# Patient Record
Sex: MALE | Race: WHITE | NOT HISPANIC OR LATINO | Employment: UNEMPLOYED | URBAN - NONMETROPOLITAN AREA
[De-identification: names, ages, dates, MRNs, and addresses within clinical notes are randomized per-mention and may not be internally consistent; named-entity substitution may affect disease eponyms.]

---

## 2017-01-03 ENCOUNTER — TELEPHONE (OUTPATIENT)
Dept: UROLOGY | Facility: CLINIC | Age: 47
End: 2017-01-03

## 2017-01-03 NOTE — TELEPHONE ENCOUNTER
The patient denies any fever, chills, hematuria, N&V or suprapubic abdominal pain. I offered the patient a nursing appt for urine culture and he declined. He stated he just wanted some antibiotics. I advised him that he would have to be seen and evaluated. He stated he will just play it by ear and may call us back. I advised the patient if symptoms worsen or persist to seek medical treatment and he verbalized understanding.

## 2017-01-03 NOTE — TELEPHONE ENCOUNTER
----- Message from Anabelle Posadas sent at 1/3/2017  9:17 AM CST -----  PLEASE CALL PT HIS HAVING BURNER AND BACK PAIN.    THANKS

## 2017-02-08 ENCOUNTER — PROCEDURE VISIT (OUTPATIENT)
Dept: UROLOGY | Facility: CLINIC | Age: 47
End: 2017-02-08

## 2017-02-08 VITALS — HEIGHT: 71 IN | TEMPERATURE: 97.3 F | WEIGHT: 215 LBS | BODY MASS INDEX: 30.1 KG/M2

## 2017-02-08 DIAGNOSIS — C67.9 MALIGNANT NEOPLASM OF URINARY BLADDER, UNSPECIFIED SITE (HCC): Primary | ICD-10-CM

## 2017-02-08 LAB
BILIRUB BLD-MCNC: NEGATIVE MG/DL
CLARITY, POC: CLEAR
COLOR UR: YELLOW
GLUCOSE UR STRIP-MCNC: NEGATIVE MG/DL
KETONES UR QL: NEGATIVE
LEUKOCYTE EST, POC: ABNORMAL
NITRITE UR-MCNC: NEGATIVE MG/ML
PH UR: 7 [PH] (ref 5–8)
PROT UR STRIP-MCNC: NEGATIVE MG/DL
RBC # UR STRIP: ABNORMAL /UL
SP GR UR: 1.02 (ref 1–1.03)
UROBILINOGEN UR QL: NORMAL

## 2017-02-08 PROCEDURE — 99212 OFFICE O/P EST SF 10 MIN: CPT | Performed by: UROLOGY

## 2017-02-08 PROCEDURE — 81003 URINALYSIS AUTO W/O SCOPE: CPT | Performed by: UROLOGY

## 2017-02-08 PROCEDURE — 51720 TREATMENT OF BLADDER LESION: CPT | Performed by: UROLOGY

## 2017-02-08 NOTE — PROGRESS NOTES
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 A  minimal amount of clear urine returned. One vial of Shahbaz strain BCG with a lot number [id number] and expiration date jul 05 17 is slowly instilled into the urinary bladder. The patient tolerated this well.     Post procedural instructions have been given to the patient.   This procedure was performed by Dr. Wolfe.    Will do office cystoscopy in six weeks.     Microscopic Urinalysis  I inspected the urine myself based on the clinical situation including the dipstick urine. The urine is spun in a centrifuge for three minutes. The spun urine shows 7-12 rbc/hpf, 0-2 wbc/hpf, none epi/hpf, negative bacteria, negative crystals, and negative casts.

## 2017-02-15 ENCOUNTER — PROCEDURE VISIT (OUTPATIENT)
Dept: UROLOGY | Facility: CLINIC | Age: 47
End: 2017-02-15

## 2017-02-15 VITALS — TEMPERATURE: 97.2 F | BODY MASS INDEX: 29.96 KG/M2 | HEIGHT: 71 IN | WEIGHT: 214 LBS

## 2017-02-15 DIAGNOSIS — C67.8 MALIGNANT NEOPLASM OF OVERLAPPING SITES OF BLADDER (HCC): Primary | ICD-10-CM

## 2017-02-15 LAB
BILIRUB BLD-MCNC: NEGATIVE MG/DL
CLARITY, POC: CLEAR
COLOR UR: YELLOW
GLUCOSE UR STRIP-MCNC: NEGATIVE MG/DL
KETONES UR QL: NEGATIVE
LEUKOCYTE EST, POC: ABNORMAL
NITRITE UR-MCNC: NEGATIVE MG/ML
PH UR: 6 [PH] (ref 5–8)
PROT UR STRIP-MCNC: NEGATIVE MG/DL
RBC # UR STRIP: ABNORMAL /UL
SP GR UR: 1 (ref 1–1.03)
UROBILINOGEN UR QL: NORMAL

## 2017-02-15 PROCEDURE — 81003 URINALYSIS AUTO W/O SCOPE: CPT | Performed by: UROLOGY

## 2017-02-15 PROCEDURE — 51720 TREATMENT OF BLADDER LESION: CPT | Performed by: UROLOGY

## 2017-02-15 PROCEDURE — 99211 OFF/OP EST MAY X REQ PHY/QHP: CPT | Performed by: UROLOGY

## 2017-02-17 NOTE — PROGRESS NOTES
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  A small amount of clear urine returned.  One vial of Tab strain BCG with a lot number [id number] and expiration date 07/05/17 is slowly instilled into the urinary bladder. The patient tolerated this well.     Post procedural instructions have been given to the patient.   This procedure was performed by Dr. Wolfe.

## 2017-02-22 ENCOUNTER — PROCEDURE VISIT (OUTPATIENT)
Dept: UROLOGY | Facility: CLINIC | Age: 47
End: 2017-02-22

## 2017-02-22 VITALS — WEIGHT: 214 LBS | HEIGHT: 71 IN | BODY MASS INDEX: 29.96 KG/M2 | TEMPERATURE: 97.3 F

## 2017-02-22 DIAGNOSIS — C67.9 MALIGNANT NEOPLASM OF URINARY BLADDER, UNSPECIFIED SITE (HCC): Primary | ICD-10-CM

## 2017-02-22 LAB
BILIRUB BLD-MCNC: NEGATIVE MG/DL
CLARITY, POC: CLEAR
COLOR UR: YELLOW
GLUCOSE UR STRIP-MCNC: NEGATIVE MG/DL
KETONES UR QL: NEGATIVE
LEUKOCYTE EST, POC: ABNORMAL
NITRITE UR-MCNC: NEGATIVE MG/ML
PH UR: 6.5 [PH] (ref 5–8)
PROT UR STRIP-MCNC: NEGATIVE MG/DL
RBC # UR STRIP: ABNORMAL /UL
SP GR UR: 1 (ref 1–1.03)
UROBILINOGEN UR QL: NORMAL

## 2017-02-22 PROCEDURE — 99211 OFF/OP EST MAY X REQ PHY/QHP: CPT | Performed by: UROLOGY

## 2017-02-22 PROCEDURE — 51720 TREATMENT OF BLADDER LESION: CPT | Performed by: UROLOGY

## 2017-02-22 PROCEDURE — 81003 URINALYSIS AUTO W/O SCOPE: CPT | Performed by: UROLOGY

## 2017-02-22 NOTE — PROGRESS NOTES
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  A small amount of clear urine returned.  One vial of [place] strain BCG with a lot number [id number] and expiration date 7/19/17 is slowly instilled into the urinary bladder. The patient tolerated this well.     Post procedural instructions have been given to the patient.   This procedure was performed by Dr. Wolfe.  Needs cystoscopy in three week.

## 2017-02-25 LAB
BACTERIA UR CULT: ABNORMAL
BACTERIA UR CULT: ABNORMAL
OTHER ANTIBIOTIC SUSC ISLT: ABNORMAL

## 2017-02-27 ENCOUNTER — TELEPHONE (OUTPATIENT)
Dept: UROLOGY | Facility: CLINIC | Age: 47
End: 2017-02-27

## 2017-02-27 NOTE — TELEPHONE ENCOUNTER
----- Message from Anabelle Posadas sent at 2/27/2017 10:26 AM CST -----  Contact: [phone]  Patient wants to know the results of his urine test from last week. Please call him at [phone].      Thanks

## 2017-02-27 NOTE — TELEPHONE ENCOUNTER
Tried to contact patient and let him know that his culture was back and that Dr. Wolfe hasnt looked at it yet but it is positive and needs antibiotics. ML

## 2017-02-28 ENCOUNTER — TELEPHONE (OUTPATIENT)
Dept: UROLOGY | Facility: CLINIC | Age: 47
End: 2017-02-28

## 2017-02-28 DIAGNOSIS — N30.00 ACUTE CYSTITIS WITHOUT HEMATURIA: Primary | ICD-10-CM

## 2017-02-28 RX ORDER — SULFAMETHOXAZOLE AND TRIMETHOPRIM 400; 80 MG/1; MG/1
1 TABLET ORAL 2 TIMES DAILY
Qty: 14 TABLET | Refills: 0 | Status: SHIPPED | OUTPATIENT
Start: 2017-02-28 | End: 2017-03-28

## 2017-02-28 NOTE — TELEPHONE ENCOUNTER
----- Message from Brandon Wolfe MD sent at 2/28/2017  6:28 AM CST -----  Contact: [phone]  Thank you.  This came to me as a result note as well and so I called in a prescription on this.  I routed that to Eloisa.  ----- Message -----     From: Nevaeh Yang MA     Sent: 2/27/2017   2:52 PM       To: Brandon Wolfe MD    Patients culture is back, what would you like me to call in?  ----- Message -----     From: Anabelle Posadas     Sent: 2/27/2017  10:26 AM       To: Nevaeh Yang MA    Patient wants to know the results of his urine test from last week. Please call him at [phone].      Thanks

## 2017-02-28 NOTE — TELEPHONE ENCOUNTER
----- Message from Brandon Wolfe MD sent at 2/28/2017  6:17 AM CST -----  I reviewed this urine culture result.  I will want him to start therapy before we treat him with the next BCG treatment.  I will send Bactrim DS for 7 days into his pharmacy.  He needs to take at least 3 days before his next treatment.  I will ask Ms. Rose to contact him.

## 2017-02-28 NOTE — TELEPHONE ENCOUNTER
Patient was advised and verbalized understanding. He stated that all questions were answered. Patient was advised to call our office if he had any further questions or concerns.

## 2017-02-28 NOTE — PROGRESS NOTES
I reviewed this urine culture result.  I will want him to start therapy before we treat him with the next BCG treatment.  I will send Bactrim DS for 7 days into his pharmacy.  He needs to take at least 3 days before his next treatment.  I will ask MsPapito Milton to contact him.

## 2017-03-23 ENCOUNTER — PROCEDURE VISIT (OUTPATIENT)
Dept: UROLOGY | Facility: CLINIC | Age: 47
End: 2017-03-23

## 2017-03-23 DIAGNOSIS — D09.0 CARCINOMA IN SITU OF BLADDER: Primary | ICD-10-CM

## 2017-03-23 LAB
BILIRUB BLD-MCNC: NEGATIVE MG/DL
CLARITY, POC: CLEAR
COLOR UR: YELLOW
GLUCOSE UR STRIP-MCNC: NEGATIVE MG/DL
KETONES UR QL: NEGATIVE
LEUKOCYTE EST, POC: ABNORMAL
NITRITE UR-MCNC: NEGATIVE MG/ML
PH UR: 7 [PH] (ref 5–8)
PROT UR STRIP-MCNC: NEGATIVE MG/DL
RBC # UR STRIP: ABNORMAL /UL
SP GR UR: 1.01 (ref 1–1.03)
UROBILINOGEN UR QL: NORMAL

## 2017-03-23 PROCEDURE — 81003 URINALYSIS AUTO W/O SCOPE: CPT | Performed by: UROLOGY

## 2017-03-23 PROCEDURE — 52000 CYSTOURETHROSCOPY: CPT | Performed by: UROLOGY

## 2017-03-23 PROCEDURE — 99214 OFFICE O/P EST MOD 30 MIN: CPT | Performed by: UROLOGY

## 2017-03-23 NOTE — PROGRESS NOTES
Mr. Tafoya is 47 y.o. male      CC: I am here for the doctor to look at my bladder    Cystoscopy procedure note  Pre- operative diagnosis:  Bladder cancer surveillance    Post operative diagnosis:  Probable carcinoma in situ bladder recurrence    Procedure:  The patient was prepped and draped in a normal sterile fashion.  The urethra was anesthetized with 2% lidocaine jelly.  A flexible cystoscope was introduced per urethra.      Urethra:  Normal and No urethral stricture    Bladder:  mild trabeculation, No bladder neck contracture and concerning finding of erythematous bladder mucosa that is consistent with past history of urothelial carcinoma in situ of bladder.  I can see in the right posterior lateral wall and there is still significant erythema that surrounds the previous biopsy sites that were also fulgurated.    Ureteral orifices:  Normal position bilaterally and Clear efflux bilaterally    Prostate:  lateral lobe hypertrophy    Patient tolerated the procedure well    Complications: none    Blood loss: minimal    Lamberto was seen today for bladder cancer.    Diagnoses and all orders for this visit:    Carcinoma in situ of bladder  -     POC Urinalysis Dipstick, Automated  -     lidocaine (XYLOCAINE) 2 % jelly; Apply  topically As Needed for Mild Pain (1-3).  -     Case Request; Standing  -     CBC (No Diff); Future  -     Comprehensive Metabolic Panel; Future  -     Urinalysis With Culture if Indicated; Future  -     ceFAZolin (ANCEF) 2 g in sodium chloride 0.9 % 100 mL IVPB; Infuse 2 g into a venous catheter 1 (One) Time.  -     Case Request    Other orders  -     Follow Anesthesia Guidelines / Standing Orders; Future  -     Verify NPO Status; Standing  -     Obtain Informed Consent; Standing        Given these findings, I had to evaluate the patient to assess fitness for surgery, formulate and discussa plan, that included alternatives, risks and benefits of management.. This went well beyond the typical  description of procedural findings and therefore an additional evaluation and management was required.    HPI  Bladder Cancer  The patient presents today with urothelial cancer of the bladder. This is a recurrence of a previous diagnosis diagnosis.. The patient was initially diagnosed 5 month(s) ago. Severity is best is explained as carcinoma in situ. Previous management includes TURBT, Fulguration and BCG intravesical therapy. Symptoms include irritative symptoms including urgency.  Last upper tract imaging was retrograde ureteropyelogram done approximately  4  month(s) ago.     The following portions of the patient's history were reviewed and updated as appropriate: allergies, current medications, past family history, past medical history, past social history, past surgical history and problem list.    Review of Systems   Constitutional: Positive for fatigue. Negative for chills and fever.   Gastrointestinal: Negative for abdominal pain, anal bleeding and blood in stool.   Genitourinary: Positive for urgency. Negative for flank pain and hematuria.         Current Outpatient Prescriptions:   •  atenolol (TENORMIN) 50 MG tablet, Take 50 mg by mouth daily., Disp: , Rfl:   •  BREO ELLIPTA 100-25 MCG/INH aerosol powder , Inhale 1 puff Daily., Disp: , Rfl: 3  •  buPROPion (WELLBUTRIN) 75 MG tablet, Take 100 mg by mouth 2 (Two) Times a Day., Disp: , Rfl:   •  cloNIDine (CATAPRES) 0.1 MG tablet, Take 0.1 mg by mouth 2 (two) times a day., Disp: , Rfl:   •  sulfamethoxazole-trimethoprim (BACTRIM,SEPTRA) 400-80 MG tablet, Take 1 tablet by mouth 2 (Two) Times a Day., Disp: 14 tablet, Rfl: 0  •  tamsulosin (FLOMAX) 0.4 MG capsule 24 hr capsule, Take 1 capsule by mouth every night., Disp: , Rfl:   •  venlafaxine (EFFEXOR) 75 MG tablet, Take 75 mg by mouth 2 (Two) Times a Day As Needed (anxiety)., Disp: , Rfl:     Current Facility-Administered Medications:   •  lidocaine (XYLOCAINE) 2 % jelly, , Topical, PRN, Brandon Wolfe,  MD    Past Medical History:   Diagnosis Date   • Anxiety    • Bladder carcinoma    • History of pneumonia 2011   • Hypertension    • Overweight    • Smoking    • Urinary retention    • Wheezing     r/t smoking       Past Surgical History:   Procedure Laterality Date   • BACK SURGERY      x3   • BLADDER TUMOR EXCISION  2016   • CYSTOSCOPY BLADDER BIOPSY N/A 11/30/2016    Procedure: CYSTOSCOPY BLADDER BIOPSY fulgeration of 3cm area of bladder;  Surgeon: Brandon Wolfe MD;  Location: [facility] OR;  Service:        Social History     Social History   • Marital status:      Spouse name: N/A   • Number of children: N/A   • Years of education: N/A     Social History Main Topics   • Smoking status: Current Every Day Smoker     Packs/day: 1.00     Years: 25.00     Types: Cigarettes   • Smokeless tobacco: Not on file   • Alcohol use No   • Drug use: No   • Sexual activity: Not on file     Other Topics Concern   • Not on file     Social History Narrative       No family history on file.    There were no vitals taken for this visit.    Physical Exam  Constitutional: The patient  is oriented to person, place, and time. They  appear well-developed and well-nourished. No distress.   Pulmonary/Chest: Effort normal.   Abdominal: Soft. The patient exhibits no distension and no mass. There is no tenderness. There is no rebound and no guarding. No hernia.   Neurological: Patient is alert and oriented to person, place, and time.   Skin: Skin is warm and dry. Not diaphoretic.   Psychiatric:  normal mood and affect.   ; penis and testicles are without mass.  Scrotum also has no lesions.      Results for orders placed or performed in visit on 03/23/17   POC Urinalysis Dipstick, Automated   Result Value Ref Range    Color Yellow Yellow, Straw, Dark Yellow, Lilibeth    Clarity, UA Clear Clear    Glucose, UA Negative Negative, 1000 mg/dL (3+) mg/dL    Bilirubin Negative Negative    Ketones, UA Negative Negative    Specific Gravity  1.010  1.005 - 1.030    Blood, UA Large (A) Negative    pH, Urine 7.0 5.0 - 8.0    Protein, POC Negative Negative mg/dL    Urobilinogen, UA Normal Normal    Leukocytes Trace (A) Negative    Nitrite, UA Negative Negative   Microscopic Urinalysis  I inspected the urine myself based on the clinical situation including the dipstick urine. The urine is spun in a centrifuge for three minutes. The spun urine shows 3-6 rbc/hpf, 0-2 wbc/hpf, none epi/hpf, negative bacteria, negative crystals, and negative casts.         Assessment and Plan  Lamberto was seen today for bladder cancer.    Diagnoses and all orders for this visit:    Carcinoma in situ of bladder  -     POC Urinalysis Dipstick, Automated  -     lidocaine (XYLOCAINE) 2 % jelly; Apply  topically As Needed for Mild Pain (1-3).  -     Case Request; Standing  -     CBC (No Diff); Future  -     Comprehensive Metabolic Panel; Future  -     Urinalysis With Culture if Indicated; Future  -     ceFAZolin (ANCEF) 2 g in sodium chloride 0.9 % 100 mL IVPB; Infuse 2 g into a venous catheter 1 (One) Time.  -     Case Request    Other orders  -     Follow Anesthesia Guidelines / Standing Orders; Future  -     Verify NPO Status; Standing  -     Obtain Informed Consent; Standing     Finding is concerning for recurrent episode of carcinoma in situ.  There are no papillary lesions.  He had an initial failure to induction BCG but responded to repeat of induction BCG.  He needs to undergo cystoscopy with biopsy followed by fulguration.  If he does have a recurrence of carcinoma in situ I will likely seek second opinion at [facility] from . Radical cystectomy would be a very reasonable option for him. Other options would include BCG + interferon vs. Intravesical Valstar. This is a severe exacerbation of a chronic issue.    Brandon Wolfe MD  03/23/17  9:58 AM    EMR Dragon/Transcription disclaimer:  Much of this encounter note is an electronic transcription/translation of  spoken language to printed text. The electronic translation of spoken language may permit erroneous, or at times, nonsensical words or phrases to be inadvertently transcribed; although I have reviewed the note for such errors, some may still exist.       Cc:

## 2017-03-28 ENCOUNTER — RESULTS ENCOUNTER (OUTPATIENT)
Dept: UROLOGY | Facility: CLINIC | Age: 47
End: 2017-03-28

## 2017-03-28 ENCOUNTER — APPOINTMENT (OUTPATIENT)
Dept: PREADMISSION TESTING | Facility: HOSPITAL | Age: 47
End: 2017-03-28

## 2017-03-28 VITALS
WEIGHT: 212 LBS | HEIGHT: 70 IN | SYSTOLIC BLOOD PRESSURE: 160 MMHG | HEART RATE: 70 BPM | BODY MASS INDEX: 30.35 KG/M2 | OXYGEN SATURATION: 97 % | DIASTOLIC BLOOD PRESSURE: 77 MMHG

## 2017-03-28 DIAGNOSIS — D09.0 CARCINOMA IN SITU OF BLADDER: ICD-10-CM

## 2017-03-28 LAB
ALBUMIN SERPL-MCNC: 4.2 G/DL (ref 3.5–5)
ALBUMIN/GLOB SERPL: 1.2 G/DL (ref 1.1–2.5)
ALP SERPL-CCNC: 86 U/L (ref 24–120)
ALT SERPL W P-5'-P-CCNC: 29 U/L (ref 0–54)
ANION GAP SERPL CALCULATED.3IONS-SCNC: 10 MMOL/L (ref 4–13)
AST SERPL-CCNC: 35 U/L (ref 7–45)
BACTERIA UR QL AUTO: ABNORMAL /HPF
BILIRUB SERPL-MCNC: 0.5 MG/DL (ref 0.1–1)
BILIRUB UR QL STRIP: NEGATIVE
BUN BLD-MCNC: 11 MG/DL (ref 5–21)
BUN/CREAT SERPL: 13.1 (ref 7–25)
CALCIUM SPEC-SCNC: 9.1 MG/DL (ref 8.4–10.4)
CHLORIDE SERPL-SCNC: 99 MMOL/L (ref 98–110)
CLARITY UR: CLEAR
CO2 SERPL-SCNC: 28 MMOL/L (ref 24–31)
COLOR UR: ABNORMAL
CREAT BLD-MCNC: 0.84 MG/DL (ref 0.5–1.4)
DEPRECATED RDW RBC AUTO: 37.3 FL (ref 40–54)
ERYTHROCYTE [DISTWIDTH] IN BLOOD BY AUTOMATED COUNT: 12.3 % (ref 12–15)
GFR SERPL CREATININE-BSD FRML MDRD: 97 ML/MIN/1.73
GLOBULIN UR ELPH-MCNC: 3.5 GM/DL
GLUCOSE BLD-MCNC: 105 MG/DL (ref 70–100)
GLUCOSE UR STRIP-MCNC: NEGATIVE MG/DL
HCT VFR BLD AUTO: 43 % (ref 40–52)
HGB BLD-MCNC: 15.2 G/DL (ref 14–18)
HGB UR QL STRIP.AUTO: ABNORMAL
HYALINE CASTS UR QL AUTO: ABNORMAL /LPF
KETONES UR QL STRIP: NEGATIVE
LEUKOCYTE ESTERASE UR QL STRIP.AUTO: ABNORMAL
MCH RBC QN AUTO: 29.3 PG (ref 28–32)
MCHC RBC AUTO-ENTMCNC: 35.3 G/DL (ref 33–36)
MCV RBC AUTO: 83 FL (ref 82–95)
NITRITE UR QL STRIP: NEGATIVE
PH UR STRIP.AUTO: 6 [PH] (ref 5–8)
PLATELET # BLD AUTO: 173 10*3/MM3 (ref 130–400)
PMV BLD AUTO: 10.6 FL (ref 6–12)
POTASSIUM BLD-SCNC: 4 MMOL/L (ref 3.5–5.3)
PROT SERPL-MCNC: 7.7 G/DL (ref 6.3–8.7)
PROT UR QL STRIP: NEGATIVE
RBC # BLD AUTO: 5.18 10*6/MM3 (ref 4.8–5.9)
RBC # UR: ABNORMAL /HPF
REF LAB TEST METHOD: ABNORMAL
SODIUM BLD-SCNC: 137 MMOL/L (ref 135–145)
SP GR UR STRIP: <=1.005 (ref 1–1.03)
SQUAMOUS #/AREA URNS HPF: ABNORMAL /HPF
UROBILINOGEN UR QL STRIP: ABNORMAL
WBC NRBC COR # BLD: 8.66 10*3/MM3 (ref 4.8–10.8)
WBC UR QL AUTO: ABNORMAL /HPF

## 2017-03-28 PROCEDURE — 93010 ELECTROCARDIOGRAM REPORT: CPT | Performed by: INTERNAL MEDICINE

## 2017-03-28 RX ORDER — CEPHALEXIN 250 MG/1
250 CAPSULE ORAL 4 TIMES DAILY
Status: ON HOLD | COMMUNITY
End: 2017-03-31

## 2017-03-28 RX ORDER — HYDROCODONE BITARTRATE AND ACETAMINOPHEN 10; 325 MG/1; MG/1
1 TABLET ORAL EVERY 4 HOURS PRN
COMMUNITY
End: 2017-04-06

## 2017-03-30 LAB — BACTERIA SPEC AEROBE CULT: NORMAL

## 2017-03-31 ENCOUNTER — HOSPITAL ENCOUNTER (OUTPATIENT)
Facility: HOSPITAL | Age: 47
Setting detail: HOSPITAL OUTPATIENT SURGERY
Discharge: HOME | End: 2017-03-31
Attending: UROLOGY | Admitting: UROLOGY

## 2017-03-31 ENCOUNTER — ANESTHESIA EVENT (OUTPATIENT)
Dept: PERIOP | Facility: HOSPITAL | Age: 47
End: 2017-03-31

## 2017-03-31 ENCOUNTER — ANESTHESIA (OUTPATIENT)
Dept: PERIOP | Facility: HOSPITAL | Age: 47
End: 2017-03-31

## 2017-03-31 VITALS
HEART RATE: 76 BPM | DIASTOLIC BLOOD PRESSURE: 60 MMHG | OXYGEN SATURATION: 95 % | TEMPERATURE: 98.3 F | SYSTOLIC BLOOD PRESSURE: 177 MMHG | RESPIRATION RATE: 14 BRPM

## 2017-03-31 DIAGNOSIS — D09.0 CARCINOMA IN SITU OF BLADDER: ICD-10-CM

## 2017-03-31 PROCEDURE — 25010000002 HYDROMORPHONE PER 4 MG: Performed by: NURSE ANESTHETIST, CERTIFIED REGISTERED

## 2017-03-31 PROCEDURE — 25010000002 PROPOFOL 10 MG/ML EMULSION: Performed by: NURSE ANESTHETIST, CERTIFIED REGISTERED

## 2017-03-31 PROCEDURE — 25010000002 DEXAMETHASONE PER 1 MG: Performed by: ANESTHESIOLOGY

## 2017-03-31 PROCEDURE — 52235 CYSTOSCOPY AND TREATMENT: CPT | Performed by: UROLOGY

## 2017-03-31 PROCEDURE — 25010000002 MIDAZOLAM PER 1 MG: Performed by: ANESTHESIOLOGY

## 2017-03-31 PROCEDURE — 25010000002 MORPHINE (PF) 10 MG/ML SOLUTION: Performed by: NURSE ANESTHETIST, CERTIFIED REGISTERED

## 2017-03-31 PROCEDURE — 25010000002 SUCCINYLCHOLINE PER 20 MG: Performed by: NURSE ANESTHETIST, CERTIFIED REGISTERED

## 2017-03-31 PROCEDURE — 88307 TISSUE EXAM BY PATHOLOGIST: CPT | Performed by: UROLOGY

## 2017-03-31 RX ORDER — DIPHENHYDRAMINE HYDROCHLORIDE 50 MG/ML
12.5 INJECTION INTRAMUSCULAR; INTRAVENOUS
Status: DISCONTINUED | OUTPATIENT
Start: 2017-03-31 | End: 2017-03-31 | Stop reason: HOSPADM

## 2017-03-31 RX ORDER — SODIUM CHLORIDE 0.9 % (FLUSH) 0.9 %
1-10 SYRINGE (ML) INJECTION AS NEEDED
Status: DISCONTINUED | OUTPATIENT
Start: 2017-03-31 | End: 2017-03-31 | Stop reason: HOSPADM

## 2017-03-31 RX ORDER — SORBITOL 30 G/1000ML
IRRIGANT IRRIGATION AS NEEDED
Status: DISCONTINUED | OUTPATIENT
Start: 2017-03-31 | End: 2017-03-31 | Stop reason: HOSPADM

## 2017-03-31 RX ORDER — ROCURONIUM BROMIDE 10 MG/ML
INJECTION, SOLUTION INTRAVENOUS AS NEEDED
Status: DISCONTINUED | OUTPATIENT
Start: 2017-03-31 | End: 2017-03-31 | Stop reason: SURG

## 2017-03-31 RX ORDER — LIDOCAINE HYDROCHLORIDE 40 MG/ML
SOLUTION TOPICAL AS NEEDED
Status: DISCONTINUED | OUTPATIENT
Start: 2017-03-31 | End: 2017-03-31 | Stop reason: SURG

## 2017-03-31 RX ORDER — MAGNESIUM HYDROXIDE 1200 MG/15ML
LIQUID ORAL AS NEEDED
Status: DISCONTINUED | OUTPATIENT
Start: 2017-03-31 | End: 2017-03-31 | Stop reason: HOSPADM

## 2017-03-31 RX ORDER — HYDROMORPHONE HCL 110MG/55ML
PATIENT CONTROLLED ANALGESIA SYRINGE INTRAVENOUS AS NEEDED
Status: DISCONTINUED | OUTPATIENT
Start: 2017-03-31 | End: 2017-03-31 | Stop reason: SURG

## 2017-03-31 RX ORDER — LABETALOL HYDROCHLORIDE 5 MG/ML
5 INJECTION, SOLUTION INTRAVENOUS
Status: DISCONTINUED | OUTPATIENT
Start: 2017-03-31 | End: 2017-03-31 | Stop reason: HOSPADM

## 2017-03-31 RX ORDER — SODIUM CHLORIDE, SODIUM LACTATE, POTASSIUM CHLORIDE, CALCIUM CHLORIDE 600; 310; 30; 20 MG/100ML; MG/100ML; MG/100ML; MG/100ML
9 INJECTION, SOLUTION INTRAVENOUS CONTINUOUS
Status: DISCONTINUED | OUTPATIENT
Start: 2017-03-31 | End: 2017-03-31 | Stop reason: HOSPADM

## 2017-03-31 RX ORDER — HYDRALAZINE HYDROCHLORIDE 20 MG/ML
5 INJECTION INTRAMUSCULAR; INTRAVENOUS
Status: DISCONTINUED | OUTPATIENT
Start: 2017-03-31 | End: 2017-03-31 | Stop reason: HOSPADM

## 2017-03-31 RX ORDER — LIDOCAINE HYDROCHLORIDE 20 MG/ML
INJECTION, SOLUTION INFILTRATION; PERINEURAL AS NEEDED
Status: DISCONTINUED | OUTPATIENT
Start: 2017-03-31 | End: 2017-03-31 | Stop reason: SURG

## 2017-03-31 RX ORDER — MORPHINE SULFATE 10 MG/ML
INJECTION, SOLUTION INTRAMUSCULAR; INTRAVENOUS AS NEEDED
Status: DISCONTINUED | OUTPATIENT
Start: 2017-03-31 | End: 2017-03-31 | Stop reason: SURG

## 2017-03-31 RX ORDER — ONDANSETRON 2 MG/ML
4 INJECTION INTRAMUSCULAR; INTRAVENOUS ONCE AS NEEDED
Status: DISCONTINUED | OUTPATIENT
Start: 2017-03-31 | End: 2017-03-31 | Stop reason: HOSPADM

## 2017-03-31 RX ORDER — GLYCOPYRROLATE 0.2 MG/ML
INJECTION INTRAMUSCULAR; INTRAVENOUS AS NEEDED
Status: DISCONTINUED | OUTPATIENT
Start: 2017-03-31 | End: 2017-03-31 | Stop reason: SURG

## 2017-03-31 RX ORDER — IPRATROPIUM BROMIDE AND ALBUTEROL SULFATE 2.5; .5 MG/3ML; MG/3ML
3 SOLUTION RESPIRATORY (INHALATION) ONCE AS NEEDED
Status: DISCONTINUED | OUTPATIENT
Start: 2017-03-31 | End: 2017-03-31 | Stop reason: HOSPADM

## 2017-03-31 RX ORDER — SUCCINYLCHOLINE CHLORIDE 20 MG/ML
INJECTION INTRAMUSCULAR; INTRAVENOUS AS NEEDED
Status: DISCONTINUED | OUTPATIENT
Start: 2017-03-31 | End: 2017-03-31 | Stop reason: SURG

## 2017-03-31 RX ORDER — MORPHINE SULFATE 2 MG/ML
2 INJECTION, SOLUTION INTRAMUSCULAR; INTRAVENOUS
Status: DISCONTINUED | OUTPATIENT
Start: 2017-03-31 | End: 2017-03-31 | Stop reason: HOSPADM

## 2017-03-31 RX ORDER — GLYCOPYRROLATE 0.2 MG/ML
INJECTION INTRAMUSCULAR; INTRAVENOUS AS NEEDED
Status: DISCONTINUED | OUTPATIENT
Start: 2017-03-31 | End: 2017-03-31

## 2017-03-31 RX ORDER — MIDAZOLAM HYDROCHLORIDE 1 MG/ML
1 INJECTION INTRAMUSCULAR; INTRAVENOUS
Status: DISCONTINUED | OUTPATIENT
Start: 2017-03-31 | End: 2017-03-31 | Stop reason: HOSPADM

## 2017-03-31 RX ORDER — DEXTROSE MONOHYDRATE 25 G/50ML
12.5 INJECTION, SOLUTION INTRAVENOUS AS NEEDED
Status: DISCONTINUED | OUTPATIENT
Start: 2017-03-31 | End: 2017-03-31 | Stop reason: HOSPADM

## 2017-03-31 RX ORDER — MIDAZOLAM HYDROCHLORIDE 1 MG/ML
2 INJECTION INTRAMUSCULAR; INTRAVENOUS
Status: DISCONTINUED | OUTPATIENT
Start: 2017-03-31 | End: 2017-03-31 | Stop reason: HOSPADM

## 2017-03-31 RX ORDER — DEXAMETHASONE SODIUM PHOSPHATE 4 MG/ML
4 INJECTION, SOLUTION INTRA-ARTICULAR; INTRALESIONAL; INTRAMUSCULAR; INTRAVENOUS; SOFT TISSUE ONCE AS NEEDED
Status: COMPLETED | OUTPATIENT
Start: 2017-03-31 | End: 2017-03-31

## 2017-03-31 RX ORDER — NALOXONE HCL 0.4 MG/ML
0.4 VIAL (ML) INJECTION AS NEEDED
Status: DISCONTINUED | OUTPATIENT
Start: 2017-03-31 | End: 2017-03-31 | Stop reason: HOSPADM

## 2017-03-31 RX ORDER — PROPOFOL 10 MG/ML
VIAL (ML) INTRAVENOUS AS NEEDED
Status: DISCONTINUED | OUTPATIENT
Start: 2017-03-31 | End: 2017-03-31 | Stop reason: SURG

## 2017-03-31 RX ORDER — FENTANYL CITRATE 50 UG/ML
25 INJECTION, SOLUTION INTRAMUSCULAR; INTRAVENOUS
Status: DISCONTINUED | OUTPATIENT
Start: 2017-03-31 | End: 2017-03-31 | Stop reason: HOSPADM

## 2017-03-31 RX ORDER — MEPERIDINE HYDROCHLORIDE 25 MG/ML
12.5 INJECTION INTRAMUSCULAR; INTRAVENOUS; SUBCUTANEOUS
Status: DISCONTINUED | OUTPATIENT
Start: 2017-03-31 | End: 2017-03-31 | Stop reason: HOSPADM

## 2017-03-31 RX ADMIN — MORPHINE SULFATE 10 MG: 10 INJECTION, SOLUTION INTRAMUSCULAR; INTRAVENOUS at 13:26

## 2017-03-31 RX ADMIN — LIDOCAINE HYDROCHLORIDE 0.5 ML: 10 INJECTION, SOLUTION EPIDURAL; INFILTRATION; INTRACAUDAL; PERINEURAL at 13:10

## 2017-03-31 RX ADMIN — LIDOCAINE HYDROCHLORIDE 1 EACH: 40 SOLUTION TOPICAL at 13:27

## 2017-03-31 RX ADMIN — SUCCINYLCHOLINE CHLORIDE 140 MG: 20 INJECTION, SOLUTION INTRAMUSCULAR; INTRAVENOUS at 13:27

## 2017-03-31 RX ADMIN — GLYCOPYRROLATE 0.4 MG: 0.2 INJECTION, SOLUTION INTRAMUSCULAR; INTRAVENOUS at 13:52

## 2017-03-31 RX ADMIN — SODIUM CHLORIDE, POTASSIUM CHLORIDE, SODIUM LACTATE AND CALCIUM CHLORIDE 9 ML/HR: 600; 310; 30; 20 INJECTION, SOLUTION INTRAVENOUS at 13:10

## 2017-03-31 RX ADMIN — MIDAZOLAM HYDROCHLORIDE 2 MG: 1 INJECTION, SOLUTION INTRAMUSCULAR; INTRAVENOUS at 13:11

## 2017-03-31 RX ADMIN — ROCURONIUM BROMIDE 10 MG: 10 INJECTION INTRAVENOUS at 13:27

## 2017-03-31 RX ADMIN — PROPOFOL 200 MG: 10 INJECTION, EMULSION INTRAVENOUS at 13:27

## 2017-03-31 RX ADMIN — HYDROMORPHONE HYDROCHLORIDE 2 MG: 2 INJECTION, SOLUTION INTRAMUSCULAR; INTRAVENOUS; SUBCUTANEOUS at 13:58

## 2017-03-31 RX ADMIN — LIDOCAINE HYDROCHLORIDE 100 MG: 20 INJECTION, SOLUTION INFILTRATION; PERINEURAL at 13:27

## 2017-03-31 RX ADMIN — DEXAMETHASONE SODIUM PHOSPHATE 4 MG: 4 INJECTION, SOLUTION INTRAMUSCULAR; INTRAVENOUS at 13:11

## 2017-03-31 RX ADMIN — SODIUM CHLORIDE, POTASSIUM CHLORIDE, SODIUM LACTATE AND CALCIUM CHLORIDE 9 ML/HR: 600; 310; 30; 20 INJECTION, SOLUTION INTRAVENOUS at 14:38

## 2017-03-31 NOTE — ANESTHESIA PREPROCEDURE EVALUATION
Anesthesia Evaluation     Patient summary reviewed   no history of anesthetic complications:  NPO Status: > 8 hours   Airway   Mallampati: III  TM distance: >3 FB  Neck ROM: full  Dental      Pulmonary    (+) a smoker, COPD,   (-) sleep apnea  Cardiovascular     ECG reviewed  Patient on routine beta blocker and Beta blocker given within 24 hours of surgery    (+) hypertension,   (-) pacemaker, past MI, angina, cardiac stents      Neuro/Psych  (-) seizures, CVA  GI/Hepatic/Renal/Endo    (+) obesity,    (-) GERD, liver disease, renal disease, diabetes    Musculoskeletal     Abdominal    Substance History      OB/GYN          Other                                  Anesthesia Plan    ASA 2     general     intravenous induction   Anesthetic plan and risks discussed with patient.

## 2017-03-31 NOTE — ANESTHESIA PROCEDURE NOTES
Airway  Urgency: elective    End Time:3/31/2017 1:28 PM  Airway not difficult    General Information and Staff    Patient location during procedure: OR  CRNA: PAMELA ARAYA    Indications and Patient Condition  Indications for airway management: airway protection    Preoxygenated: yes  MILS maintained throughout  Mask difficulty assessment: 1 - vent by mask    Final Airway Details  Final airway type: endotracheal airway      Successful airway: ETT  Cuffed: yes   Successful intubation technique: direct laryngoscopy  Facilitating devices/methods: intubating stylet  Endotracheal tube insertion site: oral  Blade: Rincon  Blade size: #2  ETT size: 7.0 mm  Cormack-Lehane Classification: grade I - full view of glottis  Placement verified by: chest auscultation, capnometry and palpation of cuff   Cuff volume (mL): 8  Measured from: teeth  ETT to teeth (cm): 23  Number of attempts at approach: 1

## 2017-03-31 NOTE — ANESTHESIA POSTPROCEDURE EVALUATION
Patient: Lamberto Tafoya    Procedure Summary     Date Anesthesia Start Anesthesia Stop Room / Location    03/31/17 1322 1417  [facility] OR 01 / [facility] OR       Procedure Diagnosis Surgeon Provider    CYSTOSCOPY , BLADDER BIOPSY, AND TRANSURETHRAL RESECTION OF BLADDER TUMOR  (N/A Bladder) Carcinoma in situ of bladder  (Carcinoma in situ of bladder [D09.0]) MD Nyla Matamoros CRNA          Anesthesia Type: general  Last vitals  BP     Temp      Pulse     Resp      SpO2        Post Anesthesia Care and Evaluation    Patient location during evaluation: PACU  Patient participation: complete - patient participated  Level of consciousness: awake and alert  Pain score: 0  Pain management: adequate  Airway patency: patent  Anesthetic complications: No anesthetic complications    Cardiovascular status: acceptable and stable  Respiratory status: acceptable and unassisted  Hydration status: stable

## 2017-04-04 LAB
CYTO UR: NORMAL
LAB AP CASE REPORT: NORMAL
LAB AP CLINICAL INFORMATION: NORMAL
LAB AP SYNOPTIC CHECKLIST: NORMAL
Lab: NORMAL
PATH REPORT.FINAL DX SPEC: NORMAL
PATH REPORT.GROSS SPEC: NORMAL

## 2017-04-06 ENCOUNTER — OFFICE VISIT (OUTPATIENT)
Dept: UROLOGY | Facility: CLINIC | Age: 47
End: 2017-04-06

## 2017-04-06 VITALS — WEIGHT: 214 LBS | TEMPERATURE: 97.5 F | BODY MASS INDEX: 29.96 KG/M2 | HEIGHT: 71 IN

## 2017-04-06 DIAGNOSIS — C67.1 CANCER OF DOME OF URINARY BLADDER (HCC): ICD-10-CM

## 2017-04-06 DIAGNOSIS — D09.0 CARCINOMA IN SITU OF BLADDER: Primary | ICD-10-CM

## 2017-04-06 PROCEDURE — 99213 OFFICE O/P EST LOW 20 MIN: CPT | Performed by: UROLOGY

## 2017-04-06 PROCEDURE — 81003 URINALYSIS AUTO W/O SCOPE: CPT | Performed by: UROLOGY

## 2017-04-06 NOTE — PROGRESS NOTES
Mr. Tafoya is 47 y.o. male    CHIEF COMPLAINT: I am here today for bladder cancer.     HPI  Bladder Cancer  The patient presents today with urothelial cancer of the bladder. This is a recurrence of a previous diagnosis diagnosis.. The patient was initially diagnosed 4 month(s) ago. Severity is best is explained as carcinoma in situ. Previous management includes TURBT and BCG intravesical therapy. Symptoms include no hematuria, dysuria or flank pain.  Last upper tract imaging was retrograde ureteropyelogram done approximately  2  Month(s) ago.     The following portions of the patient's history were reviewed and updated as appropriate: allergies, current medications, past family history, past medical history, past social history, past surgical history and problem list.    Review of Systems   Constitutional: Negative for chills and fever.   Gastrointestinal: Negative for abdominal pain, anal bleeding and blood in stool.   Genitourinary: Negative for flank pain and hematuria.       Current Outpatient Prescriptions:   •  atenolol (TENORMIN) 50 MG tablet, Take 50 mg by mouth Every Morning., Disp: , Rfl:   •  BREO ELLIPTA 100-25 MCG/INH aerosol powder , Inhale 1 puff Daily., Disp: , Rfl: 3  •  buPROPion (WELLBUTRIN) 75 MG tablet, Take 75 mg by mouth Daily., Disp: , Rfl:   •  cloNIDine (CATAPRES) 0.1 MG tablet, Take 0.1 mg by mouth 2 (Two) Times a Day. TAKES AT NOON AND 5 PM DAILY, Disp: , Rfl:   •  tamsulosin (FLOMAX) 0.4 MG capsule 24 hr capsule, Take 1 capsule by mouth every night., Disp: , Rfl:   •  venlafaxine (EFFEXOR) 75 MG tablet, Take 75 mg by mouth 2 (Two) Times a Day As Needed (anxiety)., Disp: , Rfl:     Past Medical History:   Diagnosis Date   • Anxiety    • Back pain    • Bladder carcinoma    • H/O hematuria    • History of pneumonia 2011   • Hypertension    • Overweight    • Smoking    • Urinary retention    • Wheezing     r/t smoking       Past Surgical History:   Procedure Laterality Date   • BACK  "SURGERY      x3   • BLADDER TUMOR EXCISION  2016   • CYSTOSCOPY BLADDER BIOPSY N/A 11/30/2016    Procedure: CYSTOSCOPY BLADDER BIOPSY fulgeration of 3cm area of bladder;  Surgeon: Brandon Wolfe MD;  Location:  [facility] OR;  Service:    • TRANSURETHRAL RESECTION OF BLADDER TUMOR N/A 3/31/2017    Procedure: CYSTOSCOPY , BLADDER BIOPSY, AND TRANSURETHRAL RESECTION OF BLADDER TUMOR ;  Surgeon: Brandon Wolfe MD;  Location:  [facility] OR;  Service:        Social History     Social History   • Marital status:      Spouse name: N/A   • Number of children: N/A   • Years of education: N/A     Social History Main Topics   • Smoking status: Current Every Day Smoker     Packs/day: 1.00     Years: 25.00     Types: Cigarettes   • Smokeless tobacco: None   • Alcohol use No   • Drug use: No   • Sexual activity: Not Asked     Other Topics Concern   • None     Social History Narrative       History reviewed. No pertinent family history.    Temp 97.5 °F (36.4 °C)  Ht 71\" (180.3 cm)  Wt 214 lb (97.1 kg)  BMI 29.85 kg/m2    Physical Exam  Constitutional: The patient  is oriented to person, place, and time. They  appear well-developed and well-nourished. No distress.   Pulmonary/Chest: Effort normal.   Abdominal: Soft. The patient exhibits no distension and no mass. There is no tenderness. There is no rebound and no guarding. No hernia.   Neurological: Patient is alert and oriented to person, place, and time.   Skin: Skin is warm and dry. Not diaphoretic.   Psychiatric:  normal mood and affect.   Vitals reviewed.      Results for orders placed or performed in visit on 04/06/17   POC Urinalysis Dipstick, Automated   Result Value Ref Range    Color Yellow Yellow, Straw, Dark Yellow, Lilibeth    Clarity, UA Clear Clear    Glucose, UA Negative Negative, 1000 mg/dL (3+) mg/dL    Bilirubin Negative Negative    Ketones, UA Negative Negative    Specific Gravity  1.005 1.005 - 1.030    Blood, UA Large (A) Negative    pH, Urine 6.0 5.0 - 8.0 "    Protein, POC Negative Negative mg/dL    Urobilinogen, UA Normal Normal    Leukocytes Small (1+) (A) Negative    Nitrite, UA Negative Negative       Imaging Results (last 7 days)     ** No results found for the last 168 hours. **          Assessment and Plan  Diagnoses and all orders for this visit:    Carcinoma in situ of bladder  -     POC Urinalysis Dipstick, Automated    Cancer of dome of urinary bladder     patient presents today after recent transurethral resection bladder tumor and biopsy of previous carcinoma in situ site.  Fortunately the carcinoma in situ does not show evidence recurrence.  He is new bladder tumor shows a high-grade TA lesion but there is no evidence of lamina propria or muscular invasion.  I discussed with him options including referral to a tertiary care center given his young age.  Smoking cessation is again discussed and he so far is having success.  I explained to her that it may take at least 2 years before we start see benefit from not smoking but there is clear evidence that he is more likely to keep his bladder and survive longer if he stopped smoking.    Lastly we will continue maintenance BCG is scheduled next month.  He will then need cystoscopy done in July.      Brandon Wolfe MD  04/06/17  9:37 AM    EMR Dragon/Transcription disclaimer:  Much of this encounter note is an electronic transcription/translation of spoken language to printed text. The electronic translation of spoken language may permit erroneous, or at times, nonsensical words or phrases to be inadvertently transcribed; although I have reviewed the note for such errors, some may still exist.       Cc:

## 2017-05-15 ENCOUNTER — OFFICE VISIT (OUTPATIENT)
Dept: UROLOGY | Facility: CLINIC | Age: 47
End: 2017-05-15

## 2017-05-15 VITALS
BODY MASS INDEX: 29.51 KG/M2 | DIASTOLIC BLOOD PRESSURE: 84 MMHG | SYSTOLIC BLOOD PRESSURE: 152 MMHG | TEMPERATURE: 98.6 F | HEIGHT: 71 IN | WEIGHT: 210.8 LBS

## 2017-05-15 DIAGNOSIS — C67.9 MALIGNANT NEOPLASM OF URINARY BLADDER, UNSPECIFIED SITE (HCC): Primary | ICD-10-CM

## 2017-05-15 LAB
BILIRUB BLD-MCNC: NEGATIVE MG/DL
CLARITY, POC: CLEAR
COLOR UR: YELLOW
GLUCOSE UR STRIP-MCNC: NEGATIVE MG/DL
KETONES UR QL: NEGATIVE
LEUKOCYTE EST, POC: NEGATIVE
NITRITE UR-MCNC: NEGATIVE MG/ML
PH UR: 7 [PH] (ref 5–8)
PROT UR STRIP-MCNC: NEGATIVE MG/DL
RBC # UR STRIP: ABNORMAL /UL
SP GR UR: 1.01 (ref 1–1.03)
UROBILINOGEN UR QL: NORMAL

## 2017-05-15 PROCEDURE — 51720 TREATMENT OF BLADDER LESION: CPT | Performed by: UROLOGY

## 2017-05-15 PROCEDURE — 81003 URINALYSIS AUTO W/O SCOPE: CPT | Performed by: UROLOGY

## 2017-05-15 PROCEDURE — 99212 OFFICE O/P EST SF 10 MIN: CPT | Performed by: UROLOGY

## 2017-05-23 ENCOUNTER — TELEPHONE (OUTPATIENT)
Dept: UROLOGY | Facility: CLINIC | Age: 47
End: 2017-05-23

## 2017-05-31 ENCOUNTER — OFFICE VISIT (OUTPATIENT)
Dept: UROLOGY | Facility: CLINIC | Age: 47
End: 2017-05-31

## 2017-05-31 VITALS — BODY MASS INDEX: 29.48 KG/M2 | WEIGHT: 210.6 LBS | TEMPERATURE: 97 F | HEIGHT: 71 IN

## 2017-05-31 DIAGNOSIS — D09.0 CARCINOMA IN SITU OF BLADDER: Primary | ICD-10-CM

## 2017-05-31 PROCEDURE — 99212 OFFICE O/P EST SF 10 MIN: CPT | Performed by: UROLOGY

## 2017-05-31 PROCEDURE — 81003 URINALYSIS AUTO W/O SCOPE: CPT | Performed by: UROLOGY

## 2017-05-31 PROCEDURE — 51720 TREATMENT OF BLADDER LESION: CPT | Performed by: UROLOGY

## 2017-06-12 ENCOUNTER — OFFICE VISIT (OUTPATIENT)
Dept: UROLOGY | Facility: CLINIC | Age: 47
End: 2017-06-12

## 2017-06-12 VITALS — HEIGHT: 71 IN | BODY MASS INDEX: 29.82 KG/M2 | TEMPERATURE: 97.6 F | WEIGHT: 213 LBS

## 2017-06-12 DIAGNOSIS — D09.0 CARCINOMA IN SITU OF BLADDER: Primary | ICD-10-CM

## 2017-06-12 LAB
BILIRUB BLD-MCNC: NEGATIVE MG/DL
CLARITY, POC: CLEAR
COLOR UR: YELLOW
GLUCOSE UR STRIP-MCNC: NEGATIVE MG/DL
KETONES UR QL: NEGATIVE
LEUKOCYTE EST, POC: ABNORMAL
NITRITE UR-MCNC: NEGATIVE MG/ML
PH UR: 5.5 [PH] (ref 5–8)
PROT UR STRIP-MCNC: NEGATIVE MG/DL
RBC # UR STRIP: ABNORMAL /UL
SP GR UR: 1 (ref 1–1.03)
UROBILINOGEN UR QL: NORMAL

## 2017-06-12 PROCEDURE — 99212 OFFICE O/P EST SF 10 MIN: CPT | Performed by: UROLOGY

## 2017-06-12 PROCEDURE — 81003 URINALYSIS AUTO W/O SCOPE: CPT | Performed by: UROLOGY

## 2017-06-12 PROCEDURE — 51720 TREATMENT OF BLADDER LESION: CPT | Performed by: UROLOGY

## 2017-06-12 NOTE — PROGRESS NOTES
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 A small amount of clear urine returned. One vial of [place]baz strain BCG with a lot number [id number] and expiration date 2/7/18 is slowly instilled into the urinary bladder. The patient tolerated this well.       Post procedural instructions have been given to the patient.   This procedure was performed by Dr. Wolfe.

## 2017-07-03 ENCOUNTER — PROCEDURE VISIT (OUTPATIENT)
Dept: UROLOGY | Facility: CLINIC | Age: 47
End: 2017-07-03

## 2017-07-03 DIAGNOSIS — C67.8 CANCER OF OVERLAPPING SITES OF BLADDER (HCC): ICD-10-CM

## 2017-07-03 DIAGNOSIS — D09.0 CARCINOMA IN SITU OF BLADDER: Primary | ICD-10-CM

## 2017-07-03 LAB
BILIRUB BLD-MCNC: NEGATIVE MG/DL
CLARITY, POC: CLEAR
COLOR UR: YELLOW
GLUCOSE UR STRIP-MCNC: NEGATIVE MG/DL
KETONES UR QL: NEGATIVE
LEUKOCYTE EST, POC: NEGATIVE
NITRITE UR-MCNC: NEGATIVE MG/ML
PH UR: 5.5 [PH] (ref 5–8)
PROT UR STRIP-MCNC: NEGATIVE MG/DL
RBC # UR STRIP: NEGATIVE /UL
SP GR UR: 1 (ref 1–1.03)
UROBILINOGEN UR QL: NORMAL

## 2017-07-03 PROCEDURE — 52000 CYSTOURETHROSCOPY: CPT | Performed by: UROLOGY

## 2017-07-03 PROCEDURE — 81003 URINALYSIS AUTO W/O SCOPE: CPT | Performed by: UROLOGY

## 2017-07-03 PROCEDURE — 99214 OFFICE O/P EST MOD 30 MIN: CPT | Performed by: UROLOGY

## 2017-07-03 NOTE — PROGRESS NOTES
CC: I am here for the doctor to look at my bladder    Cystoscopy procedure note  Pre- operative diagnosis:  Urothelial carcinoma of the bladder    Post operative diagnosis:  Same}     Procedure:  The patient was prepped and draped in a normal sterile fashion.  The urethra was anesthetized with 2% lidocaine jelly.  A flexible cystoscope was introduced per urethra.      Urethra:  No urethral stricture    Bladder:  He has a solid appearing lesion that does not appear new.  Its calcified and I think this may be an area of the old tumor but I have to get this out of there.  I think it is probably about 2 cm in size.  This is more towards the dome of the bladder toward the left side.  There is also infra trigonum in nature on the right side an area of erythema that looks ulcerated and also needs to be biopsied given his history of carcinoma in situ.  and moderate trabeculation    Ureteral orifices:  Clear efflux bilaterally    Prostate:  lateral lobe hypertrophy    Patient tolerated the procedure well    Complications: none    Blood loss: minimal    Diagnoses and all orders for this visit:    Carcinoma in situ of bladder  -     POC Urinalysis Dipstick, Automated  -     Case Request; Standing  -     CBC (No Diff); Future  -     Comprehensive Metabolic Panel; Future  -     Urinalysis With Culture if Indicated; Future  -     ceFAZolin (ANCEF) 2 g in sodium chloride 0.9 % 100 mL IVPB; Infuse 2 g into a venous catheter 1 (One) Time.  -     Case Request    Cancer of overlapping sites of bladder  -     Case Request; Standing  -     CBC (No Diff); Future  -     Comprehensive Metabolic Panel; Future  -     Urinalysis With Culture if Indicated; Future  -     ceFAZolin (ANCEF) 2 g in sodium chloride 0.9 % 100 mL IVPB; Infuse 2 g into a venous catheter 1 (One) Time.  -     Case Request    Other orders  -     Follow Anesthesia Guidelines / Standing Orders; Future  -     Follow Anesthesia Guidelines / Standing Orders; Standing  -      Verify NPO Status; Standing  -     Obtain Informed Consent; Standing        Follow up:    Schedule for OR  TURBT  Given these findings, I had to evaluate the patient to assess fitness for surgery, formulate and discussa plan, that included alternatives, risks and benefits of management.. This went well beyond the typical description of procedural findings and therefore an additional evaluation and management was required.      Mr. Tafoya is 47 y.o. male        HPI  Bladder Cancer  The patient presents today with urothelial cancer of the bladder. This is a recurrence of a previous diagnosis diagnosis.. The patient was initially diagnosed 1 year(s) ago. Severity is best is explained as carcinoma in situ with initial BCG failure.  Previous management includes TURBT, Fulguration and BCG intravesical therapy. Symptoms include irritative symptoms including urgency and malaise.  Last upper tract imaging was retrograde ureteropyelogram done approximately  1  year(s) ago.     The following portions of the patient's history were reviewed and updated as appropriate: allergies, current medications, past family history, past medical history, past social history, past surgical history and problem list.    Review of Systems   Constitutional: Negative for chills and fever.   Gastrointestinal: Negative for abdominal pain, anal bleeding and blood in stool.   Genitourinary: Positive for frequency and urgency. Negative for flank pain and hematuria.         Current Outpatient Prescriptions:   •  atenolol (TENORMIN) 50 MG tablet, Take 50 mg by mouth Every Morning., Disp: , Rfl:   •  BREO ELLIPTA 100-25 MCG/INH aerosol powder , Inhale 1 puff Daily., Disp: , Rfl: 3  •  buPROPion (WELLBUTRIN) 75 MG tablet, Take 75 mg by mouth Daily., Disp: , Rfl:   •  cloNIDine (CATAPRES) 0.1 MG tablet, Take 0.1 mg by mouth 2 (Two) Times a Day. TAKES AT NOON AND 5 PM DAILY, Disp: , Rfl:   •  tamsulosin (FLOMAX) 0.4 MG capsule 24 hr capsule, Take 1 capsule  by mouth every night., Disp: , Rfl:   •  venlafaxine (EFFEXOR) 75 MG tablet, Take 75 mg by mouth 2 (Two) Times a Day As Needed (anxiety)., Disp: , Rfl:     Past Medical History:   Diagnosis Date   • Anxiety    • Back pain    • Bladder carcinoma 06/2016    High grade CIS & ta low grade   • H/O hematuria    • History of pneumonia 2011   • Hypertension    • Overweight    • Smoking    • Urinary retention    • Wheezing     r/t smoking       Past Surgical History:   Procedure Laterality Date   • BACK SURGERY      x3   • CYSTOSCOPY BLADDER BIOPSY N/A 11/30/2016    Procedure: CYSTOSCOPY BLADDER BIOPSY fulgeration of 3cm area of bladder;  Surgeon: Brandon Wolfe MD;  Location:  [facility] OR;  Service:    • TRANSURETHRAL RESECTION OF BLADDER TUMOR N/A 3/31/2017    Procedure: CYSTOSCOPY , BLADDER BIOPSY, AND TRANSURETHRAL RESECTION OF BLADDER TUMOR ;  Surgeon: Brandon Wolfe MD;  Location:  [facility] OR;  Service:    • TRANSURETHRAL RESECTION OF BLADDER TUMOR  06/2016    High Grade CIS & Low grade ta disease       Social History     Social History   • Marital status:      Spouse name: N/A   • Number of children: N/A   • Years of education: N/A     Social History Main Topics   • Smoking status: Current Every Day Smoker     Packs/day: 1.00     Years: 25.00     Types: Cigarettes   • Smokeless tobacco: None   • Alcohol use No   • Drug use: No   • Sexual activity: Not Asked     Other Topics Concern   • None     Social History Narrative       Family History   Problem Relation Age of Onset   • No Known Problems Father    • No Known Problems Mother        There were no vitals taken for this visit.    Physical Exam  Constitutional: The patient  is oriented to person, place, and time. They  appear well-developed and well-nourished. No distress.   Pulmonary/Chest: Effort normal.   Abdominal: Soft. The patient exhibits no distension and no mass. There is no tenderness. There is no rebound and no guarding. No hernia.   Neurological:  Patient is alert and oriented to person, place, and time.   Skin: Skin is warm and dry. Not diaphoretic.   Psychiatric:  normal mood and affect.   Vitals reviewed.  Penis: Normal  Scrotum: No lesions  Bilateral testes are normal.     Results for orders placed or performed in visit on 07/03/17   POC Urinalysis Dipstick, Automated   Result Value Ref Range    Color Yellow Yellow, Straw, Dark Yellow, Lilibeth    Clarity, UA Clear Clear    Glucose, UA Negative Negative, 1000 mg/dL (3+) mg/dL    Bilirubin Negative Negative    Ketones, UA Negative Negative    Specific Gravity  1.005 1.005 - 1.030    Blood, UA Negative Negative    pH, Urine 5.5 5.0 - 8.0    Protein, POC Negative Negative mg/dL    Urobilinogen, UA Normal Normal    Leukocytes Negative Negative    Nitrite, UA Negative Negative       Imaging Results (last 7 days)     ** No results found for the last 168 hours. **          Assessment and Plan  Diagnoses and all orders for this visit:    Carcinoma in situ of bladder  -     POC Urinalysis Dipstick, Automated  -     Case Request; Standing  -     CBC (No Diff); Future  -     Comprehensive Metabolic Panel; Future  -     Urinalysis With Culture if Indicated; Future  -     ceFAZolin (ANCEF) 2 g in sodium chloride 0.9 % 100 mL IVPB; Infuse 2 g into a venous catheter 1 (One) Time.  -     Case Request    Cancer of overlapping sites of bladder  -     Case Request; Standing  -     CBC (No Diff); Future  -     Comprehensive Metabolic Panel; Future  -     Urinalysis With Culture if Indicated; Future  -     ceFAZolin (ANCEF) 2 g in sodium chloride 0.9 % 100 mL IVPB; Infuse 2 g into a venous catheter 1 (One) Time.  -     Case Request    Other orders  -     Follow Anesthesia Guidelines / Standing Orders; Future  -     Follow Anesthesia Guidelines / Standing Orders; Standing  -     Verify NPO Status; Standing  -     Obtain Informed Consent; Standing    #1.  I am concerned about these 2 lesions are seen in the bladder.  The lesion on  the dome is solid and appears to be in the area where I had resected a previous tumor.  It has bluish discoloration among the pink solid appearance.  There is also an ulcerated lesion that is in the right inferior lateral aspect that is very erythematous and concerning for recurrent carcinoma in situ.    #2. Plan cystoscopy/TURBT/Retrogrades and await pathology. The risks, alternatives, and benefits of this treatment recommendation are discussed.  I did answer all questions of the patient.    Brandon Wolfe MD  07/03/17  5:13 PM    Cc:

## 2017-07-08 ENCOUNTER — RESULTS ENCOUNTER (OUTPATIENT)
Dept: UROLOGY | Facility: CLINIC | Age: 47
End: 2017-07-08

## 2017-07-08 DIAGNOSIS — C67.8 CANCER OF OVERLAPPING SITES OF BLADDER (HCC): ICD-10-CM

## 2017-07-08 DIAGNOSIS — D09.0 CARCINOMA IN SITU OF BLADDER: ICD-10-CM

## 2017-07-13 ENCOUNTER — APPOINTMENT (OUTPATIENT)
Dept: PREADMISSION TESTING | Facility: HOSPITAL | Age: 47
End: 2017-07-13

## 2017-07-13 VITALS
HEART RATE: 71 BPM | OXYGEN SATURATION: 98 % | BODY MASS INDEX: 30.1 KG/M2 | DIASTOLIC BLOOD PRESSURE: 72 MMHG | WEIGHT: 215 LBS | HEIGHT: 71 IN | SYSTOLIC BLOOD PRESSURE: 187 MMHG

## 2017-07-13 DIAGNOSIS — D09.0 CARCINOMA IN SITU OF BLADDER: ICD-10-CM

## 2017-07-13 DIAGNOSIS — C67.8 CANCER OF OVERLAPPING SITES OF BLADDER (HCC): ICD-10-CM

## 2017-07-13 LAB
ALBUMIN SERPL-MCNC: 4.4 G/DL (ref 3.5–5)
ALBUMIN/GLOB SERPL: 1.5 G/DL (ref 1.1–2.5)
ALP SERPL-CCNC: 82 U/L (ref 24–120)
ALT SERPL W P-5'-P-CCNC: 38 U/L (ref 0–54)
ANION GAP SERPL CALCULATED.3IONS-SCNC: 9 MMOL/L (ref 4–13)
AST SERPL-CCNC: 29 U/L (ref 7–45)
BACTERIA UR QL AUTO: ABNORMAL /HPF
BILIRUB SERPL-MCNC: 0.6 MG/DL (ref 0.1–1)
BILIRUB UR QL STRIP: NEGATIVE
BUN BLD-MCNC: 15 MG/DL (ref 5–21)
BUN/CREAT SERPL: 16.5 (ref 7–25)
CALCIUM SPEC-SCNC: 9.3 MG/DL (ref 8.4–10.4)
CHLORIDE SERPL-SCNC: 104 MMOL/L (ref 98–110)
CLARITY UR: CLEAR
CO2 SERPL-SCNC: 28 MMOL/L (ref 24–31)
COLOR UR: YELLOW
CREAT BLD-MCNC: 0.91 MG/DL (ref 0.5–1.4)
DEPRECATED RDW RBC AUTO: 39.8 FL (ref 40–54)
ERYTHROCYTE [DISTWIDTH] IN BLOOD BY AUTOMATED COUNT: 12.9 % (ref 12–15)
GFR SERPL CREATININE-BSD FRML MDRD: 89 ML/MIN/1.73
GLOBULIN UR ELPH-MCNC: 3 GM/DL
GLUCOSE BLD-MCNC: 89 MG/DL (ref 70–100)
GLUCOSE UR STRIP-MCNC: ABNORMAL MG/DL
HCT VFR BLD AUTO: 45.2 % (ref 40–52)
HGB BLD-MCNC: 15.8 G/DL (ref 14–18)
HGB UR QL STRIP.AUTO: ABNORMAL
HYALINE CASTS UR QL AUTO: ABNORMAL /LPF
KETONES UR QL STRIP: NEGATIVE
LEUKOCYTE ESTERASE UR QL STRIP.AUTO: ABNORMAL
MCH RBC QN AUTO: 29.8 PG (ref 28–32)
MCHC RBC AUTO-ENTMCNC: 35 G/DL (ref 33–36)
MCV RBC AUTO: 85.3 FL (ref 82–95)
NITRITE UR QL STRIP: NEGATIVE
PH UR STRIP.AUTO: 7.5 [PH] (ref 5–8)
PLATELET # BLD AUTO: 103 10*3/MM3 (ref 130–400)
PMV BLD AUTO: 12.5 FL (ref 6–12)
POTASSIUM BLD-SCNC: 4.2 MMOL/L (ref 3.5–5.3)
PROT SERPL-MCNC: 7.4 G/DL (ref 6.3–8.7)
PROT UR QL STRIP: NEGATIVE
RBC # BLD AUTO: 5.3 10*6/MM3 (ref 4.8–5.9)
RBC # UR: ABNORMAL /HPF
REF LAB TEST METHOD: ABNORMAL
SODIUM BLD-SCNC: 141 MMOL/L (ref 135–145)
SP GR UR STRIP: 1.01 (ref 1–1.03)
SQUAMOUS #/AREA URNS HPF: ABNORMAL /HPF
UROBILINOGEN UR QL STRIP: ABNORMAL
WBC NRBC COR # BLD: 9.71 10*3/MM3 (ref 4.8–10.8)
WBC UR QL AUTO: ABNORMAL /HPF

## 2017-07-13 RX ORDER — HYDROCODONE BITARTRATE AND ACETAMINOPHEN 10; 325 MG/1; MG/1
TABLET ORAL
Refills: 0 | COMMUNITY
Start: 2017-06-23 | End: 2017-08-25

## 2017-07-13 NOTE — DISCHARGE INSTRUCTIONS
DAY OF SURGERY INSTRUCTIONS    Cystoscopy Transurethral Resection Of Bladder Tumor & Possible Bilateral Retrograde Ureteropyelograms-N/A, Cystoscopy Retrograde Pyelogram-Bilateral  Brandon Wolfe MD  Admission Date:   7/14/2017      ARRIVAL TIME: AS DIRECTED BY OFFICE    DAY OF SURGERY TAKE ONLY THESE MEDICATIONS: ATENOLOL AND CLONIDINE            BEFORE YOU COME TO THE HOSPITAL  (Pre-op instructions)  • Do not eat, drink, smoke or chew gum after midnight the night before surgery.  This also includes no mints.  • Morning of surgery take only the medicines you have been instructed with a sip of water unless otherwise instructed  by your physician.  • Do not shave, wear makeup or dark nail polish.  • Remove all jewelry including rings.  • Leave anything you consider valuable at home.  • Leave your suitcase in the car until after your surgery.  • Bring the following with you if applicable:  o Picture ID and insurance, Medicare or Medicaid cards  o Co-pay/deductible required by insurance (cash, check, credit card)  o Copy of advance directive, living will or power-of- documents if not brought to PAT  o CPAP or BIPAP mask and tubing  o Relaxation aids (MP3 player, book, magazine)  • Confirm your arrival time with you surgeon the day before your surgery (surgery times are subject to change)  • On the day of surgery check in at registration located at the main entrance of the hospital.       Outpatient Surgery Guidelines, Adult  Outpatient procedures are those for which the person having the procedure is allowed to go home the same day as the procedure. Various procedures are done on an outpatient basis. You should follow some general guidelines if you will be having an outpatient procedure.  LET YOUR HEALTH CARE PROVIDER KNOW ABOUT:  · Any allergies you have.  · All medicines you are taking, including vitamins, herbs, eye drops, creams, and over-the-counter medicines.  · Previous problems you or members of your  family have had with the use of anesthetics.  · Any blood disorders you have.  · Previous surgeries you have had.  · Medical conditions you have.  RISKS AND COMPLICATIONS  Your health care provider will discuss possible risks and complications with you before surgery. Common risks and complications include:    · Problems due to the use of anesthetics.  · Blood loss and replacement (does not apply to minor surgical procedures).  · Temporary increase in pain due to surgery.  · Uncorrected pain or problems that the surgery was meant to correct.  · Infection.  · New damage.  BEFORE THE PROCEDURE  · Ask your health care provider about changing or stopping your regular medicines. You may need to stop taking certain medicines in the days or weeks before the procedure.  · Stop smoking at least 2 weeks before surgery. This lowers your risk for complications during and after surgery. Ask your health care provider for help with this if needed.  · Eat your usual meals and a light supper the day before surgery. Continue fluid intake. Do not drink alcohol.  · Do not eat or drink after midnight the night before your surgery.   · Arrange for someone to take you home and to stay with you for 24 hours after the procedure. Medicine given for your procedure may affect your ability to drive or to care for yourself.  · Call your health care provider's office if you develop an illness or problem that may prevent you from safely having your procedure.  AFTER THE PROCEDURE  After surgery, you will be taken to a recovery area, where your progress will be monitored. If there are no complications, you will be allowed to go home when you are awake, stable, and taking fluids well. You may have numbness around the surgical site. Healing will take some time. You will have tenderness at the surgical site and may have some swelling and bruising. You may also have some nausea.  HOME CARE INSTRUCTIONS  · Do not drive for 24 hours, or as directed by  your health care provider. Do not drive while taking prescription pain medicines.  · Do not drink alcohol for 24 hours.  · Do not make important decisions or sign legal documents for 24 hours.  · You may resume a normal diet and activities as directed.  · Do not lift anything heavier than 10 pounds (4.5 kg) or play contact sports until your health care provider says it is okay.  · Change your bandages (dressings) as directed.  · Only take over-the-counter or prescription medicines as directed by your health care provider.  · Follow up with your health care provider as directed.  SEEK MEDICAL CARE IF:  · You have increased bleeding (more than a small spot) from the surgical site.  · You have redness, swelling, or increasing pain in the wound.  · You see pus coming from the wound.  · You have a fever.  · You notice a bad smell coming from the wound or dressing.  · You feel lightheaded or faint.  · You develop a rash.  · You have trouble breathing.  · You develop allergies.  MAKE SURE YOU:  · Understand these instructions.  · Will watch your condition.  · Will get help right away if you are not doing well or get worse.     This information is not intended to replace advice given to you by your health care provider. Make sure you discuss any questions you have with your health care provider.     Document Released: 09/12/2002 Document Revised: 05/03/2016 Document Reviewed: 05/22/2014  [employer] Interactive Patient Education ©2016 [employer]       Fall Prevention in Hospitals, Adult  As a hospital patient, your condition and the treatments you receive can increase your risk for falls. Some additional risk factors for falls in a hospital include:  · Being in an unfamiliar environment.  · Being on bed rest.  · Your surgery.  · Taking certain medicines.  · Your tubing requirements, such as intravenous (IV) therapy or catheters.  It is important that you learn how to decrease fall risks while at the hospital. Below are  important tips that can help prevent falls.  SAFETY TIPS FOR PREVENTING FALLS  Talk about your risk of falling.  · Ask your health care provider why you are at risk for falling. Is it your medicine, illness, tubing placement, or something else?  · Make a plan with your health care provider to keep you safe from falls.  · Ask your health care provider or pharmacist about side effects of your medicines. Some medicines can make you dizzy or affect your coordination.  Ask for help.  · Ask for help before getting out of bed. You may need to press your call button.  · Ask for assistance in getting safely to the toilet.  · Ask for a walker or cane to be put at your bedside. Ask that most of the side rails on your bed be placed up before your health care provider leaves the room.  · Ask family or friends to sit with you.  · Ask for things that are out of your reach, such as your glasses, hearing aids, telephone, bedside table, or call button.  Follow these tips to avoid falling:  · Stay lying or seated, rather than standing, while waiting for help.  · Wear rubber-soled slippers or shoes whenever you walk in the hospital.  · Avoid quick, sudden movements.  ¨ Change positions slowly.  ¨ Sit on the side of your bed before standing.  ¨ Stand up slowly and wait before you start to walk.  · Let your health care provider know if there is a spill on the floor.  · Pay careful attention to the medical equipment, electrical cords, and tubes around you.  · When you need help, use your call button by your bed or in the bathroom. Wait for one of your health care providers to help you.  · If you feel dizzy or unsure of your footing, return to bed and wait for assistance.  · Avoid being distracted by the TV, telephone, or another person in your room.  · Do not lean or support yourself on rolling objects, such as IV poles or bedside tables.     This information is not intended to replace advice given to you by your health care provider.  Make sure you discuss any questions you have with your health care provider.     Document Released: 12/15/2001 Document Revised: 01/08/2016 Document Reviewed: 08/25/2013  [employer] Interactive Patient Education ©2016 [employer]       Surgical Site Infections FAQs  What is a Surgical Site Infection (SSI)?  A surgical site infection is an infection that occurs after surgery in the part of the body where the surgery took place. Most patients who have surgery do not develop an infection. However, infections develop in about 1 to 3 out of every 100 patients who have surgery.  Some of the common symptoms of a surgical site infection are:  · Redness and pain around the area where you had surgery  · Drainage of cloudy fluid from your surgical wound  · Fever  Can SSIs be treated?  Yes. Most surgical site infections can be treated with antibiotics. The antibiotic given to you depends on the bacteria (germs) causing the infection. Sometimes patients with SSIs also need another surgery to treat the infection.  What are some of the things that hospitals are doing to prevent SSIs?  To prevent SSIs, doctors, nurses, and other healthcare providers:  · Clean their hands and arms up to their elbows with an antiseptic agent just before the surgery.  · Clean their hands with soap and water or an alcohol-based hand rub before and after caring for each patient.  · May remove some of your hair immediately before your surgery using electric clippers if the hair is in the same area where the procedure will occur. They should not shave you with a razor.  · Wear special hair covers, masks, gowns, and gloves during surgery to keep the surgery area clean.  · Give you antibiotics before your surgery starts. In most cases, you should get antibiotics within 60 minutes before the surgery starts and the antibiotics should be stopped within 24 hours after surgery.  · Clean the skin at the site of your surgery with a special soap that kills  germs.  What can I do to help prevent SSIs?  Before your surgery:  · Tell your doctor about other medical problems you may have. Health problems such as allergies, diabetes, and obesity could affect your surgery and your treatment.  · Quit smoking. Patients who smoke get more infections. Talk to your doctor about how you can quit before your surgery.  · Do not shave near where you will have surgery. Shaving with a razor can irritate your skin and make it easier to develop an infection.  At the time of your surgery:  · Speak up if someone tries to shave you with a razor before surgery. Ask why you need to be shaved and talk with your surgeon if you have any concerns.  · Ask if you will get antibiotics before surgery.  After your surgery:  · Make sure that your healthcare providers clean their hands before examining you, either with soap and water or an alcohol-based hand rub.  · If you do not see your providers clean their hands, please ask them to do so.  · Family and friends who visit you should not touch the surgical wound or dressings.  · Family and friends should clean their hands with soap and water or an alcohol-based hand rub before and after visiting you. If you do not see them clean their hands, ask them to clean their hands.  What do I need to do when I go home from the hospital?  · Before you go home, your doctor or nurse should explain everything you need to know about taking care of your wound. Make sure you understand how to care for your wound before you leave the hospital.  · Always clean your hands before and after caring for your wound.  · Before you go home, make sure you know who to contact if you have questions or problems after you get home.  · If you have any symptoms of an infection, such as redness and pain at the surgery site, drainage, or fever, call your doctor immediately.  If you have additional questions, please ask your doctor or nurse.  Developed and co-sponsored by The Society for  Healthcare Epidemiology of [place] (SHEA); Infectious Diseases Society of [place] (IDSA); American Hospital Association; Association for Professionals in Infection Control and Epidemiology (APIC); Centers for Disease Control and Prevention (CDC); and The Joint Commission.     This information is not intended to replace advice given to you by your health care provider. Make sure you discuss any questions you have with your health care provider.     Document Released: 12/23/2014 Document Revised: 01/08/2016 Document Reviewed: 03/02/2016  [employer] Interactive Patient Education ©2016 [employer]     PATIENT/FAMILY/RESPONSIBLE PARTY VERBALIZES UNDERSTANDING OF ABOVE EDUCATION.  COPY OF PAIN SCALE GIVEN AND REVIEWED WITH VERBALIZED UNDERSTANDING.

## 2017-07-14 ENCOUNTER — ANESTHESIA EVENT (OUTPATIENT)
Dept: PERIOP | Facility: HOSPITAL | Age: 47
End: 2017-07-14

## 2017-07-14 ENCOUNTER — HOSPITAL ENCOUNTER (OUTPATIENT)
Facility: HOSPITAL | Age: 47
Setting detail: HOSPITAL OUTPATIENT SURGERY
Discharge: HOME | End: 2017-07-14
Attending: UROLOGY | Admitting: UROLOGY

## 2017-07-14 ENCOUNTER — ANESTHESIA (OUTPATIENT)
Dept: PERIOP | Facility: HOSPITAL | Age: 47
End: 2017-07-14

## 2017-07-14 ENCOUNTER — APPOINTMENT (OUTPATIENT)
Dept: GENERAL RADIOLOGY | Facility: HOSPITAL | Age: 47
End: 2017-07-14

## 2017-07-14 VITALS
RESPIRATION RATE: 18 BRPM | HEART RATE: 58 BPM | TEMPERATURE: 97.3 F | DIASTOLIC BLOOD PRESSURE: 76 MMHG | SYSTOLIC BLOOD PRESSURE: 154 MMHG | OXYGEN SATURATION: 97 %

## 2017-07-14 DIAGNOSIS — C67.8 CANCER OF OVERLAPPING SITES OF BLADDER (HCC): ICD-10-CM

## 2017-07-14 DIAGNOSIS — D09.0 CARCINOMA IN SITU OF BLADDER: ICD-10-CM

## 2017-07-14 PROCEDURE — C1758 CATHETER, URETERAL: HCPCS | Performed by: UROLOGY

## 2017-07-14 PROCEDURE — 52351 CYSTOURETERO & OR PYELOSCOPE: CPT | Performed by: UROLOGY

## 2017-07-14 PROCEDURE — 25010000002 ONDANSETRON PER 1 MG: Performed by: NURSE ANESTHETIST, CERTIFIED REGISTERED

## 2017-07-14 PROCEDURE — 25010000002 NEOSTIGMINE PER 0.5 MG: Performed by: NURSE ANESTHETIST, CERTIFIED REGISTERED

## 2017-07-14 PROCEDURE — 25010000002 PROPOFOL 10 MG/ML EMULSION: Performed by: NURSE ANESTHETIST, CERTIFIED REGISTERED

## 2017-07-14 PROCEDURE — 25010000002 FENTANYL CITRATE (PF) 100 MCG/2ML SOLUTION: Performed by: NURSE ANESTHETIST, CERTIFIED REGISTERED

## 2017-07-14 PROCEDURE — 88305 TISSUE EXAM BY PATHOLOGIST: CPT | Performed by: UROLOGY

## 2017-07-14 PROCEDURE — 88307 TISSUE EXAM BY PATHOLOGIST: CPT | Performed by: UROLOGY

## 2017-07-14 PROCEDURE — 25010000002 MIDAZOLAM PER 1 MG: Performed by: ANESTHESIOLOGY

## 2017-07-14 PROCEDURE — 25010000002 DEXAMETHASONE PER 1 MG: Performed by: NURSE ANESTHETIST, CERTIFIED REGISTERED

## 2017-07-14 PROCEDURE — 52235 CYSTOSCOPY AND TREATMENT: CPT | Performed by: UROLOGY

## 2017-07-14 PROCEDURE — C1769 GUIDE WIRE: HCPCS | Performed by: UROLOGY

## 2017-07-14 PROCEDURE — 74420 UROGRAPHY RTRGR +-KUB: CPT | Performed by: UROLOGY

## 2017-07-14 PROCEDURE — 88342 IMHCHEM/IMCYTCHM 1ST ANTB: CPT | Performed by: UROLOGY

## 2017-07-14 PROCEDURE — 25010000002 MORPHINE SULFATE (PF) 2 MG/ML SOLUTION: Performed by: ANESTHESIOLOGY

## 2017-07-14 PROCEDURE — 25010000002 HYDROMORPHONE PER 4 MG: Performed by: ANESTHESIOLOGY

## 2017-07-14 PROCEDURE — 0 IOPAMIDOL 61 % SOLUTION: Performed by: UROLOGY

## 2017-07-14 PROCEDURE — 74420 UROGRAPHY RTRGR +-KUB: CPT

## 2017-07-14 PROCEDURE — 25010000003 CEFAZOLIN PER 500 MG: Performed by: UROLOGY

## 2017-07-14 RX ORDER — NALOXONE HCL 0.4 MG/ML
0.4 VIAL (ML) INJECTION AS NEEDED
Status: DISCONTINUED | OUTPATIENT
Start: 2017-07-14 | End: 2017-07-14 | Stop reason: HOSPADM

## 2017-07-14 RX ORDER — LABETALOL HYDROCHLORIDE 5 MG/ML
5 INJECTION, SOLUTION INTRAVENOUS
Status: DISCONTINUED | OUTPATIENT
Start: 2017-07-14 | End: 2017-07-14 | Stop reason: HOSPADM

## 2017-07-14 RX ORDER — SULFAMETHOXAZOLE AND TRIMETHOPRIM 400; 80 MG/1; MG/1
1 TABLET ORAL 2 TIMES DAILY
Qty: 14 TABLET | Refills: 0 | Status: SHIPPED | OUTPATIENT
Start: 2017-07-14 | End: 2017-08-25

## 2017-07-14 RX ORDER — ONDANSETRON 2 MG/ML
4 INJECTION INTRAMUSCULAR; INTRAVENOUS ONCE AS NEEDED
Status: DISCONTINUED | OUTPATIENT
Start: 2017-07-14 | End: 2017-07-14 | Stop reason: SDUPTHER

## 2017-07-14 RX ORDER — DEXAMETHASONE SODIUM PHOSPHATE 4 MG/ML
INJECTION, SOLUTION INTRA-ARTICULAR; INTRALESIONAL; INTRAMUSCULAR; INTRAVENOUS; SOFT TISSUE AS NEEDED
Status: DISCONTINUED | OUTPATIENT
Start: 2017-07-14 | End: 2017-07-14 | Stop reason: SURG

## 2017-07-14 RX ORDER — SODIUM CHLORIDE 0.9 % (FLUSH) 0.9 %
1-10 SYRINGE (ML) INJECTION AS NEEDED
Status: DISCONTINUED | OUTPATIENT
Start: 2017-07-14 | End: 2017-07-14 | Stop reason: SDUPTHER

## 2017-07-14 RX ORDER — FENTANYL CITRATE 50 UG/ML
25 INJECTION, SOLUTION INTRAMUSCULAR; INTRAVENOUS
Status: DISCONTINUED | OUTPATIENT
Start: 2017-07-14 | End: 2017-07-14 | Stop reason: SDUPTHER

## 2017-07-14 RX ORDER — GLYCOPYRROLATE 0.2 MG/ML
INJECTION INTRAMUSCULAR; INTRAVENOUS AS NEEDED
Status: DISCONTINUED | OUTPATIENT
Start: 2017-07-14 | End: 2017-07-14 | Stop reason: SURG

## 2017-07-14 RX ORDER — MAGNESIUM HYDROXIDE 1200 MG/15ML
LIQUID ORAL AS NEEDED
Status: DISCONTINUED | OUTPATIENT
Start: 2017-07-14 | End: 2017-07-14 | Stop reason: HOSPADM

## 2017-07-14 RX ORDER — ROCURONIUM BROMIDE 10 MG/ML
INJECTION, SOLUTION INTRAVENOUS AS NEEDED
Status: DISCONTINUED | OUTPATIENT
Start: 2017-07-14 | End: 2017-07-14 | Stop reason: SURG

## 2017-07-14 RX ORDER — PROPOFOL 10 MG/ML
VIAL (ML) INTRAVENOUS AS NEEDED
Status: DISCONTINUED | OUTPATIENT
Start: 2017-07-14 | End: 2017-07-14 | Stop reason: SURG

## 2017-07-14 RX ORDER — HYDROCODONE BITARTRATE AND ACETAMINOPHEN 5; 325 MG/1; MG/1
1 TABLET ORAL EVERY 4 HOURS PRN
Qty: 16 TABLET | Refills: 0 | Status: SHIPPED | OUTPATIENT
Start: 2017-07-14 | End: 2017-07-19

## 2017-07-14 RX ORDER — MIDAZOLAM HYDROCHLORIDE 1 MG/ML
1 INJECTION INTRAMUSCULAR; INTRAVENOUS
Status: DISCONTINUED | OUTPATIENT
Start: 2017-07-14 | End: 2017-07-14 | Stop reason: HOSPADM

## 2017-07-14 RX ORDER — HYDROCODONE BITARTRATE AND ACETAMINOPHEN 7.5; 325 MG/1; MG/1
1 TABLET ORAL EVERY 6 HOURS PRN
Qty: 24 TABLET | Refills: 0 | Status: SHIPPED | OUTPATIENT
Start: 2017-07-14 | End: 2017-08-25

## 2017-07-14 RX ORDER — MIDAZOLAM HYDROCHLORIDE 1 MG/ML
2 INJECTION INTRAMUSCULAR; INTRAVENOUS
Status: DISCONTINUED | OUTPATIENT
Start: 2017-07-14 | End: 2017-07-14 | Stop reason: HOSPADM

## 2017-07-14 RX ORDER — MEPERIDINE HYDROCHLORIDE 25 MG/ML
12.5 INJECTION INTRAMUSCULAR; INTRAVENOUS; SUBCUTANEOUS
Status: DISCONTINUED | OUTPATIENT
Start: 2017-07-14 | End: 2017-07-14 | Stop reason: HOSPADM

## 2017-07-14 RX ORDER — MIDAZOLAM HYDROCHLORIDE 1 MG/ML
1 INJECTION INTRAMUSCULAR; INTRAVENOUS
Status: DISCONTINUED | OUTPATIENT
Start: 2017-07-14 | End: 2017-07-14 | Stop reason: SDUPTHER

## 2017-07-14 RX ORDER — DEXTROSE MONOHYDRATE 25 G/50ML
12.5 INJECTION, SOLUTION INTRAVENOUS AS NEEDED
Status: DISCONTINUED | OUTPATIENT
Start: 2017-07-14 | End: 2017-07-14 | Stop reason: SDUPTHER

## 2017-07-14 RX ORDER — HYDROCODONE BITARTRATE AND ACETAMINOPHEN 5; 325 MG/1; MG/1
1 TABLET ORAL ONCE AS NEEDED
Status: DISCONTINUED | OUTPATIENT
Start: 2017-07-14 | End: 2017-07-14 | Stop reason: HOSPADM

## 2017-07-14 RX ORDER — ATROPA BELLADONNA AND OPIUM 16.2; 6 MG/1; MG/1
60 SUPPOSITORY RECTAL EVERY 8 HOURS PRN
Status: COMPLETED | OUTPATIENT
Start: 2017-07-14 | End: 2017-07-14

## 2017-07-14 RX ORDER — PROMETHAZINE HYDROCHLORIDE 25 MG/ML
12.5 INJECTION, SOLUTION INTRAMUSCULAR; INTRAVENOUS ONCE AS NEEDED
Status: DISCONTINUED | OUTPATIENT
Start: 2017-07-14 | End: 2017-07-14 | Stop reason: HOSPADM

## 2017-07-14 RX ORDER — MIDAZOLAM HYDROCHLORIDE 1 MG/ML
2 INJECTION INTRAMUSCULAR; INTRAVENOUS
Status: DISCONTINUED | OUTPATIENT
Start: 2017-07-14 | End: 2017-07-14 | Stop reason: SDUPTHER

## 2017-07-14 RX ORDER — FENTANYL CITRATE 50 UG/ML
25 INJECTION, SOLUTION INTRAMUSCULAR; INTRAVENOUS
Status: DISCONTINUED | OUTPATIENT
Start: 2017-07-14 | End: 2017-07-14 | Stop reason: HOSPADM

## 2017-07-14 RX ORDER — SODIUM CHLORIDE, SODIUM LACTATE, POTASSIUM CHLORIDE, CALCIUM CHLORIDE 600; 310; 30; 20 MG/100ML; MG/100ML; MG/100ML; MG/100ML
9 INJECTION, SOLUTION INTRAVENOUS CONTINUOUS
Status: DISCONTINUED | OUTPATIENT
Start: 2017-07-14 | End: 2017-07-14 | Stop reason: HOSPADM

## 2017-07-14 RX ORDER — ONDANSETRON 2 MG/ML
4 INJECTION INTRAMUSCULAR; INTRAVENOUS ONCE AS NEEDED
Status: DISCONTINUED | OUTPATIENT
Start: 2017-07-14 | End: 2017-07-14 | Stop reason: HOSPADM

## 2017-07-14 RX ORDER — HYDRALAZINE HYDROCHLORIDE 20 MG/ML
5 INJECTION INTRAMUSCULAR; INTRAVENOUS
Status: DISCONTINUED | OUTPATIENT
Start: 2017-07-14 | End: 2017-07-14 | Stop reason: HOSPADM

## 2017-07-14 RX ORDER — SODIUM CHLORIDE 0.9 % (FLUSH) 0.9 %
1-10 SYRINGE (ML) INJECTION AS NEEDED
Status: DISCONTINUED | OUTPATIENT
Start: 2017-07-14 | End: 2017-07-14 | Stop reason: HOSPADM

## 2017-07-14 RX ORDER — MORPHINE SULFATE 2 MG/ML
2 INJECTION, SOLUTION INTRAMUSCULAR; INTRAVENOUS
Status: DISCONTINUED | OUTPATIENT
Start: 2017-07-14 | End: 2017-07-14 | Stop reason: HOSPADM

## 2017-07-14 RX ORDER — ONDANSETRON 2 MG/ML
INJECTION INTRAMUSCULAR; INTRAVENOUS AS NEEDED
Status: DISCONTINUED | OUTPATIENT
Start: 2017-07-14 | End: 2017-07-14 | Stop reason: SURG

## 2017-07-14 RX ORDER — FENTANYL CITRATE 50 UG/ML
INJECTION, SOLUTION INTRAMUSCULAR; INTRAVENOUS AS NEEDED
Status: DISCONTINUED | OUTPATIENT
Start: 2017-07-14 | End: 2017-07-14 | Stop reason: SURG

## 2017-07-14 RX ORDER — DEXTROSE MONOHYDRATE 25 G/50ML
12.5 INJECTION, SOLUTION INTRAVENOUS AS NEEDED
Status: DISCONTINUED | OUTPATIENT
Start: 2017-07-14 | End: 2017-07-14 | Stop reason: HOSPADM

## 2017-07-14 RX ORDER — IPRATROPIUM BROMIDE AND ALBUTEROL SULFATE 2.5; .5 MG/3ML; MG/3ML
3 SOLUTION RESPIRATORY (INHALATION) ONCE AS NEEDED
Status: DISCONTINUED | OUTPATIENT
Start: 2017-07-14 | End: 2017-07-14 | Stop reason: HOSPADM

## 2017-07-14 RX ORDER — IPRATROPIUM BROMIDE AND ALBUTEROL SULFATE 2.5; .5 MG/3ML; MG/3ML
3 SOLUTION RESPIRATORY (INHALATION) ONCE
Status: COMPLETED | OUTPATIENT
Start: 2017-07-14 | End: 2017-07-14

## 2017-07-14 RX ORDER — LIDOCAINE HYDROCHLORIDE 20 MG/ML
INJECTION, SOLUTION INFILTRATION; PERINEURAL AS NEEDED
Status: DISCONTINUED | OUTPATIENT
Start: 2017-07-14 | End: 2017-07-14 | Stop reason: SURG

## 2017-07-14 RX ORDER — DIPHENHYDRAMINE HYDROCHLORIDE 50 MG/ML
12.5 INJECTION INTRAMUSCULAR; INTRAVENOUS
Status: DISCONTINUED | OUTPATIENT
Start: 2017-07-14 | End: 2017-07-14 | Stop reason: HOSPADM

## 2017-07-14 RX ADMIN — CEFAZOLIN SODIUM 2 G: 2 SOLUTION INTRAVENOUS at 13:25

## 2017-07-14 RX ADMIN — IPRATROPIUM BROMIDE AND ALBUTEROL SULFATE 3 ML: .5; 3 SOLUTION RESPIRATORY (INHALATION) at 12:40

## 2017-07-14 RX ADMIN — FENTANYL CITRATE 100 MCG: 50 INJECTION, SOLUTION INTRAMUSCULAR; INTRAVENOUS at 13:18

## 2017-07-14 RX ADMIN — LIDOCAINE HYDROCHLORIDE 50 MG: 20 INJECTION, SOLUTION INFILTRATION; PERINEURAL at 13:18

## 2017-07-14 RX ADMIN — MORPHINE SULFATE 2 MG: 2 INJECTION, SOLUTION INTRAMUSCULAR; INTRAVENOUS at 15:07

## 2017-07-14 RX ADMIN — SODIUM CHLORIDE, POTASSIUM CHLORIDE, SODIUM LACTATE AND CALCIUM CHLORIDE 9 ML/HR: 600; 310; 30; 20 INJECTION, SOLUTION INTRAVENOUS at 12:36

## 2017-07-14 RX ADMIN — SODIUM CHLORIDE, POTASSIUM CHLORIDE, SODIUM LACTATE AND CALCIUM CHLORIDE: 600; 310; 30; 20 INJECTION, SOLUTION INTRAVENOUS at 13:54

## 2017-07-14 RX ADMIN — GLYCOPYRROLATE 0.4 MG: 0.2 INJECTION, SOLUTION INTRAMUSCULAR; INTRAVENOUS at 13:58

## 2017-07-14 RX ADMIN — ONDANSETRON HYDROCHLORIDE 4 MG: 2 SOLUTION INTRAMUSCULAR; INTRAVENOUS at 13:36

## 2017-07-14 RX ADMIN — HYDROMORPHONE HYDROCHLORIDE 1 MG: 1 INJECTION, SOLUTION INTRAMUSCULAR; INTRAVENOUS; SUBCUTANEOUS at 14:55

## 2017-07-14 RX ADMIN — ROCURONIUM BROMIDE 25 MG: 10 INJECTION INTRAVENOUS at 13:18

## 2017-07-14 RX ADMIN — ATROPA BELLADONNA AND OPIUM 60 MG: 16.2; 6 SUPPOSITORY RECTAL at 14:47

## 2017-07-14 RX ADMIN — PROPOFOL 200 MG: 10 INJECTION, EMULSION INTRAVENOUS at 13:18

## 2017-07-14 RX ADMIN — Medication 4 MG: at 13:58

## 2017-07-14 RX ADMIN — HYDROMORPHONE HYDROCHLORIDE 1 MG: 1 INJECTION, SOLUTION INTRAMUSCULAR; INTRAVENOUS; SUBCUTANEOUS at 14:45

## 2017-07-14 RX ADMIN — DEXAMETHASONE SODIUM PHOSPHATE 4 MG: 4 INJECTION, SOLUTION INTRAMUSCULAR; INTRAVENOUS at 13:36

## 2017-07-14 RX ADMIN — LIDOCAINE HYDROCHLORIDE 0.5 ML: 10 INJECTION, SOLUTION EPIDURAL; INFILTRATION; INTRACAUDAL; PERINEURAL at 12:36

## 2017-07-14 RX ADMIN — MIDAZOLAM HYDROCHLORIDE 2 MG: 1 INJECTION, SOLUTION INTRAMUSCULAR; INTRAVENOUS at 12:41

## 2017-07-14 NOTE — ANESTHESIA POSTPROCEDURE EVALUATION
Patient: Lamberto Tafoya    Procedure Summary     Date Anesthesia Start Anesthesia Stop Room / Location    07/14/17 1316 1426  [facility] / [facility]       Procedure Diagnosis Surgeon Provider    CYSTOSCOPY TRANSURETHRAL RESECTION OF BLADDER TUMOR & BILATERAL RETROGRADE URETEROPYELOGRAMS (N/A Bladder); CYSTOSCOPY RETROGRADE PYELOGRAM (Bilateral Bladder) Carcinoma in situ of bladder; Cancer of overlapping sites of bladder  (Carcinoma in situ of bladder [D09.0]; Cancer of overlapping sites of bladder [C67.8]) MD Tavares Matamoros CRNA          Anesthesia Type: general  Last vitals  /69 (07/14/17 1537)    Temp 97.3 °F (36.3 °C) (07/14/17 1515)    Pulse 67 (07/14/17 1537)   Resp 16 (07/14/17 1537)    SpO2 95 % (07/14/17 1537)      Post Anesthesia Care and Evaluation      Comments: Patient discharged from PACU per protocol, VSS.   Blood pressure 158/69, pulse 67, temperature 97.3 °F (36.3 °C), temperature source Temporal Artery , resp. rate 16, SpO2 95 %.

## 2017-07-14 NOTE — OP NOTE
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    Procedure details:  The patient is identified in the preoperative holding area.  The informed consent process had been completed In the office documented by recent note that included a discussion of the risks of this procedure, alternative management options, and the potential benefits of this procedure.  The patient voiced a good recollection of that discussion.  The patient voiced no additional questions.     The patient is taken to the cystoscopic suite and given effective general anesthesia. The patient is then placed in the{ds OR patient positions: dorsal lithotomy position.  The patient is then prepped and draped in the usual sterile fashion.  At this point appropriate timeout protocol was observed.    On introduction of the cystoscope both 30 and 70° lenses are used inspect the bladder.  The bladder erythema that was in for trigone on the right side was not as impressive with a bigger field with a rigid 22 Stateless scope area however it was still present that biopsy forceps were then taken in that area was fulgurated.    The left lateral anterior lesion mentioned which measures approximately 2 cm is identified and resected with the cutting loop using a 28 Stateless resectoscope sheath with  obturator.  This was fulgurated as well.    Bilateral retrograde ureteropyelograms were then carried out with a 5 Chinese olive-tipped catheter bilaterally.  These were normal.    Interpretation of bilateral retrograde ureteropyelograms: The right ureter is normal in its course and caliber.  The pelvis and calyceal system show no mass or filling defect.  The left ureter is normal in its course and caliber.  The pelvis and calyceal system show no mass or filling defect.    Estimated Blood Loss: <30 mL    Specimens:                  ID Type Source Tests Collected by Time Destination   A : right bladder eurythema Tissue Urinary Bladder TISSUE EXAM Brandon Wolfe MD 7/14/2017 1344    B : left lateral wall bladder tumor Tissue Urinary Bladder TISSUE EXAM Brandon Wolfe MD 7/14/2017 1357          Drains:   Urethral Catheter 07/14/17 1355 100% silicone 18 5 5 (Active)           Complications: none    Plan: Continue Bang catheter until Monday, July 17.  The patient tolerated this well.  Will await the pathology report and make a decision from there.    Brandon Wolfe MD     Date: 7/14/2017  Time: 2:32 PM

## 2017-07-14 NOTE — PLAN OF CARE
Problem: Patient Care Overview (Adult)  Goal: Plan of Care Review  Outcome: Outcome(s) achieved Date Met:  07/14/17 07/14/17 1702   Coping/Psychosocial Response Interventions   Plan Of Care Reviewed With patient   Patient Care Overview   Progress improving   Outcome Evaluation   Outcome Summary/Follow up Plan pt states he is ready to go home. no acute distress noted. c/o pain but expected and pt states understanding.          07/14/17 1702   Coping/Psychosocial Response Interventions   Plan Of Care Reviewed With patient   Patient Care Overview   Progress improving   Outcome Evaluation   Outcome Summary/Follow up Plan pt states he is ready to go home. no acute distress noted. c/o pain but expected and pt states understanding. hennessy cath patent and draining to leg bag.          Problem: Perioperative Period (Adult)  Goal: Signs and Symptoms of Listed Potential Problems Will be Absent or Manageable (Perioperative Period)  Outcome: Outcome(s) achieved Date Met:  07/14/17

## 2017-07-14 NOTE — ANESTHESIA PROCEDURE NOTES
Airway  Airway not difficult    General Information and Staff    Patient location during procedure: OR  CRNA: STEPHANIE GARZON    Indications and Patient Condition  Indications for airway management: airway protection    Preoxygenated: yes  Mask difficulty assessment: 1 - vent by mask    Final Airway Details  Final airway type: endotracheal airway      Successful airway: ETT  Cuffed: yes   Successful intubation technique: direct laryngoscopy  Blade: Rincon  Blade size: #2  ETT size: 7.5 mm  Cormack-Lehane Classification: grade I - full view of glottis  Placement verified by: chest auscultation   Cuff volume (mL): 8  Measured from: teeth  ETT to teeth (cm): 22  Number of attempts at approach: 1

## 2017-07-14 NOTE — ANESTHESIA PREPROCEDURE EVALUATION
Anesthesia Evaluation     NPO Solid Status: > 8 hours  NPO Liquid Status: > 8 hours     Airway   Mallampati: II  Neck ROM: full  no difficulty expected  Dental          Pulmonary     breath sounds clear to auscultation  (+) a smoker Current, COPD mild,   Cardiovascular   Exercise tolerance: excellent (>7 METS)    NYHA Classification: I  ECG reviewed  Rhythm: regular  Rate: normal    (+) hypertension,       Neuro/Psych- negative ROS  (-) psychiatric history  GI/Hepatic/Renal/Endo    (+)  GERD well controlled,     Musculoskeletal     (+) back pain,   Abdominal     Abdomen: soft.   Substance History - negative use     OB/GYN          Other      history of cancer                                    Anesthesia Plan    ASA 2     general     intravenous induction   Anesthetic plan and risks discussed with patient.

## 2017-07-14 NOTE — PLAN OF CARE
Problem: Patient Care Overview (Adult)  Goal: Plan of Care Review  Outcome: Ongoing (interventions implemented as appropriate)    07/14/17 1515   Coping/Psychosocial Response Interventions   Plan Of Care Reviewed With patient   Patient Care Overview   Progress improving   Outcome Evaluation   Outcome Summary/Follow up Plan Meets PACU discharge criteria.         Problem: Perioperative Period (Adult)  Goal: Signs and Symptoms of Listed Potential Problems Will be Absent or Manageable (Perioperative Period)  Outcome: Ongoing (interventions implemented as appropriate)

## 2017-07-14 NOTE — DISCHARGE INSTRUCTIONS

## 2017-07-15 LAB — BACTERIA SPEC AEROBE CULT: NORMAL

## 2017-07-17 ENCOUNTER — PROCEDURE VISIT (OUTPATIENT)
Dept: UROLOGY | Facility: CLINIC | Age: 47
End: 2017-07-17

## 2017-07-17 DIAGNOSIS — D09.0 CARCINOMA IN SITU OF BLADDER: Primary | ICD-10-CM

## 2017-07-17 PROCEDURE — 99211 OFF/OP EST MAY X REQ PHY/QHP: CPT | Performed by: UROLOGY

## 2017-07-17 NOTE — PROGRESS NOTES
Lamberto Tafoya is a 47 y.o. male who is here today for catheter removal. Patient of Dr. Wolfe. 10cc syringe used to deflate the balloon and the catheter was removed without difficulty.  The patient tolerated this well and will return in 6 weeks to see Dr. Wolfe in the office for a follow up. Dr. Wolfe was in the office at the time of procedure.     Patient was advised to drink clear fluids for the next couple hours and urinate. The patient was also advised he may experience some blood in the urine and burning with urination for the next couple days. If the patient is unable to urinate or develops fever, chills, N&V or suprapubic pain he will call to return for an appt at clinic or seek medical treatment at [facility] ER, PCP or Urgent Care after hours. Patient verbalized understanding and all questions were answered.     reviewed

## 2017-07-18 DIAGNOSIS — D09.0 CARCINOMA IN SITU OF BLADDER: Primary | ICD-10-CM

## 2017-08-09 ENCOUNTER — TELEPHONE (OUTPATIENT)
Dept: UROLOGY | Facility: CLINIC | Age: 47
End: 2017-08-09

## 2017-08-09 NOTE — TELEPHONE ENCOUNTER
Patient called and wants me to set up appointment for them to talk to Dr Wolfe about his appointment he went to at [place]. He did not like the doctor.

## 2017-08-14 ENCOUNTER — OFFICE VISIT (OUTPATIENT)
Dept: UROLOGY | Facility: CLINIC | Age: 47
End: 2017-08-14

## 2017-08-14 VITALS — TEMPERATURE: 98.3 F | WEIGHT: 215 LBS | BODY MASS INDEX: 30.1 KG/M2 | HEIGHT: 71 IN

## 2017-08-14 DIAGNOSIS — C67.8 MALIGNANT NEOPLASM OF OVERLAPPING SITES OF BLADDER (HCC): Primary | ICD-10-CM

## 2017-08-14 PROCEDURE — 99214 OFFICE O/P EST MOD 30 MIN: CPT | Performed by: UROLOGY

## 2017-08-14 NOTE — PROGRESS NOTES
Mr. Tafoya is 47 y.o. male    CHIEF COMPLAINT: Bladder cancer    HPI  Patient presents today with BCG refractory carcinoma in situ.  He has had multiple recurrences and has failed induction BCG followed by repeat induction BCG and then after his first cycle of maintenance when we originally cleared him up he again developed carcinoma in situ.  He continues to be bothered by fatigue and generalized weakness.  He does have some urinary urgency but is not severe.  He has had some improvement in his symptoms with some nutritional shakes.  This has been going on for 6 months.    The following portions of the patient's history were reviewed and updated as appropriate: allergies, current medications, past family history, past medical history, past social history, past surgical history and problem list.    Review of Systems   Constitutional: Negative for chills and fever.   Gastrointestinal: Negative for abdominal pain, anal bleeding and blood in stool.   Genitourinary: Negative for flank pain and hematuria.         Current Outpatient Prescriptions:   •  atenolol (TENORMIN) 50 MG tablet, Take 50 mg by mouth Every Morning., Disp: , Rfl:   •  BREO ELLIPTA 100-25 MCG/INH aerosol powder , Inhale 1 puff Daily., Disp: , Rfl: 3  •  buPROPion (WELLBUTRIN) 75 MG tablet, Take 75 mg by mouth Daily., Disp: , Rfl:   •  cloNIDine (CATAPRES) 0.1 MG tablet, Take 0.1 mg by mouth 2 (Two) Times a Day. TAKES AT NOON AND 5 PM DAILY, Disp: , Rfl:   •  HYDROcodone-acetaminophen (NORCO)  MG per tablet, 1 TABLET 6 TIMES PER DAY AS NEEDED FOR BACK PAIN, Disp: , Rfl: 0  •  HYDROcodone-acetaminophen (NORCO) 7.5-325 MG per tablet, Take 1 tablet by mouth Every 6 (Six) Hours As Needed for Moderate Pain  for up to 24 doses., Disp: 24 tablet, Rfl: 0  •  sulfamethoxazole-trimethoprim (BACTRIM,SEPTRA) 400-80 MG tablet, Take 1 tablet by mouth 2 (Two) Times a Day., Disp: 14 tablet, Rfl: 0  •  tamsulosin (FLOMAX) 0.4 MG capsule 24 hr capsule, Take 1  "capsule by mouth Daily., Disp: , Rfl:     Past Medical History:   Diagnosis Date   • Anxiety    • Back pain    • Bladder carcinoma 06/2016    High grade CIS & ta low grade   • H/O hematuria    • History of pneumonia 2011   • Hypertension    • Overweight    • Smoking    • Urinary retention    • Wheezing     r/t smoking       Past Surgical History:   Procedure Laterality Date   • BACK SURGERY      x3   • CYSTOSCOPY BLADDER BIOPSY N/A 11/30/2016    Procedure: CYSTOSCOPY BLADDER BIOPSY fulgeration of 3cm area of bladder;  Surgeon: Brandon Wolfe MD;  Location:  [facility] OR;  Service:    • CYSTOSCOPY RETROGRADE PYELOGRAM Bilateral 7/14/2017    Procedure: CYSTOSCOPY RETROGRADE PYELOGRAM;  Surgeon: Brandon Wolfe MD;  Location:  [facility] OR;  Service:    • TRANSURETHRAL RESECTION OF BLADDER TUMOR N/A 3/31/2017    Procedure: CYSTOSCOPY , BLADDER BIOPSY, AND TRANSURETHRAL RESECTION OF BLADDER TUMOR ;  Surgeon: Brandon Wolfe MD;  Location:  [facility] OR;  Service:    • TRANSURETHRAL RESECTION OF BLADDER TUMOR  06/2016    High Grade CIS & Low grade ta disease   • TRANSURETHRAL RESECTION OF BLADDER TUMOR N/A 7/14/2017    Procedure: CYSTOSCOPY TRANSURETHRAL RESECTION OF BLADDER TUMOR & BILATERAL RETROGRADE URETEROPYELOGRAMS;  Surgeon: Brandon Wolfe MD;  Location:  [facility] OR;  Service:        Social History     Social History   • Marital status:      Spouse name: N/A   • Number of children: N/A   • Years of education: N/A     Social History Main Topics   • Smoking status: Current Every Day Smoker     Packs/day: 1.00     Years: 25.00     Types: Cigarettes   • Smokeless tobacco: Not on file   • Alcohol use No   • Drug use: No   • Sexual activity: Defer     Other Topics Concern   • Not on file     Social History Narrative       Family History   Problem Relation Age of Onset   • No Known Problems Father    • No Known Problems Mother        Temp 98.3 °F (36.8 °C)  Ht 71\" (180.3 cm)  Wt 215 lb (97.5 kg)  BMI 29.99 " "kg/m2    Physical Exam  Alert and oriented ×3  Not agitated or distressed  No obvious deformities  No respiratory distress  Skin without pallor or diaphoresis      Results for orders placed or performed during the hospital encounter of 07/14/17   Tissue Exam   Result Value Ref Range    Case Report       Surgical Pathology Report                         Case: [id number]                                  Authorizing Provider:  Brandon Wolfe MD        Collected:           07/14/2017 01:44 PM          Ordering Location:     [facility] OR  Received:            07/14/2017 03:04 PM          Pathologist:           Gini Ho MD                                                           Specimens:   1) - Urinary Bladder, Right bladder erythema                                                        2) - Urinary Bladder, left lateral wall bladder tumor                                      Final Diagnosis       1.  Urinary bladder, right erythema, biopsy:  Focal , flat high-grade urothelial carcinoma in situ.   Severe mixed inflammation  Negative for invasive carcinoma    2.  Urinary bladder, left lateral wall tumor, biopsy:  Focal , flat high-grade urothelial carcinoma in situ.   Severe mixed inflammation  Negative for invasive carcinoma        Gross Description          Specimen #1 is received in a formalin filled container, labeled with the patient's name, date of birth, and \"right bladder erythema\".  The specimen consists of a fragment of lens paper with 2 mucosa covered tissue fragments aggregating to 0.5 x 0.3 x 0.2 cm.  The mucosal surfaces appear raised and markedly erythematous.  The specimen is totally submitted in block 1A.         Specimen #2 is received in a formalin filled container, labeled with the patient's name, date of birth, and \"left lateral wall bladder tumor\".  The specimen consists of 5 red-brown soft tissue fragment aggregating to 1.3 x 0.9 x 0.2 cm.  The external surfaces shows " slightly erythematous mucosa as well as cautery artifact.  The fragments are totally submitted in block 2A.      Microscopic Description       1-2.  Histologic sections demonstrate a bladder mucosa with severe mixed inflammation.  Intact urothelium shows hyperchromasia and atypia.  The patient's history is reviewed.  Given these findings, a CK 20 stain is performed and demonstrates focal areas of full thickness staining.  Control stains appropriately.  These are single antibody stains, complex antibody stains are not utilized.  Negative for invasive carcinoma.    All of this report is an electronic transcription/translation of spoken language to printed text. The electronic translation of spoken language may permit erroneous, or at times, nonsensical words or phrases to be inadvertently transcribed; although I have reviewed the report for such errors, some may still exist.      Embedded Images         Imaging Results (last 7 days)     ** No results found for the last 168 hours. **          Assessment and Plan  Diagnoses and all orders for this visit:    Malignant neoplasm of overlapping sites of bladder  -     CT Abdomen Pelvis With Contrast; Future  -     CT chest w contrast; Future  #1.  I will proceed with metastatic evaluation with CT scan of the abdomen and pelvis and chest as recommended by [place] when he saw Dr. Mahan.  #2.  He has decided to seek care elsewhere so I will check to see if he is a candidate for a cystectomy neck possibly be done robotically with a continent reservoir.  He was asking about going to [place].      Brandon Wolfe MD  08/14/17  3:29 PM    Cc: Tam Gonzalez MD

## 2017-08-21 ENCOUNTER — HOSPITAL ENCOUNTER (OUTPATIENT)
Dept: CT IMAGING | Facility: HOSPITAL | Age: 47
Discharge: HOME | End: 2017-08-21
Attending: UROLOGY | Admitting: UROLOGY

## 2017-08-21 DIAGNOSIS — C67.8 MALIGNANT NEOPLASM OF OVERLAPPING SITES OF BLADDER (HCC): ICD-10-CM

## 2017-08-21 LAB — CREAT BLDA-MCNC: 1 MG/DL (ref 0.6–1.3)

## 2017-08-21 PROCEDURE — 82565 ASSAY OF CREATININE: CPT

## 2017-08-21 PROCEDURE — 71260 CT THORAX DX C+: CPT

## 2017-08-21 PROCEDURE — 74177 CT ABD & PELVIS W/CONTRAST: CPT

## 2017-08-21 PROCEDURE — 0 IOPAMIDOL 61 % SOLUTION: Performed by: UROLOGY

## 2017-08-21 RX ADMIN — IOPAMIDOL 100 ML: 612 INJECTION, SOLUTION INTRAVENOUS at 08:15

## 2017-08-25 ENCOUNTER — OFFICE VISIT (OUTPATIENT)
Dept: UROLOGY | Facility: CLINIC | Age: 47
End: 2017-08-25

## 2017-08-25 VITALS — HEIGHT: 71 IN | WEIGHT: 215 LBS | TEMPERATURE: 97.5 F | BODY MASS INDEX: 30.1 KG/M2

## 2017-08-25 DIAGNOSIS — C67.8 MALIGNANT NEOPLASM OF OVERLAPPING SITES OF BLADDER (HCC): Primary | ICD-10-CM

## 2017-08-25 PROCEDURE — 99213 OFFICE O/P EST LOW 20 MIN: CPT | Performed by: UROLOGY

## 2017-08-25 PROCEDURE — 81003 URINALYSIS AUTO W/O SCOPE: CPT | Performed by: UROLOGY

## 2017-08-25 NOTE — PROGRESS NOTES
Mr. Tafoya is 47 y.o. male    CHIEF COMPLAINT: I am here to follow up on my prostate cancer. I completed my CT.     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  He has had multiple recurrences and has failed induction BCG followed by repeat induction BCG and then after his first cycle of maintenance when we originally cleared him up he again developed carcinoma in situ.  His major symptoms are  fatigue and generalized weakness.  He does have some urinary urgency but it is stable.  His weight has been stable but his appetite is poor.  This has been going on for 6 months.    The following portions of the patient's history were reviewed and updated as appropriate: allergies, current medications, past family history, past medical history, past social history, past surgical history and problem list.    Review of Systems   Constitutional: Negative for chills and fever.   Gastrointestinal: Negative for abdominal pain, anal bleeding and blood in stool.   Genitourinary: Negative for flank pain and hematuria.         Current Outpatient Prescriptions:   •  atenolol (TENORMIN) 50 MG tablet, Take 50 mg by mouth Every Morning., Disp: , Rfl:   •  BREO ELLIPTA 100-25 MCG/INH aerosol powder , Inhale 1 puff Daily., Disp: , Rfl: 3  •  buPROPion (WELLBUTRIN) 75 MG tablet, Take 75 mg by mouth Daily., Disp: , Rfl:   •  cloNIDine (CATAPRES) 0.1 MG tablet, Take 0.1 mg by mouth 2 (Two) Times a Day. TAKES AT NOON AND 5 PM DAILY, Disp: , Rfl:   •  tamsulosin (FLOMAX) 0.4 MG capsule 24 hr capsule, Take 1 capsule by mouth Daily., Disp: , Rfl:     Past Medical History:   Diagnosis Date   • Anxiety    • Back pain    • Bladder carcinoma 06/2016    High grade CIS & ta low grade   • H/O hematuria    • History of pneumonia 2011   • Hypertension    • Overweight    • Smoking    • Urinary retention    • Wheezing     r/t smoking       Past Surgical History:   Procedure Laterality Date   • BACK SURGERY      x3   • CYSTOSCOPY  "BLADDER BIOPSY N/A 11/30/2016    Procedure: CYSTOSCOPY BLADDER BIOPSY fulgeration of 3cm area of bladder;  Surgeon: Brandon Wolfe MD;  Location:  [facility];  Service:    • CYSTOSCOPY RETROGRADE PYELOGRAM Bilateral 7/14/2017    Procedure: CYSTOSCOPY RETROGRADE PYELOGRAM;  Surgeon: Brandon Wolfe MD;  Location:  [facility];  Service:    • TRANSURETHRAL RESECTION OF BLADDER TUMOR N/A 3/31/2017    Procedure: CYSTOSCOPY , BLADDER BIOPSY, AND TRANSURETHRAL RESECTION OF BLADDER TUMOR ;  Surgeon: Brandon Wolfe MD;  Location:  [facility];  Service:    • TRANSURETHRAL RESECTION OF BLADDER TUMOR  06/2016    High Grade CIS & Low grade ta disease   • TRANSURETHRAL RESECTION OF BLADDER TUMOR N/A 7/14/2017    Procedure: CYSTOSCOPY TRANSURETHRAL RESECTION OF BLADDER TUMOR & BILATERAL RETROGRADE URETEROPYELOGRAMS;  Surgeon: Brandon Wolfe MD;  Location:  [facility];  Service:        Social History     Social History   • Marital status:      Spouse name: N/A   • Number of children: N/A   • Years of education: N/A     Social History Main Topics   • Smoking status: Current Every Day Smoker     Packs/day: 1.00     Years: 25.00     Types: Cigarettes   • Smokeless tobacco: Not on file   • Alcohol use No   • Drug use: No   • Sexual activity: Defer     Other Topics Concern   • Not on file     Social History Narrative       Family History   Problem Relation Age of Onset   • No Known Problems Father    • No Known Problems Mother        Temp 97.5 °F (36.4 °C)  Ht 71\" (180.3 cm)  Wt 215 lb (97.5 kg)  BMI 29.99 kg/m2    Physical Exam  Alert and oriented ×3  Not agitated or distressed  No obvious deformities  No respiratory distress  Skin without pallor or diaphoresis      Results for orders placed or performed in visit on 08/25/17   POC Urinalysis Dipstick, Automated   Result Value Ref Range    Color Yellow Yellow, Straw, Dark Yellow, Lilibeth    Clarity, UA Clear Clear    Glucose, UA Negative Negative, 1000 mg/dL (3+) mg/dL    " Bilirubin Negative Negative    Ketones, UA Negative Negative    Specific Gravity  1.005 1.005 - 1.030    Blood, UA Small (A) Negative    pH, Urine 6.5 5.0 - 8.0    Protein, POC Negative Negative mg/dL    Urobilinogen, UA Normal Normal    Leukocytes Small (1+) (A) Negative    Nitrite, UA Negative Negative   EXAMINATION: CT CHEST W CONTRAST- 8/21/2017 8:52 AM CDT      HISTORY: Bladder cancer, evaluate for metastatic disease      COMPARISON: None       DLP: 472 mGy cm      TECHNIQUE: Serial helical tomographic images of the chest were acquired  following the infusion of Isovue contrast intravenously. Sagittal and  coronal reformations were made from the original source data and  reviewed..        FINDINGS:   The visualized thyroid gland appears grossly normal. The trachea and  main bronchi appear widely patent and in normal anatomic position. The  esophagus appears grossly normal.      There is no appreciable axillary, mediastinal, or hilar lymphadenopathy.      Heart appears normal in size. Calcifications are seen within the LAD and  right circumflex coronary arteries. There is no pericardial or pleural  effusion. The main pulmonary artery appears normal in caliber. Thoracic  aorta appears normal in caliber without evidence of aneurysm or  dissection. Atherosclerotic calcifications are seen within the aorta.  Noncalcified plaque is also noted within the aorta and its branch  vessels.      Mild emphysematous changes are seen within the lungs. Mild respiratory  motion limits evaluation for small pulmonary nodules. No noncalcified  pulmonary nodules are identified.      Please see separate CT abdomen and pelvis report from the same day for  findings in the upper abdomen.      Review of the visualized osseous structures demonstrates no acute or  aggressive lesions.      IMPRESSION:  1. No evidence of metastatic disease within the chest.  2. Atherosclerosis of the aorta and coronary arteries..          This report was  finalized on 08/21/2017 08:59 by Dr. Alfredo Rincon MD  I read the report of a CT scan of the chest which shows no evidence of mass.  There is no evidence specifically for metastatic disease of urothelial carcinoma to the lung.    I reviewed the CT scan the abdomen pelvis myself.  There is no evidence of adenopathy.  While the bladder is thickened and there some edema around it I see nothing that suspicious at all for invasive disease.     EXAMINATION: CT ABDOMEN PELVIS W CONTRAST-  8/21/2017 7:45 AM CST      HISTORY: Bladder cancer      COMPARISON:06/09/16      TECHNIQUE:Radiation dose equals DLP 1078 mGy-cm.  Automated exposure  control dose reduction technique was implemented.      Thin section axial images were obtained with intravenous and oral  contrast. Early and delayed liver imaging performed. 2-D sagittal and  coronal reconstruction images were generated.      FINDINGS:      The lung bases are clear.      Posteriorly in the liver dome small amount of focal linear fat along the  diaphragm that was observed previously. There are no focal hepatic  lesions.      There is no CT evidence of gallstones. There is no biliary ductal  dilatation.      The spleen is not enlarged.      There are no adrenal masses.      The kidneys enhance symmetrically and no urinary tract calculi. There is  no evidence of obstruction. Delayed sagittal and coronal reconstruction  imaging reveals contrast material in the collecting system without CT  apparent abnormality.      The prostate gland is not enlarged.      The urinary bladder is minimally distended. Diffuse bladder wall  thickening observed. Mild bladder wall nodularity again identified less  conspicuous compared to the previous examination. No discrete focal  bladder wall lesion identified.      Stool and gas identified throughout the nondilated colon. Contrast  material within the colon is observed. There is no dilatation. There is  no CT evidence of diverticular disease.  The appendix is normal.      The small bowel is not dilated. The duodenal sweep is appreciated  appropriate. Stomach is not distended.      There is no ascites or pneumoperitoneum.      There is atherosclerotic aortoiliofemoral calcifications, moderate in  amount.      There are no pathologically enlarged lymph nodes identified in the  abdomen or pelvis.      Postsurgical changes noted in the lower lumbar spine.      Transitional vertebra is appreciated. Rudimentary S1-S2 disc space  assessment. Mild anterolisthesis of L5 on S1 is observed with disc  degeneration. Laminectomy defect noted at this level. Posterior fusion  changes noted.      IMPRESSION:  1. Mild diffuse thickening of the urinary bladder wall with mild  nodularity, less conspicuous compared to the previous abdomen and pelvis  CT dated 06/09/16. No definite discrete urinary bladder wall lesion.  2. Postsurgical and degenerative changes in the lower lumbar spine.  3. Atherosclerotic calcifications.  This report was finalized on 08/21/2017 08:57 by Dr. Doroteo Morales MD    I reviewed the report from his bone scan to look at the CT scan of the abdomen pelvis myself.  There is no hydronephrosis or filling defect in the upper tracts.  The bladder itself shows no significant lesions although the radiologist mentions some generalized thickening I would agree with this but think it secondary to BCG and multiple treatments.  I do not appreciate adenopathy.    Assessment and Plan  Diagnoses and all orders for this visit:    Malignant neoplasm of overlapping sites of bladder  -     POC Urinalysis Dipstick, Automated  -     Ambulatory Referral to Urology  Plan to send him to Dr. Badillo at [facility] in [place], [place]    Brandon Wolfe MD  08/25/17  12:15 PM      Cc: Tam Gonzalez MD

## 2017-09-13 LAB
CYTO UR: NORMAL
LAB AP CASE REPORT: NORMAL
Lab: NORMAL
PATH REPORT.FINAL DX SPEC: NORMAL
PATH REPORT.GROSS SPEC: NORMAL

## 2017-10-09 ENCOUNTER — ANESTHESIA EVENT (OUTPATIENT)
Dept: PERIOP | Facility: HOSPITAL | Age: 47
End: 2017-10-09

## 2017-10-09 ENCOUNTER — HOSPITAL ENCOUNTER (OUTPATIENT)
Facility: HOSPITAL | Age: 47
Setting detail: HOSPITAL OUTPATIENT SURGERY
Discharge: HOME | End: 2017-10-09
Attending: UROLOGY | Admitting: UROLOGY

## 2017-10-09 ENCOUNTER — ANESTHESIA (OUTPATIENT)
Dept: PERIOP | Facility: HOSPITAL | Age: 47
End: 2017-10-09

## 2017-10-09 VITALS
SYSTOLIC BLOOD PRESSURE: 154 MMHG | RESPIRATION RATE: 16 BRPM | OXYGEN SATURATION: 97 % | HEART RATE: 72 BPM | HEIGHT: 71 IN | TEMPERATURE: 97.8 F | BODY MASS INDEX: 29.96 KG/M2 | DIASTOLIC BLOOD PRESSURE: 79 MMHG | WEIGHT: 214 LBS

## 2017-10-09 LAB
ANION GAP SERPL CALCULATED.3IONS-SCNC: 13.9 MMOL/L
BASOPHILS # BLD AUTO: 0.05 10*3/MM3 (ref 0–0.2)
BASOPHILS NFR BLD AUTO: 0.5 % (ref 0–1.5)
BUN BLD-MCNC: 16 MG/DL (ref 6–20)
BUN/CREAT SERPL: 14.7 (ref 7–25)
CALCIUM SPEC-SCNC: 9.1 MG/DL (ref 8.6–10.5)
CHLORIDE SERPL-SCNC: 100 MMOL/L (ref 98–107)
CO2 SERPL-SCNC: 24.1 MMOL/L (ref 22–29)
CREAT BLD-MCNC: 1.09 MG/DL (ref 0.76–1.27)
DEPRECATED RDW RBC AUTO: 41.9 FL (ref 37–54)
EOSINOPHIL # BLD AUTO: 0.21 10*3/MM3 (ref 0–0.7)
EOSINOPHIL NFR BLD AUTO: 1.9 % (ref 0.3–6.2)
ERYTHROCYTE [DISTWIDTH] IN BLOOD BY AUTOMATED COUNT: 12.9 % (ref 11.5–14.5)
GFR SERPL CREATININE-BSD FRML MDRD: 72 ML/MIN/1.73
GLUCOSE BLD-MCNC: 161 MG/DL (ref 65–99)
HCT VFR BLD AUTO: 44.6 % (ref 40.4–52.2)
HGB BLD-MCNC: 15.2 G/DL (ref 13.7–17.6)
IMM GRANULOCYTES # BLD: 0.03 10*3/MM3 (ref 0–0.03)
IMM GRANULOCYTES NFR BLD: 0.3 % (ref 0–0.5)
LYMPHOCYTES # BLD AUTO: 3.04 10*3/MM3 (ref 0.9–4.8)
LYMPHOCYTES NFR BLD AUTO: 27.8 % (ref 19.6–45.3)
MCH RBC QN AUTO: 30.5 PG (ref 27–32.7)
MCHC RBC AUTO-ENTMCNC: 34.1 G/DL (ref 32.6–36.4)
MCV RBC AUTO: 89.4 FL (ref 79.8–96.2)
MONOCYTES # BLD AUTO: 0.78 10*3/MM3 (ref 0.2–1.2)
MONOCYTES NFR BLD AUTO: 7.1 % (ref 5–12)
NEUTROPHILS # BLD AUTO: 6.81 10*3/MM3 (ref 1.9–8.1)
NEUTROPHILS NFR BLD AUTO: 62.4 % (ref 42.7–76)
PLATELET # BLD AUTO: 173 10*3/MM3 (ref 140–500)
PMV BLD AUTO: 11.3 FL (ref 6–12)
POTASSIUM BLD-SCNC: 4.2 MMOL/L (ref 3.5–5.2)
RBC # BLD AUTO: 4.99 10*6/MM3 (ref 4.6–6)
SODIUM BLD-SCNC: 138 MMOL/L (ref 136–145)
WBC NRBC COR # BLD: 10.92 10*3/MM3 (ref 4.5–10.7)

## 2017-10-09 PROCEDURE — 25010000002 ONDANSETRON PER 1 MG: Performed by: NURSE ANESTHETIST, CERTIFIED REGISTERED

## 2017-10-09 PROCEDURE — 25010000002 DEXAMETHASONE PER 1 MG: Performed by: NURSE ANESTHETIST, CERTIFIED REGISTERED

## 2017-10-09 PROCEDURE — 80048 BASIC METABOLIC PNL TOTAL CA: CPT | Performed by: UROLOGY

## 2017-10-09 PROCEDURE — 85025 COMPLETE CBC W/AUTO DIFF WBC: CPT | Performed by: UROLOGY

## 2017-10-09 PROCEDURE — 25010000002 MIDAZOLAM PER 1 MG: Performed by: ANESTHESIOLOGY

## 2017-10-09 PROCEDURE — 25010000002 HYDROMORPHONE PER 4 MG: Performed by: NURSE ANESTHETIST, CERTIFIED REGISTERED

## 2017-10-09 PROCEDURE — 25010000003 CEFAZOLIN IN DEXTROSE 2-4 GM/100ML-% SOLUTION: Performed by: UROLOGY

## 2017-10-09 PROCEDURE — 93005 ELECTROCARDIOGRAM TRACING: CPT | Performed by: UROLOGY

## 2017-10-09 PROCEDURE — 25010000002 FENTANYL CITRATE (PF) 100 MCG/2ML SOLUTION: Performed by: NURSE ANESTHETIST, CERTIFIED REGISTERED

## 2017-10-09 PROCEDURE — 25010000002 PROPOFOL 10 MG/ML EMULSION: Performed by: NURSE ANESTHETIST, CERTIFIED REGISTERED

## 2017-10-09 PROCEDURE — 93010 ELECTROCARDIOGRAM REPORT: CPT | Performed by: INTERNAL MEDICINE

## 2017-10-09 RX ORDER — SODIUM CHLORIDE 0.9 % (FLUSH) 0.9 %
1-10 SYRINGE (ML) INJECTION AS NEEDED
Status: DISCONTINUED | OUTPATIENT
Start: 2017-10-09 | End: 2017-10-09 | Stop reason: HOSPADM

## 2017-10-09 RX ORDER — FENTANYL CITRATE 50 UG/ML
50 INJECTION, SOLUTION INTRAMUSCULAR; INTRAVENOUS
Status: DISCONTINUED | OUTPATIENT
Start: 2017-10-09 | End: 2017-10-09 | Stop reason: HOSPADM

## 2017-10-09 RX ORDER — HYDRALAZINE HYDROCHLORIDE 20 MG/ML
5 INJECTION INTRAMUSCULAR; INTRAVENOUS
Status: DISCONTINUED | OUTPATIENT
Start: 2017-10-09 | End: 2017-10-09 | Stop reason: HOSPADM

## 2017-10-09 RX ORDER — PROMETHAZINE HYDROCHLORIDE 25 MG/1
12.5 TABLET ORAL ONCE AS NEEDED
Status: DISCONTINUED | OUTPATIENT
Start: 2017-10-09 | End: 2017-10-09 | Stop reason: HOSPADM

## 2017-10-09 RX ORDER — CEFAZOLIN SODIUM 2 G/100ML
2 INJECTION, SOLUTION INTRAVENOUS ONCE
Status: COMPLETED | OUTPATIENT
Start: 2017-10-09 | End: 2017-10-09

## 2017-10-09 RX ORDER — HYDROCODONE BITARTRATE AND ACETAMINOPHEN 7.5; 325 MG/1; MG/1
1 TABLET ORAL ONCE AS NEEDED
Status: COMPLETED | OUTPATIENT
Start: 2017-10-09 | End: 2017-10-09

## 2017-10-09 RX ORDER — OXYCODONE AND ACETAMINOPHEN 7.5; 325 MG/1; MG/1
1 TABLET ORAL ONCE AS NEEDED
Status: DISCONTINUED | OUTPATIENT
Start: 2017-10-09 | End: 2017-10-09 | Stop reason: HOSPADM

## 2017-10-09 RX ORDER — IBUPROFEN 600 MG/1
600 TABLET ORAL EVERY 6 HOURS PRN
Status: DISCONTINUED | OUTPATIENT
Start: 2017-10-09 | End: 2017-10-09 | Stop reason: HOSPADM

## 2017-10-09 RX ORDER — HYDROCODONE BITARTRATE AND ACETAMINOPHEN 5; 325 MG/1; MG/1
1 TABLET ORAL ONCE AS NEEDED
Status: DISCONTINUED | OUTPATIENT
Start: 2017-10-09 | End: 2017-10-09 | Stop reason: HOSPADM

## 2017-10-09 RX ORDER — LABETALOL HYDROCHLORIDE 5 MG/ML
5 INJECTION, SOLUTION INTRAVENOUS
Status: DISCONTINUED | OUTPATIENT
Start: 2017-10-09 | End: 2017-10-09 | Stop reason: HOSPADM

## 2017-10-09 RX ORDER — SODIUM CHLORIDE, SODIUM LACTATE, POTASSIUM CHLORIDE, CALCIUM CHLORIDE 600; 310; 30; 20 MG/100ML; MG/100ML; MG/100ML; MG/100ML
9 INJECTION, SOLUTION INTRAVENOUS CONTINUOUS
Status: DISCONTINUED | OUTPATIENT
Start: 2017-10-09 | End: 2017-10-09 | Stop reason: HOSPADM

## 2017-10-09 RX ORDER — ONDANSETRON 2 MG/ML
INJECTION INTRAMUSCULAR; INTRAVENOUS AS NEEDED
Status: DISCONTINUED | OUTPATIENT
Start: 2017-10-09 | End: 2017-10-09 | Stop reason: SURG

## 2017-10-09 RX ORDER — FENTANYL CITRATE 50 UG/ML
INJECTION, SOLUTION INTRAMUSCULAR; INTRAVENOUS AS NEEDED
Status: DISCONTINUED | OUTPATIENT
Start: 2017-10-09 | End: 2017-10-09 | Stop reason: SURG

## 2017-10-09 RX ORDER — PROPOFOL 10 MG/ML
VIAL (ML) INTRAVENOUS AS NEEDED
Status: DISCONTINUED | OUTPATIENT
Start: 2017-10-09 | End: 2017-10-09 | Stop reason: SURG

## 2017-10-09 RX ORDER — DEXAMETHASONE SODIUM PHOSPHATE 10 MG/ML
INJECTION INTRAMUSCULAR; INTRAVENOUS AS NEEDED
Status: DISCONTINUED | OUTPATIENT
Start: 2017-10-09 | End: 2017-10-09 | Stop reason: SURG

## 2017-10-09 RX ORDER — PROMETHAZINE HYDROCHLORIDE 25 MG/1
25 SUPPOSITORY RECTAL ONCE AS NEEDED
Status: DISCONTINUED | OUTPATIENT
Start: 2017-10-09 | End: 2017-10-09 | Stop reason: HOSPADM

## 2017-10-09 RX ORDER — LIDOCAINE HYDROCHLORIDE 20 MG/ML
INJECTION, SOLUTION INFILTRATION; PERINEURAL AS NEEDED
Status: DISCONTINUED | OUTPATIENT
Start: 2017-10-09 | End: 2017-10-09 | Stop reason: SURG

## 2017-10-09 RX ORDER — PROMETHAZINE HYDROCHLORIDE 25 MG/ML
12.5 INJECTION, SOLUTION INTRAMUSCULAR; INTRAVENOUS ONCE AS NEEDED
Status: DISCONTINUED | OUTPATIENT
Start: 2017-10-09 | End: 2017-10-09 | Stop reason: HOSPADM

## 2017-10-09 RX ORDER — FAMOTIDINE 10 MG/ML
20 INJECTION, SOLUTION INTRAVENOUS ONCE
Status: COMPLETED | OUTPATIENT
Start: 2017-10-09 | End: 2017-10-09

## 2017-10-09 RX ORDER — DIPHENHYDRAMINE HYDROCHLORIDE 50 MG/ML
12.5 INJECTION INTRAMUSCULAR; INTRAVENOUS
Status: DISCONTINUED | OUTPATIENT
Start: 2017-10-09 | End: 2017-10-09 | Stop reason: HOSPADM

## 2017-10-09 RX ORDER — MAGNESIUM HYDROXIDE 1200 MG/15ML
LIQUID ORAL AS NEEDED
Status: DISCONTINUED | OUTPATIENT
Start: 2017-10-09 | End: 2017-10-09 | Stop reason: HOSPADM

## 2017-10-09 RX ORDER — MIDAZOLAM HYDROCHLORIDE 1 MG/ML
2 INJECTION INTRAMUSCULAR; INTRAVENOUS
Status: DISCONTINUED | OUTPATIENT
Start: 2017-10-09 | End: 2017-10-09 | Stop reason: HOSPADM

## 2017-10-09 RX ORDER — PROMETHAZINE HYDROCHLORIDE 25 MG/ML
6.25 INJECTION, SOLUTION INTRAMUSCULAR; INTRAVENOUS ONCE AS NEEDED
Status: DISCONTINUED | OUTPATIENT
Start: 2017-10-09 | End: 2017-10-09 | Stop reason: HOSPADM

## 2017-10-09 RX ORDER — NALOXONE HCL 0.4 MG/ML
0.2 VIAL (ML) INJECTION AS NEEDED
Status: DISCONTINUED | OUTPATIENT
Start: 2017-10-09 | End: 2017-10-09 | Stop reason: HOSPADM

## 2017-10-09 RX ORDER — ONDANSETRON 2 MG/ML
4 INJECTION INTRAMUSCULAR; INTRAVENOUS ONCE AS NEEDED
Status: DISCONTINUED | OUTPATIENT
Start: 2017-10-09 | End: 2017-10-09 | Stop reason: HOSPADM

## 2017-10-09 RX ORDER — MIDAZOLAM HYDROCHLORIDE 1 MG/ML
1 INJECTION INTRAMUSCULAR; INTRAVENOUS
Status: DISCONTINUED | OUTPATIENT
Start: 2017-10-09 | End: 2017-10-09 | Stop reason: HOSPADM

## 2017-10-09 RX ORDER — EPHEDRINE SULFATE 50 MG/ML
5 INJECTION, SOLUTION INTRAVENOUS ONCE AS NEEDED
Status: DISCONTINUED | OUTPATIENT
Start: 2017-10-09 | End: 2017-10-09 | Stop reason: HOSPADM

## 2017-10-09 RX ORDER — PROMETHAZINE HYDROCHLORIDE 25 MG/1
25 TABLET ORAL ONCE AS NEEDED
Status: DISCONTINUED | OUTPATIENT
Start: 2017-10-09 | End: 2017-10-09 | Stop reason: HOSPADM

## 2017-10-09 RX ORDER — HYDROCODONE BITARTRATE AND ACETAMINOPHEN 10; 325 MG/1; MG/1
1 TABLET ORAL EVERY 6 HOURS PRN
COMMUNITY

## 2017-10-09 RX ORDER — HYDROMORPHONE HYDROCHLORIDE 1 MG/ML
0.5 INJECTION, SOLUTION INTRAMUSCULAR; INTRAVENOUS; SUBCUTANEOUS
Status: DISCONTINUED | OUTPATIENT
Start: 2017-10-09 | End: 2017-10-09 | Stop reason: HOSPADM

## 2017-10-09 RX ORDER — FLUMAZENIL 0.1 MG/ML
0.2 INJECTION INTRAVENOUS AS NEEDED
Status: DISCONTINUED | OUTPATIENT
Start: 2017-10-09 | End: 2017-10-09 | Stop reason: HOSPADM

## 2017-10-09 RX ADMIN — FAMOTIDINE 20 MG: 10 INJECTION INTRAVENOUS at 11:14

## 2017-10-09 RX ADMIN — LIDOCAINE HYDROCHLORIDE 80 MG: 20 INJECTION, SOLUTION INFILTRATION; PERINEURAL at 11:24

## 2017-10-09 RX ADMIN — DEXAMETHASONE SODIUM PHOSPHATE 8 MG: 10 INJECTION INTRAMUSCULAR; INTRAVENOUS at 11:30

## 2017-10-09 RX ADMIN — CEFAZOLIN SODIUM 2 G: 2 INJECTION, SOLUTION INTRAVENOUS at 11:27

## 2017-10-09 RX ADMIN — PROPOFOL 200 MG: 10 INJECTION, EMULSION INTRAVENOUS at 11:24

## 2017-10-09 RX ADMIN — HYDROMORPHONE HYDROCHLORIDE 0.5 MG: 1 INJECTION, SOLUTION INTRAMUSCULAR; INTRAVENOUS; SUBCUTANEOUS at 12:02

## 2017-10-09 RX ADMIN — HYDROCODONE BITARTRATE AND ACETAMINOPHEN 1 TABLET: 7.5; 325 TABLET ORAL at 12:02

## 2017-10-09 RX ADMIN — SODIUM CHLORIDE, POTASSIUM CHLORIDE, SODIUM LACTATE AND CALCIUM CHLORIDE 9 ML/HR: 600; 310; 30; 20 INJECTION, SOLUTION INTRAVENOUS at 11:14

## 2017-10-09 RX ADMIN — MIDAZOLAM 2 MG: 1 INJECTION INTRAMUSCULAR; INTRAVENOUS at 11:14

## 2017-10-09 RX ADMIN — ONDANSETRON 4 MG: 2 INJECTION INTRAMUSCULAR; INTRAVENOUS at 11:30

## 2017-10-09 RX ADMIN — FENTANYL CITRATE 100 MCG: 50 INJECTION INTRAMUSCULAR; INTRAVENOUS at 11:24

## 2017-10-09 NOTE — ANESTHESIA PREPROCEDURE EVALUATION
Anesthesia Evaluation     Patient summary reviewed   no history of anesthetic complications:  NPO Solid Status: > 8 hours  NPO Liquid Status: > 8 hours     Airway   Mallampati: II  TM distance: >3 FB  Neck ROM: full  no difficulty expected  Dental          Pulmonary     breath sounds clear to auscultation  (+) a smoker Current Smoked day of surgery,   (-) shortness of breath  Cardiovascular   Exercise tolerance: good (4-7 METS)    ECG reviewed  Rhythm: regular  Rate: normal    (+) hypertension poorly controlled,     ROS comment: Likely LVH    Neuro/Psych  GI/Hepatic/Renal/Endo      Musculoskeletal     (+) back pain,   Abdominal    Substance History      OB/GYN          Other      history of cancer                                  Anesthesia Plan    ASA 2     general     intravenous induction   Anesthetic plan and risks discussed with patient and spouse/significant other.

## 2017-10-09 NOTE — ANESTHESIA POSTPROCEDURE EVALUATION
Patient: Lamberto Tafoya    Procedure Summary     Date Anesthesia Start Anesthesia Stop Room / Location    10/09/17 1119 1146  [facility] / [facility]       Procedure Diagnosis Surgeon Provider    CYSTOSCOPY AND EXAM UNDER ANESTHESIA (N/A Urethra) No diagnosis on file. MD Igor Obrien Jr., MD          Anesthesia Type: general  Last vitals  BP   154/79 (10/09/17 1225)    Temp   36.6 °C (97.8 °F) (10/09/17 1144)    Pulse   72 (10/09/17 1225)   Resp   16 (10/09/17 1225)    SpO2   97 % (10/09/17 1225)      Post Anesthesia Care and Evaluation    Patient location during evaluation: PACU  Patient participation: complete - patient participated  Level of consciousness: awake and alert  Pain management: adequate  Airway patency: patent  Anesthetic complications: No anesthetic complications    Cardiovascular status: acceptable  Respiratory status: acceptable  Hydration status: acceptable

## 2017-10-09 NOTE — PLAN OF CARE
Problem: Patient Care Overview (Adult)  Goal: Plan of Care Review  Outcome: Outcome(s) achieved Date Met:  10/09/17    10/09/17 1254   Coping/Psychosocial Response Interventions   Plan Of Care Reviewed With patient;spouse   Patient Care Overview   Progress improving   Outcome Evaluation   Outcome Summary/Follow up Plan ready for discharge       Goal: Adult Individualization and Mutuality  Outcome: Outcome(s) achieved Date Met:  10/09/17  Goal: Discharge Needs Assessment  Outcome: Outcome(s) achieved Date Met:  10/09/17

## 2017-10-09 NOTE — PLAN OF CARE
Problem: Patient Care Overview (Adult)  Goal: Plan of Care Review  Outcome: Ongoing (interventions implemented as appropriate)    10/09/17 1216   Coping/Psychosocial Response Interventions   Plan Of Care Reviewed With patient   Patient Care Overview   Progress progress toward functional goals as expected   Outcome Evaluation   Outcome Summary/Follow up Plan pt vss and pain at tolerable level       Goal: Adult Individualization and Mutuality  Outcome: Ongoing (interventions implemented as appropriate)    Problem: Perioperative Period (Adult)  Goal: Signs and Symptoms of Listed Potential Problems Will be Absent or Manageable (Perioperative Period)  Outcome: Outcome(s) achieved Date Met:  10/09/17

## 2017-10-09 NOTE — ANESTHESIA PROCEDURE NOTES
Airway  Urgency: elective    Airway not difficult    General Information and Staff    Patient location during procedure: OR  Anesthesiologist: WOLF AGUIAR  CRNA: PARISA COMBS    Indications and Patient Condition  Indications for airway management: airway protection    Preoxygenated: yes  MILS not maintained throughout  Mask difficulty assessment: 1 - vent by mask    Final Airway Details  Final airway type: supraglottic airway      Successful airway: classic  Size 5    Number of attempts at approach: 1    Additional Comments  Inflated to seal.

## 2017-10-09 NOTE — H&P
[facility] History and Physical      Patient Identification:  NAME:  Lamberto Tafoya  Age:  47 y.o.   Sex:  male   :  1970   MRN:  [id number]       Chief complaint: Bladder cancer    History of present illness:  Mr. Tafoya is a 47-year-old man with a history of bladder cancer who failed 2 BCG induction courses. After weighing the risks and benefits of treatment options, he would like to undergo radical cystoprostatectomy, likely with ileal conduit urinary diversion. Today we plan to perform cystoscopy and examination under anesthesia. Reports lower back pain which is chronic in nature. Otherwise feels well.      Past medical history:  Past Medical History:   Diagnosis Date   • Anxiety    • Back pain    • Bladder carcinoma 2016    High grade CIS & ta low grade   • H/O hematuria    • History of pneumonia    • Hypertension    • Overweight    • Smoking    • Urinary retention    • Wheezing     r/t smoking       Past surgical history:  Past Surgical History:   Procedure Laterality Date   • BACK SURGERY      x3   • CYSTOSCOPY BLADDER BIOPSY N/A 2016    Procedure: CYSTOSCOPY BLADDER BIOPSY fulgeration of 3cm area of bladder;  Surgeon: Brandon Wolfe MD;  Location: [facility] OR;  Service:    • CYSTOSCOPY RETROGRADE PYELOGRAM Bilateral 2017    Procedure: CYSTOSCOPY RETROGRADE PYELOGRAM;  Surgeon: Brandon Wolfe MD;  Location: [facility] OR;  Service:    • TRANSURETHRAL RESECTION OF BLADDER TUMOR N/A 3/31/2017    Procedure: CYSTOSCOPY , BLADDER BIOPSY, AND TRANSURETHRAL RESECTION OF BLADDER TUMOR ;  Surgeon: Brandon Wolfe MD;  Location: [facility] OR;  Service:    • TRANSURETHRAL RESECTION OF BLADDER TUMOR  2016    High Grade CIS & Low grade ta disease   • TRANSURETHRAL RESECTION OF BLADDER TUMOR N/A 2017    Procedure: CYSTOSCOPY TRANSURETHRAL RESECTION OF BLADDER TUMOR & BILATERAL RETROGRADE URETEROPYELOGRAMS;  Surgeon: Brandon Wolfe MD;  Location: [facility] OR;  Service:         Allergies:  Latex and Demerol [meperidine]    Home medications:  Prescriptions Prior to Admission   Medication Sig Dispense Refill Last Dose   • atenolol (TENORMIN) 50 MG tablet Take 50 mg by mouth Every Morning.   7/14/2017 at 0600   • BREO ELLIPTA 100-25 MCG/INH aerosol powder  Inhale 1 puff Daily.  3 7/13/2017 at 0530   • buPROPion (WELLBUTRIN) 75 MG tablet Take 75 mg by mouth Daily.   7/14/2017 at 0600   • cloNIDine (CATAPRES) 0.1 MG tablet Take 0.1 mg by mouth 2 (Two) Times a Day. TAKES AT NOON AND 5 PM DAILY   7/13/2017 at 2200   • tamsulosin (FLOMAX) 0.4 MG capsule 24 hr capsule Take 1 capsule by mouth Daily.   7/14/2017 at 0600        Hospital medications:    ceFAZolin 2 g Intravenous Once            Family history:  Family History   Problem Relation Age of Onset   • No Known Problems Father    • No Known Problems Mother    • Malig Hyperthermia Neg Hx        Social history:  Social History   Substance Use Topics   • Smoking status: Current Every Day Smoker     Packs/day: 1.00     Years: 25.00     Types: Cigarettes   • Smokeless tobacco: None   • Alcohol use No       Review of systems:    Negative 12-system ROS except for the following:  As above.      Objective:  TMax 24 hours:   No data recorded.      Vitals Ranges:        Intake/Output Last 3 shifts:        Physical Exam:       General Appearance:    Alert, cooperative, in no acute distress   Head:    Normocephalic, without obvious abnormality, atraumatic   Eyes:          PERRL, conjunctivae and corneas clear               Back:     No CVA tenderness   Lungs:     Respirations unlabored, symmetric excursion       Abdomen:     Soft, NDNT, no masses, no guarding       Extremities:   No edema, no deformity   Skin:   No bleeding, bruising or rashes   Neuro/Psych:   Orientation intact, mood/affect pleasant, no focal findings       Results review:   I reviewed the patient's new clinical results.    Data review:  Lab Results (last 24 hours)     ** No results  found for the last 24 hours. **           Imaging:  Imaging Results (last 24 hours)     ** No results found for the last 24 hours. **             Assessment:     Active Problems:    * No active hospital problems. *    Bladder cancer, BCG refractory    Plan:     - cystoscopy, ACE Badillo Jr., MD  10/09/17  10:39 AM

## 2017-10-09 NOTE — OP NOTE
Operative Report    [facility] OR    Patient: Lamberto Tafoya  Age:      47 y.o.  :     1970  Sex:      male    Medical Record:  [id number]    Date of Operation/Procedure:  10/9/2017    Pre-operative Diagnosis Code: Bladder cancer    Pre-operative Diagnosis Free Text:  Bladder cancer    Post-operative Diagnosis: Same    Surgeon(s) and Role:     * Vijay Badillo Jr., MD - Primary     Name of Operation/Procedure:  Procedure(s) and Anesthesia Type:     * CYSTOSCOPY AND EXAM UNDER ANESTHESIA - General    Findings/Complications:  Erythema diffusely through the bladder, particularly along the right lateral wall and above the right ureteral orifice. Moderate/severe BPH. Rectal exam demonstrated no firm nodules. Freely mobile prostate and bladder.    Description of procedure: The patient was taken to the OR and placed under GA in lithotomy position.  Prepped and draped in sterile fashion.  The 21 Fr cystoscope was introduced and pan-cystoscopy was performed. There were 4+ trabeculations. Also noted was erythema over the right lateral wall. No discrete tumors. The cystoscope was then removed. The pendulous urethra exhibited scar tissue but easily accomodated the cystoscope. A bimanual exam was performed, and findings are listed above. The patient was awakened from anesthesia in the OR. He tolerated the procedure well.    Specimens: none    Estimated Blood Loss: Min     Fluids/Drains: none    Vijay Badillo Jr., MD  10/9/2017  11:38 AM

## 2017-10-25 ENCOUNTER — TRANSCRIBE ORDERS (OUTPATIENT)
Dept: WOUND CARE | Facility: HOSPITAL | Age: 47
End: 2017-10-25

## 2017-10-25 DIAGNOSIS — Z71.89 ENCOUNTER FOR OSTOMY NURSE CONSULTATION: Primary | ICD-10-CM

## 2017-11-02 ENCOUNTER — HOSPITAL ENCOUNTER (INPATIENT)
Facility: HOSPITAL | Age: 47
LOS: 6 days | Discharge: HOME HEALTH | End: 2017-11-08
Attending: UROLOGY | Admitting: UROLOGY

## 2017-11-02 ENCOUNTER — ANESTHESIA (OUTPATIENT)
Dept: PERIOP | Facility: HOSPITAL | Age: 47
End: 2017-11-02

## 2017-11-02 ENCOUNTER — ANESTHESIA EVENT (OUTPATIENT)
Dept: PERIOP | Facility: HOSPITAL | Age: 47
End: 2017-11-02

## 2017-11-02 DIAGNOSIS — C67.9 BLADDER CANCER (HCC): ICD-10-CM

## 2017-11-02 DIAGNOSIS — R53.1 GENERALIZED WEAKNESS: Primary | ICD-10-CM

## 2017-11-02 LAB
ABO GROUP BLD: NORMAL
ALBUMIN SERPL-MCNC: 3.6 G/DL (ref 3.5–5.2)
ALBUMIN/GLOB SERPL: 1.4 G/DL
ALP SERPL-CCNC: 51 U/L (ref 39–117)
ALT SERPL W P-5'-P-CCNC: 16 U/L (ref 1–41)
ANION GAP SERPL CALCULATED.3IONS-SCNC: 11.5 MMOL/L
AST SERPL-CCNC: 17 U/L (ref 1–40)
BASOPHILS # BLD AUTO: 0.02 10*3/MM3 (ref 0–0.2)
BASOPHILS NFR BLD AUTO: 0.1 % (ref 0–1.5)
BILIRUB SERPL-MCNC: 0.6 MG/DL (ref 0.1–1.2)
BLD GP AB SCN SERPL QL: NEGATIVE
BUN BLD-MCNC: 15 MG/DL (ref 6–20)
BUN/CREAT SERPL: 13.9 (ref 7–25)
CALCIUM SPEC-SCNC: 8.2 MG/DL (ref 8.6–10.5)
CHLORIDE SERPL-SCNC: 104 MMOL/L (ref 98–107)
CO2 SERPL-SCNC: 23.5 MMOL/L (ref 22–29)
CREAT BLD-MCNC: 1.08 MG/DL (ref 0.76–1.27)
DEPRECATED RDW RBC AUTO: 43.7 FL (ref 37–54)
EOSINOPHIL # BLD AUTO: 0.03 10*3/MM3 (ref 0–0.7)
EOSINOPHIL NFR BLD AUTO: 0.2 % (ref 0.3–6.2)
ERYTHROCYTE [DISTWIDTH] IN BLOOD BY AUTOMATED COUNT: 12.8 % (ref 11.5–14.5)
GFR SERPL CREATININE-BSD FRML MDRD: 73 ML/MIN/1.73
GLOBULIN UR ELPH-MCNC: 2.6 GM/DL
GLUCOSE BLD-MCNC: 167 MG/DL (ref 65–99)
GLUCOSE BLDC GLUCOMTR-MCNC: 134 MG/DL (ref 70–130)
GLUCOSE BLDC GLUCOMTR-MCNC: 168 MG/DL (ref 70–130)
HCT VFR BLD AUTO: 40.6 % (ref 40.4–52.2)
HGB BLD-MCNC: 13.4 G/DL (ref 13.7–17.6)
IMM GRANULOCYTES # BLD: 0.04 10*3/MM3 (ref 0–0.03)
IMM GRANULOCYTES NFR BLD: 0.3 % (ref 0–0.5)
LYMPHOCYTES # BLD AUTO: 1.51 10*3/MM3 (ref 0.9–4.8)
LYMPHOCYTES NFR BLD AUTO: 9.6 % (ref 19.6–45.3)
MCH RBC QN AUTO: 30.9 PG (ref 27–32.7)
MCHC RBC AUTO-ENTMCNC: 33 G/DL (ref 32.6–36.4)
MCV RBC AUTO: 93.8 FL (ref 79.8–96.2)
MONOCYTES # BLD AUTO: 1.74 10*3/MM3 (ref 0.2–1.2)
MONOCYTES NFR BLD AUTO: 11 % (ref 5–12)
NEUTROPHILS # BLD AUTO: 12.42 10*3/MM3 (ref 1.9–8.1)
NEUTROPHILS NFR BLD AUTO: 78.8 % (ref 42.7–76)
PLATELET # BLD AUTO: 166 10*3/MM3 (ref 140–500)
PMV BLD AUTO: 10.7 FL (ref 6–12)
POTASSIUM BLD-SCNC: 4.5 MMOL/L (ref 3.5–5.2)
PROT SERPL-MCNC: 6.2 G/DL (ref 6–8.5)
RBC # BLD AUTO: 4.33 10*6/MM3 (ref 4.6–6)
RH BLD: POSITIVE
SODIUM BLD-SCNC: 139 MMOL/L (ref 136–145)
WBC NRBC COR # BLD: 15.76 10*3/MM3 (ref 4.5–10.7)

## 2017-11-02 PROCEDURE — 88307 TISSUE EXAM BY PATHOLOGIST: CPT | Performed by: UROLOGY

## 2017-11-02 PROCEDURE — 86920 COMPATIBILITY TEST SPIN: CPT

## 2017-11-02 PROCEDURE — C1769 GUIDE WIRE: HCPCS | Performed by: UROLOGY

## 2017-11-02 PROCEDURE — 82962 GLUCOSE BLOOD TEST: CPT

## 2017-11-02 PROCEDURE — 25010000002 FENTANYL CITRATE (PF) 100 MCG/2ML SOLUTION: Performed by: NURSE ANESTHETIST, CERTIFIED REGISTERED

## 2017-11-02 PROCEDURE — 88331 PATH CONSLTJ SURG 1 BLK 1SPC: CPT | Performed by: UROLOGY

## 2017-11-02 PROCEDURE — 25010000002 KETOROLAC TROMETHAMINE PER 15 MG: Performed by: UROLOGY

## 2017-11-02 PROCEDURE — 25010000002 CEFOXITIN PER 1 G: Performed by: UROLOGY

## 2017-11-02 PROCEDURE — 0T180ZC BYPASS BILATERAL URETERS TO ILEOCUTANEOUS, OPEN APPROACH: ICD-10-PCS | Performed by: UROLOGY

## 2017-11-02 PROCEDURE — 25010000002 FENTANYL CITRATE (PF) 100 MCG/2ML SOLUTION 5 ML AMPULE: Performed by: ANESTHESIOLOGY

## 2017-11-02 PROCEDURE — 25010000002 PROMETHAZINE PER 50 MG: Performed by: ANESTHESIOLOGY

## 2017-11-02 PROCEDURE — 25010000002 HYDROMORPHONE PER 4 MG: Performed by: NURSE ANESTHETIST, CERTIFIED REGISTERED

## 2017-11-02 PROCEDURE — 25010000002 FENTANYL CITRATE (PF) 100 MCG/2ML SOLUTION

## 2017-11-02 PROCEDURE — 25010000002 FENTANYL CITRATE (PF) 100 MCG/2ML SOLUTION: Performed by: ANESTHESIOLOGY

## 2017-11-02 PROCEDURE — 25010000002 ALBUMIN HUMAN 5% PER 50 ML: Performed by: ANESTHESIOLOGY

## 2017-11-02 PROCEDURE — C1755 CATHETER, INTRASPINAL: HCPCS | Performed by: ANESTHESIOLOGY

## 2017-11-02 PROCEDURE — 07TC0ZZ RESECTION OF PELVIS LYMPHATIC, OPEN APPROACH: ICD-10-PCS | Performed by: UROLOGY

## 2017-11-02 PROCEDURE — 25010000002 MIDAZOLAM PER 1 MG: Performed by: ANESTHESIOLOGY

## 2017-11-02 PROCEDURE — 88312 SPECIAL STAINS GROUP 1: CPT | Performed by: UROLOGY

## 2017-11-02 PROCEDURE — 25010000002 HYDROMORPHONE PER 4 MG: Performed by: UROLOGY

## 2017-11-02 PROCEDURE — 80053 COMPREHEN METABOLIC PANEL: CPT | Performed by: INTERNAL MEDICINE

## 2017-11-02 PROCEDURE — 86850 RBC ANTIBODY SCREEN: CPT | Performed by: UROLOGY

## 2017-11-02 PROCEDURE — 0TTB0ZZ RESECTION OF BLADDER, OPEN APPROACH: ICD-10-PCS | Performed by: UROLOGY

## 2017-11-02 PROCEDURE — P9041 ALBUMIN (HUMAN),5%, 50ML: HCPCS | Performed by: ANESTHESIOLOGY

## 2017-11-02 PROCEDURE — 85025 COMPLETE CBC W/AUTO DIFF WBC: CPT | Performed by: INTERNAL MEDICINE

## 2017-11-02 PROCEDURE — 25010000002 NEOSTIGMINE PER 0.5 MG: Performed by: NURSE ANESTHETIST, CERTIFIED REGISTERED

## 2017-11-02 PROCEDURE — 86901 BLOOD TYPING SEROLOGIC RH(D): CPT | Performed by: UROLOGY

## 2017-11-02 PROCEDURE — 25010000002 ONDANSETRON PER 1 MG: Performed by: NURSE ANESTHETIST, CERTIFIED REGISTERED

## 2017-11-02 PROCEDURE — C2617 STENT, NON-COR, TEM W/O DEL: HCPCS | Performed by: UROLOGY

## 2017-11-02 PROCEDURE — 88309 TISSUE EXAM BY PATHOLOGIST: CPT | Performed by: UROLOGY

## 2017-11-02 PROCEDURE — 0VT00ZZ RESECTION OF PROSTATE, OPEN APPROACH: ICD-10-PCS | Performed by: UROLOGY

## 2017-11-02 PROCEDURE — 25010000002 ROPIVACAINE PER 1 MG: Performed by: ANESTHESIOLOGY

## 2017-11-02 PROCEDURE — 88305 TISSUE EXAM BY PATHOLOGIST: CPT | Performed by: UROLOGY

## 2017-11-02 PROCEDURE — 94640 AIRWAY INHALATION TREATMENT: CPT

## 2017-11-02 PROCEDURE — 25010000002 PROPOFOL 10 MG/ML EMULSION: Performed by: NURSE ANESTHETIST, CERTIFIED REGISTERED

## 2017-11-02 PROCEDURE — 86900 BLOOD TYPING SEROLOGIC ABO: CPT | Performed by: UROLOGY

## 2017-11-02 DEVICE — STNT URNRY DIVR 7F90CM: Type: IMPLANTABLE DEVICE | Site: URETER | Status: FUNCTIONAL

## 2017-11-02 RX ORDER — NITROGLYCERIN 0.4 MG/1
0.4 TABLET SUBLINGUAL
Status: DISCONTINUED | OUTPATIENT
Start: 2017-11-02 | End: 2017-11-08 | Stop reason: HOSPADM

## 2017-11-02 RX ORDER — FENTANYL CITRATE 50 UG/ML
INJECTION, SOLUTION INTRAMUSCULAR; INTRAVENOUS
Status: COMPLETED
Start: 2017-11-02 | End: 2017-11-02

## 2017-11-02 RX ORDER — DIPHENHYDRAMINE HCL 25 MG
25 CAPSULE ORAL EVERY 6 HOURS PRN
Status: DISCONTINUED | OUTPATIENT
Start: 2017-11-02 | End: 2017-11-02 | Stop reason: HOSPADM

## 2017-11-02 RX ORDER — OXYCODONE AND ACETAMINOPHEN 7.5; 325 MG/1; MG/1
1 TABLET ORAL ONCE AS NEEDED
Status: DISCONTINUED | OUTPATIENT
Start: 2017-11-02 | End: 2017-11-02 | Stop reason: HOSPADM

## 2017-11-02 RX ORDER — MAGNESIUM HYDROXIDE 1200 MG/15ML
LIQUID ORAL AS NEEDED
Status: DISCONTINUED | OUTPATIENT
Start: 2017-11-02 | End: 2017-11-02 | Stop reason: HOSPADM

## 2017-11-02 RX ORDER — SODIUM CHLORIDE, SODIUM LACTATE, POTASSIUM CHLORIDE, CALCIUM CHLORIDE 600; 310; 30; 20 MG/100ML; MG/100ML; MG/100ML; MG/100ML
100 INJECTION, SOLUTION INTRAVENOUS CONTINUOUS
Status: DISCONTINUED | OUTPATIENT
Start: 2017-11-02 | End: 2017-11-02 | Stop reason: HOSPADM

## 2017-11-02 RX ORDER — IPRATROPIUM BROMIDE AND ALBUTEROL SULFATE 2.5; .5 MG/3ML; MG/3ML
3 SOLUTION RESPIRATORY (INHALATION) ONCE
Status: COMPLETED | OUTPATIENT
Start: 2017-11-02 | End: 2017-11-02

## 2017-11-02 RX ORDER — MIDAZOLAM HYDROCHLORIDE 1 MG/ML
1 INJECTION INTRAMUSCULAR; INTRAVENOUS
Status: DISCONTINUED | OUTPATIENT
Start: 2017-11-02 | End: 2017-11-02 | Stop reason: HOSPADM

## 2017-11-02 RX ORDER — MIDAZOLAM HYDROCHLORIDE 1 MG/ML
2 INJECTION INTRAMUSCULAR; INTRAVENOUS
Status: DISCONTINUED | OUTPATIENT
Start: 2017-11-02 | End: 2017-11-02 | Stop reason: HOSPADM

## 2017-11-02 RX ORDER — BUPROPION HYDROCHLORIDE 75 MG/1
75 TABLET ORAL DAILY
Status: DISCONTINUED | OUTPATIENT
Start: 2017-11-03 | End: 2017-11-08 | Stop reason: HOSPADM

## 2017-11-02 RX ORDER — PROMETHAZINE HYDROCHLORIDE 25 MG/1
12.5 TABLET ORAL ONCE AS NEEDED
Status: DISCONTINUED | OUTPATIENT
Start: 2017-11-02 | End: 2017-11-02 | Stop reason: HOSPADM

## 2017-11-02 RX ORDER — KETOROLAC TROMETHAMINE 15 MG/ML
15 INJECTION, SOLUTION INTRAMUSCULAR; INTRAVENOUS EVERY 6 HOURS
Status: COMPLETED | OUTPATIENT
Start: 2017-11-02 | End: 2017-11-03

## 2017-11-02 RX ORDER — ONDANSETRON 2 MG/ML
4 INJECTION INTRAMUSCULAR; INTRAVENOUS EVERY 6 HOURS PRN
Status: DISCONTINUED | OUTPATIENT
Start: 2017-11-02 | End: 2017-11-08 | Stop reason: HOSPADM

## 2017-11-02 RX ORDER — FAMOTIDINE 10 MG/ML
20 INJECTION, SOLUTION INTRAVENOUS ONCE
Status: COMPLETED | OUTPATIENT
Start: 2017-11-02 | End: 2017-11-02

## 2017-11-02 RX ORDER — PROMETHAZINE HYDROCHLORIDE 25 MG/1
25 SUPPOSITORY RECTAL ONCE AS NEEDED
Status: DISCONTINUED | OUTPATIENT
Start: 2017-11-02 | End: 2017-11-02 | Stop reason: HOSPADM

## 2017-11-02 RX ORDER — HYDROMORPHONE HYDROCHLORIDE 1 MG/ML
0.5 INJECTION, SOLUTION INTRAMUSCULAR; INTRAVENOUS; SUBCUTANEOUS
Status: DISCONTINUED | OUTPATIENT
Start: 2017-11-02 | End: 2017-11-02 | Stop reason: HOSPADM

## 2017-11-02 RX ORDER — NALOXONE HCL 0.4 MG/ML
0.2 VIAL (ML) INJECTION AS NEEDED
Status: DISCONTINUED | OUTPATIENT
Start: 2017-11-02 | End: 2017-11-02 | Stop reason: HOSPADM

## 2017-11-02 RX ORDER — ONDANSETRON 2 MG/ML
4 INJECTION INTRAMUSCULAR; INTRAVENOUS EVERY 4 HOURS PRN
Status: DISCONTINUED | OUTPATIENT
Start: 2017-11-02 | End: 2017-11-02 | Stop reason: HOSPADM

## 2017-11-02 RX ORDER — PROMETHAZINE HYDROCHLORIDE 25 MG/1
25 TABLET ORAL ONCE AS NEEDED
Status: DISCONTINUED | OUTPATIENT
Start: 2017-11-02 | End: 2017-11-02 | Stop reason: HOSPADM

## 2017-11-02 RX ORDER — LIDOCAINE HYDROCHLORIDE 20 MG/ML
INJECTION, SOLUTION INFILTRATION; PERINEURAL AS NEEDED
Status: DISCONTINUED | OUTPATIENT
Start: 2017-11-02 | End: 2017-11-02 | Stop reason: SURG

## 2017-11-02 RX ORDER — HYDROMORPHONE HYDROCHLORIDE 1 MG/ML
0.5 INJECTION, SOLUTION INTRAMUSCULAR; INTRAVENOUS; SUBCUTANEOUS
Status: DISCONTINUED | OUTPATIENT
Start: 2017-11-02 | End: 2017-11-07 | Stop reason: SDUPTHER

## 2017-11-02 RX ORDER — DIPHENHYDRAMINE HYDROCHLORIDE 50 MG/ML
12.5 INJECTION INTRAMUSCULAR; INTRAVENOUS EVERY 6 HOURS PRN
Status: DISCONTINUED | OUTPATIENT
Start: 2017-11-02 | End: 2017-11-02 | Stop reason: HOSPADM

## 2017-11-02 RX ORDER — BUDESONIDE AND FORMOTEROL FUMARATE DIHYDRATE 80; 4.5 UG/1; UG/1
2 AEROSOL RESPIRATORY (INHALATION)
Status: DISCONTINUED | OUTPATIENT
Start: 2017-11-02 | End: 2017-11-08 | Stop reason: HOSPADM

## 2017-11-02 RX ORDER — PROMETHAZINE HYDROCHLORIDE 25 MG/ML
12.5 INJECTION, SOLUTION INTRAMUSCULAR; INTRAVENOUS ONCE AS NEEDED
Status: DISCONTINUED | OUTPATIENT
Start: 2017-11-02 | End: 2017-11-02 | Stop reason: HOSPADM

## 2017-11-02 RX ORDER — TAMSULOSIN HYDROCHLORIDE 0.4 MG/1
0.4 CAPSULE ORAL DAILY
Status: DISCONTINUED | OUTPATIENT
Start: 2017-11-03 | End: 2017-11-05

## 2017-11-02 RX ORDER — EPHEDRINE SULFATE 50 MG/ML
5 INJECTION, SOLUTION INTRAVENOUS ONCE AS NEEDED
Status: DISCONTINUED | OUTPATIENT
Start: 2017-11-02 | End: 2017-11-02 | Stop reason: HOSPADM

## 2017-11-02 RX ORDER — PROMETHAZINE HYDROCHLORIDE 25 MG/ML
6.25 INJECTION, SOLUTION INTRAMUSCULAR; INTRAVENOUS ONCE
Status: COMPLETED | OUTPATIENT
Start: 2017-11-02 | End: 2017-11-02

## 2017-11-02 RX ORDER — HYDRALAZINE HYDROCHLORIDE 20 MG/ML
5 INJECTION INTRAMUSCULAR; INTRAVENOUS
Status: DISCONTINUED | OUTPATIENT
Start: 2017-11-02 | End: 2017-11-02 | Stop reason: HOSPADM

## 2017-11-02 RX ORDER — PROMETHAZINE HYDROCHLORIDE 25 MG/ML
12.5 INJECTION, SOLUTION INTRAMUSCULAR; INTRAVENOUS EVERY 6 HOURS PRN
Status: DISCONTINUED | OUTPATIENT
Start: 2017-11-02 | End: 2017-11-02 | Stop reason: HOSPADM

## 2017-11-02 RX ORDER — ROPIVACAINE HYDROCHLORIDE 2 MG/ML
INJECTION, SOLUTION EPIDURAL; INFILTRATION; PERINEURAL AS NEEDED
Status: DISCONTINUED | OUTPATIENT
Start: 2017-11-02 | End: 2017-11-02 | Stop reason: SURG

## 2017-11-02 RX ORDER — ACETAMINOPHEN 500 MG
1000 TABLET ORAL EVERY 6 HOURS PRN
COMMUNITY
End: 2017-11-19 | Stop reason: HOSPADM

## 2017-11-02 RX ORDER — CLONIDINE HYDROCHLORIDE 0.1 MG/1
0.1 TABLET ORAL 3 TIMES DAILY
Status: DISCONTINUED | OUTPATIENT
Start: 2017-11-02 | End: 2017-11-08 | Stop reason: HOSPADM

## 2017-11-02 RX ORDER — SODIUM CHLORIDE, SODIUM LACTATE, POTASSIUM CHLORIDE, CALCIUM CHLORIDE 600; 310; 30; 20 MG/100ML; MG/100ML; MG/100ML; MG/100ML
9 INJECTION, SOLUTION INTRAVENOUS CONTINUOUS
Status: DISCONTINUED | OUTPATIENT
Start: 2017-11-02 | End: 2017-11-02 | Stop reason: HOSPADM

## 2017-11-02 RX ORDER — SODIUM CHLORIDE 0.9 % (FLUSH) 0.9 %
1-10 SYRINGE (ML) INJECTION AS NEEDED
Status: DISCONTINUED | OUTPATIENT
Start: 2017-11-02 | End: 2017-11-02 | Stop reason: HOSPADM

## 2017-11-02 RX ORDER — ALBUMIN, HUMAN INJ 5% 5 %
SOLUTION INTRAVENOUS CONTINUOUS PRN
Status: DISCONTINUED | OUTPATIENT
Start: 2017-11-02 | End: 2017-11-02 | Stop reason: SURG

## 2017-11-02 RX ORDER — ROCURONIUM BROMIDE 10 MG/ML
INJECTION, SOLUTION INTRAVENOUS AS NEEDED
Status: DISCONTINUED | OUTPATIENT
Start: 2017-11-02 | End: 2017-11-02 | Stop reason: SURG

## 2017-11-02 RX ORDER — CEFOXITIN 2 G/1
2 INJECTION, POWDER, FOR SOLUTION INTRAVENOUS ONCE
Status: DISCONTINUED | OUTPATIENT
Start: 2017-11-02 | End: 2017-11-02 | Stop reason: SDUPTHER

## 2017-11-02 RX ORDER — KETOROLAC TROMETHAMINE 30 MG/ML
15 INJECTION, SOLUTION INTRAMUSCULAR; INTRAVENOUS EVERY 6 HOURS PRN
Status: DISCONTINUED | OUTPATIENT
Start: 2017-11-02 | End: 2017-11-02 | Stop reason: HOSPADM

## 2017-11-02 RX ORDER — PROPOFOL 10 MG/ML
VIAL (ML) INTRAVENOUS AS NEEDED
Status: DISCONTINUED | OUTPATIENT
Start: 2017-11-02 | End: 2017-11-02 | Stop reason: SURG

## 2017-11-02 RX ORDER — ONDANSETRON 2 MG/ML
4 INJECTION INTRAMUSCULAR; INTRAVENOUS ONCE AS NEEDED
Status: DISCONTINUED | OUTPATIENT
Start: 2017-11-02 | End: 2017-11-02 | Stop reason: HOSPADM

## 2017-11-02 RX ORDER — ATENOLOL 50 MG/1
50 TABLET ORAL EVERY MORNING
Status: DISCONTINUED | OUTPATIENT
Start: 2017-11-03 | End: 2017-11-06

## 2017-11-02 RX ORDER — FENTANYL CITRATE 50 UG/ML
INJECTION, SOLUTION INTRAMUSCULAR; INTRAVENOUS AS NEEDED
Status: DISCONTINUED | OUTPATIENT
Start: 2017-11-02 | End: 2017-11-02 | Stop reason: SURG

## 2017-11-02 RX ORDER — DOCUSATE SODIUM 100 MG/1
100 CAPSULE, LIQUID FILLED ORAL 2 TIMES DAILY PRN
Status: DISCONTINUED | OUTPATIENT
Start: 2017-11-02 | End: 2017-11-08 | Stop reason: HOSPADM

## 2017-11-02 RX ORDER — IPRATROPIUM BROMIDE AND ALBUTEROL SULFATE 2.5; .5 MG/3ML; MG/3ML
3 SOLUTION RESPIRATORY (INHALATION) ONCE
Status: DISCONTINUED | OUTPATIENT
Start: 2017-11-02 | End: 2017-11-02

## 2017-11-02 RX ORDER — HYDROCODONE BITARTRATE AND ACETAMINOPHEN 7.5; 325 MG/1; MG/1
1 TABLET ORAL ONCE AS NEEDED
Status: DISCONTINUED | OUTPATIENT
Start: 2017-11-02 | End: 2017-11-02 | Stop reason: HOSPADM

## 2017-11-02 RX ORDER — ONDANSETRON 2 MG/ML
INJECTION INTRAMUSCULAR; INTRAVENOUS AS NEEDED
Status: DISCONTINUED | OUTPATIENT
Start: 2017-11-02 | End: 2017-11-02 | Stop reason: SURG

## 2017-11-02 RX ORDER — DIPHENHYDRAMINE HYDROCHLORIDE 50 MG/ML
12.5 INJECTION INTRAMUSCULAR; INTRAVENOUS
Status: DISCONTINUED | OUTPATIENT
Start: 2017-11-02 | End: 2017-11-02 | Stop reason: HOSPADM

## 2017-11-02 RX ORDER — SODIUM CHLORIDE 9 MG/ML
25 INJECTION, SOLUTION INTRAVENOUS CONTINUOUS
Status: DISCONTINUED | OUTPATIENT
Start: 2017-11-02 | End: 2017-11-07

## 2017-11-02 RX ORDER — FENTANYL CITRATE 50 UG/ML
50 INJECTION, SOLUTION INTRAMUSCULAR; INTRAVENOUS
Status: DISCONTINUED | OUTPATIENT
Start: 2017-11-02 | End: 2017-11-02 | Stop reason: HOSPADM

## 2017-11-02 RX ORDER — GLYCOPYRROLATE 0.2 MG/ML
INJECTION INTRAMUSCULAR; INTRAVENOUS AS NEEDED
Status: DISCONTINUED | OUTPATIENT
Start: 2017-11-02 | End: 2017-11-02 | Stop reason: SURG

## 2017-11-02 RX ORDER — ONDANSETRON 4 MG/1
4 TABLET, FILM COATED ORAL EVERY 6 HOURS PRN
Status: DISCONTINUED | OUTPATIENT
Start: 2017-11-02 | End: 2017-11-08 | Stop reason: HOSPADM

## 2017-11-02 RX ORDER — LABETALOL HYDROCHLORIDE 5 MG/ML
5 INJECTION, SOLUTION INTRAVENOUS
Status: DISCONTINUED | OUTPATIENT
Start: 2017-11-02 | End: 2017-11-02 | Stop reason: HOSPADM

## 2017-11-02 RX ORDER — NALOXONE HCL 0.4 MG/ML
0.2 VIAL (ML) INJECTION
Status: DISCONTINUED | OUTPATIENT
Start: 2017-11-02 | End: 2017-11-02 | Stop reason: HOSPADM

## 2017-11-02 RX ORDER — EPHEDRINE SULFATE 50 MG/ML
INJECTION, SOLUTION INTRAVENOUS AS NEEDED
Status: DISCONTINUED | OUTPATIENT
Start: 2017-11-02 | End: 2017-11-02 | Stop reason: SURG

## 2017-11-02 RX ORDER — FLUMAZENIL 0.1 MG/ML
0.2 INJECTION INTRAVENOUS AS NEEDED
Status: DISCONTINUED | OUTPATIENT
Start: 2017-11-02 | End: 2017-11-02 | Stop reason: HOSPADM

## 2017-11-02 RX ORDER — ONDANSETRON 4 MG/1
4 TABLET, ORALLY DISINTEGRATING ORAL EVERY 6 HOURS PRN
Status: DISCONTINUED | OUTPATIENT
Start: 2017-11-02 | End: 2017-11-08 | Stop reason: HOSPADM

## 2017-11-02 RX ADMIN — FENTANYL CITRATE 50 MCG: 50 INJECTION INTRAMUSCULAR; INTRAVENOUS at 19:06

## 2017-11-02 RX ADMIN — SODIUM CHLORIDE 100 ML/HR: 9 INJECTION, SOLUTION INTRAVENOUS at 20:53

## 2017-11-02 RX ADMIN — MIDAZOLAM 1 MG: 1 INJECTION INTRAMUSCULAR; INTRAVENOUS at 12:00

## 2017-11-02 RX ADMIN — FENTANYL CITRATE 50 MCG: 50 INJECTION INTRAMUSCULAR; INTRAVENOUS at 19:44

## 2017-11-02 RX ADMIN — HYDROMORPHONE HYDROCHLORIDE 0.5 MG: 1 INJECTION, SOLUTION INTRAMUSCULAR; INTRAVENOUS; SUBCUTANEOUS at 20:53

## 2017-11-02 RX ADMIN — FENTANYL CITRATE 50 MCG: 50 INJECTION, SOLUTION INTRAMUSCULAR; INTRAVENOUS at 14:31

## 2017-11-02 RX ADMIN — IPRATROPIUM BROMIDE AND ALBUTEROL SULFATE 3 ML: .5; 3 SOLUTION RESPIRATORY (INHALATION) at 10:49

## 2017-11-02 RX ADMIN — FENTANYL CITRATE 50 MCG: 50 INJECTION, SOLUTION INTRAMUSCULAR; INTRAVENOUS at 14:45

## 2017-11-02 RX ADMIN — CEFOXITIN SODIUM 2 G: 2 POWDER, FOR SOLUTION INTRAVENOUS at 14:12

## 2017-11-02 RX ADMIN — ONDANSETRON 4 MG: 2 INJECTION INTRAMUSCULAR; INTRAVENOUS at 18:41

## 2017-11-02 RX ADMIN — FAMOTIDINE 20 MG: 10 INJECTION INTRAVENOUS at 10:37

## 2017-11-02 RX ADMIN — NEOSTIGMINE METHYLSULFATE 3 MG: 1 INJECTION INTRAMUSCULAR; INTRAVENOUS; SUBCUTANEOUS at 18:44

## 2017-11-02 RX ADMIN — SODIUM CHLORIDE, POTASSIUM CHLORIDE, SODIUM LACTATE AND CALCIUM CHLORIDE 9 ML/HR: 600; 310; 30; 20 INJECTION, SOLUTION INTRAVENOUS at 09:55

## 2017-11-02 RX ADMIN — ROCURONIUM BROMIDE 25 MG: 10 INJECTION INTRAVENOUS at 16:52

## 2017-11-02 RX ADMIN — GLYCOPYRROLATE 0.5 MG: 0.2 INJECTION INTRAMUSCULAR; INTRAVENOUS at 18:44

## 2017-11-02 RX ADMIN — GLYCOPYRROLATE 0.2 MG: 0.2 INJECTION INTRAMUSCULAR; INTRAVENOUS at 14:20

## 2017-11-02 RX ADMIN — PROPOFOL 50 MG: 10 INJECTION, EMULSION INTRAVENOUS at 16:56

## 2017-11-02 RX ADMIN — EPHEDRINE SULFATE 5 MG: 50 INJECTION INTRAMUSCULAR; INTRAVENOUS; SUBCUTANEOUS at 16:17

## 2017-11-02 RX ADMIN — ROCURONIUM BROMIDE 50 MG: 10 INJECTION INTRAVENOUS at 14:07

## 2017-11-02 RX ADMIN — ALBUMIN (HUMAN): 0.05 SOLUTION INTRAVENOUS at 16:20

## 2017-11-02 RX ADMIN — PROMETHAZINE HYDROCHLORIDE 6.25 MG: 25 INJECTION INTRAMUSCULAR; INTRAVENOUS at 10:36

## 2017-11-02 RX ADMIN — FENTANYL CITRATE 50 MCG: 50 INJECTION INTRAMUSCULAR; INTRAVENOUS at 10:59

## 2017-11-02 RX ADMIN — ROPIVACAINE HYDROCHLORIDE 5 ML: 2 INJECTION, SOLUTION EPIDURAL; INFILTRATION at 18:56

## 2017-11-02 RX ADMIN — ROCURONIUM BROMIDE 10 MG: 10 INJECTION INTRAVENOUS at 16:02

## 2017-11-02 RX ADMIN — FENTANYL CITRATE 50 MCG: 50 INJECTION INTRAMUSCULAR; INTRAVENOUS at 12:00

## 2017-11-02 RX ADMIN — HYDROMORPHONE HYDROCHLORIDE 0.5 MG: 1 INJECTION, SOLUTION INTRAMUSCULAR; INTRAVENOUS; SUBCUTANEOUS at 20:16

## 2017-11-02 RX ADMIN — FENTANYL CITRATE 50 MCG: 50 INJECTION, SOLUTION INTRAMUSCULAR; INTRAVENOUS at 14:07

## 2017-11-02 RX ADMIN — PROPOFOL 200 MG: 10 INJECTION, EMULSION INTRAVENOUS at 14:07

## 2017-11-02 RX ADMIN — PROPOFOL 50 MG: 10 INJECTION, EMULSION INTRAVENOUS at 15:10

## 2017-11-02 RX ADMIN — FENTANYL CITRATE 50 MCG: 50 INJECTION, SOLUTION INTRAMUSCULAR; INTRAVENOUS at 14:49

## 2017-11-02 RX ADMIN — ROPIVACAINE HYDROCHLORIDE 5 ML: 2 INJECTION, SOLUTION EPIDURAL; INFILTRATION at 17:40

## 2017-11-02 RX ADMIN — MIDAZOLAM 1 MG: 1 INJECTION INTRAMUSCULAR; INTRAVENOUS at 10:38

## 2017-11-02 RX ADMIN — EPHEDRINE SULFATE 5 MG: 50 INJECTION INTRAMUSCULAR; INTRAVENOUS; SUBCUTANEOUS at 17:52

## 2017-11-02 RX ADMIN — HYDROMORPHONE HYDROCHLORIDE 1 MG: 1 INJECTION, SOLUTION INTRAMUSCULAR; INTRAVENOUS; SUBCUTANEOUS at 23:23

## 2017-11-02 RX ADMIN — ROCURONIUM BROMIDE 20 MG: 10 INJECTION INTRAVENOUS at 14:25

## 2017-11-02 RX ADMIN — BUPIVACAINE HYDROCHLORIDE: 5 INJECTION, SOLUTION EPIDURAL; INTRACAUDAL at 19:29

## 2017-11-02 RX ADMIN — FENTANYL CITRATE 50 MCG: 50 INJECTION INTRAMUSCULAR; INTRAVENOUS at 11:56

## 2017-11-02 RX ADMIN — MIDAZOLAM 1 MG: 1 INJECTION INTRAMUSCULAR; INTRAVENOUS at 11:56

## 2017-11-02 RX ADMIN — ROCURONIUM BROMIDE 25 MG: 10 INJECTION INTRAVENOUS at 17:52

## 2017-11-02 RX ADMIN — HYDROMORPHONE HYDROCHLORIDE 0.5 MG: 1 INJECTION, SOLUTION INTRAMUSCULAR; INTRAVENOUS; SUBCUTANEOUS at 19:07

## 2017-11-02 RX ADMIN — CLONIDINE HYDROCHLORIDE 0.1 MG: 0.1 TABLET ORAL at 23:21

## 2017-11-02 RX ADMIN — FENTANYL CITRATE 50 MCG: 50 INJECTION, SOLUTION INTRAMUSCULAR; INTRAVENOUS at 15:16

## 2017-11-02 RX ADMIN — EPHEDRINE SULFATE 10 MG: 50 INJECTION INTRAMUSCULAR; INTRAVENOUS; SUBCUTANEOUS at 14:21

## 2017-11-02 RX ADMIN — LIDOCAINE HYDROCHLORIDE 80 MG: 20 INJECTION, SOLUTION INFILTRATION; PERINEURAL at 14:07

## 2017-11-02 RX ADMIN — EPHEDRINE SULFATE 10 MG: 50 INJECTION INTRAMUSCULAR; INTRAVENOUS; SUBCUTANEOUS at 14:12

## 2017-11-02 RX ADMIN — SODIUM CHLORIDE, POTASSIUM CHLORIDE, SODIUM LACTATE AND CALCIUM CHLORIDE: 600; 310; 30; 20 INJECTION, SOLUTION INTRAVENOUS at 17:08

## 2017-11-02 RX ADMIN — SODIUM CHLORIDE, POTASSIUM CHLORIDE, SODIUM LACTATE AND CALCIUM CHLORIDE: 600; 310; 30; 20 INJECTION, SOLUTION INTRAVENOUS at 14:33

## 2017-11-02 RX ADMIN — GLYCOPYRROLATE 0.2 MG: 0.2 INJECTION INTRAMUSCULAR; INTRAVENOUS at 14:41

## 2017-11-02 RX ADMIN — ROCURONIUM BROMIDE 20 MG: 10 INJECTION INTRAVENOUS at 15:16

## 2017-11-02 RX ADMIN — KETOROLAC TROMETHAMINE 15 MG: 30 INJECTION, SOLUTION INTRAMUSCULAR at 22:07

## 2017-11-02 RX ADMIN — MIDAZOLAM 1 MG: 1 INJECTION INTRAMUSCULAR; INTRAVENOUS at 10:59

## 2017-11-02 NOTE — ANESTHESIA PROCEDURE NOTES
Airway  Urgency: elective    Date/Time: 11/2/2017 2:10 PM  Airway not difficult    General Information and Staff    Patient location during procedure: OR  Anesthesiologist: EFRAIN VO  CRNA: FREDDIE TAY    Indications and Patient Condition  Indications for airway management: airway protection    Preoxygenated: yes  MILS maintained throughout  Mask difficulty assessment: 2 - vent by mask + OA or adjuvant +/- NMBA    Final Airway Details  Final airway type: endotracheal airway      Successful airway: ETT  Cuffed: yes   Successful intubation technique: direct laryngoscopy  Facilitating devices/methods: intubating stylet  Endotracheal tube insertion site: oral  Blade: Krystle  Blade size: #4  ETT size: 7.5 mm  Cormack-Lehane Classification: grade IIb - view of arytenoids or posterior of glottis only  Placement verified by: chest auscultation and capnometry   Cuff volume (mL): 8  Measured from: lips  ETT to lips (cm): 22  Number of attempts at approach: 1    Additional Comments  Patient in OR. Monitors on. BLVS. Pre 02 100%. SIVI. Poor dentition. DL x1. DVVC. Atraumatic placement of ETT. Placement verified with ETC02 and BBS. ETT secured. Teeth/lips in pre-op condition.

## 2017-11-02 NOTE — ANESTHESIA PROCEDURE NOTES
Epidural Block    Patient location during procedure: holding area  Performed By  Anesthesiologist: JHON PITT  Preanesthetic Checklist  Completed: patient identified, site marked, surgical consent, pre-op evaluation, timeout performed, IV checked, risks and benefits discussed and monitors and equipment checked  Prep:  Pt Position:sitting  Sterile Tech:cap, gloves, mask and sterile barrier  Prep:chlorhexidine gluconate and isopropyl alcohol  Monitoring:blood pressure monitoring, continuous pulse oximetry and EKG  Epidural Block Procedure:  Approach:midline  Guidance:landmark technique  Location:thoracic  Level:11-12  Needle Type:Tuohy  Needle Gauge:17 G  Loss of Resistance Medium: air  Paresthesia: none  Aspiration:negative  Test Dose:negative  Post Assessment:  Dressing:occlusive dressing applied and secured with tape  Pt Tolerance:patient tolerated the procedure well with no apparent complications and no signs or symtoms of SAB or intravascular injection  Complications:no

## 2017-11-02 NOTE — OP NOTE
Operative Report     [facility] OR    Patient: Lamberto Tafoya  Age:      47 y.o.  :     1970  Sex:      male    Medical Record:  [id number]    Date of Operation/Procedure:  2017    Pre-op Diagnosis:   Bladder cancer    Post-Op Diagnosis Codes:   Same    Pre-operative Diagnosis Free Text:  * No pre-op diagnosis entered *     Name of Operation/Procedure:  Procedure(s) and Anesthesia Type:     * CYSTOPROSTATECTOMY WITH BILATERAL PELVIC LYMPHADENECTOMY AND ILEAL CONDUIT URINARY DIVERSON - General    Findings/Complications:  Scar tissue posterior to prostate. Nodular prostate of unclear significance.    Description of procedure: After informed consent was obtained, he was taken to the OR and general anesthesia was induced. The stomal site was marked. All pressure points were padded. He was then prepped and draped. A timeout was performed and all team members were in agreement. Using a 10-blade, a midline incision was made up to the umbilicus. Using electrocautery, it was carried down through the fascia, and the peritoneal cavity was entered. No significant adhesions were seen. The small and large intestine were retracted cephalad. The right ureter was identified as it crossed the common iliac vessels. It was isolated with a vessel loop and dissected free from surrounding structures down to the level of the UVJ. Once it was fully isolated, we repeated this procedure for the left ureter. Once he ureters were adequately mobilized, they were suture ligated at the level of the UVJ using 2-0 silk suture. There were then divided. The distal end of each ureter was sent for frozen section. The ureters were then clipped and retracted cephalad. Next, the peritoneal wings adjacent to the bladder were taken down using the LigaSure device. Once the wings were down past the UVJ, the peritoneum posterior to the bladder was divided using electrocautery. We manually created a plane between the bladder and the rectum. Once the  posterior plane was adequate, we divided the bladder pedicles using the EnSeal device. Care was taken to avoid structures and the pelvic sidewall. Once the pedicles were divided to the level of the endopelvic fascia, this was divided using electrocautery. The remainders of the pedicle were taken down using the EnSeal. We then turned our attention to the anterior dissection. All endopelvic fascia and puboprostatic ligament was retaken down with electrocautery. The dorsal vein complex was ligated using an O-Vicryl suture. Thereafter, the prostatic urethral junction was clearly visible. The urethra was divided using electrocautery. Care was taken with the Bang catheter to leave it in the bladder and bringing it into the field to prevent seepage of urine during the procedure. At this point the specimen was free and was sent for permanent pathologic analysis. The dorsal vein complex was oversewn using an 0 Vicryl suture. The wound was irrigated using 2000 mL of sterile normal saline. The pelvis was seen to be hemostatic. We then turned our attention to the lymph node dissection.     The peritoneum overlying the right common iliac artery was divided using Metzenbaum scissors. This was opened from that point down to the node of Phillip, with care to clip lymphatic channels passing over the artery and vein. Dissection was carried down inferior to the right external iliac vein, and all external iliac nodes as well as obturator nodes were taken. Care was taken to identify and preserve the right obturator nerve, which remained intact for the procedure. All nodes were sent for permanent pathologic analysis. This procedure was then repeated on the left-hand side. Both sides were seen to be hemostatic after the node dissection. We then turned our attention towards creation of the ileal conduit.     At this point, the ileocecal valve was identified. A 15cm segment of ileum was selected for the conduit approximately 20 cm proximal  to the IC valve. The boundaries of the conduit were divided with a GREGORIA stapler. The ileoileal anastomosis was then created in side-to-side fashion. A good donut was easily palpable. The mesenteric trap was closed with 3-O Vicryl suture, and the crotch was buttressed with a 3-O Vicryl. We then turned our attention to the ureteroenteric anastomoses.      The left ureter was fixed to the butt end of the conduit with 3-O Vicryl. The ureter was then spatulated with tenotomy scissors. An enterotomy at the butt end was made with tenotomy scissors. The ureteroileal anastomosis was then created with running 4-O Monocryl suture. Prior to completing the anastomosis, a 6 Fr single-J stent was passed up the ureter into the kidney, and the end was brought out the distal end of the conduit. The procedure was repeated for the right ureter, which was of normal caliber and length. The catheters were then flushed with 10cc sterile NS, and clear yellow urine was returned by each. We then turned our attention to creation of the ostomy. A circular incision was made at the site marked by the stomal nurse preoperatively. This was carried down to the fascia using electrocautery. A cruciate incision was made in the fascia, and the distal end of the conduit was brought through the fascial defect. The conduit was seen to have adequate length. The conduit was affixed to the skin with 2-O and 3-O Vicryl sutures and seen to create a nice rosebud.      At this point, the pelvis was inspected again and seen to be hemostatic. A 13-Divehi round drain was placed within the pelvis and up into the right paracolic gutter. The retractor was removed, and incision was closed using 2 #1 looped PDS sutures. Superficial wound was irrigated using approximately 500 mL of sterile normal saline. Skin was then closed with surgical clips. An island dressing was placed, and an ostomy appliance was also placed. The drain was placed to bulb suction. The procedure was  then terminated. The patient was awakened from anesthesia in the operating room and taken to the recovery room in stable condition.    Estimated Blood Loss: 900 mL    Specimens:   Order Name Source Comment Collection Info Order Time   TYPE AND SCREEN   Collected By: Beatris Lai RN 11/2/2017 10:21 AM   PREPARE RBC Other   11/2/2017  2:32 PM    Indication for Transfusion  Intraop/Postop Blood Loss       When to Transfuse?  Hold      TISSUE PATHOLOGY EXAM Ureter, Right  Collected By: Vijay Badillo Jr., MD 11/2/2017  2:57 PM       Fluids/Drains: none    Vijay Badillo Jr., MD  11/2/2017  6:59 PM

## 2017-11-02 NOTE — NURSING NOTE
11/02/17 1010   Stoma Site Marking   Procedure Marking For urostomy   Site Marked RLQ: right lower quadrant   Patient Assessment Screen rectus muscle identified;marked within patient's visual field;bony prominences avoided;waistband avoided;creases/scars avoided   How Was Patient Marked? surgical marker   Stoma Marking Comments Marked patient- he may have to use a mirror to see urostomy but going above umbilicus was too high. Showed patient urostomy appliance. Will begin teaching tomorrow.   Patient Position During Marking sitting;standing;lying

## 2017-11-02 NOTE — H&P
FIRST UROLOGY CONSULT      Patient Identification:  NAME:  Lamberto Tafoya  Age:  47 y.o.   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 He failed 2 induction courses and was referred to me for possible radical cystoprostatectomy with ileal conduit. We discussed the considerable risks of surgery and the patient would like to proceed.      Past medical history:  Past Medical History:   Diagnosis Date   • Anxiety    • Back pain    • Bladder carcinoma 2016    High grade CIS & ta low grade   • H/O hematuria    • History of pneumonia    • Hypertension    • Overweight    • Smoking    • Urinary retention    • Wheezing     r/t smoking       Past surgical history:  Past Surgical History:   Procedure Laterality Date   • BACK SURGERY      x3   • CYSTOSCOPY N/A 10/9/2017    Procedure: CYSTOSCOPY AND EXAM UNDER ANESTHESIA;  Surgeon: Vijay Badillo Jr., MD;  Location: [facility] OR;  Service:    • CYSTOSCOPY BLADDER BIOPSY N/A 2016    Procedure: CYSTOSCOPY BLADDER BIOPSY fulgeration of 3cm area of bladder;  Surgeon: Brandon Wolfe MD;  Location: [facility] OR;  Service:    • CYSTOSCOPY RETROGRADE PYELOGRAM Bilateral 2017    Procedure: CYSTOSCOPY RETROGRADE PYELOGRAM;  Surgeon: Brandon Wolfe MD;  Location: [facility] OR;  Service:    • TRANSURETHRAL RESECTION OF BLADDER TUMOR N/A 3/31/2017    Procedure: CYSTOSCOPY , BLADDER BIOPSY, AND TRANSURETHRAL RESECTION OF BLADDER TUMOR ;  Surgeon: Brandon Wolfe MD;  Location: [facility] OR;  Service:    • TRANSURETHRAL RESECTION OF BLADDER TUMOR  2016    High Grade CIS & Low grade ta disease   • TRANSURETHRAL RESECTION OF BLADDER TUMOR N/A 2017    Procedure: CYSTOSCOPY TRANSURETHRAL RESECTION OF BLADDER TUMOR & BILATERAL RETROGRADE URETEROPYELOGRAMS;  Surgeon: Brandon Wolfe MD;  Location: [facility] OR;  Service:         Allergies:  Latex and Demerol [meperidine]    Home medications:  Prescriptions Prior to Admission   Medication Sig Dispense Refill Last Dose   • acetaminophen (TYLENOL) 500 MG tablet Take 1,000 mg by mouth Every 6 (Six) Hours As Needed for Mild Pain .   2017 at 0530   • atenolol (TENORMIN) 50 MG tablet Take 50 mg by mouth Every Morning.   2017 at 0515   • BREO ELLIPTA 100-25 MCG/INH aerosol powder  Inhale 1 puff Daily.  3 2017 at 0530   • buPROPion (WELLBUTRIN) 75 MG tablet Take 75 mg by mouth Daily.   2017 at 0530   • cloNIDine (CATAPRES) 0.1 MG tablet Take 0.1 mg by mouth 3 (Three) Times a Day.   2017 at 2200   • HYDROcodone-acetaminophen (NORCO)  MG per tablet Take 1 tablet by mouth Every 6 (Six) Hours As Needed for Moderate Pain .   10/31/2017   • tamsulosin (FLOMAX) 0.4 MG capsule 24 hr capsule Take 1 capsule by mouth Daily.   2017 at 0530        Hospital medications:    ceFOXITin (MEFOXIN) in SWFI 2 g/10ml IV PUSH syringe 2 g Intravenous Once   lactated ringers 500 mL Intravenous Once       bupivacaine and fentaNYL epidural     lactated ringers 9 mL/hr Last Rate: 9 mL/hr (17 0955)   lactated ringers 100 mL/hr      diphenhydrAMINE  •  diphenhydrAMINE  •  fentanyl  •  HYDROmorphone  •  ketorolac  •  midazolam **OR** midazolam  •  naloxone  •  ondansetron  •  promethazine  •  sodium chloride  •  sodium chloride    Family history:  Family History   Problem Relation Age of Onset   • No Known Problems Father    • No Known Problems Mother    • Malig Hyperthermia Neg Hx        Social history:  Social History   Substance Use Topics   • Smoking status: Current Every Day Smoker     Packs/day: 1.00     Years: 34.00     Types: Cigarettes   • Smokeless tobacco: Never Used   • Alcohol use No       Review of systems:    Negative 12-system ROS except for the following:  None.      Objective:  TMax 24 hours:   Temp (24hrs), Av.2 °F (36.8 °C), Min:98.2 °F (36.8 °C), Max:98.2 °F  (36.8 °C)      Vitals Ranges:   Temp:  [98.2 °F (36.8 °C)] 98.2 °F (36.8 °C)  Heart Rate:  [52-66] 57  Resp:  [14-18] 18  BP: (115-150)/(62-92) 115/67    Intake/Output Last 3 shifts:        Physical Exam:       General Appearance:    Alert, cooperative, in no acute distress   Head:    Normocephalic, without obvious abnormality, atraumatic   Eyes:          PERRL, conjunctivae and corneas clear   Ears:    Normal external inspection   Throat:   No oral lesions, oral mucosa moist   Neck:   Supple, no LAD, trachea midline   Back:     No CVA tenderness   Lungs:     Respirations unlabored, symmetric excursion       Abdomen:     Soft, NDNT, no masses, no guarding       Extremities:   No edema, no deformity   Skin:   No bleeding, bruising or rashes   Neuro/Psych:   Orientation intact, mood/affect pleasant, no focal findings       Results review:   I reviewed the patient's new clinical results.    Data review:  Lab Results (last 24 hours)     ** No results found for the last 24 hours. **           Imaging:  Imaging Results (last 24 hours)     ** No results found for the last 24 hours. **             Assessment:     Active Problems:    Bladder cancer        Plan:     NPO  To OR    Vijay Badillo Jr., MD  11/02/17  1:51 PM

## 2017-11-02 NOTE — ANESTHESIA PREPROCEDURE EVALUATION
Anesthesia Evaluation     Patient summary reviewed and Nursing notes reviewed   NPO Solid Status: > 8 hours  NPO Liquid Status: > 4 hours     Airway   Mallampati: II  Dental    (+) poor dentition        Pulmonary    (+) a smoker Current Smoked day of surgery, wheezes,   Cardiovascular - normal exam    ECG reviewed  Patient on routine beta blocker and Beta blocker given within 24 hours of surgery    (+) hypertension,       Neuro/Psych  (+) psychiatric history Anxiety,    GI/Hepatic/Renal/Endo - negative ROS     Musculoskeletal (-) negative ROS    Abdominal    Substance History - negative use     OB/GYN          Other - negative ROS                                       Anesthesia Plan    ASA 2     general     intravenous induction   Anesthetic plan and risks discussed with patient.

## 2017-11-02 NOTE — PLAN OF CARE
Problem: Patient Care Overview (Adult)  Goal: Plan of Care Review  Outcome: Ongoing (interventions implemented as appropriate)    11/02/17 1005   Coping/Psychosocial Response Interventions   Plan Of Care Reviewed With patient   Patient Care Overview   Progress no change       Goal: Adult Individualization and Mutuality  Outcome: Ongoing (interventions implemented as appropriate)    11/02/17 1005   Individualization   Patient Specific Preferences GOES BY DOMINGUEZ    Mutuality/Individual Preferences   What Anxieties, Fears or Concerns Do You Have About Your Health or Care? PT IS ANXIOUS        Goal: Discharge Needs Assessment  Outcome: Ongoing (interventions implemented as appropriate)    11/02/17 1005   Discharge Needs Assessment   Concerns To Be Addressed denies needs/concerns at this time         Problem: Perioperative Period (Adult)  Goal: Signs and Symptoms of Listed Potential Problems Will be Absent or Manageable (Perioperative Period)  Outcome: Ongoing (interventions implemented as appropriate)    11/02/17 1005   Perioperative Period   Problems Assessed (Perioperative Period) pain;hypoxia/hypoxemia   Problems Present (Perioperative Period) pain

## 2017-11-03 LAB
ANION GAP SERPL CALCULATED.3IONS-SCNC: 11.7 MMOL/L
BUN BLD-MCNC: 16 MG/DL (ref 6–20)
BUN/CREAT SERPL: 14.7 (ref 7–25)
CALCIUM SPEC-SCNC: 7.7 MG/DL (ref 8.6–10.5)
CHLORIDE SERPL-SCNC: 106 MMOL/L (ref 98–107)
CO2 SERPL-SCNC: 21.3 MMOL/L (ref 22–29)
CREAT BLD-MCNC: 1.09 MG/DL (ref 0.76–1.27)
DEPRECATED RDW RBC AUTO: 45 FL (ref 37–54)
ERYTHROCYTE [DISTWIDTH] IN BLOOD BY AUTOMATED COUNT: 13 % (ref 11.5–14.5)
GFR SERPL CREATININE-BSD FRML MDRD: 72 ML/MIN/1.73
GLUCOSE BLD-MCNC: 122 MG/DL (ref 65–99)
GLUCOSE BLDC GLUCOMTR-MCNC: 122 MG/DL (ref 70–130)
GLUCOSE BLDC GLUCOMTR-MCNC: 145 MG/DL (ref 70–130)
HCT VFR BLD AUTO: 39.2 % (ref 40.4–52.2)
HGB BLD-MCNC: 12.6 G/DL (ref 13.7–17.6)
MCH RBC QN AUTO: 30.7 PG (ref 27–32.7)
MCHC RBC AUTO-ENTMCNC: 32.1 G/DL (ref 32.6–36.4)
MCV RBC AUTO: 95.6 FL (ref 79.8–96.2)
PLATELET # BLD AUTO: 154 10*3/MM3 (ref 140–500)
PMV BLD AUTO: 10.8 FL (ref 6–12)
POTASSIUM BLD-SCNC: 4.6 MMOL/L (ref 3.5–5.2)
RBC # BLD AUTO: 4.1 10*6/MM3 (ref 4.6–6)
SODIUM BLD-SCNC: 139 MMOL/L (ref 136–145)
WBC NRBC COR # BLD: 12.61 10*3/MM3 (ref 4.5–10.7)

## 2017-11-03 PROCEDURE — 86900 BLOOD TYPING SEROLOGIC ABO: CPT

## 2017-11-03 PROCEDURE — 94799 UNLISTED PULMONARY SVC/PX: CPT

## 2017-11-03 PROCEDURE — 25010000002 KETOROLAC TROMETHAMINE PER 15 MG: Performed by: UROLOGY

## 2017-11-03 PROCEDURE — 97161 PT EVAL LOW COMPLEX 20 MIN: CPT

## 2017-11-03 PROCEDURE — 85027 COMPLETE CBC AUTOMATED: CPT | Performed by: UROLOGY

## 2017-11-03 PROCEDURE — 80048 BASIC METABOLIC PNL TOTAL CA: CPT | Performed by: UROLOGY

## 2017-11-03 PROCEDURE — 94640 AIRWAY INHALATION TREATMENT: CPT

## 2017-11-03 PROCEDURE — 97110 THERAPEUTIC EXERCISES: CPT

## 2017-11-03 PROCEDURE — 82962 GLUCOSE BLOOD TEST: CPT

## 2017-11-03 PROCEDURE — 86901 BLOOD TYPING SEROLOGIC RH(D): CPT

## 2017-11-03 PROCEDURE — 25010000002 HYDROMORPHONE PER 4 MG: Performed by: UROLOGY

## 2017-11-03 RX ORDER — HYDROCODONE BITARTRATE AND ACETAMINOPHEN 10; 325 MG/1; MG/1
1 TABLET ORAL EVERY 6 HOURS PRN
Status: DISCONTINUED | OUTPATIENT
Start: 2017-11-03 | End: 2017-11-07

## 2017-11-03 RX ADMIN — SODIUM CHLORIDE 1000 ML: 9 INJECTION, SOLUTION INTRAVENOUS at 10:21

## 2017-11-03 RX ADMIN — CLONIDINE HYDROCHLORIDE 0.1 MG: 0.1 TABLET ORAL at 15:42

## 2017-11-03 RX ADMIN — BUDESONIDE AND FORMOTEROL FUMARATE DIHYDRATE 2 PUFF: 80; 4.5 AEROSOL RESPIRATORY (INHALATION) at 08:11

## 2017-11-03 RX ADMIN — KETOROLAC TROMETHAMINE 15 MG: 30 INJECTION, SOLUTION INTRAMUSCULAR at 16:53

## 2017-11-03 RX ADMIN — HYDROCODONE BITARTRATE AND ACETAMINOPHEN 1 TABLET: 10; 325 TABLET ORAL at 06:46

## 2017-11-03 RX ADMIN — BUPROPION HYDROCHLORIDE 75 MG: 75 TABLET, FILM COATED ORAL at 09:05

## 2017-11-03 RX ADMIN — SODIUM CHLORIDE 100 ML/HR: 9 INJECTION, SOLUTION INTRAVENOUS at 16:01

## 2017-11-03 RX ADMIN — BUDESONIDE AND FORMOTEROL FUMARATE DIHYDRATE 2 PUFF: 80; 4.5 AEROSOL RESPIRATORY (INHALATION) at 20:14

## 2017-11-03 RX ADMIN — HYDROCODONE BITARTRATE AND ACETAMINOPHEN 1 TABLET: 10; 325 TABLET ORAL at 12:45

## 2017-11-03 RX ADMIN — TAMSULOSIN HYDROCHLORIDE 0.4 MG: 0.4 CAPSULE ORAL at 09:00

## 2017-11-03 RX ADMIN — HYDROCODONE BITARTRATE AND ACETAMINOPHEN 1 TABLET: 10; 325 TABLET ORAL at 18:36

## 2017-11-03 RX ADMIN — HYDROMORPHONE HYDROCHLORIDE 1 MG: 1 INJECTION, SOLUTION INTRAMUSCULAR; INTRAVENOUS; SUBCUTANEOUS at 01:17

## 2017-11-03 RX ADMIN — HYDROMORPHONE HYDROCHLORIDE 1 MG: 1 INJECTION, SOLUTION INTRAMUSCULAR; INTRAVENOUS; SUBCUTANEOUS at 09:28

## 2017-11-03 RX ADMIN — CLONIDINE HYDROCHLORIDE 0.1 MG: 0.1 TABLET ORAL at 09:05

## 2017-11-03 RX ADMIN — KETOROLAC TROMETHAMINE 15 MG: 30 INJECTION, SOLUTION INTRAMUSCULAR at 10:54

## 2017-11-03 RX ADMIN — HYDROMORPHONE HYDROCHLORIDE 0.5 MG: 1 INJECTION, SOLUTION INTRAMUSCULAR; INTRAVENOUS; SUBCUTANEOUS at 20:06

## 2017-11-03 RX ADMIN — SODIUM CHLORIDE 100 ML/HR: 9 INJECTION, SOLUTION INTRAVENOUS at 05:20

## 2017-11-03 RX ADMIN — DOCUSATE SODIUM 100 MG: 100 CAPSULE, LIQUID FILLED ORAL at 18:36

## 2017-11-03 RX ADMIN — HYDROMORPHONE HYDROCHLORIDE 1 MG: 1 INJECTION, SOLUTION INTRAMUSCULAR; INTRAVENOUS; SUBCUTANEOUS at 15:34

## 2017-11-03 RX ADMIN — HYDROMORPHONE HYDROCHLORIDE 1 MG: 1 INJECTION, SOLUTION INTRAMUSCULAR; INTRAVENOUS; SUBCUTANEOUS at 07:25

## 2017-11-03 RX ADMIN — CLONIDINE HYDROCHLORIDE 0.1 MG: 0.1 TABLET ORAL at 20:06

## 2017-11-03 RX ADMIN — HYDROMORPHONE HYDROCHLORIDE 1 MG: 1 INJECTION, SOLUTION INTRAMUSCULAR; INTRAVENOUS; SUBCUTANEOUS at 05:26

## 2017-11-03 RX ADMIN — HYDROMORPHONE HYDROCHLORIDE 0.5 MG: 1 INJECTION, SOLUTION INTRAMUSCULAR; INTRAVENOUS; SUBCUTANEOUS at 22:10

## 2017-11-03 RX ADMIN — HYDROMORPHONE HYDROCHLORIDE 1 MG: 1 INJECTION, SOLUTION INTRAMUSCULAR; INTRAVENOUS; SUBCUTANEOUS at 17:42

## 2017-11-03 RX ADMIN — ATENOLOL 50 MG: 50 TABLET ORAL at 07:46

## 2017-11-03 RX ADMIN — KETOROLAC TROMETHAMINE 15 MG: 30 INJECTION, SOLUTION INTRAMUSCULAR at 04:12

## 2017-11-03 RX ADMIN — HYDROMORPHONE HYDROCHLORIDE 1 MG: 1 INJECTION, SOLUTION INTRAMUSCULAR; INTRAVENOUS; SUBCUTANEOUS at 11:30

## 2017-11-03 RX ADMIN — HYDROMORPHONE HYDROCHLORIDE 1 MG: 1 INJECTION, SOLUTION INTRAMUSCULAR; INTRAVENOUS; SUBCUTANEOUS at 13:30

## 2017-11-03 RX ADMIN — HYDROMORPHONE HYDROCHLORIDE 1 MG: 1 INJECTION, SOLUTION INTRAMUSCULAR; INTRAVENOUS; SUBCUTANEOUS at 03:31

## 2017-11-03 NOTE — NURSING NOTE
CWOCN- follow up post op. Patient trying to rest, get comfortable. His wife is present. No teaching today. Assessed pouch and stoma. Stents in place. Left a urostomy kit in room. Will plan to change Monday with both patient and wife.

## 2017-11-03 NOTE — PLAN OF CARE
Problem: Patient Care Overview (Adult)  Goal: Plan of Care Review  Outcome: Ongoing (interventions implemented as appropriate)    11/03/17 0457   Coping/Psychosocial Response Interventions   Plan Of Care Reviewed With patient   Outcome Evaluation   Outcome Summary/Follow up Plan Pt admitted to CCU from PACU following bladder surgery. Pt has c/o consistent pain throughout the night. Dilaudid and Toradol given for pain control. RAY drain had moderate sanguineous output. Urine from urostomy is lizbeth/red, stoma appearance round, moist, reddened. Pt tolerating Room air, vitals stable.        Goal: Adult Individualization and Mutuality  Outcome: Ongoing (interventions implemented as appropriate)  Goal: Discharge Needs Assessment  Outcome: Ongoing (interventions implemented as appropriate)    Problem: Perioperative Period (Adult)  Goal: Signs and Symptoms of Listed Potential Problems Will be Absent or Manageable (Perioperative Period)  Outcome: Ongoing (interventions implemented as appropriate)

## 2017-11-03 NOTE — PROGRESS NOTES
Significant pain overnight. Epidural ineffective and discontinued. Managed with Dilaudid q2h. Of note, patient takes [place] 10/325 at home for back pain. No nausea or vomiting.    AVSS  Good UO  Low drain output  Abd soft, mildly distended.  Inc - dressed  Ostomy pink, stents intact  RAY serosang    Cr 1.09, Hb 12.6, WBC 12.6, K 4.6, CO2 21.3    Plan:  - ambulate  - sips and chips diet  - PT  - d/c epidural  - Norco 10/325

## 2017-11-03 NOTE — ANESTHESIA POSTPROCEDURE EVALUATION
Patient: Lamberto Tafoya    Procedure Summary     Date Anesthesia Start Anesthesia Stop Room / Location    11/02/17 1402 1903  [facility] / [facility]       Procedure Diagnosis Surgeon Provider    CYSTOPROSTATECTOMY WITH BILATERAL PELVIC LYMPHADENECTOMY AND ILEAL CONDUIT URINARY DIVERSON (N/A Pelvis) No diagnosis on file. MD Zenia Obrien Jr., MD          Anesthesia Type: general  Last vitals  BP   137/65 (11/02/17 2000)   Temp   36.7 °C (98.1 °F) (11/02/17 1900)   Pulse   58 (11/02/17 2000)   Resp   16 (11/02/17 2000)     SpO2   99 % (11/02/17 2000)     Post Anesthesia Care and Evaluation    Patient location during evaluation: PACU  Anesthetic complications: No anesthetic complications

## 2017-11-03 NOTE — PERIOPERATIVE NURSING NOTE
"PATIENT REPORTING NUMBNESS AND TINGLING TO (R)LEG AT THIGH TO SHIN. HE STATES \"HAS BEEN THAT WAY FOR YEARS EVER SINCE MY LAST BACK SURGERY\" DISCUSSED WITH PATIENT TO REPORT TO STAFF IF ANY SENSATION OF NUMBNESS OR TINGLING TO (L)LE OR ANY ABNORMAL SENSATION  "

## 2017-11-03 NOTE — PLAN OF CARE
Problem: Patient Care Overview (Adult)  Goal: Plan of Care Review  Outcome: Ongoing (interventions implemented as appropriate)    11/03/17 4146   Coping/Psychosocial Response Interventions   Plan Of Care Reviewed With patient;spouse   Outcome Evaluation   Outcome Summary/Follow up Plan Vitals stable. Needing pain meds every 2 hrs. Pain level never less than 8. MD aware. Plan of care explained to patient/spouse. Will continue to monitor closely.         Problem: Perioperative Period (Adult)  Goal: Signs and Symptoms of Listed Potential Problems Will be Absent or Manageable (Perioperative Period)  Outcome: Ongoing (interventions implemented as appropriate)    Problem: Pain, Chronic (Adult)  Goal: Identify Related Risk Factors and Signs and Symptoms  Outcome: Outcome(s) achieved Date Met:  11/03/17  Goal: Acceptable Pain Control/Comfort Level  Outcome: Ongoing (interventions implemented as appropriate)    Problem: Fall Risk (Adult)  Goal: Identify Related Risk Factors and Signs and Symptoms  Outcome: Outcome(s) achieved Date Met:  11/03/17  Goal: Absence of Falls  Outcome: Ongoing (interventions implemented as appropriate)

## 2017-11-03 NOTE — PROGRESS NOTES
[facility]  Phone: [phone]  Manuel Herman MD    Subjective:  LOS: 1    He continues to report pain. Getting iv dilaudid and po meds. Tolerating meds without sedation.    Objective   Vital Signs past 24hrs  BP range: BP: (104-171)/(55-84) 132/84  Pulse range: Heart Rate:  [55-74] 71  Resp rate range: Resp:  [12-24] 20  Temp range: Temp (24hrs), Av.2 °F (36.8 °C), Min:97.5 °F (36.4 °C), Max:98.8 °F (37.1 °C)    O2 Device: room airFlow (L/min):  [2-4] 2  Oxygen range:SpO2:  [92 %-100 %] 94 %   210 lb 8 oz (95.5 kg); Body mass index is 29.36 kg/(m^2).    Intake/Output Summary (Last 24 hours) at 17 1732  Last data filed at 17 1601   Gross per 24 hour   Intake             3494 ml   Output             1130 ml   Net             2364 ml       Physical Exam   Cardiovascular: Normal rate and regular rhythm.    No murmur heard.  Pulmonary/Chest: He has no wheezes. He has no rales.   Abdominal: Soft. He exhibits distension. Bowel sounds are decreased. There is no tenderness.   Ileal conduit   Musculoskeletal: He exhibits no edema.   Neurological: He is alert.   Nursing note and vitals reviewed.    Results Review:    I have reviewed the laboratory and imaging data since the last note by [facility] physician.  My annotations are noted in assessment and plan.    Medication Review:  I have reviewed the current MAR.  My annotations are noted in assessment and plan.      sodium chloride 100 mL/hr Last Rate: 100 mL/hr (17 1601)     Plan   PCCM Problems  S/p Ileal conduit and prostatectomy for bladder cancer,   Intractable pain from surgery  Chronic back pain - narcotics  Ongoing cigarette abuse    Plan of Treatment  Patient's pain appears more manageable.  If okay with urology will transfer out of the critical care units tomorrow.    Manuel Herman MD  17  5:32 PM    Part of this note may be an electronic transcription/translation of spoken language to printed text using the Dragon Dictation  System.

## 2017-11-03 NOTE — PLAN OF CARE
Problem: Patient Care Overview (Adult)  Goal: Plan of Care Review    11/03/17 1127   Coping/Psychosocial Response Interventions   Plan Of Care Reviewed With patient;family   Outcome Evaluation   Outcome Summary/Follow up Plan Pt s/p cytoprostatectomy, presents w/ generalized post op weakness and balance impairments. Pt will need skilled acute level PT to address impairments and to improve functional independence. Probable d/c home w/ assist.          Problem: Inpatient Physical Therapy  Goal: Bed Mobility Goal LTG- PT    11/03/17 1127   Bed Mobility PT LTG   Bed Mobility PT LTG, Date Established 11/03/17   Bed Mobility PT LTG, Time to Achieve 1 wk   Bed Mobility PT LTG, Activity Type all bed mobility   Bed Mobility PT LTG, [place] Level supervision required       Goal: Transfer Training Goal 2 LTG- PT    11/03/17 1127   Transfer Training 2 PT LTG   Transfer Training PT 2 LTG, Date Established 11/03/17   Transfer Training PT 2 LTG, Time to Achieve 1 wk   Transfer Training PT 2 LTG, Activity Type all transfers   Transfer Training PT 2 LTG, [place] Level supervision required       Goal: Gait Training Goal LTG- PT    11/03/17 1127   Gait Training PT LTG   Gait Training Goal PT LTG, Date Established 11/03/17   Gait Training Goal PT LTG, Time to Achieve 1 wk   Gait Training Goal PT LTG, [place] Level supervision required   Gait Training Goal PT LTG, Distance to Achieve 300

## 2017-11-03 NOTE — CONSULTS
[facility]    Reason for Consult: critical care management    HPI:  Mr. Tafoya is a 46yo WM with a history of tobacco abuse, and bladder cancer refractory to BCG.  He is now s/p radical cystoprostatectomy with ileal conduit.  He is recovering in the ICU.  He reports adequate pain control.  He has no additional complaints at this time.      Past Medical History:   Diagnosis Date   • Anxiety    • Back pain    • Bladder carcinoma 06/2016    High grade CIS & ta low grade   • H/O hematuria    • History of pneumonia 2011   • Hypertension    • Overweight    • Smoking    • Urinary retention    • Wheezing     r/t smoking     Social History     Social History   • Marital status:      Spouse name: N/A   • Number of children: N/A   • Years of education: N/A     Social History Main Topics   • Smoking status: Current Every Day Smoker     Packs/day: 1.00     Years: 34.00     Types: Cigarettes   • Smokeless tobacco: Never Used   • Alcohol use No   • Drug use: No   • Sexual activity: Defer     Other Topics Concern   • None     Social History Narrative     Family History   Problem Relation Age of Onset   • No Known Problems Father    • No Known Problems Mother    • Malig Hyperthermia Neg Hx      MEDS: reviewed  ALL: latex, demerol  ROS: 10 point negative except as in hpi    Vital Sign Min/Max for last 24 hours  Temp  Min: 98.1 °F (36.7 °C)  Max: 98.2 °F (36.8 °C)   BP  Min: 104/58  Max: 171/81   Pulse  Min: 52  Max: 72   Resp  Min: 12  Max: 18   SpO2  Min: 94 %  Max: 100 %   Flow (L/min)  Min: 2  Max: 4   Weight  Min: 210 lb 8 oz (95.5 kg)  Max: 210 lb 8 oz (95.5 kg)     GEN:   appears ill, sleepy, easily arousable, appropraite  HEENT: PERRL, EOMI, no icterus, mmm, no jvd, trachea midline, neck supple  CHEST: CTA bilat, no wheezes, no crackles, no use of accessory muscles  CV: RRR, no m/g/r  ABD: soft, nt, nd +bs, no hepatosplenomegaly  EXT: no c/c/e  SKIN: no rashes, no xanthomas, nl turgor  LYMPH: no palpable  cervical or supraclavicular lymphadenopathy  NEURO: CN 2-12 intact and symmetric bilaterally  MSK: no kyphoscoliosis, moves all4 extremities    Labs:  Pending    A/P:  1. Bladder cancer s/p ileal conduit -- management as per urology, ICU care  2. Pain management -- iv and po meds prn  3. Tobacco abuse -- will need to discuss cessation when he is more alert  4. Possible copd or asthma -- continue home inhaler, consider outpatient pft's at a later date

## 2017-11-03 NOTE — THERAPY EVALUATION
Acute Care - Physical Therapy Initial Evaluation  [facility]     Patient Name: Lamberto Tafoya  : 1970  MRN: [id number]  Today's Date: 11/3/2017   Onset of Illness/Injury or Date of Surgery Date: (P) 17  Date of Referral to PT: (P) 17  Referring Physician: Badillo (P)       Admit Date: 2017     Visit Dx:    ICD-10-CM ICD-9-CM   1. Generalized weakness R53.1 780.79   2. Bladder cancer C67.9 188.9     Patient Active Problem List   Diagnosis   • Bladder cancer     Past Medical History:   Diagnosis Date   • Anxiety    • Back pain    • Bladder carcinoma 2016    High grade CIS & ta low grade   • H/O hematuria    • History of pneumonia    • Hypertension    • Overweight    • Smoking    • Urinary retention    • Wheezing     r/t smoking     Past Surgical History:   Procedure Laterality Date   • BACK SURGERY      x3   • CYSTECTOMY N/A 2017    Procedure: CYSTOPROSTATECTOMY WITH BILATERAL PELVIC LYMPHADENECTOMY AND ILEAL CONDUIT URINARY DIVERSON;  Surgeon: Vijay Badlilo Jr., MD;  Location: [facility] OR;  Service:    • CYSTOSCOPY N/A 10/9/2017    Procedure: CYSTOSCOPY AND EXAM UNDER ANESTHESIA;  Surgeon: Vijay Badillo Jr., MD;  Location: [facility] OR;  Service:    • CYSTOSCOPY BLADDER BIOPSY N/A 2016    Procedure: CYSTOSCOPY BLADDER BIOPSY fulgeration of 3cm area of bladder;  Surgeon: Brandon Wolfe MD;  Location: [facility] OR;  Service:    • CYSTOSCOPY RETROGRADE PYELOGRAM Bilateral 2017    Procedure: CYSTOSCOPY RETROGRADE PYELOGRAM;  Surgeon: Brandon Wolfe MD;  Location: [facility] OR;  Service:    • TRANSURETHRAL RESECTION OF BLADDER TUMOR N/A 3/31/2017    Procedure: CYSTOSCOPY , BLADDER BIOPSY, AND TRANSURETHRAL RESECTION OF BLADDER TUMOR ;  Surgeon: Brandon Wolfe MD;  Location: [facility] OR;  Service:    • TRANSURETHRAL RESECTION OF BLADDER TUMOR  2016    High Grade CIS & Low grade ta disease   • TRANSURETHRAL RESECTION OF BLADDER TUMOR N/A 2017     Procedure: CYSTOSCOPY TRANSURETHRAL RESECTION OF BLADDER TUMOR & BILATERAL RETROGRADE URETEROPYELOGRAMS;  Surgeon: Brandon Wolfe MD;  Location: [facility] OR;  Service:           PT ASSESSMENT (last 72 hours)      PT Evaluation       11/03/17 1116 11/02/17 1000    Rehab Evaluation    Document Type (P)  evaluation  -     Subjective Information (P)  agree to therapy;complains of;pain  -MF     Patient Effort, Rehab Treatment (P)  good  -MF     Symptoms Noted During/After Treatment (P)  fatigue  -     General Information    Patient Profile Review (P)  yes  -MF     Onset of Illness/Injury or Date of Surgery Date (P)  11/02/17  -     Referring Physician (P)  Aby   -     General Observations (P)   male found supine in bed upon entry. Family present in room. Pt awake and oriented   -     Pertinent History Of Current Problem (P)  s/p Cytoprostatectomy and ileal conduit urinary diversion  -     Precautions/Limitations (P)  fall precautions  -     Prior Level of Function (P)  independent:;all household mobility;community mobility  -     Equipment Currently Used at Home (P)  none  -MF none  -AM    Plans/Goals Discussed With (P)  patient and family  -     Barriers to Rehab (P)  none identified  -     Living Environment    Lives With (P)  spouse  - spouse;child(marie), adult;other (see comments)   2 GRANDKIDS   -AM    Living Arrangements (P)  house  - house  -AM    Home Accessibility (P)  stairs to enter home;bed and bath on same level  - stairs to enter home;stairs within home;bed and bath on same level;no concerns  -AM    Number of Stairs to Enter Home (P)  3  - 3  -AM    Number of Stairs Within Home (P)  --   no need to go upstairs   - --   UPSTAIRS   -AM    Stair Railings at Home (P)  outside, present at both sides  - outside, present on right side;inside, present on right side  -AM    Type of Financial/Environmental Concern  none  -AM    Transportation Available  car;family or friend  will provide  -AM    Clinical Impression    Date of Referral to PT (P)  11/02/17  -     Patient/Family Goals Statement (P)  return home, dec pain  -     Criteria for Skilled Therapeutic Interventions Met (P)  yes;treatment indicated  -     Pathology/Pathophysiology Noted (Describe Specifically for Each System) (P)  musculoskeletal  -     Impairments Found (describe specific impairments) (P)  gait, locomotion, and balance  -     Rehab Potential (P)  good, to achieve stated therapy goals  -     Vital Signs    Pretreatment Heart Rate (beats/min) (P)  73  -MF     Posttreatment Heart Rate (beats/min) (P)  77  -     Pre SpO2 (%) (P)  96  -     O2 Delivery Pre Treatment (P)  room air  -     Pain Assessment    Pain Assessment (P)  0-10  -     Pain Score (P)  8  -MF     Post Pain Score (P)  9  -     Pain Type (P)  Surgical pain  -     Pain Location (P)  Abdomen  -     Pain Intervention(s) (P)  Medication (See MAR);Repositioned  -     Response to Interventions (P)  tolerated   -     Vision Assessment/Intervention    Visual Impairment (P)  WFL  -     Cognitive Assessment/Intervention    Current Cognitive/Communication Assessment (P)  functional  -     Orientation Status (P)  oriented x 4  -     Follows Commands/Answers Questions (P)  able to follow single-step instructions;100% of the time  -     Personal Safety (P)  WNL/WFL  -     Personal Safety Interventions (P)  fall prevention program maintained;gait belt;nonskid shoes/slippers when out of bed  -     ROM (Range of Motion)    General ROM (P)  no range of motion deficits identified  -     MMT (Manual Muscle Testing)    General MMT Assessment (P)  no strength deficits identified  -     General MMT Assessment Detail (P)  generalized post op weakness, no focal deficits observed   -     Bed Mobility, Assessment/Treatment    Bed Mobility, Assistive Device (P)  head of bed elevated  -     Bed Mobility, Scoot/Bridge, [place]  (P)  minimum assist (75% patient effort)  -MF     Bed Mob, Supine to Sit, [place] (P)  minimum assist (75% patient effort)  -MF     Bed Mob, Sit to Supine, [place] (P)  not tested  -     Bed Mobility, Safety Issues (P)  impaired trunk control for bed mobility  -     Bed Mobility, Impairments (P)  pain;strength decreased  -     Transfer Assessment/Treatment    Transfers, Sit-Stand [place] (P)  minimum assist (75% patient effort);hand held assist  -MF     Transfers, Stand-Sit [place] (P)  contact guard assist  -MF     Transfer, Safety Issues (P)  sequencing ability decreased  -MF     Transfer, Impairments (P)  pain;strength decreased  -     Gait Assessment/Treatment    Gait, [place] Level (P)  contact guard assist;hand held assist;1 person + 1 person to manage equipment  -     Gait, Distance (Feet) (P)  300  -     Gait, Gait Pattern Analysis (P)  swing-through gait  -     Gait, Gait Deviations (P)  antalgic;khloe decreased;weight-shifting ability decreased;step length decreased  -     Gait, Safety Issues (P)  step length decreased;balance decreased during turns  -     Gait, Impairments (P)  strength decreased;pain  -     Gait, Comment (P)  some unsteadiness, no overt LOB  -     Motor Skills/Interventions    Additional Documentation (P)  Balance Skills Training (Group)  -     Balance Skills Training    Sitting-Level of Assistance (P)  Contact guard  -MF     Sitting-Balance Support (P)  Feet supported  -     Standing-Level of Assistance (P)  Contact guard  -MF     Static Standing Balance Support (P)  Left upper extremity supported  -     Positioning and Restraints    Pre-Treatment Position (P)  in bed  -     Post Treatment Position (P)  chair  -     In Chair (P)  sitting;with family/caregiver;with nsg;encouraged to call for assist   nsg assistant changing linens, agreed to help back to bed   -       11/01/17 1732       Living Environment    Lives With spouse   -       User Key  (r) = Recorded By, (t) = Taken By, (c) = Cosigned By    Initials Name Provider Type    AM Beatris Lai, RN Registered Nurse    ROSEMARIE Davies, RN Registered Nurse     Niko Vanegas, PT Student PT Student          Physical Therapy Education     Title: PT OT SLP Therapies (Done)     Topic: Physical Therapy (Done)     Point: Mobility training (Done)    Learning Progress Summary    Learner Readiness Method Response Comment Documented by Status   Patient Acceptance E,D DU,NR   11/03/17 1127 Done   Family Acceptance E,D DU,NR   11/03/17 1127 Done               Point: Home exercise program (Done)    Learning Progress Summary    Learner Readiness Method Response Comment Documented by Status   Patient Acceptance E,D DU,NR   11/03/17 1127 Done   Family Acceptance E,D DU,[facility] 11/03/17 1127 Done               Point: Body mechanics (Done)    Learning Progress Summary    Learner Readiness Method Response Comment Documented by Status   Patient Acceptance E,D DU,[facility] 11/03/17 1127 Done   Family Acceptance E,D DU,NR   11/03/17 1127 Done               Point: Precautions (Done)    Learning Progress Summary    Learner Readiness Method Response Comment Documented by Status   Patient Acceptance E,D DU,[facility] 11/03/17 1127 Done   Family Acceptance E,D DU,NR   11/03/17 1127 Done                      User Key     Initials Effective Dates Name Provider Type Discipline     09/18/17 -  Niko Vanegas, PT Student PT Student PT                PT Recommendation and Plan  Anticipated Discharge Disposition: (P) home with assist  Planned Therapy Interventions: (P) balance training, bed mobility training, gait training, home exercise program, strengthening, transfer training  PT Frequency: (P) daily  Plan of Care Review  Plan Of Care Reviewed With: (P) patient, family  Outcome Summary/Follow up Plan: (P) Pt s/p cytoprostatectomy, presents w/ generalized post op weakness and balance impairments. Pt will  need skilled acute level PT to address impairments and to improve functional independence. Probable d/c home w/ assist.           IP PT Goals       11/03/17 1127          Bed Mobility PT LTG    Bed Mobility PT LTG, Date Established (P)  11/03/17  -MF      Bed Mobility PT LTG, Time to Achieve (P)  1 wk  -MF      Bed Mobility PT LTG, Activity Type (P)  all bed mobility  -MF      Bed Mobility PT LTG, [place] Level (P)  supervision required  -MF      Transfer Training 2 PT LTG    Transfer Training PT 2 LTG, Date Established (P)  11/03/17  -MF      Transfer Training PT 2 LTG, Time to Achieve (P)  1 wk  -MF      Transfer Training PT 2 LTG, Activity Type (P)  all transfers  -MF      Transfer Training PT 2 LTG, [place] Level (P)  supervision required  -MF      Gait Training PT LTG    Gait Training Goal PT LTG, Date Established (P)  11/03/17  -MF      Gait Training Goal PT LTG, Time to Achieve (P)  1 wk  -MF      Gait Training Goal PT LTG, [place] Level (P)  supervision required  -MF      Gait Training Goal PT LTG, Distance to Achieve (P)  300  -MF        User Key  (r) = Recorded By, (t) = Taken By, (c) = Cosigned By    Initials Name Provider Type    SARAH Vanegas, PT Student PT Student                Outcome Measures       11/03/17 1129          How much help from another person do you currently need...    Turning from your back to your side while in flat bed without using bedrails? (P)  3  -MF      Moving from lying on back to sitting on the side of a flat bed without bedrails? (P)  3  -MF      Moving to and from a bed to a chair (including a wheelchair)? (P)  3  -MF      Standing up from a chair using your arms (e.g., wheelchair, bedside chair)? (P)  3  -MF      Climbing 3-5 steps with a railing? (P)  3  -MF      To walk in hospital room? (P)  3  -MF      AM-PAC 6 Clicks Score (P)  18  -MF      Functional Assessment    Outcome Measure Options (P)  AM-PAC 6 Clicks Basic Mobility (PT)  -MF        User Key   (r) = Recorded By, (t) = Taken By, (c) = Cosigned By    Initials Name Provider Type     Niko Vanegas PT Student PT Student           Time Calculation:         PT Charges       11/03/17 1129          Time Calculation    Start Time (P)  1059  -      Stop Time (P)  1114  -      Time Calculation (min) (P)  15 min  -      PT Received On (P)  11/03/17  -      PT - Next Appointment (P)  11/04/17  -      PT Goal Re-Cert Due Date (P)  11/10/17  -        User Key  (r) = Recorded By, (t) = Taken By, (c) = Cosigned By    Initials Name Provider Type     Niko Vanegas, PT Student PT Student          Therapy Charges for Today     Code Description Service Date Service Provider Modifiers Qty    31059537217 HC PT EVAL LOW COMPLEXITY 2 11/3/2017 Niko Vanegas, PT Student GP 1    79988778844 HC PT THER PROC EA 15 MIN 11/3/2017 Niko Vanegas PT Student GP 1    88931917943 HC PT THER SUPP EA 15 MIN 11/3/2017 Niko Vanegas, PT Student GP 1          PT G-Codes  Outcome Measure Options: (P) AM-PAC 6 Clicks Basic Mobility (PT)      Niko Vanegas PT Student  11/3/2017

## 2017-11-03 NOTE — PLAN OF CARE
Problem: Perioperative Period (Adult)  Goal: Signs and Symptoms of Listed Potential Problems Will be Absent or Manageable (Perioperative Period)  Outcome: Ongoing (interventions implemented as appropriate)    11/02/17 2154   Perioperative Period   Problems Assessed (Perioperative Period) all   VSS FOR PACU STAY. HR-67,BP-13/77,RR-12 TO 16, SATS 98% 2LNC. ADEQUATE PAIN CONTROL WITH FENTANYL/MARCAINE EPIDURAL. ABDOMEN ROUND, SOFT AND MILD TENDER TO PALPATION. OSTOMY WITH 60cc TREV COLOR OUTPUT,  RAY WITH 75cc OUTPUT. MIDLINE ABDOMEN DRESSING CD+I AT TRANSFER TO FLOOR

## 2017-11-03 NOTE — NURSING NOTE
Dr. Meyer called and notified of pt pain. PRN order for dilaudid received. Also asked about clarification of discontinuation of pt's epidural. MD unable to clarify. Epidural infusion stopped per order. Will need to be D/C and pulled in the morning per anesthesia as RN is not able to pull per scope of practice.   Chantell CORTES.

## 2017-11-04 LAB
ANION GAP SERPL CALCULATED.3IONS-SCNC: 14.4 MMOL/L
BUN BLD-MCNC: 12 MG/DL (ref 6–20)
BUN/CREAT SERPL: 15.2 (ref 7–25)
CALCIUM SPEC-SCNC: 7.9 MG/DL (ref 8.6–10.5)
CHLORIDE SERPL-SCNC: 102 MMOL/L (ref 98–107)
CO2 SERPL-SCNC: 20.6 MMOL/L (ref 22–29)
CREAT BLD-MCNC: 0.79 MG/DL (ref 0.76–1.27)
DEPRECATED RDW RBC AUTO: 42.2 FL (ref 37–54)
ERYTHROCYTE [DISTWIDTH] IN BLOOD BY AUTOMATED COUNT: 12.5 % (ref 11.5–14.5)
GFR SERPL CREATININE-BSD FRML MDRD: 104 ML/MIN/1.73
GLUCOSE BLD-MCNC: 134 MG/DL (ref 65–99)
HCT VFR BLD AUTO: 38.3 % (ref 40.4–52.2)
HGB BLD-MCNC: 12.7 G/DL (ref 13.7–17.6)
MCH RBC QN AUTO: 30.9 PG (ref 27–32.7)
MCHC RBC AUTO-ENTMCNC: 33.2 G/DL (ref 32.6–36.4)
MCV RBC AUTO: 93.2 FL (ref 79.8–96.2)
PLATELET # BLD AUTO: 140 10*3/MM3 (ref 140–500)
PMV BLD AUTO: 10.5 FL (ref 6–12)
POTASSIUM BLD-SCNC: 3.9 MMOL/L (ref 3.5–5.2)
RBC # BLD AUTO: 4.11 10*6/MM3 (ref 4.6–6)
SODIUM BLD-SCNC: 137 MMOL/L (ref 136–145)
WBC NRBC COR # BLD: 11.66 10*3/MM3 (ref 4.5–10.7)

## 2017-11-04 PROCEDURE — 25010000002 ONDANSETRON PER 1 MG: Performed by: UROLOGY

## 2017-11-04 PROCEDURE — 25010000002 HYDRALAZINE PER 20 MG: Performed by: INTERNAL MEDICINE

## 2017-11-04 PROCEDURE — 80048 BASIC METABOLIC PNL TOTAL CA: CPT | Performed by: UROLOGY

## 2017-11-04 PROCEDURE — 25010000002 HYDRALAZINE PER 20 MG

## 2017-11-04 PROCEDURE — 25010000002 HYDROMORPHONE PER 4 MG: Performed by: UROLOGY

## 2017-11-04 PROCEDURE — 85027 COMPLETE CBC AUTOMATED: CPT | Performed by: UROLOGY

## 2017-11-04 RX ORDER — BISACODYL 10 MG
10 SUPPOSITORY, RECTAL RECTAL DAILY
Status: DISCONTINUED | OUTPATIENT
Start: 2017-11-04 | End: 2017-11-04

## 2017-11-04 RX ORDER — HYDRALAZINE HYDROCHLORIDE 20 MG/ML
20 INJECTION INTRAMUSCULAR; INTRAVENOUS EVERY 4 HOURS PRN
Status: DISPENSED | OUTPATIENT
Start: 2017-11-04 | End: 2017-11-07

## 2017-11-04 RX ORDER — HYDRALAZINE HYDROCHLORIDE 20 MG/ML
INJECTION INTRAMUSCULAR; INTRAVENOUS
Status: COMPLETED
Start: 2017-11-04 | End: 2017-11-04

## 2017-11-04 RX ORDER — PANTOPRAZOLE SODIUM 40 MG/10ML
40 INJECTION, POWDER, LYOPHILIZED, FOR SOLUTION INTRAVENOUS
Status: DISCONTINUED | OUTPATIENT
Start: 2017-11-04 | End: 2017-11-06

## 2017-11-04 RX ORDER — BISACODYL 10 MG
10 SUPPOSITORY, RECTAL RECTAL ONCE
Status: COMPLETED | OUTPATIENT
Start: 2017-11-04 | End: 2017-11-04

## 2017-11-04 RX ADMIN — SODIUM CHLORIDE 100 ML/HR: 9 INJECTION, SOLUTION INTRAVENOUS at 20:25

## 2017-11-04 RX ADMIN — PANTOPRAZOLE SODIUM 40 MG: 40 INJECTION, POWDER, FOR SOLUTION INTRAVENOUS at 06:30

## 2017-11-04 RX ADMIN — HYDRALAZINE HYDROCHLORIDE: 20 INJECTION INTRAMUSCULAR; INTRAVENOUS at 04:10

## 2017-11-04 RX ADMIN — HYDROMORPHONE HYDROCHLORIDE 0.5 MG: 1 INJECTION, SOLUTION INTRAMUSCULAR; INTRAVENOUS; SUBCUTANEOUS at 11:31

## 2017-11-04 RX ADMIN — HYDROMORPHONE HYDROCHLORIDE 0.5 MG: 1 INJECTION, SOLUTION INTRAMUSCULAR; INTRAVENOUS; SUBCUTANEOUS at 20:21

## 2017-11-04 RX ADMIN — HYDROMORPHONE HYDROCHLORIDE 0.5 MG: 1 INJECTION, SOLUTION INTRAMUSCULAR; INTRAVENOUS; SUBCUTANEOUS at 17:33

## 2017-11-04 RX ADMIN — HYDROMORPHONE HYDROCHLORIDE 0.5 MG: 1 INJECTION, SOLUTION INTRAMUSCULAR; INTRAVENOUS; SUBCUTANEOUS at 22:21

## 2017-11-04 RX ADMIN — CLONIDINE HYDROCHLORIDE 0.1 MG: 0.1 TABLET ORAL at 20:21

## 2017-11-04 RX ADMIN — NICARDIPINE HYDROCHLORIDE 15 MG/HR: 2.5 INJECTION INTRAVENOUS at 05:00

## 2017-11-04 RX ADMIN — HYDROCODONE BITARTRATE AND ACETAMINOPHEN 1 TABLET: 10; 325 TABLET ORAL at 01:08

## 2017-11-04 RX ADMIN — NICARDIPINE HYDROCHLORIDE 10 MG/HR: 2.5 INJECTION INTRAVENOUS at 07:59

## 2017-11-04 RX ADMIN — HYDROMORPHONE HYDROCHLORIDE 0.5 MG: 1 INJECTION, SOLUTION INTRAMUSCULAR; INTRAVENOUS; SUBCUTANEOUS at 08:43

## 2017-11-04 RX ADMIN — CLONIDINE HYDROCHLORIDE 0.1 MG: 0.1 TABLET ORAL at 08:34

## 2017-11-04 RX ADMIN — HYDROCODONE BITARTRATE AND ACETAMINOPHEN 1 TABLET: 10; 325 TABLET ORAL at 09:50

## 2017-11-04 RX ADMIN — HYDROMORPHONE HYDROCHLORIDE 0.5 MG: 1 INJECTION, SOLUTION INTRAMUSCULAR; INTRAVENOUS; SUBCUTANEOUS at 06:35

## 2017-11-04 RX ADMIN — ATENOLOL 50 MG: 50 TABLET ORAL at 07:34

## 2017-11-04 RX ADMIN — HYDROMORPHONE HYDROCHLORIDE 0.5 MG: 1 INJECTION, SOLUTION INTRAMUSCULAR; INTRAVENOUS; SUBCUTANEOUS at 15:09

## 2017-11-04 RX ADMIN — HYDROMORPHONE HYDROCHLORIDE 0.5 MG: 1 INJECTION, SOLUTION INTRAMUSCULAR; INTRAVENOUS; SUBCUTANEOUS at 00:14

## 2017-11-04 RX ADMIN — HYDROCODONE BITARTRATE AND ACETAMINOPHEN 1 TABLET: 10; 325 TABLET ORAL at 18:58

## 2017-11-04 RX ADMIN — SODIUM CHLORIDE 50 ML/HR: 9 INJECTION, SOLUTION INTRAVENOUS at 01:08

## 2017-11-04 RX ADMIN — HYDRALAZINE HYDROCHLORIDE 20 MG: 20 INJECTION INTRAMUSCULAR; INTRAVENOUS at 06:47

## 2017-11-04 RX ADMIN — ONDANSETRON 4 MG: 2 INJECTION INTRAMUSCULAR; INTRAVENOUS at 02:26

## 2017-11-04 RX ADMIN — CLONIDINE HYDROCHLORIDE 0.1 MG: 0.1 TABLET ORAL at 15:11

## 2017-11-04 RX ADMIN — HYDRALAZINE HYDROCHLORIDE 20 MG: 20 INJECTION INTRAMUSCULAR; INTRAVENOUS at 02:45

## 2017-11-04 RX ADMIN — BISACODYL 10 MG: 10 SUPPOSITORY RECTAL at 06:48

## 2017-11-04 RX ADMIN — BUPROPION HYDROCHLORIDE 75 MG: 75 TABLET, FILM COATED ORAL at 08:34

## 2017-11-04 RX ADMIN — DOCUSATE SODIUM 100 MG: 100 CAPSULE, LIQUID FILLED ORAL at 08:37

## 2017-11-04 RX ADMIN — NICARDIPINE HYDROCHLORIDE 5 MG/HR: 2.5 INJECTION INTRAVENOUS at 03:23

## 2017-11-04 RX ADMIN — TAMSULOSIN HYDROCHLORIDE 0.4 MG: 0.4 CAPSULE ORAL at 08:34

## 2017-11-04 NOTE — PROGRESS NOTES
Consult to [facility] called on 11/2/17 at 9:27 pm.  Our physicians did not see this patient since in the ICU currently and all medical care outside of urology being provided by Critical Care phycisicans.  Please call us when patient transferred to regular unit and will see for medical issues at that time.

## 2017-11-04 NOTE — PLAN OF CARE
Problem: Patient Care Overview (Adult)  Goal: Plan of Care Review  Outcome: Ongoing (interventions implemented as appropriate)    11/04/17 1923   Coping/Psychosocial Response Interventions   Plan Of Care Reviewed With patient;spouse   Outcome Evaluation   Outcome Summary/Follow up Plan Vitals stable. Still with complaint of abdominal fullness/discomfort. Patient stated that he's passing gas.. To be transferred out of critical care when bed is available. Patient/ spouse updated on plan of care.         Problem: Perioperative Period (Adult)  Goal: Signs and Symptoms of Listed Potential Problems Will be Absent or Manageable (Perioperative Period)  Outcome: Outcome(s) achieved Date Met:  11/04/17    Problem: Pain, Chronic (Adult)  Goal: Acceptable Pain Control/Comfort Level  Outcome: Ongoing (interventions implemented as appropriate)    Problem: Fall Risk (Adult)  Goal: Absence of Falls  Outcome: Ongoing (interventions implemented as appropriate)

## 2017-11-04 NOTE — SIGNIFICANT NOTE
11/04/17 1136   Rehab Treatment   Discipline physical therapist   Treatment Not Performed patient/family declined treatment  (patient declines PT today, states he walked too much yesterday and had a very restless night. states he will be up for PT tomorrow. SOPHIA Shearer aware )   Recommendation   PT - Next Appointment 11/05/17

## 2017-11-04 NOTE — PROGRESS NOTES
Pod 2 cystectomy with conduit    Afebrile  Nausea no emesis  Ng tube out  Elevated bp with nausea on cardene gtt    abd tympanic,   Dressing c/d/i - removed and incision healing well    Labs stable      Plan:  Ileus - npo  pulm treating bp  ppi  scd's  Try to ambulate today

## 2017-11-04 NOTE — NURSING NOTE
Pt nauseated and BP elevated. IV zofran given. Received orders from Dr. Joshua for 20mg hydralazine and cardene drip if needed. Hydralazine given. Cardene drip has also been started. Will continue to monitor.

## 2017-11-04 NOTE — PROGRESS NOTES
[facility]  Phone: [phone]  Db Dunlap MD    Subjective:  LOS: 2    Appears comfortable.  Seems to be having return of some bowel function.  Complains of being slightly hungry.  Has behind head appears very comfortable watching TV talking with his wife.    Objective   Vital Signs past 24hrs  BP range: BP: (130-217)/() 135/65  Pulse range: Heart Rate:  [] 73  Resp rate range: Resp:  [18-24] 20  Temp range: Temp (24hrs), Av.2 °F (37.3 °C), Min:98.4 °F (36.9 °C), Max:100.2 °F (37.9 °C)    O2 Device: room airFlow (L/min):  [4] 4  Oxygen range:SpO2:  [92 %-97 %] 95 %   210 lb 8 oz (95.5 kg); Body mass index is 29.36 kg/(m^2).    Intake/Output Summary (Last 24 hours) at 17 1428  Last data filed at 17 1331   Gross per 24 hour   Intake             3189 ml   Output             2955 ml   Net              234 ml       Physical Exam   Gen. nonacute distress well-appearing hand behind head  Cardiovascular: Normal rate and regular rhythm.    No murmur heard.  Pulmonary/Chest: He has no wheezes. He has no rales.   Abdominal: Soft. He exhibits distension. Bowel sounds Are present. There is no tenderness.   Ileal conduit  pink in color no leakage   Musculoskeletal: He exhibits no edema.   Neurological: He is alert.   Nursing note and vitals reviewed.    Results Review:    I have reviewed the laboratory and imaging data since the last note by [facility] physician.  My annotations are noted in assessment and plan.    Medication Review:  I have reviewed the current MAR.  My annotations are noted in assessment and plan.      niCARdipine 5-15 mg/hr Last Rate: Stopped (17 1130)   sodium chloride 100 mL/hr Last Rate: 15 mL/hr (17 0200)     Plan   PCCM Problems  S/p Ileal conduit and prostatectomy for bladder cancer,   Intractable pain from surgery  Chronic back pain - narcotics  Ongoing cigarette abuse    Plan of Treatment  Patient's pain appears more manageable.    Nicardipine drip off  No intensive care unit needs.  To floor for continued recovery.  Thanks for allowing us to participate in the care of this patient.  LPC is always available to discuss any concerns, questions or to help coordinate the patient's care.  Discussed with bedside RN, appreciate insight and care.        Db Dunlap MD  11/04/17  2:28 PM    Part of this note may be an electronic transcription/translation of spoken language to printed text using the Dragon Dictation System.

## 2017-11-05 LAB
ANION GAP SERPL CALCULATED.3IONS-SCNC: 10.5 MMOL/L
BUN BLD-MCNC: 11 MG/DL (ref 6–20)
BUN/CREAT SERPL: 12.8 (ref 7–25)
CALCIUM SPEC-SCNC: 8.1 MG/DL (ref 8.6–10.5)
CHLORIDE SERPL-SCNC: 105 MMOL/L (ref 98–107)
CO2 SERPL-SCNC: 23.5 MMOL/L (ref 22–29)
CREAT BLD-MCNC: 0.86 MG/DL (ref 0.76–1.27)
DEPRECATED RDW RBC AUTO: 41.6 FL (ref 37–54)
ERYTHROCYTE [DISTWIDTH] IN BLOOD BY AUTOMATED COUNT: 12.6 % (ref 11.5–14.5)
GFR SERPL CREATININE-BSD FRML MDRD: 95 ML/MIN/1.73
GLUCOSE BLD-MCNC: 114 MG/DL (ref 65–99)
HCT VFR BLD AUTO: 31 % (ref 40.4–52.2)
HGB BLD-MCNC: 10.6 G/DL (ref 13.7–17.6)
MCH RBC QN AUTO: 31.1 PG (ref 27–32.7)
MCHC RBC AUTO-ENTMCNC: 34.2 G/DL (ref 32.6–36.4)
MCV RBC AUTO: 90.9 FL (ref 79.8–96.2)
PLATELET # BLD AUTO: 144 10*3/MM3 (ref 140–500)
PMV BLD AUTO: 10.5 FL (ref 6–12)
POTASSIUM BLD-SCNC: 3.8 MMOL/L (ref 3.5–5.2)
RBC # BLD AUTO: 3.41 10*6/MM3 (ref 4.6–6)
SODIUM BLD-SCNC: 139 MMOL/L (ref 136–145)
WBC NRBC COR # BLD: 9.28 10*3/MM3 (ref 4.5–10.7)

## 2017-11-05 PROCEDURE — 25010000002 HYDRALAZINE PER 20 MG: Performed by: INTERNAL MEDICINE

## 2017-11-05 PROCEDURE — 25010000002 ONDANSETRON PER 1 MG: Performed by: UROLOGY

## 2017-11-05 PROCEDURE — 97110 THERAPEUTIC EXERCISES: CPT

## 2017-11-05 PROCEDURE — 25010000002 ENOXAPARIN PER 10 MG: Performed by: UROLOGY

## 2017-11-05 PROCEDURE — 85027 COMPLETE CBC AUTOMATED: CPT | Performed by: UROLOGY

## 2017-11-05 PROCEDURE — 80048 BASIC METABOLIC PNL TOTAL CA: CPT | Performed by: UROLOGY

## 2017-11-05 PROCEDURE — 25010000002 HYDROMORPHONE PER 4 MG: Performed by: UROLOGY

## 2017-11-05 RX ORDER — BISACODYL 10 MG
10 SUPPOSITORY, RECTAL RECTAL ONCE
Status: COMPLETED | OUTPATIENT
Start: 2017-11-05 | End: 2017-11-05

## 2017-11-05 RX ADMIN — HYDROMORPHONE HYDROCHLORIDE 0.5 MG: 1 INJECTION, SOLUTION INTRAMUSCULAR; INTRAVENOUS; SUBCUTANEOUS at 13:47

## 2017-11-05 RX ADMIN — ENOXAPARIN SODIUM 40 MG: 40 INJECTION SUBCUTANEOUS at 08:30

## 2017-11-05 RX ADMIN — HYDROMORPHONE HYDROCHLORIDE 0.5 MG: 1 INJECTION, SOLUTION INTRAMUSCULAR; INTRAVENOUS; SUBCUTANEOUS at 00:32

## 2017-11-05 RX ADMIN — HYDROMORPHONE HYDROCHLORIDE 2 MG: 1 INJECTION, SOLUTION INTRAMUSCULAR; INTRAVENOUS; SUBCUTANEOUS at 16:06

## 2017-11-05 RX ADMIN — HYDROCODONE BITARTRATE AND ACETAMINOPHEN 1 TABLET: 10; 325 TABLET ORAL at 01:35

## 2017-11-05 RX ADMIN — HYDRALAZINE HYDROCHLORIDE 20 MG: 20 INJECTION INTRAMUSCULAR; INTRAVENOUS at 23:54

## 2017-11-05 RX ADMIN — SODIUM CHLORIDE 100 ML/HR: 9 INJECTION, SOLUTION INTRAVENOUS at 14:30

## 2017-11-05 RX ADMIN — HYDRALAZINE HYDROCHLORIDE 20 MG: 20 INJECTION INTRAMUSCULAR; INTRAVENOUS at 16:10

## 2017-11-05 RX ADMIN — SODIUM CHLORIDE 100 ML/HR: 9 INJECTION, SOLUTION INTRAVENOUS at 04:57

## 2017-11-05 RX ADMIN — HYDROMORPHONE HYDROCHLORIDE 2 MG: 1 INJECTION, SOLUTION INTRAMUSCULAR; INTRAVENOUS; SUBCUTANEOUS at 18:22

## 2017-11-05 RX ADMIN — PANTOPRAZOLE SODIUM 40 MG: 40 INJECTION, POWDER, FOR SOLUTION INTRAVENOUS at 06:33

## 2017-11-05 RX ADMIN — HYDROMORPHONE HYDROCHLORIDE 2 MG: 1 INJECTION, SOLUTION INTRAMUSCULAR; INTRAVENOUS; SUBCUTANEOUS at 06:56

## 2017-11-05 RX ADMIN — BISACODYL 10 MG: 10 SUPPOSITORY RECTAL at 07:36

## 2017-11-05 RX ADMIN — HYDROCODONE BITARTRATE AND ACETAMINOPHEN 1 TABLET: 10; 325 TABLET ORAL at 08:30

## 2017-11-05 RX ADMIN — HYDROMORPHONE HYDROCHLORIDE 0.5 MG: 1 INJECTION, SOLUTION INTRAMUSCULAR; INTRAVENOUS; SUBCUTANEOUS at 20:16

## 2017-11-05 RX ADMIN — HYDROCODONE BITARTRATE AND ACETAMINOPHEN 1 TABLET: 10; 325 TABLET ORAL at 14:30

## 2017-11-05 RX ADMIN — ATENOLOL 50 MG: 50 TABLET ORAL at 06:33

## 2017-11-05 RX ADMIN — HYDROMORPHONE HYDROCHLORIDE 1 MG: 1 INJECTION, SOLUTION INTRAMUSCULAR; INTRAVENOUS; SUBCUTANEOUS at 02:59

## 2017-11-05 RX ADMIN — HYDROMORPHONE HYDROCHLORIDE 1 MG: 1 INJECTION, SOLUTION INTRAMUSCULAR; INTRAVENOUS; SUBCUTANEOUS at 09:36

## 2017-11-05 RX ADMIN — CLONIDINE HYDROCHLORIDE 0.1 MG: 0.1 TABLET ORAL at 20:31

## 2017-11-05 RX ADMIN — CLONIDINE HYDROCHLORIDE 0.1 MG: 0.1 TABLET ORAL at 16:06

## 2017-11-05 RX ADMIN — ONDANSETRON 4 MG: 2 INJECTION INTRAMUSCULAR; INTRAVENOUS at 10:30

## 2017-11-05 RX ADMIN — CLONIDINE HYDROCHLORIDE 0.1 MG: 0.1 TABLET ORAL at 08:09

## 2017-11-05 RX ADMIN — ONDANSETRON 4 MG: 2 INJECTION INTRAMUSCULAR; INTRAVENOUS at 20:22

## 2017-11-05 RX ADMIN — HYDROMORPHONE HYDROCHLORIDE 1 MG: 1 INJECTION, SOLUTION INTRAMUSCULAR; INTRAVENOUS; SUBCUTANEOUS at 04:54

## 2017-11-05 RX ADMIN — HYDROMORPHONE HYDROCHLORIDE 2 MG: 1 INJECTION, SOLUTION INTRAMUSCULAR; INTRAVENOUS; SUBCUTANEOUS at 11:37

## 2017-11-05 RX ADMIN — HYDROCODONE BITARTRATE AND ACETAMINOPHEN 1 TABLET: 10; 325 TABLET ORAL at 21:00

## 2017-11-05 RX ADMIN — HYDROMORPHONE HYDROCHLORIDE 0.5 MG: 1 INJECTION, SOLUTION INTRAMUSCULAR; INTRAVENOUS; SUBCUTANEOUS at 22:18

## 2017-11-05 RX ADMIN — BUPROPION HYDROCHLORIDE 75 MG: 75 TABLET, FILM COATED ORAL at 08:09

## 2017-11-05 RX ADMIN — HYDRALAZINE HYDROCHLORIDE 20 MG: 20 INJECTION INTRAMUSCULAR; INTRAVENOUS at 08:37

## 2017-11-05 NOTE — PLAN OF CARE
Problem: Patient Care Overview (Adult)  Goal: Plan of Care Review  Outcome: Ongoing (interventions implemented as appropriate)    11/05/17 0612   Coping/Psychosocial Response Interventions   Plan Of Care Reviewed With patient   Patient Care Overview   Progress improving   Outcome Evaluation   Outcome Summary/Follow up Plan Pt did not rest very well overnight. C/o pain several times. Norco given once and Dilaudid Q2h all night with minimal relief. 20 mL output from RAY drain. Urosotomy with lizbeth colored urine. Pt did have 1 moderate BM during shift. VSS, no s/s acute distress, will continue to monitor       Goal: Adult Individualization and Mutuality  Outcome: Ongoing (interventions implemented as appropriate)    Problem: Pain, Chronic (Adult)  Goal: Acceptable Pain Control/Comfort Level  Outcome: Ongoing (interventions implemented as appropriate)    Problem: Fall Risk (Adult)  Goal: Absence of Falls  Outcome: Ongoing (interventions implemented as appropriate)

## 2017-11-05 NOTE — PLAN OF CARE
Problem: Patient Care Overview (Adult)  Goal: Plan of Care Review    11/05/17 0479   Coping/Psychosocial Response Interventions   Plan Of Care Reviewed With patient   Outcome Evaluation   Outcome Summary/Follow up Plan Improved independence w/ mobility during PT, reports ambulating in [place] w/ family earlier today. Ambulated 175' w/ supervision, no UE support, no safety concerns. Reviewed activity recommendations and PT POC. DC PT, pt and family aware.

## 2017-11-05 NOTE — PROGRESS NOTES
Pod 3 cystectomy with conduit  afvss  Transferred from icu   + bm with supp  Tolerating ice chips   No sob  No cp    abd tympanic but less distended from yesterday  Incision healing well  james 20 last shift  Ostomy pink    Cr 0.86  hgb 10.6    Plan:  Ambulate   Advance to clears slowly  IS  lovenox added for dvt prophylaxis per pharmacy

## 2017-11-05 NOTE — THERAPY DISCHARGE NOTE
Acute Care - Physical Therapy Treatment Note/Discharge  [facility]     Patient Name: Lamberto Tafoya  : 1970  MRN: [id number]  Today's Date: 2017  Onset of Illness/Injury or Date of Surgery Date: 17  Date of Referral to PT: 17  Referring Physician: Aby     Admit Date: 2017    Visit Dx:    ICD-10-CM ICD-9-CM   1. Generalized weakness R53.1 780.79   2. Bladder cancer C67.9 188.9     Patient Active Problem List   Diagnosis   • Bladder cancer       Physical Therapy Education     Title: PT OT SLP Therapies (Done)     Topic: Physical Therapy (Done)     Point: Mobility training (Done)    Learning Progress Summary    Learner Readiness Method Response Comment Documented by Status   Patient Acceptance E DIONI,DU  AR 17 1439 Done    Acceptance E,D DU,[facility] 17 1127 Done   Family Acceptance E,D DU,[facility] 17 1127 Done               Point: Home exercise program (Done)    Learning Progress Summary    Learner Readiness Method Response Comment Documented by Status   Patient Acceptance E VU,DU  AR 17 1439 Done    Acceptance E,D DU,[facility] 17 1127 Done   Family Acceptance E,D DU,[facility] 17 1127 Done               Point: Body mechanics (Done)    Learning Progress Summary    Learner Readiness Method Response Comment Documented by Status   Patient Acceptance E VU,DU  AR 17 1439 Done    Acceptance E,D DU,[facility] 17 1127 Done   Family Acceptance E,D DU,[facility] 17 1127 Done               Point: Precautions (Done)    Learning Progress Summary    Learner Readiness Method Response Comment Documented by Status   Patient Acceptance E VU,DU  AR 17 1439 Done    Acceptance E,D DU,[facility] 17 1127 Done   Family Acceptance E,D DU,[facility] 17 1127 Done                      User Key     Initials Effective Dates Name Provider Type Discipline    AR 16 -  Sandra Black, PT Physical Therapist PT     17 -  Niko Vanegas, PT Student PT Student PT                     IP PT Goals       11/03/17 1127          Bed Mobility PT LTG    Bed Mobility PT LTG, Date Established 11/03/17  -EM (r) MF (t) EM (c)      Bed Mobility PT LTG, Time to Achieve 1 wk  -EM (r) MF (t) EM (c)      Bed Mobility PT LTG, Activity Type all bed mobility  -EM (r) MF (t) EM (c)      Bed Mobility PT LTG, [place] Level supervision required  -EM (r) MF (t) EM (c)      Transfer Training 2 PT LTG    Transfer Training PT 2 LTG, Date Established 11/03/17  -EM (r) MF (t) EM (c)      Transfer Training PT 2 LTG, Time to Achieve 1 wk  -EM (r) MF (t) EM (c)      Transfer Training PT 2 LTG, Activity Type all transfers  -EM (r) MF (t) EM (c)      Transfer Training PT 2 LTG, [place] Level supervision required  -EM (r) MF (t) EM (c)      Gait Training PT LTG    Gait Training Goal PT LTG, Date Established 11/03/17  -EM (r) MF (t) EM (c)      Gait Training Goal PT LTG, Time to Achieve 1 wk  -EM (r) MF (t) EM (c)      Gait Training Goal PT LTG, [place] Level supervision required  -EM (r) MF (t) EM (c)      Gait Training Goal PT LTG, Distance to Achieve 300  -EM (r) MF (t) EM (c)        User Key  (r) = Recorded By, (t) = Taken By, (c) = Cosigned By    Initials Name Provider Type    EM Bella Alberto, PT Physical Therapist     Niko Vanegas, PT Student PT Student              Adult Rehabilitation Note       11/05/17 1434          Rehab Assessment/Intervention    Discipline physical therapist  -AR      Document Type therapy note (daily note);discharge summary  -AR      Subjective Information agree to therapy   has ambulated w/ spouse in [place] earlier today  -AR      Patient Effort, Rehab Treatment good  -AR      Recorded by [AR] Sandra Black PT      Vital Signs    O2 Delivery Pre Treatment room air  -AR      Recorded by [AR] Sadnra Black, PT      Pain Assessment    Pain Assessment 0-10  -AR      Pain Score 6  -AR      Pain Type Surgical pain  -AR      Pain Location Abdomen  -AR       Pain Intervention(s) Repositioned;Medication (See MAR)  -AR      Recorded by [AR] Sandra Black PT      Cognitive Assessment/Intervention    Current Cognitive/Communication Assessment functional  -AR      Orientation Status oriented x 4  -AR      Follows Commands/Answers Questions 100% of the time  -AR      Personal Safety WNL/WFL  -AR      Recorded by [AR] Sandra Black PT      Bed Mobility, Assessment/Treatment    Bed Mobility, Assistive Device bed rails;head of bed elevated  -AR      Bed Mobility, Scoot/Bridge, [place] conditional independence  -AR      Bed Mob, Supine to Sit, [place] independent  -AR      Bed Mobility, Comment HOB flat, no bed rail getting back in bed  -AR      Recorded by [AR] Sandra Black PT      Transfer Assessment/Treatment    Transfers, Sit-Stand [place] supervision required  -AR      Transfers, Stand-Sit [place] supervision required  -AR      Recorded by [AR] Sandra Black PT      Gait Assessment/Treatment    Gait, [place] Level supervision required  -AR      Gait, Assistive Device --   no UE support  -AR      Gait, Distance (Feet) 175  -AR      Gait, Gait Pattern Analysis swing-through gait  -AR      Gait, Comment no safety concerns - pt dancing in [place] at  one point w/o LOB or unsteadiness  -AR      Recorded by [AR] Sandra Black PT      Stairs Assessment/Treatment    Stairs, Comment pt declined to practice steps - has no concerns for DC home  -AR      Recorded by [AR] Sandra Black PT      Positioning and Restraints    Pre-Treatment Position in bed  -AR      Post Treatment Position bed  -AR      In Bed supine;call light within reach;encouraged to call for assist;with family/caregiver  -AR      Recorded by [AR] Sandra Black PT        User Key  (r) = Recorded By, (t) = Taken By, (c) = Cosigned By    Initials Name Effective Dates    AR Sandra Black PT 06/27/16 -           PT Recommendation and Plan  Anticipated Discharge Disposition: home  with assist  Planned Therapy Interventions: balance training, bed mobility training, gait training, home exercise program, strengthening, transfer training  PT Frequency: daily  Plan of Care Review  Plan Of Care Reviewed With: patient  Outcome Summary/Follow up Plan: Improved independence w/ mobility during PT, reports ambulating in [place] w/ family earlier today.  Ambulated 175' w/ supervision, no UE support, no safety concerns.  Reviewed activity recommendations and PT POC.  DC PT, pt and family aware.            Outcome Measures       11/05/17 1400 11/03/17 1129       How much help from another person do you currently need...    Turning from your back to your side while in flat bed without using bedrails? 4  -AR 3  -EM (r) MF (t) EM (c)     Moving from lying on back to sitting on the side of a flat bed without bedrails? 4  -AR 3  -EM (r) MF (t) EM (c)     Moving to and from a bed to a chair (including a wheelchair)? 4  -AR 3  -EM (r) MF (t) EM (c)     Standing up from a chair using your arms (e.g., wheelchair, bedside chair)? 4  -AR 3  -EM (r) MF (t) EM (c)     Climbing 3-5 steps with a railing? 4  -AR 3  -EM (r) MF (t) EM (c)     To walk in hospital room? 4  -AR 3  -EM (r) MF (t) EM (c)     -PAC 6 Clicks Score 24  -AR 18  -EM (r) MF (t)     Functional Assessment    Outcome Measure Options  -[facility] 6 Clicks Basic Mobility (PT)  -EM (r) MF (t) EM (c)       User Key  (r) = Recorded By, (t) = Taken By, (c) = Cosigned By    Initials Name Provider Type    EM Bella Alberto, PT Physical Therapist    ISHA Black, PT Physical Therapist    SARAH Vanegas, PT Student PT Student           Time Calculation:         PT Charges       11/05/17 1443          Time Calculation    Start Time 1418  -AR      Stop Time 1434  -AR      Time Calculation (min) 16 min  -AR      PT Received On 11/05/17  -AR      PT - Next Appointment 10/31/17  -AR      PT Goal Re-Cert Due Date 10/31/17  -AR        User Key  (r) = Recorded By,  (t) = Taken By, (c) = Cosigned By    Initials Name Provider Type    ISHA Black PT Physical Therapist          Therapy Charges for Today     Code Description Service Date Service Provider Modifiers Qty    48709935979 HC PT THER PROC EA 15 MIN 11/5/2017 Sandra Black PT GP 1          PT G-Codes  Outcome Measure Options: AM-PAC 6 Clicks Basic Mobility (PT)         Sandra Black PT  11/5/2017

## 2017-11-05 NOTE — PROGRESS NOTES
Medicine consulted for medical mgmt as patient out of unit yesterday.  Will defer mgmt to them.  No acute pulmonary issues. Will sign off, please reconsult if pulmonary input needed.    Db Dunlap MD  [facility]  11/05/17  5:20 PM

## 2017-11-05 NOTE — PROGRESS NOTES
Pharmacy consult for Lovenox dosing    Lamberto Tafoya is a 47 y.o. male 209 lb (94.8 kg).    Indication:DVT prophylaxis   Physician:Dr Flores    Estimated Creatinine Clearance: 122.1 mL/min (by C-G formula based on Cr of 0.86).  Creatinine   Date Value Ref Range Status   11/05/2017 0.86 0.76 - 1.27 mg/dL Final   11/04/2017 0.79 0.76 - 1.27 mg/dL Final   11/03/2017 1.09 0.76 - 1.27 mg/dL Final   11/02/2017 1.08 0.76 - 1.27 mg/dL Final     Platelets   Date Value Ref Range Status   11/05/2017 144 140 - 500 10*3/mm3 Final   11/04/2017 140 140 - 500 10*3/mm3 Final   11/03/2017 154 140 - 500 10*3/mm3 Final   11/02/2017 166 140 - 500 10*3/mm3 Final     Hematocrit   Date Value Ref Range Status   11/05/2017 31.0 (L) 40.4 - 52.2 % Final   11/04/2017 38.3 (L) 40.4 - 52.2 % Final   11/03/2017 39.2 (L) 40.4 - 52.2 % Final   11/02/2017 40.6 40.4 - 52.2 % Final     Hemoglobin   Date Value Ref Range Status   11/05/2017 10.6 (L) 13.7 - 17.6 g/dL Final   11/04/2017 12.7 (L) 13.7 - 17.6 g/dL Final   11/03/2017 12.6 (L) 13.7 - 17.6 g/dL Final   11/02/2017 13.4 (L) 13.7 - 17.6 g/dL Final         PLAN:  Patient post op cystoprostatectomy with ileal coduit for bladder CA on 11/2 trasnferred out of ICU.  Will start Lovenox 40 mg sc Q 24 hr for DVT prophylaxis.  Will monitor and adjust as needed.      Barbi Smart PharmD, BCPS  11/05/17 8:17 AM

## 2017-11-05 NOTE — PLAN OF CARE
Problem: Patient Care Overview (Adult)  Goal: Plan of Care Review  Outcome: Ongoing (interventions implemented as appropriate)    11/05/17 0661   Coping/Psychosocial Response Interventions   Plan Of Care Reviewed With patient;spouse;family   Patient Care Overview   Progress no change   Outcome Evaluation   Outcome Summary/Follow up Plan Pt c/o pain and requested pain medication IV every two hours and also giving pain pill every 6 hours PRN as prescribed. Nausea once in the early AM. BP high and treated with scheduled and PRN IV medication with great results. Ambulated in the hallways a few times today. PT also worked with pt. Making great progress in ambulation. Will cont to monitor.         Problem: Pain, Chronic (Adult)  Goal: Acceptable Pain Control/Comfort Level  Outcome: Ongoing (interventions implemented as appropriate)    Problem: Fall Risk (Adult)  Goal: Absence of Falls  Outcome: Ongoing (interventions implemented as appropriate)

## 2017-11-06 PROBLEM — E87.6 HYPOKALEMIA: Status: ACTIVE | Noted: 2017-11-06

## 2017-11-06 PROBLEM — I10 HYPERTENSION: Status: ACTIVE | Noted: 2017-11-06

## 2017-11-06 LAB
ABO + RH BLD: NORMAL
ABO + RH BLD: NORMAL
ANION GAP SERPL CALCULATED.3IONS-SCNC: 10.9 MMOL/L
BH BB BLOOD EXPIRATION DATE: NORMAL
BH BB BLOOD EXPIRATION DATE: NORMAL
BH BB BLOOD TYPE BARCODE: 5100
BH BB BLOOD TYPE BARCODE: 5100
BH BB DISPENSE STATUS: NORMAL
BH BB DISPENSE STATUS: NORMAL
BH BB PRODUCT CODE: NORMAL
BH BB PRODUCT CODE: NORMAL
BH BB UNIT NUMBER: NORMAL
BH BB UNIT NUMBER: NORMAL
BUN BLD-MCNC: 6 MG/DL (ref 6–20)
BUN/CREAT SERPL: 7.8 (ref 7–25)
CALCIUM SPEC-SCNC: 8 MG/DL (ref 8.6–10.5)
CHLORIDE SERPL-SCNC: 103 MMOL/L (ref 98–107)
CO2 SERPL-SCNC: 24.1 MMOL/L (ref 22–29)
CREAT BLD-MCNC: 0.77 MG/DL (ref 0.76–1.27)
CROSSMATCH INTERPRETATION: NORMAL
CROSSMATCH INTERPRETATION: NORMAL
CYTO UR: NORMAL
DEPRECATED RDW RBC AUTO: 40.7 FL (ref 37–54)
ERYTHROCYTE [DISTWIDTH] IN BLOOD BY AUTOMATED COUNT: 12.5 % (ref 11.5–14.5)
GFR SERPL CREATININE-BSD FRML MDRD: 107 ML/MIN/1.73
GLUCOSE BLD-MCNC: 140 MG/DL (ref 65–99)
HCT VFR BLD AUTO: 29.4 % (ref 40.4–52.2)
HGB BLD-MCNC: 10 G/DL (ref 13.7–17.6)
LAB AP CASE REPORT: NORMAL
LAB AP INTRADEPARTMENTAL CONSULT: NORMAL
LAB AP SYNOPTIC CHECKLIST: NORMAL
Lab: NORMAL
Lab: NORMAL
MCH RBC QN AUTO: 30.6 PG (ref 27–32.7)
MCHC RBC AUTO-ENTMCNC: 34 G/DL (ref 32.6–36.4)
MCV RBC AUTO: 89.9 FL (ref 79.8–96.2)
PATH REPORT.FINAL DX SPEC: NORMAL
PATH REPORT.GROSS SPEC: NORMAL
PLATELET # BLD AUTO: 162 10*3/MM3 (ref 140–500)
PMV BLD AUTO: 10.1 FL (ref 6–12)
POTASSIUM BLD-SCNC: 3.3 MMOL/L (ref 3.5–5.2)
RBC # BLD AUTO: 3.27 10*6/MM3 (ref 4.6–6)
SODIUM BLD-SCNC: 138 MMOL/L (ref 136–145)
UNIT  ABO: NORMAL
UNIT  ABO: NORMAL
UNIT  RH: NORMAL
UNIT  RH: NORMAL
WBC NRBC COR # BLD: 7.99 10*3/MM3 (ref 4.5–10.7)

## 2017-11-06 PROCEDURE — 85027 COMPLETE CBC AUTOMATED: CPT | Performed by: UROLOGY

## 2017-11-06 PROCEDURE — 25010000002 ENOXAPARIN PER 10 MG: Performed by: UROLOGY

## 2017-11-06 PROCEDURE — 25010000002 HYDRALAZINE PER 20 MG: Performed by: INTERNAL MEDICINE

## 2017-11-06 PROCEDURE — 25010000002 HYDROMORPHONE PER 4 MG: Performed by: UROLOGY

## 2017-11-06 PROCEDURE — 25010000003 POTASSIUM CHLORIDE 10 MEQ/100ML SOLUTION: Performed by: HOSPITALIST

## 2017-11-06 PROCEDURE — 80048 BASIC METABOLIC PNL TOTAL CA: CPT | Performed by: UROLOGY

## 2017-11-06 PROCEDURE — 25010000002 ONDANSETRON PER 1 MG: Performed by: UROLOGY

## 2017-11-06 RX ORDER — ATENOLOL 50 MG/1
50 TABLET ORAL EVERY 12 HOURS SCHEDULED
Status: DISCONTINUED | OUTPATIENT
Start: 2017-11-06 | End: 2017-11-08 | Stop reason: HOSPADM

## 2017-11-06 RX ORDER — POTASSIUM CHLORIDE 7.45 MG/ML
10 INJECTION INTRAVENOUS
Status: DISCONTINUED | OUTPATIENT
Start: 2017-11-06 | End: 2017-11-08 | Stop reason: HOSPADM

## 2017-11-06 RX ORDER — PANTOPRAZOLE SODIUM 40 MG/1
40 TABLET, DELAYED RELEASE ORAL
Status: DISCONTINUED | OUTPATIENT
Start: 2017-11-07 | End: 2017-11-08 | Stop reason: HOSPADM

## 2017-11-06 RX ADMIN — BUPROPION HYDROCHLORIDE 75 MG: 75 TABLET, FILM COATED ORAL at 08:58

## 2017-11-06 RX ADMIN — SODIUM CHLORIDE 75 ML/HR: 9 INJECTION, SOLUTION INTRAVENOUS at 13:03

## 2017-11-06 RX ADMIN — HYDRALAZINE HYDROCHLORIDE 20 MG: 20 INJECTION INTRAMUSCULAR; INTRAVENOUS at 15:36

## 2017-11-06 RX ADMIN — ATENOLOL 50 MG: 50 TABLET ORAL at 06:47

## 2017-11-06 RX ADMIN — HYDROMORPHONE HYDROCHLORIDE 0.5 MG: 1 INJECTION, SOLUTION INTRAMUSCULAR; INTRAVENOUS; SUBCUTANEOUS at 04:36

## 2017-11-06 RX ADMIN — ENOXAPARIN SODIUM 40 MG: 40 INJECTION SUBCUTANEOUS at 08:58

## 2017-11-06 RX ADMIN — HYDROCODONE BITARTRATE AND ACETAMINOPHEN 1 TABLET: 10; 325 TABLET ORAL at 16:16

## 2017-11-06 RX ADMIN — HYDROMORPHONE HYDROCHLORIDE 0.5 MG: 1 INJECTION, SOLUTION INTRAMUSCULAR; INTRAVENOUS; SUBCUTANEOUS at 02:34

## 2017-11-06 RX ADMIN — HYDROMORPHONE HYDROCHLORIDE 1 MG: 1 INJECTION, SOLUTION INTRAMUSCULAR; INTRAVENOUS; SUBCUTANEOUS at 17:06

## 2017-11-06 RX ADMIN — POTASSIUM CHLORIDE 10 MEQ: 10 INJECTION, SOLUTION INTRAVENOUS at 17:09

## 2017-11-06 RX ADMIN — ONDANSETRON 4 MG: 2 INJECTION INTRAMUSCULAR; INTRAVENOUS at 06:59

## 2017-11-06 RX ADMIN — CLONIDINE HYDROCHLORIDE 0.1 MG: 0.1 TABLET ORAL at 08:58

## 2017-11-06 RX ADMIN — POTASSIUM CHLORIDE 10 MEQ: 10 INJECTION, SOLUTION INTRAVENOUS at 19:40

## 2017-11-06 RX ADMIN — ATENOLOL 50 MG: 50 TABLET ORAL at 21:07

## 2017-11-06 RX ADMIN — HYDROMORPHONE HYDROCHLORIDE 1 MG: 1 INJECTION, SOLUTION INTRAMUSCULAR; INTRAVENOUS; SUBCUTANEOUS at 11:10

## 2017-11-06 RX ADMIN — SODIUM CHLORIDE 100 ML/HR: 9 INJECTION, SOLUTION INTRAVENOUS at 01:48

## 2017-11-06 RX ADMIN — CLONIDINE HYDROCHLORIDE 0.1 MG: 0.1 TABLET ORAL at 21:07

## 2017-11-06 RX ADMIN — PANTOPRAZOLE SODIUM 40 MG: 40 INJECTION, POWDER, FOR SOLUTION INTRAVENOUS at 07:50

## 2017-11-06 RX ADMIN — HYDROCODONE BITARTRATE AND ACETAMINOPHEN 1 TABLET: 10; 325 TABLET ORAL at 22:03

## 2017-11-06 RX ADMIN — HYDROCODONE BITARTRATE AND ACETAMINOPHEN 1 TABLET: 10; 325 TABLET ORAL at 03:10

## 2017-11-06 RX ADMIN — HYDROMORPHONE HYDROCHLORIDE 1 MG: 1 INJECTION, SOLUTION INTRAMUSCULAR; INTRAVENOUS; SUBCUTANEOUS at 08:58

## 2017-11-06 RX ADMIN — HYDROMORPHONE HYDROCHLORIDE 1 MG: 1 INJECTION, SOLUTION INTRAMUSCULAR; INTRAVENOUS; SUBCUTANEOUS at 18:53

## 2017-11-06 RX ADMIN — HYDROMORPHONE HYDROCHLORIDE 1 MG: 1 INJECTION, SOLUTION INTRAMUSCULAR; INTRAVENOUS; SUBCUTANEOUS at 06:47

## 2017-11-06 RX ADMIN — HYDROMORPHONE HYDROCHLORIDE 1 MG: 1 INJECTION, SOLUTION INTRAMUSCULAR; INTRAVENOUS; SUBCUTANEOUS at 13:03

## 2017-11-06 RX ADMIN — POTASSIUM CHLORIDE 10 MEQ: 10 INJECTION, SOLUTION INTRAVENOUS at 15:00

## 2017-11-06 RX ADMIN — HYDRALAZINE HYDROCHLORIDE 20 MG: 20 INJECTION INTRAMUSCULAR; INTRAVENOUS at 06:59

## 2017-11-06 RX ADMIN — POTASSIUM CHLORIDE 10 MEQ: 10 INJECTION, SOLUTION INTRAVENOUS at 21:56

## 2017-11-06 RX ADMIN — HYDROCODONE BITARTRATE AND ACETAMINOPHEN 1 TABLET: 10; 325 TABLET ORAL at 09:28

## 2017-11-06 RX ADMIN — CLONIDINE HYDROCHLORIDE 0.1 MG: 0.1 TABLET ORAL at 16:20

## 2017-11-06 RX ADMIN — HYDROMORPHONE HYDROCHLORIDE 1 MG: 1 INJECTION, SOLUTION INTRAMUSCULAR; INTRAVENOUS; SUBCUTANEOUS at 21:07

## 2017-11-06 RX ADMIN — HYDROMORPHONE HYDROCHLORIDE 1 MG: 1 INJECTION, SOLUTION INTRAMUSCULAR; INTRAVENOUS; SUBCUTANEOUS at 00:22

## 2017-11-06 RX ADMIN — HYDROMORPHONE HYDROCHLORIDE 1 MG: 1 INJECTION, SOLUTION INTRAMUSCULAR; INTRAVENOUS; SUBCUTANEOUS at 15:07

## 2017-11-06 NOTE — PROGRESS NOTES
Continued Stay Note  [facility]     Patient Name: Lamberto Tafoya  MRN: [id number]  Today's Date: 11/6/2017    Admit Date: 11/2/2017          Discharge Plan       11/06/17 1018    Case Management/Social Work Plan    Plan Plan home.  Dolores RN    Additional Comments FACE SHEET VERIFIED.  Spoke with pt ant pt's wife  (Marlin  [phone]) at bedside.  Pt lives in two story house with wife, 22 y/o daughter and two grandchildren ages 20 y/o & 11 y/o.    Pt is independent with ADL's.   Pt gets prescriptions at [facility] in [place].   Pt is not current with HH,  Pt has not been in SNF.  Plan home.  Dolores, RN              Discharge Codes     None            Gabrielle Martinez, RN

## 2017-11-06 NOTE — PLAN OF CARE
Problem: Patient Care Overview (Adult)  Goal: Plan of Care Review  Outcome: Ongoing (interventions implemented as appropriate)    Problem: Pain, Chronic (Adult)  Goal: Acceptable Pain Control/Comfort Level  Outcome: Ongoing (interventions implemented as appropriate)    Problem: Fall Risk (Adult)  Goal: Absence of Falls  Outcome: Ongoing (interventions implemented as appropriate)

## 2017-11-06 NOTE — PROGRESS NOTES
Continues to have acute on chronic pain and is requiring frequent narcotic pain medication. +Flatus and BM. Tolerating clear liquids.    AVSS  Good UO, low drain output  Abd soft, mildly distended.  Inc c/d/i  Ostomy pink, stents intact  Drain serous    Cr 0.77, WBC 7.99, Hb 10.0, K 3.3, CO2 24.1    Plan:  - ambulate  - continue Lovenox  - K repletion  - plan reg diet tomorrow unless setback

## 2017-11-06 NOTE — NURSING NOTE
CWOCN follow up with patient. Instructed patient's wife on appliance change. Patient listened/observed off and on. Wife was very attentive and asked questions. Changed appliance. Will plan to change again Wed or Thurs depending on patient's discharge plans. Discussed pouch change schedule, stents, skin care, ADLs- showering.

## 2017-11-06 NOTE — PROGRESS NOTES
Discharge Planning Assessment  [facility]     Patient Name: Lamberto Tafoya  MRN: [id number]  Today's Date: 11/6/2017    Admit Date: 11/2/2017          Discharge Needs Assessment       11/06/17 1016    Living Environment    Lives With child(marie), adult;other relative(s) (specify);spouse   Grandchildren    Living Arrangements house    Home Accessibility stairs to enter home;bed and bath on same level    Stair Railings at Home outside, present at both sides    Transportation Available car;family or friend will provide    Living Environment    Provides Primary Care For no one    Quality Of Family Relationships supportive    Able to Return to Prior Living Arrangements yes    Living Arrangement Comments Pt lives in a two story house with wife and 22 y/o daughter and 20 y/o & 11 y/o grandchildren    Discharge Needs Assessment    Concerns To Be Addressed denies needs/concerns at this time    Anticipated Changes Related to Illness none    Equipment Currently Used at Home none    Equipment Needed After Discharge none    Discharge Disposition home or self-care            Discharge Plan       11/06/17 1018    Case Management/Social Work Plan    Plan Plan home.  SOPHIA Bernal    Additional Comments FACE SHEET VERIFIED.  Spoke with pt ant pt's wife  (Marlin  [phone]) at bedside.  Pt lives in two story house with wife, 22 y/o daughter and two grandchildren ages 20 y/o & 11 y/o.    Pt is independent with ADL's.   Pt gets prescriptions at [facility] in [place].   Pt is not current with ,  Pt has not been in SNF.  Plan home.  Dolores, RN        Discharge Placement     No information found                Demographic Summary       11/06/17 1015    Primary Care Physician Information    Name Dr. Tam Gonzalez    Phone [phone]      11/06/17 1014    Referral Information    Admission Type inpatient    Arrived From home or self-care    Contact Information    Permission Granted to Share Information With family/designee     Comments Wife  (Marlin Tafoya  [phone])            Functional Status       11/06/17 1016    Functional Status Current    Ambulation 2-->assistive person    Transferring 2-->assistive person    Toileting 2-->assistive person    Bathing 2-->assistive person    Dressing 2-->assistive person    Eating 0-->independent    Communication 0-->understands/communicates without difficulty            Psychosocial     None            Abuse/Neglect     None            Legal     None            Substance Abuse     None            Patient Forms     None          Gabrielle Martinez, RN

## 2017-11-06 NOTE — PLAN OF CARE
Problem: Patient Care Overview (Adult)  Goal: Plan of Care Review  Outcome: Ongoing (interventions implemented as appropriate)    11/06/17 0524   Coping/Psychosocial Response Interventions   Plan Of Care Reviewed With patient   Outcome Evaluation   Outcome Summary/Follow up Plan STAYED AWAKE NIGHT , RECEIVED Q2HRS PAIN MED AND Q6HR PAIN MED. PAIN SCALE 8-9 RANGES. ENCOUARAGE REST AND SLEEP. ABD INCION WITH STAPLES DRY/INTACT, UROSTOMY DRAINING WELL WITH CLEAR,YELLOW URINE. PATENCY MAINTAINED. WIFE AT BSD.        Goal: Adult Individualization and Mutuality  Outcome: Ongoing (interventions implemented as appropriate)  Goal: Discharge Needs Assessment  Outcome: Ongoing (interventions implemented as appropriate)    Problem: Pain, Chronic (Adult)  Goal: Acceptable Pain Control/Comfort Level  Outcome: Ongoing (interventions implemented as appropriate)    Problem: Fall Risk (Adult)  Goal: Absence of Falls  Outcome: Ongoing (interventions implemented as appropriate)

## 2017-11-07 LAB
ANION GAP SERPL CALCULATED.3IONS-SCNC: 14.4 MMOL/L
BUN BLD-MCNC: 5 MG/DL (ref 6–20)
BUN/CREAT SERPL: 7.4 (ref 7–25)
CALCIUM SPEC-SCNC: 8.6 MG/DL (ref 8.6–10.5)
CHLORIDE SERPL-SCNC: 103 MMOL/L (ref 98–107)
CO2 SERPL-SCNC: 24.6 MMOL/L (ref 22–29)
CREAT BLD-MCNC: 0.68 MG/DL (ref 0.76–1.27)
DEPRECATED RDW RBC AUTO: 42.3 FL (ref 37–54)
ERYTHROCYTE [DISTWIDTH] IN BLOOD BY AUTOMATED COUNT: 12.5 % (ref 11.5–14.5)
GFR SERPL CREATININE-BSD FRML MDRD: 124 ML/MIN/1.73
GLUCOSE BLD-MCNC: 129 MG/DL (ref 65–99)
HCT VFR BLD AUTO: 33.6 % (ref 40.4–52.2)
HGB BLD-MCNC: 11.2 G/DL (ref 13.7–17.6)
MCH RBC QN AUTO: 30.7 PG (ref 27–32.7)
MCHC RBC AUTO-ENTMCNC: 33.3 G/DL (ref 32.6–36.4)
MCV RBC AUTO: 92.1 FL (ref 79.8–96.2)
PLATELET # BLD AUTO: 193 10*3/MM3 (ref 140–500)
PMV BLD AUTO: 10.3 FL (ref 6–12)
POTASSIUM BLD-SCNC: 3.7 MMOL/L (ref 3.5–5.2)
RBC # BLD AUTO: 3.65 10*6/MM3 (ref 4.6–6)
SODIUM BLD-SCNC: 142 MMOL/L (ref 136–145)
WBC NRBC COR # BLD: 8.2 10*3/MM3 (ref 4.5–10.7)

## 2017-11-07 PROCEDURE — 25010000002 HYDRALAZINE PER 20 MG: Performed by: UROLOGY

## 2017-11-07 PROCEDURE — 85027 COMPLETE CBC AUTOMATED: CPT | Performed by: UROLOGY

## 2017-11-07 PROCEDURE — 82570 ASSAY OF URINE CREATININE: CPT | Performed by: UROLOGY

## 2017-11-07 PROCEDURE — 25010000002 HYDROMORPHONE PER 4 MG: Performed by: UROLOGY

## 2017-11-07 PROCEDURE — 80048 BASIC METABOLIC PNL TOTAL CA: CPT | Performed by: UROLOGY

## 2017-11-07 PROCEDURE — 25010000002 ENOXAPARIN PER 10 MG: Performed by: UROLOGY

## 2017-11-07 RX ORDER — HYDRALAZINE HYDROCHLORIDE 20 MG/ML
20 INJECTION INTRAMUSCULAR; INTRAVENOUS EVERY 4 HOURS PRN
Status: DISCONTINUED | OUTPATIENT
Start: 2017-11-07 | End: 2017-11-08 | Stop reason: HOSPADM

## 2017-11-07 RX ORDER — BISACODYL 10 MG
10 SUPPOSITORY, RECTAL RECTAL ONCE
Status: COMPLETED | OUTPATIENT
Start: 2017-11-07 | End: 2017-11-07

## 2017-11-07 RX ORDER — HYDROCODONE BITARTRATE AND ACETAMINOPHEN 10; 325 MG/1; MG/1
1 TABLET ORAL EVERY 4 HOURS PRN
Status: DISCONTINUED | OUTPATIENT
Start: 2017-11-07 | End: 2017-11-08 | Stop reason: HOSPADM

## 2017-11-07 RX ORDER — HYDROMORPHONE HYDROCHLORIDE 1 MG/ML
0.5 INJECTION, SOLUTION INTRAMUSCULAR; INTRAVENOUS; SUBCUTANEOUS
Status: DISCONTINUED | OUTPATIENT
Start: 2017-11-07 | End: 2017-11-08 | Stop reason: HOSPADM

## 2017-11-07 RX ORDER — PANTOPRAZOLE SODIUM 40 MG/1
TABLET, DELAYED RELEASE ORAL
Status: DISPENSED
Start: 2017-11-07 | End: 2017-11-07

## 2017-11-07 RX ORDER — AMLODIPINE BESYLATE 5 MG/1
5 TABLET ORAL
Status: DISCONTINUED | OUTPATIENT
Start: 2017-11-07 | End: 2017-11-08 | Stop reason: HOSPADM

## 2017-11-07 RX ORDER — HYDROCODONE BITARTRATE AND ACETAMINOPHEN 10; 325 MG/1; MG/1
TABLET ORAL
Status: DISPENSED
Start: 2017-11-07 | End: 2017-11-07

## 2017-11-07 RX ORDER — HYDROCODONE BITARTRATE AND ACETAMINOPHEN 10; 325 MG/1; MG/1
TABLET ORAL
Status: COMPLETED
Start: 2017-11-07 | End: 2017-11-07

## 2017-11-07 RX ORDER — HYDROCODONE BITARTRATE AND ACETAMINOPHEN 5; 325 MG/1; MG/1
TABLET ORAL
Status: DISCONTINUED
Start: 2017-11-07 | End: 2017-11-07 | Stop reason: WASHOUT

## 2017-11-07 RX ADMIN — ATENOLOL 50 MG: 50 TABLET ORAL at 20:36

## 2017-11-07 RX ADMIN — HYDROCODONE BITARTRATE AND ACETAMINOPHEN 1 TABLET: 10; 325 TABLET ORAL at 05:01

## 2017-11-07 RX ADMIN — CLONIDINE HYDROCHLORIDE 0.1 MG: 0.1 TABLET ORAL at 08:47

## 2017-11-07 RX ADMIN — ENOXAPARIN SODIUM 40 MG: 40 INJECTION SUBCUTANEOUS at 08:47

## 2017-11-07 RX ADMIN — CLONIDINE HYDROCHLORIDE 0.1 MG: 0.1 TABLET ORAL at 16:07

## 2017-11-07 RX ADMIN — HYDROCODONE BITARTRATE AND ACETAMINOPHEN 1 TABLET: 10; 325 TABLET ORAL at 08:52

## 2017-11-07 RX ADMIN — HYDROMORPHONE HYDROCHLORIDE 1 MG: 1 INJECTION, SOLUTION INTRAMUSCULAR; INTRAVENOUS; SUBCUTANEOUS at 02:20

## 2017-11-07 RX ADMIN — PANTOPRAZOLE SODIUM 40 MG: 40 TABLET, DELAYED RELEASE ORAL at 06:44

## 2017-11-07 RX ADMIN — CLONIDINE HYDROCHLORIDE 0.1 MG: 0.1 TABLET ORAL at 20:36

## 2017-11-07 RX ADMIN — BUPROPION HYDROCHLORIDE 75 MG: 75 TABLET, FILM COATED ORAL at 08:47

## 2017-11-07 RX ADMIN — HYDROCODONE BITARTRATE AND ACETAMINOPHEN 1 TABLET: 10; 325 TABLET ORAL at 21:01

## 2017-11-07 RX ADMIN — HYDROMORPHONE HYDROCHLORIDE 1 MG: 1 INJECTION, SOLUTION INTRAMUSCULAR; INTRAVENOUS; SUBCUTANEOUS at 06:15

## 2017-11-07 RX ADMIN — BISACODYL 10 MG: 10 SUPPOSITORY RECTAL at 18:36

## 2017-11-07 RX ADMIN — HYDROCODONE BITARTRATE AND ACETAMINOPHEN 1 TABLET: 10; 325 TABLET ORAL at 17:02

## 2017-11-07 RX ADMIN — HYDRALAZINE HYDROCHLORIDE 20 MG: 20 INJECTION INTRAMUSCULAR; INTRAVENOUS at 07:03

## 2017-11-07 RX ADMIN — AMLODIPINE BESYLATE 5 MG: 5 TABLET ORAL at 11:30

## 2017-11-07 RX ADMIN — ATENOLOL 50 MG: 50 TABLET ORAL at 08:47

## 2017-11-07 RX ADMIN — HYDROMORPHONE HYDROCHLORIDE 1 MG: 1 INJECTION, SOLUTION INTRAMUSCULAR; INTRAVENOUS; SUBCUTANEOUS at 00:15

## 2017-11-07 RX ADMIN — SODIUM CHLORIDE 75 ML/HR: 9 INJECTION, SOLUTION INTRAVENOUS at 00:05

## 2017-11-07 RX ADMIN — HYDROCODONE BITARTRATE AND ACETAMINOPHEN 1 TABLET: 10; 325 TABLET ORAL at 13:04

## 2017-11-07 NOTE — DISCHARGE PLACEMENT REQUEST
"Dominguez Mishra (47 y.o. Male)     Date of Birth Social Security Number Address Home Phone MRN    1970  [address] [place] [address] [phone] [id number]    Catholic Marital Status          None        Admission Date Admission Type Admitting Provider Attending Provider Department, Room/Bed    11/2/17 Elective Vijay Badillo Jr., MD Eifler, John Bernard Jr., MD [address], 651/1    Discharge Date Discharge Disposition Discharge Destination                      Attending Provider: Vijay Badillo Jr., MD     Allergies:  Latex, Demerol [Meperidine]    Isolation:  None   Infection:  None   Code Status:  FULL    Ht:  71\" (180.3 cm)   Wt:  209 lb (94.8 kg)    Admission Cmt:  None   Principal Problem:  None                Active Insurance as of 11/2/2017     Primary Coverage     Payor Plan Insurance Group Employer/Plan Group    [employer] [facility] [id number] [facility] PPO [id number]     Payor Plan Address Payor Plan Phone Number Effective From Effective To    [address] [phone] 5/1/2016     [place] [address]       Subscriber Name Subscriber Birth Date Member ID       DOMINGUEZ MISHRA 1970 [id number]                 Emergency Contacts      (Rel.) Home Phone Work Phone Mobile Phone    Marlin Mishra (Spouse) [phone] -- --        "

## 2017-11-07 NOTE — PLAN OF CARE
Problem: Patient Care Overview (Adult)  Goal: Plan of Care Review  Outcome: Ongoing (interventions implemented as appropriate)    11/07/17 0609   Coping/Psychosocial Response Interventions   Plan Of Care Reviewed With patient   Patient Care Overview   Progress no change   Outcome Evaluation   Outcome Summary/Follow up Plan PT HAD DIFFICULTY SLEEPING DESPITE ROUND THE CLOCK PAIN MEDICATIONS. PAIN SCALE RANGING FROM 8-10, PRN Q2HRS PAIN MED, IV GIVEN AND ALTERNATE WITH PO NORCO. UP TO BR X1 WITH 1 ASSIST. C/O NAUSEA X1 THIS SHIFT. RAY DRAIN WITH SEROSANGINOUS DRAIN, OVER 200CC THIS SHIFT. NO ACUTE DISTRESS. VSS. UROSTOMY PATENT WITH CLEAR YELLOW URINE.        Goal: Adult Individualization and Mutuality  Outcome: Ongoing (interventions implemented as appropriate)  Goal: Discharge Needs Assessment  Outcome: Ongoing (interventions implemented as appropriate)    Problem: Pain, Chronic (Adult)  Goal: Acceptable Pain Control/Comfort Level  Outcome: Ongoing (interventions implemented as appropriate)

## 2017-11-07 NOTE — NURSING NOTE
11/07/17 1502   Urostomy 11/02/17 1840 ileal conduit RLQ   Placement Date/Time: 11/02/17 1840   Urostomy Type: ileal conduit  Location: RLQ   Stoma Appearance red;moist   Appliance 2-piece;intact;no leakage;drainage bag to dependent drainage   Stoma Function urine output   Urine Characteristics yellow   Peristomal Skin unable to assess, covered by appliance   Instructed patient regarding 1 piece and 2 piece / cut to fit and precut ostomy pouches.  Instructed showering, clothes, night time F/C bag, when to empty pouch, frequency pouch changes,  HH agency, where to order supplies. Wife and Patient verbalize understanding. Discussed with CCP regarding HH to follow. Patient lives in [place].

## 2017-11-07 NOTE — DISCHARGE PLACEMENT REQUEST
"Dominguez Mishra (47 y.o. Male)     Date of Birth Social Security Number Address Home Phone MRN    1970  [address] [place] [address] [phone] [id number]    Druze Marital Status          None        Admission Date Admission Type Admitting Provider Attending Provider Department, Room/Bed    11/2/17 Elective Vijay Badillo Jr., MD Eifler, John Bernard Jr., MD [address], 651/1    Discharge Date Discharge Disposition Discharge Destination                      Attending Provider: Vijay Badillo Jr., MD     Allergies:  Latex, Demerol [Meperidine]    Isolation:  None   Infection:  None   Code Status:  FULL    Ht:  71\" (180.3 cm)   Wt:  209 lb (94.8 kg)    Admission Cmt:  None   Principal Problem:  None                Active Insurance as of 11/2/2017     Primary Coverage     Payor Plan Insurance Group Employer/Plan Group    [employer] [facility] [employer] [facility] PPO [id number]     Payor Plan Address Payor Plan Phone Number Effective From Effective To    [address] [phone] 5/1/2016     [place] [address]       Subscriber Name Subscriber Birth Date Member ID       DOMINGUEZ MISHRA 1970 [id number]                 Emergency Contacts      (Rel.) Home Phone Work Phone Mobile Phone    Marlin Mishra (Spouse) [phone] -- --        "

## 2017-11-07 NOTE — PLAN OF CARE
Problem: Patient Care Overview (Adult)  Goal: Plan of Care Review  Outcome: Ongoing (interventions implemented as appropriate)    11/07/17 0516   Coping/Psychosocial Response Interventions   Plan Of Care Reviewed With patient   Patient Care Overview   Progress no change   Outcome Evaluation   Outcome Summary/Follow up Plan pt slept on and off this shift, o2 sats 93-94% on 4l when sleeping but wjen awake drops to 89-90%. periodsof restlesness, sats drops to 80's. Pt needs reassurance to calm.. vss. rermains afib no c/o pain.       Goal: Adult Individualization and Mutuality  Outcome: Ongoing (interventions implemented as appropriate)  Goal: Discharge Needs Assessment  Outcome: Ongoing (interventions implemented as appropriate)    Problem: Pain, Chronic (Adult)  Goal: Acceptable Pain Control/Comfort Level  Outcome: Ongoing (interventions implemented as appropriate)    Problem: Fall Risk (Adult)  Goal: Absence of Falls  Outcome: Ongoing (interventions implemented as appropriate)

## 2017-11-07 NOTE — PLAN OF CARE
Problem: Patient Care Overview (Adult)  Goal: Plan of Care Review  Outcome: Ongoing (interventions implemented as appropriate)    11/07/17 5543   Coping/Psychosocial Response Interventions   Plan Of Care Reviewed With patient;spouse   Patient Care Overview   Progress improving   Outcome Evaluation   Outcome Summary/Follow up Plan Pt weaning self off Dilaudid. Having some residual abd discomfort with Lortab. Tolerating regular diet. Vaibhav drain with large amt of serous drainage. Good urine outputs. Ambs in [facility] regularly. Refuses to wear telemetry.          Problem: Pain, Chronic (Adult)  Goal: Acceptable Pain Control/Comfort Level  Outcome: Ongoing (interventions implemented as appropriate)    Problem: Fall Risk (Adult)  Goal: Absence of Falls  Outcome: Ongoing (interventions implemented as appropriate)

## 2017-11-07 NOTE — PROGRESS NOTES
[facility]     LOS: 4 days     Subjective:      Objective:    Vital Signs:  Temp:  [98.2 °F (36.8 °C)-98.8 °F (37.1 °C)] 98.2 °F (36.8 °C)  Heart Rate:  [71-80] 71  Resp:  [16-18] 18  BP: (161-192)/(71-87) 179/80    General Appearance: no acute distress, appears comfortable  Heart: regular rate and rhythm  Lungs: clear to auscultation bilaterally, respirations unlabored, good air entry  Abdomen: soft, non-tender, no guarding, no rebound, non-distended  Extremities: no edema, no cyanosis  Neurology: speech normal, CN grossly normal  Psychiatric: normal mood and affect    Results Review:    Glucose   Date Value Ref Range Status   11/06/2017 140 (H) 65 - 99 mg/dL Final   11/05/2017 114 (H) 65 - 99 mg/dL Final   11/04/2017 134 (H) 65 - 99 mg/dL Final       Results from last 7 days  Lab Units 11/06/17  0528   WBC 10*3/mm3 7.99   HEMOGLOBIN g/dL 10.0*   HEMATOCRIT % 29.4*   PLATELETS 10*3/mm3 162       Results from last 7 days  Lab Units 11/06/17  0528  11/02/17  2202   SODIUM mmol/L 138  < > 139   POTASSIUM mmol/L 3.3*  < > 4.5   CHLORIDE mmol/L 103  < > 104   CO2 mmol/L 24.1  < > 23.5   BUN mg/dL 6  < > 15   CREATININE mg/dL 0.77  < > 1.08   CALCIUM mg/dL 8.0*  < > 8.2*   BILIRUBIN mg/dL  --   --  0.6   ALK PHOS U/L  --   --  51   ALT (SGPT) U/L  --   --  16   AST (SGOT) U/L  --   --  17   GLUCOSE mg/dL 140*  < > 167*   < > = values in this interval not displayed.                                        I have reviewed daily medications and changes in CPOE    Scheduled meds    atenolol 50 mg Oral Q12H   budesonide-formoterol 2 puff Inhalation BID - RT   buPROPion 75 mg Oral Daily   CloNIDine 0.1 mg Oral TID   enoxaparin 40 mg Subcutaneous Q24H   [START ON 11/7/2017] pantoprazole 40 mg Oral Q AM         Pharmacy to Dose enoxaparin (LOVENOX)     sodium chloride 75 mL/hr Last Rate: 75 mL/hr (11/06/17 1303)     PRN meds  docusate sodium  •  hydrALAZINE  •  HYDROcodone-acetaminophen  •   HYDROmorphone **OR** HYDROmorphone  •  HYDROmorphone  •  nitroglycerin  •  ondansetron **OR** ondansetron ODT **OR** ondansetron  •  Pharmacy to Dose enoxaparin (LOVENOX)  •  potassium chloride      Active Problems:    Bladder cancer    Hypertension    Hypokalemia    Assessment/Plan:  1. Bladder cancer s/p ileal conduit -- management as per urology  2. Pain management -- iv and po meds prn  3. Possible COPD/tobacco abuse  4. HTN- increase the atenolol and on clonidine, probably add norvasc next 1-2 days  5. DVT prophylaxis    >35 minutes spent, > 1/2 time spent counseling and coordination of care     Delfino Ang MD  11/06/17  8:26 PM

## 2017-11-07 NOTE — PROGRESS NOTES
Awake, alert, comfortable. +Flatus and possibly a loose stool.     Afebrile, hypertensive  Good UO, RAY appropriate  Abd soft, mildly distended  Inc - c/d/i  Ostomy pink, stents intact, urine yellow and clear.    Cr 0.68, Hb 11.2, WBC 8.2    Plan:  - RAY for creatinine  - reg diet

## 2017-11-07 NOTE — PROGRESS NOTES
[facility]     LOS: 5 days     Subjective:      Objective:    Vital Signs:  Temp:  [97.5 °F (36.4 °C)-98.6 °F (37 °C)] 97.5 °F (36.4 °C)  Heart Rate:  [62-73] 62  Resp:  [16-18] 18  BP: (141-195)/(69-87) 195/75    General Appearance: no acute distress, appears comfortable  Heart: regular rate and rhythm  Lungs: clear to auscultation bilaterally, respirations unlabored, good air entry  Abdomen: soft, non-tender, no guarding, no rebound, non-distended  Extremities: no edema, no cyanosis  Neurology: speech normal, CN grossly normal  Psychiatric: normal mood and affect    Results Review:    Glucose   Date Value Ref Range Status   11/07/2017 129 (H) 65 - 99 mg/dL Final   11/06/2017 140 (H) 65 - 99 mg/dL Final   11/05/2017 114 (H) 65 - 99 mg/dL Final       Results from last 7 days  Lab Units 11/07/17  0550   WBC 10*3/mm3 8.20   HEMOGLOBIN g/dL 11.2*   HEMATOCRIT % 33.6*   PLATELETS 10*3/mm3 193       Results from last 7 days  Lab Units 11/07/17  0550  11/02/17  2202   SODIUM mmol/L 142  < > 139   POTASSIUM mmol/L 3.7  < > 4.5   CHLORIDE mmol/L 103  < > 104   CO2 mmol/L 24.6  < > 23.5   BUN mg/dL 5*  < > 15   CREATININE mg/dL 0.68*  < > 1.08   CALCIUM mg/dL 8.6  < > 8.2*   BILIRUBIN mg/dL  --   --  0.6   ALK PHOS U/L  --   --  51   ALT (SGPT) U/L  --   --  16   AST (SGOT) U/L  --   --  17   GLUCOSE mg/dL 129*  < > 167*   < > = values in this interval not displayed.                                        I have reviewed daily medications and changes in CPOE    Scheduled meds    amLODIPine 5 mg Oral Q24H   atenolol 50 mg Oral Q12H   budesonide-formoterol 2 puff Inhalation BID - RT   buPROPion 75 mg Oral Daily   CloNIDine 0.1 mg Oral TID   enoxaparin 40 mg Subcutaneous Q24H   pantoprazole 40 mg Oral Q AM         Pharmacy to Dose enoxaparin (LOVENOX)      PRN meds  docusate sodium  •  hydrALAZINE  •  HYDROcodone-acetaminophen  •  HYDROmorphone **OR** HYDROmorphone **OR** HYDROmorphone  •   nitroglycerin  •  ondansetron **OR** ondansetron ODT **OR** ondansetron  •  Pharmacy to Dose enoxaparin (LOVENOX)  •  potassium chloride      Active Problems:    Bladder cancer    Hypertension    Hypokalemia    Assessment/Plan:  1. Bladder cancer s/p ileal conduit -- management as per urology  2. Pain management -- iv and po meds prn  3. Possible COPD/tobacco abuse  4. HTN- increase the atenolol and on clonidine, add norvasc 5  5. DVT prophylaxis        Delfino Ang MD  11/07/17  9:02 AM

## 2017-11-08 VITALS
SYSTOLIC BLOOD PRESSURE: 159 MMHG | OXYGEN SATURATION: 100 % | HEART RATE: 106 BPM | HEIGHT: 71 IN | TEMPERATURE: 98.6 F | WEIGHT: 209 LBS | RESPIRATION RATE: 18 BRPM | BODY MASS INDEX: 29.26 KG/M2 | DIASTOLIC BLOOD PRESSURE: 68 MMHG

## 2017-11-08 LAB
ANION GAP SERPL CALCULATED.3IONS-SCNC: 8 MMOL/L
BUN BLD-MCNC: 6 MG/DL (ref 6–20)
BUN/CREAT SERPL: 7.1 (ref 7–25)
CALCIUM SPEC-SCNC: 8.9 MG/DL (ref 8.6–10.5)
CHLORIDE SERPL-SCNC: 102 MMOL/L (ref 98–107)
CO2 SERPL-SCNC: 31 MMOL/L (ref 22–29)
CREAT BLD-MCNC: 0.85 MG/DL (ref 0.76–1.27)
DEPRECATED RDW RBC AUTO: 42.4 FL (ref 37–54)
ERYTHROCYTE [DISTWIDTH] IN BLOOD BY AUTOMATED COUNT: 12.4 % (ref 11.5–14.5)
GFR SERPL CREATININE-BSD FRML MDRD: 96 ML/MIN/1.73
GLUCOSE BLD-MCNC: 170 MG/DL (ref 65–99)
HCT VFR BLD AUTO: 33.1 % (ref 40.4–52.2)
HGB BLD-MCNC: 10.8 G/DL (ref 13.7–17.6)
MCH RBC QN AUTO: 30.3 PG (ref 27–32.7)
MCHC RBC AUTO-ENTMCNC: 32.6 G/DL (ref 32.6–36.4)
MCV RBC AUTO: 92.7 FL (ref 79.8–96.2)
PLATELET # BLD AUTO: 207 10*3/MM3 (ref 140–500)
PMV BLD AUTO: 10 FL (ref 6–12)
POTASSIUM BLD-SCNC: 3.2 MMOL/L (ref 3.5–5.2)
RBC # BLD AUTO: 3.57 10*6/MM3 (ref 4.6–6)
REF LAB TEST METHOD: 0.81
SODIUM BLD-SCNC: 141 MMOL/L (ref 136–145)
WBC NRBC COR # BLD: 7.03 10*3/MM3 (ref 4.5–10.7)

## 2017-11-08 PROCEDURE — 25010000002 HYDRALAZINE PER 20 MG: Performed by: UROLOGY

## 2017-11-08 PROCEDURE — 93010 ELECTROCARDIOGRAM REPORT: CPT | Performed by: INTERNAL MEDICINE

## 2017-11-08 PROCEDURE — 80048 BASIC METABOLIC PNL TOTAL CA: CPT | Performed by: UROLOGY

## 2017-11-08 PROCEDURE — 93005 ELECTROCARDIOGRAM TRACING: CPT | Performed by: UROLOGY

## 2017-11-08 PROCEDURE — 25010000002 ENOXAPARIN PER 10 MG: Performed by: UROLOGY

## 2017-11-08 PROCEDURE — 85027 COMPLETE CBC AUTOMATED: CPT | Performed by: UROLOGY

## 2017-11-08 RX ORDER — POTASSIUM CHLORIDE 750 MG/1
40 CAPSULE, EXTENDED RELEASE ORAL ONCE
Status: DISCONTINUED | OUTPATIENT
Start: 2017-11-08 | End: 2017-11-08 | Stop reason: HOSPADM

## 2017-11-08 RX ORDER — HYDROCODONE BITARTRATE AND ACETAMINOPHEN 10; 325 MG/1; MG/1
1 TABLET ORAL EVERY 4 HOURS PRN
Qty: 12 TABLET | Refills: 0 | Status: SHIPPED | OUTPATIENT
Start: 2017-11-08 | End: 2017-11-19 | Stop reason: HOSPADM

## 2017-11-08 RX ORDER — ATENOLOL 50 MG/1
50 TABLET ORAL 2 TIMES DAILY
Qty: 60 TABLET | Refills: 0 | Status: SHIPPED | OUTPATIENT
Start: 2017-11-08

## 2017-11-08 RX ORDER — AMLODIPINE BESYLATE 5 MG/1
5 TABLET ORAL
Qty: 30 TABLET | Refills: 0 | Status: SHIPPED | OUTPATIENT
Start: 2017-11-08 | End: 2017-11-19 | Stop reason: HOSPADM

## 2017-11-08 RX ADMIN — PANTOPRAZOLE SODIUM 40 MG: 40 TABLET, DELAYED RELEASE ORAL at 05:37

## 2017-11-08 RX ADMIN — HYDROCODONE BITARTRATE AND ACETAMINOPHEN 1 TABLET: 10; 325 TABLET ORAL at 01:24

## 2017-11-08 RX ADMIN — ENOXAPARIN SODIUM 40 MG: 40 INJECTION SUBCUTANEOUS at 08:05

## 2017-11-08 RX ADMIN — AMLODIPINE BESYLATE 5 MG: 5 TABLET ORAL at 07:47

## 2017-11-08 RX ADMIN — BUPROPION HYDROCHLORIDE 75 MG: 75 TABLET, FILM COATED ORAL at 07:47

## 2017-11-08 RX ADMIN — HYDRALAZINE HYDROCHLORIDE 20 MG: 20 INJECTION INTRAMUSCULAR; INTRAVENOUS at 08:16

## 2017-11-08 RX ADMIN — HYDROCODONE BITARTRATE AND ACETAMINOPHEN 1 TABLET: 10; 325 TABLET ORAL at 05:35

## 2017-11-08 RX ADMIN — ATENOLOL 50 MG: 50 TABLET ORAL at 07:47

## 2017-11-08 RX ADMIN — CLONIDINE HYDROCHLORIDE 0.1 MG: 0.1 TABLET ORAL at 07:47

## 2017-11-08 NOTE — PROGRESS NOTES
Case Management Discharge Note    Final Note: Plan home with Van Bernal RN    Discharge Placement     Facility/Agency Request Status Selected? Address Phone Number Fax Number    [facility] [place] Accepted    Yes [address] [place], [place] [address] [phone]        Other:  (Private Car)    Discharge Codes: 06  Discharged/transferred to home under care of organized home health service organization in anticipation of skilled care

## 2017-11-08 NOTE — PLAN OF CARE
Problem: Patient Care Overview (Adult)  Goal: Plan of Care Review  Outcome: Ongoing (interventions implemented as appropriate)    11/08/17 0431   Coping/Psychosocial Response Interventions   Plan Of Care Reviewed With patient   Patient Care Overview   Progress improving   Outcome Evaluation   Outcome Summary/Follow up Plan Pt still c/o pain with Norco 10mg q4. VSS though still hypertensive. Refuses to wear telemetry. RAY emptying large amounts of serous drainage. Ambulates in halls frequently. No acute changes this shift. Will continue to monitor.       Goal: Adult Individualization and Mutuality  Outcome: Ongoing (interventions implemented as appropriate)  Goal: Discharge Needs Assessment  Outcome: Ongoing (interventions implemented as appropriate)    Problem: Pain, Chronic (Adult)  Goal: Acceptable Pain Control/Comfort Level  Outcome: Ongoing (interventions implemented as appropriate)    Problem: Fall Risk (Adult)  Goal: Absence of Falls  Outcome: Ongoing (interventions implemented as appropriate)

## 2017-11-08 NOTE — PROGRESS NOTES
[facility]     LOS: 6 days     Subjective:  Comfortable this am and then had food, shower and vitals at the same time and bp and hr was high at 140 and sbp was 200/110. Hydralazine was given.  bp is much better and hr is 106.  No cp  No soa  Objective:    Vital Signs:  Temp:  [97.9 °F (36.6 °C)-98.9 °F (37.2 °C)] 98.6 °F (37 °C)  Heart Rate:  [] 106  Resp:  [16-18] 18  BP: (159-213)/() 159/68    General Appearance: no acute distress, appears comfortable  Heart: regular rate and rhythm  Lungs: clear to auscultation bilaterally, respirations unlabored, good air entry  Abdomen: soft, non-tender, no guarding, no rebound, non-distended  Extremities: no edema, no cyanosis  Neurology: speech normal, CN grossly normal  Psychiatric: normal mood and affect    Results Review:    Glucose   Date Value Ref Range Status   11/08/2017 170 (H) 65 - 99 mg/dL Final   11/07/2017 129 (H) 65 - 99 mg/dL Final   11/06/2017 140 (H) 65 - 99 mg/dL Final       Results from last 7 days  Lab Units 11/08/17  0536   WBC 10*3/mm3 7.03   HEMOGLOBIN g/dL 10.8*   HEMATOCRIT % 33.1*   PLATELETS 10*3/mm3 207       Results from last 7 days  Lab Units 11/08/17  0536  11/02/17  2202   SODIUM mmol/L 141  < > 139   POTASSIUM mmol/L 3.2*  < > 4.5   CHLORIDE mmol/L 102  < > 104   CO2 mmol/L 31.0*  < > 23.5   BUN mg/dL 6  < > 15   CREATININE mg/dL 0.85  < > 1.08   CALCIUM mg/dL 8.9  < > 8.2*   BILIRUBIN mg/dL  --   --  0.6   ALK PHOS U/L  --   --  51   ALT (SGPT) U/L  --   --  16   AST (SGOT) U/L  --   --  17   GLUCOSE mg/dL 170*  < > 167*   < > = values in this interval not displayed.                                        I have reviewed daily medications and changes in CPOE    Scheduled meds    amLODIPine 5 mg Oral Q24H   atenolol 50 mg Oral Q12H   budesonide-formoterol 2 puff Inhalation BID - RT   buPROPion 75 mg Oral Daily   CloNIDine 0.1 mg Oral TID   enoxaparin 40 mg Subcutaneous Q24H   pantoprazole 40 mg Oral Q  AM         Pharmacy to Dose enoxaparin (LOVENOX)      PRN meds  docusate sodium  •  hydrALAZINE  •  HYDROcodone-acetaminophen  •  HYDROmorphone **OR** HYDROmorphone **OR** HYDROmorphone  •  nitroglycerin  •  ondansetron **OR** ondansetron ODT **OR** ondansetron  •  Pharmacy to Dose enoxaparin (LOVENOX)  •  potassium chloride      Principal Problem:    Bladder cancer  Active Problems:    Hypertension    Hypokalemia    Assessment/Plan:  1. Bladder cancer s/p ileal conduit -- management as per urology  2. Pain management -- iv and po meds prn  3. Possible COPD/tobacco abuse  4. HTN- increase the atenolol and on clonidine, add norvasc 5  5. DVT prophylaxis    Comfortable this am and then had food, shower and vitals at the same time and bp and hr was high at 140 and sbp was 200/110. Hydralazine was given.  bp is much better and hr is 106.  EKG prior to d/c.  Check BP daily at home  Seen pmd one week  Cbc and bmp one week (note given to the patient)    Delfino Ang MD  11/08/17  9:01 AM

## 2017-11-08 NOTE — PROGRESS NOTES
Continued Stay Note  [facility]     Patient Name: Lamberto Tafoya  MRN: [id number]  Today's Date: 11/8/2017    Admit Date: 11/2/2017          Discharge Plan       11/08/17 0944    Case Management/Social Work Plan    Plan Plan home with [facility].  SOPHIA Bernal    Additional Comments Called and spoke with Mai  ( [phone]) with [facility] to notify [facility] of discharge and to refer pt to [facility] in [place].   Plan home with [facility].  SOPHIA Bernal              Discharge Codes     None        Expected Discharge Date and Time     Expected Discharge Date Expected Discharge Time    Nov 7, 2017             Gabrielle Martinez, RN

## 2017-11-08 NOTE — NURSING NOTE
Pt's BP was elevated and was treated.  HR was 146.  K+ was 3.2.  BP lowered after treatment.  Dr. Ang ordered a one time dose of K+ 40mEq po and lab check afterwards but patient refused the po and lab draw because he was discharged for Urology earlier and was just waiting on his ride home.

## 2017-11-14 ENCOUNTER — HOSPITAL ENCOUNTER (INPATIENT)
Facility: HOSPITAL | Age: 47
LOS: 5 days | Discharge: HOME | End: 2017-11-19
Attending: EMERGENCY MEDICINE | Admitting: INTERNAL MEDICINE

## 2017-11-14 ENCOUNTER — APPOINTMENT (OUTPATIENT)
Dept: GENERAL RADIOLOGY | Facility: HOSPITAL | Age: 47
End: 2017-11-14

## 2017-11-14 ENCOUNTER — APPOINTMENT (OUTPATIENT)
Dept: CT IMAGING | Facility: HOSPITAL | Age: 47
End: 2017-11-14

## 2017-11-14 DIAGNOSIS — N12 PYELONEPHRITIS: Primary | ICD-10-CM

## 2017-11-14 DIAGNOSIS — R00.0 TACHYCARDIA: ICD-10-CM

## 2017-11-14 DIAGNOSIS — N17.9 ACUTE KIDNEY INJURY (HCC): ICD-10-CM

## 2017-11-14 LAB
ALBUMIN SERPL-MCNC: 3.4 G/DL (ref 3.5–5)
ALBUMIN/GLOB SERPL: 1.1 G/DL (ref 1.1–2.5)
ALP SERPL-CCNC: 102 U/L (ref 24–120)
ALT SERPL W P-5'-P-CCNC: 49 U/L (ref 0–54)
ANION GAP SERPL CALCULATED.3IONS-SCNC: 12 MMOL/L (ref 4–13)
AST SERPL-CCNC: 32 U/L (ref 7–45)
BACTERIA UR QL AUTO: ABNORMAL /HPF
BASOPHILS # BLD AUTO: 0.03 10*3/MM3 (ref 0–0.2)
BASOPHILS NFR BLD AUTO: 0.3 % (ref 0–2)
BILIRUB SERPL-MCNC: 0.6 MG/DL (ref 0.1–1)
BILIRUB UR QL STRIP: NEGATIVE
BUN BLD-MCNC: 22 MG/DL (ref 5–21)
BUN/CREAT SERPL: 13.4 (ref 7–25)
CALCIUM SPEC-SCNC: 8.6 MG/DL (ref 8.4–10.4)
CHLORIDE SERPL-SCNC: 101 MMOL/L (ref 98–110)
CLARITY UR: ABNORMAL
CO2 SERPL-SCNC: 23 MMOL/L (ref 24–31)
COLOR UR: YELLOW
CREAT BLD-MCNC: 1.64 MG/DL (ref 0.5–1.4)
D-LACTATE SERPL-SCNC: 1.8 MMOL/L (ref 0.5–2)
DEPRECATED RDW RBC AUTO: 41.7 FL (ref 40–54)
EOSINOPHIL # BLD AUTO: 0.03 10*3/MM3 (ref 0–0.7)
EOSINOPHIL NFR BLD AUTO: 0.3 % (ref 0–4)
ERYTHROCYTE [DISTWIDTH] IN BLOOD BY AUTOMATED COUNT: 12.9 % (ref 12–15)
GFR SERPL CREATININE-BSD FRML MDRD: 45 ML/MIN/1.73
GLOBULIN UR ELPH-MCNC: 3 GM/DL
GLUCOSE BLD-MCNC: 133 MG/DL (ref 70–100)
GLUCOSE UR STRIP-MCNC: NEGATIVE MG/DL
HCT VFR BLD AUTO: 32.4 % (ref 40–52)
HGB BLD-MCNC: 11.1 G/DL (ref 14–18)
HGB UR QL STRIP.AUTO: ABNORMAL
HOLD SPECIMEN: NORMAL
HOLD SPECIMEN: NORMAL
HYALINE CASTS UR QL AUTO: ABNORMAL /LPF
IMM GRANULOCYTES # BLD: 0.07 10*3/MM3 (ref 0–0.03)
IMM GRANULOCYTES NFR BLD: 0.6 % (ref 0–5)
INR PPP: 1.08 (ref 0.91–1.09)
KETONES UR QL STRIP: NEGATIVE
LEUKOCYTE ESTERASE UR QL STRIP.AUTO: ABNORMAL
LYMPHOCYTES # BLD AUTO: 0.83 10*3/MM3 (ref 0.72–4.86)
LYMPHOCYTES NFR BLD AUTO: 7.4 % (ref 15–45)
MCH RBC QN AUTO: 30.4 PG (ref 28–32)
MCHC RBC AUTO-ENTMCNC: 34.3 G/DL (ref 33–36)
MCV RBC AUTO: 88.8 FL (ref 82–95)
MONOCYTES # BLD AUTO: 0.83 10*3/MM3 (ref 0.19–1.3)
MONOCYTES NFR BLD AUTO: 7.4 % (ref 4–12)
NEUTROPHILS # BLD AUTO: 9.37 10*3/MM3 (ref 1.87–8.4)
NEUTROPHILS NFR BLD AUTO: 84 % (ref 39–78)
NITRITE UR QL STRIP: POSITIVE
PH UR STRIP.AUTO: 5.5 [PH] (ref 5–8)
PLATELET # BLD AUTO: 176 10*3/MM3 (ref 130–400)
PMV BLD AUTO: 10.4 FL (ref 6–12)
POTASSIUM BLD-SCNC: 4.1 MMOL/L (ref 3.5–5.3)
PROT SERPL-MCNC: 6.4 G/DL (ref 6.3–8.7)
PROT UR QL STRIP: ABNORMAL
PROTHROMBIN TIME: 14.3 SECONDS (ref 11.9–14.6)
RBC # BLD AUTO: 3.65 10*6/MM3 (ref 4.8–5.9)
RBC # UR: ABNORMAL /HPF
REF LAB TEST METHOD: ABNORMAL
SODIUM BLD-SCNC: 136 MMOL/L (ref 135–145)
SP GR UR STRIP: 1.02 (ref 1–1.03)
SQUAMOUS #/AREA URNS HPF: ABNORMAL /HPF
UROBILINOGEN UR QL STRIP: ABNORMAL
WBC NRBC COR # BLD: 11.16 10*3/MM3 (ref 4.8–10.8)
WBC UR QL AUTO: ABNORMAL /HPF
WHOLE BLOOD HOLD SPECIMEN: NORMAL
WHOLE BLOOD HOLD SPECIMEN: NORMAL
YEAST URNS QL MICRO: ABNORMAL /HPF

## 2017-11-14 PROCEDURE — 85025 COMPLETE CBC W/AUTO DIFF WBC: CPT | Performed by: EMERGENCY MEDICINE

## 2017-11-14 PROCEDURE — 74176 CT ABD & PELVIS W/O CONTRAST: CPT

## 2017-11-14 PROCEDURE — 71010 HC CHEST PA OR AP: CPT

## 2017-11-14 PROCEDURE — 85610 PROTHROMBIN TIME: CPT | Performed by: EMERGENCY MEDICINE

## 2017-11-14 PROCEDURE — 87086 URINE CULTURE/COLONY COUNT: CPT | Performed by: EMERGENCY MEDICINE

## 2017-11-14 PROCEDURE — 83605 ASSAY OF LACTIC ACID: CPT | Performed by: EMERGENCY MEDICINE

## 2017-11-14 PROCEDURE — G0378 HOSPITAL OBSERVATION PER HR: HCPCS

## 2017-11-14 PROCEDURE — 99284 EMERGENCY DEPT VISIT MOD MDM: CPT

## 2017-11-14 PROCEDURE — 80053 COMPREHEN METABOLIC PANEL: CPT | Performed by: EMERGENCY MEDICINE

## 2017-11-14 PROCEDURE — 87186 SC STD MICRODIL/AGAR DIL: CPT | Performed by: EMERGENCY MEDICINE

## 2017-11-14 PROCEDURE — 25010000002 VANCOMYCIN PER 500 MG: Performed by: EMERGENCY MEDICINE

## 2017-11-14 PROCEDURE — 81001 URINALYSIS AUTO W/SCOPE: CPT | Performed by: EMERGENCY MEDICINE

## 2017-11-14 PROCEDURE — 87040 BLOOD CULTURE FOR BACTERIA: CPT | Performed by: EMERGENCY MEDICINE

## 2017-11-14 PROCEDURE — 87150 DNA/RNA AMPLIFIED PROBE: CPT | Performed by: EMERGENCY MEDICINE

## 2017-11-14 PROCEDURE — 25010000002 CEFEPIME: Performed by: EMERGENCY MEDICINE

## 2017-11-14 RX ORDER — SODIUM CHLORIDE 0.9 % (FLUSH) 0.9 %
10 SYRINGE (ML) INJECTION AS NEEDED
Status: DISCONTINUED | OUTPATIENT
Start: 2017-11-14 | End: 2017-11-19 | Stop reason: HOSPADM

## 2017-11-14 RX ADMIN — SODIUM CHLORIDE 2913 ML: 9 INJECTION, SOLUTION INTRAVENOUS at 20:03

## 2017-11-14 RX ADMIN — CEFEPIME 2 G: 2 INJECTION, POWDER, FOR SOLUTION INTRAVENOUS at 20:15

## 2017-11-14 RX ADMIN — VANCOMYCIN HYDROCHLORIDE 2000 MG: 1 INJECTION, POWDER, LYOPHILIZED, FOR SOLUTION INTRAVENOUS at 20:52

## 2017-11-15 ENCOUNTER — APPOINTMENT (OUTPATIENT)
Dept: GENERAL RADIOLOGY | Facility: HOSPITAL | Age: 47
End: 2017-11-15

## 2017-11-15 LAB
ALBUMIN SERPL-MCNC: 2.6 G/DL (ref 3.5–5)
ALBUMIN/GLOB SERPL: 1 G/DL (ref 1.1–2.5)
ALP SERPL-CCNC: 86 U/L (ref 24–120)
ALT SERPL W P-5'-P-CCNC: 41 U/L (ref 0–54)
ANION GAP SERPL CALCULATED.3IONS-SCNC: 8 MMOL/L (ref 4–13)
ARTERIAL PATENCY WRIST A: POSITIVE
AST SERPL-CCNC: 26 U/L (ref 7–45)
ATMOSPHERIC PRESS: 753 MMHG
BASE EXCESS BLDA CALC-SCNC: -2 MMOL/L (ref 0–2)
BASOPHILS # BLD AUTO: 0.01 10*3/MM3 (ref 0–0.2)
BASOPHILS NFR BLD AUTO: 0.1 % (ref 0–2)
BDY SITE: ABNORMAL
BILIRUB SERPL-MCNC: 0.4 MG/DL (ref 0.1–1)
BODY TEMPERATURE: 37 C
BUN BLD-MCNC: 19 MG/DL (ref 5–21)
BUN/CREAT SERPL: 12.6 (ref 7–25)
CALCIUM SPEC-SCNC: 7.6 MG/DL (ref 8.4–10.4)
CHLORIDE SERPL-SCNC: 106 MMOL/L (ref 98–110)
CO2 SERPL-SCNC: 22 MMOL/L (ref 24–31)
CREAT BLD-MCNC: 1.51 MG/DL (ref 0.5–1.4)
D-LACTATE SERPL-SCNC: 0.8 MMOL/L (ref 0.5–2)
DEPRECATED RDW RBC AUTO: 42.3 FL (ref 40–54)
EOSINOPHIL # BLD AUTO: 0.02 10*3/MM3 (ref 0–0.7)
EOSINOPHIL NFR BLD AUTO: 0.3 % (ref 0–4)
ERYTHROCYTE [DISTWIDTH] IN BLOOD BY AUTOMATED COUNT: 13 % (ref 12–15)
GAS FLOW AIRWAY: 2 LPM
GFR SERPL CREATININE-BSD FRML MDRD: 49 ML/MIN/1.73
GLOBULIN UR ELPH-MCNC: 2.6 GM/DL
GLUCOSE BLD-MCNC: 209 MG/DL (ref 70–100)
HBA1C MFR BLD: 6.5 %
HCO3 BLDA-SCNC: 21.5 MMOL/L (ref 20–26)
HCT VFR BLD AUTO: 27 % (ref 40–52)
HGB BLD-MCNC: 9 G/DL (ref 14–18)
IMM GRANULOCYTES # BLD: 0.04 10*3/MM3 (ref 0–0.03)
IMM GRANULOCYTES NFR BLD: 0.5 % (ref 0–5)
LYMPHOCYTES # BLD AUTO: 0.49 10*3/MM3 (ref 0.72–4.86)
LYMPHOCYTES NFR BLD AUTO: 6.7 % (ref 15–45)
Lab: ABNORMAL
MAGNESIUM SERPL-MCNC: 1.7 MG/DL (ref 1.4–2.2)
MCH RBC QN AUTO: 29.9 PG (ref 28–32)
MCHC RBC AUTO-ENTMCNC: 33.3 G/DL (ref 33–36)
MCV RBC AUTO: 89.7 FL (ref 82–95)
MODALITY: ABNORMAL
MONOCYTES # BLD AUTO: 0.82 10*3/MM3 (ref 0.19–1.3)
MONOCYTES NFR BLD AUTO: 11.2 % (ref 4–12)
NEUTROPHILS # BLD AUTO: 5.91 10*3/MM3 (ref 1.87–8.4)
NEUTROPHILS NFR BLD AUTO: 81.2 % (ref 39–78)
PCO2 BLDA: 31.6 MM HG (ref 35–45)
PH BLDA: 7.44 PH UNITS (ref 7.35–7.45)
PHOSPHATE SERPL-MCNC: 2 MG/DL (ref 2.5–4.5)
PLATELET # BLD AUTO: 144 10*3/MM3 (ref 130–400)
PMV BLD AUTO: 10.7 FL (ref 6–12)
PO2 BLDA: 69.1 MM HG (ref 83–108)
POTASSIUM BLD-SCNC: 4 MMOL/L (ref 3.5–5.3)
PROT SERPL-MCNC: 5.2 G/DL (ref 6.3–8.7)
RBC # BLD AUTO: 3.01 10*6/MM3 (ref 4.8–5.9)
SAO2 % BLDCOA: 95.2 % (ref 94–99)
SODIUM BLD-SCNC: 136 MMOL/L (ref 135–145)
VENTILATOR MODE: ABNORMAL
WBC NRBC COR # BLD: 7.29 10*3/MM3 (ref 4.8–10.8)

## 2017-11-15 PROCEDURE — 25010000002 PIPERACILLIN SOD-TAZOBACTAM PER 1 G: Performed by: INTERNAL MEDICINE

## 2017-11-15 PROCEDURE — 25010000002 VANCOMYCIN 10 G RECONSTITUTED SOLUTION: Performed by: INTERNAL MEDICINE

## 2017-11-15 PROCEDURE — 25010000002 HYDROMORPHONE PER 4 MG

## 2017-11-15 PROCEDURE — 36600 WITHDRAWAL OF ARTERIAL BLOOD: CPT

## 2017-11-15 PROCEDURE — 83036 HEMOGLOBIN GLYCOSYLATED A1C: CPT | Performed by: NURSE PRACTITIONER

## 2017-11-15 PROCEDURE — 83735 ASSAY OF MAGNESIUM: CPT | Performed by: INTERNAL MEDICINE

## 2017-11-15 PROCEDURE — G0378 HOSPITAL OBSERVATION PER HR: HCPCS

## 2017-11-15 PROCEDURE — 82803 BLOOD GASES ANY COMBINATION: CPT

## 2017-11-15 PROCEDURE — 84100 ASSAY OF PHOSPHORUS: CPT | Performed by: INTERNAL MEDICINE

## 2017-11-15 PROCEDURE — 83605 ASSAY OF LACTIC ACID: CPT | Performed by: INTERNAL MEDICINE

## 2017-11-15 PROCEDURE — 71010 HC CHEST PA OR AP: CPT

## 2017-11-15 PROCEDURE — 94640 AIRWAY INHALATION TREATMENT: CPT

## 2017-11-15 PROCEDURE — 80053 COMPREHEN METABOLIC PANEL: CPT | Performed by: INTERNAL MEDICINE

## 2017-11-15 PROCEDURE — 25010000002 HYDROMORPHONE PER 4 MG: Performed by: INTERNAL MEDICINE

## 2017-11-15 PROCEDURE — 25010000002 ENOXAPARIN PER 10 MG

## 2017-11-15 PROCEDURE — 85025 COMPLETE CBC W/AUTO DIFF WBC: CPT | Performed by: INTERNAL MEDICINE

## 2017-11-15 RX ORDER — HYDROCODONE BITARTRATE AND ACETAMINOPHEN 10; 325 MG/1; MG/1
TABLET ORAL
Status: DISPENSED
Start: 2017-11-15 | End: 2017-11-15

## 2017-11-15 RX ORDER — CLONIDINE HYDROCHLORIDE 0.1 MG/1
0.1 TABLET ORAL 3 TIMES DAILY
Status: DISCONTINUED | OUTPATIENT
Start: 2017-11-15 | End: 2017-11-19 | Stop reason: HOSPADM

## 2017-11-15 RX ORDER — SODIUM CHLORIDE 9 MG/ML
150 INJECTION, SOLUTION INTRAVENOUS CONTINUOUS
Status: DISCONTINUED | OUTPATIENT
Start: 2017-11-15 | End: 2017-11-15

## 2017-11-15 RX ORDER — CLONIDINE HYDROCHLORIDE 0.1 MG/1
TABLET ORAL
Status: COMPLETED
Start: 2017-11-15 | End: 2017-11-15

## 2017-11-15 RX ORDER — AMLODIPINE BESYLATE 5 MG/1
5 TABLET ORAL
Status: DISCONTINUED | OUTPATIENT
Start: 2017-11-15 | End: 2017-11-16

## 2017-11-15 RX ORDER — SODIUM CHLORIDE 9 MG/ML
50 INJECTION, SOLUTION INTRAVENOUS CONTINUOUS
Status: DISCONTINUED | OUTPATIENT
Start: 2017-11-15 | End: 2017-11-18

## 2017-11-15 RX ORDER — ACETAMINOPHEN 500 MG
1000 TABLET ORAL EVERY 6 HOURS PRN
Status: DISCONTINUED | OUTPATIENT
Start: 2017-11-15 | End: 2017-11-15

## 2017-11-15 RX ORDER — VANCOMYCIN HYDROCHLORIDE 1 G/200ML
1 INJECTION, SOLUTION INTRAVENOUS ONCE
Status: DISCONTINUED | OUTPATIENT
Start: 2017-11-15 | End: 2017-11-15 | Stop reason: SDUPTHER

## 2017-11-15 RX ORDER — BUPROPION HYDROCHLORIDE 75 MG/1
75 TABLET ORAL DAILY
Status: DISCONTINUED | OUTPATIENT
Start: 2017-11-15 | End: 2017-11-19 | Stop reason: HOSPADM

## 2017-11-15 RX ORDER — LORAZEPAM 2 MG/ML
0.5 INJECTION INTRAMUSCULAR EVERY 6 HOURS PRN
Status: DISCONTINUED | OUTPATIENT
Start: 2017-11-15 | End: 2017-11-19 | Stop reason: HOSPADM

## 2017-11-15 RX ORDER — HYDROCODONE BITARTRATE AND ACETAMINOPHEN 10; 325 MG/1; MG/1
1 TABLET ORAL EVERY 4 HOURS PRN
Status: DISCONTINUED | OUTPATIENT
Start: 2017-11-15 | End: 2017-11-15

## 2017-11-15 RX ORDER — OXYCODONE AND ACETAMINOPHEN 7.5; 325 MG/1; MG/1
1 TABLET ORAL EVERY 4 HOURS PRN
Status: DISCONTINUED | OUTPATIENT
Start: 2017-11-15 | End: 2017-11-19 | Stop reason: HOSPADM

## 2017-11-15 RX ORDER — ACETAMINOPHEN 500 MG
1000 TABLET ORAL EVERY 6 HOURS PRN
Status: DISCONTINUED | OUTPATIENT
Start: 2017-11-15 | End: 2017-11-19 | Stop reason: HOSPADM

## 2017-11-15 RX ORDER — FAMOTIDINE 20 MG/1
40 TABLET, FILM COATED ORAL DAILY
Status: DISCONTINUED | OUTPATIENT
Start: 2017-11-15 | End: 2017-11-19 | Stop reason: HOSPADM

## 2017-11-15 RX ORDER — ONDANSETRON 2 MG/ML
4 INJECTION INTRAMUSCULAR; INTRAVENOUS EVERY 6 HOURS PRN
Status: DISCONTINUED | OUTPATIENT
Start: 2017-11-15 | End: 2017-11-19 | Stop reason: HOSPADM

## 2017-11-15 RX ORDER — ATENOLOL 50 MG/1
50 TABLET ORAL 2 TIMES DAILY
Status: DISCONTINUED | OUTPATIENT
Start: 2017-11-15 | End: 2017-11-19 | Stop reason: HOSPADM

## 2017-11-15 RX ORDER — NALOXONE HCL 0.4 MG/ML
0.4 VIAL (ML) INJECTION
Status: DISCONTINUED | OUTPATIENT
Start: 2017-11-15 | End: 2017-11-19 | Stop reason: HOSPADM

## 2017-11-15 RX ORDER — SODIUM CHLORIDE 0.9 % (FLUSH) 0.9 %
1-10 SYRINGE (ML) INJECTION AS NEEDED
Status: DISCONTINUED | OUTPATIENT
Start: 2017-11-15 | End: 2017-11-19 | Stop reason: HOSPADM

## 2017-11-15 RX ORDER — ACETAMINOPHEN 325 MG/1
650 TABLET ORAL EVERY 4 HOURS PRN
Status: DISCONTINUED | OUTPATIENT
Start: 2017-11-15 | End: 2017-11-15

## 2017-11-15 RX ORDER — IPRATROPIUM BROMIDE AND ALBUTEROL SULFATE 2.5; .5 MG/3ML; MG/3ML
3 SOLUTION RESPIRATORY (INHALATION) ONCE
Status: COMPLETED | OUTPATIENT
Start: 2017-11-15 | End: 2017-11-15

## 2017-11-15 RX ORDER — HYDROCODONE BITARTRATE AND ACETAMINOPHEN 10; 325 MG/1; MG/1
1 TABLET ORAL EVERY 6 HOURS PRN
Status: DISCONTINUED | OUTPATIENT
Start: 2017-11-15 | End: 2017-11-19 | Stop reason: HOSPADM

## 2017-11-15 RX ADMIN — ENOXAPARIN SODIUM 40 MG: 40 INJECTION SUBCUTANEOUS at 06:31

## 2017-11-15 RX ADMIN — ACETAMINOPHEN 1000 MG: 500 TABLET, FILM COATED ORAL at 12:02

## 2017-11-15 RX ADMIN — HYDROMORPHONE HYDROCHLORIDE 0.5 MG: 1 INJECTION, SOLUTION INTRAMUSCULAR; INTRAVENOUS; SUBCUTANEOUS at 14:49

## 2017-11-15 RX ADMIN — ATENOLOL 50 MG: 50 TABLET ORAL at 08:41

## 2017-11-15 RX ADMIN — CLONIDINE HYDROCHLORIDE 0.1 MG: 0.1 TABLET ORAL at 04:42

## 2017-11-15 RX ADMIN — ACETAMINOPHEN 1000 MG: 500 TABLET, FILM COATED ORAL at 19:18

## 2017-11-15 RX ADMIN — HYDROMORPHONE HYDROCHLORIDE 0.5 MG: 1 INJECTION, SOLUTION INTRAMUSCULAR; INTRAVENOUS; SUBCUTANEOUS at 18:45

## 2017-11-15 RX ADMIN — IPRATROPIUM BROMIDE AND ALBUTEROL SULFATE 3 ML: 2.5; .5 SOLUTION RESPIRATORY (INHALATION) at 19:25

## 2017-11-15 RX ADMIN — BUPROPION HYDROCHLORIDE 75 MG: 75 TABLET, FILM COATED ORAL at 08:41

## 2017-11-15 RX ADMIN — TAZOBACTAM SODIUM AND PIPERACILLIN SODIUM 3.38 G: 375; 3 INJECTION, SOLUTION INTRAVENOUS at 15:30

## 2017-11-15 RX ADMIN — SODIUM CHLORIDE 125 ML/HR: 9 INJECTION, SOLUTION INTRAVENOUS at 05:32

## 2017-11-15 RX ADMIN — TAZOBACTAM SODIUM AND PIPERACILLIN SODIUM 3.38 G: 375; 3 INJECTION, SOLUTION INTRAVENOUS at 22:20

## 2017-11-15 RX ADMIN — CLONIDINE HYDROCHLORIDE 0.1 MG: 0.1 TABLET ORAL at 20:17

## 2017-11-15 RX ADMIN — HYDROMORPHONE HYDROCHLORIDE 0.5 MG: 1 INJECTION, SOLUTION INTRAMUSCULAR; INTRAVENOUS; SUBCUTANEOUS at 06:41

## 2017-11-15 RX ADMIN — HYDROMORPHONE HYDROCHLORIDE 0.5 MG: 1 INJECTION, SOLUTION INTRAMUSCULAR; INTRAVENOUS; SUBCUTANEOUS at 21:18

## 2017-11-15 RX ADMIN — VANCOMYCIN HYDROCHLORIDE 750 MG: 100 INJECTION, POWDER, LYOPHILIZED, FOR SOLUTION INTRAVENOUS at 09:50

## 2017-11-15 RX ADMIN — HYDROMORPHONE HYDROCHLORIDE 0.5 MG: 1 INJECTION, SOLUTION INTRAMUSCULAR; INTRAVENOUS; SUBCUTANEOUS at 09:00

## 2017-11-15 RX ADMIN — HYDROCODONE BITARTRATE AND ACETAMINOPHEN 1 TABLET: 10; 325 TABLET ORAL at 04:42

## 2017-11-15 RX ADMIN — CLONIDINE HYDROCHLORIDE 0.1 MG: 0.1 TABLET ORAL at 17:24

## 2017-11-15 RX ADMIN — ATENOLOL 50 MG: 50 TABLET ORAL at 17:24

## 2017-11-15 RX ADMIN — SODIUM CHLORIDE 500 ML: 9 INJECTION, SOLUTION INTRAVENOUS at 04:45

## 2017-11-15 RX ADMIN — AMLODIPINE BESYLATE 5 MG: 5 TABLET ORAL at 08:41

## 2017-11-15 RX ADMIN — VANCOMYCIN HYDROCHLORIDE 750 MG: 100 INJECTION, POWDER, LYOPHILIZED, FOR SOLUTION INTRAVENOUS at 20:13

## 2017-11-15 RX ADMIN — SODIUM CHLORIDE 150 ML/HR: 9 INJECTION, SOLUTION INTRAVENOUS at 01:00

## 2017-11-15 RX ADMIN — HYDROMORPHONE HYDROCHLORIDE 1 MG: 1 INJECTION, SOLUTION INTRAMUSCULAR; INTRAVENOUS; SUBCUTANEOUS at 02:10

## 2017-11-15 RX ADMIN — FAMOTIDINE 40 MG: 20 TABLET, FILM COATED ORAL at 08:41

## 2017-11-15 RX ADMIN — HYDROMORPHONE HYDROCHLORIDE 0.5 MG: 1 INJECTION, SOLUTION INTRAMUSCULAR; INTRAVENOUS; SUBCUTANEOUS at 12:16

## 2017-11-15 RX ADMIN — TAZOBACTAM SODIUM AND PIPERACILLIN SODIUM 3.38 G: 375; 3 INJECTION, SOLUTION INTRAVENOUS at 08:40

## 2017-11-16 LAB
ANION GAP SERPL CALCULATED.3IONS-SCNC: 11 MMOL/L (ref 4–13)
ANION GAP SERPL CALCULATED.3IONS-SCNC: 15 MMOL/L (ref 4–13)
ARTERIAL PATENCY WRIST A: POSITIVE
ATMOSPHERIC PRESS: 755 MMHG
BACTERIA BLD CULT: ABNORMAL
BACTERIA SPEC AEROBE CULT: ABNORMAL
BASE EXCESS BLDA CALC-SCNC: -2.2 MMOL/L (ref 0–2)
BDY SITE: ABNORMAL
BODY TEMPERATURE: 37 C
BUN BLD-MCNC: 20 MG/DL (ref 5–21)
BUN BLD-MCNC: 25 MG/DL (ref 5–21)
BUN/CREAT SERPL: 12.6 (ref 7–25)
BUN/CREAT SERPL: 19.5 (ref 7–25)
CALCIUM SPEC-SCNC: 8.4 MG/DL (ref 8.4–10.4)
CALCIUM SPEC-SCNC: 8.6 MG/DL (ref 8.4–10.4)
CHLORIDE SERPL-SCNC: 101 MMOL/L (ref 98–110)
CHLORIDE SERPL-SCNC: 102 MMOL/L (ref 98–110)
CO2 SERPL-SCNC: 21 MMOL/L (ref 24–31)
CO2 SERPL-SCNC: 24 MMOL/L (ref 24–31)
CREAT BLD-MCNC: 1.28 MG/DL (ref 0.5–1.4)
CREAT BLD-MCNC: 1.59 MG/DL (ref 0.5–1.4)
DEPRECATED RDW RBC AUTO: 42.2 FL (ref 40–54)
ERYTHROCYTE [DISTWIDTH] IN BLOOD BY AUTOMATED COUNT: 13 % (ref 12–15)
GAS FLOW AIRWAY: 4 LPM
GFR SERPL CREATININE-BSD FRML MDRD: 47 ML/MIN/1.73
GFR SERPL CREATININE-BSD FRML MDRD: 60 ML/MIN/1.73
GLUCOSE BLD-MCNC: 229 MG/DL (ref 70–100)
GLUCOSE BLD-MCNC: 424 MG/DL (ref 70–100)
GLUCOSE BLDC GLUCOMTR-MCNC: 345 MG/DL (ref 70–130)
GLUCOSE BLDC GLUCOMTR-MCNC: 407 MG/DL (ref 70–130)
HCO3 BLDA-SCNC: 21.1 MMOL/L (ref 20–26)
HCT VFR BLD AUTO: 27.6 % (ref 40–52)
HGB BLD-MCNC: 9.2 G/DL (ref 14–18)
Lab: ABNORMAL
MCH RBC QN AUTO: 29.6 PG (ref 28–32)
MCHC RBC AUTO-ENTMCNC: 33.3 G/DL (ref 33–36)
MCV RBC AUTO: 88.7 FL (ref 82–95)
MODALITY: ABNORMAL
PCO2 BLDA: 29.7 MM HG (ref 35–45)
PH BLDA: 7.46 PH UNITS (ref 7.35–7.45)
PLATELET # BLD AUTO: 165 10*3/MM3 (ref 130–400)
PMV BLD AUTO: 10.9 FL (ref 6–12)
PO2 BLDA: 86.7 MM HG (ref 83–108)
POTASSIUM BLD-SCNC: 3.6 MMOL/L (ref 3.5–5.3)
POTASSIUM BLD-SCNC: 4.2 MMOL/L (ref 3.5–5.3)
RBC # BLD AUTO: 3.11 10*6/MM3 (ref 4.8–5.9)
SAO2 % BLDCOA: 97.7 % (ref 94–99)
SODIUM BLD-SCNC: 136 MMOL/L (ref 135–145)
SODIUM BLD-SCNC: 138 MMOL/L (ref 135–145)
VENTILATOR MODE: ABNORMAL
WBC NRBC COR # BLD: 7.5 10*3/MM3 (ref 4.8–10.8)

## 2017-11-16 PROCEDURE — 82803 BLOOD GASES ANY COMBINATION: CPT

## 2017-11-16 PROCEDURE — 80048 BASIC METABOLIC PNL TOTAL CA: CPT | Performed by: INTERNAL MEDICINE

## 2017-11-16 PROCEDURE — 25010000002 FUROSEMIDE PER 20 MG: Performed by: INTERNAL MEDICINE

## 2017-11-16 PROCEDURE — 87040 BLOOD CULTURE FOR BACTERIA: CPT | Performed by: NURSE PRACTITIONER

## 2017-11-16 PROCEDURE — 82962 GLUCOSE BLOOD TEST: CPT

## 2017-11-16 PROCEDURE — 85027 COMPLETE CBC AUTOMATED: CPT | Performed by: NURSE PRACTITIONER

## 2017-11-16 PROCEDURE — 25010000002 METHYLPREDNISOLONE PER 125 MG

## 2017-11-16 PROCEDURE — 94799 UNLISTED PULMONARY SVC/PX: CPT

## 2017-11-16 PROCEDURE — 25010000002 HYDROMORPHONE PER 4 MG: Performed by: INTERNAL MEDICINE

## 2017-11-16 PROCEDURE — 25010000002 PIPERACILLIN SOD-TAZOBACTAM PER 1 G: Performed by: INTERNAL MEDICINE

## 2017-11-16 PROCEDURE — G0378 HOSPITAL OBSERVATION PER HR: HCPCS

## 2017-11-16 PROCEDURE — 25010000002 ENOXAPARIN PER 10 MG: Performed by: INTERNAL MEDICINE

## 2017-11-16 PROCEDURE — 25010000002 HYDROMORPHONE PER 4 MG

## 2017-11-16 PROCEDURE — 94760 N-INVAS EAR/PLS OXIMETRY 1: CPT

## 2017-11-16 PROCEDURE — 80048 BASIC METABOLIC PNL TOTAL CA: CPT | Performed by: NURSE PRACTITIONER

## 2017-11-16 PROCEDURE — 25010000002 VANCOMYCIN 10 G RECONSTITUTED SOLUTION: Performed by: INTERNAL MEDICINE

## 2017-11-16 PROCEDURE — 25010000002 LORAZEPAM PER 2 MG: Performed by: INTERNAL MEDICINE

## 2017-11-16 PROCEDURE — 36600 WITHDRAWAL OF ARTERIAL BLOOD: CPT

## 2017-11-16 PROCEDURE — 63710000001 INSULIN LISPRO (HUMAN) PER 5 UNITS: Performed by: INTERNAL MEDICINE

## 2017-11-16 RX ORDER — DOCUSATE SODIUM 100 MG/1
100 CAPSULE, LIQUID FILLED ORAL 2 TIMES DAILY
Status: DISCONTINUED | OUTPATIENT
Start: 2017-11-16 | End: 2017-11-19 | Stop reason: HOSPADM

## 2017-11-16 RX ORDER — NIFEDIPINE 30 MG/1
30 TABLET, EXTENDED RELEASE ORAL
Status: DISCONTINUED | OUTPATIENT
Start: 2017-11-16 | End: 2017-11-18

## 2017-11-16 RX ORDER — POLYETHYLENE GLYCOL 3350 17 G/17G
17 POWDER, FOR SOLUTION ORAL DAILY
Status: DISCONTINUED | OUTPATIENT
Start: 2017-11-16 | End: 2017-11-19 | Stop reason: HOSPADM

## 2017-11-16 RX ORDER — CLONIDINE HYDROCHLORIDE 0.1 MG/1
0.1 TABLET ORAL EVERY 6 HOURS PRN
Status: DISCONTINUED | OUTPATIENT
Start: 2017-11-16 | End: 2017-11-18

## 2017-11-16 RX ORDER — IPRATROPIUM BROMIDE AND ALBUTEROL SULFATE 2.5; .5 MG/3ML; MG/3ML
3 SOLUTION RESPIRATORY (INHALATION) EVERY 4 HOURS
Status: DISCONTINUED | OUTPATIENT
Start: 2017-11-16 | End: 2017-11-16

## 2017-11-16 RX ORDER — IPRATROPIUM BROMIDE AND ALBUTEROL SULFATE 2.5; .5 MG/3ML; MG/3ML
3 SOLUTION RESPIRATORY (INHALATION) ONCE
Status: COMPLETED | OUTPATIENT
Start: 2017-11-16 | End: 2017-11-16

## 2017-11-16 RX ORDER — FUROSEMIDE 10 MG/ML
40 INJECTION INTRAMUSCULAR; INTRAVENOUS ONCE
Status: COMPLETED | OUTPATIENT
Start: 2017-11-16 | End: 2017-11-16

## 2017-11-16 RX ORDER — IPRATROPIUM BROMIDE AND ALBUTEROL SULFATE 2.5; .5 MG/3ML; MG/3ML
3 SOLUTION RESPIRATORY (INHALATION)
Status: DISCONTINUED | OUTPATIENT
Start: 2017-11-16 | End: 2017-11-17

## 2017-11-16 RX ORDER — METHYLPREDNISOLONE SODIUM SUCCINATE 125 MG/2ML
80 INJECTION, POWDER, LYOPHILIZED, FOR SOLUTION INTRAMUSCULAR; INTRAVENOUS ONCE
Status: COMPLETED | OUTPATIENT
Start: 2017-11-16 | End: 2017-11-16

## 2017-11-16 RX ORDER — METHYLPREDNISOLONE SODIUM SUCCINATE 125 MG/2ML
INJECTION, POWDER, LYOPHILIZED, FOR SOLUTION INTRAMUSCULAR; INTRAVENOUS
Status: COMPLETED
Start: 2017-11-16 | End: 2017-11-16

## 2017-11-16 RX ADMIN — INSULIN LISPRO 7 UNITS: 100 INJECTION, SOLUTION INTRAVENOUS; SUBCUTANEOUS at 18:16

## 2017-11-16 RX ADMIN — DOCUSATE SODIUM 100 MG: 100 CAPSULE ORAL at 10:34

## 2017-11-16 RX ADMIN — ENOXAPARIN SODIUM 40 MG: 40 INJECTION SUBCUTANEOUS at 05:47

## 2017-11-16 RX ADMIN — FAMOTIDINE 40 MG: 20 TABLET, FILM COATED ORAL at 08:21

## 2017-11-16 RX ADMIN — METHYLPREDNISOLONE SODIUM SUCCINATE 80 MG: 125 INJECTION, POWDER, LYOPHILIZED, FOR SOLUTION INTRAMUSCULAR; INTRAVENOUS at 02:35

## 2017-11-16 RX ADMIN — FUROSEMIDE 40 MG: 10 INJECTION, SOLUTION INTRAMUSCULAR; INTRAVENOUS at 02:09

## 2017-11-16 RX ADMIN — IPRATROPIUM BROMIDE AND ALBUTEROL SULFATE 3 ML: 2.5; .5 SOLUTION RESPIRATORY (INHALATION) at 02:15

## 2017-11-16 RX ADMIN — OXYCODONE HYDROCHLORIDE AND ACETAMINOPHEN 1 TABLET: 7.5; 325 TABLET ORAL at 15:51

## 2017-11-16 RX ADMIN — DOCUSATE SODIUM 100 MG: 100 CAPSULE ORAL at 18:01

## 2017-11-16 RX ADMIN — INSULIN LISPRO 7 UNITS: 100 INJECTION, SOLUTION INTRAVENOUS; SUBCUTANEOUS at 18:01

## 2017-11-16 RX ADMIN — AMLODIPINE BESYLATE 5 MG: 5 TABLET ORAL at 08:21

## 2017-11-16 RX ADMIN — VANCOMYCIN HYDROCHLORIDE 750 MG: 100 INJECTION, POWDER, LYOPHILIZED, FOR SOLUTION INTRAVENOUS at 20:50

## 2017-11-16 RX ADMIN — TAZOBACTAM SODIUM AND PIPERACILLIN SODIUM 3.38 G: 375; 3 INJECTION, SOLUTION INTRAVENOUS at 16:49

## 2017-11-16 RX ADMIN — CLONIDINE HYDROCHLORIDE 0.1 MG: 0.1 TABLET ORAL at 20:39

## 2017-11-16 RX ADMIN — HYDROMORPHONE HYDROCHLORIDE 0.5 MG: 1 INJECTION, SOLUTION INTRAMUSCULAR; INTRAVENOUS; SUBCUTANEOUS at 22:53

## 2017-11-16 RX ADMIN — CLONIDINE HYDROCHLORIDE 0.1 MG: 0.1 TABLET ORAL at 22:53

## 2017-11-16 RX ADMIN — ATENOLOL 50 MG: 50 TABLET ORAL at 18:01

## 2017-11-16 RX ADMIN — CLONIDINE HYDROCHLORIDE 0.1 MG: 0.1 TABLET ORAL at 15:50

## 2017-11-16 RX ADMIN — IPRATROPIUM BROMIDE AND ALBUTEROL SULFATE 3 ML: 2.5; .5 SOLUTION RESPIRATORY (INHALATION) at 15:53

## 2017-11-16 RX ADMIN — CLONIDINE HYDROCHLORIDE 0.1 MG: 0.1 TABLET ORAL at 08:21

## 2017-11-16 RX ADMIN — HYDROMORPHONE HYDROCHLORIDE 0.5 MG: 1 INJECTION, SOLUTION INTRAMUSCULAR; INTRAVENOUS; SUBCUTANEOUS at 12:04

## 2017-11-16 RX ADMIN — LORAZEPAM 0.5 MG: 2 INJECTION INTRAMUSCULAR; INTRAVENOUS at 01:50

## 2017-11-16 RX ADMIN — HYDROMORPHONE HYDROCHLORIDE 0.5 MG: 1 INJECTION, SOLUTION INTRAMUSCULAR; INTRAVENOUS; SUBCUTANEOUS at 14:15

## 2017-11-16 RX ADMIN — IPRATROPIUM BROMIDE AND ALBUTEROL SULFATE 3 ML: 2.5; .5 SOLUTION RESPIRATORY (INHALATION) at 11:42

## 2017-11-16 RX ADMIN — SODIUM CHLORIDE 50 ML/HR: 9 INJECTION, SOLUTION INTRAVENOUS at 01:20

## 2017-11-16 RX ADMIN — TAZOBACTAM SODIUM AND PIPERACILLIN SODIUM 3.38 G: 375; 3 INJECTION, SOLUTION INTRAVENOUS at 08:22

## 2017-11-16 RX ADMIN — OXYCODONE HYDROCHLORIDE AND ACETAMINOPHEN 1 TABLET: 7.5; 325 TABLET ORAL at 10:34

## 2017-11-16 RX ADMIN — BUPROPION HYDROCHLORIDE 75 MG: 75 TABLET, FILM COATED ORAL at 08:22

## 2017-11-16 RX ADMIN — HYDROMORPHONE HYDROCHLORIDE 0.5 MG: 1 INJECTION, SOLUTION INTRAMUSCULAR; INTRAVENOUS; SUBCUTANEOUS at 20:37

## 2017-11-16 RX ADMIN — ATENOLOL 50 MG: 50 TABLET ORAL at 08:21

## 2017-11-16 RX ADMIN — METHYLPREDNISOLONE SODIUM SUCCINATE 80 MG: 125 INJECTION, POWDER, FOR SOLUTION INTRAMUSCULAR; INTRAVENOUS at 02:35

## 2017-11-16 RX ADMIN — NIFEDIPINE 30 MG: 30 TABLET, FILM COATED, EXTENDED RELEASE ORAL at 16:49

## 2017-11-16 RX ADMIN — TAZOBACTAM SODIUM AND PIPERACILLIN SODIUM 3.38 G: 375; 3 INJECTION, SOLUTION INTRAVENOUS at 23:00

## 2017-11-16 RX ADMIN — SODIUM CHLORIDE 50 ML/HR: 9 INJECTION, SOLUTION INTRAVENOUS at 22:57

## 2017-11-16 RX ADMIN — INSULIN LISPRO 5 UNITS: 100 INJECTION, SOLUTION INTRAVENOUS; SUBCUTANEOUS at 20:48

## 2017-11-16 RX ADMIN — VANCOMYCIN HYDROCHLORIDE 750 MG: 100 INJECTION, POWDER, LYOPHILIZED, FOR SOLUTION INTRAVENOUS at 08:28

## 2017-11-16 RX ADMIN — CLONIDINE HYDROCHLORIDE 0.1 MG: 0.1 TABLET ORAL at 02:09

## 2017-11-16 RX ADMIN — HYDROMORPHONE HYDROCHLORIDE 0.5 MG: 1 INJECTION, SOLUTION INTRAMUSCULAR; INTRAVENOUS; SUBCUTANEOUS at 18:01

## 2017-11-16 RX ADMIN — HYDROMORPHONE HYDROCHLORIDE 0.5 MG: 1 INJECTION, SOLUTION INTRAMUSCULAR; INTRAVENOUS; SUBCUTANEOUS at 04:04

## 2017-11-16 RX ADMIN — HYDROMORPHONE HYDROCHLORIDE 0.5 MG: 1 INJECTION, SOLUTION INTRAMUSCULAR; INTRAVENOUS; SUBCUTANEOUS at 08:36

## 2017-11-16 RX ADMIN — HYDROMORPHONE HYDROCHLORIDE 0.5 MG: 1 INJECTION, SOLUTION INTRAMUSCULAR; INTRAVENOUS; SUBCUTANEOUS at 00:31

## 2017-11-16 RX ADMIN — CLONIDINE HYDROCHLORIDE 0.1 MG: 0.1 TABLET ORAL at 13:56

## 2017-11-16 RX ADMIN — POLYETHYLENE GLYCOL 3350 17 G: 17 POWDER, FOR SOLUTION ORAL at 10:34

## 2017-11-16 RX ADMIN — IPRATROPIUM BROMIDE AND ALBUTEROL SULFATE 3 ML: 2.5; .5 SOLUTION RESPIRATORY (INHALATION) at 07:32

## 2017-11-17 LAB
ALBUMIN SERPL-MCNC: 3.4 G/DL (ref 3.5–5)
ALBUMIN/GLOB SERPL: 1 G/DL (ref 1.1–2.5)
ALP SERPL-CCNC: 115 U/L (ref 24–120)
ALT SERPL W P-5'-P-CCNC: 42 U/L (ref 0–54)
ANION GAP SERPL CALCULATED.3IONS-SCNC: 11 MMOL/L (ref 4–13)
AST SERPL-CCNC: 35 U/L (ref 7–45)
BACTERIA SPEC AEROBE CULT: ABNORMAL
BACTERIA SPEC AEROBE CULT: ABNORMAL
BASOPHILS # BLD AUTO: 0.01 10*3/MM3 (ref 0–0.2)
BASOPHILS NFR BLD AUTO: 0.1 % (ref 0–2)
BILIRUB SERPL-MCNC: 0.5 MG/DL (ref 0.1–1)
BUN BLD-MCNC: 27 MG/DL (ref 5–21)
BUN/CREAT SERPL: 22.3 (ref 7–25)
CALCIUM SPEC-SCNC: 9.2 MG/DL (ref 8.4–10.4)
CHLORIDE SERPL-SCNC: 103 MMOL/L (ref 98–110)
CO2 SERPL-SCNC: 26 MMOL/L (ref 24–31)
CREAT BLD-MCNC: 1.21 MG/DL (ref 0.5–1.4)
DEPRECATED RDW RBC AUTO: 42 FL (ref 40–54)
EOSINOPHIL # BLD AUTO: 0 10*3/MM3 (ref 0–0.7)
EOSINOPHIL NFR BLD AUTO: 0 % (ref 0–4)
ERYTHROCYTE [DISTWIDTH] IN BLOOD BY AUTOMATED COUNT: 13 % (ref 12–15)
GFR SERPL CREATININE-BSD FRML MDRD: 64 ML/MIN/1.73
GLOBULIN UR ELPH-MCNC: 3.3 GM/DL
GLUCOSE BLD-MCNC: 283 MG/DL (ref 70–100)
GLUCOSE BLDC GLUCOMTR-MCNC: 222 MG/DL (ref 70–130)
GLUCOSE BLDC GLUCOMTR-MCNC: 224 MG/DL (ref 70–130)
GLUCOSE BLDC GLUCOMTR-MCNC: 271 MG/DL (ref 70–130)
GLUCOSE BLDC GLUCOMTR-MCNC: 319 MG/DL (ref 70–130)
GRAM STN SPEC: ABNORMAL
HCT VFR BLD AUTO: 30.3 % (ref 40–52)
HGB BLD-MCNC: 10.2 G/DL (ref 14–18)
IMM GRANULOCYTES # BLD: 0.03 10*3/MM3 (ref 0–0.03)
IMM GRANULOCYTES NFR BLD: 0.2 % (ref 0–5)
ISOLATED FROM: ABNORMAL
LYMPHOCYTES # BLD AUTO: 0.95 10*3/MM3 (ref 0.72–4.86)
LYMPHOCYTES NFR BLD AUTO: 7.5 % (ref 15–45)
MCH RBC QN AUTO: 29.6 PG (ref 28–32)
MCHC RBC AUTO-ENTMCNC: 33.7 G/DL (ref 33–36)
MCV RBC AUTO: 87.8 FL (ref 82–95)
MONOCYTES # BLD AUTO: 1.03 10*3/MM3 (ref 0.19–1.3)
MONOCYTES NFR BLD AUTO: 8.1 % (ref 4–12)
NEUTROPHILS # BLD AUTO: 10.67 10*3/MM3 (ref 1.87–8.4)
NEUTROPHILS NFR BLD AUTO: 84.1 % (ref 39–78)
PLATELET # BLD AUTO: 272 10*3/MM3 (ref 130–400)
PMV BLD AUTO: 11.1 FL (ref 6–12)
POTASSIUM BLD-SCNC: 3.8 MMOL/L (ref 3.5–5.3)
PROT SERPL-MCNC: 6.7 G/DL (ref 6.3–8.7)
RBC # BLD AUTO: 3.45 10*6/MM3 (ref 4.8–5.9)
SODIUM BLD-SCNC: 140 MMOL/L (ref 135–145)
VANCOMYCIN TROUGH SERPL-MCNC: 8.1 MCG/ML (ref 10–20)
WBC NRBC COR # BLD: 12.69 10*3/MM3 (ref 4.8–10.8)

## 2017-11-17 PROCEDURE — 85025 COMPLETE CBC W/AUTO DIFF WBC: CPT | Performed by: INTERNAL MEDICINE

## 2017-11-17 PROCEDURE — 63710000001 INSULIN LISPRO (HUMAN) PER 5 UNITS: Performed by: INTERNAL MEDICINE

## 2017-11-17 PROCEDURE — 25010000002 PIPERACILLIN SOD-TAZOBACTAM PER 1 G: Performed by: INTERNAL MEDICINE

## 2017-11-17 PROCEDURE — 82962 GLUCOSE BLOOD TEST: CPT

## 2017-11-17 PROCEDURE — 63710000001 INSULIN DETEMIR PER 5 UNITS: Performed by: NURSE PRACTITIONER

## 2017-11-17 PROCEDURE — 99225 PR SBSQ OBSERVATION CARE/DAY 25 MINUTES: CPT | Performed by: UROLOGY

## 2017-11-17 PROCEDURE — 80053 COMPREHEN METABOLIC PANEL: CPT | Performed by: INTERNAL MEDICINE

## 2017-11-17 PROCEDURE — 94799 UNLISTED PULMONARY SVC/PX: CPT

## 2017-11-17 PROCEDURE — 80202 ASSAY OF VANCOMYCIN: CPT | Performed by: INTERNAL MEDICINE

## 2017-11-17 PROCEDURE — 25010000002 ENOXAPARIN PER 10 MG: Performed by: INTERNAL MEDICINE

## 2017-11-17 RX ORDER — IPRATROPIUM BROMIDE AND ALBUTEROL SULFATE 2.5; .5 MG/3ML; MG/3ML
3 SOLUTION RESPIRATORY (INHALATION)
Status: DISCONTINUED | OUTPATIENT
Start: 2017-11-17 | End: 2017-11-19 | Stop reason: HOSPADM

## 2017-11-17 RX ADMIN — OXYCODONE HYDROCHLORIDE AND ACETAMINOPHEN 1 TABLET: 7.5; 325 TABLET ORAL at 20:12

## 2017-11-17 RX ADMIN — CLONIDINE HYDROCHLORIDE 0.1 MG: 0.1 TABLET ORAL at 09:47

## 2017-11-17 RX ADMIN — IPRATROPIUM BROMIDE AND ALBUTEROL SULFATE 3 ML: 2.5; .5 SOLUTION RESPIRATORY (INHALATION) at 08:11

## 2017-11-17 RX ADMIN — CLONIDINE HYDROCHLORIDE 0.1 MG: 0.1 TABLET ORAL at 20:12

## 2017-11-17 RX ADMIN — CLONIDINE HYDROCHLORIDE 0.1 MG: 0.1 TABLET ORAL at 15:43

## 2017-11-17 RX ADMIN — INSULIN LISPRO 5 UNITS: 100 INJECTION, SOLUTION INTRAVENOUS; SUBCUTANEOUS at 13:12

## 2017-11-17 RX ADMIN — TAZOBACTAM SODIUM AND PIPERACILLIN SODIUM 3.38 G: 375; 3 INJECTION, SOLUTION INTRAVENOUS at 23:05

## 2017-11-17 RX ADMIN — TAZOBACTAM SODIUM AND PIPERACILLIN SODIUM 3.38 G: 375; 3 INJECTION, SOLUTION INTRAVENOUS at 06:39

## 2017-11-17 RX ADMIN — POLYETHYLENE GLYCOL 3350 17 G: 17 POWDER, FOR SOLUTION ORAL at 09:48

## 2017-11-17 RX ADMIN — OXYCODONE HYDROCHLORIDE AND ACETAMINOPHEN 1 TABLET: 7.5; 325 TABLET ORAL at 15:43

## 2017-11-17 RX ADMIN — OXYCODONE HYDROCHLORIDE AND ACETAMINOPHEN 1 TABLET: 7.5; 325 TABLET ORAL at 09:58

## 2017-11-17 RX ADMIN — INSULIN LISPRO 3 UNITS: 100 INJECTION, SOLUTION INTRAVENOUS; SUBCUTANEOUS at 09:48

## 2017-11-17 RX ADMIN — ATENOLOL 50 MG: 50 TABLET ORAL at 09:47

## 2017-11-17 RX ADMIN — TAZOBACTAM SODIUM AND PIPERACILLIN SODIUM 3.38 G: 375; 3 INJECTION, SOLUTION INTRAVENOUS at 15:44

## 2017-11-17 RX ADMIN — IPRATROPIUM BROMIDE AND ALBUTEROL SULFATE 3 ML: 2.5; .5 SOLUTION RESPIRATORY (INHALATION) at 15:06

## 2017-11-17 RX ADMIN — ACETAMINOPHEN 1000 MG: 500 TABLET, FILM COATED ORAL at 17:59

## 2017-11-17 RX ADMIN — OXYCODONE HYDROCHLORIDE AND ACETAMINOPHEN 1 TABLET: 7.5; 325 TABLET ORAL at 05:47

## 2017-11-17 RX ADMIN — IPRATROPIUM BROMIDE AND ALBUTEROL SULFATE 3 ML: 2.5; .5 SOLUTION RESPIRATORY (INHALATION) at 11:04

## 2017-11-17 RX ADMIN — DOCUSATE SODIUM 100 MG: 100 CAPSULE ORAL at 09:47

## 2017-11-17 RX ADMIN — ATENOLOL 50 MG: 50 TABLET ORAL at 17:59

## 2017-11-17 RX ADMIN — ENOXAPARIN SODIUM 40 MG: 40 INJECTION SUBCUTANEOUS at 05:47

## 2017-11-17 RX ADMIN — NIFEDIPINE 30 MG: 30 TABLET, FILM COATED, EXTENDED RELEASE ORAL at 09:47

## 2017-11-17 RX ADMIN — OXYCODONE HYDROCHLORIDE AND ACETAMINOPHEN 1 TABLET: 7.5; 325 TABLET ORAL at 00:57

## 2017-11-17 RX ADMIN — BUPROPION HYDROCHLORIDE 75 MG: 75 TABLET, FILM COATED ORAL at 09:47

## 2017-11-17 RX ADMIN — INSULIN DETEMIR 5 UNITS: 100 INJECTION, SOLUTION SUBCUTANEOUS at 20:24

## 2017-11-17 RX ADMIN — INSULIN LISPRO 6 UNITS: 100 INJECTION, SOLUTION INTRAVENOUS; SUBCUTANEOUS at 18:00

## 2017-11-17 RX ADMIN — FAMOTIDINE 40 MG: 20 TABLET, FILM COATED ORAL at 09:47

## 2017-11-17 RX ADMIN — INSULIN LISPRO 3 UNITS: 100 INJECTION, SOLUTION INTRAVENOUS; SUBCUTANEOUS at 20:23

## 2017-11-17 RX ADMIN — DOCUSATE SODIUM 100 MG: 100 CAPSULE ORAL at 17:59

## 2017-11-18 LAB
ANION GAP SERPL CALCULATED.3IONS-SCNC: 10 MMOL/L (ref 4–13)
BUN BLD-MCNC: 22 MG/DL (ref 5–21)
BUN/CREAT SERPL: 18.3 (ref 7–25)
CALCIUM SPEC-SCNC: 8.5 MG/DL (ref 8.4–10.4)
CHLORIDE SERPL-SCNC: 103 MMOL/L (ref 98–110)
CO2 SERPL-SCNC: 27 MMOL/L (ref 24–31)
CREAT BLD-MCNC: 1.2 MG/DL (ref 0.5–1.4)
DEPRECATED RDW RBC AUTO: 42.6 FL (ref 40–54)
ERYTHROCYTE [DISTWIDTH] IN BLOOD BY AUTOMATED COUNT: 13.1 % (ref 12–15)
GFR SERPL CREATININE-BSD FRML MDRD: 64 ML/MIN/1.73
GLUCOSE BLD-MCNC: 149 MG/DL (ref 70–100)
GLUCOSE BLDC GLUCOMTR-MCNC: 156 MG/DL (ref 70–130)
GLUCOSE BLDC GLUCOMTR-MCNC: 166 MG/DL (ref 70–130)
GLUCOSE BLDC GLUCOMTR-MCNC: 193 MG/DL (ref 70–130)
GLUCOSE BLDC GLUCOMTR-MCNC: 220 MG/DL (ref 70–130)
HCT VFR BLD AUTO: 28.4 % (ref 40–52)
HGB BLD-MCNC: 9.6 G/DL (ref 14–18)
MCH RBC QN AUTO: 29.6 PG (ref 28–32)
MCHC RBC AUTO-ENTMCNC: 33.8 G/DL (ref 33–36)
MCV RBC AUTO: 87.7 FL (ref 82–95)
PLATELET # BLD AUTO: 249 10*3/MM3 (ref 130–400)
PMV BLD AUTO: 10.5 FL (ref 6–12)
POTASSIUM BLD-SCNC: 3.5 MMOL/L (ref 3.5–5.3)
RBC # BLD AUTO: 3.24 10*6/MM3 (ref 4.8–5.9)
SODIUM BLD-SCNC: 140 MMOL/L (ref 135–145)
WBC NRBC COR # BLD: 10.09 10*3/MM3 (ref 4.8–10.8)

## 2017-11-18 PROCEDURE — 94799 UNLISTED PULMONARY SVC/PX: CPT

## 2017-11-18 PROCEDURE — 25010000002 ENOXAPARIN PER 10 MG: Performed by: INTERNAL MEDICINE

## 2017-11-18 PROCEDURE — 63710000001 INSULIN LISPRO (HUMAN) PER 5 UNITS: Performed by: INTERNAL MEDICINE

## 2017-11-18 PROCEDURE — 63710000001 INSULIN DETEMIR PER 5 UNITS: Performed by: NURSE PRACTITIONER

## 2017-11-18 PROCEDURE — 25010000002 PIPERACILLIN SOD-TAZOBACTAM PER 1 G: Performed by: INTERNAL MEDICINE

## 2017-11-18 PROCEDURE — 85027 COMPLETE CBC AUTOMATED: CPT | Performed by: NURSE PRACTITIONER

## 2017-11-18 PROCEDURE — 82962 GLUCOSE BLOOD TEST: CPT

## 2017-11-18 PROCEDURE — 80048 BASIC METABOLIC PNL TOTAL CA: CPT | Performed by: NURSE PRACTITIONER

## 2017-11-18 PROCEDURE — 99225 PR SBSQ OBSERVATION CARE/DAY 25 MINUTES: CPT | Performed by: UROLOGY

## 2017-11-18 PROCEDURE — 25010000002 HYDROMORPHONE PER 4 MG: Performed by: INTERNAL MEDICINE

## 2017-11-18 RX ORDER — AMLODIPINE BESYLATE 10 MG/1
10 TABLET ORAL
Status: DISCONTINUED | OUTPATIENT
Start: 2017-11-18 | End: 2017-11-19 | Stop reason: HOSPADM

## 2017-11-18 RX ORDER — AMLODIPINE BESYLATE 5 MG/1
5 TABLET ORAL
Status: DISCONTINUED | OUTPATIENT
Start: 2017-11-18 | End: 2017-11-18

## 2017-11-18 RX ORDER — TRAZODONE HYDROCHLORIDE 100 MG/1
100 TABLET ORAL NIGHTLY PRN
Status: DISCONTINUED | OUTPATIENT
Start: 2017-11-18 | End: 2017-11-19 | Stop reason: HOSPADM

## 2017-11-18 RX ORDER — LEVOFLOXACIN 750 MG/1
750 TABLET ORAL EVERY 24 HOURS
Status: DISCONTINUED | OUTPATIENT
Start: 2017-11-18 | End: 2017-11-19 | Stop reason: HOSPADM

## 2017-11-18 RX ADMIN — CLONIDINE HYDROCHLORIDE 0.1 MG: 0.1 TABLET ORAL at 08:23

## 2017-11-18 RX ADMIN — ATENOLOL 50 MG: 50 TABLET ORAL at 17:18

## 2017-11-18 RX ADMIN — OXYCODONE HYDROCHLORIDE AND ACETAMINOPHEN 1 TABLET: 7.5; 325 TABLET ORAL at 04:54

## 2017-11-18 RX ADMIN — INSULIN DETEMIR 5 UNITS: 100 INJECTION, SOLUTION SUBCUTANEOUS at 21:14

## 2017-11-18 RX ADMIN — IPRATROPIUM BROMIDE AND ALBUTEROL SULFATE 3 ML: 2.5; .5 SOLUTION RESPIRATORY (INHALATION) at 15:54

## 2017-11-18 RX ADMIN — LEVOFLOXACIN 750 MG: 750 TABLET, FILM COATED ORAL at 10:12

## 2017-11-18 RX ADMIN — CLONIDINE HYDROCHLORIDE 0.1 MG: 0.1 TABLET ORAL at 04:53

## 2017-11-18 RX ADMIN — IPRATROPIUM BROMIDE AND ALBUTEROL SULFATE 3 ML: 2.5; .5 SOLUTION RESPIRATORY (INHALATION) at 07:43

## 2017-11-18 RX ADMIN — INSULIN LISPRO 4 UNITS: 100 INJECTION, SOLUTION INTRAVENOUS; SUBCUTANEOUS at 21:14

## 2017-11-18 RX ADMIN — ATENOLOL 50 MG: 50 TABLET ORAL at 08:23

## 2017-11-18 RX ADMIN — INSULIN LISPRO 2 UNITS: 100 INJECTION, SOLUTION INTRAVENOUS; SUBCUTANEOUS at 13:18

## 2017-11-18 RX ADMIN — HYDROMORPHONE HYDROCHLORIDE 0.5 MG: 1 INJECTION, SOLUTION INTRAMUSCULAR; INTRAVENOUS; SUBCUTANEOUS at 20:54

## 2017-11-18 RX ADMIN — DOCUSATE SODIUM 100 MG: 100 CAPSULE ORAL at 08:23

## 2017-11-18 RX ADMIN — AMLODIPINE BESYLATE 10 MG: 10 TABLET ORAL at 10:12

## 2017-11-18 RX ADMIN — ENOXAPARIN SODIUM 40 MG: 40 INJECTION SUBCUTANEOUS at 05:37

## 2017-11-18 RX ADMIN — NIFEDIPINE 30 MG: 30 TABLET, FILM COATED, EXTENDED RELEASE ORAL at 08:23

## 2017-11-18 RX ADMIN — FAMOTIDINE 40 MG: 20 TABLET, FILM COATED ORAL at 08:23

## 2017-11-18 RX ADMIN — OXYCODONE HYDROCHLORIDE AND ACETAMINOPHEN 1 TABLET: 7.5; 325 TABLET ORAL at 09:43

## 2017-11-18 RX ADMIN — DOCUSATE SODIUM 100 MG: 100 CAPSULE ORAL at 17:18

## 2017-11-18 RX ADMIN — OXYCODONE HYDROCHLORIDE AND ACETAMINOPHEN 1 TABLET: 7.5; 325 TABLET ORAL at 21:49

## 2017-11-18 RX ADMIN — BUPROPION HYDROCHLORIDE 75 MG: 75 TABLET, FILM COATED ORAL at 08:23

## 2017-11-18 RX ADMIN — CLONIDINE HYDROCHLORIDE 0.1 MG: 0.1 TABLET ORAL at 17:18

## 2017-11-18 RX ADMIN — OXYCODONE HYDROCHLORIDE AND ACETAMINOPHEN 1 TABLET: 7.5; 325 TABLET ORAL at 13:43

## 2017-11-18 RX ADMIN — CLONIDINE HYDROCHLORIDE 0.1 MG: 0.1 TABLET ORAL at 20:54

## 2017-11-18 RX ADMIN — POLYETHYLENE GLYCOL 3350 17 G: 17 POWDER, FOR SOLUTION ORAL at 08:24

## 2017-11-18 RX ADMIN — HYDROMORPHONE HYDROCHLORIDE 0.5 MG: 1 INJECTION, SOLUTION INTRAMUSCULAR; INTRAVENOUS; SUBCUTANEOUS at 23:59

## 2017-11-18 RX ADMIN — OXYCODONE HYDROCHLORIDE AND ACETAMINOPHEN 1 TABLET: 7.5; 325 TABLET ORAL at 00:20

## 2017-11-18 RX ADMIN — OXYCODONE HYDROCHLORIDE AND ACETAMINOPHEN 1 TABLET: 7.5; 325 TABLET ORAL at 17:47

## 2017-11-18 RX ADMIN — INSULIN LISPRO 2 UNITS: 100 INJECTION, SOLUTION INTRAVENOUS; SUBCUTANEOUS at 17:18

## 2017-11-18 RX ADMIN — TRAZODONE HYDROCHLORIDE 100 MG: 100 TABLET, FILM COATED ORAL at 21:49

## 2017-11-18 RX ADMIN — IPRATROPIUM BROMIDE AND ALBUTEROL SULFATE 3 ML: 2.5; .5 SOLUTION RESPIRATORY (INHALATION) at 11:15

## 2017-11-18 RX ADMIN — TAZOBACTAM SODIUM AND PIPERACILLIN SODIUM 3.38 G: 375; 3 INJECTION, SOLUTION INTRAVENOUS at 08:24

## 2017-11-19 VITALS
WEIGHT: 203.5 LBS | RESPIRATION RATE: 16 BRPM | HEART RATE: 66 BPM | TEMPERATURE: 97 F | BODY MASS INDEX: 28.49 KG/M2 | SYSTOLIC BLOOD PRESSURE: 141 MMHG | DIASTOLIC BLOOD PRESSURE: 65 MMHG | OXYGEN SATURATION: 98 % | HEIGHT: 71 IN

## 2017-11-19 LAB
GLUCOSE BLDC GLUCOMTR-MCNC: 136 MG/DL (ref 70–130)
GLUCOSE BLDC GLUCOMTR-MCNC: 230 MG/DL (ref 70–130)

## 2017-11-19 PROCEDURE — 63710000001 INSULIN LISPRO (HUMAN) PER 5 UNITS: Performed by: INTERNAL MEDICINE

## 2017-11-19 PROCEDURE — 25010000002 ENOXAPARIN PER 10 MG: Performed by: INTERNAL MEDICINE

## 2017-11-19 PROCEDURE — 82962 GLUCOSE BLOOD TEST: CPT

## 2017-11-19 PROCEDURE — 94799 UNLISTED PULMONARY SVC/PX: CPT

## 2017-11-19 PROCEDURE — 25010000002 HYDROMORPHONE PER 4 MG: Performed by: INTERNAL MEDICINE

## 2017-11-19 RX ORDER — LEVOFLOXACIN 750 MG/1
750 TABLET ORAL EVERY 24 HOURS
Qty: 9 TABLET | Refills: 0 | Status: SHIPPED | OUTPATIENT
Start: 2017-11-19 | End: 2017-11-28

## 2017-11-19 RX ORDER — CYCLOBENZAPRINE HCL 10 MG
10 TABLET ORAL 3 TIMES DAILY PRN
Qty: 5 TABLET | Refills: 0 | Status: SHIPPED | OUTPATIENT
Start: 2017-11-19 | End: 2019-11-05 | Stop reason: ALTCHOICE

## 2017-11-19 RX ORDER — AMLODIPINE BESYLATE 10 MG/1
10 TABLET ORAL
Qty: 30 TABLET | Refills: 1 | Status: SHIPPED | OUTPATIENT
Start: 2017-11-20 | End: 2019-11-05

## 2017-11-19 RX ADMIN — OXYCODONE HYDROCHLORIDE AND ACETAMINOPHEN 1 TABLET: 7.5; 325 TABLET ORAL at 08:35

## 2017-11-19 RX ADMIN — INSULIN LISPRO 3 UNITS: 100 INJECTION, SOLUTION INTRAVENOUS; SUBCUTANEOUS at 08:45

## 2017-11-19 RX ADMIN — FAMOTIDINE 40 MG: 20 TABLET, FILM COATED ORAL at 08:34

## 2017-11-19 RX ADMIN — ATENOLOL 50 MG: 50 TABLET ORAL at 08:35

## 2017-11-19 RX ADMIN — IPRATROPIUM BROMIDE AND ALBUTEROL SULFATE 3 ML: 2.5; .5 SOLUTION RESPIRATORY (INHALATION) at 08:06

## 2017-11-19 RX ADMIN — LEVOFLOXACIN 750 MG: 750 TABLET, FILM COATED ORAL at 12:33

## 2017-11-19 RX ADMIN — BUPROPION HYDROCHLORIDE 75 MG: 75 TABLET, FILM COATED ORAL at 08:34

## 2017-11-19 RX ADMIN — ENOXAPARIN SODIUM 40 MG: 40 INJECTION SUBCUTANEOUS at 04:27

## 2017-11-19 RX ADMIN — OXYCODONE HYDROCHLORIDE AND ACETAMINOPHEN 1 TABLET: 7.5; 325 TABLET ORAL at 12:34

## 2017-11-19 RX ADMIN — CLONIDINE HYDROCHLORIDE 0.1 MG: 0.1 TABLET ORAL at 08:34

## 2017-11-19 RX ADMIN — OXYCODONE HYDROCHLORIDE AND ACETAMINOPHEN 1 TABLET: 7.5; 325 TABLET ORAL at 04:27

## 2017-11-19 RX ADMIN — AMLODIPINE BESYLATE 10 MG: 10 TABLET ORAL at 08:35

## 2017-11-19 RX ADMIN — DOCUSATE SODIUM 100 MG: 100 CAPSULE ORAL at 08:34

## 2017-11-19 RX ADMIN — HYDROMORPHONE HYDROCHLORIDE 0.5 MG: 1 INJECTION, SOLUTION INTRAMUSCULAR; INTRAVENOUS; SUBCUTANEOUS at 03:23

## 2017-11-20 LAB
BACTERIA SPEC AEROBE CULT: ABNORMAL
BACTERIA SPEC AEROBE CULT: ABNORMAL
GRAM STN SPEC: ABNORMAL
ISOLATED FROM: ABNORMAL

## 2017-11-21 LAB
BACTERIA SPEC AEROBE CULT: NORMAL
BACTERIA SPEC AEROBE CULT: NORMAL

## 2017-11-21 NOTE — DISCHARGE SUMMARY
Dc summary   Patient underwent uncomplicated cystectomy and was dc home 11/8   To fu with dr smith

## 2018-02-05 ENCOUNTER — TELEPHONE (OUTPATIENT)
Dept: UROLOGY | Facility: CLINIC | Age: 48
End: 2018-02-05

## 2018-02-05 DIAGNOSIS — C67.9 MALIGNANT NEOPLASM OF URINARY BLADDER, UNSPECIFIED SITE (HCC): Primary | ICD-10-CM

## 2018-02-05 NOTE — TELEPHONE ENCOUNTER
Informed patients wife that the Ct order was in and someone would give her a call to schedule this. She voiced understanding.

## 2018-02-05 NOTE — TELEPHONE ENCOUNTER
Patient called and said that the doctor in [place] wanted him to have a CT done in February. He said that Dr Wolfe was suppose to be ordering this. Patient number is [phone].

## 2018-02-19 ENCOUNTER — HOSPITAL ENCOUNTER (OUTPATIENT)
Dept: CT IMAGING | Facility: HOSPITAL | Age: 48
Discharge: HOME | End: 2018-02-19
Attending: UROLOGY | Admitting: UROLOGY

## 2018-02-19 PROCEDURE — 0 IOPAMIDOL 61 % SOLUTION: Performed by: UROLOGY

## 2018-02-19 PROCEDURE — 74177 CT ABD & PELVIS W/CONTRAST: CPT

## 2018-02-19 PROCEDURE — 82565 ASSAY OF CREATININE: CPT

## 2018-02-19 RX ADMIN — IOPAMIDOL 100 ML: 612 INJECTION, SOLUTION INTRAVENOUS at 08:00

## 2018-02-20 LAB — CREAT BLDA-MCNC: 1 MG/DL (ref 0.6–1.3)

## 2018-03-01 ENCOUNTER — OFFICE VISIT (OUTPATIENT)
Dept: UROLOGY | Facility: CLINIC | Age: 48
End: 2018-03-01

## 2018-03-01 VITALS — HEIGHT: 71 IN | BODY MASS INDEX: 29.4 KG/M2 | TEMPERATURE: 97.8 F | WEIGHT: 210 LBS

## 2018-03-01 DIAGNOSIS — C67.8 MALIGNANT NEOPLASM OF OVERLAPPING SITES OF BLADDER (HCC): Primary | ICD-10-CM

## 2018-03-01 DIAGNOSIS — N52.9 ERECTILE DYSFUNCTION, UNSPECIFIED ERECTILE DYSFUNCTION TYPE: ICD-10-CM

## 2018-03-01 PROCEDURE — 99213 OFFICE O/P EST LOW 20 MIN: CPT | Performed by: UROLOGY

## 2018-03-01 RX ORDER — SILDENAFIL 100 MG/1
100 TABLET, FILM COATED ORAL AS NEEDED
Qty: 8 TABLET | Refills: 5 | Status: SHIPPED | OUTPATIENT
Start: 2018-03-01 | End: 2018-09-19

## 2018-03-01 NOTE — PROGRESS NOTES
Mr. Tafoya is 47 y.o. male    CHIEF COMPLAINT: I am here to follow up on my bladder cancer.     HPI  Bladder Cancer  The patient presents today with urothelial cancer of the bladder. This is a new diagnois diagnosis.. The patient was initially diagnosed 20 month(s) ago. Severity is best is explained as muscle invasive disease. Previous management includes cystectomy with an ileal conduit. Symptoms include no hematuria,urethral drainage or flank pain.  Last upper tract imaging was CT urogram done today.     He is also complaining of poor libido and erectile dysfunction consistent with aSHIM score of 4.  He has a history of hypogonadism and also complains of fatigue.  He has tried Cialis for his erections on 2 occasions which have been unsuccessful.    The following portions of the patient's history were reviewed and updated as appropriate: allergies, current medications, past family history, past medical history, past social history, past surgical history and problem list.    Review of Systems   Constitutional: Negative for chills and fever.   Gastrointestinal: Negative for abdominal pain, anal bleeding and blood in stool.   Genitourinary: Negative for flank pain and hematuria.         Current Outpatient Prescriptions:   •  amLODIPine (NORVASC) 10 MG tablet, Take 1 tablet by mouth Daily., Disp: 30 tablet, Rfl: 1  •  atenolol (TENORMIN) 50 MG tablet, Take 1 tablet by mouth 2 (Two) Times a Day., Disp: 60 tablet, Rfl: 0  •  BREO ELLIPTA 100-25 MCG/INH aerosol powder , Inhale 1 puff Daily., Disp: , Rfl: 3  •  buPROPion (WELLBUTRIN) 75 MG tablet, Take 75 mg by mouth Daily., Disp: , Rfl:   •  cloNIDine (CATAPRES) 0.1 MG tablet, Take 0.1 mg by mouth 3 (Three) Times a Day., Disp: , Rfl:   •  cyclobenzaprine (FLEXERIL) 10 MG tablet, Take 1 tablet by mouth 3 (Three) Times a Day As Needed for Muscle Spasms., Disp: 5 tablet, Rfl: 0  •  HYDROcodone-acetaminophen (NORCO)  MG per tablet, Take 1 tablet by mouth Every 6 (Six)  Hours As Needed for Moderate Pain ., Disp: , Rfl:   •  metFORMIN (GLUCOPHAGE) 500 MG tablet, Take 1 tablet by mouth 2 (Two) Times a Day With Meals., Disp: 60 tablet, Rfl: 1  •  sildenafil (VIAGRA) 100 MG tablet, Take 1 tablet by mouth As Needed for erectile dysfunction., Disp: 8 tablet, Rfl: 5    Past Medical History:   Diagnosis Date   • Anxiety    • Back pain    • Bladder carcinoma 06/2016    High grade CIS & ta low grade   • H/O hematuria    • History of pneumonia 2011   • Hypertension    • Overweight    • Smoking    • Urinary retention    • Wheezing     r/t smoking       Past Surgical History:   Procedure Laterality Date   • BACK SURGERY      x3   • CYSTECTOMY N/A 11/2/2017    Procedure: CYSTOPROSTATECTOMY WITH BILATERAL PELVIC LYMPHADENECTOMY AND ILEAL CONDUIT URINARY DIVERSON;  Surgeon: Vijay Badillo Jr., MD;  Location: [facility] OR;  Service:    • CYSTOSCOPY N/A 10/9/2017    Procedure: CYSTOSCOPY AND EXAM UNDER ANESTHESIA;  Surgeon: Vijay Badillo Jr., MD;  Location: [facility] OR;  Service:    • CYSTOSCOPY BLADDER BIOPSY N/A 11/30/2016    Procedure: CYSTOSCOPY BLADDER BIOPSY fulgeration of 3cm area of bladder;  Surgeon: Brandon Wolfe MD;  Location: [facility] OR;  Service:    • CYSTOSCOPY RETROGRADE PYELOGRAM Bilateral 7/14/2017    Procedure: CYSTOSCOPY RETROGRADE PYELOGRAM;  Surgeon: Brandon Wolfe MD;  Location: [facility] OR;  Service:    • TRANSURETHRAL RESECTION OF BLADDER TUMOR N/A 3/31/2017    Procedure: CYSTOSCOPY , BLADDER BIOPSY, AND TRANSURETHRAL RESECTION OF BLADDER TUMOR ;  Surgeon: Brandon Wolfe MD;  Location: [facility] OR;  Service:    • TRANSURETHRAL RESECTION OF BLADDER TUMOR  06/2016    High Grade CIS & Low grade ta disease   • TRANSURETHRAL RESECTION OF BLADDER TUMOR N/A 7/14/2017    Procedure: CYSTOSCOPY TRANSURETHRAL RESECTION OF BLADDER TUMOR & BILATERAL RETROGRADE URETEROPYELOGRAMS;  Surgeon: Brandon Wolfe MD;  Location: [facility] OR;  Service:        Social History  "    Social History   • Marital status:      Social History Main Topics   • Smoking status: Current Every Day Smoker     Packs/day: 1.00     Years: 34.00     Types: Cigarettes   • Smokeless tobacco: Never Used   • Alcohol use No   • Drug use: No   • Sexual activity: Defer       Family History   Problem Relation Age of Onset   • No Known Problems Father    • No Known Problems Mother    • Malig Hyperthermia Neg Hx        Temp 97.8 °F (36.6 °C)  Ht 180.3 cm (71\")  Wt 95.3 kg (210 lb)  BMI 29.29 kg/m2    Physical Exam  Constitutional: The patient  is oriented to person, place, and time. They  appear well-developed and well-nourished. No distress.   Pulmonary/Chest: Effort normal.   Abdominal: Soft. The patient exhibits no distension and no mass. There is no tenderness. There is no rebound and no guarding. No hernia.   Neurological: Patient is alert and oriented to person, place, and time.   Skin: Skin is warm and dry. Not diaphoretic.   Psychiatric:  normal mood and affect.   Vitals reviewed.  Stoma is pink and patent    Results for orders placed or performed during the hospital encounter of 02/19/18   POC Creatinine   Result Value Ref Range    Creatinine 1.00 0.60 - 1.30 mg/dL     Independent review of CT scan of the abdomen/pelvis The patient has undergone a CT scan of the abdomen and pelvis With contrast. The images are available for me to review as an independent interpretation for evaluation and management.  Assessment of the renal parenchyma with regards to thickness, scarring, symmetry in appearance and function, presence of masses both pre-and postcontrast, and calcifications are noted.  The collecting system with regard to dilatation, presence of calcifications, and masses were reviewed.  The course and caliber the ureters also noted.  The renal vessels and retroperitoneum is inspected for pathology.  The solid viscera and bowel pattern are briefly reviewed, but will also be inspected by the " "radiologist. The renal pelvis is inspected.  This study shows no hydronephrosis, adenopathy or filling defect. .    Assessment and Plan  Diagnoses and all orders for this visit:    Malignant neoplasm of overlapping sites of bladder  -     CBC Auto Differential  -     Basic Metabolic Panel    Erectile dysfunction, unspecified erectile dysfunction type   -     Testosterone  #1. No evidence of recurrent disease.   #2.The patient and I discussed the likely etiology of his impotence.  We discussed diagnostic evaluation for erectile dysfunction is possible but usually does not change the management.  He understands that goal directed therapy is probably the best approach since no matter what the etiology, a PDE 5 inhibitor is the least invasive way to manage ED.  While there are risks to PDE 5 inhibitors which I discussed as described below, it was relayed to him that they probably provide the most \"natural\" erection.  Other options discussed included penile injections, urethral suppositories, vacuum erection device without or without the tension ring, and penile prosthesis.  The risks of headache, visual changes, hypotension, priapism, GI distress, myalgias, and the contraindication of nitrates were discussed with the patient.  The patient denies a history of nitrates either in long acting or prn use form.  He denies a history of MI, heart failure, or stroke in the last 6 months.  I discussed with him that alpha blockers should be taken at least 4 hours apart from the PDE 5 inhibitors.  He was given a prescription for Viagra  100 mg for as needed use. I reminded him that facial flushing is not usual with this medication, and that it is most effective when taken on an empty stomach without alcohol.  He understands that he needs to be forthright about using this medication in any medical situation, especially when giving nitrates is being considered.      Brandon Wolfe MD  03/01/18  7:54 PM      Cc: Tam Gonzalez MD; " Vijay Staton MD

## 2018-03-02 LAB
BASOPHILS # BLD AUTO: 0.05 10*3/MM3 (ref 0–0.2)
BASOPHILS NFR BLD AUTO: 0.5 % (ref 0–2)
BUN SERPL-MCNC: 16 MG/DL (ref 5–21)
BUN/CREAT SERPL: 16.5 (ref 7–25)
CALCIUM SERPL-MCNC: 9.2 MG/DL (ref 8.4–10.4)
CHLORIDE SERPL-SCNC: 100 MMOL/L (ref 98–110)
CO2 SERPL-SCNC: 27 MMOL/L (ref 24–31)
CREAT SERPL-MCNC: 0.97 MG/DL (ref 0.5–1.4)
EOSINOPHIL # BLD AUTO: 0.17 10*3/MM3 (ref 0–0.7)
EOSINOPHIL NFR BLD AUTO: 1.8 % (ref 0–4)
ERYTHROCYTE [DISTWIDTH] IN BLOOD BY AUTOMATED COUNT: 12.9 % (ref 12–15)
GFR SERPLBLD CREATININE-BSD FMLA CKD-EPI: 100 ML/MIN/1.73
GFR SERPLBLD CREATININE-BSD FMLA CKD-EPI: 82 ML/MIN/1.73
GLUCOSE SERPL-MCNC: 173 MG/DL (ref 70–100)
HCT VFR BLD AUTO: 39.2 % (ref 40–52)
HGB BLD-MCNC: 13.1 G/DL (ref 14–18)
IMM GRANULOCYTES # BLD: 0.03 10*3/MM3 (ref 0–0.03)
IMM GRANULOCYTES NFR BLD: 0.3 % (ref 0–5)
LYMPHOCYTES # BLD AUTO: 2.57 10*3/MM3 (ref 0.72–4.86)
LYMPHOCYTES NFR BLD AUTO: 27.5 % (ref 15–45)
MCH RBC QN AUTO: 28 PG (ref 28–32)
MCHC RBC AUTO-ENTMCNC: 33.4 G/DL (ref 33–36)
MCV RBC AUTO: 83.8 FL (ref 82–95)
MONOCYTES # BLD AUTO: 0.81 10*3/MM3 (ref 0.19–1.3)
MONOCYTES NFR BLD AUTO: 8.7 % (ref 4–12)
NEUTROPHILS # BLD AUTO: 5.7 10*3/MM3 (ref 1.87–8.4)
NEUTROPHILS NFR BLD AUTO: 61.2 % (ref 39–78)
NRBC BLD AUTO-RTO: 0 /100 WBC (ref 0–0)
PLATELET # BLD AUTO: 233 10*3/MM3 (ref 130–400)
POTASSIUM SERPL-SCNC: 3.8 MMOL/L (ref 3.5–5.3)
RBC # BLD AUTO: 4.68 10*6/MM3 (ref 4.8–5.9)
SODIUM SERPL-SCNC: 138 MMOL/L (ref 135–145)
TESTOST SERPL-MCNC: 144 NG/DL (ref 264–916)
WBC # BLD AUTO: 9.33 10*3/MM3 (ref 4.8–10.8)

## 2018-03-07 ENCOUNTER — TELEPHONE (OUTPATIENT)
Dept: UROLOGY | Facility: CLINIC | Age: 48
End: 2018-03-07

## 2018-03-07 NOTE — TELEPHONE ENCOUNTER
Patient called and wants his results from his labs from last week. Dr Wolfe told them to call back today.

## 2018-03-09 NOTE — TELEPHONE ENCOUNTER
Spoke with patients wife and informed her patient could start the testosterone injections 200 every 3 weeks and we need to get a testosterone drawn before his 4th shot. I also informed her that patient needed to see his PCP about his glucose level. Patients wife voiced understanding and an appointment was made for patients testosterone

## 2018-03-09 NOTE — TELEPHONE ENCOUNTER
Pt wants to know if he can start testosterone injections? He cant answer his phone at work so the main tel to call is now his wife.

## 2018-03-13 ENCOUNTER — PROCEDURE VISIT (OUTPATIENT)
Dept: UROLOGY | Facility: CLINIC | Age: 48
End: 2018-03-13

## 2018-03-13 DIAGNOSIS — E29.1 HYPOGONADISM MALE: ICD-10-CM

## 2018-03-13 DIAGNOSIS — N52.9 ERECTILE DYSFUNCTION, UNSPECIFIED ERECTILE DYSFUNCTION TYPE: Primary | ICD-10-CM

## 2018-03-13 PROCEDURE — 96372 THER/PROPH/DIAG INJ SC/IM: CPT | Performed by: UROLOGY

## 2018-03-13 NOTE — PROGRESS NOTES
Patient of Dr. Wolfe states he is here today for his testosterone injection. Patient denies any fever, chills, N&V or hematuria. I administered 1cc/ 200 mg of testosterone IM left hip with no complications. Dr. Campbell was here in the office at the time of injection. The patient was advised to follow up in 3 weeks for his next testosterone injection. He verbalized understanding.

## 2018-03-14 PROBLEM — E29.1 HYPOGONADISM MALE: Status: ACTIVE | Noted: 2018-03-14

## 2018-03-14 RX ORDER — TESTOSTERONE CYPIONATE 200 MG/ML
200 INJECTION, SOLUTION INTRAMUSCULAR
Status: CANCELLED
Start: 2018-04-03

## 2018-03-16 RX ORDER — TESTOSTERONE CYPIONATE 200 MG/ML
200 INJECTION, SOLUTION INTRAMUSCULAR
Status: DISCONTINUED | OUTPATIENT
Start: 2018-03-16 | End: 2018-03-16 | Stop reason: HOSPADM

## 2018-03-16 RX ORDER — TESTOSTERONE CYPIONATE 200 MG/ML
200 INJECTION, SOLUTION INTRAMUSCULAR
Status: CANCELLED
Start: 2018-04-03

## 2018-03-16 RX ADMIN — TESTOSTERONE CYPIONATE 200 MG: 200 INJECTION, SOLUTION INTRAMUSCULAR at 10:33

## 2018-04-03 ENCOUNTER — PROCEDURE VISIT (OUTPATIENT)
Dept: UROLOGY | Facility: CLINIC | Age: 48
End: 2018-04-03

## 2018-04-03 DIAGNOSIS — E29.1 HYPOGONADISM MALE: Primary | ICD-10-CM

## 2018-04-03 PROCEDURE — 96372 THER/PROPH/DIAG INJ SC/IM: CPT | Performed by: UROLOGY

## 2018-04-03 RX ORDER — TESTOSTERONE CYPIONATE 200 MG/ML
200 INJECTION, SOLUTION INTRAMUSCULAR
Status: DISCONTINUED | OUTPATIENT
Start: 2018-04-03 | End: 2018-04-03 | Stop reason: HOSPADM

## 2018-04-03 RX ORDER — TESTOSTERONE CYPIONATE 200 MG/ML
200 INJECTION, SOLUTION INTRAMUSCULAR
Status: CANCELLED
Start: 2018-04-03

## 2018-04-03 RX ADMIN — TESTOSTERONE CYPIONATE 200 MG: 200 INJECTION, SOLUTION INTRAMUSCULAR at 10:54

## 2018-04-03 NOTE — PROGRESS NOTES
Patient of Dr. Wlofe  states he is here today for his testosterone injection. Patient denies any fever, chills, N&V or hematuria. I administered 1cc/ 200 mg of testosterone IM right hip with no complications. Dr. Vora was here in the office at the time of injection. The patient was advised to follow up in 3 weeks for his next testosterone injection. He verbalized understanding.

## 2018-04-24 ENCOUNTER — PROCEDURE VISIT (OUTPATIENT)
Dept: UROLOGY | Facility: CLINIC | Age: 48
End: 2018-04-24

## 2018-04-24 DIAGNOSIS — E29.1 HYPOGONADISM MALE: Primary | ICD-10-CM

## 2018-04-24 PROCEDURE — 96372 THER/PROPH/DIAG INJ SC/IM: CPT | Performed by: UROLOGY

## 2018-04-24 RX ORDER — TESTOSTERONE CYPIONATE 200 MG/ML
200 INJECTION, SOLUTION INTRAMUSCULAR
Status: DISCONTINUED | OUTPATIENT
Start: 2018-04-24 | End: 2018-04-24 | Stop reason: HOSPADM

## 2018-04-24 RX ORDER — TESTOSTERONE CYPIONATE 200 MG/ML
200 INJECTION, SOLUTION INTRAMUSCULAR
Start: 2018-04-24

## 2018-04-24 RX ADMIN — TESTOSTERONE CYPIONATE 200 MG: 200 INJECTION, SOLUTION INTRAMUSCULAR at 15:07

## 2018-08-28 ENCOUNTER — TELEPHONE (OUTPATIENT)
Dept: UROLOGY | Facility: CLINIC | Age: 48
End: 2018-08-28

## 2018-09-13 NOTE — PROGRESS NOTES
Mr. Tafoya is 48 y.o. male    CHIEF COMPLAINT: I am here for follow up on Malignant neoplasm of overlapping sites of bladder.    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  Context: This was identified during for an evaluation of dysuria and gross hematuria  Modifying factors: Patient underwent induction BCG on 2 separate occasions followed by a single maintenance BCG after the second induction.  Subsequent biopsy showed that he was BCG refractory.  Associated signs or symptoms: His ileal loop is functioning well.  He is able to keep an appliance on but occasionally has a seal break at night occludes the catheter.  There is been no blood in the urine.  History related to this issue:   -11/2017: Radical cystoprostatectomy/BPLND by Jessie Badillo [facility], [facility] [place].   Final Diagnosis            1. DISTAL RIGHT URETER, BIOPSY:               BENIGN URETERAL TISSUE.           2. DISTAL LEFT URETER, BIOPSY:               BENIGN URETERAL TISSUE.            3. PELVIC APICAL MARGIN, BIOPSY:               BENIGN FIBROVASCULAR, FATTY, AND NERVE TISSUES, WITH GANGLION.              4. RADICAL CYSTOPROSTATECTOMY SPECIMEN:               FLAT UROTHELIAL CARCINOMA IN SITU.               EXTENSIVE MUCOSAL/SUBMUCOSAL FIBROSIS WITH CHRONIC                             INFLAMMATION                            AND REACTIVE CHANGE.               NEGATIVE SEMINAL VESICLE AND VAS DEFERENS MARGINS.               NEGATIVE URETERAL MARGINS.               NEGATIVE PROSTATIC URETHRAL MARGIN.               PROSTATE WITH NECROTIZING GRANULOMATOUS INFLAMMATION.           5. RIGHT PELVIC LYMPH NODES (4):               NO TUMOR IDENTIFIED.               6. LEFT PELVIC LYMPH NODES (6):               NO TUMOR IDENTIFIED.       -09/2017:  BLADDER BIOPSY  Final Diagnosis   1.  Urinary bladder, right erythema, biopsy:  Focal , flat high-grade urothelial carcinoma in situ.   Severe mixed inflammation  Negative for invasive carcinoma     2.  Urinary bladder, left lateral wall tumor, biopsy:  Focal , flat high-grade urothelial carcinoma in situ.   Severe mixed inflammation  Negative for invasive carcinoma      Addendum electronically signed by Gini Ho MD on 9/13/2017 at 1224       - 03/2017: TURBT  Final Diagnosis   1.  Urinary bladder, lesion, biopsy:  Reactive urothelium  Severe mixed inflammation     2.  Urinary bladder, dome tumor, biopsy:  Non-invasive, high-grade papillary urothelial carcinoma  Muscularis propria (detrusor muscle) present   Electronically signed by Gini Ho MD on 4/4/2017 at 1440       - 11/2016: TURBT/Bx's after second course of induction BCG  Final Diagnosis   1.  Urinary bladder, right lateral wall, biopsy :  Severe mixed inflammation  Denuded urothelium  Rare urothelium present in demonstrates reactive changes  Negative for invasive carcinoma     2.  Urinary bladder, dome, biopsy :  Reactive urothelium  Mixed inflammation  Negative for invasive carcinoma     3.  Urinary bladder, left lateral wall, biopsy:  Reactive urothelium  Mixed inflammation  Negative for invasive carcinoma   Electronically signed by Gini Ho MD on 12/7/2016 at 0834       -09/2016: TURBT after intial induction BCG    Final Diagnosis                           1.     Urinary bladder, biopsy at previous tumor site:                                      A.     Focal high-grade urothelial atypia consistent with residual                           flat urothelial carcinoma in                                           situ.                                       B.     Negative for evidence of invasive malignancy.                                           C.     Changes compatible with previous biopsy site identified                            with mild to focally moderate chronic inflammation.                                                       2.      Urinary bladder, random bladder biopsies:                                      A.     Flat high-grade urothelial carcinoma in situ.                                      B.     Mild to focally moderate chronic inflammation.                                      C.     Negative for evidence of invasive malignancy.                                                             AJCC stage:  pTis, pNX, pMX.    - 6/2016: TURBT -  Initial    Final Diagnosis                           1.     Urinary bladder, biopsy of urothelium around tumor:                                      A.     Focal changes of a high-grade non-invasive papillary urothelial                           carcinoma.                                      B.     Urothelial carcinoma in situ.                                                        2.     Urinary bladder, transurethral resection:                                      A.     Non-invasive papillary carcinoma, high-grade.                                       B.     Detrusor muscle present.                                                        Pathologic AJCC classification:  pTa and pTis.    He is also complaining of poor libido and erectile dysfunction consistent with aSHIM score of 4.  He has a history of hypogonadism and also complains of fatigue.  He has tried Cialis for his erections on 2 occasions which have been unsuccessful.      The following portions of the patient's history were reviewed and updated as appropriate: allergies, current medications, past family history, past medical history, past social history, past surgical history and problem list.      Review of Systems   Constitutional: Negative for chills and fever.   Gastrointestinal: Negative for abdominal pain, anal bleeding and blood in stool.   Genitourinary: Negative for dysuria, frequency, hematuria and urgency.            Current Outpatient Prescriptions:   •  amLODIPine (NORVASC) 10 MG tablet, Take 1 tablet by mouth Daily., Disp: 30 tablet, Rfl: 1  •  atenolol (TENORMIN) 50 MG tablet, Take 1 tablet by mouth 2 (Two) Times a Day., Disp: 60 tablet, Rfl: 0  •  BREO ELLIPTA 100-25 MCG/INH aerosol powder , Inhale 1 puff Daily., Disp: , Rfl: 3  •  buPROPion (WELLBUTRIN) 75 MG tablet, Take 75 mg by mouth Daily., Disp: , Rfl:   •  cloNIDine (CATAPRES) 0.1 MG tablet, Take 0.1 mg by mouth 3 (Three) Times a Day., Disp: , Rfl:   •  cyclobenzaprine (FLEXERIL) 10 MG tablet, Take 1 tablet by mouth 3 (Three) Times a Day As Needed for Muscle Spasms., Disp: 5 tablet, Rfl: 0  •  HYDROcodone-acetaminophen (NORCO)  MG per tablet, Take 1 tablet by mouth Every 6 (Six) Hours As Needed for Moderate Pain ., Disp: , Rfl:   •  metFORMIN (GLUCOPHAGE) 500 MG tablet, Take 1 tablet by mouth 2 (Two) Times a Day With Meals., Disp: 60 tablet, Rfl: 1  •  sildenafil (REVATIO) 20 MG tablet, Take 1-4 tablets 1-2 hours before sexual activity PRN, Disp: 30 tablet, Rfl: 11    Past Medical History:   Diagnosis Date   • Anxiety    • Back pain    • Bladder carcinoma (CMS/HCC) 06/2016    High grade CIS & ta low grade   • H/O hematuria    • History of pneumonia 2011   • Hypertension    • Overweight    • Smoking    • Urinary retention    • Wheezing     r/t smoking       Past Surgical History:   Procedure Laterality Date   • BACK SURGERY      x3   • CYSTECTOMY N/A 11/2/2017    Procedure: CYSTOPROSTATECTOMY WITH BILATERAL PELVIC LYMPHADENECTOMY AND ILEAL CONDUIT URINARY DIVERSON;  Surgeon: Vijay Badillo Jr., MD;  Location: [facility] OR;  Service:    • CYSTOSCOPY N/A 10/9/2017    Procedure: CYSTOSCOPY AND EXAM UNDER ANESTHESIA;  Surgeon: Vijay Badillo Jr., MD;  Location: [facility] OR;  Service:    • CYSTOSCOPY BLADDER BIOPSY N/A 11/30/2016    Procedure: CYSTOSCOPY BLADDER BIOPSY fulgeration of 3cm area of bladder;  Surgeon: Brandon Wolfe MD;   "Location:  [facility] OR;  Service:    • CYSTOSCOPY RETROGRADE PYELOGRAM Bilateral 7/14/2017    Procedure: CYSTOSCOPY RETROGRADE PYELOGRAM;  Surgeon: Brandon Wolfe MD;  Location:  [facility] OR;  Service:    • TRANSURETHRAL RESECTION OF BLADDER TUMOR N/A 3/31/2017    Procedure: CYSTOSCOPY , BLADDER BIOPSY, AND TRANSURETHRAL RESECTION OF BLADDER TUMOR ;  Surgeon: Brandon Wolfe MD;  Location:  [facility] OR;  Service:    • TRANSURETHRAL RESECTION OF BLADDER TUMOR  06/2016    High Grade CIS & Low grade ta disease   • TRANSURETHRAL RESECTION OF BLADDER TUMOR N/A 7/14/2017    Procedure: CYSTOSCOPY TRANSURETHRAL RESECTION OF BLADDER TUMOR & BILATERAL RETROGRADE URETEROPYELOGRAMS;  Surgeon: Brandon Wolfe MD;  Location:  [facility] OR;  Service:        Social History     Social History   • Marital status:      Social History Main Topics   • Smoking status: Current Every Day Smoker     Packs/day: 1.00     Years: 34.00     Types: Cigarettes   • Smokeless tobacco: Never Used   • Alcohol use No   • Drug use: No   • Sexual activity: Defer     Other Topics Concern   • Not on file       Family History   Problem Relation Age of Onset   • No Known Problems Father    • No Known Problems Mother    • Malig Hyperthermia Neg Hx          BP (!) 190/90   Temp 97.3 °F (36.3 °C)   Ht 180.3 cm (71\")   Wt 95.9 kg (211 lb 6.4 oz)   BMI 29.48 kg/m²       Physical Exam  Constitutional:  They  appear well-developed and well-nourished. There are no obvious deformities. No distress.   Pulmonary/Chest: Effort normal.   GI: Soft. The patient exhibits no distension and no mass. There is no tenderness. There is no rebound and no guarding. No hernia.  Stoma is pink and patent.    Neurological: Patient is alert and oriented to person, place, and time.   Skin: Skin is warm and dry. Not diaphoretic.   Psychiatric:  normal mood and affect. Not agitated.         Data  Results for orders placed or performed in visit on 09/14/18   CBC (No Diff)   Result Value " Ref Range    WBC 8.42 4.80 - 10.80 10*3/mm3    RBC 4.91 4.80 - 5.90 10*6/mm3    Hemoglobin 14.8 14.0 - 18.0 g/dL    Hematocrit 44.9 40.0 - 52.0 %    MCV 91.4 82.0 - 95.0 fL    MCH 30.1 28.0 - 32.0 pg    MCHC 33.0 33.0 - 36.0 g/dL    RDW 14.1 12.0 - 15.0 %    Platelets 200 130 - 400 10*3/mm3   Testosterone   Result Value Ref Range    Testosterone, Total 184 (L) 264 - 916 ng/dL         Assessment and Plan  Diagnoses and all orders for this visit:    Malignant neoplasm of overlapping sites of bladder (CMS/HCC)  -     Cancel: POC Urinalysis Dipstick, Multipro  -     Cancel: US renal bilateral; Future  -     US Renal Bilateral; Future  -     CT Abdomen Pelvis With & Without Contrast; Future  -     CBC Auto Differential; Future  -     Comprehensive metabolic panel; Future  -     eGFR-Glomerular Filtration; Future    Erectile dysfunction due to arterial insufficiency  -     sildenafil (REVATIO) 20 MG tablet; Take 1-4 tablets 1-2 hours before sexual activity PRN      #1. No evidence of recurrent disease.  I will obtain a renal ultrasound to rule out any evidence for hydronephrosis.  Before the next visit we will get lab work, chest x-ray to CT scan of the abdomen and pelvis.  #2.  His erectile dysfunction has actually worsened.  I would like for him to be on Revatio.  He has been unable to afford the regular Viagra so he did not try it at the last prescription.    (Please note that portions of this note were completed with a voice recognition program.)  Brandon Wolfe MD  09/20/18  6:47 PM      Cc: Tam Gonzalez MD

## 2018-09-14 DIAGNOSIS — E29.1 HYPOGONADISM MALE: Primary | ICD-10-CM

## 2018-09-15 LAB
ERYTHROCYTE [DISTWIDTH] IN BLOOD BY AUTOMATED COUNT: 14.1 % (ref 12–15)
HCT VFR BLD AUTO: 44.9 % (ref 40–52)
HGB BLD-MCNC: 14.8 G/DL (ref 14–18)
MCH RBC QN AUTO: 30.1 PG (ref 28–32)
MCHC RBC AUTO-ENTMCNC: 33 G/DL (ref 33–36)
MCV RBC AUTO: 91.4 FL (ref 82–95)
PLATELET # BLD AUTO: 200 10*3/MM3 (ref 130–400)
RBC # BLD AUTO: 4.91 10*6/MM3 (ref 4.8–5.9)
TESTOST SERPL-MCNC: 184 NG/DL (ref 264–916)
WBC # BLD AUTO: 8.42 10*3/MM3 (ref 4.8–10.8)

## 2018-09-19 ENCOUNTER — OFFICE VISIT (OUTPATIENT)
Dept: UROLOGY | Facility: CLINIC | Age: 48
End: 2018-09-19

## 2018-09-19 VITALS
DIASTOLIC BLOOD PRESSURE: 90 MMHG | TEMPERATURE: 97.3 F | BODY MASS INDEX: 29.6 KG/M2 | WEIGHT: 211.4 LBS | HEIGHT: 71 IN | SYSTOLIC BLOOD PRESSURE: 190 MMHG

## 2018-09-19 DIAGNOSIS — C67.8 MALIGNANT NEOPLASM OF OVERLAPPING SITES OF BLADDER (HCC): Primary | ICD-10-CM

## 2018-09-19 DIAGNOSIS — N52.01 ERECTILE DYSFUNCTION DUE TO ARTERIAL INSUFFICIENCY: ICD-10-CM

## 2018-09-19 PROCEDURE — 99214 OFFICE O/P EST MOD 30 MIN: CPT | Performed by: UROLOGY

## 2018-09-19 RX ORDER — SILDENAFIL CITRATE 20 MG/1
TABLET ORAL
Qty: 30 TABLET | Refills: 11 | Status: SHIPPED | OUTPATIENT
Start: 2018-09-19 | End: 2019-04-04

## 2018-11-28 ENCOUNTER — APPOINTMENT (OUTPATIENT)
Dept: CT IMAGING | Facility: HOSPITAL | Age: 48
End: 2018-11-28

## 2018-11-28 ENCOUNTER — HOSPITAL ENCOUNTER (EMERGENCY)
Facility: HOSPITAL | Age: 48
Discharge: HOME | End: 2018-11-28
Attending: EMERGENCY MEDICINE | Admitting: EMERGENCY MEDICINE

## 2018-11-28 VITALS
RESPIRATION RATE: 16 BRPM | BODY MASS INDEX: 29.4 KG/M2 | TEMPERATURE: 98 F | HEIGHT: 71 IN | HEART RATE: 89 BPM | WEIGHT: 210 LBS | OXYGEN SATURATION: 99 % | DIASTOLIC BLOOD PRESSURE: 84 MMHG | SYSTOLIC BLOOD PRESSURE: 163 MMHG

## 2018-11-28 DIAGNOSIS — R55 SYNCOPE AND COLLAPSE: ICD-10-CM

## 2018-11-28 DIAGNOSIS — R06.00 DYSPNEA, UNSPECIFIED TYPE: ICD-10-CM

## 2018-11-28 DIAGNOSIS — K62.5 RECTAL BLEED: Primary | ICD-10-CM

## 2018-11-28 LAB
ABO GROUP BLD: NORMAL
ALBUMIN SERPL-MCNC: 4.3 G/DL (ref 3.5–5)
ALBUMIN/GLOB SERPL: 1.3 G/DL (ref 1.1–2.5)
ALP SERPL-CCNC: 91 U/L (ref 24–120)
ALT SERPL W P-5'-P-CCNC: 21 U/L (ref 0–54)
ANION GAP SERPL CALCULATED.3IONS-SCNC: 13 MMOL/L (ref 4–13)
AST SERPL-CCNC: 22 U/L (ref 7–45)
BASOPHILS # BLD AUTO: 0.03 10*3/MM3 (ref 0–0.2)
BASOPHILS NFR BLD AUTO: 0.4 % (ref 0–2)
BILIRUB SERPL-MCNC: 0.3 MG/DL (ref 0.1–1)
BLD GP AB SCN SERPL QL: NEGATIVE
BUN BLD-MCNC: 15 MG/DL (ref 5–21)
BUN/CREAT SERPL: 15.3 (ref 7–25)
CALCIUM SPEC-SCNC: 8.8 MG/DL (ref 8.4–10.4)
CHLORIDE SERPL-SCNC: 100 MMOL/L (ref 98–110)
CO2 SERPL-SCNC: 25 MMOL/L (ref 24–31)
CREAT BLD-MCNC: 0.98 MG/DL (ref 0.5–1.4)
DEPRECATED RDW RBC AUTO: 39.3 FL (ref 40–54)
EOSINOPHIL # BLD AUTO: 0.16 10*3/MM3 (ref 0–0.7)
EOSINOPHIL NFR BLD AUTO: 2 % (ref 0–4)
ERYTHROCYTE [DISTWIDTH] IN BLOOD BY AUTOMATED COUNT: 12.8 % (ref 12–15)
GFR SERPL CREATININE-BSD FRML MDRD: 81 ML/MIN/1.73
GLOBULIN UR ELPH-MCNC: 3.4 GM/DL
GLUCOSE BLD-MCNC: 144 MG/DL (ref 70–100)
HCT VFR BLD AUTO: 43.4 % (ref 40–52)
HGB BLD-MCNC: 15.1 G/DL (ref 14–18)
HOLD SPECIMEN: NORMAL
IMM GRANULOCYTES # BLD: 0.02 10*3/MM3 (ref 0–0.03)
IMM GRANULOCYTES NFR BLD: 0.3 % (ref 0–5)
LYMPHOCYTES # BLD AUTO: 1.6 10*3/MM3 (ref 0.72–4.86)
LYMPHOCYTES NFR BLD AUTO: 20.5 % (ref 15–45)
MCH RBC QN AUTO: 29.4 PG (ref 28–32)
MCHC RBC AUTO-ENTMCNC: 34.8 G/DL (ref 33–36)
MCV RBC AUTO: 84.4 FL (ref 82–95)
MONOCYTES # BLD AUTO: 0.81 10*3/MM3 (ref 0.19–1.3)
MONOCYTES NFR BLD AUTO: 10.4 % (ref 4–12)
NEUTROPHILS # BLD AUTO: 5.19 10*3/MM3 (ref 1.87–8.4)
NEUTROPHILS NFR BLD AUTO: 66.4 % (ref 39–78)
NRBC BLD MANUAL-RTO: 0 /100 WBC (ref 0–0)
PLATELET # BLD AUTO: 209 10*3/MM3 (ref 130–400)
PMV BLD AUTO: 10.4 FL (ref 6–12)
POTASSIUM BLD-SCNC: 3.8 MMOL/L (ref 3.5–5.3)
PROT SERPL-MCNC: 7.7 G/DL (ref 6.3–8.7)
RBC # BLD AUTO: 5.14 10*6/MM3 (ref 4.8–5.9)
RH BLD: POSITIVE
SODIUM BLD-SCNC: 138 MMOL/L (ref 135–145)
T&S EXPIRATION DATE: NORMAL
TROPONIN I SERPL-MCNC: <0.012 NG/ML (ref 0–0.03)
WBC NRBC COR # BLD: 7.81 10*3/MM3 (ref 4.8–10.8)
WHOLE BLOOD HOLD SPECIMEN: NORMAL
WHOLE BLOOD HOLD SPECIMEN: NORMAL

## 2018-11-28 PROCEDURE — 94799 UNLISTED PULMONARY SVC/PX: CPT

## 2018-11-28 PROCEDURE — 85025 COMPLETE CBC W/AUTO DIFF WBC: CPT | Performed by: EMERGENCY MEDICINE

## 2018-11-28 PROCEDURE — 74177 CT ABD & PELVIS W/CONTRAST: CPT

## 2018-11-28 PROCEDURE — 86901 BLOOD TYPING SEROLOGIC RH(D): CPT | Performed by: EMERGENCY MEDICINE

## 2018-11-28 PROCEDURE — 80053 COMPREHEN METABOLIC PANEL: CPT | Performed by: EMERGENCY MEDICINE

## 2018-11-28 PROCEDURE — 84484 ASSAY OF TROPONIN QUANT: CPT | Performed by: EMERGENCY MEDICINE

## 2018-11-28 PROCEDURE — 71275 CT ANGIOGRAPHY CHEST: CPT

## 2018-11-28 PROCEDURE — 86900 BLOOD TYPING SEROLOGIC ABO: CPT | Performed by: EMERGENCY MEDICINE

## 2018-11-28 PROCEDURE — 86850 RBC ANTIBODY SCREEN: CPT | Performed by: EMERGENCY MEDICINE

## 2018-11-28 PROCEDURE — 99283 EMERGENCY DEPT VISIT LOW MDM: CPT

## 2018-11-28 PROCEDURE — 93010 ELECTROCARDIOGRAM REPORT: CPT | Performed by: INTERNAL MEDICINE

## 2018-11-28 PROCEDURE — 93005 ELECTROCARDIOGRAM TRACING: CPT | Performed by: EMERGENCY MEDICINE

## 2018-11-28 PROCEDURE — 0 IOPAMIDOL PER 1 ML: Performed by: EMERGENCY MEDICINE

## 2018-11-28 RX ORDER — SODIUM CHLORIDE 0.9 % (FLUSH) 0.9 %
10 SYRINGE (ML) INJECTION AS NEEDED
Status: DISCONTINUED | OUTPATIENT
Start: 2018-11-28 | End: 2018-11-28 | Stop reason: HOSPADM

## 2018-11-28 RX ADMIN — IOPAMIDOL 118 ML: 755 INJECTION, SOLUTION INTRAVENOUS at 17:58

## 2019-03-14 ENCOUNTER — OFFICE VISIT (OUTPATIENT)
Dept: PRIMARY CARE CLINIC | Age: 49
End: 2019-03-14
Payer: MEDICAID

## 2019-03-14 VITALS
HEART RATE: 64 BPM | RESPIRATION RATE: 14 BRPM | HEIGHT: 71 IN | OXYGEN SATURATION: 100 % | BODY MASS INDEX: 29.68 KG/M2 | WEIGHT: 212 LBS | SYSTOLIC BLOOD PRESSURE: 136 MMHG | TEMPERATURE: 98 F | DIASTOLIC BLOOD PRESSURE: 86 MMHG

## 2019-03-14 DIAGNOSIS — Z12.11 COLON CANCER SCREENING: ICD-10-CM

## 2019-03-14 DIAGNOSIS — Z13.220 LIPID SCREENING: ICD-10-CM

## 2019-03-14 DIAGNOSIS — F32.A ANXIETY AND DEPRESSION: ICD-10-CM

## 2019-03-14 DIAGNOSIS — Z76.89 ENCOUNTER TO ESTABLISH CARE: Primary | ICD-10-CM

## 2019-03-14 DIAGNOSIS — J42 CHRONIC BRONCHITIS, UNSPECIFIED CHRONIC BRONCHITIS TYPE (HCC): ICD-10-CM

## 2019-03-14 DIAGNOSIS — E55.9 VITAMIN D DEFICIENCY: ICD-10-CM

## 2019-03-14 DIAGNOSIS — Z72.0 TOBACCO ABUSE: ICD-10-CM

## 2019-03-14 DIAGNOSIS — I10 ESSENTIAL HYPERTENSION: ICD-10-CM

## 2019-03-14 DIAGNOSIS — Z85.51 HX OF BLADDER CANCER: ICD-10-CM

## 2019-03-14 DIAGNOSIS — K21.9 GASTROESOPHAGEAL REFLUX DISEASE WITHOUT ESOPHAGITIS: ICD-10-CM

## 2019-03-14 DIAGNOSIS — Z12.5 PROSTATE CANCER SCREENING: ICD-10-CM

## 2019-03-14 DIAGNOSIS — F41.9 ANXIETY AND DEPRESSION: ICD-10-CM

## 2019-03-14 DIAGNOSIS — Z71.6 TOBACCO ABUSE COUNSELING: ICD-10-CM

## 2019-03-14 PROCEDURE — G8484 FLU IMMUNIZE NO ADMIN: HCPCS | Performed by: NURSE PRACTITIONER

## 2019-03-14 PROCEDURE — G8926 SPIRO NO PERF OR DOC: HCPCS | Performed by: NURSE PRACTITIONER

## 2019-03-14 PROCEDURE — G8427 DOCREV CUR MEDS BY ELIG CLIN: HCPCS | Performed by: NURSE PRACTITIONER

## 2019-03-14 PROCEDURE — G8419 CALC BMI OUT NRM PARAM NOF/U: HCPCS | Performed by: NURSE PRACTITIONER

## 2019-03-14 PROCEDURE — 99406 BEHAV CHNG SMOKING 3-10 MIN: CPT | Performed by: NURSE PRACTITIONER

## 2019-03-14 PROCEDURE — 3017F COLORECTAL CA SCREEN DOC REV: CPT | Performed by: NURSE PRACTITIONER

## 2019-03-14 PROCEDURE — 99204 OFFICE O/P NEW MOD 45 MIN: CPT | Performed by: NURSE PRACTITIONER

## 2019-03-14 PROCEDURE — 4004F PT TOBACCO SCREEN RCVD TLK: CPT | Performed by: NURSE PRACTITIONER

## 2019-03-14 PROCEDURE — 3023F SPIROM DOC REV: CPT | Performed by: NURSE PRACTITIONER

## 2019-03-14 RX ORDER — BUSPIRONE HYDROCHLORIDE 5 MG/1
5 TABLET ORAL 3 TIMES DAILY PRN
Qty: 90 TABLET | Refills: 1 | Status: SHIPPED | OUTPATIENT
Start: 2019-03-14 | End: 2019-04-10 | Stop reason: SDUPTHER

## 2019-03-14 RX ORDER — CLONIDINE HYDROCHLORIDE 0.1 MG/1
0.1 TABLET ORAL DAILY
Qty: 60 TABLET | Refills: 1 | Status: ON HOLD | OUTPATIENT
Start: 2019-03-14 | End: 2019-05-06 | Stop reason: HOSPADM

## 2019-03-14 RX ORDER — OMEPRAZOLE 40 MG/1
40 CAPSULE, DELAYED RELEASE ORAL DAILY
Qty: 30 CAPSULE | Refills: 1 | Status: SHIPPED | OUTPATIENT
Start: 2019-03-14 | End: 2019-05-28 | Stop reason: SDUPTHER

## 2019-03-14 RX ORDER — CLONIDINE HYDROCHLORIDE 0.1 MG/1
0.1 TABLET ORAL
COMMUNITY
End: 2019-03-14 | Stop reason: SDUPTHER

## 2019-03-14 RX ORDER — TIZANIDINE 4 MG/1
TABLET ORAL
Refills: 5 | COMMUNITY
Start: 2018-12-31 | End: 2019-03-14 | Stop reason: SDUPTHER

## 2019-03-14 RX ORDER — SERTRALINE HYDROCHLORIDE 100 MG/1
TABLET, FILM COATED ORAL
Refills: 5 | COMMUNITY
Start: 2019-02-26 | End: 2019-03-14 | Stop reason: SDUPTHER

## 2019-03-14 RX ORDER — METAXALONE 800 MG/1
800 TABLET ORAL 4 TIMES DAILY
COMMUNITY
End: 2019-04-24

## 2019-03-14 RX ORDER — AMLODIPINE BESYLATE 5 MG/1
5 TABLET ORAL DAILY
COMMUNITY
End: 2019-03-14 | Stop reason: SDUPTHER

## 2019-03-14 RX ORDER — FLUTICASONE FUROATE AND VILANTEROL 100; 25 UG/1; UG/1
1 POWDER RESPIRATORY (INHALATION)
COMMUNITY
Start: 2016-08-20 | End: 2019-03-14 | Stop reason: SDUPTHER

## 2019-03-14 RX ORDER — ATENOLOL 50 MG/1
TABLET ORAL
Qty: 30 TABLET | Refills: 5 | Status: SHIPPED | OUTPATIENT
Start: 2019-03-14 | End: 2019-04-10 | Stop reason: SDUPTHER

## 2019-03-14 RX ORDER — SERTRALINE HYDROCHLORIDE 100 MG/1
TABLET, FILM COATED ORAL
Qty: 30 TABLET | Refills: 5 | Status: SHIPPED | OUTPATIENT
Start: 2019-03-14 | End: 2019-04-24

## 2019-03-14 RX ORDER — ATENOLOL 50 MG/1
TABLET ORAL
Refills: 5 | COMMUNITY
Start: 2019-02-26 | End: 2019-03-14 | Stop reason: SDUPTHER

## 2019-03-14 RX ORDER — VENLAFAXINE HYDROCHLORIDE 150 MG/1
150 CAPSULE, EXTENDED RELEASE ORAL DAILY
Qty: 30 CAPSULE | Refills: 5 | Status: SHIPPED | OUTPATIENT
Start: 2019-03-14 | End: 2019-04-10

## 2019-03-14 RX ORDER — FLUTICASONE FUROATE AND VILANTEROL 100; 25 UG/1; UG/1
1 POWDER RESPIRATORY (INHALATION) DAILY
Qty: 1 EACH | Refills: 2 | Status: SHIPPED | OUTPATIENT
Start: 2019-03-14 | End: 2019-04-10 | Stop reason: SDUPTHER

## 2019-03-14 RX ORDER — AMLODIPINE BESYLATE 5 MG/1
5 TABLET ORAL DAILY
Qty: 30 TABLET | Refills: 5 | Status: ON HOLD | OUTPATIENT
Start: 2019-03-14 | End: 2019-05-06 | Stop reason: HOSPADM

## 2019-03-14 RX ORDER — TIZANIDINE 4 MG/1
TABLET ORAL
Qty: 60 TABLET | Refills: 5 | Status: SHIPPED | OUTPATIENT
Start: 2019-03-14 | End: 2019-07-09

## 2019-03-14 RX ORDER — VENLAFAXINE HYDROCHLORIDE 150 MG/1
150 CAPSULE, EXTENDED RELEASE ORAL DAILY
Refills: 5 | COMMUNITY
Start: 2019-02-05 | End: 2019-03-14 | Stop reason: SDUPTHER

## 2019-03-14 RX ORDER — OMEPRAZOLE 20 MG/1
20 CAPSULE, DELAYED RELEASE ORAL DAILY
COMMUNITY
End: 2019-03-14 | Stop reason: SDUPTHER

## 2019-03-14 RX ORDER — HYDROCODONE BITARTRATE AND ACETAMINOPHEN 10; 325 MG/1; MG/1
TABLET ORAL
Refills: 0 | COMMUNITY
Start: 2019-02-14 | End: 2019-04-10

## 2019-03-14 ASSESSMENT — PATIENT HEALTH QUESTIONNAIRE - PHQ9
1. LITTLE INTEREST OR PLEASURE IN DOING THINGS: 0
2. FEELING DOWN, DEPRESSED OR HOPELESS: 0
SUM OF ALL RESPONSES TO PHQ9 QUESTIONS 1 & 2: 0
SUM OF ALL RESPONSES TO PHQ QUESTIONS 1-9: 0
SUM OF ALL RESPONSES TO PHQ QUESTIONS 1-9: 0

## 2019-03-14 ASSESSMENT — ENCOUNTER SYMPTOMS
RESPIRATORY NEGATIVE: 1
GASTROINTESTINAL NEGATIVE: 1
EYES NEGATIVE: 1

## 2019-03-18 ENCOUNTER — RESULTS ENCOUNTER (OUTPATIENT)
Dept: UROLOGY | Facility: CLINIC | Age: 49
End: 2019-03-18

## 2019-03-18 DIAGNOSIS — C67.8 MALIGNANT NEOPLASM OF OVERLAPPING SITES OF BLADDER (HCC): ICD-10-CM

## 2019-03-19 ENCOUNTER — TELEPHONE (OUTPATIENT)
Dept: UROLOGY | Facility: CLINIC | Age: 49
End: 2019-03-19

## 2019-03-20 ENCOUNTER — APPOINTMENT (OUTPATIENT)
Dept: CT IMAGING | Facility: HOSPITAL | Age: 49
End: 2019-03-20
Attending: UROLOGY

## 2019-03-20 ENCOUNTER — HOSPITAL ENCOUNTER (OUTPATIENT)
Dept: ULTRASOUND IMAGING | Facility: HOSPITAL | Age: 49
Discharge: HOME | End: 2019-03-20
Attending: UROLOGY | Admitting: UROLOGY

## 2019-03-20 DIAGNOSIS — C67.8 MALIGNANT NEOPLASM OF OVERLAPPING SITES OF BLADDER (HCC): ICD-10-CM

## 2019-03-20 DIAGNOSIS — C67.8 MALIGNANT NEOPLASM OF OVERLAPPING SITES OF BLADDER (HCC): Primary | ICD-10-CM

## 2019-03-20 PROCEDURE — 76775 US EXAM ABDO BACK WALL LIM: CPT

## 2019-03-22 DIAGNOSIS — C67.8 MALIGNANT NEOPLASM OF OVERLAPPING SITES OF BLADDER (HCC): ICD-10-CM

## 2019-03-26 LAB
ALBUMIN SERPL-MCNC: 4.2 G/DL (ref 3.5–5.2)
ALBUMIN/GLOB SERPL: 1.4 G/DL
ALP SERPL-CCNC: 98 U/L (ref 39–117)
ALT SERPL-CCNC: 12 U/L (ref 1–41)
AST SERPL-CCNC: 14 U/L (ref 1–40)
BILIRUB SERPL-MCNC: 0.3 MG/DL (ref 0.2–1.2)
BUN SERPL-MCNC: 16 MG/DL (ref 6–20)
BUN/CREAT SERPL: 16 (ref 7–25)
CALCIUM SERPL-MCNC: 9.5 MG/DL (ref 8.6–10.5)
CHLORIDE SERPL-SCNC: 100 MMOL/L (ref 98–107)
CO2 SERPL-SCNC: 21.2 MMOL/L (ref 22–29)
CREAT SERPL-MCNC: 1 MG/DL (ref 0.76–1.27)
GLOBULIN SER CALC-MCNC: 3.1 GM/DL
GLUCOSE SERPL-MCNC: 201 MG/DL (ref 65–99)
POTASSIUM SERPL-SCNC: 4.3 MMOL/L (ref 3.5–5.2)
PROT SERPL-MCNC: 7.3 G/DL (ref 6–8.5)
SODIUM SERPL-SCNC: 136 MMOL/L (ref 136–145)

## 2019-03-27 DIAGNOSIS — C67.8 MALIGNANT NEOPLASM OF OVERLAPPING SITES OF BLADDER (HCC): Primary | ICD-10-CM

## 2019-04-01 ENCOUNTER — HOSPITAL ENCOUNTER (OUTPATIENT)
Dept: CT IMAGING | Facility: HOSPITAL | Age: 49
Discharge: HOME | End: 2019-04-01
Admitting: UROLOGY

## 2019-04-01 DIAGNOSIS — C67.8 MALIGNANT NEOPLASM OF OVERLAPPING SITES OF BLADDER (HCC): ICD-10-CM

## 2019-04-01 LAB — CREAT BLDA-MCNC: 1 MG/DL (ref 0.6–1.3)

## 2019-04-01 PROCEDURE — 82565 ASSAY OF CREATININE: CPT

## 2019-04-01 PROCEDURE — 74178 CT ABD&PLV WO CNTR FLWD CNTR: CPT

## 2019-04-01 PROCEDURE — 25010000002 IOPAMIDOL 61 % SOLUTION: Performed by: UROLOGY

## 2019-04-01 RX ADMIN — IOPAMIDOL 100 ML: 612 INJECTION, SOLUTION INTRAVENOUS at 08:12

## 2019-04-02 DIAGNOSIS — F32.A ANXIETY AND DEPRESSION: ICD-10-CM

## 2019-04-02 DIAGNOSIS — E55.9 VITAMIN D DEFICIENCY: ICD-10-CM

## 2019-04-02 DIAGNOSIS — I10 ESSENTIAL HYPERTENSION: ICD-10-CM

## 2019-04-02 DIAGNOSIS — Z12.5 PROSTATE CANCER SCREENING: ICD-10-CM

## 2019-04-02 DIAGNOSIS — F41.9 ANXIETY AND DEPRESSION: ICD-10-CM

## 2019-04-02 DIAGNOSIS — Z13.220 LIPID SCREENING: ICD-10-CM

## 2019-04-02 LAB
ALBUMIN SERPL-MCNC: 4.3 G/DL (ref 3.5–5.2)
ALP BLD-CCNC: 119 U/L (ref 40–130)
ALT SERPL-CCNC: 18 U/L (ref 5–41)
ANION GAP SERPL CALCULATED.3IONS-SCNC: 16 MMOL/L (ref 7–19)
AST SERPL-CCNC: 25 U/L (ref 5–40)
BILIRUB SERPL-MCNC: 0.3 MG/DL (ref 0.2–1.2)
BUN BLDV-MCNC: 14 MG/DL (ref 6–20)
CALCIUM SERPL-MCNC: 9.7 MG/DL (ref 8.6–10)
CHLORIDE BLD-SCNC: 97 MMOL/L (ref 98–111)
CHOLESTEROL, TOTAL: 342 MG/DL (ref 160–199)
CO2: 24 MMOL/L (ref 22–29)
CREAT SERPL-MCNC: 0.9 MG/DL (ref 0.5–1.2)
GFR NON-AFRICAN AMERICAN: >60
GLUCOSE BLD-MCNC: 227 MG/DL (ref 74–109)
HBA1C MFR BLD: 7.5 % (ref 4–6)
HCT VFR BLD CALC: 46.6 % (ref 42–52)
HDLC SERPL-MCNC: 27 MG/DL (ref 55–121)
HEMOGLOBIN: 15.4 G/DL (ref 14–18)
LDL CHOLESTEROL CALCULATED: ABNORMAL MG/DL
LDL CHOLESTEROL DIRECT: 105 MG/DL
MCH RBC QN AUTO: 29.6 PG (ref 27–31)
MCHC RBC AUTO-ENTMCNC: 33 G/DL (ref 33–37)
MCV RBC AUTO: 89.4 FL (ref 80–94)
PDW BLD-RTO: 12.9 % (ref 11.5–14.5)
PLATELET # BLD: 157 K/UL (ref 130–400)
PMV BLD AUTO: 10.2 FL (ref 9.4–12.4)
POTASSIUM SERPL-SCNC: 4.2 MMOL/L (ref 3.5–5)
RBC # BLD: 5.21 M/UL (ref 4.7–6.1)
SODIUM BLD-SCNC: 137 MMOL/L (ref 136–145)
TOTAL PROTEIN: 8 G/DL (ref 6.6–8.7)
TRIGL SERPL-MCNC: 1431 MG/DL (ref 0–149)
TSH SERPL DL<=0.05 MIU/L-ACNC: 2.45 UIU/ML (ref 0.27–4.2)
VITAMIN B-12: 988 PG/ML (ref 211–946)
VITAMIN D 25-HYDROXY: 16.2 NG/ML
WBC # BLD: 7.8 K/UL (ref 4.8–10.8)

## 2019-04-02 NOTE — PROGRESS NOTES
Mr. Tafoya is 49 y.o. male    CHIEF COMPLAINT: I am here for follow up on bladder cancer.     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  Context: This was identified during for an evaluation of dysuria and gross hematuria  Modifying factors: Patient underwent induction BCG on 2 separate occasions followed by a single maintenance BCG after the second induction.  Subsequent biopsy showed that he was BCG refractory. He then underwent cystectomy and ileal loop which has resulted in KYLEE status.   Associated signs or symptoms: No hematuria or flank pain. History related to this issue:   -11/2017: Radical cystoprostatectomy/BPLND by [facility] [place].   Final Diagnosis            1. DISTAL RIGHT URETER, BIOPSY:               BENIGN URETERAL TISSUE.           2. DISTAL LEFT URETER, BIOPSY:               BENIGN URETERAL TISSUE.            3. PELVIC APICAL MARGIN, BIOPSY:               BENIGN FIBROVASCULAR, FATTY, AND NERVE TISSUES, WITH GANGLION.              4. RADICAL CYSTOPROSTATECTOMY SPECIMEN:               FLAT UROTHELIAL CARCINOMA IN SITU.               EXTENSIVE MUCOSAL/SUBMUCOSAL FIBROSIS WITH CHRONIC                             INFLAMMATION                            AND REACTIVE CHANGE.               NEGATIVE SEMINAL VESICLE AND VAS DEFERENS MARGINS.               NEGATIVE URETERAL MARGINS.               NEGATIVE PROSTATIC URETHRAL MARGIN.               PROSTATE WITH NECROTIZING GRANULOMATOUS INFLAMMATION.           5. RIGHT PELVIC LYMPH NODES (4):               NO TUMOR IDENTIFIED.               6. LEFT PELVIC LYMPH NODES (6):               NO TUMOR IDENTIFIED.         -09/2017: BLADDER BIOPSY  Final Diagnosis   1.  Urinary bladder, right erythema, biopsy:  Focal , flat high-grade  urothelial carcinoma in situ.   Severe mixed inflammation  Negative for invasive carcinoma     2.  Urinary bladder, left lateral wall tumor, biopsy:  Focal , flat high-grade urothelial carcinoma in situ.   Severe mixed inflammation  Negative for invasive carcinoma      Addendum electronically signed by Gini Ho MD on 9/13/2017 at 1224         - 03/2017: TURBT  Final Diagnosis   1.  Urinary bladder, lesion, biopsy:  Reactive urothelium  Severe mixed inflammation     2.  Urinary bladder, dome tumor, biopsy:  Non-invasive, high-grade papillary urothelial carcinoma  Muscularis propria (detrusor muscle) present   Electronically signed by Gini Ho MD on 4/4/2017 at 1440         - 11/2016: TURBT/Bx's after second course of induction BCG  Final Diagnosis   1.  Urinary bladder, right lateral wall, biopsy :  Severe mixed inflammation  Denuded urothelium  Rare urothelium present in demonstrates reactive changes  Negative for invasive carcinoma     2.  Urinary bladder, dome, biopsy :  Reactive urothelium  Mixed inflammation  Negative for invasive carcinoma     3.  Urinary bladder, left lateral wall, biopsy:  Reactive urothelium  Mixed inflammation  Negative for invasive carcinoma   Electronically signed by Gini Ho MD on 12/7/2016 at 0834         -09/2016: TURBT after intial induction BCG                          Final Diagnosis                           1.     Urinary bladder, biopsy at previous tumor site:                                      A.     Focal high-grade urothelial atypia consistent with residual                           flat urothelial carcinoma in                                           situ.                                       B.     Negative for evidence of invasive malignancy.                                           C.     Changes compatible with previous biopsy site identified                           with mild to focally moderate chronic inflammation.                                                        2.      Urinary bladder, random bladder biopsies:                                      A.     Flat high-grade urothelial carcinoma in situ.                                      B.     Mild to focally moderate chronic inflammation.                                      C.     Negative for evidence of invasive malignancy.                                                             AJCC stage:  pTis, pNX, pMX.    - 6/2016: TURBT -  Initial                          Final Diagnosis                           1.     Urinary bladder, biopsy of urothelium around tumor:                                      A.     Focal changes of a high-grade non-invasive papillary urothelial                           carcinoma.                                      B.     Urothelial carcinoma in situ.                                                        2.     Urinary bladder, transurethral resection:                                      A.     Non-invasive papillary carcinoma, high-grade.                                       B.     Detrusor muscle present.                                                        Pathologic AJCC classification:  pTa and pTis.        -He is also followed for ED since cystectomy 17 months ago. He has not responded to PDE 5 inhibitors but hasn't tried them in nine months. He does get partial erections now but he didn't at that time. .         The following portions of the patient's history were reviewed and updated as appropriate: allergies, current medications, past family history, past medical history, past social history, past surgical history and problem list.      Review of Systems   Constitutional: Negative for chills and fever.   Gastrointestinal: Negative for abdominal pain, anal bleeding and blood in stool.   Genitourinary: Negative for dysuria, frequency, hematuria and urgency.           Current Outpatient Medications:   •  amLODIPine (NORVASC) 10 MG tablet, Take 1 tablet  by mouth Daily., Disp: 30 tablet, Rfl: 1  •  atenolol (TENORMIN) 50 MG tablet, Take 1 tablet by mouth 2 (Two) Times a Day., Disp: 60 tablet, Rfl: 0  •  BREO ELLIPTA 100-25 MCG/INH aerosol powder , Inhale 1 puff Daily., Disp: , Rfl: 3  •  buPROPion (WELLBUTRIN) 75 MG tablet, Take 75 mg by mouth Daily., Disp: , Rfl:   •  busPIRone (BUSPAR) 5 MG tablet, Take 5 mg by mouth., Disp: , Rfl:   •  cloNIDine (CATAPRES) 0.1 MG tablet, Take 0.1 mg by mouth 3 (Three) Times a Day., Disp: , Rfl:   •  cyclobenzaprine (FLEXERIL) 10 MG tablet, Take 1 tablet by mouth 3 (Three) Times a Day As Needed for Muscle Spasms., Disp: 5 tablet, Rfl: 0  •  HYDROcodone-acetaminophen (NORCO)  MG per tablet, Take 1 tablet by mouth Every 6 (Six) Hours As Needed for Moderate Pain ., Disp: , Rfl:   •  metFORMIN (GLUCOPHAGE) 500 MG tablet, Take 1 tablet by mouth 2 (Two) Times a Day With Meals., Disp: 60 tablet, Rfl: 1  •  sildenafil (REVATIO) 20 MG tablet, Take 1-5 tablets 1-2 hours before sexual activity PRN, Disp: 30 tablet, Rfl: 11    Past Medical History:   Diagnosis Date   • Anxiety    • Back pain    • Bladder carcinoma (CMS/HCC) 06/2016    High grade CIS & ta low grade   • H/O hematuria    • History of pneumonia 2011   • Hypertension    • Overweight    • Smoking    • Urinary retention    • Wheezing     r/t smoking       Past Surgical History:   Procedure Laterality Date   • BACK SURGERY      x3   • CYSTECTOMY N/A 11/2/2017    Procedure: CYSTOPROSTATECTOMY WITH BILATERAL PELVIC LYMPHADENECTOMY AND ILEAL CONDUIT URINARY DIVERSON;  Surgeon: Vijay Badillo Jr., MD;  Location: [facility] OR;  Service:    • CYSTOSCOPY N/A 10/9/2017    Procedure: CYSTOSCOPY AND EXAM UNDER ANESTHESIA;  Surgeon: Vijay Badillo Jr., MD;  Location: [facility] OR;  Service:    • CYSTOSCOPY BLADDER BIOPSY N/A 11/30/2016    Procedure: CYSTOSCOPY BLADDER BIOPSY fulgeration of 3cm area of bladder;  Surgeon: Brandon Wolfe MD;  Location:  [facility] OR;  Service:   "  • CYSTOSCOPY RETROGRADE PYELOGRAM Bilateral 7/14/2017    Procedure: CYSTOSCOPY RETROGRADE PYELOGRAM;  Surgeon: Brandon Wolfe MD;  Location:  [facility] OR;  Service:    • TRANSURETHRAL RESECTION OF BLADDER TUMOR N/A 3/31/2017    Procedure: CYSTOSCOPY , BLADDER BIOPSY, AND TRANSURETHRAL RESECTION OF BLADDER TUMOR ;  Surgeon: Brandon Wolfe MD;  Location:  [facility] OR;  Service:    • TRANSURETHRAL RESECTION OF BLADDER TUMOR  06/2016    High Grade CIS & Low grade ta disease   • TRANSURETHRAL RESECTION OF BLADDER TUMOR N/A 7/14/2017    Procedure: CYSTOSCOPY TRANSURETHRAL RESECTION OF BLADDER TUMOR & BILATERAL RETROGRADE URETEROPYELOGRAMS;  Surgeon: Brandon Wolfe MD;  Location:  [facility] OR;  Service:        Social History     Socioeconomic History   • Marital status:      Spouse name: Not on file   • Number of children: Not on file   • Years of education: Not on file   • Highest education level: Not on file   Tobacco Use   • Smoking status: Current Every Day Smoker     Packs/day: 1.00     Years: 34.00     Pack years: 34.00     Types: Cigarettes   • Smokeless tobacco: Never Used   Substance and Sexual Activity   • Alcohol use: No   • Drug use: No   • Sexual activity: Defer       Family History   Problem Relation Age of Onset   • No Known Problems Father    • No Known Problems Mother    • Malig Hyperthermia Neg Hx          Temp 97.2 °F (36.2 °C)   Ht 180.3 cm (71\")   Wt 95.3 kg (210 lb)   BMI 29.29 kg/m²       Physical Exam  Constitutional:  They  appear well-developed and well-nourished. There are no obvious deformities. No distress.   Pulmonary/Chest: Effort normal.   GI: Soft. The patient exhibits no distension and no mass. There is no tenderness. There is no rebound and no guarding. No hernia.   Neurological: Patient is alert and oriented to person, place, and time.   Skin: Skin is warm and dry. Not diaphoretic.   Psychiatric:  normal mood and affect. Not agitated.         Data  Results for orders placed or " performed during the hospital encounter of 04/01/19   POC Creatinine   Result Value Ref Range    Creatinine 1.00 0.60 - 1.30 mg/dL       CT ABDOMEN PELVIS W WO CONTRAST-  4/1/2019 8:09 AM CDT     HISTORY: Hematuria; C67.8-Malignant neoplasm of overlapping sites of  bladder. Status post cystectomy     COMPARISON: CT abdomen and pelvis dated 11/20/2018     TECHNIQUE: CT urography was performed. Radiation dose equals DLP 1568  mGy-cm.  Automated exposure control dose reduction technique was  implemented.     Thin section axial images were obtained with and without intravenous  contrast. Early and delayed postcontrast enhanced imaging performed. 2-D  sagittal coronal reconstructions were generated. Oral contrast was not  ingested.     FINDINGS:      The lung bases are clear.     There is no CT evidence of gallstones.     There is diffuse fatty liver infiltration. There is no biliary ductal  dilatation. The hepatic venous and portal venous structures are imaged  normally.     There is mild splenomegaly. Spleen measures 14 cm in [id number]  length. There are no focal splenic lesions.     There are no adrenal masses.     The no pancreatic lesions.     The kidneys enhance symmetrically.     There are no urinary tract calculi. Cystectomy changes appreciated with  ileal loop/urinary diversion and right lower quadrant urostomy. There is  no hydroureter or obstructive uropathy changes.     There is stool and gas identified throughout the colon which is not  dilated. There is no CT evidence of significant diverticular disease.     Small bowel is not dilated. The duodenal sweep is imaged appropriately.  The stomach is not distended.     There are small retroperitoneal, periaortic/pelvic lymph nodes. There  are small mesenteric lymph nodes. There are no pathologically enlarged  nodes.     There is atherosclerotic aortoiliofemoral calcifications.     Celiac axis, SMA and UVALDO are intact and uncompromised.     Degenerative changes  appreciated in both hip joints, greater on the  left. Changes from lumbar spine surgery appreciated. No acute osseous  abnormalities observed.     There is no ascites or pneumoperitoneum.           IMPRESSION:  1. History of bladder cancer with cystectomy and urinary diversion/ileal  loop.  2. No CT evidence of metastatic disease/recurrent malignancy.  3. Hepatic steatosis.  4. Mild splenomegaly.  This report was finalized on 2019 08:50 by Dr. Doroteo Morales MD.    These images were made available to me to review independently.  I did review the radiologist's report described above with regard to the urologic findings. No upper tract changes or adenopathy noted.   /Brandon Wolfe MD      Assessment and Plan  Diagnoses and all orders for this visit:    Malignant neoplasm of overlapping sites of bladder (CMS/HCC)  -     Cancel: POC Urinalysis Dipstick, Multipro  -     US Renal Bilateral; Future  -     Comprehensive metabolic panel; Future  -     eGFR-Glomerular Filtration; Future  -     CBC Auto Differential; Future  -     XR Chest 2 View; Future    Erectile dysfunction after radical cystectomy  -     sildenafil (REVATIO) 20 MG tablet; Take 1-5 tablets 1-2 hours before sexual activity PRN        #1. No evidence of recurrent disease.  I will obtain a renal ultrasound to rule out any evidence for hydronephrosis.     #2.  He opted against filling his sildenafil so he let the script . I encouraged him to try this now that he his starting to get a partial erection. .        (Please note that portions of this note were completed with a voice recognition program.)  Brandon Wolfe MD  19  1:16 PM      Cc: Tam Gonzalez MD

## 2019-04-04 ENCOUNTER — TELEPHONE (OUTPATIENT)
Dept: GASTROENTEROLOGY | Age: 49
End: 2019-04-04

## 2019-04-04 ENCOUNTER — OFFICE VISIT (OUTPATIENT)
Dept: UROLOGY | Facility: CLINIC | Age: 49
End: 2019-04-04

## 2019-04-04 VITALS — TEMPERATURE: 97.2 F | WEIGHT: 210 LBS | HEIGHT: 71 IN | BODY MASS INDEX: 29.4 KG/M2

## 2019-04-04 DIAGNOSIS — C67.8 MALIGNANT NEOPLASM OF OVERLAPPING SITES OF BLADDER (HCC): Primary | ICD-10-CM

## 2019-04-04 DIAGNOSIS — N52.32 ERECTILE DYSFUNCTION AFTER RADICAL CYSTECTOMY: ICD-10-CM

## 2019-04-04 PROCEDURE — 99214 OFFICE O/P EST MOD 30 MIN: CPT | Performed by: UROLOGY

## 2019-04-04 RX ORDER — BUSPIRONE HYDROCHLORIDE 5 MG/1
5 TABLET ORAL
COMMUNITY
Start: 2019-03-14

## 2019-04-04 RX ORDER — SILDENAFIL CITRATE 20 MG/1
TABLET ORAL
Qty: 30 TABLET | Refills: 11 | Status: SHIPPED | OUTPATIENT
Start: 2019-04-04 | End: 2022-02-15 | Stop reason: SDUPTHER

## 2019-04-04 NOTE — PATIENT INSTRUCTIONS

## 2019-04-10 ENCOUNTER — HOSPITAL ENCOUNTER (OUTPATIENT)
Dept: GENERAL RADIOLOGY | Age: 49
Discharge: HOME | End: 2019-04-10
Payer: MEDICAID

## 2019-04-10 ENCOUNTER — OFFICE VISIT (OUTPATIENT)
Dept: PRIMARY CARE CLINIC | Age: 49
End: 2019-04-10
Payer: MEDICAID

## 2019-04-10 ENCOUNTER — TELEPHONE (OUTPATIENT)
Dept: PRIMARY CARE CLINIC | Age: 49
End: 2019-04-10

## 2019-04-10 VITALS
OXYGEN SATURATION: 98 % | HEIGHT: 71 IN | WEIGHT: 212 LBS | BODY MASS INDEX: 29.68 KG/M2 | TEMPERATURE: 97.9 F | DIASTOLIC BLOOD PRESSURE: 98 MMHG | HEART RATE: 68 BPM | SYSTOLIC BLOOD PRESSURE: 140 MMHG

## 2019-04-10 DIAGNOSIS — J42 CHRONIC BRONCHITIS, UNSPECIFIED CHRONIC BRONCHITIS TYPE (HCC): ICD-10-CM

## 2019-04-10 DIAGNOSIS — Z71.6 TOBACCO ABUSE COUNSELING: ICD-10-CM

## 2019-04-10 DIAGNOSIS — M54.2 CERVICAL PAIN: ICD-10-CM

## 2019-04-10 DIAGNOSIS — E78.2 MIXED HYPERLIPIDEMIA: ICD-10-CM

## 2019-04-10 DIAGNOSIS — R06.02 SHORT OF BREATH ON EXERTION: ICD-10-CM

## 2019-04-10 DIAGNOSIS — R55 NEAR SYNCOPE: ICD-10-CM

## 2019-04-10 DIAGNOSIS — R42 DIZZINESS: ICD-10-CM

## 2019-04-10 DIAGNOSIS — E11.9 TYPE 2 DIABETES MELLITUS WITHOUT COMPLICATION, WITHOUT LONG-TERM CURRENT USE OF INSULIN (HCC): Primary | ICD-10-CM

## 2019-04-10 DIAGNOSIS — I10 ESSENTIAL HYPERTENSION: ICD-10-CM

## 2019-04-10 DIAGNOSIS — E55.9 VITAMIN D DEFICIENCY: ICD-10-CM

## 2019-04-10 DIAGNOSIS — F32.A ANXIETY AND DEPRESSION: ICD-10-CM

## 2019-04-10 DIAGNOSIS — F41.9 ANXIETY AND DEPRESSION: ICD-10-CM

## 2019-04-10 DIAGNOSIS — Z72.0 TOBACCO ABUSE: ICD-10-CM

## 2019-04-10 DIAGNOSIS — R07.89 CHEST TIGHTNESS: ICD-10-CM

## 2019-04-10 PROCEDURE — 3017F COLORECTAL CA SCREEN DOC REV: CPT | Performed by: NURSE PRACTITIONER

## 2019-04-10 PROCEDURE — 4004F PT TOBACCO SCREEN RCVD TLK: CPT | Performed by: NURSE PRACTITIONER

## 2019-04-10 PROCEDURE — 99215 OFFICE O/P EST HI 40 MIN: CPT | Performed by: NURSE PRACTITIONER

## 2019-04-10 PROCEDURE — 72040 X-RAY EXAM NECK SPINE 2-3 VW: CPT

## 2019-04-10 PROCEDURE — G8427 DOCREV CUR MEDS BY ELIG CLIN: HCPCS | Performed by: NURSE PRACTITIONER

## 2019-04-10 PROCEDURE — G8419 CALC BMI OUT NRM PARAM NOF/U: HCPCS | Performed by: NURSE PRACTITIONER

## 2019-04-10 PROCEDURE — 99406 BEHAV CHNG SMOKING 3-10 MIN: CPT | Performed by: NURSE PRACTITIONER

## 2019-04-10 PROCEDURE — G8926 SPIRO NO PERF OR DOC: HCPCS | Performed by: NURSE PRACTITIONER

## 2019-04-10 PROCEDURE — 3045F PR MOST RECENT HEMOGLOBIN A1C LEVEL 7.0-9.0%: CPT | Performed by: NURSE PRACTITIONER

## 2019-04-10 PROCEDURE — 2022F DILAT RTA XM EVC RTNOPTHY: CPT | Performed by: NURSE PRACTITIONER

## 2019-04-10 PROCEDURE — 3023F SPIROM DOC REV: CPT | Performed by: NURSE PRACTITIONER

## 2019-04-10 RX ORDER — ERGOCALCIFEROL (VITAMIN D2) 1250 MCG
50000 CAPSULE ORAL WEEKLY
Qty: 4 CAPSULE | Refills: 3 | Status: SHIPPED | OUTPATIENT
Start: 2019-04-10 | End: 2019-08-08 | Stop reason: SDUPTHER

## 2019-04-10 RX ORDER — ATORVASTATIN CALCIUM 20 MG/1
20 TABLET, FILM COATED ORAL DAILY
Qty: 30 TABLET | Refills: 3 | Status: SHIPPED | OUTPATIENT
Start: 2019-04-10 | End: 2019-08-28 | Stop reason: SDUPTHER

## 2019-04-10 RX ORDER — BUSPIRONE HYDROCHLORIDE 10 MG/1
10 TABLET ORAL 3 TIMES DAILY PRN
Qty: 90 TABLET | Refills: 1 | Status: SHIPPED | OUTPATIENT
Start: 2019-04-10 | End: 2019-05-28 | Stop reason: SDUPTHER

## 2019-04-10 RX ORDER — FLUOXETINE 10 MG/1
10 CAPSULE ORAL DAILY
Qty: 30 CAPSULE | Refills: 3 | Status: SHIPPED | OUTPATIENT
Start: 2019-04-10 | End: 2019-04-24 | Stop reason: SDUPTHER

## 2019-04-10 RX ORDER — FENOFIBRATE 160 MG/1
160 TABLET ORAL DAILY
Qty: 30 TABLET | Refills: 5 | Status: SHIPPED | OUTPATIENT
Start: 2019-04-10 | End: 2019-10-19 | Stop reason: SDUPTHER

## 2019-04-10 RX ORDER — ATENOLOL 50 MG/1
TABLET ORAL
Qty: 30 TABLET | Refills: 5 | Status: SHIPPED | OUTPATIENT
Start: 2019-04-10 | End: 2020-03-16

## 2019-04-10 RX ORDER — FLUTICASONE FUROATE AND VILANTEROL 100; 25 UG/1; UG/1
1 POWDER RESPIRATORY (INHALATION) DAILY
Qty: 1 EACH | Refills: 2 | Status: SHIPPED | OUTPATIENT
Start: 2019-04-10 | End: 2019-04-24

## 2019-04-10 ASSESSMENT — ENCOUNTER SYMPTOMS
SHORTNESS OF BREATH: 1
EYES NEGATIVE: 1
GASTROINTESTINAL NEGATIVE: 1
CHEST TIGHTNESS: 1

## 2019-04-10 NOTE — PROGRESS NOTES
[facility]  [address]  [address] [place] [address]  Dept: [phone]  Dept Fax: [phone]  Loc: [phone]    Hermelinda Larson is a 44 y.o. male who presents today for his medical conditions/complaints as noted below. Hermelinda Larson is c/o of Hyperglycemia (blood sugar from [address] [facility])      Chief Complaint   Patient presents with    Hyperglycemia     blood sugar from  220       HPI:     HPI  Patient here for follow up multiple issues including elevated glucose. He is also complaining of right arm pain and episodes of \"blacking out\" if he looks to the right. He develops dizziness and feels like he might pass out. Patient is also having issues with depression. He reports that the Zoloft is not working. He has tried Wellbutrin and Effexor in the past without relief. Patient is also having episodes were he becomes short of breath with any exertion. He has hx of COPD. He denies any episodes of chest pain. Past Medical History:   Diagnosis Date    Cancer [facility])     Bladder    COPD (chronic obstructive pulmonary disease) (HCC)     Hypertension         Past Surgical History:   Procedure Laterality Date    BACK SURGERY      X3     BLADDER REMOVAL      LYMPHADENECTOMY      lower ext    PROSTATECTOMY         Social History     Tobacco Use    Smoking status: Current Every Day Smoker     Packs/day: 1.50     Years: 30.00     Pack years: 45.00    Smokeless tobacco: Never Used   Substance Use Topics    Alcohol use: Yes     Comment:  Occ        Current Outpatient Medications   Medication Sig Dispense Refill    ergocalciferol (ERGOCALCIFEROL) 61277 units capsule Take 1 capsule by mouth once a week 4 capsule 3    FLUoxetine (PROZAC) 10 MG capsule Take 1 capsule by mouth daily 30 capsule 3    busPIRone (BUSPAR) 10 MG tablet Take 1 tablet by mouth 3 times daily as needed (anxiety) 90 tablet 1    fenofibrate 160 MG tablet Take 1 tablet by mouth daily 30 tablet 5    atorvastatin (LIPITOR) 20 MG tablet Take 1 tablet by mouth daily 30 tablet 3    metFORMIN (GLUCOPHAGE) 500 MG tablet Take 1 tablet by mouth 2 times daily (with meals) 60 tablet 0    atenolol (TENORMIN) 50 MG tablet TAKE ONE TABLET EVERY DAY 30 tablet 5    fluticasone-vilanterol (BREO ELLIPTA) 100-25 MCG/INH AEPB inhaler Inhale 1 puff into the lungs daily 1 each 2    metaxalone (SKELAXIN) 800 MG tablet Take 800 mg by mouth 4 times daily      cloNIDine (CATAPRES) 0.1 MG tablet Take 1 tablet by mouth daily 60 tablet 1    tiZANidine (ZANAFLEX) 4 MG tablet TAKE 1 TABLET BY MOUTH EVERY 6 HOURS FOR MUSCLE PAIN 60 tablet 5    sertraline (ZOLOFT) 100 MG tablet TAKE ONE TABLET BY MOUTH ONCE DAILY 30 tablet 5    amLODIPine (NORVASC) 5 MG tablet Take 1 tablet by mouth daily 30 tablet 5    omeprazole (PRILOSEC) 40 MG delayed release capsule Take 1 capsule by mouth daily 30 capsule 1     No current facility-administered medications for this visit. Allergies   Allergen Reactions    Latex Other (See Comments)     BOILS AND BLISTERS- ALLERGY CALLED TO [facility] SURGERY SCHEDULING.  Demerol Hcl [Meperidine]        History reviewed. No pertinent family history. Subjective:      Review of Systems   Constitutional: Positive for fatigue. HENT: Negative. Eyes: Negative. Respiratory: Positive for chest tightness and shortness of breath. Cardiovascular: Negative. Gastrointestinal: Negative. Endocrine: Negative. Genitourinary: Negative. Musculoskeletal: Negative. Skin: Negative. Neurological: Positive for dizziness, syncope (near syncope) and headaches. Hematological: Negative. Psychiatric/Behavioral: Negative. Objective:     Physical Exam   Constitutional: He is oriented to person, place, and time. Vital signs are normal. He appears well-developed and well-nourished. HENT:   Head: Normocephalic and atraumatic.    Right Ear: Hearing and external ear normal.   Left Ear: Hearing and external ear normal.   Nose: Nose normal.   Mouth/Throat: Uvula is midline, oropharynx is clear and moist and mucous membranes are normal.   Eyes: Pupils are equal, round, and reactive to light. Conjunctivae, EOM and lids are normal.   Neck: Trachea normal and normal range of motion. Neck supple. No thyroid mass and no thyromegaly present. Cardiovascular: Normal rate, regular rhythm, normal heart sounds and intact distal pulses. Pulmonary/Chest: Effort normal. He has wheezes. Abdominal: Soft. Normal appearance and bowel sounds are normal.       Musculoskeletal:        Cervical back: He exhibits decreased range of motion. He exhibits no tenderness. Thoracic back: Normal. He exhibits normal range of motion and no tenderness. Lumbar back: He exhibits decreased range of motion. He exhibits no tenderness. Neurological: He is alert and oriented to person, place, and time. He has normal strength. Skin: Skin is warm, dry and intact. Psychiatric: He has a normal mood and affect. His speech is normal and behavior is normal. Judgment and thought content normal. Cognition and memory are normal.   Nursing note and vitals reviewed. BP (!) 140/98   Pulse 68   Temp 97.9 °F (36.6 °C) (Temporal)   Ht 5' 11\" (1.803 m)   Wt 212 lb (96.2 kg)   SpO2 98%   BMI 29.57 kg/m²     Assessment:      Diagnosis Orders   1. Type 2 diabetes mellitus without complication, without long-term current use of insulin ([facility])  metFORMIN (GLUCOPHAGE) 500 MG tablet   2. Anxiety and depression  FLUoxetine (PROZAC) 10 MG capsule    busPIRone (BUSPAR) 10 MG tablet   3. Short of breath on exertion     4. Chest tightness     5. Vitamin D deficiency  ergocalciferol (ERGOCALCIFEROL) 46787 units capsule   6. Mixed hyperlipidemia  fenofibrate 160 MG tablet    atorvastatin (LIPITOR) 20 MG tablet    US CAROTID ARTERY BILATERAL   7.  Essential hypertension  atenolol (TENORMIN) 50 MG tablet 8. Chronic bronchitis, unspecified chronic bronchitis type ([facility] [address]  fluticasone-vilanterol (BREO ELLIPTA) 100-25 MCG/INH AEPB inhaler   9. Cervical pain     10. Near syncope  US CAROTID ARTERY BILATERAL   11. Dizziness  US CAROTID ARTERY BILATERAL   12. Tobacco abuse     13. Tobacco abuse counseling         No results found for this visit on 04/10/19. Plan:     I am having patient decrease Zoloft to 50 mg for 2 days then start Prozac 10 mg.   I'm also starting the patient on Lipitor and fenofibrate due to elevated cholesterol and hypertriglycerides. Patient informed on how to start all new medications and to taper start dates. I am checking US of Carotids due to symptoms. RTC in 2 weeks to monitor all symptoms and likely will need cardiology work up. Approximately 5 minutes of education was provided about quit smoking and the harms of tobacco.  Patient does show understanding. Patient does not have the desire to quit smoking in the future. More than 50% of the time was spent counseling and coordinating care for a total time of 50 mins face to face. Return in about 2 weeks (around 4/24/2019) for multiple issues.     Orders Placed This Encounter   Procedures    US CAROTID ARTERY BILATERAL     Standing Status:   Future     Standing Expiration Date:   4/10/2020       Orders Placed This Encounter   Medications    ergocalciferol (ERGOCALCIFEROL) 85181 units capsule     Sig: Take 1 capsule by mouth once a week     Dispense:  4 capsule     Refill:  3    FLUoxetine (PROZAC) 10 MG capsule     Sig: Take 1 capsule by mouth daily     Dispense:  30 capsule     Refill:  3    busPIRone (BUSPAR) 10 MG tablet     Sig: Take 1 tablet by mouth 3 times daily as needed (anxiety)     Dispense:  90 tablet     Refill:  1    fenofibrate 160 MG tablet     Sig: Take 1 tablet by mouth daily     Dispense:  30 tablet     Refill:  5    atorvastatin (LIPITOR) 20 MG tablet     Sig: Take 1 tablet by mouth daily     Dispense: 30 tablet     Refill:  3    metFORMIN (GLUCOPHAGE) 500 MG tablet     Sig: Take 1 tablet by mouth 2 times daily (with meals)     Dispense:  60 tablet     Refill:  0    atenolol (TENORMIN) 50 MG tablet     Sig: TAKE ONE TABLET EVERY DAY     Dispense:  30 tablet     Refill:  5    fluticasone-vilanterol (BREO ELLIPTA) 100-25 MCG/INH AEPB inhaler     Sig: Inhale 1 puff into the lungs daily     Dispense:  1 each     Refill:  2        Patient offered educational handouts and has had all questions answered. Patient voices understanding and agrees to plans along with risks and benefits of plan. Patient is instructed to continue prior meds, diet, and exercise plans as instructed. Patient agrees to follow up as instructed and sooner if needed. Patient agrees to go to ER if condition becomes emergent. EMR Dragon/transcription disclaimer: Some of this encounter note is an electronic transcription/translation of spoken language to printed text. The electronic translation of spoken language may permit erroneous, or at times, nonsensical words or phrases to be inadvertently transcribed.  Although I have reviewed the note for such errors, some may still exist.    Electronically signed by BRYAN Swartz on 4/10/2019 at 11:40 AM

## 2019-04-10 NOTE — TELEPHONE ENCOUNTER
Called patient and informed as follows:Please let patient know that there are some changes in cervical spine, but it also showed some calcification in the carotid artery. He needs to make sure he goes to US of carotids that was scheduled.   Voices understanding   Patients wife reports Elsa Champagne not covered by insurance Discussed and wife will call insurance to see what is on formulary

## 2019-04-10 NOTE — TELEPHONE ENCOUNTER
----- Message from BRYAN Tong sent at 4/10/2019 12:58 PM CDT -----  Please let patient know that there are some changes in cervical spine, but it also showed some calcification in the carotid artery. He needs to make sure he goes to US of carotids that was scheduled.

## 2019-04-18 ENCOUNTER — HOSPITAL ENCOUNTER (OUTPATIENT)
Dept: VASCULAR LAB | Age: 49
Discharge: HOME | End: 2019-04-18
Payer: MEDICAID

## 2019-04-18 DIAGNOSIS — R42 DIZZINESS: ICD-10-CM

## 2019-04-18 DIAGNOSIS — R55 NEAR SYNCOPE: ICD-10-CM

## 2019-04-18 DIAGNOSIS — E78.2 MIXED HYPERLIPIDEMIA: ICD-10-CM

## 2019-04-18 PROCEDURE — 93880 EXTRACRANIAL BILAT STUDY: CPT

## 2019-04-20 ENCOUNTER — TELEPHONE (OUTPATIENT)
Dept: PRIMARY CARE CLINIC | Age: 49
End: 2019-04-20

## 2019-04-20 NOTE — TELEPHONE ENCOUNTER
----- Message from BRYAN Aranda sent at 4/19/2019 10:45 AM CDT -----  Please let patient know that US of Carotids were normal.  Thanks!

## 2019-04-23 ENCOUNTER — TELEPHONE (OUTPATIENT)
Dept: PRIMARY CARE CLINIC | Age: 49
End: 2019-04-23

## 2019-04-24 ENCOUNTER — OFFICE VISIT (OUTPATIENT)
Dept: PRIMARY CARE CLINIC | Age: 49
End: 2019-04-24
Payer: MEDICAID

## 2019-04-24 ENCOUNTER — APPOINTMENT (OUTPATIENT)
Dept: CT IMAGING | Age: 49
DRG: 234 | End: 2019-04-24
Payer: MEDICAID

## 2019-04-24 ENCOUNTER — APPOINTMENT (OUTPATIENT)
Dept: GENERAL RADIOLOGY | Age: 49
DRG: 234 | End: 2019-04-24
Payer: MEDICAID

## 2019-04-24 ENCOUNTER — HOSPITAL ENCOUNTER (INPATIENT)
Age: 49
LOS: 7 days | Discharge: HOME | DRG: 234 | End: 2019-05-06
Attending: INTERNAL MEDICINE | Admitting: INTERNAL MEDICINE
Payer: MEDICAID

## 2019-04-24 VITALS
HEIGHT: 71 IN | TEMPERATURE: 98.4 F | RESPIRATION RATE: 18 BRPM | BODY MASS INDEX: 29.82 KG/M2 | DIASTOLIC BLOOD PRESSURE: 92 MMHG | HEART RATE: 80 BPM | SYSTOLIC BLOOD PRESSURE: 152 MMHG | OXYGEN SATURATION: 98 % | WEIGHT: 213 LBS

## 2019-04-24 DIAGNOSIS — I10 ESSENTIAL HYPERTENSION: ICD-10-CM

## 2019-04-24 DIAGNOSIS — F41.9 ANXIETY AND DEPRESSION: ICD-10-CM

## 2019-04-24 DIAGNOSIS — R07.9 CHEST PAIN, UNSPECIFIED TYPE: Primary | ICD-10-CM

## 2019-04-24 DIAGNOSIS — E11.9 TYPE 2 DIABETES MELLITUS WITHOUT COMPLICATION, WITHOUT LONG-TERM CURRENT USE OF INSULIN (HCC): ICD-10-CM

## 2019-04-24 DIAGNOSIS — F32.A ANXIETY AND DEPRESSION: ICD-10-CM

## 2019-04-24 DIAGNOSIS — E78.2 MIXED HYPERLIPIDEMIA: ICD-10-CM

## 2019-04-24 DIAGNOSIS — R06.02 SHORT OF BREATH ON EXERTION: ICD-10-CM

## 2019-04-24 DIAGNOSIS — R53.83 DECREASED STAMINA: ICD-10-CM

## 2019-04-24 LAB
ALBUMIN SERPL-MCNC: 4.6 G/DL (ref 3.5–5.2)
ALP BLD-CCNC: 100 U/L (ref 40–130)
ALT SERPL-CCNC: 15 U/L (ref 5–41)
ANION GAP SERPL CALCULATED.3IONS-SCNC: 13 MMOL/L (ref 7–19)
AST SERPL-CCNC: 17 U/L (ref 5–40)
BASOPHILS ABSOLUTE: 0 K/UL (ref 0–0.2)
BASOPHILS RELATIVE PERCENT: 0.5 % (ref 0–1)
BILIRUB SERPL-MCNC: 0.3 MG/DL (ref 0.2–1.2)
BUN BLDV-MCNC: 23 MG/DL (ref 6–20)
CALCIUM SERPL-MCNC: 9.9 MG/DL (ref 8.6–10)
CHLORIDE BLD-SCNC: 98 MMOL/L (ref 98–111)
CO2: 25 MMOL/L (ref 22–29)
CREAT SERPL-MCNC: 1.1 MG/DL (ref 0.5–1.2)
D DIMER: <0.27 UG/ML FEU (ref 0–0.48)
EKG P AXIS: 57 DEGREES
EKG P-R INTERVAL: 118 MS
EKG Q-T INTERVAL: 398 MS
EKG QRS DURATION: 96 MS
EKG QTC CALCULATION (BAZETT): 415 MS
EKG T AXIS: 59 DEGREES
EOSINOPHILS ABSOLUTE: 0.1 K/UL (ref 0–0.6)
EOSINOPHILS RELATIVE PERCENT: 1.3 % (ref 0–5)
GFR NON-AFRICAN AMERICAN: >60
GLUCOSE BLD-MCNC: 237 MG/DL (ref 74–109)
GLUCOSE BLD-MCNC: 247 MG/DL (ref 70–99)
HBA1C MFR BLD: 8.2 % (ref 4–6)
HCT VFR BLD CALC: 45.1 % (ref 42–52)
HEMOGLOBIN: 15.3 G/DL (ref 14–18)
LYMPHOCYTES ABSOLUTE: 2.2 K/UL (ref 1.1–4.5)
LYMPHOCYTES RELATIVE PERCENT: 25.6 % (ref 20–40)
MCH RBC QN AUTO: 30.1 PG (ref 27–31)
MCHC RBC AUTO-ENTMCNC: 33.9 G/DL (ref 33–37)
MCV RBC AUTO: 88.8 FL (ref 80–94)
MONOCYTES ABSOLUTE: 0.8 K/UL (ref 0–0.9)
MONOCYTES RELATIVE PERCENT: 9.3 % (ref 0–10)
NEUTROPHILS ABSOLUTE: 5.3 K/UL (ref 1.5–7.5)
NEUTROPHILS RELATIVE PERCENT: 62.9 % (ref 50–65)
PDW BLD-RTO: 12.5 % (ref 11.5–14.5)
PERFORMED ON: ABNORMAL
PLATELET # BLD: 164 K/UL (ref 130–400)
PMV BLD AUTO: 10.2 FL (ref 9.4–12.4)
POTASSIUM REFLEX MAGNESIUM: 4.5 MMOL/L (ref 3.5–5)
RBC # BLD: 5.08 M/UL (ref 4.7–6.1)
SODIUM BLD-SCNC: 136 MMOL/L (ref 136–145)
TOTAL PROTEIN: 8 G/DL (ref 6.6–8.7)
TROPONIN: <0.01 NG/ML (ref 0–0.03)
WBC # BLD: 8.4 K/UL (ref 4.8–10.8)

## 2019-04-24 PROCEDURE — 3017F COLORECTAL CA SCREEN DOC REV: CPT | Performed by: NURSE PRACTITIONER

## 2019-04-24 PROCEDURE — 6370000000 HC RX 637 (ALT 250 FOR IP): Performed by: INTERNAL MEDICINE

## 2019-04-24 PROCEDURE — 82948 REAGENT STRIP/BLOOD GLUCOSE: CPT

## 2019-04-24 PROCEDURE — G0378 HOSPITAL OBSERVATION PER HR: HCPCS | Performed by: INTERNAL MEDICINE

## 2019-04-24 PROCEDURE — 4004F PT TOBACCO SCREEN RCVD TLK: CPT | Performed by: NURSE PRACTITIONER

## 2019-04-24 PROCEDURE — G8419 CALC BMI OUT NRM PARAM NOF/U: HCPCS | Performed by: NURSE PRACTITIONER

## 2019-04-24 PROCEDURE — 80053 COMPREHEN METABOLIC PANEL: CPT

## 2019-04-24 PROCEDURE — G0378 HOSPITAL OBSERVATION PER HR: HCPCS

## 2019-04-24 PROCEDURE — 2580000003 HC RX 258: Performed by: INTERNAL MEDICINE

## 2019-04-24 PROCEDURE — G8427 DOCREV CUR MEDS BY ELIG CLIN: HCPCS | Performed by: NURSE PRACTITIONER

## 2019-04-24 PROCEDURE — 70450 CT HEAD/BRAIN W/O DYE: CPT

## 2019-04-24 PROCEDURE — 96372 THER/PROPH/DIAG INJ SC/IM: CPT

## 2019-04-24 PROCEDURE — 96374 THER/PROPH/DIAG INJ IV PUSH: CPT

## 2019-04-24 PROCEDURE — 99215 OFFICE O/P EST HI 40 MIN: CPT | Performed by: NURSE PRACTITIONER

## 2019-04-24 PROCEDURE — 3045F PR MOST RECENT HEMOGLOBIN A1C LEVEL 7.0-9.0%: CPT | Performed by: NURSE PRACTITIONER

## 2019-04-24 PROCEDURE — 99285 EMERGENCY DEPT VISIT HI MDM: CPT

## 2019-04-24 PROCEDURE — 83036 HEMOGLOBIN GLYCOSYLATED A1C: CPT

## 2019-04-24 PROCEDURE — 84484 ASSAY OF TROPONIN QUANT: CPT

## 2019-04-24 PROCEDURE — 93005 ELECTROCARDIOGRAM TRACING: CPT

## 2019-04-24 PROCEDURE — 6360000002 HC RX W HCPCS: Performed by: NURSE PRACTITIONER

## 2019-04-24 PROCEDURE — 71046 X-RAY EXAM CHEST 2 VIEWS: CPT

## 2019-04-24 PROCEDURE — 36415 COLL VENOUS BLD VENIPUNCTURE: CPT

## 2019-04-24 PROCEDURE — 2022F DILAT RTA XM EVC RTNOPTHY: CPT | Performed by: NURSE PRACTITIONER

## 2019-04-24 PROCEDURE — 85379 FIBRIN DEGRADATION QUANT: CPT

## 2019-04-24 PROCEDURE — 96375 TX/PRO/DX INJ NEW DRUG ADDON: CPT

## 2019-04-24 PROCEDURE — 6360000002 HC RX W HCPCS: Performed by: INTERNAL MEDICINE

## 2019-04-24 PROCEDURE — 99220 PR INITIAL OBSERVATION CARE/DAY 70 MINUTES: CPT | Performed by: INTERNAL MEDICINE

## 2019-04-24 PROCEDURE — 6370000000 HC RX 637 (ALT 250 FOR IP): Performed by: NURSE PRACTITIONER

## 2019-04-24 PROCEDURE — 85025 COMPLETE CBC W/AUTO DIFF WBC: CPT

## 2019-04-24 RX ORDER — PREDNISONE 10 MG/1
40 TABLET ORAL DAILY
Status: COMPLETED | OUTPATIENT
Start: 2019-04-24 | End: 2019-04-24

## 2019-04-24 RX ORDER — HYDROCODONE BITARTRATE AND ACETAMINOPHEN 10; 325 MG/1; MG/1
1 TABLET ORAL ONCE
Status: COMPLETED | OUTPATIENT
Start: 2019-04-24 | End: 2019-04-24

## 2019-04-24 RX ORDER — LANOLIN ALCOHOL/MO/W.PET/CERES
6 CREAM (GRAM) TOPICAL ONCE
Status: COMPLETED | OUTPATIENT
Start: 2019-04-24 | End: 2019-04-24

## 2019-04-24 RX ORDER — NICOTINE 21 MG/24HR
1 PATCH, TRANSDERMAL 24 HOURS TRANSDERMAL DAILY
Status: DISCONTINUED | OUTPATIENT
Start: 2019-04-24 | End: 2019-05-01

## 2019-04-24 RX ORDER — NICOTINE POLACRILEX 4 MG
15 LOZENGE BUCCAL PRN
Status: DISCONTINUED | OUTPATIENT
Start: 2019-04-24 | End: 2019-05-01

## 2019-04-24 RX ORDER — AMLODIPINE BESYLATE 5 MG/1
5 TABLET ORAL DAILY
Status: DISCONTINUED | OUTPATIENT
Start: 2019-04-25 | End: 2019-04-25

## 2019-04-24 RX ORDER — SODIUM CHLORIDE 0.9 % (FLUSH) 0.9 %
10 SYRINGE (ML) INJECTION EVERY 12 HOURS SCHEDULED
Status: DISCONTINUED | OUTPATIENT
Start: 2019-04-24 | End: 2019-04-26

## 2019-04-24 RX ORDER — FLUOXETINE HYDROCHLORIDE 20 MG/1
20 CAPSULE ORAL DAILY
Qty: 30 CAPSULE | Refills: 2 | Status: SHIPPED | OUTPATIENT
Start: 2019-04-24 | End: 2019-05-28 | Stop reason: SDUPTHER

## 2019-04-24 RX ORDER — CLONIDINE HYDROCHLORIDE 0.1 MG/1
0.1 TABLET ORAL DAILY
Status: DISCONTINUED | OUTPATIENT
Start: 2019-04-24 | End: 2019-04-25

## 2019-04-24 RX ORDER — DEXTROSE MONOHYDRATE 25 G/50ML
12.5 INJECTION, SOLUTION INTRAVENOUS PRN
Status: DISCONTINUED | OUTPATIENT
Start: 2019-04-24 | End: 2019-05-01

## 2019-04-24 RX ORDER — ASPIRIN 81 MG/1
81 TABLET, CHEWABLE ORAL DAILY
Status: DISCONTINUED | OUTPATIENT
Start: 2019-04-25 | End: 2019-05-01

## 2019-04-24 RX ORDER — ASPIRIN 325 MG
325 TABLET ORAL ONCE
Status: COMPLETED | OUTPATIENT
Start: 2019-04-24 | End: 2019-04-24

## 2019-04-24 RX ORDER — KETOROLAC TROMETHAMINE 30 MG/ML
30 INJECTION, SOLUTION INTRAMUSCULAR; INTRAVENOUS ONCE
Status: COMPLETED | OUTPATIENT
Start: 2019-04-24 | End: 2019-04-24

## 2019-04-24 RX ORDER — FLUOXETINE HYDROCHLORIDE 20 MG/1
20 CAPSULE ORAL DAILY
Status: DISCONTINUED | OUTPATIENT
Start: 2019-04-25 | End: 2019-04-25

## 2019-04-24 RX ORDER — ONDANSETRON 2 MG/ML
4 INJECTION INTRAMUSCULAR; INTRAVENOUS EVERY 6 HOURS PRN
Status: DISCONTINUED | OUTPATIENT
Start: 2019-04-24 | End: 2019-04-26

## 2019-04-24 RX ORDER — BUSPIRONE HYDROCHLORIDE 10 MG/1
10 TABLET ORAL 3 TIMES DAILY PRN
Status: DISCONTINUED | OUTPATIENT
Start: 2019-04-24 | End: 2019-05-01

## 2019-04-24 RX ORDER — DEXTROSE MONOHYDRATE 50 MG/ML
100 INJECTION, SOLUTION INTRAVENOUS PRN
Status: DISCONTINUED | OUTPATIENT
Start: 2019-04-24 | End: 2019-05-01

## 2019-04-24 RX ORDER — MORPHINE SULFATE 4 MG/ML
4 INJECTION, SOLUTION INTRAMUSCULAR; INTRAVENOUS ONCE
Status: COMPLETED | OUTPATIENT
Start: 2019-04-24 | End: 2019-04-24

## 2019-04-24 RX ORDER — PANTOPRAZOLE SODIUM 40 MG/1
40 TABLET, DELAYED RELEASE ORAL
Status: DISCONTINUED | OUTPATIENT
Start: 2019-04-25 | End: 2019-05-01

## 2019-04-24 RX ORDER — HYDROCODONE BITARTRATE AND ACETAMINOPHEN 10; 325 MG/1; MG/1
2 TABLET ORAL EVERY 4 HOURS PRN
Status: ON HOLD | COMMUNITY
End: 2020-02-21 | Stop reason: SDUPTHER

## 2019-04-24 RX ORDER — ATORVASTATIN CALCIUM 20 MG/1
20 TABLET, FILM COATED ORAL DAILY
Status: DISCONTINUED | OUTPATIENT
Start: 2019-04-25 | End: 2019-04-25

## 2019-04-24 RX ORDER — SODIUM CHLORIDE 0.9 % (FLUSH) 0.9 %
10 SYRINGE (ML) INJECTION PRN
Status: DISCONTINUED | OUTPATIENT
Start: 2019-04-24 | End: 2019-04-26

## 2019-04-24 RX ORDER — ONDANSETRON 2 MG/ML
4 INJECTION INTRAMUSCULAR; INTRAVENOUS ONCE
Status: COMPLETED | OUTPATIENT
Start: 2019-04-24 | End: 2019-04-24

## 2019-04-24 RX ADMIN — ONDANSETRON 4 MG: 2 INJECTION INTRAMUSCULAR; INTRAVENOUS at 16:46

## 2019-04-24 RX ADMIN — ASPIRIN 325 MG: 325 TABLET, COATED ORAL at 14:51

## 2019-04-24 RX ADMIN — CLONIDINE HYDROCHLORIDE 0.1 MG: 0.1 TABLET ORAL at 20:32

## 2019-04-24 RX ADMIN — HYDROCODONE BITARTRATE AND ACETAMINOPHEN 1 TABLET: 10; 325 TABLET ORAL at 23:35

## 2019-04-24 RX ADMIN — PREDNISONE 40 MG: 10 TABLET ORAL at 20:32

## 2019-04-24 RX ADMIN — Medication 10 ML: at 20:40

## 2019-04-24 RX ADMIN — MORPHINE SULFATE 4 MG: 4 INJECTION INTRAVENOUS at 16:47

## 2019-04-24 RX ADMIN — INSULIN LISPRO 1 UNITS: 100 INJECTION, SOLUTION INTRAVENOUS; SUBCUTANEOUS at 20:41

## 2019-04-24 RX ADMIN — ENOXAPARIN SODIUM 40 MG: 40 INJECTION SUBCUTANEOUS at 20:32

## 2019-04-24 RX ADMIN — Medication 6 MG: at 23:35

## 2019-04-24 RX ADMIN — KETOROLAC TROMETHAMINE 30 MG: 30 INJECTION, SOLUTION INTRAMUSCULAR; INTRAVENOUS at 16:46

## 2019-04-24 ASSESSMENT — HEART SCORE: ECG: 1

## 2019-04-24 ASSESSMENT — PAIN DESCRIPTION - LOCATION
LOCATION: HEAD
LOCATION: HEAD

## 2019-04-24 ASSESSMENT — PAIN SCALES - GENERAL
PAINLEVEL_OUTOF10: 10
PAINLEVEL_OUTOF10: 8
PAINLEVEL_OUTOF10: 8
PAINLEVEL_OUTOF10: 7

## 2019-04-24 ASSESSMENT — PAIN DESCRIPTION - PAIN TYPE
TYPE: ACUTE PAIN
TYPE: ACUTE PAIN

## 2019-04-24 ASSESSMENT — ENCOUNTER SYMPTOMS
SHORTNESS OF BREATH: 1
SHORTNESS OF BREATH: 1
CHEST TIGHTNESS: 1

## 2019-04-24 NOTE — ED PROVIDER NOTES
Attending Supervisory Note/Shared Visit    I have reviewed the mid-levels findings and agree. We have discussed the case and reviewed the diagnostic data. I agree with the assessment. In plan of admission for rule out cardiac.   Luis Collier MD  Attending Emergency Physician        Luis Collier MD  04/24/19 0911

## 2019-04-24 NOTE — PROGRESS NOTES
4 Eyes Skin Assessment    Abdiaziz Vasques [place] is being assessed upon: Admission    I agree that I, Shiraz Vallecillo, along with *** (either 2 RN's or 1 LPN and 1 RN) have performed a thorough Head to Toe Skin Assessment on the patient. ALL assessment sites listed below have been assessed. Areas assessed by both nurses:     [x]   Head, Face, and Ears   [x]   Shoulders, Back, and Chest  [x]   Arms, Elbows, and Hands   [x]   Coccyx, Sacrum, and Ischium  [x]   Legs, Feet, and Heels    Does the Patient have Skin Breakdown? No    [place] Prevention initiated: NA  Wound Care Orders initiated: NA    [facility] nurse consulted for Pressure Injury (Stage 3,4, Unstageable, DTI, NWPT, and Complex wounds) and New or Established Ostomies: NA        Primary Nurse eSignature:  Shiraz Vallecillo RN on 4/24/2019 at 6:33 PM      Co-Signer eSignature: {Esignature:[id number]}

## 2019-04-24 NOTE — ED PROVIDER NOTES
[facility]  eMERGENCY dEPARTMENT eNCOUnter      Pt Name: Trice Larson  MRN: [id number]  [place] 1970  Date of evaluation: 4/24/2019  Provider: Mery Ferris, [address]       Chief Complaint   Patient presents with    Chest Pain         HISTORY OF PRESENT ILLNESS   (Location/Symptom, Timing/Onset,Context/Setting, Quality, Duration, Modifying Factors, Severity)  Note limiting factors. Stefanie Vergarar a 44 y.o. male who presents to the emergency department for evaluation of chest pain. Pt tells me that he developed chest pain while at his pcp office today now gone. He tells me that he has had difficulty with shortness of breath with walking activities over past few weeks. He has had generalized weakness and fatigue yesterday associated with near faint episode. He has had recent negative carotid US testing. He has recently began treatment for diabetes. He has had recent adjustments in mediation per patient giving history of hypertension and hyperlipidemia. He has history of copd and smokes cigarettes currently. He denies fever, changes in cough as well as abdominal pain to me. He has no history of pe/dvt. He tells me that he has history of bladder cancer having had colon resection, bladder and prostate surgery. Pt also tells me that he has had daily headaches over past few months. He does not endorse and vision change or lateralizing numbness or weakness. He tells me that he has been taking tylenol and motrin for the headaches. HPI    Nursing Notes were reviewed. REVIEW OF SYSTEMS    (2-9 systems for level 4, 10 or more for level 5)     Review of Systems   Constitutional: Positive for activity change. Respiratory: Positive for shortness of breath. Cardiovascular: Positive for chest pain. Neurological: Positive for syncope (near syncope), weakness (generalized) and headaches. All other systems reviewed and are negative.       A complete review of systems was performed and is negative except as noted above in the HPI. PAST MEDICAL HISTORY     Past Medical History:   Diagnosis Date    Cancer [facility])     Bladder    COPD [facility]ch[facility] obstructive [facility] disease) [facility]     Depression     [facility]     Hypertension          SURGICAL HISTORY       Past Surgical History:   Procedure Laterality Date    BACK SURGERY      X3     BLADDER REMOVAL      LYMPHADENECTOMY      lower ext    PROSTATECTOMY           CURRENT MEDICATIONS       Previous Medications    AMLODIPINE (NORVASC) 5 MG TABLET    Take 1 tablet by mouth daily    ATENOLOL (TENORMIN) 50 MG TABLET    TAKE ONE TABLET EVERY DAY    ATORVASTATIN (LIPITOR) 20 MG TABLET    Take 1 tablet by mouth daily    BUSPIRONE (BUSPAR) 10 MG TABLET    Take 1 tablet by mouth 3 times daily as needed (anxiety)    CLONIDINE (CATAPRES) 0.1 MG TABLET    Take 1 tablet by mouth daily    ERGOCALCIFEROL (ERGOCALCIFEROL) 57008 UNITS CAPSULE    Take 1 capsule by mouth once a week    FENOFIBRATE 160 MG TABLET    Take 1 tablet by mouth daily    FLUOXETINE (PROZAC) 20 MG CAPSULE    Take 1 capsule by mouth daily    METFORMIN (GLUCOPHAGE) 500 MG TABLET    Take 1 tablet by mouth 2 times daily (with meals)    OMEPRAZOLE (PRILOSEC) 40 MG DELAYED RELEASE CAPSULE    Take 1 capsule by mouth daily    TIZANIDINE (ZANAFLEX) 4 MG TABLET    TAKE 1 TABLET BY MOUTH EVERY 6 HOURS FOR MUSCLE PAIN       ALLERGIES     Latex and Demerol hcl [meperidine]    FAMILY HISTORY     History reviewed. No pertinent family history.        SOCIAL HISTORY       Social History     Socioeconomic History    Marital status:      Spouse name: None    Number of children: None    Years of education: None    Highest education level: None   Occupational History    None   Social Needs    Financial resource strain: None    Food insecurity:     Worry: None     Inability: None    Transportation needs:     Medical: None     Non-medical: None   Tobacco Use    Smoking status: appliance right side of abdomen   Musculoskeletal: Normal range of motion. No lower extremity edema or calf ttp   Neurological: He is alert. Skin: Skin is warm and dry. Vitals reviewed. DIAGNOSTIC RESULTS     EKG: All EKG's are interpreted by the Emergency Department Physician who either signs or Co-signs this chart in the absence of acardiologist.    There is a regular rate and rhythm. SR hr 71b/min Normal MT interval and normal P waves. Normal QRS interval. Normal QT interval. No obvious ST elevation or ST depression. Repeat 2hr ekg There is a regular rate and rhythm. SR hr 69b/min Normal MT interval and normal P waves. Normal QRS interval. Normal QT interval. No obvious ST elevation or ST depression. RADIOLOGY:   Non-plain film images such as CT, Ultrasound andMRI are read by the radiologist. Plain radiographic images are visualized and preliminarily interpreted by the emergency physician with the below findings:        Interpretation per the Radiologist below, if available at the time of this note:    XR CHEST STANDARD (2 VW)   Final Result   No pulmonary consolidation or acute findings. A few small   nodules in the left lung base, at least one is calcified. One of the   nodules may represent a nipple shadow artifact. Consider repeat PA   chest with nipple markers in place. Signed by Dr Brenda Ware. Devika on 4/24/2019 3:12 PM      CT Head WO Contrast    (Results Pending)         ED BEDSIDE ULTRASOUND:   Performed by ED Physician - none    LABS:  Labs Reviewed   COMPREHENSIVE METABOLIC PANEL W/ REFLEX TO MG FOR LOW K - Abnormal; Notable for the following components:       Result Value    Glucose 237 (*)     BUN 23 (*)     All other components within normal limits   CBC WITH AUTO DIFFERENTIAL   D-DIMER, QUANTITATIVE   TROPONIN   TROPONIN       All other labs were within normal range or not returned as of this dictation.     RE-ASSESSMENT     Discussed with hospitalist Dr. Susan Pedro who will admit patient for stress test tomorrow      EMERGENCY DEPARTMENT COURSE and DIFFERENTIALDIAGNOSIS/MDM:   Vitals:    Vitals:    04/24/19 1418 04/24/19 1432   BP: (!) 169/84 (!) 156/81   Pulse: 73 72   Resp: 18    Temp: 97.7 °F (36.5 °C)    TempSrc: Oral    SpO2: 96%    Weight: 213 lb (96.6 kg)    Height: 5' 11\" (1.803 m)        MDM      CONSULTS:  None    PROCEDURES:  Unless otherwise notedbelow, none     Procedures    FINAL IMPRESSION     1.  Chest pain, unspecified type          DISPOSITION/PLAN   DISPOSITION        PATIENT REFERRED TO:  Vic Ramey MD  [facility] [id number]  [place] [facility]  [phone]            DISCHARGE MEDICATIONS:       Current Discharge Medication List          (Pleasenote that portions of this note were completed with a voice recognition program.  Efforts were made to edit the dictations but occasionally words are mis-transcribed.)              Álvaro Vora, APRN  04/24/19 5553

## 2019-04-24 NOTE — PROGRESS NOTES
Jahaira Larson arrived to room # 723. Presented with: chest pain, sob  Mental Status: Patient is oriented, alert, coherent, logical, thought processes intact and able to concentrate and follow conversation. Vitals:    04/24/19 1754   BP: (!) 153/79   Pulse: 64   Resp: 18   Temp: 97.6 °F (36.4 °C)   SpO2: 95%     Patient safety contract and falls prevention contract reviewed with patient Yes. Oriented Patient to room. Call light within reach. Yes.   Needs, issues or concerns expressed at this time: no.      Electronically signed by Torsten Stone RN on 4/24/2019 at 6:36 PM

## 2019-04-24 NOTE — H&P
[address] - History & Physical    11/11  PCP: Reva Evans MD  Date of Admission: 4/24/2019   Date of Service: Pt seen/examined on4/24/2019 and Placed in Observation. Chief Complaint:  Chest pain/sob    History Of Present Illness: The patient is a 44 y.o. male with hx of copd, dm, htn, hyperlipidemia, tobacco use presenting from pcps office due to recurrent chest pain over last 2 weeks which have been increasing with frequency and associated with sob, nausea, and dizziness. Denies any fevers or chills. No sputum. No abd pain or diarrhea. No focal weakness. Pt currently chest pain free. In ER EKG NSR w/o any acute changes, trop x 2 negative. Past Medical History:        Diagnosis Date    Cancer [facility])     Bladder    COPD (chronic obstructive pulmonary disease) (HCC)     Depression     Diabetes mellitus ([facility] [facility]     Hypertension        Past Surgical History:        Procedure Laterality Date    BACK SURGERY      X3     BLADDER REMOVAL      LYMPHADENECTOMY      lower ext    PROSTATECTOMY         Home Medications:  Prior to Admission medications    Medication Sig Start Date End Date Taking?  Authorizing Provider   FLUoxetine (PROZAC) 20 MG capsule Take 1 capsule by mouth daily 4/24/19  Yes BRYAN Benjamin   ergocalciferol (ERGOCALCIFEROL) 18310 units capsule Take 1 capsule by mouth once a week 4/10/19  Yes BRYAN Benjamin   busPIRone (BUSPAR) 10 MG tablet Take 1 tablet by mouth 3 times daily as needed (anxiety) 4/10/19  Yes BRYAN Benjamin   fenofibrate 160 MG tablet Take 1 tablet by mouth daily 4/10/19  Yes BRYAN Benjamin   atorvastatin (LIPITOR) 20 MG tablet Take 1 tablet by mouth daily 4/10/19  Yes BRYAN Benjamin   metFORMIN (GLUCOPHAGE) 500 MG tablet Take 1 tablet by mouth 2 times daily (with meals) 4/10/19  Yes BRYAN Benjamin   atenolol (TENORMIN) 50 MG tablet TAKE ONE TABLET EVERY DAY 4/10/19  Yes BRYAN Benjamin   cloNIDine (CATAPRES) 0.1 MG Recent Labs     04/24/19  1430   AST 17   ALT 15   BILITOT 0.3   ALKPHOS 100     Cardiac Enzymes:   Recent Labs     04/24/19  1430 04/24/19  1615   TROPONINI <0.01 <0.01     RAD:    XR CHEST STANDARD (2 VW) [[id number]] Resulted: 04/24/19 1512     Order Status: Completed Specimen: Chest Updated: 04/24/19 1514     Narrative:       EXAMINATION:  XR CHEST (2 VW)  4/24/2019 3:09 PM  HISTORY: Chest pain shortness of breath  COMPARISON: No comparison study. FINDINGS:  PA and lateral views of the chest were obtained. The lungs  are clear and normally expanded. There are few scattered nodular  densities in the left lung base, at least one is calcified, another of  the nodules may a represent nipple shadow artifact. Repeat PA chest  with nipple markers in place is recommended. Heart size is normal. No  pneumothorax or pleural effusion is identified. The osseous structures  are unremarkable.     Impression:       No pulmonary consolidation or acute findings. A few small  nodules in the left lung base, at least one is calcified. One of the  nodules may represent a nipple shadow artifact. Consider repeat PA  chest with nipple markers in place. Signed by Dr Sanam Fortune on 4/24/2019 3:12 PM         CT Head WO Contrast [[id number]] Resulted: 04/24/19 1754      Order Status: Completed Updated: 04/24/19 1756     Narrative:       EXAMINATION: CT HEAD WO CONTRAST 4/24/2019 5:48 PM  HISTORY: Daily headaches, history of bladder cancer  Comparison: CT head without contrast 2/17/2011  Technique: Sequential imaging was performed from the vertex through  the base of the skull without the use of IV contrast. Sagittal and  coronal reformations were made from the original source data and  reviewed. Automated exposure control was utilized for radiation dose  reduction. Radiation dose:  mGy-cm  Findings: There is no evidence of acute intracranial hemorrhage, mass, or  midline shift.  There is no evidence of acute territorial

## 2019-04-24 NOTE — ED NOTES
Pt resting quietly in room with family at bedside Awaiting further orders Pt to have repeat cardiac tests approx Cathy [address], [employer]  04/24/19 2015

## 2019-04-24 NOTE — PROGRESS NOTES
Anibal He Concan arrived to room # 723-2. Presented with: Chest Pain  Mental Status: Patient is oriented, alert, coherent, logical, thought processes intact and able to concentrate and follow conversation. Vitals:    04/24/19 1754   BP: (!) 153/79   Pulse: 64   Resp: 18   Temp: 97.6 °F (36.4 °C)   SpO2: 95%     Patient safety contract and falls prevention contract reviewed with patient Yes. Oriented Patient to room. Call light within reach. Yes.   Needs, issues or concerns expressed at this time: no.      Electronically signed by Rhea Laura RN on 4/24/2019 at 6:34 PM

## 2019-04-24 NOTE — PROGRESS NOTES
[facility]  [facility] [address]  Dept: [phone]  Dept Fax: [phone]  Loc: [phone]    Dulce Larson is a 44 y.o. male who presents today for his medical conditions/complaints as noted below. Dulce Larson is c/o of 2 Week Follow-Up      Chief Complaint   Patient presents with    2 Week Follow-Up       HPI:     HPI  Patient here for follow up on multiple issues. These include DM, HTN, and HLD. Patient also had issues with feeling like he was going to pass out recently. Patient has had carotid US since last visit which was normal.     Patient is also having intermittent chest pain and increasing SOA. Patient states that near syncopal episodes have been occurring more frequently. Past Medical History:   Diagnosis Date    Cancer [facility])     Bladder    COPD (chronic obstructive pulmonary disease) (HCC)     Hypertension         Past Surgical History:   Procedure Laterality Date    BACK SURGERY      X3     BLADDER REMOVAL      LYMPHADENECTOMY      lower ext    PROSTATECTOMY         Social History     Tobacco Use    Smoking status: Current Every Day Smoker     Packs/day: 1.50     Years: 30.00     Pack years: 45.00    Smokeless tobacco: Never Used   Substance Use Topics    Alcohol use: Yes     Comment:  Occ        Current Outpatient Medications   Medication Sig Dispense Refill    FLUoxetine (PROZAC) 20 MG capsule Take 1 capsule by mouth daily 30 capsule 2    ergocalciferol (ERGOCALCIFEROL) 45771 units capsule Take 1 capsule by mouth once a week 4 capsule 3    busPIRone (BUSPAR) 10 MG tablet Take 1 tablet by mouth 3 times daily as needed (anxiety) 90 tablet 1    fenofibrate 160 MG tablet Take 1 tablet by mouth daily 30 tablet 5    atorvastatin (LIPITOR) 20 MG tablet Take 1 tablet by mouth daily 30 tablet 3    metFORMIN (GLUCOPHAGE) 500 MG tablet Take 1 tablet by mouth 2 times daily (with meals) 60 tablet 0  atenolol (TENORMIN) 50 MG tablet TAKE ONE TABLET EVERY DAY 30 tablet 5    fluticasone-vilanterol (BREO ELLIPTA) 100-25 MCG/INH AEPB inhaler Inhale 1 puff into the lungs daily 1 each 2    cloNIDine (CATAPRES) 0.1 MG tablet Take 1 tablet by mouth daily 60 tablet 1    tiZANidine (ZANAFLEX) 4 MG tablet TAKE 1 TABLET BY MOUTH EVERY 6 HOURS FOR MUSCLE PAIN 60 tablet 5    amLODIPine (NORVASC) 5 MG tablet Take 1 tablet by mouth daily 30 tablet 5    omeprazole (PRILOSEC) 40 MG delayed release capsule Take 1 capsule by mouth daily 30 capsule 1     No current facility-administered medications for this visit. Allergies   Allergen Reactions    Latex Other (See Comments)     BOILS AND BLISTERS- ALLERGY CALLED TO OMID SURGERY SCHEDULING.  Demerol Hcl [Meperidine]        History reviewed. No pertinent family history. Subjective:      Review of Systems   Constitutional: Positive for diaphoresis. Respiratory: Positive for chest tightness and shortness of breath. Cardiovascular: Positive for chest pain (intermittent). Psychiatric/Behavioral: The patient is nervous/anxious. Objective:     Physical Exam   Constitutional: He is oriented to person, place, and time. Vital signs are normal. He appears well-developed and well-nourished. HENT:   Head: Normocephalic and atraumatic. Right Ear: Hearing and external ear normal.   Left Ear: Hearing and external ear normal.   Nose: Nose normal.   Mouth/Throat: Uvula is midline, oropharynx is clear and moist and mucous membranes are normal.   Eyes: Pupils are equal, round, and reactive to light. Conjunctivae, EOM and lids are normal.   Neck: Trachea normal and normal range of motion. Neck supple. No thyroid mass and no thyromegaly present. Cardiovascular: Normal rate, regular rhythm, normal heart sounds and intact distal pulses. Pulmonary/Chest: Effort normal and breath sounds normal.   Abdominal: Soft.  Normal appearance and bowel sounds are normal. Musculoskeletal:        Right shoulder: He exhibits decreased range of motion. Cervical back: He exhibits decreased range of motion. He exhibits no tenderness. Thoracic back: Normal. He exhibits normal range of motion and no tenderness. Lumbar back: Normal. He exhibits normal range of motion and no tenderness. Neurological: He is alert and oriented to person, place, and time. He has normal strength. Skin: Skin is warm and intact. Capillary refill takes 2 to 3 seconds. He is diaphoretic. Clammy skin pale   Psychiatric: He has a normal mood and affect. His speech is normal and behavior is normal. Judgment and thought content normal. Cognition and memory are normal.   Nursing note and vitals reviewed. BP (!) 152/92   Pulse 80   Temp 98.4 °F (36.9 °C) (Temporal)   Resp 18   Ht 5' 11\" (1.803 m)   Wt 213 lb (96.6 kg)   SpO2 98%   BMI 29.71 kg/m²     Assessment:      Diagnosis Orders   1. Chest pain, unspecified type     2. Short of breath on exertion     3. Anxiety and depression  FLUoxetine (PROZAC) 20 MG capsule   4. Essential hypertension     5. Type 2 diabetes mellitus without complication, without long-term current use of insulin ([address]     6. Mixed hyperlipidemia     7. Decreased stamina         No results found for this visit on 04/24/19. Plan:     Patient to ER due to symptoms. Spoke with Dr Gabe Hernández. Patient refused EMS and wife at bedside and is driving patient to ER. No follow-ups on file. No orders of the defined types were placed in this encounter. Orders Placed This Encounter   Medications    FLUoxetine (PROZAC) 20 MG capsule     Sig: Take 1 capsule by mouth daily     Dispense:  30 capsule     Refill:  2        Patient offered educational handouts and has had all questions answered. Patient voices understanding and agrees to plans along with risks and benefits of plan.  Patient is instructed to continue prior meds, diet, and exercise plans as instructed. Patient agrees to follow up as instructed and sooner if needed. Patient agrees to go to ER if condition becomes emergent. EMR Dragon/transcription disclaimer: Some of this encounter note is an electronic transcription/translation of spoken language to printed text. The electronic translation of spoken language may permit erroneous, or at times, nonsensical words or phrases to be inadvertently transcribed.  Although I have reviewed the note for such errors, some may still exist.    Electronically signed by BRYAN Patricia on 4/24/2019 at 2:06 PM

## 2019-04-25 ENCOUNTER — APPOINTMENT (OUTPATIENT)
Dept: NUCLEAR MEDICINE | Age: 49
DRG: 234 | End: 2019-04-25
Payer: MEDICAID

## 2019-04-25 LAB
AMYLASE: 98 U/L (ref 28–100)
ANION GAP SERPL CALCULATED.3IONS-SCNC: 13 MMOL/L (ref 7–19)
ANION GAP SERPL CALCULATED.3IONS-SCNC: 9 MMOL/L (ref 7–19)
BUN BLDV-MCNC: 22 MG/DL (ref 6–20)
BUN BLDV-MCNC: 24 MG/DL (ref 6–20)
CALCIUM SERPL-MCNC: 9.7 MG/DL (ref 8.6–10)
CALCIUM SERPL-MCNC: 9.8 MG/DL (ref 8.6–10)
CHLORIDE BLD-SCNC: 90 MMOL/L (ref 98–111)
CHLORIDE BLD-SCNC: 97 MMOL/L (ref 98–111)
CHOLESTEROL, TOTAL: 242 MG/DL (ref 160–199)
CO2: 25 MMOL/L (ref 22–29)
CO2: 28 MMOL/L (ref 22–29)
CREAT SERPL-MCNC: 1 MG/DL (ref 0.5–1.2)
CREAT SERPL-MCNC: 1.2 MG/DL (ref 0.5–1.2)
EKG P AXIS: 62 DEGREES
EKG P-R INTERVAL: 122 MS
EKG Q-T INTERVAL: 404 MS
EKG QRS DURATION: 96 MS
EKG QTC CALCULATION (BAZETT): 423 MS
EKG T AXIS: 62 DEGREES
GFR NON-AFRICAN AMERICAN: >60
GFR NON-AFRICAN AMERICAN: >60
GLUCOSE BLD-MCNC: 144 MG/DL (ref 70–99)
GLUCOSE BLD-MCNC: 246 MG/DL (ref 70–99)
GLUCOSE BLD-MCNC: 248 MG/DL (ref 74–109)
GLUCOSE BLD-MCNC: 315 MG/DL (ref 70–99)
GLUCOSE BLD-MCNC: 329 MG/DL (ref 70–99)
GLUCOSE BLD-MCNC: 339 MG/DL (ref 74–109)
HCT VFR BLD CALC: 42.1 % (ref 42–52)
HDLC SERPL-MCNC: 28 MG/DL (ref 55–121)
HEMOGLOBIN: 14.4 G/DL (ref 14–18)
LDL CHOLESTEROL CALCULATED: ABNORMAL MG/DL
LDL CHOLESTEROL DIRECT: 105 MG/DL
LIPASE: 73 U/L (ref 13–60)
LV EF: 60 %
LVEF MODALITY: NORMAL
MCH RBC QN AUTO: 30.8 PG (ref 27–31)
MCHC RBC AUTO-ENTMCNC: 34.2 G/DL (ref 33–37)
MCV RBC AUTO: 90.1 FL (ref 80–94)
PDW BLD-RTO: 12.6 % (ref 11.5–14.5)
PERFORMED ON: ABNORMAL
PLATELET # BLD: 151 K/UL (ref 130–400)
PMV BLD AUTO: 10.5 FL (ref 9.4–12.4)
POTASSIUM REFLEX MAGNESIUM: 5.7 MMOL/L (ref 3.5–5)
POTASSIUM SERPL-SCNC: 4 MMOL/L (ref 3.5–5)
RBC # BLD: 4.67 M/UL (ref 4.7–6.1)
SODIUM BLD-SCNC: 131 MMOL/L (ref 136–145)
SODIUM BLD-SCNC: 131 MMOL/L (ref 136–145)
TRIGL SERPL-MCNC: 1388 MG/DL (ref 0–149)
WBC # BLD: 7.9 K/UL (ref 4.8–10.8)

## 2019-04-25 PROCEDURE — 82948 REAGENT STRIP/BLOOD GLUCOSE: CPT

## 2019-04-25 PROCEDURE — 96372 THER/PROPH/DIAG INJ SC/IM: CPT

## 2019-04-25 PROCEDURE — 2500000003 HC RX 250 WO HCPCS: Performed by: HOSPITALIST

## 2019-04-25 PROCEDURE — 3430000000 HC RX DIAGNOSTIC RADIOPHARMACEUTICAL: Performed by: INTERNAL MEDICINE

## 2019-04-25 PROCEDURE — G0378 HOSPITAL OBSERVATION PER HR: HCPCS

## 2019-04-25 PROCEDURE — 83690 ASSAY OF LIPASE: CPT

## 2019-04-25 PROCEDURE — 6360000004 HC RX CONTRAST MEDICATION: Performed by: INTERNAL MEDICINE

## 2019-04-25 PROCEDURE — 80048 BASIC METABOLIC PNL TOTAL CA: CPT

## 2019-04-25 PROCEDURE — 6360000002 HC RX W HCPCS: Performed by: INTERNAL MEDICINE

## 2019-04-25 PROCEDURE — 2580000003 HC RX 258: Performed by: INTERNAL MEDICINE

## 2019-04-25 PROCEDURE — 82150 ASSAY OF AMYLASE: CPT

## 2019-04-25 PROCEDURE — 85027 COMPLETE CBC AUTOMATED: CPT

## 2019-04-25 PROCEDURE — 6370000000 HC RX 637 (ALT 250 FOR IP): Performed by: HOSPITALIST

## 2019-04-25 PROCEDURE — 93017 CV STRESS TEST TRACING ONLY: CPT

## 2019-04-25 PROCEDURE — 96375 TX/PRO/DX INJ NEW DRUG ADDON: CPT

## 2019-04-25 PROCEDURE — 99255 IP/OBS CONSLTJ NEW/EST HI 80: CPT | Performed by: INTERNAL MEDICINE

## 2019-04-25 PROCEDURE — 6370000000 HC RX 637 (ALT 250 FOR IP): Performed by: INTERNAL MEDICINE

## 2019-04-25 PROCEDURE — 80061 LIPID PANEL: CPT

## 2019-04-25 PROCEDURE — C8929 TTE W OR WO FOL WCON,DOPPLER: HCPCS

## 2019-04-25 PROCEDURE — 93005 ELECTROCARDIOGRAM TRACING: CPT

## 2019-04-25 PROCEDURE — A9500 TC99M SESTAMIBI: HCPCS | Performed by: INTERNAL MEDICINE

## 2019-04-25 PROCEDURE — 78452 HT MUSCLE IMAGE SPECT MULT: CPT

## 2019-04-25 PROCEDURE — 83721 ASSAY OF BLOOD LIPOPROTEIN: CPT

## 2019-04-25 PROCEDURE — 36415 COLL VENOUS BLD VENIPUNCTURE: CPT

## 2019-04-25 RX ORDER — ALPRAZOLAM 0.5 MG/1
0.5 TABLET ORAL
Status: ACTIVE | OUTPATIENT
Start: 2019-04-26 | End: 2019-04-26

## 2019-04-25 RX ORDER — INSULIN GLARGINE 100 [IU]/ML
10 INJECTION, SOLUTION SUBCUTANEOUS NIGHTLY
Status: DISCONTINUED | OUTPATIENT
Start: 2019-04-25 | End: 2019-04-26

## 2019-04-25 RX ORDER — BUMETANIDE 0.25 MG/ML
0.5 INJECTION, SOLUTION INTRAMUSCULAR; INTRAVENOUS ONCE
Status: COMPLETED | OUTPATIENT
Start: 2019-04-25 | End: 2019-04-25

## 2019-04-25 RX ORDER — SODIUM CHLORIDE 0.9 % (FLUSH) 0.9 %
10 SYRINGE (ML) INJECTION EVERY 12 HOURS SCHEDULED
Status: DISCONTINUED | OUTPATIENT
Start: 2019-04-25 | End: 2019-04-26

## 2019-04-25 RX ORDER — ASPIRIN 81 MG/1
81 TABLET ORAL ONCE
Status: DISCONTINUED | OUTPATIENT
Start: 2019-04-25 | End: 2019-04-26

## 2019-04-25 RX ORDER — SODIUM CHLORIDE 9 MG/ML
INJECTION, SOLUTION INTRAVENOUS CONTINUOUS
Status: DISCONTINUED | OUTPATIENT
Start: 2019-04-26 | End: 2019-04-26

## 2019-04-25 RX ORDER — HYDRALAZINE HYDROCHLORIDE 20 MG/ML
5 INJECTION INTRAMUSCULAR; INTRAVENOUS EVERY 4 HOURS PRN
Status: DISCONTINUED | OUTPATIENT
Start: 2019-04-25 | End: 2019-05-01

## 2019-04-25 RX ORDER — ONDANSETRON 2 MG/ML
4 INJECTION INTRAMUSCULAR; INTRAVENOUS EVERY 6 HOURS PRN
Status: DISCONTINUED | OUTPATIENT
Start: 2019-04-25 | End: 2019-04-25 | Stop reason: SDUPTHER

## 2019-04-25 RX ORDER — SODIUM CHLORIDE 0.9 % (FLUSH) 0.9 %
10 SYRINGE (ML) INJECTION PRN
Status: DISCONTINUED | OUTPATIENT
Start: 2019-04-25 | End: 2019-04-25 | Stop reason: SDUPTHER

## 2019-04-25 RX ORDER — INSULIN GLARGINE 100 [IU]/ML
10 INJECTION, SOLUTION SUBCUTANEOUS 2 TIMES DAILY
Status: DISCONTINUED | OUTPATIENT
Start: 2019-04-25 | End: 2019-04-25

## 2019-04-25 RX ORDER — SODIUM CHLORIDE 9 MG/ML
INJECTION, SOLUTION INTRAVENOUS CONTINUOUS
Status: DISCONTINUED | OUTPATIENT
Start: 2019-04-25 | End: 2019-04-25

## 2019-04-25 RX ORDER — AMLODIPINE BESYLATE 5 MG/1
5 TABLET ORAL 2 TIMES DAILY
Status: DISCONTINUED | OUTPATIENT
Start: 2019-04-25 | End: 2019-05-01

## 2019-04-25 RX ORDER — ATORVASTATIN CALCIUM 80 MG/1
80 TABLET, FILM COATED ORAL DAILY
Status: DISCONTINUED | OUTPATIENT
Start: 2019-04-26 | End: 2019-05-01

## 2019-04-25 RX ORDER — CLONIDINE HYDROCHLORIDE 0.1 MG/1
0.1 TABLET ORAL 2 TIMES DAILY
Status: DISCONTINUED | OUTPATIENT
Start: 2019-04-25 | End: 2019-04-28

## 2019-04-25 RX ORDER — HYDROCODONE BITARTRATE AND ACETAMINOPHEN 10; 325 MG/1; MG/1
2 TABLET ORAL EVERY 6 HOURS PRN
Status: DISCONTINUED | OUTPATIENT
Start: 2019-04-25 | End: 2019-04-27

## 2019-04-25 RX ORDER — SODIUM POLYSTYRENE SULFONATE 15 G/60ML
15 SUSPENSION ORAL; RECTAL ONCE
Status: COMPLETED | OUTPATIENT
Start: 2019-04-25 | End: 2019-04-25

## 2019-04-25 RX ORDER — ALPRAZOLAM 0.5 MG/1
0.5 TABLET ORAL
Status: DISCONTINUED | OUTPATIENT
Start: 2019-04-25 | End: 2019-04-25

## 2019-04-25 RX ORDER — FENOFIBRATE 160 MG/1
160 TABLET ORAL DAILY
Status: DISCONTINUED | OUTPATIENT
Start: 2019-04-25 | End: 2019-05-01

## 2019-04-25 RX ORDER — FENOFIBRATE 54 MG/1
54 TABLET ORAL DAILY
Status: DISCONTINUED | OUTPATIENT
Start: 2019-04-25 | End: 2019-04-25

## 2019-04-25 RX ADMIN — HYDROCODONE BITARTRATE AND ACETAMINOPHEN 2 TABLET: 10; 325 TABLET ORAL at 22:34

## 2019-04-25 RX ADMIN — REGADENOSON 0.4 MG: 0.08 INJECTION, SOLUTION INTRAVENOUS at 13:50

## 2019-04-25 RX ADMIN — HYDROCODONE BITARTRATE AND ACETAMINOPHEN 2 TABLET: 10; 325 TABLET ORAL at 11:08

## 2019-04-25 RX ADMIN — FENOFIBRATE 160 MG: 160 TABLET ORAL at 10:48

## 2019-04-25 RX ADMIN — REGADENOSON 0.4 MG: 0.08 INJECTION, SOLUTION INTRAVENOUS at 14:53

## 2019-04-25 RX ADMIN — TETRAKIS(2-METHOXYISOBUTYLISOCYANIDE)COPPER(I) TETRAFLUOROBORATE 10 MILLICURIE: 1 INJECTION, POWDER, LYOPHILIZED, FOR SOLUTION INTRAVENOUS at 13:36

## 2019-04-25 RX ADMIN — PERFLUTREN 1.65 MG: 6.52 INJECTION, SUSPENSION INTRAVENOUS at 08:45

## 2019-04-25 RX ADMIN — Medication 10 ML: at 09:25

## 2019-04-25 RX ADMIN — AMLODIPINE BESYLATE 5 MG: 5 TABLET ORAL at 09:21

## 2019-04-25 RX ADMIN — AMLODIPINE BESYLATE 5 MG: 5 TABLET ORAL at 21:06

## 2019-04-25 RX ADMIN — PANTOPRAZOLE SODIUM 40 MG: 40 TABLET, DELAYED RELEASE ORAL at 09:21

## 2019-04-25 RX ADMIN — TETRAKIS(2-METHOXYISOBUTYLISOCYANIDE)COPPER(I) TETRAFLUOROBORATE 30 MILLICURIE: 1 INJECTION, POWDER, LYOPHILIZED, FOR SOLUTION INTRAVENOUS at 13:37

## 2019-04-25 RX ADMIN — ATORVASTATIN CALCIUM 20 MG: 20 TABLET, FILM COATED ORAL at 09:21

## 2019-04-25 RX ADMIN — INSULIN LISPRO 4 UNITS: 100 INJECTION, SOLUTION INTRAVENOUS; SUBCUTANEOUS at 09:26

## 2019-04-25 RX ADMIN — Medication 10 ML: at 21:06

## 2019-04-25 RX ADMIN — HYDROCODONE BITARTRATE AND ACETAMINOPHEN 2 TABLET: 10; 325 TABLET ORAL at 16:31

## 2019-04-25 RX ADMIN — SODIUM POLYSTYRENE SULFONATE 15 G: 15 SUSPENSION ORAL; RECTAL at 10:40

## 2019-04-25 RX ADMIN — FLUOXETINE 20 MG: 20 CAPSULE ORAL at 09:21

## 2019-04-25 RX ADMIN — INSULIN HUMAN 10 UNITS: 100 INJECTION, SOLUTION PARENTERAL at 10:39

## 2019-04-25 RX ADMIN — INSULIN LISPRO 2 UNITS: 100 INJECTION, SOLUTION INTRAVENOUS; SUBCUTANEOUS at 21:08

## 2019-04-25 RX ADMIN — ENOXAPARIN SODIUM 40 MG: 40 INJECTION SUBCUTANEOUS at 21:06

## 2019-04-25 RX ADMIN — CLONIDINE HYDROCHLORIDE 0.1 MG: 0.1 TABLET ORAL at 21:05

## 2019-04-25 RX ADMIN — ASPIRIN 81 MG 81 MG: 81 TABLET ORAL at 09:21

## 2019-04-25 RX ADMIN — BUMETANIDE 0.5 MG: 0.25 INJECTION INTRAMUSCULAR; INTRAVENOUS at 10:40

## 2019-04-25 RX ADMIN — INSULIN LISPRO 4 UNITS: 100 INJECTION, SOLUTION INTRAVENOUS; SUBCUTANEOUS at 17:19

## 2019-04-25 ASSESSMENT — PAIN SCALES - GENERAL
PAINLEVEL_OUTOF10: 8
PAINLEVEL_OUTOF10: 8
PAINLEVEL_OUTOF10: 10
PAINLEVEL_OUTOF10: 0

## 2019-04-25 ASSESSMENT — ENCOUNTER SYMPTOMS
NAUSEA: 0
EYES NEGATIVE: 1
VOMITING: 0
SHORTNESS OF BREATH: 0
GASTROINTESTINAL NEGATIVE: 1
DIARRHEA: 0
RESPIRATORY NEGATIVE: 1

## 2019-04-25 NOTE — PROGRESS NOTES
Pr family came to desk requesting \"calamine\" lotion. Staff made family  Aware that we did not have any. When asked what they wanted it for, family stated to put around the urostomy stoma sight because it was infected. Staff attempted to educate patient on proper way of changing urostomy supplies and tried to give them some adapt paste to seal the wafer around the stoma. Family and patients states that they only use calamine lotion and dry it with a hair dryer. Pt stoma is beefy red unable to see skin around the stoma bc family already had wafer secure to surrounding skin.

## 2019-04-25 NOTE — PROGRESS NOTES
Potassium of 5.7, Dr. Vasu Figueroa notified, no orders at this time.  Electronically signed by Angelica Corbett RN on 4/25/2019 at 5:49 AM

## 2019-04-25 NOTE — CONSULTS
[facility]  Cardiology Consult      Requesting MD:  Magdalene Blancas, *   Admit Status:  Observation [104]       History obtained from:   [] Patient  [] Other (specify):     Patient:  Trice Larson  [id number]     Chief Complaint:   Chief Complaint   Patient presents with    Chest Pain       HPI: Mr. Matthias Mcwilliams is a 44 y.o. male with a history of attention diabetes and hyperlipidemia tobacco abuse ongoing has been experiencing recurrent bouts of chest tightness and dyspnea diaphoresis for the past few months had an episode of this yesterday while at the doctor's office. Does not describe typical exertional angina episodes last a few minutes at a time not affected by deep breathing upper body movement or direct palpation. Troponins negative. Stress test today ejection fraction 54% questionable distal inferior ischemia    Review of Systems:  Review of Systems   Constitutional: Negative. Negative for chills, fever and unexpected weight change. HENT: Negative. Eyes: Negative. Respiratory: Negative. Negative for shortness of breath. Cardiovascular: Negative. Negative for chest pain. Gastrointestinal: Negative. Negative for diarrhea, nausea and vomiting. Endocrine: Negative. Genitourinary: Negative. Musculoskeletal: Negative. Skin: Negative. Neurological: Negative. All other systems reviewed and are negative. Cardiac Specific Data:  Specialty Problems        Cardiology Problems    Essential hypertension              Past Medical History:  Past Medical History:   Diagnosis Date    Cancer [facility])     Bladder    COPD [facility] obstructive [facility] disease[facility] [address]     Depression     Diabetes mellitus ([facility] [address]     Hypertension         Past Surgical History:  Past Surgical History:   Procedure Laterality Date    BACK SURGERY      X3     BLADDER REMOVAL      LYMPHADENECTOMY      lower ext    PROSTATECTOMY         Past Family History:  History reviewed.  No pertinent family history. Past Social History:  Social History     Socioeconomic History    Marital status:      Spouse name: Not on file    Number of children: Not on file    Years of education: Not on file    Highest education level: Not on file   Occupational History    Not on file   Social Needs    Financial resource strain: Not on file    Food insecurity:     Worry: Not on file     Inability: Not on file    Transportation needs:     Medical: Not on file     Non-medical: Not on file   Tobacco Use    Smoking status: Current Every Day Smoker     Packs/day: 1.50     Years: 30.00     Pack years: 45.00    Smokeless tobacco: Never Used   Substance and Sexual Activity    Alcohol use: Yes     Comment: Occ    Drug use: Never    Sexual activity: Not on file   Lifestyle    Physical activity:     Days per week: Not on file     Minutes per session: Not on file    Stress: Not on file   Relationships    Social connections:     Talks on phone: Not on file     Gets together: Not on file     Attends [employer] service: Not on file     Active member of club or organization: Not on file     Attends meetings of clubs or organizations: Not on file     Relationship status: Not on file    Intimate partner violence:     Fear of current or ex partner: Not on file     Emotionally abused: Not on file     Physically abused: Not on file     Forced sexual activity: Not on file   Other Topics Concern    Not on file   Social History Narrative     16 years second marriage    On disability due to multiple back surgeries and bladder cancer    [address] [facility]    Education high school    Smokes 1-1/2 pack per day drinks alcohol occasionally denies substance usage    Physically sedentary    Former  at  [address] [employer]    Never in the [employer]       Allergies: Allergies   Allergen Reactions    Latex Other (See Comments)     BOILS AND BLISTERS- ALLERGY CALLED TO [facility] SURGERY SCHEDULING.     Demerol Hcl [Meperidine]        Home Meds:  Prior to Admission medications    Medication Sig Start Date End Date Taking? Authorizing Provider   FLUoxetine (PROZAC) 20 MG capsule Take 1 capsule by mouth daily 4/24/19  Yes BRYAN Stone   HYDROcodone-acetaminophen (NORCO)  MG per tablet Take 2 tablets by mouth every 4 hours as needed for Pain.    Yes Historical Provider, MD   ergocalciferol (ERGOCALCIFEROL) 53652 units capsule Take 1 capsule by mouth once a week 4/10/19  Yes BRYAN Stone   busPIRone (BUSPAR) 10 MG tablet Take 1 tablet by mouth 3 times daily as needed (anxiety) 4/10/19  Yes BRYAN Stone   fenofibrate 160 MG tablet Take 1 tablet by mouth daily 4/10/19  Yes BRYAN Stone   atorvastatin (LIPITOR) 20 MG tablet Take 1 tablet by mouth daily 4/10/19  Yes BRYAN Stone   metFORMIN (GLUCOPHAGE) 500 MG tablet Take 1 tablet by mouth 2 times daily (with meals) 4/10/19  Yes BRYAN Stone   atenolol (TENORMIN) 50 MG tablet TAKE ONE TABLET EVERY DAY  Patient taking differently: TAKE ONE TABLET EVERY DAY AT LUNCH 4/10/19  Yes BRYAN Stone   cloNIDine (CATAPRES) 0.1 MG tablet Take 1 tablet by mouth daily  Patient taking differently: Take 0.1 mg by mouth nightly  3/14/19  Yes BRYAN Stone   tiZANidine (ZANAFLEX) 4 MG tablet TAKE 1 TABLET BY MOUTH EVERY 6 HOURS FOR MUSCLE PAIN 3/14/19  Yes BRYAN Stone   amLODIPine (NORVASC) 5 MG tablet Take 1 tablet by mouth daily 3/14/19  Yes BRYAN Stone   omeprazole (PRILOSEC) 40 MG delayed release capsule Take 1 capsule by mouth daily 3/14/19  Yes BRYAN Stone       Current Meds:   insulin glargine  10 Units Subcutaneous Nightly    [START ON 4/26/2019] atorvastatin  80 mg Oral Daily    fenofibrate  160 mg Oral Daily    amLODIPine  5 mg Oral Daily    cloNIDine  0.1 mg Oral Daily    FLUoxetine  20 mg Oral Daily    pantoprazole  40 mg Oral QAM AC    sodium chloride flush  10 mL Intravenous 2 times per day    aspirin  81 mg Oral Daily    enoxaparin  40 mg Subcutaneous Q24H    insulin lispro  0-6 Units Subcutaneous TID WC    insulin lispro  0-3 Units Subcutaneous Nightly    nicotine  1 patch Transdermal Daily       Current Infused Meds:   dextrose         Physical Exam:  Vitals:    04/25/19 1414   BP: (!) 182/84   Pulse: 82   Resp: 20   Temp: 97.3 °F (36.3 °C)   SpO2: 96%       Intake/Output Summary (Last 24 hours) at 4/25/2019 1640  Last data filed at 4/25/2019 1448  Gross per 24 hour   Intake 448 ml   Output --   Net 448 ml     Estimated body mass index is 29.12 kg/m² as calculated from the following:    Height as of this encounter: 5' 11\" (1.803 m). Weight as of this encounter: 208 lb 12.8 oz (94.7 kg). Physical Exam   Constitutional: He is oriented to person, place, and time. He appears well-developed and well-nourished. No distress. HENT:   Head: Normocephalic and atraumatic. Eyes: Pupils are equal, round, and reactive to light. EOM are normal. No scleral icterus. Neck: Normal range of motion. Neck supple. No JVD present. Carotid bruit is not present. No tracheal deviation present. No thyromegaly present. No carotid bruits auscultated   Cardiovascular: Normal rate, regular rhythm and normal heart sounds. Exam reveals no gallop and no friction rub. No murmur heard. Pulmonary/Chest: Effort normal and breath sounds normal. No stridor. No respiratory distress. He has no wheezes. He has no rales. He exhibits no tenderness. Abdominal: Soft. Bowel sounds are normal. He exhibits no distension and no mass. There is no tenderness. There is no rebound and no guarding. No hernia. Musculoskeletal: He exhibits no edema, tenderness or deformity. Lymphadenopathy:     He has no cervical adenopathy. Neurological: He is alert and oriented to person, place, and time. No cranial nerve deficit or sensory deficit. He exhibits normal muscle tone. Coordination normal.   Skin: Skin is warm and dry.  He is not diaphoretic. Psychiatric: He has a normal mood and affect. His behavior is normal. Judgment and thought content normal.   Vitals reviewed. Labs:  Recent Labs     04/24/19  1430 04/25/19  0207   WBC 8.4 7.9   HGB 15.3 14.4    151       Recent Labs     04/24/19  1430 04/25/19  0207    131*   K 4.5 5.7*   CL 98 97*   CO2 25 25   BUN 23* 24*   CREATININE 1.1 1.2   LABGLOM >60 >60   CALCIUM 9.9 9.7       CK, CKMB, Troponin: @LABRCNT (CKTOTAL:3, CKMB:3, TROPONINI:3)@    Last 3 BNP:  No results for input(s): BNP in the last 72 hours. IMAGING:  Xr Chest Standard (2 Vw)    Result Date: 4/24/2019  EXAMINATION:  XR CHEST (2 VW)  4/24/2019 3:09 PM HISTORY: Chest pain shortness of breath COMPARISON: No comparison study. FINDINGS:  PA and lateral views of the chest were obtained. The lungs are clear and normally expanded. There are few scattered nodular densities in the left lung base, at least one is calcified, another of the nodules may a represent nipple shadow artifact. Repeat PA chest with nipple markers in place is recommended. Heart size is normal. No pneumothorax or pleural effusion is identified. The osseous structures are unremarkable. No pulmonary consolidation or acute findings. A few small nodules in the left lung base, at least one is calcified. One of the nodules may represent a nipple shadow artifact. Consider repeat PA chest with nipple markers in place. Signed by Dr Yodit Briones. Devika on 4/24/2019 3:12 PM    Xr Cervical Spine (2-3 Views)    Result Date: 4/10/2019  EXAMINATION: XR CERVICAL SPINE (2-3 VIEWS) 4/10/2019 9:34 AM HISTORY: Neck pain 4 views cervical spine are obtained. Cervical vertebra are normally aligned. There is slight loss of height at C5-6 disc space level and loss of height of C6-7 disc space level. Mild uncinate spurring is noted at the C6-7 level. Minimal anterior hypertrophic degenerative changes noted inferior endplates of V6-K9 and C6.  The odontoid process is intact. Calcification left carotid artery is noted. [place] the lungs appears clear as visualized. Impression degenerative spondylosis is noted in the cervical spine as described above Signed by Dr Bacilio Brewer on 4/10/2019 9:36 AM    Ct Head Wo Contrast    Result Date: 4/24/2019  EXAMINATION: CT HEAD WO CONTRAST 4/24/2019 5:48 PM HISTORY: Daily headaches, history of bladder cancer Comparison: CT head without contrast 2/17/2011 Technique: Sequential imaging was performed from the vertex through the base of the skull without the use of IV contrast. Sagittal and coronal reformations were made from the original source data and reviewed. Automated exposure control was utilized for radiation dose reduction. Radiation dose:  mGy-cm Findings: There is no evidence of acute intracranial hemorrhage, mass, or midline shift. There is no evidence of acute territorial infarct. The ventricles appear normal in configuration. Basilar cisterns are patent. The posterior fossa appears grossly normal. The calvarium appears intact. There is near complete opacification of the visualized left maxillary sinus. The visualized mastoid air cells appear clear. Calcifications are seen in the cavernous carotid arteries. Impression: No acute intracranial findings. Sinus disease. Signed by Dr Bobby Trevizo on 4/24/2019 5:54 PM    Nm Myocardial Spect Rest Exercise Or Rx    Result Date: 4/25/2019  Lexiscan Nuclear Stress Test Report Procedure date: 04/25/2019 Indications: Chest pain Procedure: Stress was performed with injection of 0.4 mg Lexiscan. Vital signs and EKG were monitored. Technetium-99 sestamibi was injected in divided doses, approximately 10 mCi and 30 mCi respectively for rest and stress imaging. The patient was discharged in stable condition. Results: Patient had symptoms of dyspnea, dizziness, nausea, and stomach cramping during infusion that resolved in recovery. Baseline EKG showed normal sinus rhythm.  During stress there were no significant EKG changes or rhythm changes. Baseline and peak blood pressures were 151/103, and 163/92 respectively. Baseline and peak heart rates were 87 and  99 respectively. Radioactive pharmaceuticals used: Rest images 10.67 mCi technetium 99m sestamibi Stress images 32.83 mCi technetium 99m sestamibi Review of rest and stress images obtained in a SPECT acquisition protocol low with review of the polar plot revealed: 1. Ejection fraction normal 54% 2. Wall motion study suggested mild inferoseptal hypokinesis 3. Myocardial perfusion imaging demonstrated homogeneous uptake of the tracer in all visualized segments with the exception a few of the more distal short axis slices showed diminished uptake in the inferior wall which appeared to reverse on some of the slices. The sum stress score was 1. Summary impression: Probably normal study normal ejection fraction 54% minimal distal inferior wall ischemia cannot be entirely excluded more likely normal correlate clinically Signed by Dr Pauline Jimenez on 4/25/2019 4:00 PM    Us Carotid Artery Bilateral    Result Date: 4/18/2019  Vascular Carotid Procedure  Demographics   Patient Name    Alok Marrero    Age                   44                  [place]   Patient Number  [id number]           Gender                Male   Visit Number    [id number]        Interpreting          Taylor Valente MD                                   Physician   Date of Birth   1970       Referring Physician   Eder Valerio   Accession       [id number]        [address] [place] [address] RVT  Number  Procedure Type of Study:   Cerebral:Carotid, US CAROTID ARTERY BILATERAL [[id number]]. Indications for Study:Dizziness/Vertigo. Risk Factors   - The patient's risk factor(s) include: diabetes mellitus, dyslipidemia     and arterial hypertension.   - Current - Every day. Impression   There is no plaque visualized in the bilateral internal carotid  artery(ies).   There is no stenosis in the right internal carotid artery. There is no stenosis of the left internal carotid artery. There is normal antegrade flow in the bilateral vertebral artery(ies). Signature   ----------------------------------------------------------------  Electronically signed by Gwendolyn Moreno MD(Interpreting  physician) on 04/18/2019 11:12 AM  ----------------------------------------------------------------  Blood Pressure:Right arm 176/86 mmHg. Left arm 184/90 mmHg. Velocities are measured in cm/s ; Diameters are measured in mm Carotid Right Measurements +------------+-------+-------+--------+-------+------------+---------------+ ! Location    ! PSV    ! EDV    ! Angle   ! RI     !%Stenosis   ! Tortuosity     ! +------------+-------+-------+--------+-------+------------+---------------+ ! Prox CCA    !111    !18.2   ! 60      !0.84   !            !               ! +------------+-------+-------+--------+-------+------------+---------------+ ! Mid CCA     ! 85     !19.9   !60      !0.77   !            !               ! +------------+-------+-------+--------+-------+------------+---------------+ ! Prox ICA    !103    !27     !60      !0.74   !            !               ! +------------+-------+-------+--------+-------+------------+---------------+ ! Mid ICA     !101    !31.7   !60      !0.69   !            !               ! +------------+-------+-------+--------+-------+------------+---------------+ ! Dist ICA    !76.8   !28.1   ! 60      !0.63   !            !               ! +------------+-------+-------+--------+-------+------------+---------------+ ! Prox ECA    !173    !16.7   !60      !0.9    ! !               ! +------------+-------+-------+--------+-------+------------+---------------+ ! Vertebral   !47.1   !11.4   !60      !0.76   !            !               ! +------------+-------+-------+--------+-------+------------+---------------+   - There is antegrade vertebral flow noted on the right side.    - Additional Measurements:ICAPSV/CCAPSV 0.93. ICAEDV/CCAEDV 1.74. Carotid Left Measurements +------------+-------+-------+--------+-------+------------+---------------+ ! Location    ! PSV    ! EDV    ! Angle   ! RI     !%Stenosis   ! Tortuosity     ! +------------+-------+-------+--------+-------+------------+---------------+ ! Prox CCA    !78.2   !15.7   !60      !0.8    ! !               ! +------------+-------+-------+--------+-------+------------+---------------+ ! Mid CCA     !107    !22.3   !60      !0.79   !            !               ! +------------+-------+-------+--------+-------+------------+---------------+ ! Prox ICA    !57.8   !14.9   !60      !0.74   !            !               ! +------------+-------+-------+--------+-------+------------+---------------+ ! Mid ICA     ! 96.7   !35.2   ! 60      !0.64   !            !               ! +------------+-------+-------+--------+-------+------------+---------------+ ! Dist ICA    ! 96.7   !38.1   ! 60      !0.61   !            !               ! +------------+-------+-------+--------+-------+------------+---------------+ ! Prox ECA    !136    !14.1   ! 60      !0.9    ! !               ! +------------+-------+-------+--------+-------+------------+---------------+ ! Vertebral   !50.7   !16.5   !60      !0.67   !            !               ! +------------+-------+-------+--------+-------+------------+---------------+   - There is antegrade vertebral flow noted on the left side. - Additional Measurements:ICAPSV/CCAPSV 1.24. ICAEDV/CCAEDV 2.43. Assessment:  1. Recurrent bouts of chest tightness over the past few months suspicious for angina  2. Tobacco abuse ongoing  3. History of bladder cancer  4. Chronic back pain and multiple back surgeries  5. Stress test today ejection fraction 54% questionable minimal inferior ischemia distally  6. I pretension  7. Hyperlipidemia  8. Diabetes type 2  9.  Anxiety and depression  10. Hyperkalemia      Recommendations:  1. Assess options either trial of medical therapy versus diagnostic catheterization they would prefer the latter  I have discussed with the patient regarding indications for the proposed procedure LEFT HEART CATHETERIZATION AND POSSIBLE PERCUTANEOUS INTERVENTION  along with possible alternatives benefits and risks including but not limited to risks of death, myocardial infarction, stroke, contrast induced nephropathy which in some cases may lead to acute kidney failure requiring dialysis, allergic reactions, bleeding requiring blood transfusion,  cardiac arrhthymias, respiratory failure which may require placing the patient on respiratory support such as a ventilator or breathing machine,risk of complications which may require vascular surgery, and if coronary intervention is performed emergency CABG may be required in less than 1% of cases. The patient is awake and alert and understands the issues involved and indicates willingness to proceed as ordered. The patient does not have any contraindications to dual antiplatelet therapy. The patient does not have any known  pending surgical procedures in the next 12 months at this time. The patient is  a reasonable candidate for moderate conscious sedation.     ASA score:  ASA 3 - Patient with moderate systemic disease with functional limitations    Mallampati: I (soft palate, uvula, fauces, tonsillar pillars visible)    Preferred vascular access site will be: right radial

## 2019-04-25 NOTE — PLAN OF CARE
Problem: Falls - Risk of:  Goal: Will remain free from falls  Description  Will remain free from falls  Outcome: Ongoing  Goal: Absence of physical injury  Description  Absence of physical injury  Outcome: Ongoing     Problem: SAFETY  Goal: Free from accidental physical injury  Outcome: Ongoing  Goal: Free from intentional harm  Outcome: Ongoing     Problem: DAILY CARE  Goal: Daily care needs are met  Outcome: Ongoing     Problem: PAIN  Goal: Patient's pain/discomfort is manageable  Outcome: Ongoing     Problem: SKIN INTEGRITY  Goal: Skin integrity is maintained or improved  Outcome: Ongoing     Problem: KNOWLEDGE DEFICIT  Goal: Patient/S.O. demonstrates understanding of disease process, treatment plan, medications, and discharge instructions.   Outcome: Ongoing     Problem: DISCHARGE BARRIERS  Goal: Patient's continuum of care needs are met  Outcome: Ongoing

## 2019-04-26 ENCOUNTER — APPOINTMENT (OUTPATIENT)
Dept: GENERAL RADIOLOGY | Age: 49
DRG: 234 | End: 2019-04-26
Payer: MEDICAID

## 2019-04-26 ENCOUNTER — APPOINTMENT (OUTPATIENT)
Dept: CARDIAC CATH/INVASIVE PROCEDURES | Age: 49
DRG: 234 | End: 2019-04-26
Payer: MEDICAID

## 2019-04-26 PROBLEM — I10 ESSENTIAL HYPERTENSION: Chronic | Status: ACTIVE | Noted: 2019-03-14

## 2019-04-26 PROBLEM — Z85.51 HX OF BLADDER CANCER: Chronic | Status: ACTIVE | Noted: 2019-03-14

## 2019-04-26 PROBLEM — E11.9 TYPE 2 DIABETES MELLITUS WITHOUT COMPLICATION, WITHOUT LONG-TERM CURRENT USE OF INSULIN (HCC): Chronic | Status: ACTIVE | Noted: 2019-04-24

## 2019-04-26 PROBLEM — I25.110 CORONARY ARTERY DISEASE INVOLVING NATIVE CORONARY ARTERY OF NATIVE HEART WITH UNSTABLE ANGINA PECTORIS (HCC): Status: ACTIVE | Noted: 2019-04-24

## 2019-04-26 PROBLEM — E78.2 MIXED HYPERLIPIDEMIA: Chronic | Status: ACTIVE | Noted: 2019-04-24

## 2019-04-26 PROBLEM — F41.8 MIXED ANXIETY DEPRESSIVE DISORDER: Chronic | Status: ACTIVE | Noted: 2019-04-24

## 2019-04-26 LAB
ALBUMIN SERPL-MCNC: 3.9 G/DL (ref 3.5–5.2)
ALP BLD-CCNC: 98 U/L (ref 40–130)
ALT SERPL-CCNC: 6 U/L (ref 5–41)
ANION GAP SERPL CALCULATED.3IONS-SCNC: 13 MMOL/L (ref 7–19)
AST SERPL-CCNC: 5 U/L (ref 5–40)
BACTERIA: ABNORMAL /HPF
BILIRUB SERPL-MCNC: <0.2 MG/DL (ref 0.2–1.2)
BILIRUBIN URINE: NEGATIVE
BLOOD, URINE: ABNORMAL
BUN BLDV-MCNC: 24 MG/DL (ref 6–20)
CALCIUM SERPL-MCNC: 9.2 MG/DL (ref 8.6–10)
CHLORIDE BLD-SCNC: 94 MMOL/L (ref 98–111)
CHOLESTEROL, TOTAL: 225 MG/DL (ref 160–199)
CLARITY: ABNORMAL
CO2: 26 MMOL/L (ref 22–29)
COLOR: YELLOW
CREAT SERPL-MCNC: 1 MG/DL (ref 0.5–1.2)
EKG P AXIS: 49 DEGREES
EKG P-R INTERVAL: 124 MS
EKG Q-T INTERVAL: 416 MS
EKG QRS DURATION: 98 MS
EKG QTC CALCULATION (BAZETT): 430 MS
EKG T AXIS: 63 DEGREES
EPITHELIAL CELLS, UA: 1 /HPF (ref 0–5)
GFR NON-AFRICAN AMERICAN: >60
GLUCOSE BLD-MCNC: 219 MG/DL (ref 70–99)
GLUCOSE BLD-MCNC: 251 MG/DL (ref 70–99)
GLUCOSE BLD-MCNC: 291 MG/DL (ref 70–99)
GLUCOSE BLD-MCNC: 294 MG/DL (ref 70–99)
GLUCOSE BLD-MCNC: 320 MG/DL (ref 74–109)
GLUCOSE URINE: 250 MG/DL
HCT VFR BLD CALC: 40.7 % (ref 42–52)
HDLC SERPL-MCNC: 27 MG/DL (ref 55–121)
HEMOGLOBIN: 14.4 G/DL (ref 14–18)
HYALINE CASTS: 40 /HPF (ref 0–8)
KETONES, URINE: NEGATIVE MG/DL
LDL CHOLESTEROL CALCULATED: ABNORMAL MG/DL
LDL CHOLESTEROL DIRECT: 109 MG/DL
LEUKOCYTE ESTERASE, URINE: NEGATIVE
LV EF: 54 %
LVEF MODALITY: NORMAL
MCH RBC QN AUTO: 31.3 PG (ref 27–31)
MCHC RBC AUTO-ENTMCNC: 35.4 G/DL (ref 33–37)
MCV RBC AUTO: 88.5 FL (ref 80–94)
NITRITE, URINE: POSITIVE
PDW BLD-RTO: 12.4 % (ref 11.5–14.5)
PERFORMED ON: ABNORMAL
PH UA: 6.5 (ref 5–8)
PLATELET # BLD: 154 K/UL (ref 130–400)
PMV BLD AUTO: 10.5 FL (ref 9.4–12.4)
POTASSIUM REFLEX MAGNESIUM: 4 MMOL/L (ref 3.5–5)
PRO-BNP: 32 PG/ML (ref 0–900)
PROTEIN UA: ABNORMAL MG/DL
RBC # BLD: 4.6 M/UL (ref 4.7–6.1)
RBC UA: 3 /HPF (ref 0–4)
SODIUM BLD-SCNC: 133 MMOL/L (ref 136–145)
SPECIFIC GRAVITY UA: >1.045 (ref 1–1.03)
TOTAL PROTEIN: 6.9 G/DL (ref 6.6–8.7)
TRIGL SERPL-MCNC: 1127 MG/DL (ref 0–149)
URINE REFLEX TO CULTURE: YES
UROBILINOGEN, URINE: 0.2 E.U./DL
WBC # BLD: 9.8 K/UL (ref 4.8–10.8)
WBC UA: 81 /HPF (ref 0–5)

## 2019-04-26 PROCEDURE — 93458 L HRT ARTERY/VENTRICLE ANGIO: CPT | Performed by: INTERNAL MEDICINE

## 2019-04-26 PROCEDURE — 80061 LIPID PANEL: CPT

## 2019-04-26 PROCEDURE — G0378 HOSPITAL OBSERVATION PER HR: HCPCS

## 2019-04-26 PROCEDURE — 6360000002 HC RX W HCPCS: Performed by: INTERNAL MEDICINE

## 2019-04-26 PROCEDURE — 71045 X-RAY EXAM CHEST 1 VIEW: CPT

## 2019-04-26 PROCEDURE — C1894 INTRO/SHEATH, NON-LASER: HCPCS

## 2019-04-26 PROCEDURE — 96372 THER/PROPH/DIAG INJ SC/IM: CPT

## 2019-04-26 PROCEDURE — 36415 COLL VENOUS BLD VENIPUNCTURE: CPT

## 2019-04-26 PROCEDURE — 6370000000 HC RX 637 (ALT 250 FOR IP): Performed by: HOSPITALIST

## 2019-04-26 PROCEDURE — 2580000003 HC RX 258: Performed by: HOSPITALIST

## 2019-04-26 PROCEDURE — 99152 MOD SED SAME PHYS/QHP 5/>YRS: CPT

## 2019-04-26 PROCEDURE — 99225 PR SBSQ OBSERVATION CARE/DAY 25 MINUTES: CPT | Performed by: HOSPITALIST

## 2019-04-26 PROCEDURE — 96376 TX/PRO/DX INJ SAME DRUG ADON: CPT

## 2019-04-26 PROCEDURE — 2500000003 HC RX 250 WO HCPCS

## 2019-04-26 PROCEDURE — 6360000004 HC RX CONTRAST MEDICATION: Performed by: INTERNAL MEDICINE

## 2019-04-26 PROCEDURE — 2580000003 HC RX 258: Performed by: INTERNAL MEDICINE

## 2019-04-26 PROCEDURE — 94664 DEMO&/EVAL PT USE INHALER: CPT

## 2019-04-26 PROCEDURE — 80053 COMPREHEN METABOLIC PANEL: CPT

## 2019-04-26 PROCEDURE — 85027 COMPLETE CBC AUTOMATED: CPT

## 2019-04-26 PROCEDURE — 82948 REAGENT STRIP/BLOOD GLUCOSE: CPT

## 2019-04-26 PROCEDURE — 6370000000 HC RX 637 (ALT 250 FOR IP): Performed by: INTERNAL MEDICINE

## 2019-04-26 PROCEDURE — 93458 L HRT ARTERY/VENTRICLE ANGIO: CPT

## 2019-04-26 PROCEDURE — 6360000002 HC RX W HCPCS

## 2019-04-26 PROCEDURE — 4A023N7 MEASUREMENT OF CARDIAC SAMPLING AND PRESSURE, LEFT HEART, PERCUTANEOUS APPROACH: ICD-10-PCS | Performed by: INTERNAL MEDICINE

## 2019-04-26 PROCEDURE — B2151ZZ FLUOROSCOPY OF LEFT HEART USING LOW OSMOLAR CONTRAST: ICD-10-PCS | Performed by: INTERNAL MEDICINE

## 2019-04-26 PROCEDURE — 83721 ASSAY OF BLOOD LIPOPROTEIN: CPT

## 2019-04-26 PROCEDURE — 2709999900 HC NON-CHARGEABLE SUPPLY

## 2019-04-26 PROCEDURE — 81001 URINALYSIS AUTO W/SCOPE: CPT

## 2019-04-26 PROCEDURE — C1769 GUIDE WIRE: HCPCS

## 2019-04-26 PROCEDURE — 87081 CULTURE SCREEN ONLY: CPT

## 2019-04-26 PROCEDURE — B2111ZZ FLUOROSCOPY OF MULTIPLE CORONARY ARTERIES USING LOW OSMOLAR CONTRAST: ICD-10-PCS | Performed by: INTERNAL MEDICINE

## 2019-04-26 PROCEDURE — 94640 AIRWAY INHALATION TREATMENT: CPT

## 2019-04-26 PROCEDURE — 6360000002 HC RX W HCPCS: Performed by: HOSPITALIST

## 2019-04-26 PROCEDURE — 83880 ASSAY OF NATRIURETIC PEPTIDE: CPT

## 2019-04-26 PROCEDURE — 87086 URINE CULTURE/COLONY COUNT: CPT

## 2019-04-26 PROCEDURE — 96375 TX/PRO/DX INJ NEW DRUG ADDON: CPT

## 2019-04-26 RX ORDER — INSULIN GLARGINE 100 [IU]/ML
15 INJECTION, SOLUTION SUBCUTANEOUS 2 TIMES DAILY
Status: DISCONTINUED | OUTPATIENT
Start: 2019-04-27 | End: 2019-04-28

## 2019-04-26 RX ORDER — ACETAMINOPHEN 325 MG/1
650 TABLET ORAL EVERY 4 HOURS PRN
Status: DISCONTINUED | OUTPATIENT
Start: 2019-04-26 | End: 2019-05-01

## 2019-04-26 RX ORDER — SODIUM CHLORIDE 9 MG/ML
1000 INJECTION, SOLUTION INTRAVENOUS CONTINUOUS
Status: DISCONTINUED | OUTPATIENT
Start: 2019-04-26 | End: 2019-05-01

## 2019-04-26 RX ORDER — HYDRALAZINE HYDROCHLORIDE 20 MG/ML
10 INJECTION INTRAMUSCULAR; INTRAVENOUS EVERY 10 MIN PRN
Status: DISCONTINUED | OUTPATIENT
Start: 2019-04-26 | End: 2019-05-01

## 2019-04-26 RX ORDER — ONDANSETRON 2 MG/ML
4 INJECTION INTRAMUSCULAR; INTRAVENOUS EVERY 6 HOURS PRN
Status: DISCONTINUED | OUTPATIENT
Start: 2019-04-26 | End: 2019-05-01

## 2019-04-26 RX ORDER — IPRATROPIUM BROMIDE AND ALBUTEROL SULFATE 2.5; .5 MG/3ML; MG/3ML
1 SOLUTION RESPIRATORY (INHALATION)
Status: DISCONTINUED | OUTPATIENT
Start: 2019-04-26 | End: 2019-05-01

## 2019-04-26 RX ORDER — SODIUM CHLORIDE 0.9 % (FLUSH) 0.9 %
10 SYRINGE (ML) INJECTION EVERY 12 HOURS SCHEDULED
Status: DISCONTINUED | OUTPATIENT
Start: 2019-04-26 | End: 2019-05-01

## 2019-04-26 RX ORDER — SODIUM CHLORIDE 9 MG/ML
1000 INJECTION, SOLUTION INTRAVENOUS CONTINUOUS
Status: DISCONTINUED | OUTPATIENT
Start: 2019-04-26 | End: 2019-04-26

## 2019-04-26 RX ORDER — SODIUM CHLORIDE 0.9 % (FLUSH) 0.9 %
10 SYRINGE (ML) INJECTION PRN
Status: DISCONTINUED | OUTPATIENT
Start: 2019-04-26 | End: 2019-05-01

## 2019-04-26 RX ADMIN — IPRATROPIUM BROMIDE AND ALBUTEROL SULFATE 1 AMPULE: .5; 3 SOLUTION RESPIRATORY (INHALATION) at 18:23

## 2019-04-26 RX ADMIN — AMLODIPINE BESYLATE 5 MG: 5 TABLET ORAL at 20:13

## 2019-04-26 RX ADMIN — SODIUM CHLORIDE: 9 INJECTION, SOLUTION INTRAVENOUS at 08:49

## 2019-04-26 RX ADMIN — CLONIDINE HYDROCHLORIDE 0.1 MG: 0.1 TABLET ORAL at 20:13

## 2019-04-26 RX ADMIN — HYDROCODONE BITARTRATE AND ACETAMINOPHEN 2 TABLET: 10; 325 TABLET ORAL at 04:29

## 2019-04-26 RX ADMIN — Medication 10 ML: at 08:50

## 2019-04-26 RX ADMIN — FENOFIBRATE 160 MG: 160 TABLET ORAL at 08:41

## 2019-04-26 RX ADMIN — HYDROCODONE BITARTRATE AND ACETAMINOPHEN 2 TABLET: 10; 325 TABLET ORAL at 22:01

## 2019-04-26 RX ADMIN — INSULIN LISPRO 6 UNITS: 100 INJECTION, SOLUTION INTRAVENOUS; SUBCUTANEOUS at 17:33

## 2019-04-26 RX ADMIN — CLONIDINE HYDROCHLORIDE 0.1 MG: 0.1 TABLET ORAL at 08:40

## 2019-04-26 RX ADMIN — INSULIN LISPRO 3 UNITS: 100 INJECTION, SOLUTION INTRAVENOUS; SUBCUTANEOUS at 20:14

## 2019-04-26 RX ADMIN — ATORVASTATIN CALCIUM 80 MG: 80 TABLET, FILM COATED ORAL at 08:41

## 2019-04-26 RX ADMIN — Medication 1 G: at 22:01

## 2019-04-26 RX ADMIN — PANTOPRAZOLE SODIUM 40 MG: 40 TABLET, DELAYED RELEASE ORAL at 08:40

## 2019-04-26 RX ADMIN — AMLODIPINE BESYLATE 5 MG: 5 TABLET ORAL at 08:40

## 2019-04-26 RX ADMIN — IPRATROPIUM BROMIDE AND ALBUTEROL SULFATE 1 AMPULE: .5; 3 SOLUTION RESPIRATORY (INHALATION) at 09:00

## 2019-04-26 RX ADMIN — ONDANSETRON 4 MG: 2 INJECTION INTRAMUSCULAR; INTRAVENOUS at 20:32

## 2019-04-26 RX ADMIN — ENOXAPARIN SODIUM 40 MG: 40 INJECTION SUBCUTANEOUS at 17:32

## 2019-04-26 RX ADMIN — IOPAMIDOL 136 ML: 612 INJECTION, SOLUTION INTRAVENOUS at 12:05

## 2019-04-26 RX ADMIN — SODIUM CHLORIDE 1000 ML: 9 INJECTION, SOLUTION INTRAVENOUS at 22:01

## 2019-04-26 RX ADMIN — ASPIRIN 81 MG 81 MG: 81 TABLET ORAL at 08:41

## 2019-04-26 RX ADMIN — INSULIN GLARGINE 10 UNITS: 100 INJECTION, SOLUTION SUBCUTANEOUS at 20:15

## 2019-04-26 RX ADMIN — HYDROCODONE BITARTRATE AND ACETAMINOPHEN 2 TABLET: 10; 325 TABLET ORAL at 10:36

## 2019-04-26 RX ADMIN — IPRATROPIUM BROMIDE AND ALBUTEROL SULFATE 1 AMPULE: .5; 3 SOLUTION RESPIRATORY (INHALATION) at 14:23

## 2019-04-26 RX ADMIN — HYDROCODONE BITARTRATE AND ACETAMINOPHEN 2 TABLET: 10; 325 TABLET ORAL at 15:53

## 2019-04-26 RX ADMIN — Medication 10 ML: at 20:15

## 2019-04-26 RX ADMIN — SODIUM CHLORIDE 1000 ML: 9 INJECTION, SOLUTION INTRAVENOUS at 14:23

## 2019-04-26 ASSESSMENT — PAIN SCALES - GENERAL
PAINLEVEL_OUTOF10: 9
PAINLEVEL_OUTOF10: 9
PAINLEVEL_OUTOF10: 0
PAINLEVEL_OUTOF10: 3
PAINLEVEL_OUTOF10: 10
PAINLEVEL_OUTOF10: 3
PAINLEVEL_OUTOF10: 7

## 2019-04-26 NOTE — PLAN OF CARE
Problem: Falls - Risk of:  Goal: Will remain free from falls  Description  Will remain free from falls  4/26/2019 0505 by Freddy Mcnally RN  Outcome: Ongoing  4/25/2019 1644 by Francheska Saavedra RN  Outcome: Ongoing  Goal: Absence of physical injury  Description  Absence of physical injury  4/26/2019 0505 by Freddy Mcnally RN  Outcome: Ongoing  4/25/2019 1644 by Francheska Saavedra RN  Outcome: Ongoing     Problem: SAFETY  Goal: Free from accidental physical injury  4/26/2019 0505 by Freddy Mcnally RN  Outcome: Ongoing  4/25/2019 1644 by Francheska Saavedra RN  Outcome: Ongoing  Goal: Free from intentional harm  4/26/2019 0505 by Freddy Mcnally RN  Outcome: Ongoing  4/25/2019 1644 by Francheska Saavedra RN  Outcome: Ongoing     Problem: DAILY CARE  Goal: Daily care needs are met  4/26/2019 0505 by Freddy Mcnally RN  Outcome: Ongoing  4/25/2019 1644 by Francheska Saavedra RN  Outcome: Ongoing     Problem: PAIN  Goal: Patient's pain/discomfort is manageable  4/26/2019 0505 by Freddy Mcnally RN  Outcome: Ongoing  4/25/2019 1644 by Francheska Saavedra RN  Outcome: Ongoing     Problem: SKIN INTEGRITY  Goal: Skin integrity is maintained or improved  4/26/2019 0505 by Freddy Mcnally RN  Outcome: Ongoing  4/25/2019 1644 by Francheska Saavedra RN  Outcome: Ongoing     Problem: KNOWLEDGE DEFICIT  Goal: Patient/S.O. demonstrates understanding of disease process, treatment plan, medications, and discharge instructions.   4/26/2019 0505 by Freddy Mcnally RN  Outcome: Ongoing  4/25/2019 1644 by Francheska Saavedra RN  Outcome: Ongoing     Problem: DISCHARGE BARRIERS  Goal: Patient's continuum of care needs are met  4/26/2019 0505 by Freddy Mcnally RN  Outcome: Ongoing  4/25/2019 1644 by Francheska Saavedra RN  Outcome: Ongoing

## 2019-04-26 NOTE — CONSULTS
Consultation History & Physical    Date of Admission:  4/24/2019  2:17 PM  Date of Consultation:  4/26/2019    Surgeon: Dr. Amanda Benedict      Cardiologist: Dr. Rona Garcia       PCP:  Aj Zhang MD       Reason for Consultation: CAD    History of Present Illness:   Mr. Jose Huff is a 44 y.o. newly-diagnosed diabetic male who was referred to the ER by his PCP for evaluation of exertional chest pain and progressive shortness of breath. Work-up in the ER included EKG that demonstrated NSR without acute changes. Cardiac enzymes were negative. He was admitted to the hospitalist service. Nuclear stress test was completed on 04/25/2019, demonstrated inferior wall ischemia with calculated EF 54%. Therefore, he underwent cardiac catheterization via right radial approach by Dr. Rona Garcia today. This demonstrated moderate to severe multivessel CAD with preserved LV systolic function. CT surgery consulted for consideration for bypass surgery. Past Medical History:    Past Medical History:   Diagnosis Date    Cancer [facility])     Bladder    COPD [facility]chronic obstructive [facility] disease) [facility]     Depression     Diabetes mellitus ([facility] [address] [facility]     Hypertension        Past Surgical History:    Past Surgical History:   Procedure Laterality Date    BACK SURGERY      X3     BLADDER REMOVAL      LYMPHADENECTOMY      lower ext    PROSTATECTOMY           Home Medications:   Prior to Admission medications    Medication Sig Start Date End Date Taking? Authorizing Provider   FLUoxetine (PROZAC) 20 MG capsule Take 1 capsule by mouth daily 4/24/19  Yes BRYAN Alexander   HYDROcodone-acetaminophen (NORCO)  MG per tablet Take 2 tablets by mouth every 4 hours as needed for Pain.    Yes Historical Provider, MD   ergocalciferol (ERGOCALCIFEROL) 93300 units capsule Take 1 capsule by mouth once a week 4/10/19  Yes BRYAN Alexander   busPIRone (BUSPAR) 10 MG tablet Take 1 tablet by mouth 3 times daily as needed (anxiety) 4/10/19  Yes Claudia Gramajo, APRN   fenofibrate 160 MG tablet Take 1 tablet by mouth daily 4/10/19  Yes Claudia Gramajo APRN   atorvastatin (LIPITOR) 20 MG tablet Take 1 tablet by mouth daily 4/10/19  Yes Claudia Gramajo, APRN   metFORMIN (GLUCOPHAGE) 500 MG tablet Take 1 tablet by mouth 2 times daily (with meals) 4/10/19  Yes Claudia Gramajo APRN   atenolol (TENORMIN) 50 MG tablet TAKE ONE TABLET EVERY DAY  Patient taking differently: TAKE ONE TABLET EVERY DAY AT LUNCH 4/10/19  Yes Claudia Gramajo APRN   cloNIDine (CATAPRES) 0.1 MG tablet Take 1 tablet by mouth daily  Patient taking differently: Take 0.1 mg by mouth nightly  3/14/19  Yes Claudia Gramajo APRN   tiZANidine (ZANAFLEX) 4 MG tablet TAKE 1 TABLET BY MOUTH EVERY 6 HOURS FOR MUSCLE PAIN 3/14/19  Yes Claudia Gramajo APRN   amLODIPine (NORVASC) 5 MG tablet Take 1 tablet by mouth daily 3/14/19  Yes Claudia Gramajo APRN   omeprazole (PRILOSEC) 40 MG delayed release capsule Take 1 capsule by mouth daily 3/14/19  Yes Claudia Gramajo APRN        Facility Administered Medications:    ipratropium-albuterol  1 ampule Inhalation Q4H WA    sodium chloride flush  10 mL Intravenous 2 times per day    insulin glargine  10 Units Subcutaneous Nightly    atorvastatin  80 mg Oral Daily    fenofibrate  160 mg Oral Daily    aspirin  81 mg Oral Once    amLODIPine  5 mg Oral BID    cloNIDine  0.1 mg Oral BID    insulin lispro  0-12 Units Subcutaneous TID WC    insulin lispro  0-6 Units Subcutaneous Nightly    pantoprazole  40 mg Oral QAM AC    aspirin  81 mg Oral Daily    enoxaparin  40 mg Subcutaneous Q24H    nicotine  1 patch Transdermal Daily       Allergies:  Latex and Demerol hcl [meperidine]     Social History:       Social History     Socioeconomic History    Marital status:      Spouse name: Not on file    Number of children: Not on file    Years of education: Not on file    Highest education level: Not on file   Occupational History breath with any activity. Cardiovascular: No hypertension, hypotension, dizziness, or syncopal spells. No history of palpitations. Exertional chest pain. Peripheral Vascular:  No history of claudication. Gastrointestinal:  No nausea, vomiting, diarrhea, or constipation. No reflux or gastroesophageal reflux disease. Genitourinary:  PMHx bladder cancer - S/P Cystoprostatectomy and ileal conduit urinary diversion on 11/02/2017  Neurological:  No history of cerebrovascular accident, transient ischemic attack, or amaurosis fugax. No history of seizure disorder. Integumentary: No rash, history of nonhealing wounds or skin cancer removal.   Psychiatric:  No anxiety or depression. Endocrine: Newly diagnosed diabetic. Musculoskeletal: No limit to range of motion of joints or swelling of limbs. Hematologic: No history of DVT, PE, or anemia. Physical Examination:    BP (!) 160/83   Pulse 66   Temp 97.5 °F (36.4 °C)   Resp 16   Ht 5' 11\" (1.803 m)   Wt 210 lb 9.6 oz (95.5 kg)   SpO2 96%   BMI 29.37 kg/m²       General:  Alert & Oriented x 3 in no apparent distress. HEENT:  Normocephalic without evidence of old or recent trauma. Sclerae clear. Conjunctiva pink. EOMs intact. Pupils equal and round. Oral cavity/Pharynx normal, no cyanosis. Tongue protrudes midline. Neck: No JVD, no lymphadenopathy. Supple. Trachea midline. Thyroid normal.   Respiratory: effort is unlabored, clear bilateral breath sounds, no crackles, wheezes or rubs  Cardiovascular: Normal HT regular rate and rhythm. No murmurs, gallops or rubs. No carotid bruits. No edema or varicosities. Pulses: Normal  GI: abdomen soft, nondistended, no organomegaly.   Musculoskeletal: strength and tone normal  Extremities: PPP, no edema  Skin: Warm & Dry  Neuro/psychiatric: grossly intact    MEDICAL DECISION MAKING/TESTING    Nuclear Stress Test:    Probably normal study normal ejection fraction 54% minimal distal inferior wall ischemia cannot be entirely excluded more likely normal correlate clinically. CXR:   No pulmonary consolidation or acute findings. A few small nodules in the left lung base, at least one is calcified. One of the nodules may represent a nipple shadow artifact. Consider repeat PA  chest with nipple markers in place. Labs:   CBC:   Recent Labs     04/24/19  1430 04/25/19  0207 04/26/19  0132   WBC 8.4 7.9 9.8   HGB 15.3 14.4 14.4   HCT 45.1 42.1 40.7*   MCV 88.8 90.1 88.5    151 154     BMP:   Recent Labs     04/25/19  0207 04/25/19  1906 04/26/19  0132   * 131* 133*   K 5.7* 4.0 4.0   CL 97* 90* 94*   CO2 25 28 26   BUN 24* 22* 24*   CREATININE 1.2 1.0 1.0     Cardiac Enzymes:   Recent Labs     04/24/19  1430 04/24/19  1615 04/24/19  1952   TROPONINI <0.01 <0.01 <0.01     Liver Profile:  Lab Results   Component Value Date    AST 5 04/26/2019    ALT 6 04/26/2019    BILITOT <0.2 04/26/2019    ALKPHOS 98 04/26/2019     Lab Results   Component Value Date    CHOL 225 04/26/2019    HDL 27 04/26/2019    TRIG 1,127 04/26/2019     TSH:  Lab Results   Component Value Date    TSH 2.450 04/02/2019       Diagnosis:      1. Crescendo Angina Associated with Severe CAD  2. Essential HTN  3. DM, Type 2   4. Mixed Hyperlipidemia  5. PMHx Bladder Cancer    Plan:     1. Check MRSA  2. Check UA     Dr. Laura Pat to review records/ images. He will discuss recommendations as well as R/B/A with patient and family. Further recommendations per Dr. Laura Pat.          Deanne Duckworth, APRN  4/26/2019  2:00 PM

## 2019-04-27 ENCOUNTER — APPOINTMENT (OUTPATIENT)
Dept: CT IMAGING | Age: 49
DRG: 234 | End: 2019-04-27
Payer: MEDICAID

## 2019-04-27 LAB
ANION GAP SERPL CALCULATED.3IONS-SCNC: 12 MMOL/L (ref 7–19)
BUN BLDV-MCNC: 21 MG/DL (ref 6–20)
CALCIUM SERPL-MCNC: 8.7 MG/DL (ref 8.6–10)
CHLORIDE BLD-SCNC: 96 MMOL/L (ref 98–111)
CO2: 27 MMOL/L (ref 22–29)
CREAT SERPL-MCNC: 0.9 MG/DL (ref 0.5–1.2)
GFR NON-AFRICAN AMERICAN: >60
GLUCOSE BLD-MCNC: 206 MG/DL (ref 70–99)
GLUCOSE BLD-MCNC: 229 MG/DL (ref 74–109)
GLUCOSE BLD-MCNC: 245 MG/DL (ref 70–99)
GLUCOSE BLD-MCNC: 259 MG/DL (ref 70–99)
GLUCOSE BLD-MCNC: 284 MG/DL (ref 70–99)
PERFORMED ON: ABNORMAL
POTASSIUM SERPL-SCNC: 4.1 MMOL/L (ref 3.5–5)
SODIUM BLD-SCNC: 135 MMOL/L (ref 136–145)

## 2019-04-27 PROCEDURE — 96372 THER/PROPH/DIAG INJ SC/IM: CPT

## 2019-04-27 PROCEDURE — 99231 SBSQ HOSP IP/OBS SF/LOW 25: CPT | Performed by: INTERNAL MEDICINE

## 2019-04-27 PROCEDURE — 94640 AIRWAY INHALATION TREATMENT: CPT

## 2019-04-27 PROCEDURE — G0378 HOSPITAL OBSERVATION PER HR: HCPCS

## 2019-04-27 PROCEDURE — 71260 CT THORAX DX C+: CPT

## 2019-04-27 PROCEDURE — 96376 TX/PRO/DX INJ SAME DRUG ADON: CPT

## 2019-04-27 PROCEDURE — 99225 PR SBSQ OBSERVATION CARE/DAY 25 MINUTES: CPT | Performed by: HOSPITALIST

## 2019-04-27 PROCEDURE — 6370000000 HC RX 637 (ALT 250 FOR IP): Performed by: HOSPITALIST

## 2019-04-27 PROCEDURE — 6360000002 HC RX W HCPCS: Performed by: HOSPITALIST

## 2019-04-27 PROCEDURE — 99213 OFFICE O/P EST LOW 20 MIN: CPT | Performed by: THORACIC SURGERY (CARDIOTHORACIC VASCULAR SURGERY)

## 2019-04-27 PROCEDURE — 80048 BASIC METABOLIC PNL TOTAL CA: CPT

## 2019-04-27 PROCEDURE — 6360000002 HC RX W HCPCS: Performed by: INTERNAL MEDICINE

## 2019-04-27 PROCEDURE — 6370000000 HC RX 637 (ALT 250 FOR IP): Performed by: INTERNAL MEDICINE

## 2019-04-27 PROCEDURE — 6360000004 HC RX CONTRAST MEDICATION: Performed by: HOSPITALIST

## 2019-04-27 PROCEDURE — 82948 REAGENT STRIP/BLOOD GLUCOSE: CPT

## 2019-04-27 PROCEDURE — 2580000003 HC RX 258: Performed by: HOSPITALIST

## 2019-04-27 PROCEDURE — 2580000003 HC RX 258: Performed by: INTERNAL MEDICINE

## 2019-04-27 PROCEDURE — 36415 COLL VENOUS BLD VENIPUNCTURE: CPT

## 2019-04-27 RX ORDER — POLYETHYLENE GLYCOL 3350 17 G/17G
17 POWDER, FOR SOLUTION ORAL DAILY
Status: DISCONTINUED | OUTPATIENT
Start: 2019-04-27 | End: 2019-04-28

## 2019-04-27 RX ORDER — HYDROCODONE BITARTRATE AND ACETAMINOPHEN 10; 325 MG/1; MG/1
2 TABLET ORAL EVERY 4 HOURS PRN
Status: DISCONTINUED | OUTPATIENT
Start: 2019-04-27 | End: 2019-05-01

## 2019-04-27 RX ADMIN — MAGNESIUM HYDROXIDE 30 ML: 400 SUSPENSION ORAL at 03:07

## 2019-04-27 RX ADMIN — CLONIDINE HYDROCHLORIDE 0.1 MG: 0.1 TABLET ORAL at 08:59

## 2019-04-27 RX ADMIN — IPRATROPIUM BROMIDE AND ALBUTEROL SULFATE 1 AMPULE: .5; 3 SOLUTION RESPIRATORY (INHALATION) at 10:24

## 2019-04-27 RX ADMIN — IPRATROPIUM BROMIDE AND ALBUTEROL SULFATE 1 AMPULE: .5; 3 SOLUTION RESPIRATORY (INHALATION) at 14:28

## 2019-04-27 RX ADMIN — CLONIDINE HYDROCHLORIDE 0.1 MG: 0.1 TABLET ORAL at 20:43

## 2019-04-27 RX ADMIN — Medication 1 G: at 20:45

## 2019-04-27 RX ADMIN — ASPIRIN 81 MG 81 MG: 81 TABLET ORAL at 08:59

## 2019-04-27 RX ADMIN — INSULIN LISPRO 6 UNITS: 100 INJECTION, SOLUTION INTRAVENOUS; SUBCUTANEOUS at 17:36

## 2019-04-27 RX ADMIN — AMLODIPINE BESYLATE 5 MG: 5 TABLET ORAL at 20:44

## 2019-04-27 RX ADMIN — HYDROCODONE BITARTRATE AND ACETAMINOPHEN 2 TABLET: 10; 325 TABLET ORAL at 04:04

## 2019-04-27 RX ADMIN — INSULIN LISPRO 6 UNITS: 100 INJECTION, SOLUTION INTRAVENOUS; SUBCUTANEOUS at 09:03

## 2019-04-27 RX ADMIN — HYDROCODONE BITARTRATE AND ACETAMINOPHEN 2 TABLET: 10; 325 TABLET ORAL at 20:44

## 2019-04-27 RX ADMIN — ATORVASTATIN CALCIUM 80 MG: 80 TABLET, FILM COATED ORAL at 08:59

## 2019-04-27 RX ADMIN — ENOXAPARIN SODIUM 40 MG: 40 INJECTION SUBCUTANEOUS at 17:35

## 2019-04-27 RX ADMIN — AMLODIPINE BESYLATE 5 MG: 5 TABLET ORAL at 08:59

## 2019-04-27 RX ADMIN — IOPAMIDOL 95 ML: 755 INJECTION, SOLUTION INTRAVENOUS at 05:51

## 2019-04-27 RX ADMIN — INSULIN LISPRO 5 UNITS: 100 INJECTION, SOLUTION INTRAVENOUS; SUBCUTANEOUS at 20:47

## 2019-04-27 RX ADMIN — METHYLNALTREXONE BROMIDE 12 MG: 12 INJECTION, SOLUTION SUBCUTANEOUS at 21:04

## 2019-04-27 RX ADMIN — HYDROCODONE BITARTRATE AND ACETAMINOPHEN 2 TABLET: 10; 325 TABLET ORAL at 10:04

## 2019-04-27 RX ADMIN — FENOFIBRATE 160 MG: 160 TABLET ORAL at 08:59

## 2019-04-27 RX ADMIN — POLYETHYLENE GLYCOL 3350 17 G: 17 POWDER, FOR SOLUTION ORAL at 21:05

## 2019-04-27 RX ADMIN — IPRATROPIUM BROMIDE AND ALBUTEROL SULFATE 1 AMPULE: .5; 3 SOLUTION RESPIRATORY (INHALATION) at 06:45

## 2019-04-27 RX ADMIN — INSULIN GLARGINE 15 UNITS: 100 INJECTION, SOLUTION SUBCUTANEOUS at 09:00

## 2019-04-27 RX ADMIN — IPRATROPIUM BROMIDE AND ALBUTEROL SULFATE 1 AMPULE: .5; 3 SOLUTION RESPIRATORY (INHALATION) at 18:26

## 2019-04-27 RX ADMIN — Medication 10 ML: at 09:07

## 2019-04-27 RX ADMIN — HYDROCODONE BITARTRATE AND ACETAMINOPHEN 2 TABLET: 10; 325 TABLET ORAL at 16:10

## 2019-04-27 RX ADMIN — INSULIN GLARGINE 15 UNITS: 100 INJECTION, SOLUTION SUBCUTANEOUS at 20:45

## 2019-04-27 RX ADMIN — INSULIN LISPRO 9 UNITS: 100 INJECTION, SOLUTION INTRAVENOUS; SUBCUTANEOUS at 12:22

## 2019-04-27 RX ADMIN — PANTOPRAZOLE SODIUM 40 MG: 40 TABLET, DELAYED RELEASE ORAL at 06:08

## 2019-04-27 ASSESSMENT — PAIN SCALES - GENERAL
PAINLEVEL_OUTOF10: 8
PAINLEVEL_OUTOF10: 9
PAINLEVEL_OUTOF10: 6
PAINLEVEL_OUTOF10: 5
PAINLEVEL_OUTOF10: 3
PAINLEVEL_OUTOF10: 10
PAINLEVEL_OUTOF10: 8

## 2019-04-27 NOTE — PROGRESS NOTES
Cardiology Daily Note Conner Juarez MD      Patient:  Jahaira aLrson  [id number]    Patient Active Problem List    Diagnosis Date Noted    Type 2 diabetes mellitus without complication, without long-term current use of insulin ([address] 04/24/2019     Priority: Low    Mixed anxiety depressive disorder 04/24/2019     Priority: Low    Mixed hyperlipidemia 04/24/2019     Priority: Low    Coronary artery disease involving native coronary artery of native heart with unstable angina pectoris ([address] 04/24/2019     Priority: Low    Essential hypertension 03/14/2019     Priority: Low    Hx of bladder cancer 03/14/2019     Priority: Low    Chronic bronchitis ([address] 03/14/2019     Priority: Low       Admit Date:  4/24/2019    Admission Problem List: Present on Admission:   Coronary artery disease involving native coronary artery of native heart with unstable angina pectoris ([address]   Mixed hyperlipidemia   Essential hypertension   Type 2 diabetes mellitus without complication, without long-term current use of insulin ([address]   Mixed anxiety depressive disorder   Hx of bladder cancer      Cardiac Specific Data:  Specialty Problems        Cardiology Problems    Essential hypertension        * (Principal) Coronary artery disease involving native coronary artery of native heart with unstable angina pectoris ([address]              Subjective:  Mr. Larson seen today resting comfortably. Underwent cardiac catheterization yesterday revealing severe multivessel coronary artery disease cardiovascular surgical consultation appreciated. Denies chest pain at the present time. No new complaints. Workup in progress.     Objective:   BP (!) 150/70   Pulse 80   Temp 97.2 °F (36.2 °C) (Temporal)   Resp 16   Ht 5' 11\" (1.803 m)   Wt 211 lb (95.7 kg)   SpO2 96%   BMI 29.43 kg/m²       Intake/Output Summary (Last 24 hours) at 4/27/2019 1447  Last data filed at 4/27/2019 1256  Gross per 24 hour   Intake 1410 ml   Output 2650 ml   Net -1240 ml       Prior to Admission medications    Medication Sig Start Date End Date Taking? Authorizing Provider   FLUoxetine (PROZAC) 20 MG capsule Take 1 capsule by mouth daily 4/24/19  Yes BRYAN Seymour   HYDROcodone-acetaminophen (NORCO)  MG per tablet Take 2 tablets by mouth every 4 hours as needed for Pain.    Yes Historical Provider, MD   ergocalciferol (ERGOCALCIFEROL) 05121 units capsule Take 1 capsule by mouth once a week 4/10/19  Yes BRYAN Seymour   busPIRone (BUSPAR) 10 MG tablet Take 1 tablet by mouth 3 times daily as needed (anxiety) 4/10/19  Yes BRYAN Seymour   fenofibrate 160 MG tablet Take 1 tablet by mouth daily 4/10/19  Yes BRYAN Seymour   atorvastatin (LIPITOR) 20 MG tablet Take 1 tablet by mouth daily 4/10/19  Yes BRYAN Seymour   metFORMIN (GLUCOPHAGE) 500 MG tablet Take 1 tablet by mouth 2 times daily (with meals) 4/10/19  Yes BRYAN Seymour   atenolol (TENORMIN) 50 MG tablet TAKE ONE TABLET EVERY DAY  Patient taking differently: TAKE ONE TABLET EVERY DAY AT LUNCH 4/10/19  Yes BRYAN Seymour   cloNIDine (CATAPRES) 0.1 MG tablet Take 1 tablet by mouth daily  Patient taking differently: Take 0.1 mg by mouth nightly  3/14/19  Yes BRYAN Seymour   tiZANidine (ZANAFLEX) 4 MG tablet TAKE 1 TABLET BY MOUTH EVERY 6 HOURS FOR MUSCLE PAIN 3/14/19  Yes BRYAN Seymour   amLODIPine (NORVASC) 5 MG tablet Take 1 tablet by mouth daily 3/14/19  Yes BRYAN Seymour   omeprazole (PRILOSEC) 40 MG delayed release capsule Take 1 capsule by mouth daily 3/14/19  Yes BRYAN Seymour        ipratropium-albuterol  1 ampule Inhalation Q4H WA    sodium chloride flush  10 mL Intravenous 2 times per day    cefTRIAXone (ROCEPHIN) IV  1 g Intravenous Q24H    insulin glargine  15 Units Subcutaneous BID    insulin lispro  0-18 Units Subcutaneous TID WC    insulin lispro  0-9 Units Subcutaneous Nightly    atorvastatin  80 mg Oral Daily    fenofibrate  160 mg Oral Daily    amLODIPine  5 mg Oral BID    cloNIDine  0.1 mg Oral BID    pantoprazole  40 mg Oral QAM AC    aspirin  81 mg Oral Daily    enoxaparin  40 mg Subcutaneous Q24H    nicotine  1 patch Transdermal Daily       TELEMETRY: Sinus     Physical Exam:      Physical Exam      General:  Awake, alert, NAD  Skin:  Warm and dry  Neck:  no jvd , no carotid bruits  Chest:  Clear to auscultation, no wheezing or rales  Cardiovascular:  RRR J1L1 no murmurs, clicks, gallups, or rubs  Abdomen:  Soft nontender, nondistended, bowel sounds present  Extremities:  Edema: none       Lab Data:  CBC:   Recent Labs     04/25/19  0207 04/26/19  0132   WBC 7.9 9.8   HGB 14.4 14.4   HCT 42.1 40.7*   MCV 90.1 88.5    154     BMP:   Recent Labs     04/25/19  1906 04/26/19  0132 04/27/19 0139   * 133* 135*   K 4.0 4.0 4.1   CL 90* 94* 96*   CO2 28 26 27   BUN 22* 24* 21*   CREATININE 1.0 1.0 0.9     LIVER PROFILE:   Recent Labs     04/25/19  0207 04/26/19 0132   AST  --  5   ALT  --  6   LIPASE 73*  --    BILITOT  --  <0.2   ALKPHOS  --  98     PT/INR: No results for input(s): PROTIME, INR in the last 72 hours. APTT: No results for input(s): APTT in the last 72 hours. BNP:  No results for input(s): BNP in the last 72 hours. CK, CKMB, Troponin: @LABRCNT (CKTOTAL:3, CKMB:3, TROPONINI:3)@    IMAGING:  Xr Chest Standard (2 Vw)    Result Date: 4/24/2019  EXAMINATION:  XR CHEST (2 VW)  4/24/2019 3:09 PM HISTORY: Chest pain shortness of breath COMPARISON: No comparison study. FINDINGS:  PA and lateral views of the chest were obtained. The lungs are clear and normally expanded. There are few scattered nodular densities in the left lung base, at least one is calcified, another of the nodules may a represent nipple shadow artifact. Repeat PA chest with nipple markers in place is recommended. Heart size is normal. No pneumothorax or pleural effusion is identified. The osseous structures are unremarkable.     No pulmonary consolidation or acute arteries. Impression: No acute intracranial findings. Sinus disease. Signed by Dr Flores Cardoso on 4/24/2019 5:54 PM    Ct Chest W Contrast    Result Date: 4/27/2019  CT CHEST W CONTRAST 4/27/2019 5:00 AM HISTORY: Left lower lobe pulmonary nodule COMPARISON: Chest radiograph dated 4/26/2019 and 4/24/2019 TECHNIQUE:  Radiation dose equals  mGy cm. AUTOMATED EXPOSURE CONTROL dose reduction technique was implemented. Thin section axial images were obtained with intravenous contrast. Multi projection reconstruction images were generated. FINDINGS: There are no measurable pulmonary nodules identified in the lungs. Particularly, in the left lower lobe in the region of the chest radiograph abnormality, no CT correlate is identified. There are linear opacities identified in the right lung base compatible with mild platelike atelectasis associated with the diaphragm position. There are no pleural effusions or pneumothoraces or parenchymal infiltrates otherwise. There are small mediastinal lymph nodes. There are no pathologically enlarged nodes. There are coronary artery calcifications. There are atherosclerotic aortic calcifications. There is fatty infiltration of the liver. Limited imaging of the upper abdomen demonstrate no acute abnormality. Bony structures are intact without acute osseous abnormalities. Degenerative spurring noted diffusely in the thoracic spine. 1. No measurable pulmonary nodules identified in the lungs. Minimal platelike atelectasis in the right lung base associated with diaphragm position. Lung fields are clear without acute cardiopulmonary process. 2. Atherosclerotic calcifications. 4. Hepatic steatosis. Signed by Dr Dior Alcazar on 4/27/2019 8:30 AM    Xr Chest 1 Vw    Result Date: 4/26/2019  XR CHEST 1 VIEW 4/26/2019 7:00 PM HISTORY: Repeat chest x-ray with nipple markers COMPARISON: 4/24/2019. FINDINGS: Frontal view of the chest was obtained. Nipple markers are present.  The nipple markers are separate from the nodular opacity seen on the previous study, but the nodule is not well visualized on this exam. The lungs are otherwise clear. The cardiomediastinal silhouette and pulmonary vascularity are unchanged. The osseous structures and surrounding soft tissues demonstrate no acute abnormality. 1. The previously seen nodule is not well visualized but is in a separate position than the nipple marker. Given the patient's age, consider further evaluation with CT to ensure no suspicious nodule is present. Signed by Dr Lokesh Morales on 4/26/2019 8:35 PM    Nm Myocardial Spect Rest Exercise Or Rx    Result Date: 4/26/2019  Lexiscan Nuclear Stress Test Report Procedure date: 04/25/2019 Indications: Chest pain Procedure: Stress was performed with injection of 0.4 mg Lexiscan. Vital signs and EKG were monitored. Technetium-99 sestamibi was injected in divided doses, approximately 10 mCi and 30 mCi respectively for rest and stress imaging. The patient was discharged in stable condition. Results: Patient had symptoms of dyspnea, dizziness, nausea, and stomach cramping during infusion that resolved in recovery. Baseline EKG showed normal sinus rhythm. During stress there were no significant EKG changes or rhythm changes. Baseline and peak blood pressures were 151/103, and 163/92 respectively. Baseline and peak heart rates were 87 and  99 respectively. Radioactive pharmaceuticals used: Rest images 10.67 mCi technetium 99m sestamibi Stress images 32.83 mCi technetium 99m sestamibi Review of rest and stress images obtained in a SPECT acquisition protocol low with review of the polar plot revealed: 1. Ejection fraction normal 54% 2. Wall motion study suggested mild inferoseptal hypokinesis 3.  Myocardial perfusion imaging demonstrated homogeneous uptake of the tracer in all visualized segments with the exception a few of the more distal short axis slices showed diminished uptake in the inferior wall which appeared to reverse on some of the slices. The sum stress score was 1. Summary impression: Probably normal study normal ejection fraction 54% minimal distal inferior wall ischemia cannot be entirely excluded more likely normal correlate clinically Signed by Dr Angie Gutierres on 4/25/2019 4:00 PM    Us Carotid Artery Bilateral    Result Date: 4/18/2019  Vascular Carotid Procedure  Demographics   Patient Name    Farrah Cao    Age                   44                  [place]   Patient Number  [id number]           Gender                Male   Visit Number    [id number]        Interpreting          Balbir Mix MD                                   Physician   Date of Birth   1970       Referring Physician   [facility]   Accession       [id number]        [address] [place] [address]  Number  Procedure Type of Study:   Cerebral:Carotid, US CAROTID ARTERY BILATERAL [[id number]]. Indications for Study:Dizziness/Vertigo. Risk Factors   - The patient's risk factor(s) include: diabetes mellitus, dyslipidemia     and arterial hypertension.   - Current - Every day. Impression   There is no plaque visualized in the bilateral internal carotid  artery(ies). There is no stenosis in the right internal carotid artery. There is no stenosis of the left internal carotid artery. There is normal antegrade flow in the bilateral vertebral artery(ies). Signature   ----------------------------------------------------------------  Electronically signed by Balbir Mix MD(Interpreting  physician) on 04/18/2019 11:12 AM  ----------------------------------------------------------------  Blood Pressure:Right arm 176/86 mmHg. Left arm 184/90 mmHg. Velocities are measured in cm/s ; Diameters are measured in mm Carotid Right Measurements +------------+-------+-------+--------+-------+------------+---------------+ ! Location    ! PSV    ! EDV    ! Angle   ! RI     !%Stenosis   ! Tortuosity     ! +------------+-------+-------+--------+-------+------------+---------------+ ! Prox CCA    !111    !18.2   ! 60      !0.84   !            !               ! +------------+-------+-------+--------+-------+------------+---------------+ ! Mid CCA     ! 85     !19.9   !60      !0.77   !            !               ! +------------+-------+-------+--------+-------+------------+---------------+ ! Prox ICA    !103    !27     !60      !0.74   !            !               ! +------------+-------+-------+--------+-------+------------+---------------+ ! Mid ICA     !101    !31.7   !60      !0.69   !            !               ! +------------+-------+-------+--------+-------+------------+---------------+ ! Dist ICA    !76.8   !28.1   ! 60      !0.63   !            !               ! +------------+-------+-------+--------+-------+------------+---------------+ ! Prox ECA    !173    !16.7   !60      !0.9    ! !               ! +------------+-------+-------+--------+-------+------------+---------------+ ! Vertebral   !47.1   !11.4   !60      !0.76   !            !               ! +------------+-------+-------+--------+-------+------------+---------------+   - There is antegrade vertebral flow noted on the right side. - Additional Measurements:ICAPSV/CCAPSV 0.93. ICAEDV/CCAEDV 1.74. Carotid Left Measurements +------------+-------+-------+--------+-------+------------+---------------+ ! Location    ! PSV    ! EDV    ! Angle   ! RI     !%Stenosis   ! Tortuosity     ! +------------+-------+-------+--------+-------+------------+---------------+ ! Prox CCA    !78.2   !15.7   !60      !0.8    ! !               ! +------------+-------+-------+--------+-------+------------+---------------+ ! Mid CCA     !107    !22.3   !60      !0.79   !            !               ! +------------+-------+-------+--------+-------+------------+---------------+ ! Prox ICA    !57.8   !14.9   !60      !0.74   !            !               !

## 2019-04-27 NOTE — PROGRESS NOTES
Progress Note        Subjective:  No issues overnight    No new complaints    Objective:    Physical Examination:    BP (!) 156/70   Pulse 69   Temp 97.2 °F (36.2 °C) (Temporal)   Resp 16   Ht 5' 11\" (1.803 m)   Wt 211 lb (95.7 kg)   SpO2 97%   BMI 29.43 kg/m²          General: in no acute distress, comfortable  HEENT: PERRLA, EOMI, anicteric sclerae, NC/AT, no nasal septal deviation, mucus membranes               moist  Pulm: CTA b/l, no rales, rhonchi, wheezing  Cardio: S1S2+, regular rate and rhythm, no murmurs/rubs/gallops/thrills  Abdomen: S/NT/ND/NABS  Extrems: 5/5 strength throughout, no cyanosis, clubbing, edema  Skin: no rashes, lesions, ulcers  Neuro: alert and oriented to person, place, time, situation, cranial nerves 2-12 intact  Psych: pleasant, cooperative, recent and remote memory intact, no anxiety/hallucinations/agitation    Labs:   CBC:   Recent Labs     04/26/19  0132   WBC 9.8   HGB 14.4   HCT 40.7*   MCV 88.5        BMP:   Recent Labs     04/27/19  0139   *   K 4.1   CL 96*   CO2 27   BUN 21*   CREATININE 0.9     Liver Profile:  Lab Results   Component Value Date    AST 5 04/26/2019    ALT 6 04/26/2019    BILITOT <0.2 04/26/2019    ALKPHOS 98 04/26/2019    ALKPHOS 74 02/17/2011     Lab Results   Component Value Date    CHOL 225 04/26/2019    HDL 27 04/26/2019    TRIG 1,127 04/26/2019       Impression:   1. Unstable angina   2. 3v CAD   3. Normal LV function     Plan:    1. Initiate pre-op w/u: vein mapping, carotid US, echo   2.  Pending satisfactory completion of above will schedule CABG (Dr. Anival Lopez to evaluate       for CABG)              Kwame Douglas MD  4/27/2019  10:00 AM

## 2019-04-27 NOTE — CONSULTS
Consultation Note    Date of Admission:  4/24/2019  2:17 PM  Date of Consultation:  4/26/2019    Surgeon: Zoie Zacarias        PCP:  Calhoun Frankel, MD         Reason for consult: evaluate for CABG surgery    History of Present Illness:   Mr. Morgan Fontaine is a 44 y.o. newly-diagnosed diabetic male who was referred to the ER by his PCP for evaluation of exertional chest pain and progressive shortness of breath.      Work-up in the ER included EKG that demonstrated NSR without acute changes. Cardiac enzymes were negative. He was admitted to the hospitalist service.      Nuclear stress test was completed on 04/25/2019, demonstrating inferior wall ischemia with a calculated EF of 54%. Subsequent cardiac catheterization demonstrated moderate to severe multivessel CAD with preserved LV systolic function.      CVT surgery consulted to evaluate candidacy for CABG surgery    Medical History:   1. Bladder CA   2. COPD   3. Depression   4. Diabetes   5. HTN    Surgical History:   1. Back surgery x 3   2. Bladder surgery   3. Prostatectomy   4. Lymphadenectomy of lower extremity    Home Medications:   Prior to Admission medications    Medication Sig Start Date End Date Taking? Authorizing Provider   FLUoxetine (PROZAC) 20 MG capsule Take 1 capsule by mouth daily 4/24/19  Yes BRYAN Garnett   HYDROcodone-acetaminophen (NORCO)  MG per tablet Take 2 tablets by mouth every 4 hours as needed for Pain.    Yes Historical Provider, MD   ergocalciferol (ERGOCALCIFEROL) 94545 units capsule Take 1 capsule by mouth once a week 4/10/19  Yes BRYAN Garnett   busPIRone (BUSPAR) 10 MG tablet Take 1 tablet by mouth 3 times daily as needed (anxiety) 4/10/19  Yes BRYAN Garnett   fenofibrate 160 MG tablet Take 1 tablet by mouth daily 4/10/19  Yes BRYAN Garnett   atorvastatin (LIPITOR) 20 MG tablet Take 1 tablet by mouth daily 4/10/19  Yes BRYAN Garnett   metFORMIN (GLUCOPHAGE) 500 MG tablet Take 1 tablet by mouth 2 times daily (with meals) 4/10/19  Yes BRYAN Felix   atenolol (TENORMIN) 50 MG tablet TAKE ONE TABLET EVERY DAY  Patient taking differently: TAKE ONE TABLET EVERY DAY AT LUNCH 4/10/19  Yes BRYAN Felix   cloNIDine (CATAPRES) 0.1 MG tablet Take 1 tablet by mouth daily  Patient taking differently: Take 0.1 mg by mouth nightly  3/14/19  Yes BRYAN Felix   tiZANidine (ZANAFLEX) 4 MG tablet TAKE 1 TABLET BY MOUTH EVERY 6 HOURS FOR MUSCLE PAIN 3/14/19  Yes BRYAN Felix   amLODIPine (NORVASC) 5 MG tablet Take 1 tablet by mouth daily 3/14/19  Yes BRYAN Felix   omeprazole (PRILOSEC) 40 MG delayed release capsule Take 1 capsule by mouth daily 3/14/19  Yes BRYAN Felix        Facility Administered Medications:    ipratropium-albuterol  1 ampule Inhalation Q4H WA    sodium chloride flush  10 mL Intravenous 2 times per day    insulin glargine  10 Units Subcutaneous Nightly    atorvastatin  80 mg Oral Daily    fenofibrate  160 mg Oral Daily    aspirin  81 mg Oral Once    amLODIPine  5 mg Oral BID    cloNIDine  0.1 mg Oral BID    insulin lispro  0-12 Units Subcutaneous TID WC    insulin lispro  0-6 Units Subcutaneous Nightly    pantoprazole  40 mg Oral QAM AC    aspirin  81 mg Oral Daily    enoxaparin  40 mg Subcutaneous Q24H    nicotine  1 patch Transdermal Daily       Allergies:  Latex and Demerol hcl [meperidine]     Social History:       Social History     Socioeconomic History    Marital status:      Spouse name: Not on file    Number of children: Not on file    Years of education: Not on file    Highest education level: Not on file   Occupational History    Not on file   Social Needs    Financial resource strain: Not on file    Food insecurity:     Worry: Not on file     Inability: Not on file    Transportation needs:     Medical: Not on file     Non-medical: Not on file   Tobacco Use    Smoking status: Current Every Day Smoker     Packs/day: 1.50 Years: 30.00     Pack years: 45.00    Smokeless tobacco: Never Used   Substance and Sexual Activity    Alcohol use: Yes     Comment: Occ    Drug use: Never    Sexual activity: Not on file   Lifestyle    Physical activity:     Days per week: Not on file     Minutes per session: Not on file    Stress: Not on file   Relationships    Social connections:     Talks on phone: Not on file     Gets together: Not on file     Attends Latter-day service: Not on file     Active member of club or organization: Not on file     Attends meetings of clubs or organizations: Not on file     Relationship status: Not on file    Intimate partner violence:     Fear of current or ex partner: Not on file     Emotionally abused: Not on file     Physically abused: Not on file     Forced sexual activity: Not on file   Other Topics Concern    Not on file   Social History Narrative     16 years second marriage    On disability due to multiple back surgeries and bladder cancer    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 Progressive weakness   2. Shortness of breath   3. Exertional angina   4. Bladder CA s/p cystoprostatectomy   5.  Newly diagnosed diabetic    A complete 14 point review of systems was performed and all negative except what is listed above      Physical Examination:      BP (!) 155/79   Pulse 67   Temp 98.3 °F (36.8 °C) (Temporal)   Resp 14   Ht 5' 11\" (1.803 m)   Wt 210 lb 9.6 oz (95.5 kg)   SpO2 96%   BMI 29.37 kg/m²          General: in no acute distress, comfortable  HEENT: PERRLA, EOMI, anicteric sclerae, NC/AT, no nasal septal deviation, mucus membranes moist, tongue midline  Neck: trachea midline, no JVD, no thyromegaly  Pulm: CTA b/l, no rales, rhonchi, wheezing  Cardio: S1S2+, regular rate and rhythm, no murmurs/rubs/gallops/thrills  Abdomen: S/NT/ND/NABS  Extrems: 5/5 strength throughout, no cyanosis, clubbing, edema  Skin: no rashes, lesions, ulcers  Neuro: alert and oriented to person, place, time, situation, cranial nerves 2-12 intact  Psych: pleasant, cooperative, recent and remote memory intact, no anxiety/hallucinations/agitation           MEDICAL DECISION MAKING/TESTING    Cath:  Multivessel CAD    Echo/JERRY: pending    CXR: no acute cardiopulmonary disease    EKG: NSR    CT / MRI:     Nuclear stress test: normal EF     minimal distal inferior wall ischemia possible:     Labs:   CBC:   Recent Labs     04/26/19 0132   WBC 9.8   HGB 14.4   HCT 40.7*   MCV 88.5        BMP:   Recent Labs     04/26/19 0132   *   K 4.0   CL 94*   CO2 26   BUN 24*   CREATININE 1.0     Cardiac Enzymes:   Recent Labs     04/24/19  1430 04/24/19  1615 04/24/19  1952   TROPONINI <0.01 <0.01 <0.01     PT/INR: No results for input(s): PROTIME, INR in the last 72 hours. APTT: No results for input(s): APTT in the last 72 hours.   Liver Profile:  Lab Results   Component Value Date    AST 5 04/26/2019    ALT 6 04/26/2019    BILITOT <0.2 04/26/2019    ALKPHOS 98 04/26/2019    ALKPHOS 74 02/17/2011     Lab Results   Component Value Date    CHOL 225 04/26/2019    HDL 27 04/26/2019    TRIG 1,127 04/26/2019     TSH:  Lab Results   Component Value Date    TSH 2.450 04/02/2019     UA:   Lab Results   Component Value Date    COLORU YELLOW 04/26/2019    PHUR 6.5 04/26/2019    WBCUA 81 04/26/2019    RBCUA 3 04/26/2019    BACTERIA 4+ 04/26/2019    CLARITYU TURBID

## 2019-04-27 NOTE — PLAN OF CARE
Problem: Falls - Risk of:  Goal: Will remain free from falls  Description  Will remain free from falls  Outcome: Ongoing  Goal: Absence of physical injury  Description  Absence of physical injury  Outcome: Ongoing     Problem: SAFETY  Goal: Free from accidental physical injury  Outcome: Ongoing  Goal: Free from intentional harm  Outcome: Ongoing     Problem: DAILY CARE  Goal: Daily care needs are met  Outcome: Ongoing     Problem: PAIN  Goal: Patient's pain/discomfort is manageable  Outcome: Ongoing     Problem: SKIN INTEGRITY  Goal: Skin integrity is maintained or improved  Outcome: Ongoing     Problem: KNOWLEDGE DEFICIT  Goal: Patient/S.O. demonstrates understanding of disease process, treatment plan, medications, and discharge instructions.   Outcome: Ongoing     Problem: DISCHARGE BARRIERS  Goal: Patient's continuum of care needs are met  Outcome: Ongoing     Problem: Pain:  Goal: Pain level will decrease  Description  Pain level will decrease  Outcome: Ongoing  Goal: Control of acute pain  Description  Control of acute pain  Outcome: Ongoing  Goal: Control of chronic pain  Description  Control of chronic pain  Outcome: Ongoing

## 2019-04-27 NOTE — PROGRESS NOTES
HOSPITAL MEDICINE  - PROGRESS NOTE    Admit Date: 4/24/2019         CC: chest pain    Subjective: chest pain    Objective: mild distress    Diet: DIET CARDIAC; No Caffeine  Pain is:Mild  Nausea:None  Bowel Movement/Flatus yes    Data:   Scheduled Meds: Reviewed  Current Facility-Administered Medications   Medication Dose Route Frequency Provider Last Rate Last Dose    ipratropium-albuterol (DUONEB) nebulizer solution 1 ampule  1 ampule Inhalation Q4H WA Memo Drew MD   1 ampule at 04/26/19 1823    0.9 % sodium chloride infusion  1,000 mL Intravenous Continuous Edouard Trammell MD   Stopped at 04/26/19 1737    sodium chloride flush 0.9 % injection 10 mL  10 mL Intravenous 2 times per day Edouard Trammell MD   10 mL at 04/26/19 2015    sodium chloride flush 0.9 % injection 10 mL  10 mL Intravenous PRN Edouard Trammell MD        acetaminophen (TYLENOL) tablet 650 mg  650 mg Oral Q4H PRN Edouard Trammell MD        [facility] injection 4 mg  4 mg Intravenous Q6H PRN Edouard Trammell MD   4 mg at 04/26/19 2032    hydrALAZINE (APRESOLINE) injection 10 mg  10 mg Intravenous Q10 Min PRN Edouard Trammell MD        cefTRIAXone (ROCEPHIN) 1 g in sterile water 10 mL IV syringe  1 g Intravenous Q24H Memo Drew MD        insulin glargine (LANTUS) injection vial 10 Units  10 Units Subcutaneous Nightly Memo Drew MD   10 Units at 04/26/19 2015    atorvastatin (LIPITOR) tablet 80 mg  80 mg Oral Daily Memo Drew MD   80 mg at 04/26/19 0841    fenofibrate tablet 160 mg  160 mg Oral Daily Memo Drew MD   160 mg at 04/26/19 0841    HYDROcodone-acetaminophen (NORCO)  MG per tablet 2 tablet  2 tablet Oral Q6H PRN Memo Drew MD   2 tablet at 04/26/19 1553    amLODIPine (NORVASC) tablet 5 mg  5 mg Oral BID eMmo Drew MD   5 mg at 04/26/19 2013  cloNIDine (CATAPRES) tablet 0.1 mg  0.1 mg Oral BID Ya Briones MD   0.1 mg at 04/26/19 2013    hydrALAZINE (APRESOLINE) injection 5 mg  5 mg Intravenous Q4H PRN Ya Briones MD        insulin lispro (HUMALOG) injection vial 0-12 Units  0-12 Units Subcutaneous TID WC Ya Briones MD   6 Units at 04/26/19 1733    insulin lispro (HUMALOG) injection vial 0-6 Units  0-6 Units Subcutaneous Nightly Ya Briones MD   3 Units at 04/26/19 2014    ALPRAZolam Mey Salome) tablet 0.5 mg  0.5 mg Oral Once PRN Giovanni Dale MD        busPIRone (BUSPAR) tablet 10 mg  10 mg Oral TID PRN Raj Rough Amado, DO        pantoprazole (PROTONIX) tablet 40 mg  40 mg Oral QAM AC Raj Rough Amado, DO   40 mg at 04/26/19 0840    magnesium hydroxide (MILK OF MAGNESIA) 400 MG/5ML suspension 30 mL  30 mL Oral Daily PRN Saturnino Overton, DO        aspirin chewable tablet 81 mg  81 mg Oral Daily Raj Rough Amado, DO   81 mg at 04/26/19 0841    enoxaparin (LOVENOX) injection 40 mg  40 mg Subcutaneous Q24H Raj Rough Amado, DO   40 mg at 04/26/19 1732    glucose (GLUTOSE) 40 % oral gel 15 g  15 g Oral PRN Raj Rough Amado, DO        dextrose 50 % solution 12.5 g  12.5 g Intravenous PRN Raj Rough Amado, DO        glucagon (rDNA) injection 1 mg  1 mg Intramuscular PRN Raj Rough Amado, DO        dextrose 5 % solution  100 mL/hr Intravenous PRN Raj Rough Amado, DO        nicotine (NICODERM CQ) 14 MG/24HR 1 patch  1 patch Transdermal Daily Saturnino Overton, DO   1 patch at 04/26/19 1369     DVT Prophylaxis: Lovenox 40 mg sq daily    Continuous Infusions:   sodium chloride Stopped (04/26/19 1737)    dextrose         Intake/Output Summary (Last 24 hours) at 4/26/2019 2041  Last data filed at 4/26/2019 1803  Gross per 24 hour   Intake 850 ml   Output --   Net 850 ml     CBC:   Recent Labs     04/24/19  1430 04/25/19  0207 04/26/19  0132   WBC 8.4 7.9 9.8   HGB 15.3 14.4 14.4    151 154 BMP:  Recent Labs     04/25/19  0207 04/25/19  1906 04/26/19  0132   * 131* 133*   K 5.7* 4.0 4.0   CL 97* 90* 94*   CO2 25 28 26   BUN 24* 22* 24*   CREATININE 1.2 1.0 1.0   GLUCOSE 339* 248* 320*     ABGs: No results found for: PHART, PO2ART, ZIG4BVV  INR: No results for input(s): INR in the last 72 hours. Objective:   Vitals: BP (!) 155/79   Pulse 67   Temp 98.3 °F (36.8 °C) (Temporal)   Resp 14   Ht 5' 11\" (1.803 m)   Wt 210 lb 9.6 oz (95.5 kg)   SpO2 96%   BMI 29.37 kg/m²   General appearance: alert, appears stated age and cooperative  Skin: Skin color, texture, turgor normal.   HEENT: Head: Normocephalic, no lesions, without obvious abnormality.   Neck: no adenopathy, no carotid bruit, no JVD and supple, symmetrical, trachea midline  Lungs: clear to auscultation bilaterally  Heart: regular rate and rhythm, S1, S2 normal, no murmur, click, rub or gallop  Abdomen: soft, non-tender; bowel sounds normal; no masses,  no organomegaly  Extremities: extremities normal, atraumatic, no cyanosis or edema  Lymphatic: No significant lymph node enlargement papable  Neurologic: Mental status: Alert, oriented, thought content appropriate        Assessment & Plan:    · Severe CAD- cardiothoracic surgery consulted  · Lung nodule- ct chest pending  · UTI- rocephin  · DM2- uncontrolled - adjust insulin   · Hyperlipidemia - on statin and fenofibrate   · Essential hypertension  · Hx of bladder cancer    Disposition: per CT surgery     Shade Shorten

## 2019-04-27 NOTE — PROGRESS NOTES
Pt had complaints of stomach pains. Pt stated his belly was tight. Pt has not had bm in several days. Pt asked for laxative to help with a BM. See MAR. Will continue to monitor.

## 2019-04-28 LAB
ANION GAP SERPL CALCULATED.3IONS-SCNC: 12 MMOL/L (ref 7–19)
BUN BLDV-MCNC: 17 MG/DL (ref 6–20)
CALCIUM SERPL-MCNC: 8.7 MG/DL (ref 8.6–10)
CHLORIDE BLD-SCNC: 96 MMOL/L (ref 98–111)
CO2: 25 MMOL/L (ref 22–29)
CREAT SERPL-MCNC: 1 MG/DL (ref 0.5–1.2)
GFR NON-AFRICAN AMERICAN: >60
GLUCOSE BLD-MCNC: 192 MG/DL (ref 70–99)
GLUCOSE BLD-MCNC: 219 MG/DL (ref 70–99)
GLUCOSE BLD-MCNC: 227 MG/DL (ref 74–109)
GLUCOSE BLD-MCNC: 232 MG/DL (ref 70–99)
GLUCOSE BLD-MCNC: 265 MG/DL (ref 70–99)
MRSA CULTURE ONLY: NORMAL
PERFORMED ON: ABNORMAL
POTASSIUM SERPL-SCNC: 4.3 MMOL/L (ref 3.5–5)
SODIUM BLD-SCNC: 133 MMOL/L (ref 136–145)
URINE CULTURE, ROUTINE: NORMAL

## 2019-04-28 PROCEDURE — 99233 SBSQ HOSP IP/OBS HIGH 50: CPT | Performed by: THORACIC SURGERY (CARDIOTHORACIC VASCULAR SURGERY)

## 2019-04-28 PROCEDURE — 2580000003 HC RX 258: Performed by: INTERNAL MEDICINE

## 2019-04-28 PROCEDURE — 36415 COLL VENOUS BLD VENIPUNCTURE: CPT

## 2019-04-28 PROCEDURE — 6370000000 HC RX 637 (ALT 250 FOR IP): Performed by: INTERNAL MEDICINE

## 2019-04-28 PROCEDURE — 99231 SBSQ HOSP IP/OBS SF/LOW 25: CPT | Performed by: INTERNAL MEDICINE

## 2019-04-28 PROCEDURE — 6370000000 HC RX 637 (ALT 250 FOR IP): Performed by: HOSPITALIST

## 2019-04-28 PROCEDURE — G0378 HOSPITAL OBSERVATION PER HR: HCPCS

## 2019-04-28 PROCEDURE — 80048 BASIC METABOLIC PNL TOTAL CA: CPT

## 2019-04-28 PROCEDURE — 96372 THER/PROPH/DIAG INJ SC/IM: CPT

## 2019-04-28 PROCEDURE — 82948 REAGENT STRIP/BLOOD GLUCOSE: CPT

## 2019-04-28 PROCEDURE — 94640 AIRWAY INHALATION TREATMENT: CPT

## 2019-04-28 PROCEDURE — 99225 PR SBSQ OBSERVATION CARE/DAY 25 MINUTES: CPT | Performed by: HOSPITALIST

## 2019-04-28 PROCEDURE — 6360000002 HC RX W HCPCS: Performed by: INTERNAL MEDICINE

## 2019-04-28 PROCEDURE — 93970 EXTREMITY STUDY: CPT

## 2019-04-28 PROCEDURE — 6360000002 HC RX W HCPCS: Performed by: HOSPITALIST

## 2019-04-28 RX ORDER — INSULIN GLARGINE 100 [IU]/ML
20 INJECTION, SOLUTION SUBCUTANEOUS 2 TIMES DAILY
Status: DISCONTINUED | OUTPATIENT
Start: 2019-04-28 | End: 2019-04-28

## 2019-04-28 RX ORDER — POLYETHYLENE GLYCOL 3350 17 G/17G
17 POWDER, FOR SOLUTION ORAL DAILY
Status: DISCONTINUED | OUTPATIENT
Start: 2019-04-28 | End: 2019-05-01

## 2019-04-28 RX ORDER — INSULIN GLARGINE 100 [IU]/ML
25 INJECTION, SOLUTION SUBCUTANEOUS 2 TIMES DAILY
Status: DISCONTINUED | OUTPATIENT
Start: 2019-04-28 | End: 2019-05-01

## 2019-04-28 RX ORDER — CLONIDINE HYDROCHLORIDE 0.1 MG/1
0.1 TABLET ORAL 3 TIMES DAILY
Status: DISCONTINUED | OUTPATIENT
Start: 2019-04-28 | End: 2019-05-01

## 2019-04-28 RX ADMIN — CLONIDINE HYDROCHLORIDE 0.1 MG: 0.1 TABLET ORAL at 20:26

## 2019-04-28 RX ADMIN — FENOFIBRATE 160 MG: 160 TABLET ORAL at 07:55

## 2019-04-28 RX ADMIN — HYDROCODONE BITARTRATE AND ACETAMINOPHEN 2 TABLET: 10; 325 TABLET ORAL at 15:54

## 2019-04-28 RX ADMIN — AMLODIPINE BESYLATE 5 MG: 5 TABLET ORAL at 07:55

## 2019-04-28 RX ADMIN — HYDROCODONE BITARTRATE AND ACETAMINOPHEN 2 TABLET: 10; 325 TABLET ORAL at 20:25

## 2019-04-28 RX ADMIN — AMLODIPINE BESYLATE 5 MG: 5 TABLET ORAL at 20:25

## 2019-04-28 RX ADMIN — ASPIRIN 81 MG 81 MG: 81 TABLET ORAL at 07:55

## 2019-04-28 RX ADMIN — HYDROCODONE BITARTRATE AND ACETAMINOPHEN 2 TABLET: 10; 325 TABLET ORAL at 11:43

## 2019-04-28 RX ADMIN — HYDROCODONE BITARTRATE AND ACETAMINOPHEN 2 TABLET: 10; 325 TABLET ORAL at 07:57

## 2019-04-28 RX ADMIN — PANTOPRAZOLE SODIUM 40 MG: 40 TABLET, DELAYED RELEASE ORAL at 07:55

## 2019-04-28 RX ADMIN — ATORVASTATIN CALCIUM 80 MG: 80 TABLET, FILM COATED ORAL at 07:54

## 2019-04-28 RX ADMIN — INSULIN LISPRO 9 UNITS: 100 INJECTION, SOLUTION INTRAVENOUS; SUBCUTANEOUS at 16:38

## 2019-04-28 RX ADMIN — CLONIDINE HYDROCHLORIDE 0.1 MG: 0.1 TABLET ORAL at 07:55

## 2019-04-28 RX ADMIN — ENOXAPARIN SODIUM 40 MG: 40 INJECTION SUBCUTANEOUS at 18:35

## 2019-04-28 RX ADMIN — INSULIN GLARGINE 25 UNITS: 100 INJECTION, SOLUTION SUBCUTANEOUS at 20:26

## 2019-04-28 RX ADMIN — INSULIN LISPRO 6 UNITS: 100 INJECTION, SOLUTION INTRAVENOUS; SUBCUTANEOUS at 11:43

## 2019-04-28 RX ADMIN — Medication 10 ML: at 07:55

## 2019-04-28 RX ADMIN — INSULIN LISPRO 3 UNITS: 100 INJECTION, SOLUTION INTRAVENOUS; SUBCUTANEOUS at 20:33

## 2019-04-28 RX ADMIN — POLYETHYLENE GLYCOL 3350 17 G: 17 POWDER, FOR SOLUTION ORAL at 07:55

## 2019-04-28 RX ADMIN — Medication 10 ML: at 20:27

## 2019-04-28 RX ADMIN — HYDROCODONE BITARTRATE AND ACETAMINOPHEN 2 TABLET: 10; 325 TABLET ORAL at 03:53

## 2019-04-28 RX ADMIN — HYDROCODONE BITARTRATE AND ACETAMINOPHEN 2 TABLET: 10; 325 TABLET ORAL at 00:44

## 2019-04-28 RX ADMIN — METHYLNALTREXONE BROMIDE 12 MG: 12 INJECTION, SOLUTION SUBCUTANEOUS at 16:36

## 2019-04-28 RX ADMIN — IPRATROPIUM BROMIDE AND ALBUTEROL SULFATE 1 AMPULE: .5; 3 SOLUTION RESPIRATORY (INHALATION) at 18:28

## 2019-04-28 RX ADMIN — INSULIN GLARGINE 15 UNITS: 100 INJECTION, SOLUTION SUBCUTANEOUS at 07:54

## 2019-04-28 RX ADMIN — INSULIN LISPRO 3 UNITS: 100 INJECTION, SOLUTION INTRAVENOUS; SUBCUTANEOUS at 07:54

## 2019-04-28 RX ADMIN — IPRATROPIUM BROMIDE AND ALBUTEROL SULFATE 1 AMPULE: .5; 3 SOLUTION RESPIRATORY (INHALATION) at 07:02

## 2019-04-28 RX ADMIN — IPRATROPIUM BROMIDE AND ALBUTEROL SULFATE 1 AMPULE: .5; 3 SOLUTION RESPIRATORY (INHALATION) at 14:24

## 2019-04-28 RX ADMIN — IPRATROPIUM BROMIDE AND ALBUTEROL SULFATE 1 AMPULE: .5; 3 SOLUTION RESPIRATORY (INHALATION) at 10:47

## 2019-04-28 ASSESSMENT — PAIN SCALES - GENERAL
PAINLEVEL_OUTOF10: 10
PAINLEVEL_OUTOF10: 10
PAINLEVEL_OUTOF10: 8
PAINLEVEL_OUTOF10: 10
PAINLEVEL_OUTOF10: 9
PAINLEVEL_OUTOF10: 6
PAINLEVEL_OUTOF10: 10
PAINLEVEL_OUTOF10: 9
PAINLEVEL_OUTOF10: 8
PAINLEVEL_OUTOF10: 7
PAINLEVEL_OUTOF10: 9
PAINLEVEL_OUTOF10: 9
PAINLEVEL_OUTOF10: 8

## 2019-04-28 NOTE — PROGRESS NOTES
Cardiology Daily Note Sahra Jones MD      Patient:  Dulce Larson  [id number]    Patient Active Problem List    Diagnosis Date Noted    Unstable angina pectoris [facility])      Priority: High    Type 2 diabetes mellitus without complication, without long-term current use of insulin ([facility] [address] 04/24/2019     Priority: Low    Mixed anxiety depressive disorder 04/24/2019     Priority: Low    Mixed hyperlipidemia 04/24/2019     Priority: Low    Coronary artery disease involving native coronary artery of native heart with unstable angina pectoris ([facility] [address] 04/24/2019     Priority: Low    Essential hypertension 03/14/2019     Priority: Low    Hx of bladder cancer 03/14/2019     Priority: Low    Chronic bronchitis ([facility] [address] 03/14/2019     Priority: Low       Admit Date:  4/24/2019    Admission Problem List: Present on Admission:   Coronary artery disease involving native coronary artery of native heart with unstable angina pectoris ([facility] [address]   Mixed hyperlipidemia   Essential hypertension   Type 2 diabetes mellitus without complication, without long-term current use of insulin (HCC)   Mixed anxiety depressive disorder   Hx of bladder cancer   Unstable angina pectoris [facility])      Cardiac Specific Data:  Specialty Problems        Cardiology Problems    Unstable angina pectoris [facility])        Essential hypertension        * (Principal) Coronary artery disease involving native coronary artery of native heart with unstable angina pectoris (HCC)              Subjective:  Mr. Larson seen today resting comfortably. Denies chest pain at the present time. No new complaints.     Objective:   BP (!) 150/71   Pulse 73   Temp 96 °F (35.6 °C) (Temporal)   Resp 18   Ht 5' 11\" (1.803 m)   Wt 211 lb 9.6 oz (96 kg)   SpO2 95%   BMI 29.51 kg/m²       Intake/Output Summary (Last 24 hours) at 4/28/2019 1111  Last data filed at 4/28/2019 0956  Gross per 24 hour   Intake 2759 ml   Output 1900 ml   Net 859 ml       Prior to Admission 0.1 mg Oral BID    pantoprazole  40 mg Oral QAM AC    aspirin  81 mg Oral Daily    enoxaparin  40 mg Subcutaneous Q24H    nicotine  1 patch Transdermal Daily       TELEMETRY: Sinus     Physical Exam:      Physical Exam      General:  Awake, alert, NAD  Skin:  Warm and dry  Neck:  no jvd , no carotid bruits  Chest:  Clear to auscultation, no wheezing or rales  Cardiovascular:  RRR C6Y2 no murmurs, clicks, gallups, or rubs  Abdomen:  Soft nontender, nondistended, bowel sounds present  Extremities:  Edema: none     Lab Data:  CBC:   Recent Labs     04/26/19 0132   WBC 9.8   HGB 14.4   HCT 40.7*   MCV 88.5        BMP:   Recent Labs     04/26/19 0132 04/27/19 0139 04/28/19  0216   * 135* 133*   K 4.0 4.1 4.3   CL 94* 96* 96*   CO2 26 27 25   BUN 24* 21* 17   CREATININE 1.0 0.9 1.0     LIVER PROFILE:   Recent Labs     04/26/19 0132   AST 5   ALT 6   BILITOT <0.2   ALKPHOS 98     PT/INR: No results for input(s): PROTIME, INR in the last 72 hours. APTT: No results for input(s): APTT in the last 72 hours. BNP:  No results for input(s): BNP in the last 72 hours. CK, CKMB, Troponin: @LABRCNT (CKTOTAL:3, CKMB:3, TROPONINI:3)@    IMAGING:  Xr Chest Standard (2 Vw)    Result Date: 4/24/2019  EXAMINATION:  XR CHEST (2 VW)  4/24/2019 3:09 PM HISTORY: Chest pain shortness of breath COMPARISON: No comparison study. FINDINGS:  PA and lateral views of the chest were obtained. The lungs are clear and normally expanded. There are few scattered nodular densities in the left lung base, at least one is calcified, another of the nodules may a represent nipple shadow artifact. Repeat PA chest with nipple markers in place is recommended. Heart size is normal. No pneumothorax or pleural effusion is identified. The osseous structures are unremarkable. No pulmonary consolidation or acute findings. A few small nodules in the left lung base, at least one is calcified.  One of the nodules may represent a nipple shadow artifact. Consider repeat PA chest with nipple markers in place. Signed by Dr Angel North. Devika on 4/24/2019 3:12 PM    Xr Cervical Spine (2-3 Views)    Result Date: 4/10/2019  EXAMINATION: XR CERVICAL SPINE (2-3 VIEWS) 4/10/2019 9:34 AM HISTORY: Neck pain 4 views cervical spine are obtained. Cervical vertebra are normally aligned. There is slight loss of height at C5-6 disc space level and loss of height of C6-7 disc space level. Mild uncinate spurring is noted at the C6-7 level. Minimal anterior hypertrophic degenerative changes noted inferior endplates of S4-D8 and C6. The odontoid process is intact. Calcification left carotid artery is noted. [place] the lungs appears clear as visualized. Impression degenerative spondylosis is noted in the cervical spine as described above Signed by Dr Mihai Hassan on 4/10/2019 9:36 AM    Ct Head Wo Contrast    Result Date: 4/24/2019  EXAMINATION: CT HEAD WO CONTRAST 4/24/2019 5:48 PM HISTORY: Daily headaches, history of bladder cancer Comparison: CT head without contrast 2/17/2011 Technique: Sequential imaging was performed from the vertex through the base of the skull without the use of IV contrast. Sagittal and coronal reformations were made from the original source data and reviewed. Automated exposure control was utilized for radiation dose reduction. Radiation dose:  mGy-cm Findings: There is no evidence of acute intracranial hemorrhage, mass, or midline shift. There is no evidence of acute territorial infarct. The ventricles appear normal in configuration. Basilar cisterns are patent. The posterior fossa appears grossly normal. The calvarium appears intact. There is near complete opacification of the visualized left maxillary sinus. The visualized mastoid air cells appear clear. Calcifications are seen in the cavernous carotid arteries. Impression: No acute intracranial findings. Sinus disease.  Signed by Dr Catalina Ham on 4/24/2019 5:54 PM    Ct Chest W Contrast    Result Date: 4/27/2019  CT CHEST W CONTRAST 4/27/2019 5:00 AM HISTORY: Left lower lobe pulmonary nodule COMPARISON: Chest radiograph dated 4/26/2019 and 4/24/2019 TECHNIQUE:  Radiation dose equals  mGy cm. AUTOMATED EXPOSURE CONTROL dose reduction technique was implemented. Thin section axial images were obtained with intravenous contrast. Multi projection reconstruction images were generated. FINDINGS: There are no measurable pulmonary nodules identified in the lungs. Particularly, in the left lower lobe in the region of the chest radiograph abnormality, no CT correlate is identified. There are linear opacities identified in the right lung base compatible with mild platelike atelectasis associated with the diaphragm position. There are no pleural effusions or pneumothoraces or parenchymal infiltrates otherwise. There are small mediastinal lymph nodes. There are no pathologically enlarged nodes. There are coronary artery calcifications. There are atherosclerotic aortic calcifications. There is fatty infiltration of the liver. Limited imaging of the upper abdomen demonstrate no acute abnormality. Bony structures are intact without acute osseous abnormalities. Degenerative spurring noted diffusely in the thoracic spine. 1. No measurable pulmonary nodules identified in the lungs. Minimal platelike atelectasis in the right lung base associated with diaphragm position. Lung fields are clear without acute cardiopulmonary process. 2. Atherosclerotic calcifications. 4. Hepatic steatosis. Signed by Dr Estiven Greenwood on 4/27/2019 8:30 AM    Xr Chest 1 Vw    Result Date: 4/26/2019  XR CHEST 1 VIEW 4/26/2019 7:00 PM HISTORY: Repeat chest x-ray with nipple markers COMPARISON: 4/24/2019. FINDINGS: Frontal view of the chest was obtained. Nipple markers are present.  The nipple markers are separate from the nodular opacity seen on the previous study, but the nodule is not well visualized on this exam. The lungs are otherwise clear. The cardiomediastinal silhouette and pulmonary vascularity are unchanged. The osseous structures and surrounding soft tissues demonstrate no acute abnormality. 1. The previously seen nodule is not well visualized but is in a separate position than the nipple marker. Given the patient's age, consider further evaluation with CT to ensure no suspicious nodule is present. Signed by Dr Forest Elliott on 4/26/2019 8:35 PM    Nm Myocardial Spect Rest Exercise Or Rx    Result Date: 4/26/2019  Lexiscan Nuclear Stress Test Report Procedure date: 04/25/2019 Indications: Chest pain Procedure: Stress was performed with injection of 0.4 mg Lexiscan. Vital signs and EKG were monitored. Technetium-99 sestamibi was injected in divided doses, approximately 10 mCi and 30 mCi respectively for rest and stress imaging. The patient was discharged in stable condition. Results: Patient had symptoms of dyspnea, dizziness, nausea, and stomach cramping during infusion that resolved in recovery. Baseline EKG showed normal sinus rhythm. During stress there were no significant EKG changes or rhythm changes. Baseline and peak blood pressures were 151/103, and 163/92 respectively. Baseline and peak heart rates were 87 and  99 respectively. Radioactive pharmaceuticals used: Rest images 10.67 mCi technetium 99m sestamibi Stress images 32.83 mCi technetium 99m sestamibi Review of rest and stress images obtained in a SPECT acquisition protocol low with review of the polar plot revealed: 1. Ejection fraction normal 54% 2. Wall motion study suggested mild inferoseptal hypokinesis 3. Myocardial perfusion imaging demonstrated homogeneous uptake of the tracer in all visualized segments with the exception a few of the more distal short axis slices showed diminished uptake in the inferior wall which appeared to reverse on some of the slices. The sum stress score was 1.     Summary impression: Probably normal study normal ejection fraction 54% minimal distal inferior wall ischemia cannot be entirely excluded more likely normal correlate clinically Signed by Dr Paola Gomez on 4/25/2019 4:00 PM    Us Carotid Artery Bilateral    Result Date: 4/18/2019  Vascular Carotid Procedure  Demographics   Patient Name    Luis Enrique Monzon    Age                   44                  [place]   Patient Number  [id number]           Gender                Male   Visit Number    [id number]        Interpreting          Jason Collins MD                                   Physician   Date of Birth   1970       Referring Physician   Tameka Vivas   Accession       [id number]        [address] [place] [address]  Number  Procedure Type of Study:   Cerebral:Carotid, US CAROTID ARTERY BILATERAL [[id number]]. Indications for Study:Dizziness/Vertigo. Risk Factors   - The patient's risk factor(s) include: diabetes mellitus, dyslipidemia     and arterial hypertension.   - Current - Every day. Impression   There is no plaque visualized in the bilateral internal carotid  artery(ies). There is no stenosis in the right internal carotid artery. There is no stenosis of the left internal carotid artery. There is normal antegrade flow in the bilateral vertebral artery(ies). Signature   ----------------------------------------------------------------  Electronically signed by Jason Collins MD(Interpreting  physician) on 04/18/2019 11:12 AM  ----------------------------------------------------------------  Blood Pressure:Right arm 176/86 mmHg. Left arm 184/90 mmHg. Velocities are measured in cm/s ; Diameters are measured in mm Carotid Right Measurements +------------+-------+-------+--------+-------+------------+---------------+ ! Location    ! PSV    ! EDV    ! Angle   ! RI     !%Stenosis   ! Tortuosity     ! +------------+-------+-------+--------+-------+------------+---------------+ ! Prox CCA    !111    !18.2   ! 60      !0.84   !            !               ! +------------+-------+-------+--------+-------+------------+---------------+ ! Mid CCA     ! 85     !19.9   !60      !0.77   !            !               ! +------------+-------+-------+--------+-------+------------+---------------+ ! Prox ICA    !103    !27     !60      !0.74   !            !               ! +------------+-------+-------+--------+-------+------------+---------------+ ! Mid ICA     !101    !31.7   !60      !0.69   !            !               ! +------------+-------+-------+--------+-------+------------+---------------+ ! Dist ICA    !76.8   !28.1   ! 60      !0.63   !            !               ! +------------+-------+-------+--------+-------+------------+---------------+ ! Prox ECA    !173    !16.7   !60      !0.9    ! !               ! +------------+-------+-------+--------+-------+------------+---------------+ ! Vertebral   !47.1   !11.4   !60      !0.76   !            !               ! +------------+-------+-------+--------+-------+------------+---------------+   - There is antegrade vertebral flow noted on the right side. - Additional Measurements:ICAPSV/CCAPSV 0.93. ICAEDV/CCAEDV 1.74. Carotid Left Measurements +------------+-------+-------+--------+-------+------------+---------------+ ! Location    ! PSV    ! EDV    ! Angle   ! RI     !%Stenosis   ! Tortuosity     ! +------------+-------+-------+--------+-------+------------+---------------+ ! Prox CCA    !78.2   !15.7   !60      !0.8    ! !               ! +------------+-------+-------+--------+-------+------------+---------------+ ! Mid CCA     !107    !22.3   !60      !0.79   !            !               ! +------------+-------+-------+--------+-------+------------+---------------+ ! Prox ICA    !57.8   !14.9   !60      !0.74   !            !               ! +------------+-------+-------+--------+-------+------------+---------------+ ! Mid ICA     ! 96.7   !35.2   ! 60      !0.64   !            !               ! +------------+-------+-------+--------+-------+------------+---------------+ ! Dist ICA    ! 96.7   !38.1   ! 60      !0.61   !            !               ! +------------+-------+-------+--------+-------+------------+---------------+ ! Prox ECA    !136    !14.1   ! 60      !0.9    ! !               ! +------------+-------+-------+--------+-------+------------+---------------+ ! Vertebral   !50.7   !16.5   !60      !0.67   !            !               ! +------------+-------+-------+--------+-------+------------+---------------+   - There is antegrade vertebral flow noted on the left side. - Additional Measurements:ICAPSV/CCAPSV 1.24. ICAEDV/CCAEDV 2.43. Assessment:  1. Recurrent bouts of chest tightness over the past few months suspicious for angina  2. Tobacco abuse ongoing  3. History of bladder cancer  4. Chronic back pain and multiple back surgeries  5. Stress test today ejection fraction 54% questionable minimal inferior ischemia distally  6. I pretension  7. Hyperlipidemia  8. Diabetes type 2  9. Anxiety and depression  10. Hyperkalemia          Plan:  1. Continue current treatment cardiac status stable  2.  Await final decision regarding CABG    Alea Virk MD 4/28/2019 11:11 AM

## 2019-04-28 NOTE — PROGRESS NOTES
Progress Note        Subjective:  No new complaints    No issues overnight    Objective:    Physical Examination:    BP (!) 150/71   Pulse 73   Temp 96 °F (35.6 °C) (Temporal)   Resp 18   Ht 5' 11\" (1.803 m)   Wt 211 lb 9.6 oz (96 kg)   SpO2 95%   BMI 29.51 kg/m²          General: in no acute distress, comfortable  HEENT: PERRLA, EOMI, anicteric sclerae, NC/AT, no nasal septal deviation, mucus membranes moist  Pulm: CTA b/l, no rales, rhonchi, wheezing  Cardio: S1S2+, regular rate and rhythm, no murmurs/rubs/gallops/thrills  Abdomen: S/NT/ND/NABS  Extrems: 5/5 strength throughout, no cyanosis, clubbing, edema  Skin: no rashes, lesions, ulcers  Neuro: alert and oriented to person, place, time, situation, cranial nerves 2-12 intact  Psych: pleasant, cooperative, recent and remote memory intact, no anxiety/hallucinations/agitation    Labs:   CBC:   Recent Labs     04/26/19  0132   WBC 9.8   HGB 14.4   HCT 40.7*   MCV 88.5        BMP:   Recent Labs     04/28/19  0216   *   K 4.3   CL 96*   CO2 25   BUN 17   CREATININE 1.0     Liver Profile:  Lab Results   Component Value Date    AST 5 04/26/2019    ALT 6 04/26/2019    BILITOT <0.2 04/26/2019    ALKPHOS 98 04/26/2019    ALKPHOS 74 02/17/2011     Lab Results   Component Value Date    CHOL 225 04/26/2019    HDL 27 04/26/2019    TRIG 1,127 04/26/2019       Impression:   1. Unstable angina   2. 3v CAD   3. Normal LV function     Plan:    1. Initiate pre-op w/u: vein mapping, carotid US, echo   2. Pending satisfactory completion of above Dr. Le Baez to evaluate       for CABG              Ivone Weller MD  4/28/2019  1:03 PM

## 2019-04-28 NOTE — PROGRESS NOTES
HOSPITAL MEDICINE  - PROGRESS NOTE    Admit Date: 4/24/2019         CC: chest pain    Subjective: no chest pain, constipated     Objective: no distress    Diet: DIET CARDIAC; No Caffeine  Pain is:Mild  Nausea:None  Bowel Movement/Flatus no     Data:   Scheduled Meds: Reviewed  Current Facility-Administered Medications   Medication Dose Route Frequency Provider Last Rate Last Dose    polyethylene glycol (GLYCOLAX) packet 17 g  17 g Oral Daily Catarina Sylvester MD        cloNIDine (CATAPRES) tablet 0.1 mg  0.1 mg Oral TID Catarina Sylvester MD        insulin glargine (LANTUS) injection vial 25 Units  25 Units Subcutaneous BID Catarina Sylvester MD        HYDROcodone-acetaminophen [facility])  MG per tablet 2 tablet  2 tablet Oral Q4H PRN Catarina Sylvester MD   2 tablet at 04/28/19 1554    ipratropium-albuterol (DUONEB) nebulizer solution 1 ampule  1 ampule Inhalation Q4H WA Catarina Sylvester MD   1 ampule at 04/28/19 1424    sodium chloride flush 0.9 % injection 10 mL  10 mL Intravenous 2 times per day Mark Farias MD   10 mL at 04/28/19 0755    sodium chloride flush 0.9 % injection 10 mL  10 mL Intravenous PRN Mark Farias MD        acetaminophen (TYLENOL) tablet 650 mg  650 mg Oral Q4H PRN Mark Farias MD        ondansetron [facility]) injection 4 mg  4 mg Intravenous Q6H PRN Mark Farias MD   4 mg at 04/26/19 2032    hydrALAZINE (APRESOLINE) injection 10 mg  10 mg Intravenous Q10 Min PRN Mark Farias MD        insulin lispro (HUMALOG) injection vial 0-18 Units  0-18 Units Subcutaneous TID WC Catarina Sylvester MD   9 Units at 04/28/19 1638    insulin lispro (HUMALOG) injection vial 0-9 Units  0-9 Units Subcutaneous Nightly Catarina Sylvester MD   5 Units at 04/27/19 2047    0.9 % sodium chloride infusion  1,000 mL Intravenous Continuous Catarina Sylvester MD 50 mL/hr at 04/26/19 2201 1,000 mL at 04/26/19 2201    atorvastatin (LIPITOR) tablet 80 mg  80 mg Oral Daily Solo Sanchez MD   80 mg at 04/28/19 0754    fenofibrate tablet 160 mg  160 mg Oral Daily Solo Sanchez MD   160 mg at 04/28/19 0755    amLODIPine (NORVASC) tablet 5 mg  5 mg Oral BID oSlo Sanchez MD   5 mg at 04/28/19 6733    hydrALAZINE (APRESOLINE) injection 5 mg  5 mg Intravenous Q4H PRN Solo Sanchez MD        busPIRone (BUSPAR) tablet 10 mg  10 mg Oral TID PRN Lawrence Cornet Amado, DO        pantoprazole (PROTONIX) tablet 40 mg  40 mg Oral QAM AC Lawrence Cornet Amado, DO   40 mg at 04/28/19 0755    magnesium hydroxide (MILK OF MAGNESIA) 400 MG/5ML suspension 30 mL  30 mL Oral Daily PRN Lawrence Cornet Amado, DO   30 mL at 04/27/19 9965    aspirin chewable tablet 81 mg  81 mg Oral Daily Lawrence Cornet Amado, DO   81 mg at 04/28/19 0755    enoxaparin (LOVENOX) injection 40 mg  40 mg Subcutaneous Q24H Lawrence Cornet Amado, DO   40 mg at 04/28/19 1835    glucose (GLUTOSE) 40 % oral gel 15 g  15 g Oral PRN Lawrence Cornet Amado, DO        dextrose 50 % solution 12.5 g  12.5 g Intravenous PRN Lawrence Cornet Amado, DO        glucagon (rDNA) injection 1 mg  1 mg Intramuscular PRN Lawrence Cornet Amado, DO        dextrose 5 % solution  100 mL/hr Intravenous PRN Clement Benoit,         nicotine (NICODERM CQ) 14 MG/24HR 1 patch  1 patch Transdermal Daily Clement Benoit DO   1 patch at 04/27/19 0405     DVT Prophylaxis: Lovenox 40 mg sq daily    Continuous Infusions:   sodium chloride 1,000 mL (04/26/19 2201)    dextrose         Intake/Output Summary (Last 24 hours) at 4/28/2019 1843  Last data filed at 4/28/2019 1813  Gross per 24 hour   Intake 3359 ml   Output 1250 ml   Net 2109 ml     CBC:   Recent Labs     04/26/19 0132   WBC 9.8   HGB 14.4        BMP:  Recent Labs     04/26/19  0132 04/27/19  0139 04/28/19  0216   * 135* 133*   K 4.0 4.1 4.3   CL 94* 96* 96*   CO2 26 27 25   BUN 24* 21*

## 2019-04-29 ENCOUNTER — TELEPHONE (OUTPATIENT)
Dept: PRIMARY CARE CLINIC | Age: 49
End: 2019-04-29

## 2019-04-29 PROBLEM — I25.10 CAD IN NATIVE ARTERY: Status: ACTIVE | Noted: 2019-04-29

## 2019-04-29 LAB
ANION GAP SERPL CALCULATED.3IONS-SCNC: 13 MMOL/L (ref 7–19)
BUN BLDV-MCNC: 16 MG/DL (ref 6–20)
CALCIUM SERPL-MCNC: 8.8 MG/DL (ref 8.6–10)
CHLORIDE BLD-SCNC: 95 MMOL/L (ref 98–111)
CO2: 25 MMOL/L (ref 22–29)
CREAT SERPL-MCNC: 0.8 MG/DL (ref 0.5–1.2)
GFR NON-AFRICAN AMERICAN: >60
GLUCOSE BLD-MCNC: 143 MG/DL (ref 70–99)
GLUCOSE BLD-MCNC: 189 MG/DL (ref 70–99)
GLUCOSE BLD-MCNC: 201 MG/DL (ref 74–109)
GLUCOSE BLD-MCNC: 229 MG/DL (ref 70–99)
GLUCOSE BLD-MCNC: 258 MG/DL (ref 70–99)
PERFORMED ON: ABNORMAL
POTASSIUM SERPL-SCNC: 4.1 MMOL/L (ref 3.5–5)
SODIUM BLD-SCNC: 133 MMOL/L (ref 136–145)

## 2019-04-29 PROCEDURE — 82948 REAGENT STRIP/BLOOD GLUCOSE: CPT

## 2019-04-29 PROCEDURE — 2580000003 HC RX 258: Performed by: INTERNAL MEDICINE

## 2019-04-29 PROCEDURE — 2140000000 HC CCU INTERMEDIATE R&B

## 2019-04-29 PROCEDURE — 99231 SBSQ HOSP IP/OBS SF/LOW 25: CPT | Performed by: NURSE PRACTITIONER

## 2019-04-29 PROCEDURE — 6370000000 HC RX 637 (ALT 250 FOR IP): Performed by: HOSPITALIST

## 2019-04-29 PROCEDURE — 6370000000 HC RX 637 (ALT 250 FOR IP): Performed by: INTERNAL MEDICINE

## 2019-04-29 PROCEDURE — 99231 SBSQ HOSP IP/OBS SF/LOW 25: CPT | Performed by: INTERNAL MEDICINE

## 2019-04-29 PROCEDURE — 36415 COLL VENOUS BLD VENIPUNCTURE: CPT

## 2019-04-29 PROCEDURE — 80048 BASIC METABOLIC PNL TOTAL CA: CPT

## 2019-04-29 PROCEDURE — 6360000002 HC RX W HCPCS: Performed by: INTERNAL MEDICINE

## 2019-04-29 PROCEDURE — 94640 AIRWAY INHALATION TREATMENT: CPT

## 2019-04-29 RX ADMIN — ASPIRIN 81 MG 81 MG: 81 TABLET ORAL at 08:40

## 2019-04-29 RX ADMIN — CLONIDINE HYDROCHLORIDE 0.1 MG: 0.1 TABLET ORAL at 08:40

## 2019-04-29 RX ADMIN — ATORVASTATIN CALCIUM 80 MG: 80 TABLET, FILM COATED ORAL at 08:39

## 2019-04-29 RX ADMIN — ENOXAPARIN SODIUM 40 MG: 40 INJECTION SUBCUTANEOUS at 17:57

## 2019-04-29 RX ADMIN — IPRATROPIUM BROMIDE AND ALBUTEROL SULFATE 1 AMPULE: .5; 3 SOLUTION RESPIRATORY (INHALATION) at 14:32

## 2019-04-29 RX ADMIN — IPRATROPIUM BROMIDE AND ALBUTEROL SULFATE 1 AMPULE: .5; 3 SOLUTION RESPIRATORY (INHALATION) at 07:00

## 2019-04-29 RX ADMIN — INSULIN GLARGINE 25 UNITS: 100 INJECTION, SOLUTION SUBCUTANEOUS at 20:29

## 2019-04-29 RX ADMIN — AMLODIPINE BESYLATE 5 MG: 5 TABLET ORAL at 08:40

## 2019-04-29 RX ADMIN — CLONIDINE HYDROCHLORIDE 0.1 MG: 0.1 TABLET ORAL at 14:54

## 2019-04-29 RX ADMIN — HYDROCODONE BITARTRATE AND ACETAMINOPHEN 2 TABLET: 10; 325 TABLET ORAL at 00:19

## 2019-04-29 RX ADMIN — INSULIN LISPRO 6 UNITS: 100 INJECTION, SOLUTION INTRAVENOUS; SUBCUTANEOUS at 17:58

## 2019-04-29 RX ADMIN — INSULIN LISPRO 2 UNITS: 100 INJECTION, SOLUTION INTRAVENOUS; SUBCUTANEOUS at 20:29

## 2019-04-29 RX ADMIN — HYDROCODONE BITARTRATE AND ACETAMINOPHEN 2 TABLET: 10; 325 TABLET ORAL at 12:37

## 2019-04-29 RX ADMIN — INSULIN LISPRO 3 UNITS: 100 INJECTION, SOLUTION INTRAVENOUS; SUBCUTANEOUS at 12:36

## 2019-04-29 RX ADMIN — AMLODIPINE BESYLATE 5 MG: 5 TABLET ORAL at 20:28

## 2019-04-29 RX ADMIN — INSULIN LISPRO 6 UNITS: 100 INJECTION, SOLUTION INTRAVENOUS; SUBCUTANEOUS at 09:22

## 2019-04-29 RX ADMIN — HYDROCODONE BITARTRATE AND ACETAMINOPHEN 2 TABLET: 10; 325 TABLET ORAL at 20:34

## 2019-04-29 RX ADMIN — IPRATROPIUM BROMIDE AND ALBUTEROL SULFATE 1 AMPULE: .5; 3 SOLUTION RESPIRATORY (INHALATION) at 19:40

## 2019-04-29 RX ADMIN — FENOFIBRATE 160 MG: 160 TABLET ORAL at 08:39

## 2019-04-29 RX ADMIN — IPRATROPIUM BROMIDE AND ALBUTEROL SULFATE 1 AMPULE: .5; 3 SOLUTION RESPIRATORY (INHALATION) at 10:31

## 2019-04-29 RX ADMIN — INSULIN GLARGINE 25 UNITS: 100 INJECTION, SOLUTION SUBCUTANEOUS at 08:40

## 2019-04-29 RX ADMIN — HYDROCODONE BITARTRATE AND ACETAMINOPHEN 2 TABLET: 10; 325 TABLET ORAL at 08:40

## 2019-04-29 RX ADMIN — Medication 10 ML: at 20:29

## 2019-04-29 RX ADMIN — POLYETHYLENE GLYCOL 3350 17 G: 17 POWDER, FOR SOLUTION ORAL at 08:40

## 2019-04-29 RX ADMIN — HYDROCODONE BITARTRATE AND ACETAMINOPHEN 2 TABLET: 10; 325 TABLET ORAL at 16:39

## 2019-04-29 RX ADMIN — CLONIDINE HYDROCHLORIDE 0.1 MG: 0.1 TABLET ORAL at 20:28

## 2019-04-29 RX ADMIN — HYDROCODONE BITARTRATE AND ACETAMINOPHEN 2 TABLET: 10; 325 TABLET ORAL at 04:17

## 2019-04-29 RX ADMIN — PANTOPRAZOLE SODIUM 40 MG: 40 TABLET, DELAYED RELEASE ORAL at 06:33

## 2019-04-29 ASSESSMENT — PAIN SCALES - GENERAL
PAINLEVEL_OUTOF10: 9
PAINLEVEL_OUTOF10: 10
PAINLEVEL_OUTOF10: 2
PAINLEVEL_OUTOF10: 9
PAINLEVEL_OUTOF10: 6
PAINLEVEL_OUTOF10: 6
PAINLEVEL_OUTOF10: 10
PAINLEVEL_OUTOF10: 8
PAINLEVEL_OUTOF10: 9
PAINLEVEL_OUTOF10: 10
PAINLEVEL_OUTOF10: 10

## 2019-04-29 ASSESSMENT — PAIN DESCRIPTION - LOCATION: LOCATION: BACK

## 2019-04-29 ASSESSMENT — PAIN DESCRIPTION - PAIN TYPE: TYPE: CHRONIC PAIN

## 2019-04-29 NOTE — TELEPHONE ENCOUNTER
Pt called to let APRN know that he is currently admitted and is going to have a triple bypass. He wanted APRN to know and also wanted to thank her.

## 2019-04-29 NOTE — PROGRESS NOTES
I am seeing the patient today in preparation for coronary bypass surgery. ,  The patient was seen in consultation originally by Dr. Nabil Del Rosario last Friday. The patient underwent cardiac catheterization on 4/26/19 by Dr. Min Wing. .  This revealed a left ventricle that showed inferior wall hypokinesis with ejection fraction of 50%. The left main artery was unremarkable. The LAD had mild proximal disease with perhaps a 70% proximal stenosis seen in one view. The left circumflex artery had a 70% proximal stenosis  Before a branching obtuse marginal system. The 2nd branch of the obtuse marginal system also had a 70% stenosis. .  The RCA is a dominant system with a long, complex mid 90% stenosis. Because of  the presence of severe multivessel disease in  a newly diagnosed diabetic. Bypass surgery was recommended. This patient is disabled from 3 failed back operations. He also has a continent ileostomy for treatment of a cystectomy from bladder cancer. .  After discussing the indications for operation and the alternatives. The patient and his wife wish to proceed with bypass surgery. I therefore discussed procedure of bypass surgery with the patient and wife. We discussed risks, benefits and alternatives to the operation. The risks include bleeding, stroke, infection, reoperation, blood transfusion, multisystem organ failure, respiratory failure and sudden cardiac death. I'll place on the operating room schedule sometime next couple of days.   Vein mapping is been performed and carotid ultrasound is reportedly negative for occlusive carotid disease

## 2019-04-29 NOTE — PROGRESS NOTES
Cardiology Daily Note Violeta Ford MD      Patient:  Vanessa Larson  [id number]    Patient Active Problem List    Diagnosis Date Noted    Unstable angina pectoris [facility])      Priority: High    Chest pain      Priority: Low    Type 2 diabetes mellitus without complication, without long-term current use of insulin ([facility] [address] 04/24/2019     Priority: Low    Mixed anxiety depressive disorder 04/24/2019     Priority: Low    Mixed hyperlipidemia 04/24/2019     Priority: Low    Coronary artery disease involving native coronary artery of native heart with unstable angina pectoris ([facility] [address] 04/24/2019     Priority: Low    Essential hypertension 03/14/2019     Priority: Low    Hx of bladder cancer 03/14/2019     Priority: Low    Chronic bronchitis ([facility] [address] 03/14/2019     Priority: Low       Admit Date:  4/24/2019    Admission Problem List: Present on Admission:   Coronary artery disease involving native coronary artery of native heart with unstable angina pectoris ([facility] [address]   Mixed hyperlipidemia   Essential hypertension   Type 2 diabetes mellitus without complication, without long-term current use of insulin (HCC)   Mixed anxiety depressive disorder   Hx of bladder cancer   Unstable angina pectoris ([facility] [address]   Chest pain      Cardiac Specific Data:  Specialty Problems        Cardiology Problems    Unstable angina pectoris [facility])        Essential hypertension        * (Principal) Coronary artery disease involving native coronary artery of native heart with unstable angina pectoris (HCC)              Subjective:  Mr. Larson seen today resting comfortably. Dr. Terry Happy to see today. Denies chest pain. No new complaints.     Objective:   BP (!) 184/86 Comment: manual bp reported to renny perez  Pulse 86   Temp 96.5 °F (35.8 °C) (Temporal)   Resp 16   Ht 5' 11\" (1.803 m)   Wt 213 lb 3.2 oz (96.7 kg)   SpO2 96%   BMI 29.74 kg/m²       Intake/Output Summary (Last 24 hours) at 4/29/2019 1034  Last data filed at 4/29/2019 3943  Gross per 24 hour   Intake 2570 ml   Output 1550 ml   Net 1020 ml       Prior to Admission medications    Medication Sig Start Date End Date Taking? Authorizing Provider   FLUoxetine (PROZAC) 20 MG capsule Take 1 capsule by mouth daily 4/24/19  Yes BRYAN Seymour   HYDROcodone-acetaminophen (NORCO)  MG per tablet Take 2 tablets by mouth every 4 hours as needed for Pain.    Yes Historical Provider, MD   ergocalciferol (ERGOCALCIFEROL) 21896 units capsule Take 1 capsule by mouth once a week 4/10/19  Yes BRYAN Seymour   busPIRone (BUSPAR) 10 MG tablet Take 1 tablet by mouth 3 times daily as needed (anxiety) 4/10/19  Yes BRYAN Seymour   fenofibrate 160 MG tablet Take 1 tablet by mouth daily 4/10/19  Yes BRYAN Seymour   atorvastatin (LIPITOR) 20 MG tablet Take 1 tablet by mouth daily 4/10/19  Yes BRYAN Seymour   metFORMIN (GLUCOPHAGE) 500 MG tablet Take 1 tablet by mouth 2 times daily (with meals) 4/10/19  Yes BRYAN Seymour   atenolol (TENORMIN) 50 MG tablet TAKE ONE TABLET EVERY DAY  Patient taking differently: TAKE ONE TABLET EVERY DAY AT LUNCH 4/10/19  Yes BRYAN Seymour   cloNIDine (CATAPRES) 0.1 MG tablet Take 1 tablet by mouth daily  Patient taking differently: Take 0.1 mg by mouth nightly  3/14/19  Yes BRYAN Seymour   tiZANidine (ZANAFLEX) 4 MG tablet TAKE 1 TABLET BY MOUTH EVERY 6 HOURS FOR MUSCLE PAIN 3/14/19  Yes BRYAN Seymour   amLODIPine (NORVASC) 5 MG tablet Take 1 tablet by mouth daily 3/14/19  Yes BRYAN Seymour   omeprazole (PRILOSEC) 40 MG delayed release capsule Take 1 capsule by mouth daily 3/14/19  Yes BRYAN Seymour        polyethylene glycol  17 g Oral Daily    cloNIDine  0.1 mg Oral TID    insulin glargine  25 Units Subcutaneous BID    ipratropium-albuterol  1 ampule Inhalation Q4H WA    sodium chloride flush  10 mL Intravenous 2 times per day    insulin lispro  0-18 Units Subcutaneous TID WC    insulin lispro  0-9 Units Subcutaneous Nightly    atorvastatin  80 mg Oral Daily    fenofibrate  160 mg Oral Daily    amLODIPine  5 mg Oral BID    pantoprazole  40 mg Oral QAM AC    aspirin  81 mg Oral Daily    enoxaparin  40 mg Subcutaneous Q24H    nicotine  1 patch Transdermal Daily       TELEMETRY: Sinus     Physical Exam:      Physical Exam      General:  Awake, alert, NAD  Skin:  Warm and dry  Neck:  no jvd , no carotid bruits  Chest:  Clear to auscultation, no wheezing or rales  Cardiovascular:  RRR [id number] no murmurs, clicks, gallups, or rubs  Abdomen:  Soft nontender, nondistended, bowel sounds present  Extremities:  Edema: none    Lab Data:  CBC: No results for input(s): WBC, HGB, HCT, MCV, PLT in the last 72 hours. BMP:   Recent Labs     04/27/19  0139 04/28/19  0216 04/29/19  0125   * 133* 133*   K 4.1 4.3 4.1   CL 96* 96* 95*   CO2 27 25 25   BUN 21* 17 16   CREATININE 0.9 1.0 0.8     LIVER PROFILE: No results for input(s): AST, ALT, LIPASE, BILIDIR, BILITOT, ALKPHOS in the last 72 hours. Invalid input(s): AMYLASE,  ALB  PT/INR: No results for input(s): PROTIME, INR in the last 72 hours. APTT: No results for input(s): APTT in the last 72 hours. BNP:  No results for input(s): BNP in the last 72 hours. CK, CKMB, Troponin: @LABRCNT (CKTOTAL:3, CKMB:3, TROPONINI:3)@    IMAGING:  Xr Chest Standard (2 Vw)    Result Date: 4/24/2019  EXAMINATION:  XR CHEST (2 VW)  4/24/2019 3:09 PM HISTORY: Chest pain shortness of breath COMPARISON: No comparison study. FINDINGS:  PA and lateral views of the chest were obtained. The lungs are clear and normally expanded. There are few scattered nodular densities in the left lung base, at least one is calcified, another of the nodules may a represent nipple shadow artifact. Repeat PA chest with nipple markers in place is recommended. Heart size is normal. No pneumothorax or pleural effusion is identified. The osseous structures are unremarkable.     No pulmonary consolidation or acute arteries. Impression: No acute intracranial findings. Sinus disease. Signed by Dr Flores Cardoso on 4/24/2019 5:54 PM    Ct Chest W Contrast    Result Date: 4/27/2019  CT CHEST W CONTRAST 4/27/2019 5:00 AM HISTORY: Left lower lobe pulmonary nodule COMPARISON: Chest radiograph dated 4/26/2019 and 4/24/2019 TECHNIQUE:  Radiation dose equals  mGy cm. AUTOMATED EXPOSURE CONTROL dose reduction technique was implemented. Thin section axial images were obtained with intravenous contrast. Multi projection reconstruction images were generated. FINDINGS: There are no measurable pulmonary nodules identified in the lungs. Particularly, in the left lower lobe in the region of the chest radiograph abnormality, no CT correlate is identified. There are linear opacities identified in the right lung base compatible with mild platelike atelectasis associated with the diaphragm position. There are no pleural effusions or pneumothoraces or parenchymal infiltrates otherwise. There are small mediastinal lymph nodes. There are no pathologically enlarged nodes. There are coronary artery calcifications. There are atherosclerotic aortic calcifications. There is fatty infiltration of the liver. Limited imaging of the upper abdomen demonstrate no acute abnormality. Bony structures are intact without acute osseous abnormalities. Degenerative spurring noted diffusely in the thoracic spine. 1. No measurable pulmonary nodules identified in the lungs. Minimal platelike atelectasis in the right lung base associated with diaphragm position. Lung fields are clear without acute cardiopulmonary process. 2. Atherosclerotic calcifications. 4. Hepatic steatosis. Signed by Dr Dior Alcazar on 4/27/2019 8:30 AM    Xr Chest 1 Vw    Result Date: 4/26/2019  XR CHEST 1 VIEW 4/26/2019 7:00 PM HISTORY: Repeat chest x-ray with nipple markers COMPARISON: 4/24/2019. FINDINGS: Frontal view of the chest was obtained. Nipple markers are present.  The nipple markers are separate from the nodular opacity seen on the previous study, but the nodule is not well visualized on this exam. The lungs are otherwise clear. The cardiomediastinal silhouette and pulmonary vascularity are unchanged. The osseous structures and surrounding soft tissues demonstrate no acute abnormality. 1. The previously seen nodule is not well visualized but is in a separate position than the nipple marker. Given the patient's age, consider further evaluation with CT to ensure no suspicious nodule is present. Signed by Dr Steve Huang on 4/26/2019 8:35 PM    Nm Myocardial Spect Rest Exercise Or Rx    Result Date: 4/26/2019  Lexiscan Nuclear Stress Test Report Procedure date: 04/25/2019 Indications: Chest pain Procedure: Stress was performed with injection of 0.4 mg Lexiscan. Vital signs and EKG were monitored. Technetium-99 sestamibi was injected in divided doses, approximately 10 mCi and 30 mCi respectively for rest and stress imaging. The patient was discharged in stable condition. Results: Patient had symptoms of dyspnea, dizziness, nausea, and stomach cramping during infusion that resolved in recovery. Baseline EKG showed normal sinus rhythm. During stress there were no significant EKG changes or rhythm changes. Baseline and peak blood pressures were 151/103, and 163/92 respectively. Baseline and peak heart rates were 87 and  99 respectively. Radioactive pharmaceuticals used: Rest images 10.67 mCi technetium 99m sestamibi Stress images 32.83 mCi technetium 99m sestamibi Review of rest and stress images obtained in a SPECT acquisition protocol low with review of the polar plot revealed: 1. Ejection fraction normal 54% 2. Wall motion study suggested mild inferoseptal hypokinesis 3.  Myocardial perfusion imaging demonstrated homogeneous uptake of the tracer in all visualized segments with the exception a few of the more distal short axis slices showed diminished uptake in the inferior wall which Forrest Fairchild MD(Interpreting  physician) on 04/29/2019 05:27 AM  ----------------------------------------------------------------  Velocities are measured in cm/s ; Diameters are measured in mm +--------------------------------------++--------+-----+----+--------+-----+ ! Superficial - Great Saphenous Vein    ! ! Right   ! ! Left!        !     ! +--------------------------------------++--------+-----+----+--------+-----+ ! Location                              ! !Diameter! Depth! !Diameter! Depth! +--------------------------------------++--------+-----+----+--------+-----+ ! GSV 2 cm Distal to Junction           ! !3.9     !     !    !5       !     ! +--------------------------------------++--------+-----+----+--------+-----+ ! GSV High Thigh                        ! !2.4     !     !    !2.8     !     ! +--------------------------------------++--------+-----+----+--------+-----+ ! GSV Mid Thigh                         !!2.2     !     !    !2.5     !     ! +--------------------------------------++--------+-----+----+--------+-----+ ! GSV Low Thigh                         ! !2.5     !     !    !2.4     !     ! +--------------------------------------++--------+-----+----+--------+-----+ ! GSV Knee                              !!2.4     !     !    !2.8     !     ! +--------------------------------------++--------+-----+----+--------+-----+ ! GSV High Calf                         !!1.8     !     !    !2.3     !     ! +--------------------------------------++--------+-----+----+--------+-----+ ! GSV Mid Calf                          !!2.5     !     !    !2.7     !     ! +--------------------------------------++--------+-----+----+--------+-----+ ! GSV Ankle                             !!2.8     !     !    !2.5     !     ! +--------------------------------------++--------+-----+----+--------+-----+        Assessment:  1. Recurrent bouts of chest tightness over the past few months suspicious for angina  2.  Tobacco abuse ongoing  3. History of bladder cancer  4. Chronic back pain and multiple back surgeries  5. Stress test today ejection fraction 54% questionable minimal inferior ischemia distally  6. I pretension  7. Hyperlipidemia  8. Diabetes type 2  9. Anxiety and depression  10. Hyperkalemia              Plan:  1.  Continue treatment cardiac status stable await CV surgery recommendations    Henri Taylor MD 4/29/2019 10:34 AM

## 2019-04-29 NOTE — PROGRESS NOTES
Hospitalist Progress Note  4/29/2019 10:45 AM  Subjective:   Admit Date: 4/24/2019  PCP: Gabriel Lucero MD    Chief Complaint: F/U CAD    Subjective: Resting in bed. Waiting for CT surgeon to come by. Cumulative Hospital Course:  Mr. Paulino Bernal is a 44 y.o. newly-diagnosed diabetic male who was referred to the ER by his PCP for evaluation of exertional chest pain and progressive shortness of breath.      Work-up in the ER included EKG that demonstrated NSR without acute changes. Cardiac enzymes were negative. He was admitted to the hospitalist service.      Nuclear stress test was completed on 04/25/2019, demonstrated inferior wall ischemia with calculated EF 54%. Therefore, he underwent cardiac catheterization via right radial approach by Dr. David rose. This demonstrated moderate to severe multivessel CAD with preserved LV systolic function. Review of Systems:   Constitutional: Denies fever or chills. Denies change in energy level or malaise. HEENT: Denies headaches or visual disturbances. Denies difficulty swallowing or sore throat. Respiratory: Denies cough or hoarseness. Denies shortness of breath. Cardiovascular: Denies chest pain or pressure. Denies palpitations. Denies presyncope/syncope. Denies orthopnea/PND. Denies lower extremity edema. Gastrointestinal: Denies abdominal pain. Denies nausea/vomiting. Denies change in bowel habits or history of recent GI tract blood loss. Genitourinary: Denies urinary urgency or frequency. Denies dysuria, hematuria. Musculoskeletal: Denies pain or swelling in joints. Neurological: Denies paresthesias. Denies headache. Denies seizure or stroke symptoms. Behavioral/Psych: Denies problems with anxiety or depression. All other ROS negative except where stated above.       DIET CARDIAC; No Caffeine    Intake/Output Summary (Last 24 hours) at 4/29/2019 1045  Last data filed at 4/29/2019 0939  Gross per 24 hour   Intake 2570 ml fenofibrate tablet 160 mg  160 mg Oral Daily Neetu Macias MD   160 mg at 04/29/19 0839    amLODIPine (NORVASC) tablet 5 mg  5 mg Oral BID Neetu Macias MD   5 mg at 04/29/19 0840    hydrALAZINE (APRESOLINE) injection 5 mg  5 mg Intravenous Q4H PRN Neetu Macias MD        busPIRone (BUSPAR) tablet 10 mg  10 mg Oral TID PRN Willem Fischer, DO        pantoprazole (PROTONIX) tablet 40 mg  40 mg Oral QAM AC Willem Blanchardo, DO   40 mg at 04/29/19 7404    magnesium hydroxide (MILK OF MAGNESIA) 400 MG/5ML suspension 30 mL  30 mL Oral Daily PRN Willem Fischer, DO   30 mL at 04/27/19 0307    aspirin chewable tablet 81 mg  81 mg Oral Daily Willem Nieveseo, DO   81 mg at 04/29/19 0840    enoxaparin (LOVENOX) injection 40 mg  40 mg Subcutaneous Q24H Willem Fischer, DO   40 mg at 04/28/19 1835    glucose (GLUTOSE) 40 % oral gel 15 g  15 g Oral PRN Willem Nieveseo, DO        dextrose 50 % solution 12.5 g  12.5 g Intravenous PRN Willem Fischer, DO        glucagon (rDNA) injection 1 mg  1 mg Intramuscular PRN Willem Fischer, DO        dextrose 5 % solution  100 mL/hr Intravenous PRN Emma Garcia DO        nicotine (NICODERM CQ) 14 MG/24HR 1 patch  1 patch Transdermal Daily Emma Garcia DO   1 patch at 04/28/19 2026        Labs:     BMP:   Recent Labs     04/27/19  0139 04/28/19  0216 04/29/19  0125   * 133* 133*   K 4.1 4.3 4.1   ANIONGAP 12 12 13   CL 96* 96* 95*   CO2 27 25 25   BUN 21* 17 16   CREATININE 0.9 1.0 0.8   GLUCOSE 229* 227* 201*   CALCIUM 8.7 8.7 8.8     FLP:    Lab Results   Component Value Date    TRIG 1,127 04/26/2019    HDL 27 04/26/2019    LDLCALC see below 04/26/2019    LDLDIRECT 109 04/26/2019       Radiology:  VL Vein Mapping Lower Bilateral   Final Result      CT CHEST W CONTRAST   Final Result   1. No measurable pulmonary nodules identified in the lungs. Minimal   platelike atelectasis in the right lung base associated with diaphragm   position. Lung fields are clear without acute cardiopulmonary process. 2. Atherosclerotic calcifications. 4. Hepatic steatosis. Signed by Dr Allen Hughes on 4/27/2019 8:30 AM      XR CHEST 1 VW   Final Result   1. The previously seen nodule is not well visualized but is in a   separate position than the nipple marker. Given the patient's age,   consider further evaluation with CT to ensure no suspicious nodule is   present. Signed by Dr Harsha Conteh on 4/26/2019 8:35 PM      NM MYOCARDIAL SPECT REST EXERCISE OR RX   Final Result   Summary impression:   Probably normal study normal ejection fraction 54% minimal distal   inferior wall ischemia cannot be entirely excluded more likely normal   correlate clinically   Signed by Dr Richar Valdes on 4/25/2019 4:00 PM      CT Head WO Contrast   Final Result   Impression:   No acute intracranial findings. Sinus disease. Signed by Dr Catracho Arenas on 4/24/2019 5:54 PM      XR CHEST STANDARD (2 VW)   Final Result   No pulmonary consolidation or acute findings. A few small   nodules in the left lung base, at least one is calcified. One of the   nodules may represent a nipple shadow artifact. Consider repeat PA   chest with nipple markers in place. Signed by Dr Denton Wolf.  Devika on 4/24/2019 3:12 PM          Objective:   Vitals: /70   Pulse 79   Temp 96 °F (35.6 °C) (Temporal)   Resp 18   Ht 5' 11\" (1.803 m)   Wt 213 lb 3.2 oz (96.7 kg)   SpO2 100%   BMI 29.74 kg/m²   24HR INTAKE/OUTPUT:      Intake/Output Summary (Last 24 hours) at 4/29/2019 1045  Last data filed at 4/29/2019 0947  Gross per 24 hour   Intake 2570 ml   Output 1550 ml   Net 1020 ml     General appearance: alert and cooperative with exam  HEENT: atraumatic, eyes with clear conjunctiva and normal lids, pupils and irises normal, external ears normal, lips normal.  Neck without masses, lympadenopathy, bruit, thyroid normal  Lungs: no increased work of breathing, clear to auscultation bilaterally  Heart: regular rate and rhythm, S1, S2 normal, no murmur, click, rub or gallop  Abdomen: soft, non-tender; bowel sounds normal; no masses,  no organomegaly  Extremities: extremities normal, atraumatic, no cyanosis or edema  Neurologic: No focal neurologic deficits, normal sensation, alert and oriented, affect and mood appropriate. Skin: no rashes, nodules. Assessment and Plan:     Principal Problem:    Coronary artery disease involving native coronary artery of native heart with unstable angina pectoris ([facility] [address] - Awaiting for CT surgery to evaluate    Active Problems:    Essential hypertension - fair Control    Type 2 diabetes mellitus without complication, without long-term current use of insulin (HCC) - on Lantus BID, Accu-checks with SSC    Hx of bladder cancer - S/P  Cystoprotatectomy with Ileal Conduit - Noted    Mixed anxiety depressive disorder - Noted    Mixed hyperlipidemia - On Statin        Advance Directive: Full Code    DVT prophylaxis: Lovenox  GI prophylaxis: Protonix  Antibiotics: N/A    Discharge planning: TBD. Await CT surgery to see.          Jose Alfredo Armenta, APRN

## 2019-04-30 ENCOUNTER — APPOINTMENT (OUTPATIENT)
Dept: GENERAL RADIOLOGY | Age: 49
DRG: 234 | End: 2019-04-30
Payer: MEDICAID

## 2019-04-30 ENCOUNTER — ANESTHESIA EVENT (OUTPATIENT)
Dept: OPERATING ROOM | Age: 49
DRG: 234 | End: 2019-04-30
Payer: MEDICAID

## 2019-04-30 LAB
ABO/RH: NORMAL
ALBUMIN SERPL-MCNC: 4 G/DL (ref 3.5–5.2)
ALP BLD-CCNC: 92 U/L (ref 40–130)
ALT SERPL-CCNC: 22 U/L (ref 5–41)
ANION GAP SERPL CALCULATED.3IONS-SCNC: 13 MMOL/L (ref 7–19)
ANION GAP SERPL CALCULATED.3IONS-SCNC: 18 MMOL/L (ref 7–19)
ANTIBODY SCREEN: NORMAL
AST SERPL-CCNC: 25 U/L (ref 5–40)
BASOPHILS ABSOLUTE: 0.1 K/UL (ref 0–0.2)
BASOPHILS RELATIVE PERCENT: 0.6 % (ref 0–1)
BILIRUB SERPL-MCNC: 0.5 MG/DL (ref 0.2–1.2)
BUN BLDV-MCNC: 15 MG/DL (ref 6–20)
BUN BLDV-MCNC: 15 MG/DL (ref 6–20)
CALCIUM SERPL-MCNC: 9.4 MG/DL (ref 8.6–10)
CALCIUM SERPL-MCNC: 9.6 MG/DL (ref 8.6–10)
CHLORIDE BLD-SCNC: 91 MMOL/L (ref 98–111)
CHLORIDE BLD-SCNC: 92 MMOL/L (ref 98–111)
CO2: 25 MMOL/L (ref 22–29)
CO2: 28 MMOL/L (ref 22–29)
CREAT SERPL-MCNC: 0.9 MG/DL (ref 0.5–1.2)
CREAT SERPL-MCNC: 0.9 MG/DL (ref 0.5–1.2)
EOSINOPHILS ABSOLUTE: 0.2 K/UL (ref 0–0.6)
EOSINOPHILS RELATIVE PERCENT: 2.8 % (ref 0–5)
FIBRINOGEN: 773 MG/DL (ref 238–505)
GFR NON-AFRICAN AMERICAN: >60
GFR NON-AFRICAN AMERICAN: >60
GLUCOSE BLD-MCNC: 162 MG/DL (ref 74–109)
GLUCOSE BLD-MCNC: 180 MG/DL (ref 70–99)
GLUCOSE BLD-MCNC: 181 MG/DL (ref 70–99)
GLUCOSE BLD-MCNC: 185 MG/DL (ref 70–99)
GLUCOSE BLD-MCNC: 194 MG/DL (ref 70–99)
GLUCOSE BLD-MCNC: 221 MG/DL (ref 74–109)
HBA1C MFR BLD: 8 % (ref 4–6)
HCT VFR BLD CALC: 36.9 % (ref 42–52)
HEMOGLOBIN: 12.6 G/DL (ref 14–18)
INR BLD: 1.02 (ref 0.88–1.18)
LYMPHOCYTES ABSOLUTE: 1.1 K/UL (ref 1.1–4.5)
LYMPHOCYTES RELATIVE PERCENT: 13.9 % (ref 20–40)
MAGNESIUM: 2.2 MG/DL (ref 1.6–2.6)
MCH RBC QN AUTO: 30.3 PG (ref 27–31)
MCHC RBC AUTO-ENTMCNC: 34.1 G/DL (ref 33–37)
MCV RBC AUTO: 88.7 FL (ref 80–94)
MONOCYTES ABSOLUTE: 1 K/UL (ref 0–0.9)
MONOCYTES RELATIVE PERCENT: 12.1 % (ref 0–10)
NEUTROPHILS ABSOLUTE: 5.7 K/UL (ref 1.5–7.5)
NEUTROPHILS RELATIVE PERCENT: 70.1 % (ref 50–65)
PDW BLD-RTO: 12.1 % (ref 11.5–14.5)
PERFORMED ON: ABNORMAL
PLATELET # BLD: 139 K/UL (ref 130–400)
PMV BLD AUTO: 10.5 FL (ref 9.4–12.4)
POTASSIUM REFLEX MAGNESIUM: 4.4 MMOL/L (ref 3.5–5)
POTASSIUM SERPL-SCNC: 4.2 MMOL/L (ref 3.5–5)
PROTHROMBIN TIME: 12.8 SEC (ref 12–14.6)
RBC # BLD: 4.16 M/UL (ref 4.7–6.1)
SODIUM BLD-SCNC: 132 MMOL/L (ref 136–145)
SODIUM BLD-SCNC: 135 MMOL/L (ref 136–145)
TOTAL PROTEIN: 7.6 G/DL (ref 6.6–8.7)
WBC # BLD: 8.1 K/UL (ref 4.8–10.8)

## 2019-04-30 PROCEDURE — 86901 BLOOD TYPING SEROLOGIC RH(D): CPT

## 2019-04-30 PROCEDURE — 86923 COMPATIBILITY TEST ELECTRIC: CPT

## 2019-04-30 PROCEDURE — 6370000000 HC RX 637 (ALT 250 FOR IP): Performed by: INTERNAL MEDICINE

## 2019-04-30 PROCEDURE — 6360000002 HC RX W HCPCS: Performed by: INTERNAL MEDICINE

## 2019-04-30 PROCEDURE — 6370000000 HC RX 637 (ALT 250 FOR IP): Performed by: HOSPITALIST

## 2019-04-30 PROCEDURE — 80053 COMPREHEN METABOLIC PANEL: CPT

## 2019-04-30 PROCEDURE — 2580000003 HC RX 258: Performed by: INTERNAL MEDICINE

## 2019-04-30 PROCEDURE — 86850 RBC ANTIBODY SCREEN: CPT

## 2019-04-30 PROCEDURE — 99232 SBSQ HOSP IP/OBS MODERATE 35: CPT | Performed by: INTERNAL MEDICINE

## 2019-04-30 PROCEDURE — 71046 X-RAY EXAM CHEST 2 VIEWS: CPT

## 2019-04-30 PROCEDURE — 82948 REAGENT STRIP/BLOOD GLUCOSE: CPT

## 2019-04-30 PROCEDURE — 36415 COLL VENOUS BLD VENIPUNCTURE: CPT

## 2019-04-30 PROCEDURE — 85384 FIBRINOGEN ACTIVITY: CPT

## 2019-04-30 PROCEDURE — 2580000003 HC RX 258: Performed by: THORACIC SURGERY (CARDIOTHORACIC VASCULAR SURGERY)

## 2019-04-30 PROCEDURE — 83036 HEMOGLOBIN GLYCOSYLATED A1C: CPT

## 2019-04-30 PROCEDURE — 85025 COMPLETE CBC W/AUTO DIFF WBC: CPT

## 2019-04-30 PROCEDURE — 6370000000 HC RX 637 (ALT 250 FOR IP): Performed by: THORACIC SURGERY (CARDIOTHORACIC VASCULAR SURGERY)

## 2019-04-30 PROCEDURE — 86900 BLOOD TYPING SEROLOGIC ABO: CPT

## 2019-04-30 PROCEDURE — 94375 RESPIRATORY FLOW VOLUME LOOP: CPT

## 2019-04-30 PROCEDURE — 94640 AIRWAY INHALATION TREATMENT: CPT

## 2019-04-30 PROCEDURE — 2140000000 HC CCU INTERMEDIATE R&B

## 2019-04-30 PROCEDURE — 83735 ASSAY OF MAGNESIUM: CPT

## 2019-04-30 PROCEDURE — 85610 PROTHROMBIN TIME: CPT

## 2019-04-30 PROCEDURE — 99231 SBSQ HOSP IP/OBS SF/LOW 25: CPT | Performed by: INTERNAL MEDICINE

## 2019-04-30 RX ORDER — SODIUM CHLORIDE 0.9 % (FLUSH) 0.9 %
10 SYRINGE (ML) INJECTION PRN
Status: DISCONTINUED | OUTPATIENT
Start: 2019-04-30 | End: 2019-05-01 | Stop reason: HOSPADM

## 2019-04-30 RX ORDER — CHLORHEXIDINE GLUCONATE 4 G/100ML
SOLUTION TOPICAL SEE ADMIN INSTRUCTIONS
Status: DISCONTINUED | OUTPATIENT
Start: 2019-04-30 | End: 2019-05-01 | Stop reason: HOSPADM

## 2019-04-30 RX ORDER — BISACODYL 10 MG
10 SUPPOSITORY, RECTAL RECTAL DAILY PRN
Status: DISCONTINUED | OUTPATIENT
Start: 2019-04-30 | End: 2019-05-01

## 2019-04-30 RX ORDER — CHLORHEXIDINE GLUCONATE 0.12 MG/ML
15 RINSE ORAL ONCE
Status: COMPLETED | OUTPATIENT
Start: 2019-05-01 | End: 2019-05-01

## 2019-04-30 RX ORDER — SODIUM CHLORIDE 0.9 % (FLUSH) 0.9 %
10 SYRINGE (ML) INJECTION EVERY 12 HOURS SCHEDULED
Status: DISCONTINUED | OUTPATIENT
Start: 2019-04-30 | End: 2019-05-01 | Stop reason: HOSPADM

## 2019-04-30 RX ADMIN — ATORVASTATIN CALCIUM 80 MG: 80 TABLET, FILM COATED ORAL at 08:40

## 2019-04-30 RX ADMIN — PANTOPRAZOLE SODIUM 40 MG: 40 TABLET, DELAYED RELEASE ORAL at 06:41

## 2019-04-30 RX ADMIN — Medication 10 ML: at 08:40

## 2019-04-30 RX ADMIN — IPRATROPIUM BROMIDE AND ALBUTEROL SULFATE 1 AMPULE: .5; 3 SOLUTION RESPIRATORY (INHALATION) at 07:22

## 2019-04-30 RX ADMIN — INSULIN GLARGINE 25 UNITS: 100 INJECTION, SOLUTION SUBCUTANEOUS at 22:29

## 2019-04-30 RX ADMIN — CLONIDINE HYDROCHLORIDE 0.1 MG: 0.1 TABLET ORAL at 16:16

## 2019-04-30 RX ADMIN — ASPIRIN 81 MG 81 MG: 81 TABLET ORAL at 08:40

## 2019-04-30 RX ADMIN — IPRATROPIUM BROMIDE AND ALBUTEROL SULFATE 1 AMPULE: .5; 3 SOLUTION RESPIRATORY (INHALATION) at 16:01

## 2019-04-30 RX ADMIN — INSULIN GLARGINE 25 UNITS: 100 INJECTION, SOLUTION SUBCUTANEOUS at 08:40

## 2019-04-30 RX ADMIN — HYDROCODONE BITARTRATE AND ACETAMINOPHEN 2 TABLET: 10; 325 TABLET ORAL at 04:33

## 2019-04-30 RX ADMIN — HYDROCODONE BITARTRATE AND ACETAMINOPHEN 2 TABLET: 10; 325 TABLET ORAL at 20:54

## 2019-04-30 RX ADMIN — HYDROCODONE BITARTRATE AND ACETAMINOPHEN 2 TABLET: 10; 325 TABLET ORAL at 00:31

## 2019-04-30 RX ADMIN — FENOFIBRATE 160 MG: 160 TABLET ORAL at 12:13

## 2019-04-30 RX ADMIN — NITROGLYCERIN 0.5 INCH: 20 OINTMENT TOPICAL at 16:44

## 2019-04-30 RX ADMIN — MUPIROCIN: 20 OINTMENT TOPICAL at 22:26

## 2019-04-30 RX ADMIN — HYDROCODONE BITARTRATE AND ACETAMINOPHEN 2 TABLET: 10; 325 TABLET ORAL at 08:54

## 2019-04-30 RX ADMIN — NITROGLYCERIN 0.5 INCH: 20 OINTMENT TOPICAL at 12:14

## 2019-04-30 RX ADMIN — POLYETHYLENE GLYCOL 3350 17 G: 17 POWDER, FOR SOLUTION ORAL at 08:40

## 2019-04-30 RX ADMIN — AMLODIPINE BESYLATE 5 MG: 5 TABLET ORAL at 20:54

## 2019-04-30 RX ADMIN — IPRATROPIUM BROMIDE AND ALBUTEROL SULFATE 1 AMPULE: .5; 3 SOLUTION RESPIRATORY (INHALATION) at 11:10

## 2019-04-30 RX ADMIN — INSULIN LISPRO 3 UNITS: 100 INJECTION, SOLUTION INTRAVENOUS; SUBCUTANEOUS at 08:41

## 2019-04-30 RX ADMIN — INSULIN LISPRO 3 UNITS: 100 INJECTION, SOLUTION INTRAVENOUS; SUBCUTANEOUS at 12:13

## 2019-04-30 RX ADMIN — Medication 10 MG: at 12:13

## 2019-04-30 RX ADMIN — HYDROCODONE BITARTRATE AND ACETAMINOPHEN 2 TABLET: 10; 325 TABLET ORAL at 16:44

## 2019-04-30 RX ADMIN — CLONIDINE HYDROCHLORIDE 0.1 MG: 0.1 TABLET ORAL at 08:40

## 2019-04-30 RX ADMIN — Medication 10 ML: at 22:28

## 2019-04-30 RX ADMIN — AMLODIPINE BESYLATE 5 MG: 5 TABLET ORAL at 08:40

## 2019-04-30 RX ADMIN — IPRATROPIUM BROMIDE AND ALBUTEROL SULFATE 1 AMPULE: .5; 3 SOLUTION RESPIRATORY (INHALATION) at 20:51

## 2019-04-30 RX ADMIN — HYDROCODONE BITARTRATE AND ACETAMINOPHEN 2 TABLET: 10; 325 TABLET ORAL at 12:43

## 2019-04-30 RX ADMIN — INSULIN LISPRO 3 UNITS: 100 INJECTION, SOLUTION INTRAVENOUS; SUBCUTANEOUS at 18:07

## 2019-04-30 RX ADMIN — INSULIN LISPRO 2 UNITS: 100 INJECTION, SOLUTION INTRAVENOUS; SUBCUTANEOUS at 22:29

## 2019-04-30 RX ADMIN — CLONIDINE HYDROCHLORIDE 0.1 MG: 0.1 TABLET ORAL at 20:54

## 2019-04-30 ASSESSMENT — PAIN SCALES - GENERAL
PAINLEVEL_OUTOF10: 10
PAINLEVEL_OUTOF10: 9
PAINLEVEL_OUTOF10: 8
PAINLEVEL_OUTOF10: 8
PAINLEVEL_OUTOF10: 10
PAINLEVEL_OUTOF10: 6

## 2019-04-30 NOTE — PROGRESS NOTES
mg  10 mg Oral TID PRN [place] Eulalio Amado, DO        pantoprazole (PROTONIX) tablet 40 mg  40 mg Oral QAM AC [place] Eulalio Amado, DO   40 mg at 04/30/19 0641    magnesium hydroxide (MILK OF MAGNESIA) 400 MG/5ML suspension 30 mL  30 mL Oral Daily PRN Marlane Cerise, DO   30 mL at 04/27/19 0438    aspirin chewable tablet 81 mg  81 mg Oral Daily [place] Eulalio Amado, DO   81 mg at 04/29/19 0840    enoxaparin (LOVENOX) injection 40 mg  40 mg Subcutaneous Q24H [place] Eulalio Amado, DO   40 mg at 04/29/19 1757    glucose (GLUTOSE) 40 % oral gel 15 g  15 g Oral PRN [facility] Amado, DO        dextrose 50 % solution 12.5 g  12.5 [facility]        glucagon (rDNA) injection 1 mg  1 mg Intramuscular PRN [place] Eulalio Amado, DO        dextrose 5 % solution  100 mL/hr Intravenous PRN Marlane Cerise, DO        nicotine (NICODERM CQ) 14 MG/24HR 1 patch  1 patch Transdermal Daily [place] Cerise, DO   1 patch at 04/29/19 2028         sodium chloride 1,000 mL (04/26/19 2201)    dextrose        polyethylene glycol  17 g Oral Daily    cloNIDine  0.1 mg Oral TID    insulin glargine  25 Units Subcutaneous BID    ipratropium-albuterol  1 ampule Inhalation Q4H [place]    sodium chloride flush  10 mL Intravenous 2 times per day    insulin lispro  0-18 Units Subcutaneous TID WC    insulin lispro  0-9 Units Subcutaneous Nightly    atorvastatin  80 mg Oral Daily    fenofibrate  160 mg Oral Daily    amLODIPine  5 mg Oral BID    pantoprazole  40 mg Oral QAM AC    aspirin  81 mg Oral Daily    enoxaparin  40 mg Subcutaneous Q24H    nicotine  1 patch Transdermal Daily     HYDROcodone-acetaminophen, sodium chloride flush, acetaminophen, ondansetron, hydrALAZINE, hydrALAZINE, busPIRone, magnesium hydroxide, glucose, dextrose, glucagon (rDNA), dextrose  DIET CARDIAC; No Caffeine       Labs:   No results for input(s): WBC, RBC, HGB, HCT, MCV, MCH, MCHC, PLT in the last 72 hours.   Recent Labs     04/28/19  0216 04/29/19  0125 04/30/19  0232   * 133* 132*   K 4.3 4.1 4.2   ANIONGAP 12 13 13   CL 96* 95* 91*   CO2 25 25 28   BUN 17 16 15   CREATININE 1.0 0.8 0.9   GLUCOSE 227* 201* 221*   CALCIUM 8.7 8.8 9.4     No results for input(s): MG, PHOS in the last 72 hours. No results for input(s): AST, ALT, ALB, BILITOT, ALKPHOS, ALB in the last 72 hours. HgBA1c:  No components found for: HGBA1C  FLP:    Lab Results   Component Value Date    TRIG 1,127 04/26/2019    HDL 27 04/26/2019    LDLCALC see below 04/26/2019    LDLDIRECT 109 04/26/2019     TSH:    Lab Results   Component Value Date    TSH 2.450 04/02/2019     Troponin T: No results for input(s): TROPONINI in the last 72 hours. Pro-BNP: No results for input(s): BNP in the last 72 hours. INR: No results for input(s): INR in the last 72 hours. ABGs: No results found for: PHART, PO2ART, KZB6PQH  UA:No results for input(s): NITRITE, COLORU, PHUR, LABCAST, WBCUA, RBCUA, MUCUS, TRICHOMONAS, YEAST, BACTERIA, CLARITYU, SPECGRAV, LEUKOCYTESUR, UROBILINOGEN, BILIRUBINUR, BLOODU, GLUCOSEU, AMORPHOUS in the last 72 hours. Invalid input(s): Claudy Canales      RAD:   Xr Chest Standard (2 Vw)    Result Date: 4/24/2019  EXAMINATION:  XR CHEST (2 VW)  4/24/2019 3:09 PM HISTORY: Chest pain shortness of breath COMPARISON: No comparison study. FINDINGS:  PA and lateral views of the chest were obtained. The lungs are clear and normally expanded. There are few scattered nodular densities in the left lung base, at least one is calcified, another of the nodules may a represent nipple shadow artifact. Repeat PA chest with nipple markers in place is recommended. Heart size is normal. No pneumothorax or pleural effusion is identified. The osseous structures are unremarkable. No pulmonary consolidation or acute findings. A few small nodules in the left lung base, at least one is calcified. One of the nodules may represent a nipple shadow artifact. Consider repeat PA chest with nipple markers in place. Signed by Dr Molina Torres. Devika on 4/24/2019 3:12 PM    Xr Cervical Spine (2-3 Views)    Result Date: 4/10/2019  EXAMINATION: XR CERVICAL SPINE (2-3 VIEWS) 4/10/2019 9:34 AM HISTORY: Neck pain 4 views cervical spine are obtained. Cervical vertebra are normally aligned. There is slight loss of height at C5-6 disc space level and loss of height of C6-7 disc space level. Mild uncinate spurring is noted at the C6-7 level. Minimal anterior hypertrophic degenerative changes noted inferior endplates of F7-L7 and C6. The odontoid process is intact. Calcification left carotid artery is noted. [place] the lungs appears clear as visualized. Impression degenerative spondylosis is noted in the cervical spine as described above Signed by Dr Shell Mcmanus on 4/10/2019 9:36 AM    Ct Head Wo Contrast    Result Date: 4/24/2019  EXAMINATION: CT HEAD WO CONTRAST 4/24/2019 5:48 PM HISTORY: Daily headaches, history of bladder cancer Comparison: CT head without contrast 2/17/2011 Technique: Sequential imaging was performed from the vertex through the base of the skull without the use of IV contrast. Sagittal and coronal reformations were made from the original source data and reviewed. Automated exposure control was utilized for radiation dose reduction. Radiation dose:  mGy-cm Findings: There is no evidence of acute intracranial hemorrhage, mass, or midline shift. There is no evidence of acute territorial infarct. The ventricles appear normal in configuration. Basilar cisterns are patent. The posterior fossa appears grossly normal. The calvarium appears intact. There is near complete opacification of the visualized left maxillary sinus. The visualized mastoid air cells appear clear. Calcifications are seen in the cavernous carotid arteries. Impression: No acute intracranial findings. Sinus disease.  Signed by Dr Elizabeth Pacheco on 4/24/2019 5:54 PM    Ct Chest W Contrast    Result Date: 4/27/2019  CT CHEST W CONTRAST 4/27/2019 5:00 AM vascularity are unchanged. The osseous structures and surrounding soft tissues demonstrate no acute abnormality. 1. The previously seen nodule is not well visualized but is in a separate position than the nipple marker. Given the patient's age, consider further evaluation with CT to ensure no suspicious nodule is present. Signed by Dr Yisel Mederos on 4/26/2019 8:35 PM    Nm Myocardial Spect Rest Exercise Or Rx    Result Date: 4/26/2019  Lexiscan Nuclear Stress Test Report Procedure date: 04/25/2019 Indications: Chest pain Procedure: Stress was performed with injection of 0.4 mg Lexiscan. Vital signs and EKG were monitored. Technetium-99 sestamibi was injected in divided doses, approximately 10 mCi and 30 mCi respectively for rest and stress imaging. The patient was discharged in stable condition. Results: Patient had symptoms of dyspnea, dizziness, nausea, and stomach cramping during infusion that resolved in recovery. Baseline EKG showed normal sinus rhythm. During stress there were no significant EKG changes or rhythm changes. Baseline and peak blood pressures were 151/103, and 163/92 respectively. Baseline and peak heart rates were 87 and  99 respectively. Radioactive pharmaceuticals used: Rest images 10.67 mCi technetium 99m sestamibi Stress images 32.83 mCi technetium 99m sestamibi Review of rest and stress images obtained in a SPECT acquisition protocol low with review of the polar plot revealed: 1. Ejection fraction normal 54% 2. Wall motion study suggested mild inferoseptal hypokinesis 3. Myocardial perfusion imaging demonstrated homogeneous uptake of the tracer in all visualized segments with the exception a few of the more distal short axis slices showed diminished uptake in the inferior wall which appeared to reverse on some of the slices. The sum stress score was 1.     Summary impression: Probably normal study normal ejection fraction 54% minimal distal inferior wall ischemia cannot be entirely excluded more likely normal correlate clinically Signed by Dr Filomena Lyon on 4/25/2019 4:00 PM    Us Carotid Artery Bilateral    Result Date: 4/18/2019  Vascular Carotid Procedure  Demographics   Patient Name    Mariano Bunch    Age                   44                  [place]   Patient Number  [id number]           Gender                Male   Visit Number    [id number]        Interpreting          Marcio Knutson MD                                   Physician   Date of Birth   1970       Referring Physician   Pratibha Oviedo   Accession       [id number]        [address] [place] [address] RV  Number  Procedure Type of Study:   Cerebral:Carotid, US CAROTID ARTERY BILATERAL [[id number]]. Indications for Study:Dizziness/Vertigo. Risk Factors   - The patient's risk factor(s) include: diabetes mellitus, dyslipidemia     and arterial hypertension.   - Current - Every day. Impression   There is no plaque visualized in the bilateral internal carotid  artery(ies). There is no stenosis in the right internal carotid artery. There is no stenosis of the left internal carotid artery. There is normal antegrade flow in the bilateral vertebral artery(ies). Signature   ----------------------------------------------------------------  Electronically signed by Marcio Knutson MD(Interpreting  physician) on 04/18/2019 11:12 AM  ----------------------------------------------------------------  Blood Pressure:Right arm 176/86 mmHg. Left arm 184/90 mmHg. Velocities are measured in cm/s ; Diameters are measured in mm Carotid Right Measurements +------------+-------+-------+--------+-------+------------+---------------+ ! Location    ! PSV    ! EDV    ! Angle   ! RI     !%Stenosis   ! Tortuosity     ! +------------+-------+-------+--------+-------+------------+---------------+ ! Prox CCA    !111    !18.2   ! 60      !0.84   !            !               ! +------------+-------+-------+--------+-------+------------+---------------+ ! Tricia CCA !85     !19.9   !60      !0.77   !            !               ! +------------+-------+-------+--------+-------+------------+---------------+ ! Prox ICA    !103    !27     !60      !0.74   !            !               ! +------------+-------+-------+--------+-------+------------+---------------+ ! Mid ICA     !101    !31.7   !60      !0.69   !            !               ! +------------+-------+-------+--------+-------+------------+---------------+ ! Dist ICA    !76.8   !28.1   ! 60      !0.63   !            !               ! +------------+-------+-------+--------+-------+------------+---------------+ ! Prox ECA    !173    !16.7   !60      !0.9    ! !               ! +------------+-------+-------+--------+-------+------------+---------------+ ! Vertebral   !47.1   !11.4   !60      !0.76   !            !               ! +------------+-------+-------+--------+-------+------------+---------------+   - There is antegrade vertebral flow noted on the right side. - Additional Measurements:ICAPSV/CCAPSV 0.93. ICAEDV/CCAEDV 1.74. Carotid Left Measurements +------------+-------+-------+--------+-------+------------+---------------+ ! Location    ! PSV    ! EDV    ! Angle   ! RI     !%Stenosis   ! Tortuosity     ! +------------+-------+-------+--------+-------+------------+---------------+ ! Prox CCA    !78.2   !15.7   !60      !0.8    ! !               ! +------------+-------+-------+--------+-------+------------+---------------+ ! Mid CCA     !107    !22.3   !60      !0.79   !            !               ! +------------+-------+-------+--------+-------+------------+---------------+ ! Prox ICA    !57.8   !14.9   !60      !0.74   !            !               ! +------------+-------+-------+--------+-------+------------+---------------+ ! Mid ICA     ! 96.7   !35.2   ! 60      !0.64   !            !               ! +------------+-------+-------+--------+-------+------------+---------------+ ! Dist ICA    ! 96.7   !38.1 auscultation bilaterally\" without rales, rhonchi or wheezes  Heart: regular rate and rhythm, S1, S2 normal, no murmur, click, rub or gallop  Abdomen: soft, non-tender; non-distended normal bowel sounds no masses, no organomegaly  Extremities: extremities normal, atraumatic, no cyanosis or edema  Neurologic: No focal neurologic deficits, normal sensation, alert and oriented, affect and mood appropriate. CN II-XII Intact  Skin: Skin color, texture, turgor normal. No rashes or lesions      Assessment/plan:         Principal Problem:    Coronary artery disease involving native coronary artery of native heart with unstable angina pectoris (HCC)  Active Problems:    Unstable angina pectoris (HCC)    Essential hypertension    Hx of bladder cancer    Type 2 diabetes mellitus without complication, without long-term current use of insulin (HCC)    Mixed anxiety depressive disorder    Mixed hyperlipidemia    Chest pain    CAD in native artery  Resolved Problems:    * No resolved hospital problems. *      Coronary artery disease  · Cardiology following  · CT Surgery on board, on account of  Severe multivessel coronary artery disease  · Plan for CABD (05/01/2019)  · Continue optimized medical management          Continue management of other chronic medical conditions - see above and orders. Advance Directive: Full Code    DIET CARDIAC; No Caffeine     DVT prophylaxis: Enoxaparin    Consults Made:   IP CONSULT TO CARDIOLOGY  IP CONSULT TO CARDIOTHORACIC SURGERY    Discharge planning: Plan for CABG in a.m.        Electronically signed by   Will Cerna MD, MPH,   Internal Medicine Hospitalist   4/30/2019 8:09 AM

## 2019-04-30 NOTE — PROGRESS NOTES
Attempted several times to do bedside spirometry, pt unable to do due to coughing. Pt just quit smoking 6 days ago, just coughed up large amt brown sputum. Will attempt bedside later this afternoon.  Doing breathing tx now

## 2019-04-30 NOTE — PROGRESS NOTES
Cardiology Daily Note Violeta Ford MD      Patient:  Vanessa Larson  [id number]    Patient Active Problem List    Diagnosis Date Noted    Unstable angina pectoris [facility])      Priority: High    CAD in native artery 04/29/2019     Priority: Low    Chest pain      Priority: Low    Type 2 diabetes mellitus without complication, without long-term current use of insulin ([facility] 04/24/2019     Priority: Low    Mixed anxiety depressive disorder 04/24/2019     Priority: Low    Mixed hyperlipidemia 04/24/2019     Priority: Low    Coronary artery disease involving native coronary artery of native heart with unstable angina pectoris ([address] 04/24/2019     Priority: Low    Essential hypertension 03/14/2019     Priority: Low    Hx of bladder cancer 03/14/2019     Priority: Low    Chronic bronchitis ([facility] [address] 03/14/2019     Priority: Low       Admit Date:  4/24/2019    Admission Problem List: Present on Admission:   Coronary artery disease involving native coronary artery of native heart with unstable angina pectoris ([facility] [address] [facility]   Mixed hyperlipidemia   Essential hypertension   Type 2 diabetes mellitus without complication, without long-term current use of insulin (HCC)   Mixed anxiety depressive disorder   Hx of bladder cancer   Unstable angina pectoris ([facility] [address] [facility]   Chest pain   CAD in native artery      Cardiac Specific Data:  Specialty Problems        Cardiology Problems    Unstable angina pectoris [facility])        Essential hypertension        * (Principal) Coronary artery disease involving native coronary artery of native heart with unstable angina pectoris ([address]        CAD in native artery              Subjective:  Mr. Larson seen today as had a little bit of chest pain off-and-on will add some Nitropaste. No other complaints. Dr. Millie Blackwell note reviewed plan for CABG tomorrow morning.     Objective:   /76   Pulse 70   Temp 97.2 °F (36.2 °C) (Temporal)   Resp 18   Ht 5' 11\" (1.803 m)   Wt 210 lb 12.8 oz (95.6 kg) Inhalation Q4H WA    sodium chloride flush  10 mL Intravenous 2 times per day    insulin lispro  0-18 Units Subcutaneous TID WC    insulin lispro  0-9 Units Subcutaneous Nightly    atorvastatin  80 mg Oral Daily    fenofibrate  160 mg Oral Daily    amLODIPine  5 mg Oral BID    pantoprazole  40 mg Oral QAM AC    aspirin  81 mg Oral Daily    enoxaparin  40 mg Subcutaneous Q24H    nicotine  1 patch Transdermal Daily       TELEMETRY: Sinus     Physical Exam:      Physical Exam      General:  Awake, alert, NAD  Skin:  Warm and dry  Neck:  no jvd , no carotid bruits  Chest:  Clear to auscultation, no wheezing or rales  Cardiovascular:  RRR Y0T7 no murmurs, clicks, gallups, or rubs  Abdomen:  Soft nontender, nondistended, bowel sounds present  Extremities:  Edema: none     Lab Data:  CBC: No results for input(s): WBC, HGB, HCT, MCV, PLT in the last 72 hours. BMP:   Recent Labs     04/28/19  0216 04/29/19  0125 04/30/19  0232   * 133* 132*   K 4.3 4.1 4.2   CL 96* 95* 91*   CO2 25 25 28   BUN 17 16 15   CREATININE 1.0 0.8 0.9     LIVER PROFILE: No results for input(s): AST, ALT, LIPASE, BILIDIR, BILITOT, ALKPHOS in the last 72 hours. Invalid input(s): AMYLASE,  ALB  PT/INR: No results for input(s): PROTIME, INR in the last 72 hours. APTT: No results for input(s): APTT in the last 72 hours. BNP:  No results for input(s): BNP in the last 72 hours. CK, CKMB, Troponin: @LABRCNT (CKTOTAL:3, CKMB:3, TROPONINI:3)@    IMAGING:  Xr Chest Standard (2 Vw)    Result Date: 4/24/2019  EXAMINATION:  XR CHEST (2 VW)  4/24/2019 3:09 PM HISTORY: Chest pain shortness of breath COMPARISON: No comparison study. FINDINGS:  PA and lateral views of the chest were obtained. The lungs are clear and normally expanded. There are few scattered nodular densities in the left lung base, at least one is calcified, another of the nodules may a represent nipple shadow artifact.  Repeat PA chest with nipple markers in place is recommended. Heart size is normal. No pneumothorax or pleural effusion is identified. The osseous structures are unremarkable. No pulmonary consolidation or acute findings. A few small nodules in the left lung base, at least one is calcified. One of the nodules may represent a nipple shadow artifact. Consider repeat PA chest with nipple markers in place. Signed by Dr Ginny Castillo. Devika on 4/24/2019 3:12 PM    Xr Cervical Spine (2-3 Views)    Result Date: 4/10/2019  EXAMINATION: XR CERVICAL SPINE (2-3 VIEWS) 4/10/2019 9:34 AM HISTORY: Neck pain 4 views cervical spine are obtained. Cervical vertebra are normally aligned. There is slight loss of height at C5-6 disc space level and loss of height of C6-7 disc space level. Mild uncinate spurring is noted at the C6-7 level. Minimal anterior hypertrophic degenerative changes noted inferior endplates of A1-C1 and C6. The odontoid process is intact. Calcification left carotid artery is noted. [place] the lungs appears clear as visualized. Impression degenerative spondylosis is noted in the cervical spine as described above Signed by Dr Tammy Freitas on 4/10/2019 9:36 AM    Ct Head Wo Contrast    Result Date: 4/24/2019  EXAMINATION: CT HEAD WO CONTRAST 4/24/2019 5:48 PM HISTORY: Daily headaches, history of bladder cancer Comparison: CT head without contrast 2/17/2011 Technique: Sequential imaging was performed from the vertex through the base of the skull without the use of IV contrast. Sagittal and coronal reformations were made from the original source data and reviewed. Automated exposure control was utilized for radiation dose reduction. Radiation dose:  mGy-cm Findings: There is no evidence of acute intracranial hemorrhage, mass, or midline shift. There is no evidence of acute territorial infarct. The ventricles appear normal in configuration. Basilar cisterns are patent. The posterior fossa appears grossly normal. The calvarium appears intact.  There is near complete opacification of the visualized left maxillary sinus. The visualized mastoid air cells appear clear. Calcifications are seen in the cavernous carotid arteries. Impression: No acute intracranial findings. Sinus disease. Signed by Dr Estella Allen on 4/24/2019 5:54 PM    Ct Chest W Contrast    Result Date: 4/27/2019  CT CHEST W CONTRAST 4/27/2019 5:00 AM HISTORY: Left lower lobe pulmonary nodule COMPARISON: Chest radiograph dated 4/26/2019 and 4/24/2019 TECHNIQUE:  Radiation dose equals  mGy cm. AUTOMATED EXPOSURE CONTROL dose reduction technique was implemented. Thin section axial images were obtained with intravenous contrast. Multi projection reconstruction images were generated. FINDINGS: There are no measurable pulmonary nodules identified in the lungs. Particularly, in the left lower lobe in the region of the chest radiograph abnormality, no CT correlate is identified. There are linear opacities identified in the right lung base compatible with mild platelike atelectasis associated with the diaphragm position. There are no pleural effusions or pneumothoraces or parenchymal infiltrates otherwise. There are small mediastinal lymph nodes. There are no pathologically enlarged nodes. There are coronary artery calcifications. There are atherosclerotic aortic calcifications. There is fatty infiltration of the liver. Limited imaging of the upper abdomen demonstrate no acute abnormality. Bony structures are intact without acute osseous abnormalities. Degenerative spurring noted diffusely in the thoracic spine. 1. No measurable pulmonary nodules identified in the lungs. Minimal platelike atelectasis in the right lung base associated with diaphragm position. Lung fields are clear without acute cardiopulmonary process. 2. Atherosclerotic calcifications. 4. Hepatic steatosis.  Signed by Dr Marilyn Marrero on 4/27/2019 8:30 AM    Xr Chest 1 Vw    Result Date: 4/26/2019  XR CHEST 1 VIEW 4/26/2019 7:00 PM HISTORY: Repeat chest x-ray with nipple markers COMPARISON: 4/24/2019. FINDINGS: Frontal view of the chest was obtained. Nipple markers are present. The nipple markers are separate from the nodular opacity seen on the previous study, but the nodule is not well visualized on this exam. The lungs are otherwise clear. The cardiomediastinal silhouette and pulmonary vascularity are unchanged. The osseous structures and surrounding soft tissues demonstrate no acute abnormality. 1. The previously seen nodule is not well visualized but is in a separate position than the nipple marker. Given the patient's age, consider further evaluation with CT to ensure no suspicious nodule is present. Signed by Dr Steve Huang on 4/26/2019 8:35 PM    Nm Myocardial Spect Rest Exercise Or Rx    Result Date: 4/26/2019  Lexiscan Nuclear Stress Test Report Procedure date: 04/25/2019 Indications: Chest pain Procedure: Stress was performed with injection of 0.4 mg Lexiscan. Vital signs and EKG were monitored. Technetium-99 sestamibi was injected in divided doses, approximately 10 mCi and 30 mCi respectively for rest and stress imaging. The patient was discharged in stable condition. Results: Patient had symptoms of dyspnea, dizziness, nausea, and stomach cramping during infusion that resolved in recovery. Baseline EKG showed normal sinus rhythm. During stress there were no significant EKG changes or rhythm changes. Baseline and peak blood pressures were 151/103, and 163/92 respectively. Baseline and peak heart rates were 87 and  99 respectively. Radioactive pharmaceuticals used: Rest images 10.67 mCi technetium 99m sestamibi Stress images 32.83 mCi technetium 99m sestamibi Review of rest and stress images obtained in a SPECT acquisition protocol low with review of the polar plot revealed: 1. Ejection fraction normal 54% 2. Wall motion study suggested mild inferoseptal hypokinesis 3.  Myocardial perfusion imaging demonstrated homogeneous uptake of the tracer in all visualized segments with the exception a few of the more distal short axis slices showed diminished uptake in the inferior wall which appeared to reverse on some of the slices. The sum stress score was 1. Summary impression: Probably normal study normal ejection fraction 54% minimal distal inferior wall ischemia cannot be entirely excluded more likely normal correlate clinically Signed by Dr Tino Abrams on 4/25/2019 4:00 PM    Us Carotid Artery Bilateral    Result Date: 4/18/2019  Vascular Carotid Procedure  Demographics   Patient Name    Kassandra Godinez    Age                   44                  [place]   Patient Number  [id number]           Gender                Male   Visit Number    [id number]        Interpreting          Forrest Fairchild MD                                   Physician   Date of Birth   1970       Referring Physician   Caroline Moore   Accession       [id number]        [address] [place] [address] RVT  Number  Procedure Type of Study:   Cerebral:Carotid, US CAROTID ARTERY BILATERAL [[id number]]. Indications for Study:Dizziness/Vertigo. Risk Factors   - The patient's risk factor(s) include: diabetes mellitus, dyslipidemia     and arterial hypertension.   - Current - Every day. Impression   There is no plaque visualized in the bilateral internal carotid  artery(ies). There is no stenosis in the right internal carotid artery. There is no stenosis of the left internal carotid artery. There is normal antegrade flow in the bilateral vertebral artery(ies). Signature   ----------------------------------------------------------------  Electronically signed by Forrest Fairchild MD(Interpreting  physician) on 04/18/2019 11:12 AM  ----------------------------------------------------------------  Blood Pressure:Right arm 176/86 mmHg. Left arm 184/90 mmHg.  Velocities are measured in cm/s ; Diameters are measured in mm Carotid Right Measurements +------------+-------+-------+--------+-------+------------+---------------+ ! Prox ICA    !57.8   !14.9   !60      !0.74   !            !               ! +------------+-------+-------+--------+-------+------------+---------------+ ! Mid ICA     ! 96.7   !35.2   ! 60      !0.64   !            !               ! +------------+-------+-------+--------+-------+------------+---------------+ ! Dist ICA    ! 96.7   !38.1   ! 60      !0.61   !            !               ! +------------+-------+-------+--------+-------+------------+---------------+ ! Prox ECA    !136    !14.1   ! 60      !0.9    ! !               ! +------------+-------+-------+--------+-------+------------+---------------+ ! Vertebral   !50.7   !16.5   !60      !0.67   !            !               ! +------------+-------+-------+--------+-------+------------+---------------+   - There is antegrade vertebral flow noted on the left side. - Additional Measurements:ICAPSV/CCAPSV 1.24. ICAEDV/CCAEDV 2.43. Vl Vein Mapping Lower Bilateral    Result Date: 4/29/2019  Vascular Lower Extremity Vein Mapping Procedure  Demographics   Patient Name   Mary Garrett   Age                 44                 [place]   Patient Number [id number]          Gender              Male   Visit Number   [id number]       Interpreting        Carlos Eduardo Combs MD                                 Physician   Date of Birth  1970      Referring Physician Jeff Pandey   Accession      [id number]       [address] [place] [address]  Number  Procedure Type of Study:   Veins:Lower Extremity Vein Mapping, [id number], VEIN MAPPING LOWER BILATERAL . Indications for Study:Pre-op CABG. Risk Factors   - The patient's last creatinine was 1 mg/dl. Allergies   - Demerol.   - Natural rubber and latex. Impression   Usable greater Saphenous vein conduit in both lower extremities. The veins have been marked on the skin.   Sizes are noted above. NOTE: veins are small in diameter. Signature   ----------------------------------------------------------------  Electronically signed by Anna Gonzalez  physician) on 04/29/2019 05:27 AM  ----------------------------------------------------------------  Velocities are measured in cm/s ; Diameters are measured in mm +--------------------------------------++--------+-----+----+--------+-----+ ! Superficial - Great Saphenous Vein    ! ! Right   ! ! Left!        !     ! +--------------------------------------++--------+-----+----+--------+-----+ ! Location                              ! !Diameter! Depth! !Diameter! Depth! +--------------------------------------++--------+-----+----+--------+-----+ ! GSV 2 cm Distal to Junction           ! !3.9     !     !    !5       !     ! +--------------------------------------++--------+-----+----+--------+-----+ ! GSV High Thigh                        ! !2.4     !     !    !2.8     !     ! +--------------------------------------++--------+-----+----+--------+-----+ ! GSV Mid Thigh                         !!2.2     !     !    !2.5     !     ! +--------------------------------------++--------+-----+----+--------+-----+ ! GSV Low Thigh                         ! !2.5     !     !    !2.4     !     ! +--------------------------------------++--------+-----+----+--------+-----+ ! GSV Knee                              !!2.4     !     !    !2.8     !     ! +--------------------------------------++--------+-----+----+--------+-----+ ! GSV High Calf                         !!1.8     !     !    !2.3     !     ! +--------------------------------------++--------+-----+----+--------+-----+ ! GSV Mid Calf                          !!2.5     !     !    !2.7     !     ! +--------------------------------------++--------+-----+----+--------+-----+ ! GSV Ankle                             !!2.8     !     !    !2.5     !     ! +--------------------------------------++--------+-----+----+--------+-----+        Assessment:  1. Recurrent bouts of chest tightness over the past few months suspicious for angina  2. Tobacco abuse ongoing  3. History of bladder cancer  4. Chronic back pain and multiple back surgeries  5. Stress test today ejection fraction 54% questionable minimal inferior ischemia distally  6. I pretension  7. Hyperlipidemia  8. Diabetes type 2  9. Anxiety and depression  10. Hyperkalemia           Plan:  1. Continue treatment will add Nitropaste  2. CABG planned for tomorrow rex Lyon MD 4/30/2019 9:24 AM

## 2019-05-01 ENCOUNTER — APPOINTMENT (OUTPATIENT)
Dept: GENERAL RADIOLOGY | Age: 49
DRG: 234 | End: 2019-05-01
Payer: MEDICAID

## 2019-05-01 ENCOUNTER — OUTSIDE FACILITY SERVICE (OUTPATIENT)
Dept: PULMONOLOGY | Facility: CLINIC | Age: 49
End: 2019-05-01

## 2019-05-01 ENCOUNTER — ANESTHESIA (OUTPATIENT)
Dept: OPERATING ROOM | Age: 49
DRG: 234 | End: 2019-05-01
Payer: MEDICAID

## 2019-05-01 VITALS
TEMPERATURE: 98.6 F | RESPIRATION RATE: 1 BRPM | DIASTOLIC BLOOD PRESSURE: 50 MMHG | OXYGEN SATURATION: 100 % | SYSTOLIC BLOOD PRESSURE: 85 MMHG

## 2019-05-01 LAB
ANION GAP SERPL CALCULATED.3IONS-SCNC: 10 MMOL/L (ref 7–19)
ANION GAP SERPL CALCULATED.3IONS-SCNC: 11 MMOL/L (ref 7–19)
APTT: 30.3 SEC (ref 26–36.2)
BASE EXCESS ARTERIAL: -2 MMOL/L (ref -2–2)
BASE EXCESS ARTERIAL: -2.5 MMOL/L (ref -2–2)
BASE EXCESS ARTERIAL: 1 (ref -3–3)
BASE EXCESS ARTERIAL: 1.3 MMOL/L (ref -2–2)
BASE EXCESS ARTERIAL: 3 (ref -3–3)
BASE EXCESS ARTERIAL: 3 (ref -3–3)
BASE EXCESS ARTERIAL: 5 (ref -3–3)
BASE EXCESS ARTERIAL: 6 (ref -3–3)
BASE EXCESS ARTERIAL: 7 (ref -3–3)
BILIRUBIN URINE: NEGATIVE
BLOOD, URINE: ABNORMAL
BUN BLDV-MCNC: 15 MG/DL (ref 6–20)
BUN BLDV-MCNC: 16 MG/DL (ref 6–20)
CALCIUM IONIZED: 1.01 MMOL/L (ref 1.1–1.3)
CALCIUM IONIZED: 1.03 MMOL/L (ref 1.1–1.3)
CALCIUM IONIZED: 1.05 MMOL/L (ref 1.1–1.3)
CALCIUM IONIZED: 1.09 MMOL/L (ref 1.1–1.3)
CALCIUM IONIZED: 1.1 MMOL/L (ref 1.1–1.3)
CALCIUM IONIZED: 1.15 MMOL/L (ref 1.1–1.3)
CALCIUM SERPL-MCNC: 7.5 MG/DL (ref 8.6–10)
CALCIUM SERPL-MCNC: 9.4 MG/DL (ref 8.6–10)
CARBOXYHEMOGLOBIN ARTERIAL: 0.9 % (ref 0–5)
CARBOXYHEMOGLOBIN ARTERIAL: 1 % (ref 0–5)
CARBOXYHEMOGLOBIN ARTERIAL: 1.3 % (ref 0–5)
CHLORIDE BLD-SCNC: 103 MMOL/L (ref 98–111)
CHLORIDE BLD-SCNC: 95 MMOL/L (ref 98–111)
CLARITY: ABNORMAL
CO2: 24 MMOL/L (ref 22–29)
CO2: 26 MEQ/L (ref 21–32)
CO2: 27 MEQ/L (ref 21–32)
CO2: 27 MMOL/L (ref 22–29)
CO2: 28 MEQ/L (ref 21–32)
CO2: 30 MEQ/L (ref 21–32)
CO2: 31 MEQ/L (ref 21–32)
CO2: 33 MEQ/L (ref 21–32)
COLOR: YELLOW
CREAT SERPL-MCNC: 1 MG/DL (ref 0.5–1.2)
CREAT SERPL-MCNC: 1 MG/DL (ref 0.5–1.2)
GFR NON-AFRICAN AMERICAN: >60
GLUCOSE BLD-MCNC: 104 MG/DL (ref 70–99)
GLUCOSE BLD-MCNC: 107 MG/DL (ref 70–99)
GLUCOSE BLD-MCNC: 107 MG/DL (ref 70–99)
GLUCOSE BLD-MCNC: 109 MG/DL (ref 70–99)
GLUCOSE BLD-MCNC: 109 MG/DL (ref 70–99)
GLUCOSE BLD-MCNC: 113 MG/DL (ref 70–99)
GLUCOSE BLD-MCNC: 120 MG/DL (ref 74–109)
GLUCOSE BLD-MCNC: 126 MG/DL (ref 70–99)
GLUCOSE BLD-MCNC: 127 MG/DL (ref 70–99)
GLUCOSE BLD-MCNC: 148 MG/DL (ref 70–99)
GLUCOSE BLD-MCNC: 162 MG/DL (ref 70–99)
GLUCOSE BLD-MCNC: 172 MG/DL (ref 70–99)
GLUCOSE BLD-MCNC: 174 MG/DL (ref 70–99)
GLUCOSE BLD-MCNC: 177 MG/DL (ref 70–99)
GLUCOSE BLD-MCNC: 180 MG/DL (ref 74–109)
GLUCOSE BLD-MCNC: 51 MG/DL (ref 70–99)
GLUCOSE BLD-MCNC: 76 MG/DL (ref 70–99)
GLUCOSE BLD-MCNC: 95 MG/DL (ref 70–99)
GLUCOSE URINE: NEGATIVE MG/DL
HCO3 ARTERIAL: 21.6 MMOL/L (ref 22–26)
HCO3 ARTERIAL: 22.2 MMOL/L (ref 22–26)
HCO3 ARTERIAL: 24.9 MMOL/L (ref 22–26)
HCT VFR BLD CALC: 31.2 % (ref 42–52)
HCT VFR BLD CALC: 31.7 % (ref 42–52)
HEMOGLOBIN, ART, EXTENDED: 10.3 G/DL (ref 14–18)
HEMOGLOBIN, ART, EXTENDED: 10.5 G/DL (ref 14–18)
HEMOGLOBIN, ART, EXTENDED: 10.9 G/DL (ref 14–18)
HEMOGLOBIN: 10.1 GM/DL (ref 12–18)
HEMOGLOBIN: 10.5 GM/DL (ref 12–18)
HEMOGLOBIN: 10.6 G/DL (ref 14–18)
HEMOGLOBIN: 10.6 G/DL (ref 14–18)
HEMOGLOBIN: 11.4 GM/DL (ref 12–18)
HEMOGLOBIN: 11.6 GM/DL (ref 12–18)
HEMOGLOBIN: 9.3 GM/DL (ref 12–18)
HEMOGLOBIN: 9.9 GM/DL (ref 12–18)
INR BLD: 1.34 (ref 0.88–1.18)
KETONES, URINE: NEGATIVE MG/DL
LEUKOCYTE ESTERASE, URINE: NEGATIVE
MAGNESIUM: 2 MG/DL (ref 1.6–2.6)
MCH RBC QN AUTO: 30.3 PG (ref 27–31)
MCH RBC QN AUTO: 30.5 PG (ref 27–31)
MCHC RBC AUTO-ENTMCNC: 33.4 G/DL (ref 33–37)
MCHC RBC AUTO-ENTMCNC: 34 G/DL (ref 33–37)
MCV RBC AUTO: 89.7 FL (ref 80–94)
MCV RBC AUTO: 90.6 FL (ref 80–94)
METHEMOGLOBIN ARTERIAL: 0.7 %
METHEMOGLOBIN ARTERIAL: 0.8 %
METHEMOGLOBIN ARTERIAL: 1 %
NITRITE, URINE: NEGATIVE
O2 CONTENT ARTERIAL: 13.9 ML/DL
O2 CONTENT ARTERIAL: 14.4 ML/DL
O2 CONTENT ARTERIAL: 15.3 ML/DL
O2 SAT, ARTERIAL: 100 % (ref 93–100)
O2 SAT, ARTERIAL: 95.1 %
O2 SAT, ARTERIAL: 96.1 %
O2 SAT, ARTERIAL: 96.4 %
O2 SAT, ARTERIAL: 99 % (ref 93–100)
O2 THERAPY: ABNORMAL
PCO2 ARTERIAL: 34 MMHG (ref 35–45)
PCO2 ARTERIAL: 35 MMHG (ref 35–45)
PCO2 ARTERIAL: 35 MMHG (ref 35–45)
PCO2 ARTERIAL: 38 MM HG (ref 35–48)
PCO2 ARTERIAL: 45 MM HG (ref 35–48)
PCO2 ARTERIAL: 46 MM HG (ref 35–48)
PCO2 ARTERIAL: 51 MM HG (ref 35–48)
PDW BLD-RTO: 12 % (ref 11.5–14.5)
PDW BLD-RTO: 12.2 % (ref 11.5–14.5)
PERFORMED ON: ABNORMAL
PERFORMED ON: NORMAL
PH ARTERIAL: 7.37 (ref 7.3–7.5)
PH ARTERIAL: 7.41 (ref 7.35–7.45)
PH ARTERIAL: 7.41 (ref 7.35–7.45)
PH ARTERIAL: 7.41 (ref 7.3–7.5)
PH ARTERIAL: 7.42 (ref 7.3–7.5)
PH ARTERIAL: 7.42 (ref 7.3–7.5)
PH ARTERIAL: 7.44 (ref 7.3–7.5)
PH ARTERIAL: 7.46 (ref 7.35–7.45)
PH ARTERIAL: 7.46 (ref 7.3–7.5)
PH UA: 6.5 (ref 5–8)
PLATELET # BLD: 127 K/UL (ref 130–400)
PLATELET # BLD: 172 K/UL (ref 130–400)
PMV BLD AUTO: 10.7 FL (ref 9.4–12.4)
PMV BLD AUTO: 10.8 FL (ref 9.4–12.4)
PO2 ARTERIAL: 109 MMHG (ref 80–100)
PO2 ARTERIAL: 160 MM HG (ref 83–108)
PO2 ARTERIAL: 222 MMHG (ref 80–100)
PO2 ARTERIAL: 349 MM HG (ref 83–108)
PO2 ARTERIAL: 385 MM HG (ref 83–108)
PO2 ARTERIAL: 478 MM HG (ref 83–108)
PO2 ARTERIAL: 489 MM HG (ref 83–108)
PO2 ARTERIAL: 551 MM HG (ref 83–108)
PO2 ARTERIAL: 80 MMHG (ref 80–100)
POC ANION GAP: 10
POC ANION GAP: 11
POC ANION GAP: 12
POC CHLORIDE: 102 MEQ/L (ref 99–110)
POC CHLORIDE: 95 MEQ/L (ref 99–110)
POC CHLORIDE: 95 MEQ/L (ref 99–110)
POC CHLORIDE: 96 MEQ/L (ref 99–110)
POC CHLORIDE: 98 MEQ/L (ref 99–110)
POC CHLORIDE: 99 MEQ/L (ref 99–110)
POC CREATININE: 0.9 MG/DL (ref 0.3–1.3)
POC CREATININE: 0.9 MG/DL (ref 0.3–1.3)
POC CREATININE: 1 MG/DL (ref 0.3–1.3)
POC CREATININE: 1.1 MG/DL (ref 0.3–1.3)
POC HEMATOCRIT: 27 % (ref 37–52)
POC HEMATOCRIT: 29 % (ref 37–52)
POC HEMATOCRIT: 30 % (ref 37–52)
POC HEMATOCRIT: 31 % (ref 37–52)
POC HEMATOCRIT: 34 % (ref 37–52)
POC HEMATOCRIT: 34 % (ref 37–52)
POC POTASSIUM: 3.7 MEQ/L (ref 3.5–5.1)
POC POTASSIUM: 3.9 MEQ/L (ref 3.5–5.1)
POC POTASSIUM: 3.9 MEQ/L (ref 3.5–5.1)
POC POTASSIUM: 4 MEQ/L (ref 3.5–5.1)
POC POTASSIUM: 4.1 MEQ/L (ref 3.5–5.1)
POC POTASSIUM: 4.5 MEQ/L (ref 3.5–5.1)
POC SAMPLE TYPE: ABNORMAL
POC SODIUM: 135 MEQ/L (ref 136–145)
POC SODIUM: 137 MEQ/L (ref 136–145)
POC SODIUM: 137 MEQ/L (ref 136–145)
POC SODIUM: 138 MEQ/L (ref 136–145)
POTASSIUM SERPL-SCNC: 4.4 MMOL/L (ref 3.5–5)
POTASSIUM SERPL-SCNC: 4.4 MMOL/L (ref 3.5–5)
POTASSIUM SERPL-SCNC: 5.3 MMOL/L (ref 3.5–5)
POTASSIUM, WHOLE BLOOD: 4.2
POTASSIUM, WHOLE BLOOD: 5.2
POTASSIUM, WHOLE BLOOD: 5.2
PROTEIN UA: NEGATIVE MG/DL
PROTHROMBIN TIME: 15.9 SEC (ref 12–14.6)
RBC # BLD: 3.48 M/UL (ref 4.7–6.1)
RBC # BLD: 3.5 M/UL (ref 4.7–6.1)
RBC UA: ABNORMAL /HPF (ref 0–2)
SODIUM BLD-SCNC: 133 MMOL/L (ref 136–145)
SODIUM BLD-SCNC: 137 MMOL/L (ref 136–145)
SPECIFIC GRAVITY UA: 1.01 (ref 1–1.03)
TCO2 ARTERIAL: 28 MMOL/L
TCO2 ARTERIAL: 28 MMOL/L
TCO2 ARTERIAL: 30 MMOL/L
TCO2 ARTERIAL: 31 MMOL/L
TCO2 ARTERIAL: 33 MMOL/L
TCO2 ARTERIAL: 35 MMOL/L
UROBILINOGEN, URINE: 1 E.U./DL
WBC # BLD: 12.1 K/UL (ref 4.8–10.8)
WBC # BLD: 14.5 K/UL (ref 4.8–10.8)
WBC UA: ABNORMAL /HPF (ref 0–5)
YEAST: ABNORMAL /HPF

## 2019-05-01 PROCEDURE — 6370000000 HC RX 637 (ALT 250 FOR IP): Performed by: THORACIC SURGERY (CARDIOTHORACIC VASCULAR SURGERY)

## 2019-05-01 PROCEDURE — 84295 ASSAY OF SERUM SODIUM: CPT

## 2019-05-01 PROCEDURE — 3600000090 HC PERFUSION PUMP ON: Performed by: THORACIC SURGERY (CARDIOTHORACIC VASCULAR SURGERY)

## 2019-05-01 PROCEDURE — 2500000003 HC RX 250 WO HCPCS: Performed by: NURSE ANESTHETIST, CERTIFIED REGISTERED

## 2019-05-01 PROCEDURE — 3700000001 HC ADD 15 MINUTES (ANESTHESIA): Performed by: THORACIC SURGERY (CARDIOTHORACIC VASCULAR SURGERY)

## 2019-05-01 PROCEDURE — 2580000003 HC RX 258: Performed by: NURSE ANESTHETIST, CERTIFIED REGISTERED

## 2019-05-01 PROCEDURE — C1894 INTRO/SHEATH, NON-LASER: HCPCS | Performed by: THORACIC SURGERY (CARDIOTHORACIC VASCULAR SURGERY)

## 2019-05-01 PROCEDURE — 36600 WITHDRAWAL OF ARTERIAL BLOOD: CPT

## 2019-05-01 PROCEDURE — 33508 ENDOSCOPIC VEIN HARVEST: CPT | Performed by: THORACIC SURGERY (CARDIOTHORACIC VASCULAR SURGERY)

## 2019-05-01 PROCEDURE — 021109W BYPASS CORONARY ARTERY, TWO ARTERIES FROM AORTA WITH AUTOLOGOUS VENOUS TISSUE, OPEN APPROACH: ICD-10-PCS | Performed by: THORACIC SURGERY (CARDIOTHORACIC VASCULAR SURGERY)

## 2019-05-01 PROCEDURE — 2780000010 HC IMPLANT OTHER: Performed by: THORACIC SURGERY (CARDIOTHORACIC VASCULAR SURGERY)

## 2019-05-01 PROCEDURE — 82800 BLOOD PH: CPT

## 2019-05-01 PROCEDURE — 82435 ASSAY OF BLOOD CHLORIDE: CPT

## 2019-05-01 PROCEDURE — C1729 CATH, DRAINAGE: HCPCS | Performed by: THORACIC SURGERY (CARDIOTHORACIC VASCULAR SURGERY)

## 2019-05-01 PROCEDURE — P9045 ALBUMIN (HUMAN), 5%, 250 ML: HCPCS | Performed by: THORACIC SURGERY (CARDIOTHORACIC VASCULAR SURGERY)

## 2019-05-01 PROCEDURE — 6360000002 HC RX W HCPCS: Performed by: NURSE ANESTHETIST, CERTIFIED REGISTERED

## 2019-05-01 PROCEDURE — 84132 ASSAY OF SERUM POTASSIUM: CPT

## 2019-05-01 PROCEDURE — 82565 ASSAY OF CREATININE: CPT

## 2019-05-01 PROCEDURE — 94010 BREATHING CAPACITY TEST: CPT | Performed by: INTERNAL MEDICINE

## 2019-05-01 PROCEDURE — 36415 COLL VENOUS BLD VENIPUNCTURE: CPT

## 2019-05-01 PROCEDURE — 2580000003 HC RX 258: Performed by: THORACIC SURGERY (CARDIOTHORACIC VASCULAR SURGERY)

## 2019-05-01 PROCEDURE — 85027 COMPLETE CBC AUTOMATED: CPT

## 2019-05-01 PROCEDURE — 82803 BLOOD GASES ANY COMBINATION: CPT

## 2019-05-01 PROCEDURE — 85730 THROMBOPLASTIN TIME PARTIAL: CPT

## 2019-05-01 PROCEDURE — 3600000018 HC SURGERY OHS ADDTL 15MIN: Performed by: THORACIC SURGERY (CARDIOTHORACIC VASCULAR SURGERY)

## 2019-05-01 PROCEDURE — 85530 HEPARIN-PROTAMINE TOLERANCE: CPT | Performed by: THORACIC SURGERY (CARDIOTHORACIC VASCULAR SURGERY)

## 2019-05-01 PROCEDURE — 80048 BASIC METABOLIC PNL TOTAL CA: CPT

## 2019-05-01 PROCEDURE — 6360000002 HC RX W HCPCS: Performed by: HOSPITALIST

## 2019-05-01 PROCEDURE — 85014 HEMATOCRIT: CPT

## 2019-05-01 PROCEDURE — 02100Z9 BYPASS CORONARY ARTERY, ONE ARTERY FROM LEFT INTERNAL MAMMARY, OPEN APPROACH: ICD-10-PCS | Performed by: THORACIC SURGERY (CARDIOTHORACIC VASCULAR SURGERY)

## 2019-05-01 PROCEDURE — 6370000000 HC RX 637 (ALT 250 FOR IP): Performed by: HOSPITALIST

## 2019-05-01 PROCEDURE — 6360000002 HC RX W HCPCS: Performed by: THORACIC SURGERY (CARDIOTHORACIC VASCULAR SURGERY)

## 2019-05-01 PROCEDURE — 3600000008 HC SURGERY OHS BASE: Performed by: THORACIC SURGERY (CARDIOTHORACIC VASCULAR SURGERY)

## 2019-05-01 PROCEDURE — 33533 CABG ARTERIAL SINGLE: CPT | Performed by: THORACIC SURGERY (CARDIOTHORACIC VASCULAR SURGERY)

## 2019-05-01 PROCEDURE — 3700000000 HC ANESTHESIA ATTENDED CARE: Performed by: THORACIC SURGERY (CARDIOTHORACIC VASCULAR SURGERY)

## 2019-05-01 PROCEDURE — C1751 CATH, INF, PER/CENT/MIDLINE: HCPCS | Performed by: THORACIC SURGERY (CARDIOTHORACIC VASCULAR SURGERY)

## 2019-05-01 PROCEDURE — 94640 AIRWAY INHALATION TREATMENT: CPT

## 2019-05-01 PROCEDURE — 85610 PROTHROMBIN TIME: CPT

## 2019-05-01 PROCEDURE — 6370000000 HC RX 637 (ALT 250 FOR IP): Performed by: INTERNAL MEDICINE

## 2019-05-01 PROCEDURE — 5A1221Z PERFORMANCE OF CARDIAC OUTPUT, CONTINUOUS: ICD-10-PCS | Performed by: THORACIC SURGERY (CARDIOTHORACIC VASCULAR SURGERY)

## 2019-05-01 PROCEDURE — 85347 COAGULATION TIME ACTIVATED: CPT | Performed by: THORACIC SURGERY (CARDIOTHORACIC VASCULAR SURGERY)

## 2019-05-01 PROCEDURE — 82810 BLOOD GASES O2 SAT ONLY: CPT

## 2019-05-01 PROCEDURE — 2500000003 HC RX 250 WO HCPCS: Performed by: THORACIC SURGERY (CARDIOTHORACIC VASCULAR SURGERY)

## 2019-05-01 PROCEDURE — 82948 REAGENT STRIP/BLOOD GLUCOSE: CPT

## 2019-05-01 PROCEDURE — 2709999900 HC NON-CHARGEABLE SUPPLY: Performed by: THORACIC SURGERY (CARDIOTHORACIC VASCULAR SURGERY)

## 2019-05-01 PROCEDURE — 6370000000 HC RX 637 (ALT 250 FOR IP): Performed by: NURSE ANESTHETIST, CERTIFIED REGISTERED

## 2019-05-01 PROCEDURE — 83735 ASSAY OF MAGNESIUM: CPT

## 2019-05-01 PROCEDURE — 87081 CULTURE SCREEN ONLY: CPT

## 2019-05-01 PROCEDURE — L8612 AQUEOUS SHUNT PROSTHESIS: HCPCS | Performed by: THORACIC SURGERY (CARDIOTHORACIC VASCULAR SURGERY)

## 2019-05-01 PROCEDURE — 71045 X-RAY EXAM CHEST 1 VIEW: CPT

## 2019-05-01 PROCEDURE — 2700000000 HC OXYGEN THERAPY PER DAY

## 2019-05-01 PROCEDURE — 2000000000 HC ICU R&B

## 2019-05-01 PROCEDURE — 82330 ASSAY OF CALCIUM: CPT

## 2019-05-01 PROCEDURE — 94002 VENT MGMT INPAT INIT DAY: CPT

## 2019-05-01 PROCEDURE — 99232 SBSQ HOSP IP/OBS MODERATE 35: CPT | Performed by: INTERNAL MEDICINE

## 2019-05-01 PROCEDURE — 33518 CABG ARTERY-VEIN TWO: CPT | Performed by: THORACIC SURGERY (CARDIOTHORACIC VASCULAR SURGERY)

## 2019-05-01 PROCEDURE — 82374 ASSAY BLOOD CARBON DIOXIDE: CPT

## 2019-05-01 PROCEDURE — 2720000010 HC SURG SUPPLY STERILE: Performed by: THORACIC SURGERY (CARDIOTHORACIC VASCULAR SURGERY)

## 2019-05-01 PROCEDURE — 81001 URINALYSIS AUTO W/SCOPE: CPT

## 2019-05-01 PROCEDURE — 37799 UNLISTED PX VASCULAR SURGERY: CPT

## 2019-05-01 PROCEDURE — 06BQ4ZZ EXCISION OF LEFT SAPHENOUS VEIN, PERCUTANEOUS ENDOSCOPIC APPROACH: ICD-10-PCS | Performed by: THORACIC SURGERY (CARDIOTHORACIC VASCULAR SURGERY)

## 2019-05-01 DEVICE — DISK-SHAPED STYLE, SILICONE (1 PER STERILE PKG)
Type: IMPLANTABLE DEVICE | Site: HEART | Status: FUNCTIONAL
Brand: SCANLAN® RADIOMARK® GRAFT MARKERS

## 2019-05-01 DEVICE — Z INACTIVE USE 2535480 CLIP LIG M BLU TI HRT SHP WIRE HORZ 180 PER BX: Type: IMPLANTABLE DEVICE | Site: HEART | Status: FUNCTIONAL

## 2019-05-01 DEVICE — CLIP INT SM TI EZ LD LIG SYS WECK HORZ: Type: IMPLANTABLE DEVICE | Site: HEART | Status: FUNCTIONAL

## 2019-05-01 RX ORDER — SODIUM CHLORIDE 450 MG/100ML
INJECTION, SOLUTION INTRAVENOUS CONTINUOUS PRN
Status: DISCONTINUED | OUTPATIENT
Start: 2019-05-01 | End: 2019-05-01 | Stop reason: SDUPTHER

## 2019-05-01 RX ORDER — ATORVASTATIN CALCIUM 20 MG/1
20 TABLET, FILM COATED ORAL NIGHTLY
Status: DISCONTINUED | OUTPATIENT
Start: 2019-05-02 | End: 2019-05-06 | Stop reason: HOSPADM

## 2019-05-01 RX ORDER — LIDOCAINE HYDROCHLORIDE 10 MG/ML
1 INJECTION, SOLUTION EPIDURAL; INFILTRATION; INTRACAUDAL; PERINEURAL
Status: CANCELLED | OUTPATIENT
Start: 2019-05-01 | End: 2019-05-01

## 2019-05-01 RX ORDER — FENTANYL CITRATE 50 UG/ML
25 INJECTION, SOLUTION INTRAMUSCULAR; INTRAVENOUS
Status: CANCELLED | OUTPATIENT
Start: 2019-05-01 | End: 2019-05-01

## 2019-05-01 RX ORDER — NICOTINE POLACRILEX 4 MG
15 LOZENGE BUCCAL PRN
Status: DISCONTINUED | OUTPATIENT
Start: 2019-05-01 | End: 2019-05-06 | Stop reason: HOSPADM

## 2019-05-01 RX ORDER — PROTAMINE SULFATE 10 MG/ML
50 INJECTION, SOLUTION INTRAVENOUS
Status: ACTIVE | OUTPATIENT
Start: 2019-05-01 | End: 2019-05-01

## 2019-05-01 RX ORDER — ACETAMINOPHEN 325 MG/1
650 TABLET ORAL EVERY 4 HOURS PRN
Status: DISCONTINUED | OUTPATIENT
Start: 2019-05-01 | End: 2019-05-06 | Stop reason: HOSPADM

## 2019-05-01 RX ORDER — NITROGLYCERIN 20 MG/100ML
INJECTION INTRAVENOUS PRN
Status: DISCONTINUED | OUTPATIENT
Start: 2019-05-01 | End: 2019-05-01 | Stop reason: SDUPTHER

## 2019-05-01 RX ORDER — PROPOFOL 10 MG/ML
INJECTION, EMULSION INTRAVENOUS CONTINUOUS PRN
Status: DISCONTINUED | OUTPATIENT
Start: 2019-05-01 | End: 2019-05-01 | Stop reason: SDUPTHER

## 2019-05-01 RX ORDER — SODIUM CHLORIDE 9 MG/ML
INJECTION, SOLUTION INTRAVENOUS CONTINUOUS PRN
Status: DISCONTINUED | OUTPATIENT
Start: 2019-05-01 | End: 2019-05-01 | Stop reason: SDUPTHER

## 2019-05-01 RX ORDER — ALBUMIN, HUMAN INJ 5% 5 %
25 SOLUTION INTRAVENOUS ONCE
Status: COMPLETED | OUTPATIENT
Start: 2019-05-01 | End: 2019-05-01

## 2019-05-01 RX ORDER — KETOROLAC TROMETHAMINE 30 MG/ML
30 INJECTION, SOLUTION INTRAMUSCULAR; INTRAVENOUS ONCE
Status: COMPLETED | OUTPATIENT
Start: 2019-05-01 | End: 2019-05-01

## 2019-05-01 RX ORDER — MORPHINE SULFATE 2 MG/ML
2 INJECTION, SOLUTION INTRAMUSCULAR; INTRAVENOUS
Status: DISCONTINUED | OUTPATIENT
Start: 2019-05-01 | End: 2019-05-03

## 2019-05-01 RX ORDER — PROPOFOL 10 MG/ML
INJECTION, EMULSION INTRAVENOUS PRN
Status: DISCONTINUED | OUTPATIENT
Start: 2019-05-01 | End: 2019-05-01 | Stop reason: SDUPTHER

## 2019-05-01 RX ORDER — SODIUM CHLORIDE 0.9 % (FLUSH) 0.9 %
10 SYRINGE (ML) INJECTION PRN
Status: DISCONTINUED | OUTPATIENT
Start: 2019-05-01 | End: 2019-05-06 | Stop reason: HOSPADM

## 2019-05-01 RX ORDER — ASPIRIN 81 MG/1
81 TABLET ORAL DAILY
Status: DISCONTINUED | OUTPATIENT
Start: 2019-05-01 | End: 2019-05-06 | Stop reason: HOSPADM

## 2019-05-01 RX ORDER — FENTANYL CITRATE 50 UG/ML
50 INJECTION, SOLUTION INTRAMUSCULAR; INTRAVENOUS
Status: CANCELLED | OUTPATIENT
Start: 2019-05-01 | End: 2019-05-01

## 2019-05-01 RX ORDER — DEXTROSE MONOHYDRATE 25 G/50ML
12.5 INJECTION, SOLUTION INTRAVENOUS PRN
Status: DISCONTINUED | OUTPATIENT
Start: 2019-05-01 | End: 2019-05-06 | Stop reason: HOSPADM

## 2019-05-01 RX ORDER — FAMOTIDINE 20 MG/1
20 TABLET, FILM COATED ORAL 2 TIMES DAILY
Status: DISCONTINUED | OUTPATIENT
Start: 2019-05-01 | End: 2019-05-06 | Stop reason: HOSPADM

## 2019-05-01 RX ORDER — SODIUM CHLORIDE, SODIUM LACTATE, POTASSIUM CHLORIDE, CALCIUM CHLORIDE 600; 310; 30; 20 MG/100ML; MG/100ML; MG/100ML; MG/100ML
INJECTION, SOLUTION INTRAVENOUS CONTINUOUS
Status: CANCELLED | OUTPATIENT
Start: 2019-05-01

## 2019-05-01 RX ORDER — DEXTROSE MONOHYDRATE 50 MG/ML
100 INJECTION, SOLUTION INTRAVENOUS PRN
Status: DISCONTINUED | OUTPATIENT
Start: 2019-05-01 | End: 2019-05-06 | Stop reason: HOSPADM

## 2019-05-01 RX ORDER — SODIUM CHLORIDE 0.9 % (FLUSH) 0.9 %
10 SYRINGE (ML) INJECTION EVERY 12 HOURS SCHEDULED
Status: DISCONTINUED | OUTPATIENT
Start: 2019-05-01 | End: 2019-05-06 | Stop reason: HOSPADM

## 2019-05-01 RX ORDER — PROPOFOL 10 MG/ML
10 INJECTION, EMULSION INTRAVENOUS
Status: DISCONTINUED | OUTPATIENT
Start: 2019-05-01 | End: 2019-05-03

## 2019-05-01 RX ORDER — ROCURONIUM BROMIDE 10 MG/ML
INJECTION, SOLUTION INTRAVENOUS PRN
Status: DISCONTINUED | OUTPATIENT
Start: 2019-05-01 | End: 2019-05-01 | Stop reason: SDUPTHER

## 2019-05-01 RX ORDER — OXYCODONE HYDROCHLORIDE 5 MG/1
5 TABLET ORAL EVERY 4 HOURS PRN
Status: DISCONTINUED | OUTPATIENT
Start: 2019-05-01 | End: 2019-05-06 | Stop reason: HOSPADM

## 2019-05-01 RX ORDER — SODIUM CHLORIDE 0.9 % (FLUSH) 0.9 %
10 SYRINGE (ML) INJECTION PRN
Status: CANCELLED | OUTPATIENT
Start: 2019-05-01

## 2019-05-01 RX ORDER — MIDAZOLAM HYDROCHLORIDE 1 MG/ML
2 INJECTION INTRAMUSCULAR; INTRAVENOUS
Status: CANCELLED | OUTPATIENT
Start: 2019-05-01 | End: 2019-05-01

## 2019-05-01 RX ORDER — SUFENTANIL CITRATE 50 UG/ML
INJECTION EPIDURAL; INTRAVENOUS PRN
Status: DISCONTINUED | OUTPATIENT
Start: 2019-05-01 | End: 2019-05-01 | Stop reason: SDUPTHER

## 2019-05-01 RX ORDER — METHYLENE BLUE 10 MG/ML
INJECTION INTRAVENOUS PRN
Status: DISCONTINUED | OUTPATIENT
Start: 2019-05-01 | End: 2019-05-01 | Stop reason: HOSPADM

## 2019-05-01 RX ORDER — OXYCODONE HYDROCHLORIDE 5 MG/1
10 TABLET ORAL EVERY 4 HOURS PRN
Status: DISCONTINUED | OUTPATIENT
Start: 2019-05-01 | End: 2019-05-06 | Stop reason: HOSPADM

## 2019-05-01 RX ORDER — SODIUM CHLORIDE 0.9 % (FLUSH) 0.9 %
10 SYRINGE (ML) INJECTION EVERY 12 HOURS SCHEDULED
Status: CANCELLED | OUTPATIENT
Start: 2019-05-01

## 2019-05-01 RX ORDER — PROTAMINE SULFATE 10 MG/ML
INJECTION, SOLUTION INTRAVENOUS PRN
Status: DISCONTINUED | OUTPATIENT
Start: 2019-05-01 | End: 2019-05-01 | Stop reason: SDUPTHER

## 2019-05-01 RX ORDER — HEPARIN SODIUM 1000 [USP'U]/ML
INJECTION, SOLUTION INTRAVENOUS; SUBCUTANEOUS PRN
Status: DISCONTINUED | OUTPATIENT
Start: 2019-05-01 | End: 2019-05-01 | Stop reason: SDUPTHER

## 2019-05-01 RX ORDER — 0.9 % SODIUM CHLORIDE 0.9 %
500 INTRAVENOUS SOLUTION INTRAVENOUS CONTINUOUS PRN
Status: DISCONTINUED | OUTPATIENT
Start: 2019-05-01 | End: 2019-05-06 | Stop reason: HOSPADM

## 2019-05-01 RX ORDER — MORPHINE SULFATE 2 MG/ML
4 INJECTION, SOLUTION INTRAMUSCULAR; INTRAVENOUS
Status: DISCONTINUED | OUTPATIENT
Start: 2019-05-01 | End: 2019-05-03

## 2019-05-01 RX ORDER — MIDAZOLAM HYDROCHLORIDE 1 MG/ML
INJECTION INTRAMUSCULAR; INTRAVENOUS PRN
Status: DISCONTINUED | OUTPATIENT
Start: 2019-05-01 | End: 2019-05-01 | Stop reason: SDUPTHER

## 2019-05-01 RX ORDER — IPRATROPIUM BROMIDE AND ALBUTEROL SULFATE 2.5; .5 MG/3ML; MG/3ML
1 SOLUTION RESPIRATORY (INHALATION)
Status: DISCONTINUED | OUTPATIENT
Start: 2019-05-01 | End: 2019-05-06 | Stop reason: HOSPADM

## 2019-05-01 RX ORDER — CEFAZOLIN SODIUM 1 G/3ML
INJECTION, POWDER, FOR SOLUTION INTRAMUSCULAR; INTRAVENOUS PRN
Status: DISCONTINUED | OUTPATIENT
Start: 2019-05-01 | End: 2019-05-01 | Stop reason: SDUPTHER

## 2019-05-01 RX ORDER — SODIUM CHLORIDE 9 MG/ML
INJECTION, SOLUTION INTRAVENOUS CONTINUOUS
Status: DISCONTINUED | OUTPATIENT
Start: 2019-05-01 | End: 2019-05-03

## 2019-05-01 RX ORDER — POTASSIUM CHLORIDE 7.45 MG/ML
10 INJECTION INTRAVENOUS PRN
Status: DISCONTINUED | OUTPATIENT
Start: 2019-05-01 | End: 2019-05-06 | Stop reason: HOSPADM

## 2019-05-01 RX ORDER — NITROGLYCERIN 20 MG/100ML
INJECTION INTRAVENOUS CONTINUOUS PRN
Status: DISCONTINUED | OUTPATIENT
Start: 2019-05-01 | End: 2019-05-01 | Stop reason: SDUPTHER

## 2019-05-01 RX ORDER — DEXTROSE MONOHYDRATE 25 G/50ML
INJECTION, SOLUTION INTRAVENOUS PRN
Status: DISCONTINUED | OUTPATIENT
Start: 2019-05-01 | End: 2019-05-01 | Stop reason: SDUPTHER

## 2019-05-01 RX ORDER — INSULIN GLARGINE 100 [IU]/ML
0.15 INJECTION, SOLUTION SUBCUTANEOUS NIGHTLY
Status: DISCONTINUED | OUTPATIENT
Start: 2019-05-02 | End: 2019-05-06 | Stop reason: HOSPADM

## 2019-05-01 RX ORDER — ZOLPIDEM TARTRATE 5 MG/1
5 TABLET ORAL NIGHTLY PRN
Status: DISCONTINUED | OUTPATIENT
Start: 2019-05-02 | End: 2019-05-06 | Stop reason: HOSPADM

## 2019-05-01 RX ORDER — CLOPIDOGREL BISULFATE 75 MG/1
75 TABLET ORAL DAILY
Status: DISCONTINUED | OUTPATIENT
Start: 2019-05-02 | End: 2019-05-06 | Stop reason: HOSPADM

## 2019-05-01 RX ORDER — DOCUSATE SODIUM 100 MG/1
100 CAPSULE, LIQUID FILLED ORAL 2 TIMES DAILY
Status: DISCONTINUED | OUTPATIENT
Start: 2019-05-01 | End: 2019-05-06 | Stop reason: HOSPADM

## 2019-05-01 RX ORDER — ONDANSETRON 2 MG/ML
4 INJECTION INTRAMUSCULAR; INTRAVENOUS EVERY 8 HOURS PRN
Status: DISCONTINUED | OUTPATIENT
Start: 2019-05-01 | End: 2019-05-06 | Stop reason: HOSPADM

## 2019-05-01 RX ADMIN — SUFENTANIL CITRATE 80 MCG: 50 INJECTION, SOLUTION EPIDURAL; INTRAVENOUS at 07:35

## 2019-05-01 RX ADMIN — MORPHINE SULFATE 4 MG: 2 INJECTION, SOLUTION INTRAMUSCULAR; INTRAVENOUS at 19:39

## 2019-05-01 RX ADMIN — NITROGLYCERIN 0.5 INCH: 20 OINTMENT TOPICAL at 00:16

## 2019-05-01 RX ADMIN — IPRATROPIUM BROMIDE AND ALBUTEROL SULFATE 1 AMPULE: .5; 3 SOLUTION RESPIRATORY (INHALATION) at 14:36

## 2019-05-01 RX ADMIN — ROCURONIUM BROMIDE 50 MG: 10 INJECTION INTRAVENOUS at 07:35

## 2019-05-01 RX ADMIN — ROCURONIUM BROMIDE 50 MG: 10 INJECTION INTRAVENOUS at 08:34

## 2019-05-01 RX ADMIN — PHENYLEPHRINE HYDROCHLORIDE 80 MCG: 10 INJECTION INTRAVENOUS at 09:20

## 2019-05-01 RX ADMIN — NITROGLYCERIN 20 MCG/MIN: 20 INJECTION INTRAVENOUS at 08:52

## 2019-05-01 RX ADMIN — PROPOFOL 40 MCG/KG/MIN: 10 INJECTION, EMULSION INTRAVENOUS at 15:11

## 2019-05-01 RX ADMIN — SODIUM CHLORIDE 2 G: 9 INJECTION, SOLUTION INTRAVENOUS at 08:18

## 2019-05-01 RX ADMIN — PROPOFOL 15 MCG/KG/MIN: 10 INJECTION, EMULSION INTRAVENOUS at 12:04

## 2019-05-01 RX ADMIN — SUFENTANIL CITRATE 50 MCG: 50 INJECTION, SOLUTION EPIDURAL; INTRAVENOUS at 11:23

## 2019-05-01 RX ADMIN — SODIUM CHLORIDE: 9 INJECTION, SOLUTION INTRAVENOUS at 08:15

## 2019-05-01 RX ADMIN — Medication 2 G: at 19:42

## 2019-05-01 RX ADMIN — SUFENTANIL CITRATE 50 MCG: 50 INJECTION, SOLUTION EPIDURAL; INTRAVENOUS at 07:55

## 2019-05-01 RX ADMIN — CHLORHEXIDINE GLUCONATE 15 ML: 1.2 RINSE ORAL at 05:40

## 2019-05-01 RX ADMIN — KETOROLAC TROMETHAMINE 30 MG: 30 INJECTION, SOLUTION INTRAMUSCULAR; INTRAVENOUS at 18:25

## 2019-05-01 RX ADMIN — PHENYLEPHRINE HYDROCHLORIDE 80 MCG: 10 INJECTION INTRAVENOUS at 12:13

## 2019-05-01 RX ADMIN — INSULIN HUMAN 2 UNITS: 100 INJECTION, SOLUTION PARENTERAL at 08:30

## 2019-05-01 RX ADMIN — SUFENTANIL CITRATE 10 MCG: 50 INJECTION, SOLUTION EPIDURAL; INTRAVENOUS at 07:19

## 2019-05-01 RX ADMIN — PROPOFOL 160 MG: 10 INJECTION, EMULSION INTRAVENOUS at 07:13

## 2019-05-01 RX ADMIN — OXYCODONE HYDROCHLORIDE 10 MG: 5 TABLET ORAL at 21:47

## 2019-05-01 RX ADMIN — ROCURONIUM BROMIDE 50 MG: 10 INJECTION INTRAVENOUS at 09:58

## 2019-05-01 RX ADMIN — SUFENTANIL CITRATE 50 MCG: 50 INJECTION, SOLUTION EPIDURAL; INTRAVENOUS at 08:34

## 2019-05-01 RX ADMIN — MORPHINE SULFATE 4 MG: 2 INJECTION, SOLUTION INTRAMUSCULAR; INTRAVENOUS at 13:08

## 2019-05-01 RX ADMIN — SODIUM CHLORIDE 500 ML: 9 INJECTION, SOLUTION INTRAVENOUS at 17:27

## 2019-05-01 RX ADMIN — SODIUM CHLORIDE: 4.5 INJECTION, SOLUTION INTRAVENOUS at 07:09

## 2019-05-01 RX ADMIN — MIDAZOLAM 2 MG: 1 INJECTION INTRAMUSCULAR; INTRAVENOUS at 12:06

## 2019-05-01 RX ADMIN — SUFENTANIL CITRATE 5 MCG: 50 INJECTION, SOLUTION EPIDURAL; INTRAVENOUS at 07:23

## 2019-05-01 RX ADMIN — MORPHINE SULFATE 4 MG: 2 INJECTION, SOLUTION INTRAMUSCULAR; INTRAVENOUS at 16:36

## 2019-05-01 RX ADMIN — SUFENTANIL CITRATE 5 MCG: 50 INJECTION, SOLUTION EPIDURAL; INTRAVENOUS at 07:14

## 2019-05-01 RX ADMIN — MORPHINE SULFATE 4 MG: 2 INJECTION, SOLUTION INTRAMUSCULAR; INTRAVENOUS at 14:19

## 2019-05-01 RX ADMIN — ALBUMIN (HUMAN) 25 G: 12.5 INJECTION, SOLUTION INTRAVENOUS at 13:00

## 2019-05-01 RX ADMIN — NITROGLYCERIN 100 MCG: 20 INJECTION INTRAVENOUS at 08:52

## 2019-05-01 RX ADMIN — DEXMEDETOMIDINE HYDROCHLORIDE 0.2 MCG/KG/HR: 100 INJECTION, SOLUTION INTRAVENOUS at 14:50

## 2019-05-01 RX ADMIN — ROCURONIUM BROMIDE 50 MG: 10 INJECTION INTRAVENOUS at 08:25

## 2019-05-01 RX ADMIN — SUFENTANIL CITRATE 50 MCG: 50 INJECTION, SOLUTION EPIDURAL; INTRAVENOUS at 08:52

## 2019-05-01 RX ADMIN — PROTAMINE SULFATE 250 MG: 10 INJECTION, SOLUTION INTRAVENOUS at 11:22

## 2019-05-01 RX ADMIN — CEFAZOLIN 2000 MG: 330 INJECTION, POWDER, FOR SOLUTION INTRAMUSCULAR; INTRAVENOUS at 12:06

## 2019-05-01 RX ADMIN — IPRATROPIUM BROMIDE AND ALBUTEROL SULFATE 1 AMPULE: .5; 3 SOLUTION RESPIRATORY (INHALATION) at 06:18

## 2019-05-01 RX ADMIN — PHENYLEPHRINE HYDROCHLORIDE 80 MCG: 10 INJECTION INTRAVENOUS at 09:26

## 2019-05-01 RX ADMIN — PROPOFOL 100 MG: 10 INJECTION, EMULSION INTRAVENOUS at 07:35

## 2019-05-01 RX ADMIN — HEPARIN SODIUM 35000 UNITS: 1000 INJECTION, SOLUTION INTRAVENOUS; SUBCUTANEOUS at 09:07

## 2019-05-01 RX ADMIN — SUFENTANIL CITRATE 50 MCG: 50 INJECTION, SOLUTION EPIDURAL; INTRAVENOUS at 08:45

## 2019-05-01 RX ADMIN — DEXTROSE MONOHYDRATE 12.5 G: 25 INJECTION, SOLUTION INTRAVENOUS at 09:40

## 2019-05-01 RX ADMIN — PHENYLEPHRINE HYDROCHLORIDE 80 MCG: 10 INJECTION INTRAVENOUS at 09:23

## 2019-05-01 RX ADMIN — MORPHINE SULFATE 4 MG: 2 INJECTION, SOLUTION INTRAMUSCULAR; INTRAVENOUS at 23:43

## 2019-05-01 RX ADMIN — MIDAZOLAM 2 MG: 1 INJECTION INTRAMUSCULAR; INTRAVENOUS at 09:28

## 2019-05-01 RX ADMIN — PHENYLEPHRINE HYDROCHLORIDE 80 MCG: 10 INJECTION INTRAVENOUS at 09:28

## 2019-05-01 RX ADMIN — DEXTROSE MONOHYDRATE 12.5 G: 25 INJECTION, SOLUTION INTRAVENOUS at 09:19

## 2019-05-01 RX ADMIN — NITROGLYCERIN 100 MCG: 20 INJECTION INTRAVENOUS at 08:48

## 2019-05-01 RX ADMIN — SODIUM CHLORIDE 3.4 UNITS/HR: 9 INJECTION, SOLUTION INTRAVENOUS at 15:12

## 2019-05-01 RX ADMIN — DEXMEDETOMIDINE HYDROCHLORIDE 0.4 MCG/KG/HR: 100 INJECTION, SOLUTION INTRAVENOUS at 19:16

## 2019-05-01 RX ADMIN — MORPHINE SULFATE 2 MG: 2 INJECTION, SOLUTION INTRAMUSCULAR; INTRAVENOUS at 21:41

## 2019-05-01 RX ADMIN — HYDRALAZINE HYDROCHLORIDE 5 MG: 20 INJECTION INTRAMUSCULAR; INTRAVENOUS at 00:15

## 2019-05-01 RX ADMIN — ROCURONIUM BROMIDE 50 MG: 10 INJECTION INTRAVENOUS at 10:52

## 2019-05-01 RX ADMIN — SUFENTANIL CITRATE 20 MCG: 50 INJECTION, SOLUTION EPIDURAL; INTRAVENOUS at 11:33

## 2019-05-01 RX ADMIN — HYDROCODONE BITARTRATE AND ACETAMINOPHEN 2 TABLET: 10; 325 TABLET ORAL at 00:54

## 2019-05-01 RX ADMIN — MIDAZOLAM 2 MG: 1 INJECTION INTRAMUSCULAR; INTRAVENOUS at 07:09

## 2019-05-01 RX ADMIN — Medication 10 ML: at 20:56

## 2019-05-01 RX ADMIN — SODIUM CHLORIDE: 9 INJECTION, SOLUTION INTRAVENOUS at 12:14

## 2019-05-01 RX ADMIN — SODIUM CHLORIDE: 9 INJECTION, SOLUTION INTRAVENOUS at 19:00

## 2019-05-01 RX ADMIN — SODIUM CHLORIDE: 9 INJECTION, SOLUTION INTRAVENOUS at 13:00

## 2019-05-01 RX ADMIN — ROCURONIUM BROMIDE 50 MG: 10 INJECTION INTRAVENOUS at 07:37

## 2019-05-01 RX ADMIN — IPRATROPIUM BROMIDE AND ALBUTEROL SULFATE 1 AMPULE: .5; 3 SOLUTION RESPIRATORY (INHALATION) at 19:15

## 2019-05-01 ASSESSMENT — PAIN SCALES - GENERAL
PAINLEVEL_OUTOF10: 7
PAINLEVEL_OUTOF10: 10
PAINLEVEL_OUTOF10: 8
PAINLEVEL_OUTOF10: 5
PAINLEVEL_OUTOF10: 10
PAINLEVEL_OUTOF10: 8
PAINLEVEL_OUTOF10: 10
PAINLEVEL_OUTOF10: 5
PAINLEVEL_OUTOF10: 10

## 2019-05-01 ASSESSMENT — PULMONARY FUNCTION TESTS
PIF_VALUE: 31.3
PIF_VALUE: 7.3
PIF_VALUE: 12.1
PIF_VALUE: 9.9
PIF_VALUE: 7.9
PIF_VALUE: 8.4
PIF_VALUE: 11.3
PIF_VALUE: 34.4
PIF_VALUE: 8.5
PIF_VALUE: 11.5
PIF_VALUE: 37.4
PIF_VALUE: 31.6
PIF_VALUE: 32.6
PIF_VALUE: 11.3
PIF_VALUE: 31.3
PIF_VALUE: 29.4
PIF_VALUE: 38.2
PIF_VALUE: 29.2
PIF_VALUE: 23.8
PIF_VALUE: 7.5

## 2019-05-01 ASSESSMENT — LIFESTYLE VARIABLES: SMOKING_STATUS: 1

## 2019-05-01 NOTE — ANESTHESIA PROCEDURE NOTES
Arterial Line:    An arterial line was placed using ultrasound guidance, in the OR for the following indication(s): continuous blood pressure monitoring and blood sampling needed. A 20 gauge (size), 5 cm (length), Angiocath (type) catheter was placed, Seldinger technique used, into the left brachial artery, secured by suture and Tegaderm. Anesthesia type: General    Events:  patient tolerated procedure well with no complications.   5/1/2019 8:21 AM5/1/2019 8:21 AM  Anesthesiologist: Laura Romero MD  Preanesthetic Checklist  Completed: patient identified, site marked, surgical consent, pre-op evaluation, timeout performed, IV checked, risks and benefits discussed, monitors and equipment checked, anesthesia consent given, oxygen available and patient being monitored

## 2019-05-01 NOTE — PROGRESS NOTES
[facility] Progress Note    Patient:  Otis Larson  YOB: 1970  Date of Service: 5/1/2019  MRN: [id number]   Acct: [de-identified]   Primary Care Physician: Jovani Moore MD  Advance Directive: Full Code  Admit Date: 4/24/2019       Hospital Day: 2      CHIEF COMPLAINT:     Chief Complaint   Patient presents with    Chest Pain       5/1/2019 1:15 PM  Subjective / Interval History:   Patient seen in the ICU this pm. S/p CABG. Currently sedated and on Full Vent. Cumulative Hospital History:   As per Previous Documentation, quoted below;  Mr. Sameer Will [place] a 44 y. o. newly-diagnosed diabetic male who was referred to the ER by his PCP for evaluation of exertional chest pain and progressive shortness of breath.      Work-up in the ER included EKG that demonstrated NSR without acute changes. Cardiac enzymes were negative. He was admitted to the hospitalist service.      Nuclear stress test was completed on 04/25/2019, demonstrated inferior wall ischemia with calculated EF 54%. Therefore, he underwent cardiac catheterization via right radial approach (04/29/2019). This demonstrated moderate to severe multivessel CAD with preserved LV systolic function. CT Surgery following, on account of  Severe multivessel coronary artery disease  S/p CABG (05/01/2019)        Review of Systems:   Review of Systems  ROS: 14 point review of systems is negative except as specifically addressed above.     No diet orders on file    Intake/Output Summary (Last 24 hours) at 5/1/2019 1315  Last data filed at 5/1/2019 1214  Gross per 24 hour   Intake 3270 ml   Output 950 ml   Net 2320 ml       Medications:   sodium chloride      sodium chloride      insulin (HUMAN R) non-weight based infusion      dextrose      dexmedetomidine (PRECEDEX) IV infusion       Current Facility-Administered Medications   Medication Dose Route Frequency Provider Last Rate Last Dose    protamine injection 50 mg  50 mg Intravenous Once PRN Etta Duffy MD        0.9 % sodium chloride infusion   Intravenous Continuous Etta Duffy MD        sodium chloride flush 0.9 % injection 10 mL  10 mL Intravenous 2 times per day Etta Duffy MD        sodium chloride flush 0.9 % injection 10 mL  10 mL Intravenous PRN Etta Duffy MD        potassium chloride 10 mEq/100 mL IVPB (Peripheral Line)  10 mEq Intravenous PRN Etta Duffy MD        ceFAZolin (ANCEF) 2 g in 0.9% sodium chloride 50 mL IVPB  2 g Intravenous Q8H Etta Duffy MD        acetaminophen (TYLENOL) tablet 650 mg  650 mg Oral Q4H PRN Etta Duffy MD        oxyCODONE (ROXICODONE) immediate release tablet 5 mg  5 mg Oral Q4H PRN Etta Duffy MD        Or    oxyCODONE (ROXICODONE) immediate release tablet 10 mg  10 mg Oral Q4H PRN Etta Duffy MD        morphine (PF) injection 2 mg  2 mg Intravenous Q2H PRN Etta Duffy MD        Or    morphine (PF) injection 4 mg  4 mg Intravenous Q2H PRN Etta Duffy MD   4 mg at 05/01/19 1308    [START ON 5/2/2019] zolpidem (AMBIEN) tablet 5 mg  5 mg Oral Nightly PRN Etta Duffy MD        docusate sodium (COLACE) capsule 100 mg  100 mg Oral BID Etta Duffy MD        ondansetron [facility]) injection 4 mg  4 mg Intravenous Q8H PRN Etta Duffy MD        famotidine (PEPCID) tablet 20 mg  20 mg Oral BID Etta Duffy MD        [START ON 5/2/2019] atorvastatin (LIPITOR) tablet 20 mg  20 mg Oral Nightly Etta Duffy MD        aspirin EC tablet 81 mg  81 mg Oral Daily MD Hortensia Montejo ON 5/2/2019] clopidogrel (PLAVIX) tablet 75 mg  75 mg Oral Daily Etta Duffy MD        ipratropium-albuterol (DUONEB) nebulizer solution 1 ampule  1 ampule Inhalation Q4H WA Etta Duffy MD        0.9 % sodium chloride bolus  500 mL Intravenous Continuous PRN Etta Duffy MD        insulin regular (HUMULIN R;NOVOLIN R) 100 Units in sodium 05/01/19  1044 05/01/19  1149   WBC 8.1  --   --   --   --    RBC 4.16*  --   --   --   --    HGB 12.6*   < > 9.9* 10.1* 10.5*   HCT 36.9*  --   --   --   --    MCV 88.7  --   --   --   --    MCH 30.3  --   --   --   --    MCHC 34.1  --   --   --   --      --   --   --   --     < > = values in this interval not displayed. Recent Labs     04/30/19  0232 04/30/19  1411 05/01/19  0146  05/01/19  1013 05/01/19  1044 05/01/19  1149 05/01/19  1244   * 135* 133*  --   --   --   --   --    K 4.2 4.4 4.4  --   --   --   --  4.2   ANIONGAP 13 18 11  --   --   --   --   --    CL 91* 92* 95*  --   --   --   --   --    CO2 28 25 27   < > 31 30 26  --    BUN 15 15 15  --   --   --   --   --    CREATININE 0.9 0.9 1.0   < > 1.0 0.9 1.0  --    GLUCOSE 221* 162* 180*  --   --   --   --   --    CALCIUM 9.4 9.6 9.4  --   --   --   --   --     < > = values in this interval not displayed. Recent Labs     04/30/19  1411   MG 2.2     Recent Labs     04/30/19  1411   AST 25   ALT 22   BILITOT 0.5   ALKPHOS 92     HgBA1c:  No components found for: HGBA1C  FLP:    Lab Results   Component Value Date    TRIG 1,127 04/26/2019    HDL 27 04/26/2019    LDLCALC see below 04/26/2019    LDLDIRECT 109 04/26/2019     TSH:    Lab Results   Component Value Date    TSH 2.450 04/02/2019     Troponin T: No results for input(s): TROPONINI in the last 72 hours. Pro-BNP: No results for input(s): BNP in the last 72 hours.   INR:   Recent Labs     04/30/19  1411   INR 1.02     ABGs:   Lab Results   Component Value Date    PHART 7.460 05/01/2019    PO2ART 222.0 05/01/2019    RUA6RBT 35.0 05/01/2019     UA:  Recent Labs     05/01/19  0100   COLORU YELLOW   PHUR 6.5   WBCUA 2-4   RBCUA 2-4   YEAST 2+*   CLARITYU CLOUDY*   SPECGRAV 1.010   LEUKOCYTESUR Negative   UROBILINOGEN 1.0   BILIRUBINUR Negative   BLOODU SMALL*   GLUCOSEU Negative         RAD:   Xr Chest Standard (2 Vw)    Result Date: 4/24/2019  EXAMINATION:  XR CHEST (2 VW)  4/24/2019 3:09 PM HISTORY: Chest pain shortness of breath COMPARISON: No comparison study. FINDINGS:  PA and lateral views of the chest were obtained. The lungs are clear and normally expanded. There are few scattered nodular densities in the left lung base, at least one is calcified, another of the nodules may a represent nipple shadow artifact. Repeat PA chest with nipple markers in place is recommended. Heart size is normal. No pneumothorax or pleural effusion is identified. The osseous structures are unremarkable. No pulmonary consolidation or acute findings. A few small nodules in the left lung base, at least one is calcified. One of the nodules may represent a nipple shadow artifact. Consider repeat PA chest with nipple markers in place. Signed by Dr Jessica Fortune on 4/24/2019 3:12 PM    Xr Cervical Spine (2-3 Views)    Result Date: 4/10/2019  EXAMINATION: XR CERVICAL SPINE (2-3 VIEWS) 4/10/2019 9:34 AM HISTORY: Neck pain 4 views cervical spine are obtained. Cervical vertebra are normally aligned. There is slight loss of height at C5-6 disc space level and loss of height of C6-7 disc space level. Mild uncinate spurring is noted at the C6-7 level. Minimal anterior hypertrophic degenerative changes noted inferior endplates of P3-M4 and C6. The odontoid process is intact. Calcification left carotid artery is noted. [place] the lungs appears clear as visualized. Impression degenerative spondylosis is noted in the cervical spine as described above Signed by Dr Nadege Martin on 4/10/2019 9:36 AM    Ct Head Wo Contrast    Result Date: 4/24/2019  EXAMINATION: CT HEAD WO CONTRAST 4/24/2019 5:48 PM HISTORY: Daily headaches, history of bladder cancer Comparison: CT head without contrast 2/17/2011 Technique: Sequential imaging was performed from the vertex through the base of the skull without the use of IV contrast. Sagittal and coronal reformations were made from the original source data and reviewed.  Automated exposure pharmaceuticals used: Rest images 10.67 mCi technetium 99m sestamibi Stress images 32.83 mCi technetium 99m sestamibi Review of rest and stress images obtained in a SPECT acquisition protocol low with review of the polar plot revealed: 1. Ejection fraction normal 54% 2. Wall motion study suggested mild inferoseptal hypokinesis 3. Myocardial perfusion imaging demonstrated homogeneous uptake of the tracer in all visualized segments with the exception a few of the more distal short axis slices showed diminished uptake in the inferior wall which appeared to reverse on some of the slices. The sum stress score was 1. Summary impression: Probably normal study normal ejection fraction 54% minimal distal inferior wall ischemia cannot be entirely excluded more likely normal correlate clinically Signed by Dr Larri Fothergill on 4/25/2019 4:00 PM    Us Carotid Artery Bilateral    Result Date: 4/18/2019  Vascular Carotid Procedure  Demographics   Patient Name    Marj Pop    Age                   44                  [place]   Patient Number  [id number]           Gender                Male   Visit Number    [id number]        Interpreting          Nurys Tejada MD                                   Physician   Date of Birth   1970       Referring Physician   Eduard Montague   Accession       [id number]        [address] [place] [address]  Number  Procedure Type of Study:   Cerebral:Carotid, US CAROTID ARTERY BILATERAL [[id number]]. Indications for Study:Dizziness/Vertigo. Risk Factors   - The patient's risk factor(s) include: diabetes mellitus, dyslipidemia     and arterial hypertension.   - Current - Every day. Impression   There is no plaque visualized in the bilateral internal carotid  artery(ies). There is no stenosis in the right internal carotid artery. There is no stenosis of the left internal carotid artery. There is normal antegrade flow in the bilateral vertebral artery(ies).    Signature !Angle   ! RI     !%Stenosis   ! Tortuosity     ! +------------+-------+-------+--------+-------+------------+---------------+ ! Prox CCA    !78.2   !15.7   !60      !0.8    ! !               ! +------------+-------+-------+--------+-------+------------+---------------+ ! Mid CCA     !107    !22.3   !60      !0.79   !            !               ! +------------+-------+-------+--------+-------+------------+---------------+ ! Prox ICA    !57.8   !14.9   !60      !0.74   !            !               ! +------------+-------+-------+--------+-------+------------+---------------+ ! Mid ICA     ! 96.7   !35.2   ! 60      !0.64   !            !               ! +------------+-------+-------+--------+-------+------------+---------------+ ! Dist ICA    ! 96.7   !38.1   ! 60      !0.61   !            !               ! +------------+-------+-------+--------+-------+------------+---------------+ ! Prox ECA    !136    !14.1   ! 60      !0.9    ! !               ! +------------+-------+-------+--------+-------+------------+---------------+ ! Vertebral   !50.7   !16.5   !60      !0.67   !            !               ! +------------+-------+-------+--------+-------+------------+---------------+   - There is antegrade vertebral flow noted on the left side. - Additional Measurements:ICAPSV/CCAPSV 1.24. ICAEDV/CCAEDV 2.43.     Vl Vein Mapping Lower Bilateral    Result Date: 4/29/2019  Vascular Lower Extremity Vein Mapping Procedure  Demographics   Patient Name   Sally Lezama   Age                 44                 [place]   Patient Number [id number]          Gender              Male   Visit Number   [id number]       Rom Raymond MD                                 Physician   Date of Birth  1970      Referring Physician Guy Das   Accession      [id number]       [address] [place] [address]  Number  Procedure Type of Study: disease  · S/p CABG (05/01/2019)  · Continue management as per CTS recommendation. Continue management of other chronic medical conditions - see above and orders.               Advance Directive: Full Code    No diet orders on file     DVT prophylaxis: Enoxaparin    Consults Made:   IP CONSULT TO CARDIOLOGY  IP CONSULT TO [address] [place] [address] [place] CONSULT TO CASE MANAGEMENT    Discharge planning: Continue management in the ICU       Electronically signed by   Mary Kirk MD, MPH,   Internal Medicine Hospitalist   5/1/2019 1:15 PM

## 2019-05-01 NOTE — PROGRESS NOTES
Consent signed and in soft chart. Chlorexidine shower 1 of 2 completed. New 20 guage IV placed in R upper arm.

## 2019-05-01 NOTE — ANESTHESIA PRE PROCEDURE
Department of Anesthesiology  Preprocedure Note       Name:  Colby Hurt   Age:  44 y.o.  :  1970                                          MRN:  [id number]         Date:  2019      Surgeon: Selvin Coe):  Karol Perkins MD    Procedure: CABG CORONARY ARTERY BYPASS (N/A )    Medications prior to admission:   Prior to Admission medications    Medication Sig Start Date End Date Taking? Authorizing Provider   FLUoxetine (PROZAC) 20 MG capsule Take 1 capsule by mouth daily 19  Yes BRYAN Bush   HYDROcodone-acetaminophen (NORCO)  MG per tablet Take 2 tablets by mouth every 4 hours as needed for Pain.    Yes Historical Provider, MD   ergocalciferol (ERGOCALCIFEROL) 64634 units capsule Take 1 capsule by mouth once a week 4/10/19  Yes BRYAN Bush   busPIRone (BUSPAR) 10 MG tablet Take 1 tablet by mouth 3 times daily as needed (anxiety) 4/10/19  Yes BRYAN Bush   fenofibrate 160 MG tablet Take 1 tablet by mouth daily 4/10/19  Yes BRYAN Bush   atorvastatin (LIPITOR) 20 MG tablet Take 1 tablet by mouth daily 4/10/19  Yes BRYAN Bush   metFORMIN (GLUCOPHAGE) 500 MG tablet Take 1 tablet by mouth 2 times daily (with meals) 4/10/19  Yes BRYAN Bush   atenolol (TENORMIN) 50 MG tablet TAKE ONE TABLET EVERY DAY  Patient taking differently: TAKE ONE TABLET EVERY DAY AT LUNCH 4/10/19  BRYAN Ferrell   cloNIDine (CATAPRES) 0.1 MG tablet Take 1 tablet by mouth daily  Patient taking differently: Take 0.1 mg by mouth nightly  3/14/19  Yes BRYAN Bush   tiZANidine (ZANAFLEX) 4 MG tablet TAKE 1 TABLET BY MOUTH EVERY 6 HOURS FOR MUSCLE PAIN 3/14/19  Yes BRYAN Bush   amLODIPine (NORVASC) 5 MG tablet Take 1 tablet by mouth daily 3/14/19  Yes BRYAN Bush   omeprazole (PRILOSEC) 40 MG delayed release capsule Take 1 capsule by mouth daily 3/14/19  BRYAN Frerell       Current medications:    Current Facility-Administered Medications   Medication Dose Route Frequency Provider Last Rate Last Dose    bisacodyl (DULCOLAX) suppository 10 mg  10 mg Rectal Daily PRN Haley Meyer MD   10 mg at 04/30/19 1213    nitroglycerin (NITRO-BID) 2 % ointment 0.5 inch  0.5 inch Topical 4 times per day Haley Meyer MD   0.5 inch at 05/01/19 0016    sodium chloride flush 0.9 % injection 10 mL  10 mL Intravenous 2 times per day Maxx Villalba MD   10 mL at 04/30/19 2228    sodium chloride flush 0.9 % injection 10 mL  10 mL Intravenous PRN Maxx Villalba MD        mupirocin (BACTROBAN) 2 % ointment   Nasal BID Maxx Villalba MD        chlorhexidine (HIBICLENS) 4 % liquid   Topical See Admin Instructions Maxx Villalba MD        ceFAZolin (ANCEF) 2 g in 0.9% sodium chloride 50 mL IVPB  2 g Intravenous On Call to Jethro Leventhal, MD        polyethylene glycol [facility]) packet 17 g  17 g Oral Daily Meenakshi Costa MD   17 g at 04/30/19 0840    cloNIDine (CATAPRES) tablet 0.1 mg  0.1 mg Oral TID Meenakshi Costa MD   0.1 mg at 04/30/19 2054    insulin glargine (LANTUS) injection vial 25 Units  25 Units Subcutaneous BID Meenakshi Costa MD   25 Units at 04/30/19 2229    HYDROcodone-acetaminophen (Paul Hey)  MG per tablet 2 tablet  2 tablet Oral Q4H PRN Meenakshi Costa MD   2 tablet at 05/01/19 0054    ipratropium-albuterol (DUONEB) nebulizer solution 1 ampule  1 ampule Inhalation Q4H WA Meenakshi Costa MD   1 ampule at 05/01/19 0618    sodium chloride flush 0.9 % injection 10 mL  10 mL Intravenous 2 times per day Haley Meyer MD   10 mL at 04/30/19 0840    sodium chloride flush 0.9 % injection 10 mL  10 mL Intravenous PRN Haley Meyer MD        acetaminophen (TYLENOL) tablet 650 mg  650 mg Oral Q4H PRN Haley Meyer MD        [facility]ron [facility]) injection 4 mg  4 mg Intravenous Q6H PRN Haley Meyer MD   4 mg at 04/26/19 2032    hydrALAZINE (APRESOLINE) injection 10 mg  10 mg Intravenous Q10 Min PRN Stephanie Booker MD        insulin lispro (HUMALOG) injection vial 0-18 Units  0-18 Units Subcutaneous TID WC Lala Alvarez MD   3 Units at 04/30/19 1807    insulin lispro (HUMALOG) injection vial 0-9 Units  0-9 Units Subcutaneous Nightly Lala Alvarez MD   2 Units at 04/30/19 2229    0.9 % sodium chloride infusion  1,000 mL Intravenous Continuous Lala Alvarez MD 50 mL/hr at 04/26/19 2201 1,000 mL at 04/26/19 2201    atorvastatin (LIPITOR) tablet 80 mg  80 mg Oral Daily Lala Alvarez MD   80 mg at 04/30/19 0840    fenofibrate tablet 160 mg  160 mg Oral Daily Lala Alvarez MD   160 mg at 04/30/19 1213    amLODIPine (NORVASC) tablet 5 mg  5 mg Oral BID Lala Alvarez MD   5 mg at 04/30/19 2054    hydrALAZINE (APRESOLINE) injection 5 mg  5 mg Intravenous Q4H PRN Lala Alvarez MD   5 mg at 05/01/19 0015    busPIRone (BUSPAR) tablet 10 mg  10 mg Oral TID PRN Vandana Stai Amado, DO        pantoprazole (PROTONIX) tablet 40 mg  40 mg Oral QAM AC Vandana Stai Amado, DO   40 mg at 04/30/19 0641    magnesium hydroxide (MILK OF MAGNESIA) 400 MG/5ML suspension 30 mL  30 mL Oral Daily PRN Crystal Bank, DO   30 mL at 04/27/19 0307    aspirin chewable tablet 81 mg  81 mg Oral Daily Vandana Stai Amado, DO   81 mg at 04/30/19 0840    enoxaparin (LOVENOX) injection 40 mg  40 mg Subcutaneous Q24H Vandana Stai Amado, DO   40 mg at 04/29/19 1757    glucose (GLUTOSE) 40 % oral gel 15 g  15 g Oral PRN Vandana Stai Amado, DO        dextrose 50 % solution 12.5 g  12.5 g Intravenous PRN Vandana Stai Amado, DO        glucagon (rDNA) injection 1 mg  1 mg Intramuscular PRN Vandana Stai Amado, DO        dextrose 5 % solution  100 mL/hr Intravenous PRN Crystal Bank, DO        nicotine (NICODERM CQ) 14 MG/24HR 1 patch  1 patch Transdermal Daily Crystal Bank, DO   1 patch at 04/30/19 7536       Allergies:     Allergies   Allergen Reactions    Latex Other (See Comments)     BOILS AND BLISTERS- ALLERGY CALLED TO [facility] SURGERY SCHEDULING.  Demerol Hcl [Meperidine]        Problem List:    Patient Active Problem List   Diagnosis Code    Essential hypertension I10    Hx of bladder cancer Z85.51    Chronic bronchitis ([facility] J42    Type 2 diabetes mellitus without complication, without long-term current use of insulin ([facility]) E11.9    Mixed anxiety depressive disorder F41.8    Mixed hyperlipidemia E78.2    Coronary artery disease involving native coronary artery of native heart with unstable angina pectoris ([facility] I25.110    Unstable angina pectoris ([facility] I20.0    Chest pain R07.9    CAD in native artery I25.10       Past Medical History:        Diagnosis Date    Cancer [facility])     Bladder    COPD (chronic obstructive pulmonary disease) ([facility]     Depression     Diabetes mellitus ([facility]     Hypertension        Past Surgical History:        Procedure Laterality Date    BACK SURGERY      X3     BLADDER REMOVAL      LYMPHADENECTOMY      lower ext    PROSTATECTOMY         Social History:    Social History     Tobacco Use    Smoking status: Current Every Day Smoker     Packs/day: 1.50     Years: 30.00     Pack years: 45.00    Smokeless tobacco: Never Used   Substance Use Topics    Alcohol use: Yes     Comment:  Occ                                Ready to quit: Not Answered  Counseling given: Not Answered      Vital Signs (Current):   Vitals:    05/01/19 0054 05/01/19 0400 05/01/19 0617 05/01/19 0620   BP: (!) 147/80  (!) 166/92 (!) 172/84   Pulse:   97 93   Resp:   18    Temp:   97.9 °F (36.6 °C)    TempSrc:   Temporal    SpO2:   95% 99%   Weight:  210 lb 3.2 oz (95.3 kg)     Height:                                                  BP Readings from Last 3 Encounters:   05/01/19 (!) 172/84   04/24/19 (!) 152/92   04/10/19 (!) 140/98       NPO Status:                                                                                 BMI:   Wt Readings from Last 3 Encounters:   05/01/19 210 lb 3.2 oz (95.3 kg)   04/24/19 213 lb (96.6 kg)   04/10/19 212 lb (96.2 kg)     Body mass index is 29.32 kg/m². CBC:   Lab Results   Component Value Date    WBC 8.1 04/30/2019    RBC 4.16 04/30/2019    HGB 12.6 04/30/2019    HCT 36.9 04/30/2019    HCT 43.8 02/17/2011    MCV 88.7 04/30/2019    RDW 12.1 04/30/2019     04/30/2019     02/17/2011       CMP:   Lab Results   Component Value Date     05/01/2019     02/17/2011    K 4.4 05/01/2019    K 4.4 04/30/2019    K 4.1 02/17/2011    CL 95 05/01/2019     02/17/2011    CO2 27 05/01/2019    BUN 15 05/01/2019    CREATININE 1.0 05/01/2019    CREATININE 1.0 02/17/2011    LABGLOM >60 05/01/2019    GLUCOSE 180 05/01/2019    PROT 7.6 04/30/2019    PROT 6.9 02/17/2011    CALCIUM 9.4 05/01/2019    BILITOT 0.5 04/30/2019    ALKPHOS 92 04/30/2019    ALKPHOS 74 02/17/2011    AST 25 04/30/2019    ALT 22 04/30/2019       POC Tests:   Recent Labs     04/30/19  2111   POCGLU 194*       Coags:   Lab Results   Component Value Date    PROTIME 12.8 04/30/2019    INR 1.02 04/30/2019       HCG (If Applicable): No results found for: PREGTESTUR, PREGSERUM, HCG, HCGQUANT     ABGs: No results found for: PHART, PO2ART, WMI0XVN, JVL3MRB, BEART, P3SDVCEU     Type & Screen (If Applicable):  No results found for: LABABO, [address]    Anesthesia Evaluation  Patient summary reviewed and Nursing notes reviewed no history of anesthetic complications:   Airway: Mallampati: II  TM distance: >3 FB   Neck ROM: full  Mouth opening: > = 3 FB Dental:          Pulmonary:   (+) COPD:  current smoker    (-) sleep apnea          Patient did not smoke on day of surgery. Cardiovascular:    (+) hypertension:, angina:, CAD:,     (-)  CHF and orthopnea       Beta Blocker:  Dose within 24 Hrs      ROS comment: LMCA: Normal.    LAD:       Lesion on Prox LAD: Mid subsection. 70% stenosis 33 mm length.  Pre    procedure RADHA III flow was noted. Good runoff was present. The lesion was    diffuse.       Lesion on 1st Diag: Proximal subsection. 75% stenosis 15 mm length. Pre    procedure RADHA III flow was noted. Good runoff was present.     LCx:       Lesion on 1st Ob [place]: Mid subsection. 70% stenosis 7 mm length. Pre    procedure RADHA III flow was noted. Good runoff was present.     RCA:       Lesion on Mid RCA: Mid subsection. 90% stenosis 33 mm length. Pre    procedure RADHA III flow was noted. Good runoff was present. The lesion was    diffuse. The lesion showed with irregular contour.      VA     LV function assessed as:Normal.     Neuro/Psych:               GI/Hepatic/Renal:   (+) GERD: well controlled,           Endo/Other:    (+) Diabetes, malignancy/cancer. Abdominal:           Vascular:                                      Anesthesia Plan      general     ASA 4     (Decadron/Zofran Intraop)  Induction: intravenous. arterial line, BIS, central line, CVP and PA catheter  MIPS: Postoperative opioids intended and Prophylactic antiemetics administered. Anesthetic plan and risks discussed with patient.                     Maciej Barillas MD   5/1/2019

## 2019-05-01 NOTE — OP NOTE
[facility]                 [address]                                OPERATIVE REPORT    PATIENT NAME: Shannan PALOMINO                  :        1970  MED REC NO:   [id number]                              ROOM:       [facility]  ACCOUNT NO:   [de-identified]                           ADMIT DATE: 2019  PROVIDER:     Jakub Alves MD    DATE OF PROCEDURE:  2019    PREOPERATIVE DIAGNOSES:  1. Unstable angina with acute coronary syndrome, severe multivessel  coronary artery disease. 2.  Newly diagnosed type 2 diabetic. POSTOPERATIVE DIAGNOSES:  1. Unstable angina with acute coronary syndrome, severe multivessel  coronary artery disease. 2.  Newly diagnosed type 2 diabetic. 3.  Small severely and diffusely diseased coronary arteries. OPERATIVE PROCEDURE:  1. Three-vessel perfusion assisted coronary artery bypass grafting  placing the left internal mammary artery to the left anterior descending  artery and saphenous vein bypass grafts to the obtuse marginal branch  and posterior descending artery. 2.  Endoscopic saphenous vein harvest, left leg. SURGEON:  Jakub Alves MD    ASSISTANT:  Hoang Weber CFA    ANESTHESIA:  General.    HISTORY AND FINDINGS:  The patient is a 70-year-old overweight newly  diagnosed type 2 diabetic, who presented to his family physician with  complaints of severe chest discomfort with diaphoresis and shortness of  breath. He was sent urgently to the emergency department. An ECG  demonstrated no acute changes and cardiac enzymes were negative. He  then underwent Lexiscan and demonstrated evidence of myocardial ischemia  with ejection fraction of 54%. The patient then underwent cardiac  catheterization. This demonstrates a left ventricle, which shows that  the inferior wall is hypokinetic. Ejection fraction is estimated at  50%. The left main artery is unremarkable.   The LAD has some proximal  disease perhaps up to 70% in one view. The circumflex system has a  bifurcating branching obtuse marginal system with 70% stenosis in the  second branch. The RCA is a dominant system with a long complex mid 90%  stenosis with a diffusely diseased distal vessel. At the time of  operation, the patient was found to have a slightly dilated aorta, which  was normal in caliber and size, free of any palpable atherosclerotic  disease. The heart was normal size; although, there was significant  left ventricular hypertrophy present, which made rotating the heart and  exposing the left side of the heart extremely difficult. The RCA was  severely diffusely diseased throughout its entire length. This vessel  was not operable; therefore, only the PDA was accessible in the right  system. It was a diffusely diseased vessel with a lumen that measured  only 1 to 1.5 mm in size and it is very difficult to graft. The LAD was  accessible at its midpoint, where it measured 1.5 mm in size. The two  obtuse marginal branches were very tiny vessels, diffusely diseased,  difficult to graft, each was about 1 to 1.5 mm in size. Overall, this  patient has rather severe diffuse diabetic-type arteries with severe  diffuse disease. Bypass grafting was difficult and challenging due to  the diffuse nature of the disease and very tiny vessels. It is very  doubtful if he would ever be a candidate for redo operation. DESCRIPTION OF PROCEDURE:  The patient was taken to the operating room  and placed in the supine position. The patient could not tolerate  insertion of the radial arterial line. He did not tolerate the small  needle to inject the local anesthetic to insert the arterial line;  therefore, the patient had to undergo general anesthesia to insert the  radial line. The radial line was difficult to insert.   Both right and  left wrist was unacceptable; therefore, using ultrasound guidance, a  catheter was placed into the left brachial artery with successful  achievement of central arterial pressure. A [place] catheter was then  inserted without difficulty. After ensuring adequate anesthesia, I  first explored saphenous vein on the patient's right leg in anticipation  to performing endoscopic vein harvest or the vein in this area was  unusable. Therefore, I cut down on the left leg just below the left  knee. This vein was small, but appeared to be usable for bypass  grafting. Therefore, vein was taken just below the right knee all the  way to the groin. Further vein was obtained by extending the incision  down to the upper calf level. The chest was then entered through a  median sternotomy incision. The left internal mammary artery was then  dissected off the left chest wall as a pedicle. The patient was then  systemically heparinized and was cannulated in the usual fashion. The  mammary artery was then retrieved from the left chest.  This was an  excellent conduit with good length. The distal lumen measured about 2  mm in size and appeared to be a good match for the LAD. The patient was  then placed on cardiopulmonary bypass and maintained a 37-degree  centigrade using the perfusion assisted beating heart technique. I then  used the cardiac stabilizer to help dissect out the distal vessels. I  approached the PDA first.  A proximal vessel loop was then placed. A  cardiac stabilizer was then applied. The vessels opened sharply. It  was quite brittle and quite small. I had to squeeze in 1.5 mm shunt and  a segment of saphenous vein was anastomosed end to side to the PDA with  a running 7-0 Prolene suture. This anastomosis was very difficult to  perform as the vessels were so small and so brittle. Attention was then  turned to one of the branches of the obtuse marginal system. A proximal  vessel loop was then placed. The cardiac stabilizer was then applied.    Exposure was difficult as this patient had significant left ventricular  hypertrophy and exposure to the lateral free wall was quite challenging. The vessels then opened. I could barely squeeze in a 1.5 mm shunt and a  second segment of the saphenous vein was then anastomosed end to side to  the obtuse marginal branch with a running 7-0 Prolene suture. This  anastomosis was extremely difficult to perform as the vessel was so  small, so brittle, very difficult to handle. Exposure was extremely  difficult along the free wall for the reasons mentioned above. Once  anastomosis was performed, the vessel did fill retrograde and I felt  that the anastomosis was open. Attention was then turned to the LAD. The left internal mammary artery was then brought through a pericardial  tunnel and prepared for distal anastomosis. A proximal vessel loop was  then placed. A cardiac stabilizer was then applied. The vessels opened  sharply. I was able to squeeze in 1.5 mm shunt in this heavily diseased  vessel and the left internal mammary artery was then anastomosed end to  side to the LAD with a running 7-0 Prolene suture. The stabilizer was  removed and hemostasis was secured. A partial clamp was then placed on  the ascending aorta and the two proximal vein graft anastomoses were  then performed in the running 6-0 Prolene suture. The partial clamp was  then removed and the vein grafts were deaired. All three distal  anastomoses were then checked for hemostasis. I then placed two right  atrial pacing leads. At this point, I noted some blood welling up from  the pericardia well. This was bright red blood. It appeared to be  arterial.  Therefore, I searched for the source. The only source I  could find was a small intermittent bleeding area at the heel of the  obtuse marginal branch anastomosis and this was repaired with 7-0  Prolene sutures. Once the hemostasis appeared to be adequate, no  further blood appeared to be welling up.   I then placed right atrial  pacing

## 2019-05-01 NOTE — PROGRESS NOTES
Ref Range & Units Status Collected Lab    pH, Arterial 7.460High   7.350 - 7.450 Final 05/01/2019 12:44 PM [address] [place] [address] Lab   pCO2, Arterial 35.0  35.0 - 45.0 mmHg Final 05/01/2019 12:44 PM [facility] Lab   pO2, Arterial 222. 0High   80.0 - 100.0 mmHg Final 05/01/2019 12:44 PM [address] [place] [address] Lab   HCO3, Arterial 24.9  22.0 - 26.0 mmol/L Final 05/01/2019 12:44 PM [facility] Excess, Arterial 1.3  -2.0 - 2.0 mmol/L Final 05/01/2019 12:44 PM [address] [place] [address] Lab   Hemoglobin, Art, Extended 10.9Low   14.0 - 18.0 g/dL Final 05/01/2019 12:44 PM [address] Sat, Arterial 96.4  >92 % Final 05/01/2019 12:44 PM [facility] Lab   Carboxyhgb, Arterial 1.0  0.0 - 5.0 % Final 05/01/2019 12:44 PM [facility] Lab        0.0-1.5   (Smokers 1.5-5.0)    Methemoglobin, Arterial 1.0  <1.5 % Final 05/01/2019 12:44 PM [address] [place] [address] Lab   O2 Content, Arterial 15.3  Not Established mL/dL Final 05/01/2019 12:44 PM [address] [place] [address] Lab   O2 Therapy Unknown   Final 05/01/2019 12:44 PM [address] [place] [address] Lab   Testing Performed By     Richard Barry Name Director Address Valid Date Range   [facility] - [address] LAB Climmie Seat,  Alfreda Barry,[address]  [address] [address] 08/30/17 0733-Present   Lab and Collection     Blood gas, arterial - 5/1/2019   Result History     Blood gas, arterial on 5/1/2019   Result Information     Flag: Abnormal Status: Final result (Collected: 5/1/2019 12:44) Provider Status: Ordered   Routing History     Priority Sent On From To Message Type    5/1/2019  1:56 AM [facility] Incoming Lab Results From Yolanda Ward MD     4/29/2019  6:22 PM [facility] Incoming Lab Results From Yolanda Ward MD     4/24/2019  8:07 PM [facility] Incoming Lab Results From Yolanda Ward MD    Click to Print Result   View SmartLink Info     Blood gas, arterial (Order #[id number]) on 5/1/19   Order Report     Order Details   Pt on simv 16 600 +5 100% art line

## 2019-05-02 ENCOUNTER — APPOINTMENT (OUTPATIENT)
Dept: GENERAL RADIOLOGY | Age: 49
DRG: 234 | End: 2019-05-02
Payer: MEDICAID

## 2019-05-02 LAB
ANION GAP SERPL CALCULATED.3IONS-SCNC: 12 MMOL/L (ref 7–19)
BASE EXCESS ARTERIAL: -1.5 MMOL/L (ref -2–2)
BASE EXCESS ARTERIAL: -2.9 MMOL/L (ref -2–2)
BUN BLDV-MCNC: 22 MG/DL (ref 6–20)
CALCIUM SERPL-MCNC: 8 MG/DL (ref 8.6–10)
CARBOXYHEMOGLOBIN ARTERIAL: 1.3 % (ref 0–5)
CARBOXYHEMOGLOBIN ARTERIAL: 1.8 % (ref 0–5)
CHLORIDE BLD-SCNC: 101 MMOL/L (ref 98–111)
CO2: 22 MMOL/L (ref 22–29)
CREAT SERPL-MCNC: 1.3 MG/DL (ref 0.5–1.2)
EKG P AXIS: 51 DEGREES
EKG P-R INTERVAL: 108 MS
EKG Q-T INTERVAL: 356 MS
EKG QRS DURATION: 100 MS
EKG QTC CALCULATION (BAZETT): 424 MS
EKG T AXIS: 46 DEGREES
GFR NON-AFRICAN AMERICAN: 58
GLUCOSE BLD-MCNC: 102 MG/DL (ref 70–99)
GLUCOSE BLD-MCNC: 105 MG/DL (ref 74–109)
GLUCOSE BLD-MCNC: 106 MG/DL (ref 70–99)
GLUCOSE BLD-MCNC: 108 MG/DL (ref 70–99)
GLUCOSE BLD-MCNC: 111 MG/DL (ref 70–99)
GLUCOSE BLD-MCNC: 114 MG/DL (ref 70–99)
GLUCOSE BLD-MCNC: 123 MG/DL (ref 70–99)
GLUCOSE BLD-MCNC: 127 MG/DL (ref 70–99)
GLUCOSE BLD-MCNC: 146 MG/DL (ref 70–99)
GLUCOSE BLD-MCNC: 190 MG/DL (ref 70–99)
GLUCOSE BLD-MCNC: 199 MG/DL (ref 70–99)
HCO3 ARTERIAL: 21.9 MMOL/L (ref 22–26)
HCO3 ARTERIAL: 22.8 MMOL/L (ref 22–26)
HCT VFR BLD CALC: 27 % (ref 42–52)
HEMOGLOBIN, ART, EXTENDED: 9.5 G/DL (ref 14–18)
HEMOGLOBIN, ART, EXTENDED: 9.8 G/DL (ref 14–18)
HEMOGLOBIN: 9.1 G/DL (ref 14–18)
MCH RBC QN AUTO: 30.4 PG (ref 27–31)
MCHC RBC AUTO-ENTMCNC: 33.7 G/DL (ref 33–37)
MCV RBC AUTO: 90.3 FL (ref 80–94)
METHEMOGLOBIN ARTERIAL: 0.7 %
METHEMOGLOBIN ARTERIAL: 0.7 %
MRSA CULTURE ONLY: NORMAL
O2 CONTENT ARTERIAL: 11.7 ML/DL
O2 CONTENT ARTERIAL: 13 ML/DL
O2 SAT, ARTERIAL: 87.2 %
O2 SAT, ARTERIAL: 93.6 %
O2 THERAPY: ABNORMAL
O2 THERAPY: ABNORMAL
PCO2 ARTERIAL: 36 MMHG (ref 35–45)
PCO2 ARTERIAL: 37 MMHG (ref 35–45)
PDW BLD-RTO: 12.2 % (ref 11.5–14.5)
PERFORMED ON: ABNORMAL
PH ARTERIAL: 7.38 (ref 7.35–7.45)
PH ARTERIAL: 7.41 (ref 7.35–7.45)
PLATELET # BLD: 143 K/UL (ref 130–400)
PMV BLD AUTO: 9.9 FL (ref 9.4–12.4)
PO2 ARTERIAL: 55 MMHG (ref 80–100)
PO2 ARTERIAL: 76 MMHG (ref 80–100)
POTASSIUM SERPL-SCNC: 4.9 MMOL/L (ref 3.5–5)
POTASSIUM SERPL-SCNC: 5.3 MMOL/L (ref 3.5–5)
POTASSIUM, WHOLE BLOOD: 4.5
POTASSIUM, WHOLE BLOOD: 4.7
RBC # BLD: 2.99 M/UL (ref 4.7–6.1)
SODIUM BLD-SCNC: 135 MMOL/L (ref 136–145)
WBC # BLD: 12.8 K/UL (ref 4.8–10.8)

## 2019-05-02 PROCEDURE — 94640 AIRWAY INHALATION TREATMENT: CPT

## 2019-05-02 PROCEDURE — 6360000002 HC RX W HCPCS: Performed by: THORACIC SURGERY (CARDIOTHORACIC VASCULAR SURGERY)

## 2019-05-02 PROCEDURE — 6370000000 HC RX 637 (ALT 250 FOR IP): Performed by: THORACIC SURGERY (CARDIOTHORACIC VASCULAR SURGERY)

## 2019-05-02 PROCEDURE — 36600 WITHDRAWAL OF ARTERIAL BLOOD: CPT

## 2019-05-02 PROCEDURE — 82803 BLOOD GASES ANY COMBINATION: CPT

## 2019-05-02 PROCEDURE — 85027 COMPLETE CBC AUTOMATED: CPT

## 2019-05-02 PROCEDURE — 93005 ELECTROCARDIOGRAM TRACING: CPT

## 2019-05-02 PROCEDURE — 2580000003 HC RX 258: Performed by: THORACIC SURGERY (CARDIOTHORACIC VASCULAR SURGERY)

## 2019-05-02 PROCEDURE — 82948 REAGENT STRIP/BLOOD GLUCOSE: CPT

## 2019-05-02 PROCEDURE — 2700000000 HC OXYGEN THERAPY PER DAY

## 2019-05-02 PROCEDURE — 99024 POSTOP FOLLOW-UP VISIT: CPT | Performed by: THORACIC SURGERY (CARDIOTHORACIC VASCULAR SURGERY)

## 2019-05-02 PROCEDURE — 2500000003 HC RX 250 WO HCPCS: Performed by: THORACIC SURGERY (CARDIOTHORACIC VASCULAR SURGERY)

## 2019-05-02 PROCEDURE — 37799 UNLISTED PX VASCULAR SURGERY: CPT

## 2019-05-02 PROCEDURE — 71045 X-RAY EXAM CHEST 1 VIEW: CPT

## 2019-05-02 PROCEDURE — 2000000000 HC ICU R&B

## 2019-05-02 PROCEDURE — 84132 ASSAY OF SERUM POTASSIUM: CPT

## 2019-05-02 PROCEDURE — 80048 BASIC METABOLIC PNL TOTAL CA: CPT

## 2019-05-02 PROCEDURE — 99232 SBSQ HOSP IP/OBS MODERATE 35: CPT | Performed by: INTERNAL MEDICINE

## 2019-05-02 RX ORDER — ALPRAZOLAM 1 MG/1
1 TABLET ORAL 3 TIMES DAILY PRN
Status: DISCONTINUED | OUTPATIENT
Start: 2019-05-02 | End: 2019-05-06 | Stop reason: HOSPADM

## 2019-05-02 RX ORDER — ATENOLOL 50 MG/1
50 TABLET ORAL DAILY
Status: DISCONTINUED | OUTPATIENT
Start: 2019-05-02 | End: 2019-05-06 | Stop reason: HOSPADM

## 2019-05-02 RX ORDER — KETOROLAC TROMETHAMINE 30 MG/ML
30 INJECTION, SOLUTION INTRAMUSCULAR; INTRAVENOUS EVERY 6 HOURS PRN
Status: DISCONTINUED | OUTPATIENT
Start: 2019-05-02 | End: 2019-05-06 | Stop reason: HOSPADM

## 2019-05-02 RX ORDER — FLUOXETINE HYDROCHLORIDE 20 MG/1
20 CAPSULE ORAL DAILY
Status: DISCONTINUED | OUTPATIENT
Start: 2019-05-02 | End: 2019-05-06 | Stop reason: HOSPADM

## 2019-05-02 RX ORDER — BUSPIRONE HYDROCHLORIDE 10 MG/1
10 TABLET ORAL 3 TIMES DAILY PRN
Status: DISCONTINUED | OUTPATIENT
Start: 2019-05-02 | End: 2019-05-06 | Stop reason: HOSPADM

## 2019-05-02 RX ORDER — AMLODIPINE BESYLATE 5 MG/1
5 TABLET ORAL DAILY
Status: DISCONTINUED | OUTPATIENT
Start: 2019-05-02 | End: 2019-05-06 | Stop reason: HOSPADM

## 2019-05-02 RX ADMIN — ASPIRIN 81 MG: 81 TABLET, COATED ORAL at 08:50

## 2019-05-02 RX ADMIN — Medication 2 G: at 03:41

## 2019-05-02 RX ADMIN — INSULIN LISPRO 1 UNITS: 100 INJECTION, SOLUTION INTRAVENOUS; SUBCUTANEOUS at 20:42

## 2019-05-02 RX ADMIN — CLOPIDOGREL BISULFATE 75 MG: 75 TABLET ORAL at 08:51

## 2019-05-02 RX ADMIN — HYDROMORPHONE HYDROCHLORIDE 1 MG: 1 INJECTION, SOLUTION INTRAMUSCULAR; INTRAVENOUS; SUBCUTANEOUS at 05:32

## 2019-05-02 RX ADMIN — BUSPIRONE HYDROCHLORIDE 10 MG: 10 TABLET ORAL at 20:41

## 2019-05-02 RX ADMIN — HYDROMORPHONE HYDROCHLORIDE 1 MG: 1 INJECTION, SOLUTION INTRAMUSCULAR; INTRAVENOUS; SUBCUTANEOUS at 13:05

## 2019-05-02 RX ADMIN — SODIUM CHLORIDE: 9 INJECTION, SOLUTION INTRAVENOUS at 09:04

## 2019-05-02 RX ADMIN — ATENOLOL 50 MG: 50 TABLET ORAL at 07:39

## 2019-05-02 RX ADMIN — KETOROLAC TROMETHAMINE 30 MG: 30 INJECTION, SOLUTION INTRAMUSCULAR; INTRAVENOUS at 23:28

## 2019-05-02 RX ADMIN — Medication 2 G: at 13:18

## 2019-05-02 RX ADMIN — KETOROLAC TROMETHAMINE 30 MG: 30 INJECTION, SOLUTION INTRAMUSCULAR; INTRAVENOUS at 07:31

## 2019-05-02 RX ADMIN — IPRATROPIUM BROMIDE AND ALBUTEROL SULFATE 1 AMPULE: .5; 3 SOLUTION RESPIRATORY (INHALATION) at 10:11

## 2019-05-02 RX ADMIN — ALPRAZOLAM 1 MG: 1 TABLET ORAL at 14:55

## 2019-05-02 RX ADMIN — AMLODIPINE BESYLATE 5 MG: 5 TABLET ORAL at 07:39

## 2019-05-02 RX ADMIN — MORPHINE SULFATE 4 MG: 2 INJECTION, SOLUTION INTRAMUSCULAR; INTRAVENOUS at 03:29

## 2019-05-02 RX ADMIN — ALPRAZOLAM 1 MG: 1 TABLET ORAL at 23:28

## 2019-05-02 RX ADMIN — FAMOTIDINE 20 MG: 20 TABLET ORAL at 08:51

## 2019-05-02 RX ADMIN — HYDROMORPHONE HYDROCHLORIDE 1 MG: 1 INJECTION, SOLUTION INTRAMUSCULAR; INTRAVENOUS; SUBCUTANEOUS at 09:47

## 2019-05-02 RX ADMIN — IPRATROPIUM BROMIDE AND ALBUTEROL SULFATE 1 AMPULE: .5; 3 SOLUTION RESPIRATORY (INHALATION) at 14:30

## 2019-05-02 RX ADMIN — Medication 10 ML: at 20:42

## 2019-05-02 RX ADMIN — KETOROLAC TROMETHAMINE 30 MG: 30 INJECTION, SOLUTION INTRAMUSCULAR; INTRAVENOUS at 16:48

## 2019-05-02 RX ADMIN — FAMOTIDINE 20 MG: 20 TABLET ORAL at 20:41

## 2019-05-02 RX ADMIN — Medication 10 ML: at 07:31

## 2019-05-02 RX ADMIN — ATORVASTATIN CALCIUM 20 MG: 20 TABLET, FILM COATED ORAL at 20:41

## 2019-05-02 RX ADMIN — OXYCODONE HYDROCHLORIDE 10 MG: 5 TABLET ORAL at 01:54

## 2019-05-02 RX ADMIN — DOCUSATE SODIUM 100 MG: 100 CAPSULE, LIQUID FILLED ORAL at 08:51

## 2019-05-02 RX ADMIN — METFORMIN HYDROCHLORIDE 500 MG: 500 TABLET ORAL at 08:51

## 2019-05-02 RX ADMIN — MORPHINE SULFATE 4 MG: 2 INJECTION, SOLUTION INTRAMUSCULAR; INTRAVENOUS at 20:41

## 2019-05-02 RX ADMIN — HYDROMORPHONE HYDROCHLORIDE 1 MG: 1 INJECTION, SOLUTION INTRAMUSCULAR; INTRAVENOUS; SUBCUTANEOUS at 01:27

## 2019-05-02 RX ADMIN — IPRATROPIUM BROMIDE AND ALBUTEROL SULFATE 1 AMPULE: .5; 3 SOLUTION RESPIRATORY (INHALATION) at 18:37

## 2019-05-02 RX ADMIN — OXYCODONE HYDROCHLORIDE 10 MG: 5 TABLET ORAL at 17:59

## 2019-05-02 RX ADMIN — SODIUM CHLORIDE: 9 INJECTION, SOLUTION INTRAVENOUS at 22:29

## 2019-05-02 RX ADMIN — OXYCODONE HYDROCHLORIDE 10 MG: 5 TABLET ORAL at 06:08

## 2019-05-02 RX ADMIN — ALPRAZOLAM 1 MG: 1 TABLET ORAL at 07:31

## 2019-05-02 RX ADMIN — INSULIN GLARGINE 14 UNITS: 100 INJECTION, SOLUTION SUBCUTANEOUS at 20:42

## 2019-05-02 RX ADMIN — Medication 2 G: at 19:35

## 2019-05-02 RX ADMIN — OXYCODONE HYDROCHLORIDE 10 MG: 5 TABLET ORAL at 22:28

## 2019-05-02 RX ADMIN — INSULIN LISPRO 1 UNITS: 100 INJECTION, SOLUTION INTRAVENOUS; SUBCUTANEOUS at 16:58

## 2019-05-02 RX ADMIN — METFORMIN HYDROCHLORIDE 500 MG: 500 TABLET ORAL at 16:52

## 2019-05-02 RX ADMIN — FLUOXETINE HYDROCHLORIDE 20 MG: 20 CAPSULE ORAL at 08:51

## 2019-05-02 RX ADMIN — IPRATROPIUM BROMIDE AND ALBUTEROL SULFATE 1 AMPULE: .5; 3 SOLUTION RESPIRATORY (INHALATION) at 06:50

## 2019-05-02 RX ADMIN — DEXMEDETOMIDINE HYDROCHLORIDE 0.2 MCG/KG/HR: 100 INJECTION, SOLUTION INTRAVENOUS at 13:16

## 2019-05-02 RX ADMIN — OXYCODONE HYDROCHLORIDE 10 MG: 5 TABLET ORAL at 11:38

## 2019-05-02 RX ADMIN — DOCUSATE SODIUM 100 MG: 100 CAPSULE, LIQUID FILLED ORAL at 20:41

## 2019-05-02 ASSESSMENT — PAIN SCALES - GENERAL
PAINLEVEL_OUTOF10: 8
PAINLEVEL_OUTOF10: 8
PAINLEVEL_OUTOF10: 6
PAINLEVEL_OUTOF10: 10
PAINLEVEL_OUTOF10: 8
PAINLEVEL_OUTOF10: 7
PAINLEVEL_OUTOF10: 6
PAINLEVEL_OUTOF10: 7
PAINLEVEL_OUTOF10: 8
PAINLEVEL_OUTOF10: 5
PAINLEVEL_OUTOF10: 10
PAINLEVEL_OUTOF10: 10
PAINLEVEL_OUTOF10: 8
PAINLEVEL_OUTOF10: 5
PAINLEVEL_OUTOF10: 8
PAINLEVEL_OUTOF10: 8
PAINLEVEL_OUTOF10: 4
PAINLEVEL_OUTOF10: 4
PAINLEVEL_OUTOF10: 6
PAINLEVEL_OUTOF10: 10

## 2019-05-02 NOTE — PROGRESS NOTES
5/2/2019  6:31 AM - Jose Enrique Mart Incoming Lab Results From [facility]     Component Value Ref Range & Units Status Collected Lab   pH, Arterial 7.380  7.350 - 7.450 Final 05/02/2019  6:30 AM [facility] Lab   pCO2, Arterial 37.0  35.0 - 45.0 mmHg Final 05/02/2019  6:30 AM [facility] Lab   pO2, Arterial 76. 0Low   80.0 - 100.0 mmHg Final 05/02/2019  6:30 AM [facility] Lab   HCO3, Arterial 21.9Low   22.0 - 26.0 mmol/L Final 05/02/2019  6:30 AM [facility] Excess, Arterial -2.9Low   -2.0 - 2.0 mmol/L Final 05/02/2019  6:30 AM [facility] Lab   Hemoglobin, Art, Extended 9.8Low   14.0 - 18.0 g/dL Final 05/02/2019  6:30 AM [facility] Lab   O2 Sat, Arterial 93.6  >92 % Final 05/02/2019  6:30 AM [facility] Lab   Carboxyhgb, Arterial 1.3  0.0 - 5.0 % Final 05/02/2019  6:30 AM [facility] Lab        0.0-1.5   (Smokers 1.5-5.0)    Methemoglobin, Arterial 0.7  <1.5 % Final 05/02/2019  6:30 AM [facility] Lab   O2 Content, Arterial 13.0          Pt on 50% venti mask, al

## 2019-05-02 NOTE — PROGRESS NOTES
[facility] Hospitalists Progress Note    Patient:  Hermelinda Larson  YOB: 1970  Date of Service: 5/2/2019  MRN: [id number]   Acct: [de-identified]   Primary Care Physician: Warrick Duverney, MD  Advance Directive: Full Code  Admit Date: 4/24/2019       Hospital Day: 3      CHIEF COMPLAINT:     Chief Complaint   Patient presents with    Chest Pain       5/2/2019 8:42 AM  Subjective / Interval History:   05/02/2019  Extubated yesterday. Laying comfortably in bed. Endorses some anxiety. No acute overnight events reported. Denies any other acute complaints or distress at this time. 05/01/2019  Patient seen in the ICU this pm. S/p CABG. Currently sedated and on Full Vent. Cumulative Hospital History:   As per Previous Documentation, quoted below;  Mr. Sameer Hernandes Qualia a 44 y. o. newly-diagnosed diabetic male who was referred to the ER by his PCP for evaluation of exertional chest pain and progressive shortness of breath.      Work-up in the ER included EKG that demonstrated NSR without acute changes. Cardiac enzymes were negative. He was admitted to the hospitalist service.      Nuclear stress test was completed on 04/25/2019, demonstrated inferior wall ischemia with calculated EF 54%. Therefore, he underwent cardiac catheterization via right radial approach (04/29/2019). This demonstrated moderate to severe multivessel CAD with preserved LV systolic function. CT Surgery following, on account of  Severe multivessel coronary artery disease  S/p CABG (05/01/2019)        Review of Systems:   Review of Systems  ROS: 14 point review of systems is negative except as specifically addressed above.     DIET CLEAR LIQUID; Carb Control: 4 carb choices (60 gms)/meal; No Caffeine    Intake/Output Summary (Last 24 hours) at 5/2/2019 0842  Last data filed at 5/2/2019 0700  Gross per 24 hour   Intake 4036.12 ml   Output 2925 ml   Net 1111.12 ml       Medications:   sodium chloride 75 mL/hr at 05/02/19 041    Q2H PRN Marta Pederson MD   2 mg at 05/01/19 2141    Or    morphine (PF) injection 4 mg  4 mg Intravenous Q2H PRN Marta Pederson MD   4 mg at 05/02/19 0329    zolpidem (AMBIEN) tablet 5 mg  5 mg Oral Nightly PRN Marta Pederson MD        docusate sodium (COLACE) capsule 100 mg  100 mg Oral BID Marta Pederson MD   Stopped at 05/01/19 2124    ondansetron (ZOFRAN) injection 4 mg  4 mg Intravenous Q8H PRN Marta Pederson MD        famotidine (PEPCID) tablet 20 mg  20 mg Oral BID Marta Pederson MD   Stopped at 05/01/19 2124    atorvastatin (LIPITOR) tablet 20 mg  20 mg Oral Nightly Marta Pederson MD        aspirin EC tablet 81 mg  81 mg Oral Daily Marta Pederson MD        clopidogrel (PLAVIX) tablet 75 mg  75 mg Oral Daily Marta Pederson MD        ipratropium-albuterol (DUONEB) nebulizer solution 1 ampule  1 ampule Inhalation Q4H WA Marta Pederson MD   1 ampule at 05/02/19 0650    0.9 % sodium chloride bolus  500 mL Intravenous Continuous PRN Marta Pederson MD   Stopped at 05/01/19 1900    insulin regular (HUMULIN R;NOVOLIN R) 100 Units in sodium chloride 0.9 % 100 mL infusion  1 Units/hr Intravenous Continuous Marta Pederson MD   Stopped at 05/02/19 0727    insulin glargine (LANTUS) injection vial 14 Units  0.15 Units/kg Subcutaneous Nightly Marta Pederson MD        insulin lispro (HUMALOG) injection vial 0-6 Units  0-6 Units Subcutaneous TID  Marta Pederson MD        insulin lispro (HUMALOG) injection vial 0-3 Units  0-3 Units Subcutaneous Nightly Marta Pederson MD        glucose (GLUTOSE) 40 % oral gel 15 g  15 g Oral PRN Marta Pederson MD        dextrose 50 % solution 12.5 g  12.5 g Intravenous PRN Marta Pederson MD        glucagon (rDNA) injection 1 mg  1 mg Intramuscular PRN Marta Pederson MD        dextrose 5 % solution  100 mL/hr Intravenous PRN Marta Pederson MD        dexmedetomidine (PRECEDEX) 400 mcg in sodium chloride 0.9 % 100 mL infusion  0.2 mcg/kg/hr Intravenous Continuous Louisa Heller MD 7.1 mL/hr at 05/02/19 0530 0.3 mcg/kg/hr at 05/02/19 0530    propofol 1000 MG/100ML injection  10 mcg/kg/min Intravenous Titrated Louisa Heller MD   Stopped at 05/01/19 1559    HYDROmorphone (DILAUDID) injection 1 mg  1 mg Intravenous Q2H PRN Louisa Heller MD   1 mg at 05/02/19 0532         sodium chloride 75 mL/hr at 05/02/19 0411    sodium chloride Stopped (05/01/19 1900)    insulin (HUMAN R) non-weight based infusion Stopped (05/02/19 0727)    dextrose      dexmedetomidine (PRECEDEX) IV infusion 0.3 mcg/kg/hr (05/02/19 0530)    propofol Stopped (05/01/19 1559)      amLODIPine  5 mg Oral Daily    FLUoxetine  20 mg Oral Daily    atenolol  50 mg Oral Daily    metFORMIN  500 mg Oral BID WC    sodium chloride flush  10 mL Intravenous 2 times per day    ceFAZolin (ANCEF) IVPB  2 g Intravenous Q8H    docusate sodium  100 mg Oral BID    famotidine  20 mg Oral BID    atorvastatin  20 mg Oral Nightly    aspirin  81 mg Oral Daily    clopidogrel  75 mg Oral Daily    ipratropium-albuterol  1 ampule Inhalation Q4H WA    insulin glargine  0.15 Units/kg Subcutaneous Nightly    insulin lispro  0-6 Units Subcutaneous TID WC    insulin lispro  0-3 Units Subcutaneous Nightly     ALPRAZolam, ketorolac, busPIRone, sodium chloride flush, potassium chloride, acetaminophen, oxyCODONE **OR** oxyCODONE, morphine **OR** morphine, zolpidem, ondansetron, sodium chloride, glucose, dextrose, glucagon (rDNA), dextrose, HYDROmorphone  DIET CLEAR LIQUID; Carb Control: 4 carb choices (60 gms)/meal; No Caffeine       Labs:     Recent Labs     05/01/19  1247 05/01/19  1818 05/02/19  0440   WBC 12.1* 14.5* 12.8*   RBC 3.48* 3.50* 2.99*   HGB 10.6* 10.6* 9.1*   HCT 31.2* 31.7* 27.0*   MCV 89.7 90.6 90.3   MCH 30.5 30.3 30.4   MCHC 34.0 33.4 33.7   * 172 143     Recent Labs     05/01/19  0146  05/01/19  1149  05/01/19  1247  05/02/19  0440 artifact. Repeat PA chest with nipple markers in place is recommended. Heart size is normal. No pneumothorax or pleural effusion is identified. The osseous structures are unremarkable. No pulmonary consolidation or acute findings. A few small nodules in the left lung base, at least one is calcified. One of the nodules may represent a nipple shadow artifact. Consider repeat PA chest with nipple markers in place. Signed by Dr Jessica Fortune on 4/24/2019 3:12 PM    Xr Cervical Spine (2-3 Views)    Result Date: 4/10/2019  EXAMINATION: XR CERVICAL SPINE (2-3 VIEWS) 4/10/2019 9:34 AM HISTORY: Neck pain 4 views cervical spine are obtained. Cervical vertebra are normally aligned. There is slight loss of height at C5-6 disc space level and loss of height of C6-7 disc space level. Mild uncinate spurring is noted at the C6-7 level. Minimal anterior hypertrophic degenerative changes noted inferior endplates of V8-U3 and C6. The odontoid process is intact. Calcification left carotid artery is noted. [place] the lungs appears clear as visualized. Impression degenerative spondylosis is noted in the cervical spine as described above Signed by Dr Nadege Martin on 4/10/2019 9:36 AM    Ct Head Wo Contrast    Result Date: 4/24/2019  EXAMINATION: CT HEAD WO CONTRAST 4/24/2019 5:48 PM HISTORY: Daily headaches, history of bladder cancer Comparison: CT head without contrast 2/17/2011 Technique: Sequential imaging was performed from the vertex through the base of the skull without the use of IV contrast. Sagittal and coronal reformations were made from the original source data and reviewed. Automated exposure control was utilized for radiation dose reduction. Radiation dose:  mGy-cm Findings: There is no evidence of acute intracranial hemorrhage, mass, or midline shift. There is no evidence of acute territorial infarct. The ventricles appear normal in configuration. Basilar cisterns are patent.  The posterior fossa appears grossly normal. The calvarium appears intact. There is near complete opacification of the visualized left maxillary sinus. The visualized mastoid air cells appear clear. Calcifications are seen in the cavernous carotid arteries. Impression: No acute intracranial findings. Sinus disease. Signed by Dr Bobby Trevizo on 4/24/2019 5:54 PM    Ct Chest W Contrast    Result Date: 4/27/2019  CT CHEST W CONTRAST 4/27/2019 5:00 AM HISTORY: Left lower lobe pulmonary nodule COMPARISON: Chest radiograph dated 4/26/2019 and 4/24/2019 TECHNIQUE:  Radiation dose equals  mGy cm. AUTOMATED EXPOSURE CONTROL dose reduction technique was implemented. Thin section axial images were obtained with intravenous contrast. Multi projection reconstruction images were generated. FINDINGS: There are no measurable pulmonary nodules identified in the lungs. Particularly, in the left lower lobe in the region of the chest radiograph abnormality, no CT correlate is identified. There are linear opacities identified in the right lung base compatible with mild platelike atelectasis associated with the diaphragm position. There are no pleural effusions or pneumothoraces or parenchymal infiltrates otherwise. There are small mediastinal lymph nodes. There are no pathologically enlarged nodes. There are coronary artery calcifications. There are atherosclerotic aortic calcifications. There is fatty infiltration of the liver. Limited imaging of the upper abdomen demonstrate no acute abnormality. Bony structures are intact without acute osseous abnormalities. Degenerative spurring noted diffusely in the thoracic spine. 1. No measurable pulmonary nodules identified in the lungs. Minimal platelike atelectasis in the right lung base associated with diaphragm position. Lung fields are clear without acute cardiopulmonary process. 2. Atherosclerotic calcifications. 4. Hepatic steatosis.  Signed by Dr Phillip Powell on 4/27/2019 8:30 AM    Xr Chest 1 Vw    Result Carotid Right Measurements +------------+-------+-------+--------+-------+------------+---------------+ ! Location    ! PSV    ! EDV    ! Angle   ! RI     !%Stenosis   ! Tortuosity     ! +------------+-------+-------+--------+-------+------------+---------------+ ! Prox CCA    !111    !18.2   ! 60      !0.84   !            !               ! +------------+-------+-------+--------+-------+------------+---------------+ ! Mid CCA     ! 85     !19.9   !60      !0.77   !            !               ! +------------+-------+-------+--------+-------+------------+---------------+ ! Prox ICA    !103    !27     !60      !0.74   !            !               ! +------------+-------+-------+--------+-------+------------+---------------+ ! Mid ICA     !101    !31.7   !60      !0.69   !            !               ! +------------+-------+-------+--------+-------+------------+---------------+ ! Dist ICA    !76.8   !28.1   ! 60      !0.63   !            !               ! +------------+-------+-------+--------+-------+------------+---------------+ ! Prox ECA    !173    !16.7   !60      !0.9    ! !               ! +------------+-------+-------+--------+-------+------------+---------------+ ! Vertebral   !47.1   !11.4   !60      !0.76   !            !               ! +------------+-------+-------+--------+-------+------------+---------------+   - There is antegrade vertebral flow noted on the right side. - Additional Measurements:ICAPSV/CCAPSV 0.93. ICAEDV/CCAEDV 1.74. Carotid Left Measurements +------------+-------+-------+--------+-------+------------+---------------+ ! Location    ! PSV    ! EDV    ! Angle   ! RI     !%Stenosis   ! Tortuosity     ! +------------+-------+-------+--------+-------+------------+---------------+ ! Prox CCA    !78.2   !15.7   !60      !0.8    ! !               ! +------------+-------+-------+--------+-------+------------+---------------+ ! Mid CCA     !107    !22.3   !60      !0.79   !            ! ! +------------+-------+-------+--------+-------+------------+---------------+ ! Prox ICA    !57.8   !14.9   !60      !0.74   !            !               ! +------------+-------+-------+--------+-------+------------+---------------+ ! Mid ICA     ! 96.7   !35.2   ! 60      !0.64   !            !               ! +------------+-------+-------+--------+-------+------------+---------------+ ! Dist ICA    ! 96.7   !38.1   ! 60      !0.61   !            !               ! +------------+-------+-------+--------+-------+------------+---------------+ ! Prox ECA    !136    !14.1   ! 60      !0.9    ! !               ! +------------+-------+-------+--------+-------+------------+---------------+ ! Vertebral   !50.7   !16.5   !60      !0.67   !            !               ! +------------+-------+-------+--------+-------+------------+---------------+   - There is antegrade vertebral flow noted on the left side. - Additional Measurements:ICAPSV/CCAPSV 1.24. ICAEDV/CCAEDV 2.43. Vl Vein Mapping Lower Bilateral    Result Date: 4/29/2019  Vascular Lower Extremity Vein Mapping Procedure  Demographics   Patient Name   Leonard Turner   Age                 44                 [place]   Patient Number [id number]          Gender              Male   Visit Number   [id number]       Interpreting        Missy Gomez MD                                 Physician   Date of Birth  1970      Referring Physician [facility] Numbers   Accession      [id number]       [address] [facility], [facility]  Number  Procedure Type of Study:   Veins:Lower Extremity Vein Mapping, 00305, VEIN MAPPING LOWER BILATERAL . Indications for Study:Pre-op CABG. Risk Factors   - The patient's last creatinine was 1 mg/dl. Allergies   - Demerol.   - Natural rubber and latex. Impression   Usable greater Saphenous vein conduit in both lower extremities. The veins have been marked on the skin.   Sizes are noted above. NOTE: veins are small in diameter.    Signature   ----------------------------------------------------------------  Electronically signed by Forrest Fairchild MD(Interpreting  physician) on 04/29/2019 05:27 AM        Echo:  Summary   Normal left ventricular size and systolic function EF 44%   Mild concentric left ventricular hypertrophy with transmitral flow   consistent with grade 2 diastolic dysfunction   Normal left atrial size   Structurally normal trileaflet aortic valve with adequate cusp separation   and no insufficiency   Structurally normal mitral valve with trace to mild regurgitation   Normal right-sided chambers with preserved RV systolic function   Trace tricuspid regurgitation   Normal IVC dimensions consistent with normal right atrial filling   pressures of 5-10 mmHg   No pericardial effusion and aortic root dimensions are within normal   limits      Signature   ----------------------------------------------------------------   Electronically signed by Rina Hernandez MD(Interpreting physician)   on 04/25/2019 10:25 AM   ----------------------------------------------------------------        Diagnostic Cardiac Cath - 04/26/2019  Conclusions   Normal LV systolic function.   Severe multivessel coronary artery disease      Recommendations   Routine Post Diagnostic Cath orders.   Risk factor modification.   Cardiovascular surgical consultation      Signatures   ----------------------------------------------------------------   Electronically signed by Shamar Singleton MD(Performing   Physician) on 04/26/2019 15:20   ----------------------------------------------------------------        Objective:   Vitals: BP (!) 166/64   Pulse 90   Temp 101 °F (38.3 °C) (Temporal)   Resp 23   Ht 5' 11\" (1.803 m)   Wt 220 lb 8 oz (100 kg)   SpO2 96%   BMI 30.75 kg/m²   24HR INTAKE/OUTPUT:      Intake/Output Summary (Last 24 hours) at 5/2/2019 0842  Last data filed at 5/2/2019 0700  Gross per 24 hour   Intake 4036.12 ml   Output 2925 ml   Net 1111.12 ml       Physical Exam  General appearance: Sedated and on on Full Vent  HEENT: atraumatic, eyes with clear conjunctiva and normal lids, pupils and irises normal, external ears and nose are normal, lips normal. Neck without masses, lympadenopathy, bruit  Lungs: Patient in no acute respiratory distress, no increased work of breathing, \"clear to auscultation bilaterally\" without rales, rhonchi or wheezes  Heart: regular rate and rhythm, S1, S2 normal, no murmur, click, rub or gallop  Abdomen: soft, non-tender; non-distended normal bowel sounds no masses, no organomegaly  Musculoskeletal: No joint tenderness or deformities throughout. Range of motion intact in bilateral upper and lower extremities. Extremities: extremities normal, atraumatic, no cyanosis or edema  Neurologic: No focal neurologic deficits, normal sensation, Unable to assess patient's oriented, affect and mood appropriate. Skin: Skin color, texture, turgor normal. No rashes or lesions      Assessment/plan:         Principal Problem:    Coronary artery disease involving native coronary artery of native heart with unstable angina pectoris (HCC)  Active Problems:    Unstable angina pectoris (HCC)    Essential hypertension    Hx of bladder cancer    Type 2 diabetes mellitus without complication, without long-term current use of insulin (HCC)    Mixed anxiety depressive disorder    Mixed hyperlipidemia    Chest pain    CAD in native artery  Resolved Problems:    * No resolved hospital problems. *      Coronary artery disease  · Cardiology on board  · CT Surgery on board, on account of  Severe multivessel coronary artery disease  · S/p CABG (05/01/2019)  · Continue management as per CTS recommendation. · Continue pain management          Continue management of other chronic medical conditions - see above and orders.               Advance Directive: Full Code    DIET CLEAR LIQUID; Carb Control: 4 carb choices (60 gms)/meal; No Caffeine     DVT prophylaxis: Enoxaparin    Consults Made:   IP CONSULT TO CARDIOLOGY  IP CONSULT TO CARDIOTHORACIC SURGERY  IP CONSULT TO CASE MANAGEMENT    Discharge planning: Continue management in the ICU       Electronically signed by   Pablo Mata MD, MPH,   Internal Medicine Hospitalist   5/2/2019 8:42 AM

## 2019-05-02 NOTE — PROCEDURES
[place] [facility] [address] [facility]                               PULMONARY FUNCTION    PATIENT NAME: Lisa PALOMINO                  :        1970  MED REC NO:   [id number]                              ROOM:       [facility]  ACCOUNT NO:   [de-identified]                           ADMIT DATE: 2019  PROVIDER:     Luisana Starr MD    DATE OF PROCEDURE:  2019    FLOW VOLUME LOOP    IMPRESSION:  1. Spirometry shows moderate restrictive physiology, although  reproducibility criteria are not met, so this may not be valid. 2.  Peak expiratory flow is reduced.         Roscoe Blackwell MD    D: 2019 17:34:25      T: 2019 17:43:53     [id number]  Job#: [id number]     Doc#: [id number]    CC:

## 2019-05-02 NOTE — PROGRESS NOTES
5/2/2019  4:50 AM - Jose Enrique Mart Incoming Lab Results From [employer] Value Ref Range & Units Status Collected Lab   pH, Arterial 7.410  7.350 - 7.450 Final 05/02/2019  4:41 AM [facility] Lab   pCO2, Arterial 36.0  35.0 - 45.0 mmHg Final 05/02/2019  4:41 AM [facility] Lab   pO2, Arterial 55. 0Low   80.0 - 100.0 mmHg Final 05/02/2019  4:41 AM [facility] Lab   HCO3, Arterial 22.8  22.0 - 26.0 mmol/L Final 05/02/2019  4:41 AM [facility] Excess, Arterial -1.5  -2.0 - 2.0 mmol/L Final 05/02/2019  4:41 AM [facility] Lab   Hemoglobin, Art, Extended 9.5Low   14.0 - 18.0 g/dL Final 05/02/2019  4:41 AM [facility] Lab   O2 Sat, Arterial 87.2Low Panic   >92 % Final 05/02/2019  4:41 AM [facility] Lab   Carboxyhgb, Arterial 1.8  0.0 - 5.0 % Final 05/02/2019  4:41 AM [facility] Lab        0.0-1.5   (Smokers 1.5-5.0)    Methemoglobin, Arterial 0.7  <1.5 % Final 05/02/2019  4:41 AM [facility] Lab   O2 Content, Arterial 11.7  Not Established mL/dL Final 05/02/2019  4:41 AM [facility] Lab   O2 Therapy Unknown   Final 05/02/2019  4:41 AM [facility] Lab   Testing Performed By     [address] Name Director Address Valid Date Range   [facility] - [address] LAB Tiffanie Granger  [place],[address]  [address] [place] [address] 08/30/17 0733-Present   Narrative   Performed by: [address] [facility] Lab   CALL  [place]  AYDEN Cowart RN ICU, 05/02/2019 04:50, by CLAAI       A-line, 94% on 2.5 LPM NC  Increase 4 LPM NC

## 2019-05-02 NOTE — PROGRESS NOTES
POST OP CARDIOTHORACIC SURGERY PROGRESS NOTE    Post op day 1    SUBJECTIVE:  AWake and alert. Very anxious. !!  Uncomfortable. Tachypnea from anxiety. BP (!) 164/64   Pulse 104   Temp 101 °F (38.3 °C) (Temporal)   Resp (!) 32   Ht 5' 11\" (1.803 m)   Wt 220 lb 8 oz (100 kg)   SpO2 97%   BMI 30.75 kg/m²   Average, Min, and Max for last 24 hours Vitals:  TEMPERATURE:  Temp  Av.3 °F (37.9 °C)  Min: 98.6 °F (37 °C)  Max: 101.3 °F (38.5 °C)  RESPIRATIONS RANGE: Resp  Av.5  Min: 1  Max: 35  PULSE RANGE: Pulse  Av.4  Min: 91  Max: 104  BLOOD PRESSURE RANGE:  Systolic (04UZB), RO:339 , Min:85 , PHM:011   ; Diastolic (40XPN), JXS:32, Min:49, Max:102    PULSE OXIMETRY RANGE: SpO2  Av.4 %  Min: 84 %  Max: 100 %    I/O last 3 completed shifts: In: 3700.5 [P.O.:650; I.V.:2250.5; Blood:250; IV Piggyback:550]  Out: 0685 [PBYRQ:1658; Emesis/NG output:500; Chest Tube:335]    CHEST: lungs clear. Some upper airway rhonchi    CARDIOVASCULAR: regular rhythm, no murmurs    INCISION: no drainage, skin edges intact    DRAINS: output - 495 ml/24 hours; no air leak    LABS:  CBC:   Lab Results   Component Value Date    WBC 12.8 2019    RBC 2.99 2019    HGB 9.1 2019    HCT 27.0 2019    HCT 43.8 2011    MCV 90.3 2019    MCH 30.4 2019    MCHC 33.7 2019    RDW 12.2 2019     2019     2011    MPV 9.9 2019     BMP:    Lab Results   Component Value Date     2019     2011    K 4.5 2019    K 4.9 2019    K 4.4 2019    K 4.1 2011     2019     2011    CO2 22 2019    BUN 22 2019    LABALBU 4.0 2019    LABALBU 4.6 2011    CREATININE 1.3 2019    CREATININE 1.0 2011    CALCIUM 8.0 2019    LABGLOM 58 2019    GLUCOSE 105 2019       CHEST XRAY: normal postoperative chest xray    ASSESSMENT: normal postoperative course. Anxiety issues    PLAN: Continue Toradol. Zanax for sedation. Remove drains.  Keep in ICU today    Electronically signed by Etta Duffy MD on 5/2/19 at 6:44 AM

## 2019-05-02 NOTE — PROGRESS NOTES
5/1/2019  9:22 PM - BarronoJse Enrique Incoming Lab Results From [facility]     Component Value Ref Range & Units Status Collected Lab   pH, Arterial 7.410  7.350 - 7.450 Final 05/01/2019  9:20 PM [facility] Lab   pCO2, Arterial 34. 0Low   35.0 - 45.0 mmHg Final 05/01/2019  9:20 PM [address] [place] [address] Lab   pO2, Arterial 80.0  80.0 - 100.0 mmHg Final 05/01/2019  9:20 PM [address] [place] [address] Lab   HCO3, Arterial 21.6Low   22.0 - 26.0 mmol/L Final 05/01/2019  9:20 PM [facility] Excess, Arterial -2.5Low   -2.0 - 2.0 mmol/L Final 05/01/2019  9:20 PM [address] [place] [address] Lab   Hemoglobin, Art, Extended 10.3Low   14.0 - 18.0 g/dL Final 05/01/2019  9:20 PM [address] [place] [address] Lab   O2 Sat, Arterial 95.1  >92 % Final 05/01/2019  9:20 PM [address] [place] [address] Lab   Carboxyhgb, Arterial 1.3  0.0 - 5.0 % Final 05/01/2019  9:20 PM [facility] Lab        0.0-1.5   (Smokers 1.5-5.0)    Methemoglobin, Arterial 0.7  <1.5 % Final 05/01/2019  9:20 PM [address] [place] [address] Lab   O2 Content, Arterial 13.9  Not Established mL/dL Final 05/01/2019  9:20 PM [address] [place] [address] Lab   O2 Therapy Unknown   Final 05/01/2019  9:20 PM [address] [place] [address] Lab   Testing Performed By     Richard Barry Name Director Address Valid Date Range   [facility] - [address] LAB Liz Tee  [place],[address]  [address] [address] 08/30/17 0733-Present       JAMES, 100%  CPAP 5, PS 5, 35%  RR 28-42  SBI 59

## 2019-05-02 NOTE — PROGRESS NOTES
PT preoxygenated before suctioning and removing patients tube. NO adverse outcomes. Patient did get anxious right before tube being pull. PT placed on 4 LPM NC and has calmed down. Patient is currently stable and resting in his bed with 4 LPM NC. Will continue to monitor.

## 2019-05-03 ENCOUNTER — APPOINTMENT (OUTPATIENT)
Dept: GENERAL RADIOLOGY | Age: 49
DRG: 234 | End: 2019-05-03
Payer: MEDICAID

## 2019-05-03 LAB
ANION GAP SERPL CALCULATED.3IONS-SCNC: 11 MMOL/L (ref 7–19)
BUN BLDV-MCNC: 27 MG/DL (ref 6–20)
CALCIUM SERPL-MCNC: 8.4 MG/DL (ref 8.6–10)
CHLORIDE BLD-SCNC: 99 MMOL/L (ref 98–111)
CO2: 20 MMOL/L (ref 22–29)
CREAT SERPL-MCNC: 1.1 MG/DL (ref 0.5–1.2)
GFR NON-AFRICAN AMERICAN: >60
GLUCOSE BLD-MCNC: 181 MG/DL (ref 74–109)
GLUCOSE BLD-MCNC: 198 MG/DL (ref 70–99)
GLUCOSE BLD-MCNC: 216 MG/DL (ref 70–99)
GLUCOSE BLD-MCNC: 256 MG/DL (ref 70–99)
GLUCOSE BLD-MCNC: 261 MG/DL (ref 70–99)
HCT VFR BLD CALC: 23.9 % (ref 42–52)
HEMOGLOBIN: 7.8 G/DL (ref 14–18)
MCH RBC QN AUTO: 30.1 PG (ref 27–31)
MCHC RBC AUTO-ENTMCNC: 32.6 G/DL (ref 33–37)
MCV RBC AUTO: 92.3 FL (ref 80–94)
PDW BLD-RTO: 12.3 % (ref 11.5–14.5)
PERFORMED ON: ABNORMAL
PLATELET # BLD: 131 K/UL (ref 130–400)
PMV BLD AUTO: 10.2 FL (ref 9.4–12.4)
POTASSIUM SERPL-SCNC: 4.6 MMOL/L (ref 3.5–5)
RBC # BLD: 2.59 M/UL (ref 4.7–6.1)
SODIUM BLD-SCNC: 130 MMOL/L (ref 136–145)
WBC # BLD: 13.7 K/UL (ref 4.8–10.8)

## 2019-05-03 PROCEDURE — 2580000003 HC RX 258: Performed by: THORACIC SURGERY (CARDIOTHORACIC VASCULAR SURGERY)

## 2019-05-03 PROCEDURE — 6370000000 HC RX 637 (ALT 250 FOR IP): Performed by: THORACIC SURGERY (CARDIOTHORACIC VASCULAR SURGERY)

## 2019-05-03 PROCEDURE — 37799 UNLISTED PX VASCULAR SURGERY: CPT

## 2019-05-03 PROCEDURE — 99232 SBSQ HOSP IP/OBS MODERATE 35: CPT | Performed by: INTERNAL MEDICINE

## 2019-05-03 PROCEDURE — 6360000002 HC RX W HCPCS: Performed by: THORACIC SURGERY (CARDIOTHORACIC VASCULAR SURGERY)

## 2019-05-03 PROCEDURE — 85027 COMPLETE CBC AUTOMATED: CPT

## 2019-05-03 PROCEDURE — 2140000000 HC CCU INTERMEDIATE R&B

## 2019-05-03 PROCEDURE — 71045 X-RAY EXAM CHEST 1 VIEW: CPT

## 2019-05-03 PROCEDURE — 80048 BASIC METABOLIC PNL TOTAL CA: CPT

## 2019-05-03 PROCEDURE — 2700000000 HC OXYGEN THERAPY PER DAY

## 2019-05-03 PROCEDURE — 82948 REAGENT STRIP/BLOOD GLUCOSE: CPT

## 2019-05-03 PROCEDURE — 94640 AIRWAY INHALATION TREATMENT: CPT

## 2019-05-03 PROCEDURE — 99024 POSTOP FOLLOW-UP VISIT: CPT | Performed by: THORACIC SURGERY (CARDIOTHORACIC VASCULAR SURGERY)

## 2019-05-03 RX ADMIN — IPRATROPIUM BROMIDE AND ALBUTEROL SULFATE 1 AMPULE: .5; 3 SOLUTION RESPIRATORY (INHALATION) at 06:37

## 2019-05-03 RX ADMIN — INSULIN LISPRO 2 UNITS: 100 INJECTION, SOLUTION INTRAVENOUS; SUBCUTANEOUS at 23:09

## 2019-05-03 RX ADMIN — FAMOTIDINE 20 MG: 20 TABLET ORAL at 23:01

## 2019-05-03 RX ADMIN — ALPRAZOLAM 1 MG: 1 TABLET ORAL at 22:51

## 2019-05-03 RX ADMIN — ATORVASTATIN CALCIUM 20 MG: 20 TABLET, FILM COATED ORAL at 22:51

## 2019-05-03 RX ADMIN — Medication 2 G: at 03:37

## 2019-05-03 RX ADMIN — OXYCODONE HYDROCHLORIDE 10 MG: 5 TABLET ORAL at 13:09

## 2019-05-03 RX ADMIN — KETOROLAC TROMETHAMINE 30 MG: 30 INJECTION, SOLUTION INTRAMUSCULAR; INTRAVENOUS at 07:49

## 2019-05-03 RX ADMIN — Medication 10 ML: at 20:20

## 2019-05-03 RX ADMIN — INSULIN LISPRO 1 UNITS: 100 INJECTION, SOLUTION INTRAVENOUS; SUBCUTANEOUS at 17:17

## 2019-05-03 RX ADMIN — ATENOLOL 50 MG: 50 TABLET ORAL at 08:19

## 2019-05-03 RX ADMIN — HYDROMORPHONE HYDROCHLORIDE 1 MG: 1 INJECTION, SOLUTION INTRAMUSCULAR; INTRAVENOUS; SUBCUTANEOUS at 23:01

## 2019-05-03 RX ADMIN — OXYCODONE HYDROCHLORIDE 10 MG: 5 TABLET ORAL at 17:15

## 2019-05-03 RX ADMIN — FLUOXETINE HYDROCHLORIDE 20 MG: 20 CAPSULE ORAL at 08:20

## 2019-05-03 RX ADMIN — HYDROMORPHONE HYDROCHLORIDE 1 MG: 1 INJECTION, SOLUTION INTRAMUSCULAR; INTRAVENOUS; SUBCUTANEOUS at 20:19

## 2019-05-03 RX ADMIN — INSULIN GLARGINE 14 UNITS: 100 INJECTION, SOLUTION SUBCUTANEOUS at 23:07

## 2019-05-03 RX ADMIN — Medication 10 ML: at 08:20

## 2019-05-03 RX ADMIN — CLOPIDOGREL BISULFATE 75 MG: 75 TABLET ORAL at 08:20

## 2019-05-03 RX ADMIN — METFORMIN HYDROCHLORIDE 500 MG: 500 TABLET ORAL at 08:20

## 2019-05-03 RX ADMIN — OXYCODONE HYDROCHLORIDE 10 MG: 5 TABLET ORAL at 03:37

## 2019-05-03 RX ADMIN — IPRATROPIUM BROMIDE AND ALBUTEROL SULFATE 1 AMPULE: .5; 3 SOLUTION RESPIRATORY (INHALATION) at 14:32

## 2019-05-03 RX ADMIN — OXYCODONE HYDROCHLORIDE 10 MG: 5 TABLET ORAL at 09:04

## 2019-05-03 RX ADMIN — IPRATROPIUM BROMIDE AND ALBUTEROL SULFATE 1 AMPULE: .5; 3 SOLUTION RESPIRATORY (INHALATION) at 20:46

## 2019-05-03 RX ADMIN — ASPIRIN 81 MG: 81 TABLET, COATED ORAL at 08:19

## 2019-05-03 RX ADMIN — KETOROLAC TROMETHAMINE 30 MG: 30 INJECTION, SOLUTION INTRAMUSCULAR; INTRAVENOUS at 22:51

## 2019-05-03 RX ADMIN — KETOROLAC TROMETHAMINE 30 MG: 30 INJECTION, SOLUTION INTRAMUSCULAR; INTRAVENOUS at 14:03

## 2019-05-03 RX ADMIN — DOCUSATE SODIUM 100 MG: 100 CAPSULE, LIQUID FILLED ORAL at 08:20

## 2019-05-03 RX ADMIN — AMLODIPINE BESYLATE 5 MG: 5 TABLET ORAL at 08:19

## 2019-05-03 RX ADMIN — METFORMIN HYDROCHLORIDE 500 MG: 500 TABLET ORAL at 17:15

## 2019-05-03 RX ADMIN — ACETAMINOPHEN 650 MG: 325 TABLET ORAL at 10:57

## 2019-05-03 RX ADMIN — INSULIN LISPRO 2 UNITS: 100 INJECTION, SOLUTION INTRAVENOUS; SUBCUTANEOUS at 09:01

## 2019-05-03 RX ADMIN — DOCUSATE SODIUM 100 MG: 100 CAPSULE, LIQUID FILLED ORAL at 23:01

## 2019-05-03 RX ADMIN — IPRATROPIUM BROMIDE AND ALBUTEROL SULFATE 1 AMPULE: .5; 3 SOLUTION RESPIRATORY (INHALATION) at 11:03

## 2019-05-03 RX ADMIN — INSULIN LISPRO 3 UNITS: 100 INJECTION, SOLUTION INTRAVENOUS; SUBCUTANEOUS at 12:52

## 2019-05-03 RX ADMIN — FAMOTIDINE 20 MG: 20 TABLET ORAL at 08:20

## 2019-05-03 ASSESSMENT — PAIN SCALES - GENERAL
PAINLEVEL_OUTOF10: 9
PAINLEVEL_OUTOF10: 9
PAINLEVEL_OUTOF10: 8
PAINLEVEL_OUTOF10: 10
PAINLEVEL_OUTOF10: 9
PAINLEVEL_OUTOF10: 10
PAINLEVEL_OUTOF10: 10
PAINLEVEL_OUTOF10: 6
PAINLEVEL_OUTOF10: 10

## 2019-05-03 NOTE — PROGRESS NOTES
POST OP CARDIOTHORACIC SURGERY PROGRESS NOTE    Post op day 2    SUBJECTIVE:  More sedate today, Xanax has helped. Appears more comfortable    BP (!) 118/50   Pulse 88   Temp 98.3 °F (36.8 °C) (Temporal)   Resp 24   Ht 5' 11\" (1.803 m)   Wt 220 lb 3.2 oz (99.9 kg)   SpO2 95%   BMI 30.71 kg/m²   Average, Min, and Max for last 24 hours Vitals:  TEMPERATURE:  Temp  Av °F (36.7 °C)  Min: 97.2 °F (36.2 °C)  Max: 98.7 °F (37.1 °C)  RESPIRATIONS RANGE: Resp  Av.2  Min: 16  Max: 35  PULSE RANGE: Pulse  Av.6  Min: 78  Max: 97  BLOOD PRESSURE RANGE:  Systolic (67KWJ), BQF:971 , Min:118 , IJR:595   ; Diastolic (80TZY), SUX:49, Min:43, Max:66    PULSE OXIMETRY RANGE: SpO2  Av %  Min: 80 %  Max: 98 %    I/O last 3 completed shifts: In: 2835.3 [P.O.:980; I.V.:1755.3; IV Piggyback:100]  Out: 2360 [Urine:1900; Emesis/NG output:300; Chest Tube:160]    CHEST: upper and lower airway rhonchi    CARDIOVASCULAR: regular rhythm, no murmurs    INCISION: no drainage, skin edges intact    DRAINS:     LABS:  CBC:   Lab Results   Component Value Date    WBC 13.7 2019    RBC 2.59 2019    HGB 7.8 2019    HCT 23.9 2019    HCT 43.8 2011    MCV 92.3 2019    MCH 30.1 2019    MCHC 32.6 2019    RDW 12.3 2019     2019     2011    MPV 10.2 2019     BMP:    Lab Results   Component Value Date     2019     2011    K 4.6 2019    K 4.4 2019    K 4.1 2011    CL 99 2019     2011    CO2 20 2019    BUN 27 2019    LABALBU 4.0 2019    LABALBU 4.6 2011    CREATININE 1.1 2019    CREATININE 1.0 2011    CALCIUM 8.4 2019    LABGLOM >60 2019    GLUCOSE 181 2019       CHEST XRAY: normal postoperative chest xray    ASSESSMENT: normal postoperative course. Anxiety has improved    PLAN: OK to PCU.  Resume meds    Electronically signed by Mariel Andersen Irvin Alvarez MD on 5/3/19 at 6:31 AM

## 2019-05-03 NOTE — PROGRESS NOTES
Leah Larson received from 147 to room # [id number] . Mental Status: Patient is oriented, alert, coherent, logical, thought processes intact and able to concentrate and follow conversation. Vitals:    05/03/19 1406   BP: 107/61   Pulse: 84   Resp: 20   Temp: 97.2 °F (36.2 °C)   SpO2: 95%     Placed on cardiac monitor: Yes. Box # [id number]. Belongings: None location is not applicable . Family at bedside Yes. Oriented Patient and Family to room. Call light within reach. Yes. Transfer was: Well tolerated by patient. .    Electronically signed by Douglas Dahl RN on 5/3/2019 at 2:17 PM

## 2019-05-03 NOTE — PROGRESS NOTES
[facility] Hospitalists Progress Note    Patient:  Candida Larson  YOB: 1970  Date of Service: 5/3/2019  MRN: [id number]   Acct: [de-identified]   Primary Care Physician: Ekaterina Fernandes MD  Advance Directive: Full Code  Admit Date: 4/24/2019       Hospital Day: 4      CHIEF COMPLAINT:     Chief Complaint   Patient presents with    Chest Pain       5/3/2019 8:10 AM  Subjective / Interval History:   05/03/2019  No acute overnight event reported. Patient endorses some intermittent cough. Denies any other acute complaints or distress at this time. Family at bedside. 05/02/2019  Extubated -05/01/2019. Laying comfortably in bed. Endorses some anxiety. No acute overnight events reported. Denies any other acute complaints or distress at this time. 05/01/2019  Patient seen in the ICU this pm. S/p CABG. Currently sedated and on Full Vent. Cumulative Hospital History:   As per Previous Documentation, quoted below;  Mr. Sameer Sanchezi a 44 y. o. newly-diagnosed diabetic male who was referred to the ER by his PCP for evaluation of exertional chest pain and progressive shortness of breath.      Work-up in the ER included EKG that demonstrated NSR without acute changes. Cardiac enzymes were negative. He was admitted to the hospitalist service.      Nuclear stress test was completed on 04/25/2019, demonstrated inferior wall ischemia with calculated EF 54%. Therefore, he underwent cardiac catheterization via right radial approach (04/29/2019). This demonstrated moderate to severe multivessel CAD with preserved LV systolic function. CT Surgery following, on account of  Severe multivessel coronary artery disease  S/p CABG (05/01/2019)        Review of Systems:   Review of Systems  ROS: 14 point review of systems is negative except as specifically addressed above.     DIET FULL LIQUID; Carb Control: 4 carb choices (60 gms)/meal; No Caffeine    Intake/Output Summary (Last 24 hours) at 5/3/2019 0810  Last data filed at 5/3/2019 0600  Gross per 24 hour   Intake 2072.15 ml   Output 2070 ml   Net 2.15 ml       Medications:   sodium chloride Stopped (05/01/19 1900)    insulin (HUMAN R) non-weight based infusion Stopped (05/02/19 0727)    dextrose       Current Facility-Administered Medications   Medication Dose Route Frequency Provider Last Rate Last Dose    ALPRAZolam (XANAX) tablet 1 mg  1 mg Oral TID PRN Elsie Singh MD   1 mg at 05/02/19 2328    ketorolac (TORADOL) injection 30 mg  30 mg Intravenous Q6H PRN Elsie Singh MD   30 mg at 05/03/19 0749    amLODIPine (NORVASC) tablet 5 mg  5 mg Oral Daily Elsie Singh MD   5 mg at 05/02/19 0739    busPIRone (BUSPAR) tablet 10 mg  10 mg Oral TID PRN Elsie Singh MD   10 mg at 05/02/19 2041    FLUoxetine (PROZAC) capsule 20 mg  20 mg Oral Daily Elsie Singh MD   20 mg at 05/02/19 0851    atenolol (TENORMIN) tablet 50 mg  50 mg Oral Daily Elsie Singh MD   50 mg at 05/02/19 0739    metFORMIN (GLUCOPHAGE) tablet 500 mg  500 mg Oral BID WC Elsie Singh MD   500 mg at 05/02/19 1652    sodium chloride flush 0.9 % injection 10 mL  10 mL Intravenous 2 times per day Elsie Singh MD   10 mL at 05/02/19 2042    sodium chloride flush 0.9 % injection 10 mL  10 mL Intravenous PRN Elsie Singh MD        potassium chloride 10 mEq/100 mL IVPB (Peripheral Line)  10 mEq Intravenous PRN Elsie Singh MD        acetaminophen (TYLENOL) tablet 650 mg  650 mg Oral Q4H PRN Elsie Singh MD        oxyCODONE (ROXICODONE) immediate release tablet 5 mg  5 mg Oral Q4H PRN Elsie Singh MD        Or   Everton Mitchell oxyCODONE (ROXICODONE) immediate release tablet 10 mg  10 mg Oral Q4H PRN Elsie Singh MD   10 mg at 05/03/19 2854    zolpidem (AMBIEN) tablet 5 mg  5 mg Oral Nightly PRN Elsie Singh MD        docusate sodium (COLACE) capsule 100 mg  100 mg Oral BID Elsie Singh MD   100 mg at 05/02/19 2041    ondansetron [facility] injection 4 mg  4 mg Intravenous Q8H PRN Annmarie Reynoso MD        famotidine (PEPCID) tablet 20 mg  20 mg Oral BID Annmarie Reynoso MD   20 mg at 05/02/19 2041    atorvastatin (LIPITOR) tablet 20 mg  20 mg Oral Nightly Annmarie Reynoso MD   20 mg at 05/02/19 2041    aspirin EC tablet 81 mg  81 mg Oral Daily Annmarie Reynoso MD   81 mg at 05/02/19 0850    clopidogrel (PLAVIX) tablet 75 mg  75 mg Oral Daily Annmarie Reynoso MD   75 mg at 05/02/19 0851    ipratropium-albuterol (DUONEB) nebulizer solution 1 ampule  1 ampule Inhalation Q4H WA Annmarie Reynoso MD   1 ampule at 05/03/19 3391    0.9 % sodium chloride bolus  500 mL Intravenous Continuous PRN Annmarie Reynoso MD   Stopped at 05/01/19 1900    insulin regular (HUMULIN R;NOVOLIN R) 100 Units in sodium chloride 0.9 % 100 mL infusion  1 Units/hr Intravenous Continuous Annmarie Reynoso MD   Stopped at 05/02/19 0727    insulin glargine (LANTUS) injection vial 14 Units  0.15 Units/kg Subcutaneous Nightly Annmarie Reynoso MD   14 Units at 05/02/19 2042    insulin lispro (HUMALOG) injection vial 0-6 Units  0-6 Units Subcutaneous TID WC Annmarie Reynoso MD   1 Units at 05/02/19 1658    insulin lispro (HUMALOG) injection vial 0-3 Units  0-3 Units Subcutaneous Nightly Annmarie Reynoso MD   1 Units at 05/02/19 2042    glucose (GLUTOSE) 40 % oral gel 15 g  15 g Oral PRN Annmarie Reynoso MD        dextrose 50 % solution 12.5 g  12.5 g Intravenous PRN Annmarie Reynoso MD        glucagon (rDNA) injection 1 mg  1 mg Intramuscular PRN Annmarie Reynoso MD        dextrose 5 % solution  100 mL/hr Intravenous PRN Annmarie Reynoso MD        HYDROmorphone (DILAUDID) injection 1 mg  1 mg Intravenous Q2H PRN Annmarie Reynoso MD   1 mg at 05/02/19 1305         sodium chloride Stopped (05/01/19 1900)    insulin (HUMAN R) non-weight based infusion Stopped (05/02/19 0727)    dextrose        amLODIPine  5 mg Oral Daily    FLUoxetine  20 mg Oral Daily    atenolol  50 mg Oral Daily    metFORMIN  500 mg Oral BID     sodium chloride flush  10 mL Intravenous 2 times per day    docusate sodium  100 mg Oral BID    famotidine  20 mg Oral BID    atorvastatin  20 mg Oral Nightly    aspirin  81 mg Oral Daily    clopidogrel  75 mg Oral Daily    ipratropium-albuterol  1 ampule Inhalation Q4H WA    insulin glargine  0.15 Units/kg Subcutaneous Nightly    insulin lispro  0-6 Units Subcutaneous TID     insulin lispro  0-3 Units Subcutaneous Nightly     ALPRAZolam, ketorolac, busPIRone, sodium chloride flush, potassium chloride, acetaminophen, oxyCODONE **OR** oxyCODONE, zolpidem, ondansetron, sodium chloride, glucose, dextrose, glucagon (rDNA), dextrose, HYDROmorphone  DIET FULL LIQUID; Carb Control: 4 carb choices (60 gms)/meal; No Caffeine       Labs:     Recent Labs     05/01/19 1818 05/02/19 0440 05/03/19  0348   WBC 14.5* 12.8* 13.7*   RBC 3.50* 2.99* 2.59*   HGB 10.6* 9.1* 7.8*   HCT 31.7* 27.0* 23.9*   MCV 90.6 90.3 92.3   MCH 30.3 30.4 30.1   MCHC 33.4 33.7 32.6*    143 131     Recent Labs     05/01/19  1247  05/02/19 0440 05/02/19 0441 05/02/19  0630 05/03/19  0348     --  135*  --   --  130*   K 4.4   < > 4.9 4.7 4.5 4.6   ANIONGAP 10  --  12  --   --  11     --  101  --   --  99   CO2 24  --  22  --   --  20*   BUN 16  --  22*  --   --  27*   CREATININE 1.0  --  1.3*  --   --  1.1   GLUCOSE 120*  --  105  --   --  181*   CALCIUM 7.5*  --  8.0*  --   --  8.4*    < > = values in this interval not displayed.      Recent Labs     04/30/19  1411 05/01/19  1818   MG 2.2 2.0     Recent Labs     04/30/19  1411   AST 25   ALT 22   BILITOT 0.5   ALKPHOS 92     HgBA1c:  No components found for: HGBA1C  FLP:    Lab Results   Component Value Date    TRIG 1,127 04/26/2019    HDL 27 04/26/2019    LDLCALC see below 04/26/2019    LDLDIRECT 109 04/26/2019     TSH:    Lab Results   Component Value Date    TSH 2.450 04/02/2019     Troponin T: No results Calcification left carotid artery is noted. [place] the lungs appears clear as visualized. Impression degenerative spondylosis is noted in the cervical spine as described above Signed by Dr Bertha Knight on 4/10/2019 9:36 AM    Ct Head Wo Contrast    Result Date: 4/24/2019  EXAMINATION: CT HEAD WO CONTRAST 4/24/2019 5:48 PM HISTORY: Daily headaches, history of bladder cancer Comparison: CT head without contrast 2/17/2011 Technique: Sequential imaging was performed from the vertex through the base of the skull without the use of IV contrast. Sagittal and coronal reformations were made from the original source data and reviewed. Automated exposure control was utilized for radiation dose reduction. Radiation dose:  mGy-cm Findings: There is no evidence of acute intracranial hemorrhage, mass, or midline shift. There is no evidence of acute territorial infarct. The ventricles appear normal in configuration. Basilar cisterns are patent. The posterior fossa appears grossly normal. The calvarium appears intact. There is near complete opacification of the visualized left maxillary sinus. The visualized mastoid air cells appear clear. Calcifications are seen in the cavernous carotid arteries. Impression: No acute intracranial findings. Sinus disease. Signed by Dr Severa Oz on 4/24/2019 5:54 PM    Ct Chest W Contrast    Result Date: 4/27/2019  CT CHEST W CONTRAST 4/27/2019 5:00 AM HISTORY: Left lower lobe pulmonary nodule COMPARISON: Chest radiograph dated 4/26/2019 and 4/24/2019 TECHNIQUE:  Radiation dose equals  mGy cm. AUTOMATED EXPOSURE CONTROL dose reduction technique was implemented. Thin section axial images were obtained with intravenous contrast. Multi projection reconstruction images were generated. FINDINGS: There are no measurable pulmonary nodules identified in the lungs. Particularly, in the left lower lobe in the region of the chest radiograph abnormality, no CT correlate is identified.  There are linear opacities identified in the right lung base compatible with mild platelike atelectasis associated with the diaphragm position. There are no pleural effusions or pneumothoraces or parenchymal infiltrates otherwise. There are small mediastinal lymph nodes. There are no pathologically enlarged nodes. There are coronary artery calcifications. There are atherosclerotic aortic calcifications. There is fatty infiltration of the liver. Limited imaging of the upper abdomen demonstrate no acute abnormality. Bony structures are intact without acute osseous abnormalities. Degenerative spurring noted diffusely in the thoracic spine. 1. No measurable pulmonary nodules identified in the lungs. Minimal platelike atelectasis in the right lung base associated with diaphragm position. Lung fields are clear without acute cardiopulmonary process. 2. Atherosclerotic calcifications. 4. Hepatic steatosis. Signed by Dr Phillip Powell on 4/27/2019 8:30 AM    Xr Chest 1 Vw    Result Date: 4/26/2019  XR CHEST 1 VIEW 4/26/2019 7:00 PM HISTORY: Repeat chest x-ray with nipple markers COMPARISON: 4/24/2019. FINDINGS: Frontal view of the chest was obtained. Nipple markers are present. The nipple markers are separate from the nodular opacity seen on the previous study, but the nodule is not well visualized on this exam. The lungs are otherwise clear. The cardiomediastinal silhouette and pulmonary vascularity are unchanged. The osseous structures and surrounding soft tissues demonstrate no acute abnormality. 1. The previously seen nodule is not well visualized but is in a separate position than the nipple marker. Given the patient's age, consider further evaluation with CT to ensure no suspicious nodule is present.  Signed by Dr Shirley Arredondo on 4/26/2019 8:35 PM    Nm Myocardial Spect Rest Exercise Or Rx    Result Date: 4/26/2019  Lexiscan Nuclear Stress Test Report Procedure date: 04/25/2019 Indications: Chest pain Procedure: Stress was performed with injection of 0.4 mg Lexiscan. Vital signs and EKG were monitored. Technetium-99 sestamibi was injected in divided doses, approximately 10 mCi and 30 mCi respectively for rest and stress imaging. The patient was discharged in stable condition. Results: Patient had symptoms of dyspnea, dizziness, nausea, and stomach cramping during infusion that resolved in recovery. Baseline EKG showed normal sinus rhythm. During stress there were no significant EKG changes or rhythm changes. Baseline and peak blood pressures were 151/103, and 163/92 respectively. Baseline and peak heart rates were 87 and  99 respectively. Radioactive pharmaceuticals used: Rest images 10.67 mCi technetium 99m sestamibi Stress images 32.83 mCi technetium 99m sestamibi Review of rest and stress images obtained in a SPECT acquisition protocol low with review of the polar plot revealed: 1. Ejection fraction normal 54% 2. Wall motion study suggested mild inferoseptal hypokinesis 3. Myocardial perfusion imaging demonstrated homogeneous uptake of the tracer in all visualized segments with the exception a few of the more distal short axis slices showed diminished uptake in the inferior wall which appeared to reverse on some of the slices. The sum stress score was 1.     Summary impression: Probably normal study normal ejection fraction 54% minimal distal inferior wall ischemia cannot be entirely excluded more likely normal correlate clinically Signed by Dr Elijah Gambino on 4/25/2019 4:00 PM    Us Carotid Artery Bilateral    Result Date: 4/18/2019  Vascular Carotid Procedure  Demographics   Patient Name    Leonard Turner    Age                   44                  [place]   Patient Number  [id number]           Gender                Male   Visit Number    [id number]        Interpreting          Missy Gomez MD                                   Physician   Date of Birth   1970       Referring Physician   Jason Marrero [id number]        [address] [place] [address]  Number  Procedure Type of Study:   Cerebral:Carotid, US CAROTID ARTERY BILATERAL [[id number]]. Indications for Study:Dizziness/Vertigo. Risk Factors   - The patient's risk factor(s) include: diabetes mellitus, dyslipidemia     and arterial hypertension.   - Current - Every day. Impression   There is no plaque visualized in the bilateral internal carotid  artery(ies). There is no stenosis in the right internal carotid artery. There is no stenosis of the left internal carotid artery. There is normal antegrade flow in the bilateral vertebral artery(ies). Signature   ----------------------------------------------------------------  Electronically signed by La Gallo MD(Interpreting  physician) on 04/18/2019 11:12 AM  ----------------------------------------------------------------  Blood Pressure:Right arm 176/86 mmHg. Left arm 184/90 mmHg. Velocities are measured in cm/s ; Diameters are measured in mm Carotid Right Measurements +------------+-------+-------+--------+-------+------------+---------------+ ! Location    ! PSV    ! EDV    ! Angle   ! RI     !%Stenosis   ! Tortuosity     ! +------------+-------+-------+--------+-------+------------+---------------+ ! Prox CCA    !111    !18.2   ! 60      !0.84   !            !               ! +------------+-------+-------+--------+-------+------------+---------------+ ! Mid CCA     ! 85     !19.9   !60      !0.77   !            !               ! +------------+-------+-------+--------+-------+------------+---------------+ ! Prox ICA    !103    !27     !60      !0.74   !            !               ! +------------+-------+-------+--------+-------+------------+---------------+ ! Mid ICA     !101    !31.7   !60      !0.69   !            !               ! +------------+-------+-------+--------+-------+------------+---------------+ ! Dist ICA    !76.8   !28.1   ! 60      !0.63   !            !               ! +------------+-------+-------+--------+-------+------------+---------------+ ! Prox ECA    !173    !16.7   !60      !0.9    ! !               ! +------------+-------+-------+--------+-------+------------+---------------+ ! Vertebral   !47.1   !11.4   !60      !0.76   !            !               ! +------------+-------+-------+--------+-------+------------+---------------+   - There is antegrade vertebral flow noted on the right side. - Additional Measurements:ICAPSV/CCAPSV 0.93. ICAEDV/CCAEDV 1.74. Carotid Left Measurements +------------+-------+-------+--------+-------+------------+---------------+ ! Location    ! PSV    ! EDV    ! Angle   ! RI     !%Stenosis   ! Tortuosity     ! +------------+-------+-------+--------+-------+------------+---------------+ ! Prox CCA    !78.2   !15.7   !60      !0.8    ! !               ! +------------+-------+-------+--------+-------+------------+---------------+ ! Mid CCA     !107    !22.3   !60      !0.79   !            !               ! +------------+-------+-------+--------+-------+------------+---------------+ ! Prox ICA    !57.8   !14.9   !60      !0.74   !            !               ! +------------+-------+-------+--------+-------+------------+---------------+ ! Mid ICA     ! 96.7   !35.2   ! 60      !0.64   !            !               ! +------------+-------+-------+--------+-------+------------+---------------+ ! Dist ICA    ! 96.7   !38.1   ! 60      !0.61   !            !               ! +------------+-------+-------+--------+-------+------------+---------------+ ! Prox ECA    !136    !14.1   ! 60      !0.9    ! !               ! +------------+-------+-------+--------+-------+------------+---------------+ ! Vertebral   !50.7   !16.5   !60      !0.67   !            !               ! +------------+-------+-------+--------+-------+------------+---------------+   - There is antegrade vertebral flow noted on the left side.    - Additional Measurements:ICAPSV/CCAPSV 1.24. ICAEDV/CCAEDV 2.43. Vl Vein Mapping Lower Bilateral    Result Date: 4/29/2019  Vascular Lower Extremity Vein Mapping Procedure  Demographics   Patient Name   Meche Thao   Age                 44                 [place]   Patient Number [id number]          Gender              Male   Visit Number   [id number]       Interpreting        Carmine Severe MD                                 Physician   Date of Birth  1970      Referring Physician Iwona Breaux   Accession      [id number]       [address] [facility]  Number  Procedure Type of Study:   Veins:Lower Extremity Vein Mapping, 24301, VEIN MAPPING LOWER BILATERAL . Indications for Study:Pre-op CABG. Risk Factors   - The patient's last creatinine was 1 mg/dl. Allergies   - Demerol.   - Natural rubber and latex. Impression   Usable greater Saphenous vein conduit in both lower extremities. The veins have been marked on the skin. Sizes are noted above. NOTE: veins are small in diameter.    Signature   ----------------------------------------------------------------  Electronically signed by Carmine Severe MD(Interpreting  physician) on 04/29/2019 05:27 AM        Echo:  Summary   Normal left ventricular size and systolic function EF 29%   Mild concentric left ventricular hypertrophy with transmitral flow   consistent with grade 2 diastolic dysfunction   Normal left atrial size   Structurally normal trileaflet aortic valve with adequate cusp separation   and no insufficiency   Structurally normal mitral valve with trace to mild regurgitation   Normal right-sided chambers with preserved RV systolic function   Trace tricuspid regurgitation   Normal IVC dimensions consistent with normal right atrial filling   pressures of 5-10 mmHg   No pericardial effusion and aortic root dimensions are within normal   limits      Signature   ----------------------------------------------------------------   Electronically signed by Bora Crawley MD(Interpreting physician)  Chet Osborn 04/25/2019 10:25 AM   ----------------------------------------------------------------        Diagnostic Cardiac Cath - 04/26/2019  Conclusions   Normal LV systolic function.   Severe multivessel coronary artery disease      Recommendations   Routine Post Diagnostic Cath orders.   Risk factor modification.   Cardiovascular surgical consultation      Signatures   ----------------------------------------------------------------   Electronically signed by Herlinda Helton MD(Performing   Physician) on 04/26/2019 15:20   ----------------------------------------------------------------        Objective:   Vitals: /84   Pulse 90   Temp 98.3 °F (36.8 °C) (Temporal)   Resp 23   Ht 5' 11\" (1.803 m)   Wt 220 lb 3.2 oz (99.9 kg)   SpO2 95%   BMI 30.71 kg/m²   24HR INTAKE/OUTPUT:      Intake/Output Summary (Last 24 hours) at 5/3/2019 0810  Last data filed at 5/3/2019 0600  Gross per 24 hour   Intake 2072.15 ml   Output 2070 ml   Net 2.15 ml       Physical Exam  General appearance: Well nourished, well developed, appears stated age, anxious, in no acute distress. HEENT: atraumatic, eyes with clear conjunctiva and normal lids, pupils and irises normal, external ears and nose are normal, lips normal. Neck without masses, lympadenopathy, bruit  Lungs: Patient in no acute respiratory distress, no increased work of breathing, \"clear to auscultation bilaterally\" without rales, rhonchi or wheezes  Heart: regular rate and rhythm, S1, S2 normal, no murmur, click, rub or gallop  Abdomen: soft, non-tender; non-distended normal bowel sounds no masses, no organomegaly  Musculoskeletal: No joint tenderness or deformities throughout. Range of motion intact in bilateral upper and lower extremities.   Extremities: extremities normal, atraumatic, no cyanosis or edema  Neurologic: No focal neurologic deficits, normal sensation, Unable to assess patient's oriented, affect and mood appropriate. Skin: Skin color, texture, turgor normal. No rashes or lesions      Assessment/plan:         Principal Problem:    Coronary artery disease involving native coronary artery of native heart with unstable angina pectoris (HCC)  Active Problems:    Unstable angina pectoris (HCC)    Essential hypertension    Hx of bladder cancer    Type 2 diabetes mellitus without complication, without long-term current use of insulin (HCC)    Mixed anxiety depressive disorder    Mixed hyperlipidemia    Chest pain    CAD in native artery  Resolved Problems:    * No resolved hospital problems. *      Coronary artery disease  · Cardiology on board  · CT Surgery on board, on account of  Severe multivessel coronary artery disease  · S/p CABG (05/01/2019)  · Continue management as per CTS recommendation. · Continue pain management          Continue management of other chronic medical conditions - see above and orders.               Advance Directive: Full Code    DIET FULL LIQUID; Carb Control: 4 carb choices (60 gms)/meal; No Caffeine     DVT prophylaxis: Enoxaparin    Consults Made:   IP CONSULT TO CARDIOLOGY  IP CONSULT TO CARDIOTHORACIC SURGERY  IP CONSULT TO CASE MANAGEMENT    Discharge planning: Continue management in the ICU       Electronically signed by   Polina Schaeffer MD, MPH,   Internal Medicine Hospitalist   5/3/2019 8:10 AM

## 2019-05-03 NOTE — CARE COORDINATION
Spoke with pt and family, including pt's mother and S/O present at bedside, to complete the dc assessment. Pt reports residing at home with S/O and having his mother as supports. Pt denies having any in home services prior to admission. Pt S/O reports having a walker from pt's back surgery that can be used if necessary. Pt was seated in recliner at bedside and asked to get back into bed. KALIE spoke with Jerry Quevedo who reports has notified RN Braden Suárez, but pt has not been up in recliner more than a few minutes and needs to remain upright. SW explained pt will need to be seated upright and walk with therapy when requested in order to prevent any further pulmonology issues and increase his strength to determine appropriate dc needs. Pt did not want to answer SW and just closed his eyes and began clutching his heart pillow. SW aske dif pt was ok and pt shook his head yes. Pt mom and S/O stated he is just \"winded from walking with the RN earlier\" and KALIE explained will continue to follow and assist with further dc needs.

## 2019-05-04 LAB
ANION GAP SERPL CALCULATED.3IONS-SCNC: 15 MMOL/L (ref 7–19)
BASOPHILS ABSOLUTE: 0 K/UL (ref 0–0.2)
BASOPHILS RELATIVE PERCENT: 0.3 % (ref 0–1)
BLOOD BANK DISPENSE STATUS: NORMAL
BLOOD BANK DISPENSE STATUS: NORMAL
BLOOD BANK PRODUCT CODE: NORMAL
BLOOD BANK PRODUCT CODE: NORMAL
BPU ID: NORMAL
BPU ID: NORMAL
BUN BLDV-MCNC: 25 MG/DL (ref 6–20)
CALCIUM SERPL-MCNC: 8.9 MG/DL (ref 8.6–10)
CHLORIDE BLD-SCNC: 97 MMOL/L (ref 98–111)
CO2: 22 MMOL/L (ref 22–29)
CREAT SERPL-MCNC: 1 MG/DL (ref 0.5–1.2)
DESCRIPTION BLOOD BANK: NORMAL
DESCRIPTION BLOOD BANK: NORMAL
EOSINOPHILS ABSOLUTE: 0.2 K/UL (ref 0–0.6)
EOSINOPHILS RELATIVE PERCENT: 2.2 % (ref 0–5)
GFR NON-AFRICAN AMERICAN: >60
GLUCOSE BLD-MCNC: 169 MG/DL (ref 70–99)
GLUCOSE BLD-MCNC: 172 MG/DL (ref 74–109)
GLUCOSE BLD-MCNC: 178 MG/DL (ref 70–99)
GLUCOSE BLD-MCNC: 180 MG/DL (ref 70–99)
GLUCOSE BLD-MCNC: 204 MG/DL (ref 70–99)
GLUCOSE BLD-MCNC: 232 MG/DL (ref 70–99)
HCT VFR BLD CALC: 21.4 % (ref 42–52)
HCT VFR BLD CALC: 25.9 % (ref 42–52)
HEMOGLOBIN: 7.1 G/DL (ref 14–18)
HEMOGLOBIN: 8.2 G/DL (ref 14–18)
LYMPHOCYTES ABSOLUTE: 1.2 K/UL (ref 1.1–4.5)
LYMPHOCYTES RELATIVE PERCENT: 11.9 % (ref 20–40)
MCH RBC QN AUTO: 30 PG (ref 27–31)
MCH RBC QN AUTO: 30.2 PG (ref 27–31)
MCHC RBC AUTO-ENTMCNC: 31.7 G/DL (ref 33–37)
MCHC RBC AUTO-ENTMCNC: 33.2 G/DL (ref 33–37)
MCV RBC AUTO: 91.1 FL (ref 80–94)
MCV RBC AUTO: 94.9 FL (ref 80–94)
MONOCYTES ABSOLUTE: 0.8 K/UL (ref 0–0.9)
MONOCYTES RELATIVE PERCENT: 8.7 % (ref 0–10)
NEUTROPHILS ABSOLUTE: 7.4 K/UL (ref 1.5–7.5)
NEUTROPHILS RELATIVE PERCENT: 76.1 % (ref 50–65)
PDW BLD-RTO: 12.2 % (ref 11.5–14.5)
PDW BLD-RTO: 12.3 % (ref 11.5–14.5)
PERFORMED ON: ABNORMAL
PLATELET # BLD: 150 K/UL (ref 130–400)
PLATELET # BLD: 153 K/UL (ref 130–400)
PMV BLD AUTO: 10.6 FL (ref 9.4–12.4)
PMV BLD AUTO: 11 FL (ref 9.4–12.4)
POTASSIUM SERPL-SCNC: 4.6 MMOL/L (ref 3.5–5)
RBC # BLD: 2.35 M/UL (ref 4.7–6.1)
RBC # BLD: 2.73 M/UL (ref 4.7–6.1)
SODIUM BLD-SCNC: 134 MMOL/L (ref 136–145)
WBC # BLD: 15.2 K/UL (ref 4.8–10.8)
WBC # BLD: 9.7 K/UL (ref 4.8–10.8)

## 2019-05-04 PROCEDURE — 2580000003 HC RX 258: Performed by: THORACIC SURGERY (CARDIOTHORACIC VASCULAR SURGERY)

## 2019-05-04 PROCEDURE — 6370000000 HC RX 637 (ALT 250 FOR IP): Performed by: THORACIC SURGERY (CARDIOTHORACIC VASCULAR SURGERY)

## 2019-05-04 PROCEDURE — 2700000000 HC OXYGEN THERAPY PER DAY

## 2019-05-04 PROCEDURE — 36415 COLL VENOUS BLD VENIPUNCTURE: CPT

## 2019-05-04 PROCEDURE — 6360000002 HC RX W HCPCS: Performed by: THORACIC SURGERY (CARDIOTHORACIC VASCULAR SURGERY)

## 2019-05-04 PROCEDURE — 2140000000 HC CCU INTERMEDIATE R&B

## 2019-05-04 PROCEDURE — 6370000000 HC RX 637 (ALT 250 FOR IP)

## 2019-05-04 PROCEDURE — 99024 POSTOP FOLLOW-UP VISIT: CPT | Performed by: THORACIC SURGERY (CARDIOTHORACIC VASCULAR SURGERY)

## 2019-05-04 PROCEDURE — 97150 GROUP THERAPEUTIC PROCEDURES: CPT

## 2019-05-04 PROCEDURE — 80048 BASIC METABOLIC PNL TOTAL CA: CPT

## 2019-05-04 PROCEDURE — 94640 AIRWAY INHALATION TREATMENT: CPT

## 2019-05-04 PROCEDURE — 85025 COMPLETE CBC W/AUTO DIFF WBC: CPT

## 2019-05-04 PROCEDURE — 97161 PT EVAL LOW COMPLEX 20 MIN: CPT

## 2019-05-04 PROCEDURE — 85027 COMPLETE CBC AUTOMATED: CPT

## 2019-05-04 PROCEDURE — 82948 REAGENT STRIP/BLOOD GLUCOSE: CPT

## 2019-05-04 PROCEDURE — 99232 SBSQ HOSP IP/OBS MODERATE 35: CPT | Performed by: INTERNAL MEDICINE

## 2019-05-04 RX ORDER — BISACODYL 10 MG
10 SUPPOSITORY, RECTAL RECTAL DAILY PRN
Status: DISCONTINUED | OUTPATIENT
Start: 2019-05-04 | End: 2019-05-06 | Stop reason: HOSPADM

## 2019-05-04 RX ADMIN — HYDROMORPHONE HYDROCHLORIDE 1 MG: 1 INJECTION, SOLUTION INTRAMUSCULAR; INTRAVENOUS; SUBCUTANEOUS at 14:39

## 2019-05-04 RX ADMIN — AMLODIPINE BESYLATE 5 MG: 5 TABLET ORAL at 09:14

## 2019-05-04 RX ADMIN — INSULIN GLARGINE 14 UNITS: 100 INJECTION, SOLUTION SUBCUTANEOUS at 20:16

## 2019-05-04 RX ADMIN — CLOPIDOGREL BISULFATE 75 MG: 75 TABLET ORAL at 09:14

## 2019-05-04 RX ADMIN — IPRATROPIUM BROMIDE AND ALBUTEROL SULFATE 1 AMPULE: .5; 3 SOLUTION RESPIRATORY (INHALATION) at 21:48

## 2019-05-04 RX ADMIN — IPRATROPIUM BROMIDE AND ALBUTEROL SULFATE 1 AMPULE: .5; 3 SOLUTION RESPIRATORY (INHALATION) at 07:50

## 2019-05-04 RX ADMIN — KETOROLAC TROMETHAMINE 30 MG: 30 INJECTION, SOLUTION INTRAMUSCULAR; INTRAVENOUS at 14:38

## 2019-05-04 RX ADMIN — INSULIN LISPRO 2 UNITS: 100 INJECTION, SOLUTION INTRAVENOUS; SUBCUTANEOUS at 12:20

## 2019-05-04 RX ADMIN — INSULIN LISPRO 1 UNITS: 100 INJECTION, SOLUTION INTRAVENOUS; SUBCUTANEOUS at 21:00

## 2019-05-04 RX ADMIN — OXYCODONE HYDROCHLORIDE 10 MG: 5 TABLET ORAL at 12:09

## 2019-05-04 RX ADMIN — OXYCODONE HYDROCHLORIDE 10 MG: 5 TABLET ORAL at 18:31

## 2019-05-04 RX ADMIN — MAGNESIUM HYDROXIDE 30 ML: 400 SUSPENSION ORAL at 05:54

## 2019-05-04 RX ADMIN — OXYCODONE HYDROCHLORIDE 10 MG: 5 TABLET ORAL at 04:05

## 2019-05-04 RX ADMIN — METFORMIN HYDROCHLORIDE 500 MG: 500 TABLET ORAL at 09:14

## 2019-05-04 RX ADMIN — ALPRAZOLAM 1 MG: 1 TABLET ORAL at 20:15

## 2019-05-04 RX ADMIN — INSULIN LISPRO 1 UNITS: 100 INJECTION, SOLUTION INTRAVENOUS; SUBCUTANEOUS at 17:33

## 2019-05-04 RX ADMIN — ATENOLOL 50 MG: 50 TABLET ORAL at 09:14

## 2019-05-04 RX ADMIN — DOCUSATE SODIUM 100 MG: 100 CAPSULE, LIQUID FILLED ORAL at 20:15

## 2019-05-04 RX ADMIN — KETOROLAC TROMETHAMINE 30 MG: 30 INJECTION, SOLUTION INTRAMUSCULAR; INTRAVENOUS at 08:10

## 2019-05-04 RX ADMIN — ATORVASTATIN CALCIUM 20 MG: 20 TABLET, FILM COATED ORAL at 20:42

## 2019-05-04 RX ADMIN — IPRATROPIUM BROMIDE AND ALBUTEROL SULFATE 1 AMPULE: .5; 3 SOLUTION RESPIRATORY (INHALATION) at 15:01

## 2019-05-04 RX ADMIN — OXYCODONE HYDROCHLORIDE 10 MG: 5 TABLET ORAL at 08:10

## 2019-05-04 RX ADMIN — Medication 10 ML: at 20:18

## 2019-05-04 RX ADMIN — IPRATROPIUM BROMIDE AND ALBUTEROL SULFATE 1 AMPULE: .5; 3 SOLUTION RESPIRATORY (INHALATION) at 11:30

## 2019-05-04 RX ADMIN — FLUOXETINE HYDROCHLORIDE 20 MG: 20 CAPSULE ORAL at 09:14

## 2019-05-04 RX ADMIN — DOCUSATE SODIUM 100 MG: 100 CAPSULE, LIQUID FILLED ORAL at 09:15

## 2019-05-04 RX ADMIN — FAMOTIDINE 20 MG: 20 TABLET ORAL at 09:14

## 2019-05-04 RX ADMIN — METFORMIN HYDROCHLORIDE 500 MG: 500 TABLET ORAL at 17:32

## 2019-05-04 RX ADMIN — ASPIRIN 81 MG: 81 TABLET, COATED ORAL at 09:14

## 2019-05-04 RX ADMIN — INSULIN LISPRO 2 UNITS: 100 INJECTION, SOLUTION INTRAVENOUS; SUBCUTANEOUS at 09:15

## 2019-05-04 RX ADMIN — Medication 10 ML: at 09:14

## 2019-05-04 RX ADMIN — FAMOTIDINE 20 MG: 20 TABLET ORAL at 21:00

## 2019-05-04 ASSESSMENT — PAIN DESCRIPTION - LOCATION
LOCATION: BACK;CHEST

## 2019-05-04 ASSESSMENT — PAIN DESCRIPTION - PAIN TYPE
TYPE: CHRONIC PAIN;SURGICAL PAIN

## 2019-05-04 ASSESSMENT — PAIN SCALES - GENERAL
PAINLEVEL_OUTOF10: 9
PAINLEVEL_OUTOF10: 7
PAINLEVEL_OUTOF10: 9
PAINLEVEL_OUTOF10: 10
PAINLEVEL_OUTOF10: 9

## 2019-05-04 NOTE — PROGRESS NOTES
Concurrent Group Co-treatment   Time In           Time Out           Minutes                   Teresa Falcon PT     Electronically signed by Teresa Falcon PT on 5/4/2019 at 11:19 AM

## 2019-05-04 NOTE — PROGRESS NOTES
[facility] Hospitalists Progress Note    Patient:  Leatha Larson  YOB: 1970  Date of Service: 5/4/2019  MRN: [id number]   Acct: [de-identified]   Primary Care Physician: Willy More MD  Advance Directive: Full Code  Admit Date: 4/24/2019       Hospital Day: 5      CHIEF COMPLAINT:     Chief Complaint   Patient presents with    Chest Pain       5/4/2019 1:07 PM  Subjective / Interval History:   05/04/2019  No acute overnight events reported. Patient continues to be anxious. Doing well. Patient sitting comfortably in chair. Endorses some minimal improvement. Denies any active chest pain at this time. 05/03/2019  No acute overnight event reported. Patient endorses some intermittent cough. Denies any other acute complaints or distress at this time. Family at bedside. 05/02/2019  Extubated -05/01/2019. Laying comfortably in bed. Endorses some anxiety. No acute overnight events reported. Denies any other acute complaints or distress at this time. 05/01/2019  Patient seen in the ICU this pm. S/p CABG. Currently sedated and on Full Vent. Cumulative Hospital History:   As per Previous Documentation, quoted below;  Mr. Sameer Flores Axlo a 44 y. o. newly-diagnosed diabetic male who was referred to the ER by his PCP for evaluation of exertional chest pain and progressive shortness of breath.      Work-up in the ER included EKG that demonstrated NSR without acute changes. Cardiac enzymes were negative. He was admitted to the hospitalist service.      Nuclear stress test was completed on 04/25/2019, demonstrated inferior wall ischemia with calculated EF 54%. Therefore, he underwent cardiac catheterization via right radial approach (04/29/2019). This demonstrated moderate to severe multivessel CAD with preserved LV systolic function.      CT Surgery following, on account of  Severe multivessel coronary artery disease  S/p CABG (05/01/2019)        Review of Systems:   Review of Systems  ROS: 14 point review of systems is negative except as specifically addressed above.     DIET GENERAL; No Caffeine    Intake/Output Summary (Last 24 hours) at 5/4/2019 1307  Last data filed at 5/4/2019 0933  Gross per 24 hour   Intake 100 ml   Output 1700 ml   Net -1600 ml       Medications:   sodium chloride Stopped (05/01/19 1900)    insulin (HUMAN R) non-weight based infusion Stopped (05/02/19 0727)    dextrose       Current Facility-Administered Medications   Medication Dose Route Frequency Provider Last Rate Last Dose    magnesium hydroxide (MILK OF MAGNESIA) 400 MG/5ML suspension 30 mL  30 mL Oral Daily PRN Milind Fragoso RN   30 mL at 05/04/19 0554    bisacodyl (DULCOLAX) suppository 10 mg  10 mg Rectal Daily PRN Milind Fragoso RN        ALPRAZolam [place]) tablet 1 mg  1 mg Oral TID PRN Anayeli Yap MD   1 mg at 05/03/19 2251    ketorolac (TORADOL) injection 30 mg  30 mg Intravenous Q6H PRN Anayeli Yap MD   30 mg at 05/04/19 0810    amLODIPine (NORVASC) tablet 5 mg  5 mg Oral Daily Anayeli Yap MD   5 mg at 05/04/19 0914    busPIRone (BUSPAR) tablet 10 mg  10 mg Oral TID PRN Anayeli Yap MD   10 mg at 05/02/19 2041    FLUoxetine (PROZAC) capsule 20 mg  20 mg Oral Daily Anayeli Yap MD   20 mg at 05/04/19 0914    atenolol (TENORMIN) tablet 50 mg  50 mg Oral Daily Anayeli Yap MD   50 mg at 05/04/19 0914    metFORMIN (GLUCOPHAGE) tablet 500 mg  500 mg Oral BID WC Anayeli Yap MD   500 mg at 05/04/19 0914    sodium chloride flush 0.9 % injection 10 mL  10 mL Intravenous 2 times per day Anayeli Yap MD   10 mL at 05/04/19 0914    sodium chloride flush 0.9 % injection 10 mL  10 mL Intravenous PRN Anayeli Yap MD        potassium chloride 10 mEq/100 mL IVPB (Peripheral Line)  10 mEq Intravenous PRN Anayeli Yap MD        acetaminophen (TYLENOL) tablet 650 mg  650 mg Oral Q4H PRN Anayeli Yap MD   650 mg at 05/03/19 1057    oxyCODONE (Soha [place]) immediate release tablet 5 mg  5 mg Oral Q4H PRN Sara Polo MD        Or    oxyCODONE (ROXICODONE) immediate release tablet 10 mg  10 mg Oral Q4H PRN Sara Polo MD   10 mg at 05/04/19 1209    zolpidem (AMBIEN) tablet 5 mg  5 mg Oral Nightly PRN Sara Polo MD        docusate sodium (COLACE) capsule 100 mg  100 mg Oral BID Sara Polo MD   100 mg at 05/04/19 0915    ondansetron (ZOFRAN) injection 4 mg  4 mg Intravenous Q8H PRN Sara Polo MD        famotidine (PEPCID) tablet 20 mg  20 mg Oral BID Sara Polo MD   20 mg at 05/04/19 0914    atorvastatin (LIPITOR) tablet 20 mg  20 mg Oral Nightly Sara Polo MD   20 mg at 05/03/19 2251    aspirin EC tablet 81 mg  81 mg Oral Daily Sara Polo MD   81 mg at 05/04/19 0914    clopidogrel (PLAVIX) tablet 75 mg  75 mg Oral Daily Sara Polo MD   75 mg at 05/04/19 0914    ipratropium-albuterol (DUONEB) nebulizer solution 1 ampule  1 ampule Inhalation Q4H WA Sara Polo MD   1 ampule at 05/04/19 1130    0.9 % sodium chloride bolus  500 mL Intravenous Continuous PRN Sara Polo MD   Stopped at 05/01/19 1900    insulin regular (HUMULIN R;NOVOLIN R) 100 Units in sodium chloride 0.9 % 100 mL infusion  1 Units/hr Intravenous Continuous Sara Polo MD   Stopped at 05/02/19 0727    insulin glargine (LANTUS) injection vial 14 Units  0.15 Units/kg Subcutaneous Nightly Sara Polo MD   14 Units at 05/03/19 2307    insulin lispro (HUMALOG) injection vial 0-6 Units  0-6 Units Subcutaneous TID WC Sara Polo MD   2 Units at 05/04/19 1220    insulin lispro (HUMALOG) injection vial 0-3 Units  0-3 Units Subcutaneous Nightly Sara Polo MD   2 Units at 05/03/19 2309    glucose (GLUTOSE) 40 % oral gel 15 g  15 g Oral PRN Sara Polo MD        dextrose 50 % solution 12.5 g  12.5 g Intravenous PRN Sara Polo MD        glucagon (rDNA) injection 1 mg  1 mg Intramuscular PRN Dash Lauren Lenny Segundo MD        dextrose 5 % solution  100 mL/hr Intravenous PRN Erika Diaz MD        HYDROmorphone (DILAUDID) injection 1 mg  1 mg Intravenous Q2H PRN Erika Diaz MD   1 mg at 05/03/19 2301         sodium chloride Stopped (05/01/19 1900)    insulin (HUMAN R) non-weight based infusion Stopped (05/02/19 0727)    dextrose        amLODIPine  5 mg Oral Daily    FLUoxetine  20 mg Oral Daily    atenolol  50 mg Oral Daily    metFORMIN  500 mg Oral BID WC    sodium chloride flush  10 mL Intravenous 2 times per day    docusate sodium  100 mg Oral BID    famotidine  20 mg Oral BID    atorvastatin  20 mg Oral Nightly    aspirin  81 mg Oral Daily    clopidogrel  75 mg Oral Daily    ipratropium-albuterol  1 ampule Inhalation Q4H WA    insulin glargine  0.15 Units/kg Subcutaneous Nightly    insulin lispro  0-6 Units Subcutaneous TID WC    insulin lispro  0-3 Units Subcutaneous Nightly     magnesium hydroxide, bisacodyl, ALPRAZolam, ketorolac, busPIRone, sodium chloride flush, potassium chloride, acetaminophen, oxyCODONE **OR** oxyCODONE, zolpidem, ondansetron, sodium chloride, glucose, dextrose, glucagon (rDNA), dextrose, HYDROmorphone  DIET GENERAL; No Caffeine       Labs:     Recent Labs     05/02/19 0440 05/03/19 0348 05/04/19  0202   WBC 12.8* 13.7* 15.2*   RBC 2.99* 2.59* 2.73*   HGB 9.1* 7.8* 8.2*   HCT 27.0* 23.9* 25.9*   MCV 90.3 92.3 94.9*   MCH 30.4 30.1 30.0   MCHC 33.7 32.6* 31.7*    131 153     Recent Labs     05/02/19 0440 05/02/19  0630 05/03/19 0348 05/04/19  0202   *  --   --  130* 134*   K 4.9   < > 4.5 4.6 4.6   ANIONGAP 12  --   --  11 15     --   --  99 97*   CO2 22  --   --  20* 22   BUN 22*  --   --  27* 25*   CREATININE 1.3*  --   --  1.1 1.0   GLUCOSE 105  --   --  181* 172*   CALCIUM 8.0*  --   --  8.4* 8.9    < > = values in this interval not displayed.      Recent Labs     05/01/19  1818   MG 2.0     No results for input(s): AST, ALT, ALB, BILITOT, ALKPHOS, ALB in the last 72 hours. HgBA1c:  No components found for: HGBA1C  FLP:    Lab Results   Component Value Date    TRIG 1,127 04/26/2019    HDL 27 04/26/2019    LDLCALC see below 04/26/2019    LDLDIRECT 109 04/26/2019     TSH:    Lab Results   Component Value Date    TSH 2.450 04/02/2019     Troponin T: No results for input(s): TROPONINI in the last 72 hours. Pro-BNP: No results for input(s): BNP in the last 72 hours. INR:   No results for input(s): INR in the last 72 hours. ABGs:   Lab Results   Component Value Date    PHART 7.380 05/02/2019    PO2ART 76.0 05/02/2019    IZZ6QFE 37.0 05/02/2019     UA:  No results for input(s): NITRITE, COLORU, PHUR, LABCAST, WBCUA, RBCUA, MUCUS, TRICHOMONAS, YEAST, BACTERIA, CLARITYU, SPECGRAV, LEUKOCYTESUR, UROBILINOGEN, BILIRUBINUR, BLOODU, GLUCOSEU, AMORPHOUS in the last 72 hours. Invalid input(s): Samina Peña      RAD:   Xr Chest Standard (2 Vw)    Result Date: 4/24/2019  EXAMINATION:  XR CHEST (2 VW)  4/24/2019 3:09 PM HISTORY: Chest pain shortness of breath COMPARISON: No comparison study. FINDINGS:  PA and lateral views of the chest were obtained. The lungs are clear and normally expanded. There are few scattered nodular densities in the left lung base, at least one is calcified, another of the nodules may a represent nipple shadow artifact. Repeat PA chest with nipple markers in place is recommended. Heart size is normal. No pneumothorax or pleural effusion is identified. The osseous structures are unremarkable. No pulmonary consolidation or acute findings. A few small nodules in the left lung base, at least one is calcified. One of the nodules may represent a nipple shadow artifact. Consider repeat PA chest with nipple markers in place. Signed by Dr Molina Torres.  Devika on 4/24/2019 3:12 PM    Xr Cervical Spine (2-3 Views)    Result Date: 4/10/2019  EXAMINATION: XR CERVICAL SPINE (2-3 VIEWS) 4/10/2019 9:34 AM HISTORY: Neck pain 4 views cervical spine are obtained. Cervical vertebra are normally aligned. There is slight loss of height at C5-6 disc space level and loss of height of C6-7 disc space level. Mild uncinate spurring is noted at the C6-7 level. Minimal anterior hypertrophic degenerative changes noted inferior endplates of D4-V7 and C6. The odontoid process is intact. Calcification left carotid artery is noted. [place] the lungs appears clear as visualized. Impression degenerative spondylosis is noted in the cervical spine as described above Signed by Dr Diamond Rendon on 4/10/2019 9:36 AM    Ct Head Wo Contrast    Result Date: 4/24/2019  EXAMINATION: CT HEAD WO CONTRAST 4/24/2019 5:48 PM HISTORY: Daily headaches, history of bladder cancer Comparison: CT head without contrast 2/17/2011 Technique: Sequential imaging was performed from the vertex through the base of the skull without the use of IV contrast. Sagittal and coronal reformations were made from the original source data and reviewed. Automated exposure control was utilized for radiation dose reduction. Radiation dose:  mGy-cm Findings: There is no evidence of acute intracranial hemorrhage, mass, or midline shift. There is no evidence of acute territorial infarct. The ventricles appear normal in configuration. Basilar cisterns are patent. The posterior fossa appears grossly normal. The calvarium appears intact. There is near complete opacification of the visualized left maxillary sinus. The visualized mastoid air cells appear clear. Calcifications are seen in the cavernous carotid arteries. Impression: No acute intracranial findings. Sinus disease. Signed by Dr Elder Schwab on 4/24/2019 5:54 PM    Ct Chest W Contrast    Result Date: 4/27/2019  CT CHEST W CONTRAST 4/27/2019 5:00 AM HISTORY: Left lower lobe pulmonary nodule COMPARISON: Chest radiograph dated 4/26/2019 and 4/24/2019 TECHNIQUE:  Radiation dose equals  mGy cm.  AUTOMATED EXPOSURE CONTROL dose reduction technique was implemented. Thin section axial images were obtained with intravenous contrast. Multi projection reconstruction images were generated. FINDINGS: There are no measurable pulmonary nodules identified in the lungs. Particularly, in the left lower lobe in the region of the chest radiograph abnormality, no CT correlate is identified. There are linear opacities identified in the right lung base compatible with mild platelike atelectasis associated with the diaphragm position. There are no pleural effusions or pneumothoraces or parenchymal infiltrates otherwise. There are small mediastinal lymph nodes. There are no pathologically enlarged nodes. There are coronary artery calcifications. There are atherosclerotic aortic calcifications. There is fatty infiltration of the liver. Limited imaging of the upper abdomen demonstrate no acute abnormality. Bony structures are intact without acute osseous abnormalities. Degenerative spurring noted diffusely in the thoracic spine. 1. No measurable pulmonary nodules identified in the lungs. Minimal platelike atelectasis in the right lung base associated with diaphragm position. Lung fields are clear without acute cardiopulmonary process. 2. Atherosclerotic calcifications. 4. Hepatic steatosis. Signed by Dr Michaelle Habermann on 4/27/2019 8:30 AM    Xr Chest 1 Vw    Result Date: 4/26/2019  XR CHEST 1 VIEW 4/26/2019 7:00 PM HISTORY: Repeat chest x-ray with nipple markers COMPARISON: 4/24/2019. FINDINGS: Frontal view of the chest was obtained. Nipple markers are present. The nipple markers are separate from the nodular opacity seen on the previous study, but the nodule is not well visualized on this exam. The lungs are otherwise clear. The cardiomediastinal silhouette and pulmonary vascularity are unchanged. The osseous structures and surrounding soft tissues demonstrate no acute abnormality.     1. The previously seen nodule is not well visualized but is in a separate position than the nipple marker. Given the patient's age, consider further evaluation with CT to ensure no suspicious nodule is present. Signed by Dr Hiram Mcintyre on 4/26/2019 8:35 PM    Nm Myocardial Spect Rest Exercise Or Rx    Result Date: 4/26/2019  Lexiscan Nuclear Stress Test Report Procedure date: 04/25/2019 Indications: Chest pain Procedure: Stress was performed with injection of 0.4 mg Lexiscan. Vital signs and EKG were monitored. Technetium-99 sestamibi was injected in divided doses, approximately 10 mCi and 30 mCi respectively for rest and stress imaging. The patient was discharged in stable condition. Results: Patient had symptoms of dyspnea, dizziness, nausea, and stomach cramping during infusion that resolved in recovery. Baseline EKG showed normal sinus rhythm. During stress there were no significant EKG changes or rhythm changes. Baseline and peak blood pressures were 151/103, and 163/92 respectively. Baseline and peak heart rates were 87 and  99 respectively. Radioactive pharmaceuticals used: Rest images 10.67 mCi technetium 99m sestamibi Stress images 32.83 mCi technetium 99m sestamibi Review of rest and stress images obtained in a SPECT acquisition protocol low with review of the polar plot revealed: 1. Ejection fraction normal 54% 2. Wall motion study suggested mild inferoseptal hypokinesis 3. Myocardial perfusion imaging demonstrated homogeneous uptake of the tracer in all visualized segments with the exception a few of the more distal short axis slices showed diminished uptake in the inferior wall which appeared to reverse on some of the slices. The sum stress score was 1.     Summary impression: Probably normal study normal ejection fraction 54% minimal distal inferior wall ischemia cannot be entirely excluded more likely normal correlate clinically Signed by Dr Pooja Banks on 4/25/2019 4:00 PM    Us Carotid Artery Bilateral    Result Date: 4/18/2019  Vascular Carotid Procedure  Demographics   Patient Name    Kathleen Randolph    Age                   44                  [place]   Patient Number  [id number]           Gender                Male   Visit Number    [id number]        Interpreting          Pancho Borjas MD                                   Physician   Date of Birth   1970       Referring Physician   Kezia Bull   Accession       [id number]        [address] [place] [address]  Number  Procedure Type of Study:   Cerebral:Carotid, US CAROTID ARTERY BILATERAL [[id number]]. Indications for Study:Dizziness/Vertigo. Risk Factors   - The patient's risk factor(s) include: diabetes mellitus, dyslipidemia     and arterial hypertension.   - Current - Every day. Impression   There is no plaque visualized in the bilateral internal carotid  artery(ies). There is no stenosis in the right internal carotid artery. There is no stenosis of the left internal carotid artery. There is normal antegrade flow in the bilateral vertebral artery(ies). Signature   ----------------------------------------------------------------  Electronically signed by Pancho Borjas MD(Interpreting  physician) on 04/18/2019 11:12 AM  ----------------------------------------------------------------  Blood Pressure:Right arm 176/86 mmHg. Left arm 184/90 mmHg. Velocities are measured in cm/s ; Diameters are measured in mm Carotid Right Measurements +------------+-------+-------+--------+-------+------------+---------------+ ! Location    ! PSV    ! EDV    ! Angle   ! RI     !%Stenosis   ! Tortuosity     ! +------------+-------+-------+--------+-------+------------+---------------+ ! Prox CCA    !111    !18.2   ! 60      !0.84   !            !               ! +------------+-------+-------+--------+-------+------------+---------------+ ! Mid CCA     ! 85     !19.9   !60      !0.77   !            !               ! +------------+-------+-------+--------+-------+------------+---------------+ ! Prox ICA    !103    !27     !60      !0.74   !            ! +------------+-------+-------+--------+-------+------------+---------------+ ! Vertebral   !50.7   !16.5   !60      !0.67   !            !               ! +------------+-------+-------+--------+-------+------------+---------------+   - There is antegrade vertebral flow noted on the left side. - Additional Measurements:ICAPSV/CCAPSV 1.24. ICAEDV/CCAEDV 2.43. Vl Vein Mapping Lower Bilateral    Result Date: 4/29/2019  Vascular Lower Extremity Vein Mapping Procedure  Demographics   Patient Name   Savannah Hernandez   Age                 44                 [place]   Patient Number [id number]          Gender              Male   Visit Number   [id number]       Interpreting        Pinky Blanco MD                                 Physician   Date of Birth  1970      Referring Physician Vanessa Donnelly   Accession      [id number]       [address] [place] [address]  Number  Procedure Type of Study:   Veins:Lower Extremity Vein Mapping, 31708, VEIN MAPPING LOWER BILATERAL . Indications for Study:Pre-op CABG. Risk Factors   - The patient's last creatinine was 1 mg/dl. Allergies   - Demerol.   - Natural rubber and latex. Impression   Usable greater Saphenous vein conduit in both lower extremities. The veins have been marked on the skin. Sizes are noted above. NOTE: veins are small in diameter.    Signature   ----------------------------------------------------------------  Electronically signed by Pinky Blanco MD(Interpreting  physician) on 04/29/2019 05:27 AM        Echo:  Summary   Normal left ventricular size and systolic function EF 57%   Mild concentric left ventricular hypertrophy with transmitral flow   consistent with grade 2 diastolic dysfunction   Normal left atrial size   Structurally normal trileaflet aortic valve with adequate cusp separation   and no insufficiency   Structurally normal mitral valve with trace to mild regurgitation   Normal right-sided chambers with preserved RV systolic function   Trace tricuspid regurgitation   Normal IVC dimensions consistent with normal right atrial filling   pressures of 5-10 mmHg   No pericardial effusion and aortic root dimensions are within normal   limits      Signature   ----------------------------------------------------------------   Electronically signed by Ludy Farmer MD(Interpreting physician)   on 04/25/2019 10:25 AM   ----------------------------------------------------------------        Diagnostic Cardiac Cath - 04/26/2019  Conclusions   Normal LV systolic function.   Severe multivessel coronary artery disease      Recommendations   Routine Post Diagnostic Cath orders.   Risk factor modification.   Cardiovascular surgical consultation      Signatures   ----------------------------------------------------------------   Electronically signed by Venkat Carpio MD(Performing   Physician) on 04/26/2019 15:20   ----------------------------------------------------------------        Objective:   Vitals: BP (!) 140/75   Pulse 72   Temp 95.8 °F (35.4 °C) (Temporal)   Resp 22   Ht 5' 11\" (1.803 m)   Wt 219 lb 6.4 oz (99.5 kg)   SpO2 92%   BMI 30.60 kg/m²   24HR INTAKE/OUTPUT:      Intake/Output Summary (Last 24 hours) at 5/4/2019 1307  Last data filed at 5/4/2019 0952  Gross per 24 hour   Intake 100 ml   Output 1700 ml   Net -1600 ml       Physical Exam  General appearance: Well-developed, well-nourished, in no acute distress.   HEENT: atraumatic, eyes with clear conjunctiva and normal lids, pupils and irises normal, external ears and nose are normal, lips normal. Neck without masses, lympadenopathy, bruit  Lungs: Patient in no acute respiratory distress, no increased work of breathing, \"clear to auscultation bilaterally\" without rales, rhonchi or wheezes  Heart: regular rate and rhythm, S1, S2 normal, no murmur, click, rub or gallop  Abdomen: soft, non-tender; non-distended normal bowel sounds no masses,

## 2019-05-04 NOTE — PROGRESS NOTES
POST OP CARDIOTHORACIC SURGERY PROGRESS NOTE    Post op day 3    SUBJECTIVE:  Still quite anxious, but  better. BP (!) 161/65   Pulse 84   Temp 97.8 °F (36.6 °C) (Temporal)   Resp 18   Ht 5' 11\" (1.803 m)   Wt 219 lb 6.4 oz (99.5 kg)   SpO2 97%   BMI 30.60 kg/m²   Average, Min, and Max for last 24 hours Vitals:  TEMPERATURE:  Temp  Av.5 °F (36.4 °C)  Min: 97 °F (36.1 °C)  Max: 98.1 °F (36.7 °C)  RESPIRATIONS RANGE: Resp  Av  Min: 18  Max: 29  PULSE RANGE: Pulse  Av.1  Min: 61  Max: 92  BLOOD PRESSURE RANGE:  Systolic (15NHJ), RFO:555 , Min:107 , SZJ:868   ; Diastolic (17VOT), IIX:62, Min:49, Max:74    PULSE OXIMETRY RANGE: SpO2  Av.9 %  Min: 90 %  Max: 97 %    I/O last 3 completed shifts: In: 100 [P.O.:100]  Out: 1850 [Urine:1850]    CHEST: lungs clear    CARDIOVASCULAR: regular rhythm, no murmurs    INCISION: no drainage, skin edges intact    DRAINS:     LABS:  CBC:   Lab Results   Component Value Date    WBC 15.2 2019    RBC 2.73 2019    HGB 8.2 2019    HCT 25.9 2019    HCT 43.8 2011    MCV 94.9 2019    MCH 30.0 2019    MCHC 31.7 2019    RDW 12.2 2019     2019     2011    MPV 11.0 2019     BMP:    Lab Results   Component Value Date     2019     2011    K 4.6 2019    K 4.4 2019    K 4.1 2011    CL 97 2019     2011    CO2 22 2019    BUN 25 2019    LABALBU 4.0 2019    LABALBU 4.6 2011    CREATININE 1.0 2019    CREATININE 1.0 2011    CALCIUM 8.9 2019    LABGLOM >60 2019    GLUCOSE 172 2019       CHEST XRAY: none    ASSESSMENT: normal postoperative course. A lot of anxiety issues    PLAN: Remove pacing wires. OK in shower.  Labs and CXR in AM    Electronically signed by Campbell Lucero MD on 19 at 9:27 AM

## 2019-05-05 ENCOUNTER — APPOINTMENT (OUTPATIENT)
Dept: GENERAL RADIOLOGY | Age: 49
DRG: 234 | End: 2019-05-05
Payer: MEDICAID

## 2019-05-05 LAB
ABO/RH: NORMAL
ALBUMIN SERPL-MCNC: 3.2 G/DL (ref 3.5–5.2)
ALP BLD-CCNC: 281 U/L (ref 40–130)
ALT SERPL-CCNC: 104 U/L (ref 5–41)
ANION GAP SERPL CALCULATED.3IONS-SCNC: 17 MMOL/L (ref 7–19)
ANTIBODY SCREEN: NORMAL
AST SERPL-CCNC: 211 U/L (ref 5–40)
BASOPHILS ABSOLUTE: 0.1 K/UL (ref 0–0.2)
BASOPHILS RELATIVE PERCENT: 0.5 % (ref 0–1)
BILIRUB SERPL-MCNC: 0.7 MG/DL (ref 0.2–1.2)
BUN BLDV-MCNC: 30 MG/DL (ref 6–20)
CALCIUM SERPL-MCNC: 9 MG/DL (ref 8.6–10)
CHLORIDE BLD-SCNC: 96 MMOL/L (ref 98–111)
CO2: 22 MMOL/L (ref 22–29)
CREAT SERPL-MCNC: 1.1 MG/DL (ref 0.5–1.2)
EOSINOPHILS ABSOLUTE: 0.4 K/UL (ref 0–0.6)
EOSINOPHILS RELATIVE PERCENT: 2.6 % (ref 0–5)
GFR NON-AFRICAN AMERICAN: >60
GLUCOSE BLD-MCNC: 122 MG/DL (ref 70–99)
GLUCOSE BLD-MCNC: 137 MG/DL (ref 74–109)
GLUCOSE BLD-MCNC: 165 MG/DL (ref 70–99)
GLUCOSE BLD-MCNC: 169 MG/DL (ref 70–99)
GLUCOSE BLD-MCNC: 199 MG/DL (ref 70–99)
HCT VFR BLD CALC: 22.6 % (ref 42–52)
HCT VFR BLD CALC: 24.4 % (ref 42–52)
HEMOGLOBIN: 7.3 G/DL (ref 14–18)
HEMOGLOBIN: 7.9 G/DL (ref 14–18)
LYMPHOCYTES ABSOLUTE: 2.5 K/UL (ref 1.1–4.5)
LYMPHOCYTES RELATIVE PERCENT: 16.7 % (ref 20–40)
MCH RBC QN AUTO: 29 PG (ref 27–31)
MCH RBC QN AUTO: 29.5 PG (ref 27–31)
MCHC RBC AUTO-ENTMCNC: 32.3 G/DL (ref 33–37)
MCHC RBC AUTO-ENTMCNC: 32.4 G/DL (ref 33–37)
MCV RBC AUTO: 89.7 FL (ref 80–94)
MCV RBC AUTO: 91 FL (ref 80–94)
MONOCYTES ABSOLUTE: 1.4 K/UL (ref 0–0.9)
MONOCYTES RELATIVE PERCENT: 9.6 % (ref 0–10)
NEUTROPHILS ABSOLUTE: 10.2 K/UL (ref 1.5–7.5)
NEUTROPHILS RELATIVE PERCENT: 69.2 % (ref 50–65)
PDW BLD-RTO: 12.4 % (ref 11.5–14.5)
PDW BLD-RTO: 12.6 % (ref 11.5–14.5)
PERFORMED ON: ABNORMAL
PLATELET # BLD: 184 K/UL (ref 130–400)
PLATELET # BLD: 220 K/UL (ref 130–400)
PMV BLD AUTO: 10.8 FL (ref 9.4–12.4)
PMV BLD AUTO: 10.9 FL (ref 9.4–12.4)
POTASSIUM SERPL-SCNC: 4.2 MMOL/L (ref 3.5–5)
RBC # BLD: 2.52 M/UL (ref 4.7–6.1)
RBC # BLD: 2.68 M/UL (ref 4.7–6.1)
SODIUM BLD-SCNC: 135 MMOL/L (ref 136–145)
TOTAL PROTEIN: 6.9 G/DL (ref 6.6–8.7)
WBC # BLD: 12.4 K/UL (ref 4.8–10.8)
WBC # BLD: 14.8 K/UL (ref 4.8–10.8)

## 2019-05-05 PROCEDURE — 86900 BLOOD TYPING SEROLOGIC ABO: CPT

## 2019-05-05 PROCEDURE — 86923 COMPATIBILITY TEST ELECTRIC: CPT

## 2019-05-05 PROCEDURE — 82948 REAGENT STRIP/BLOOD GLUCOSE: CPT

## 2019-05-05 PROCEDURE — 2140000000 HC CCU INTERMEDIATE R&B

## 2019-05-05 PROCEDURE — 99231 SBSQ HOSP IP/OBS SF/LOW 25: CPT | Performed by: INTERNAL MEDICINE

## 2019-05-05 PROCEDURE — 85027 COMPLETE CBC AUTOMATED: CPT

## 2019-05-05 PROCEDURE — 99024 POSTOP FOLLOW-UP VISIT: CPT | Performed by: THORACIC SURGERY (CARDIOTHORACIC VASCULAR SURGERY)

## 2019-05-05 PROCEDURE — 2700000000 HC OXYGEN THERAPY PER DAY

## 2019-05-05 PROCEDURE — 71045 X-RAY EXAM CHEST 1 VIEW: CPT

## 2019-05-05 PROCEDURE — 6370000000 HC RX 637 (ALT 250 FOR IP)

## 2019-05-05 PROCEDURE — 36415 COLL VENOUS BLD VENIPUNCTURE: CPT

## 2019-05-05 PROCEDURE — 85025 COMPLETE CBC W/AUTO DIFF WBC: CPT

## 2019-05-05 PROCEDURE — 6370000000 HC RX 637 (ALT 250 FOR IP): Performed by: THORACIC SURGERY (CARDIOTHORACIC VASCULAR SURGERY)

## 2019-05-05 PROCEDURE — 80053 COMPREHEN METABOLIC PANEL: CPT

## 2019-05-05 PROCEDURE — 97116 GAIT TRAINING THERAPY: CPT

## 2019-05-05 PROCEDURE — 6360000002 HC RX W HCPCS: Performed by: THORACIC SURGERY (CARDIOTHORACIC VASCULAR SURGERY)

## 2019-05-05 PROCEDURE — 86901 BLOOD TYPING SEROLOGIC RH(D): CPT

## 2019-05-05 PROCEDURE — 2580000003 HC RX 258: Performed by: THORACIC SURGERY (CARDIOTHORACIC VASCULAR SURGERY)

## 2019-05-05 PROCEDURE — 86850 RBC ANTIBODY SCREEN: CPT

## 2019-05-05 PROCEDURE — 94640 AIRWAY INHALATION TREATMENT: CPT

## 2019-05-05 PROCEDURE — 71046 X-RAY EXAM CHEST 2 VIEWS: CPT

## 2019-05-05 RX ORDER — IPRATROPIUM BROMIDE AND ALBUTEROL SULFATE 2.5; .5 MG/3ML; MG/3ML
1 SOLUTION RESPIRATORY (INHALATION) ONCE
Status: DISCONTINUED | OUTPATIENT
Start: 2019-05-05 | End: 2019-05-06 | Stop reason: HOSPADM

## 2019-05-05 RX ADMIN — IPRATROPIUM BROMIDE AND ALBUTEROL SULFATE 1 AMPULE: .5; 3 SOLUTION RESPIRATORY (INHALATION) at 07:22

## 2019-05-05 RX ADMIN — METFORMIN HYDROCHLORIDE 500 MG: 500 TABLET ORAL at 10:18

## 2019-05-05 RX ADMIN — ALPRAZOLAM 1 MG: 1 TABLET ORAL at 04:38

## 2019-05-05 RX ADMIN — OXYCODONE HYDROCHLORIDE 10 MG: 5 TABLET ORAL at 00:06

## 2019-05-05 RX ADMIN — ATORVASTATIN CALCIUM 20 MG: 20 TABLET, FILM COATED ORAL at 21:05

## 2019-05-05 RX ADMIN — ATENOLOL 50 MG: 50 TABLET ORAL at 10:17

## 2019-05-05 RX ADMIN — KETOROLAC TROMETHAMINE 30 MG: 30 INJECTION, SOLUTION INTRAMUSCULAR; INTRAVENOUS at 00:58

## 2019-05-05 RX ADMIN — INSULIN GLARGINE 14 UNITS: 100 INJECTION, SOLUTION SUBCUTANEOUS at 21:07

## 2019-05-05 RX ADMIN — INSULIN LISPRO 1 UNITS: 100 INJECTION, SOLUTION INTRAVENOUS; SUBCUTANEOUS at 18:36

## 2019-05-05 RX ADMIN — AMLODIPINE BESYLATE 5 MG: 5 TABLET ORAL at 10:17

## 2019-05-05 RX ADMIN — FLUOXETINE HYDROCHLORIDE 20 MG: 20 CAPSULE ORAL at 10:17

## 2019-05-05 RX ADMIN — MAGNESIUM HYDROXIDE 30 ML: 400 SUSPENSION ORAL at 21:17

## 2019-05-05 RX ADMIN — KETOROLAC TROMETHAMINE 30 MG: 30 INJECTION, SOLUTION INTRAMUSCULAR; INTRAVENOUS at 10:18

## 2019-05-05 RX ADMIN — FAMOTIDINE 20 MG: 20 TABLET ORAL at 21:05

## 2019-05-05 RX ADMIN — CLOPIDOGREL BISULFATE 75 MG: 75 TABLET ORAL at 10:17

## 2019-05-05 RX ADMIN — DOCUSATE SODIUM 100 MG: 100 CAPSULE, LIQUID FILLED ORAL at 10:17

## 2019-05-05 RX ADMIN — IPRATROPIUM BROMIDE AND ALBUTEROL SULFATE 1 AMPULE: .5; 3 SOLUTION RESPIRATORY (INHALATION) at 20:03

## 2019-05-05 RX ADMIN — INSULIN LISPRO 1 UNITS: 100 INJECTION, SOLUTION INTRAVENOUS; SUBCUTANEOUS at 13:14

## 2019-05-05 RX ADMIN — Medication 10 ML: at 21:05

## 2019-05-05 RX ADMIN — OXYCODONE HYDROCHLORIDE 10 MG: 5 TABLET ORAL at 04:38

## 2019-05-05 RX ADMIN — METFORMIN HYDROCHLORIDE 500 MG: 500 TABLET ORAL at 18:36

## 2019-05-05 RX ADMIN — OXYCODONE HYDROCHLORIDE 5 MG: 5 TABLET ORAL at 10:18

## 2019-05-05 RX ADMIN — OXYCODONE HYDROCHLORIDE 5 MG: 5 TABLET ORAL at 15:19

## 2019-05-05 RX ADMIN — DOCUSATE SODIUM 100 MG: 100 CAPSULE, LIQUID FILLED ORAL at 21:05

## 2019-05-05 RX ADMIN — Medication 10 ML: at 10:18

## 2019-05-05 RX ADMIN — HYDROMORPHONE HYDROCHLORIDE 1 MG: 1 INJECTION, SOLUTION INTRAMUSCULAR; INTRAVENOUS; SUBCUTANEOUS at 00:54

## 2019-05-05 RX ADMIN — INSULIN LISPRO 1 UNITS: 100 INJECTION, SOLUTION INTRAVENOUS; SUBCUTANEOUS at 21:07

## 2019-05-05 RX ADMIN — BUSPIRONE HYDROCHLORIDE 10 MG: 10 TABLET ORAL at 04:38

## 2019-05-05 RX ADMIN — IPRATROPIUM BROMIDE AND ALBUTEROL SULFATE 1 AMPULE: .5; 3 SOLUTION RESPIRATORY (INHALATION) at 10:59

## 2019-05-05 RX ADMIN — IPRATROPIUM BROMIDE AND ALBUTEROL SULFATE 1 AMPULE: .5; 3 SOLUTION RESPIRATORY (INHALATION) at 15:09

## 2019-05-05 RX ADMIN — FAMOTIDINE 20 MG: 20 TABLET ORAL at 10:17

## 2019-05-05 RX ADMIN — KETOROLAC TROMETHAMINE 30 MG: 30 INJECTION, SOLUTION INTRAMUSCULAR; INTRAVENOUS at 18:36

## 2019-05-05 RX ADMIN — OXYCODONE HYDROCHLORIDE 5 MG: 5 TABLET ORAL at 21:05

## 2019-05-05 RX ADMIN — BUSPIRONE HYDROCHLORIDE 10 MG: 10 TABLET ORAL at 21:05

## 2019-05-05 RX ADMIN — ASPIRIN 81 MG: 81 TABLET, COATED ORAL at 10:17

## 2019-05-05 ASSESSMENT — PAIN SCALES - GENERAL
PAINLEVEL_OUTOF10: 0
PAINLEVEL_OUTOF10: 10
PAINLEVEL_OUTOF10: 9
PAINLEVEL_OUTOF10: 8
PAINLEVEL_OUTOF10: 10
PAINLEVEL_OUTOF10: 9
PAINLEVEL_OUTOF10: 7
PAINLEVEL_OUTOF10: 0
PAINLEVEL_OUTOF10: 5
PAINLEVEL_OUTOF10: 10
PAINLEVEL_OUTOF10: 0
PAINLEVEL_OUTOF10: 9

## 2019-05-05 ASSESSMENT — PAIN DESCRIPTION - PAIN TYPE
TYPE: CHRONIC PAIN;SURGICAL PAIN
TYPE: SURGICAL PAIN

## 2019-05-05 ASSESSMENT — PAIN DESCRIPTION - LOCATION: LOCATION: BACK;CHEST

## 2019-05-05 ASSESSMENT — PAIN SCALES - WONG BAKER: WONGBAKER_NUMERICALRESPONSE: 0

## 2019-05-05 NOTE — PROGRESS NOTES
POST OP CARDIOTHORACIC SURGERY PROGRESS NOTE    Post op day 4    SUBJECTIVE:  Up in chair, alert, still very anxious. Had shower, feels better    BP (!) 147/76   Pulse 74   Temp 97.6 °F (36.4 °C) (Temporal)   Resp 18   Ht 5' 11\" (1.803 m)   Wt 215 lb 12.8 oz (97.9 kg)   SpO2 98%   BMI 30.10 kg/m²   Average, Min, and Max for last 24 hours Vitals:  TEMPERATURE:  Temp  Av.8 °F (36 °C)  Min: 95.8 °F (35.4 °C)  Max: 97.9 °F (36.6 °C)  RESPIRATIONS RANGE: Resp  Av  Min: 18  Max: 24  PULSE RANGE: Pulse  Av.6  Min: 72  Max: 80  BLOOD PRESSURE RANGE:  Systolic (97RAJ), WJU:662 , Min:123 , ASV:007   ; Diastolic (48ZHG), QMV:92, Min:61, Max:76    PULSE OXIMETRY RANGE: SpO2  Av %  Min: 90 %  Max: 98 %    I/O last 3 completed shifts: In: 36 [P.O.:760]  Out: 1200 [Urine:1200]    CHEST: lungs clear    CARDIOVASCULAR: regular rhythm, no murmurs    INCISION: no drainage, skin edges intact    DRAINS:     LABS:  CBC:   Lab Results   Component Value Date    WBC 14.8 2019    RBC 2.68 2019    HGB 7.9 2019    HCT 24.4 2019    HCT 43.8 2011    MCV 91.0 2019    MCH 29.5 2019    MCHC 32.4 2019    RDW 12.6 2019     2019     2011    MPV 10.8 2019     CMP:    Lab Results   Component Value Date     2019     2011    K 4.2 2019    K 4.4 2019    K 4.1 2011    CL 96 2019     2011    CO2 22 2019    BUN 30 2019    CREATININE 1.1 2019    CREATININE 1.0 2011    LABGLOM >60 2019    GLUCOSE 137 2019    PROT 6.9 2019    PROT 6.9 2011    LABALBU 3.2 2019    LABALBU 4.6 2011    CALCIUM 9.0 2019    BILITOT 0.7 2019    ALKPHOS 281 2019    ALKPHOS 74 2011     2019     2019       CHEST XRAY: normal postoperative chest xray    ASSESSMENT: normal postoperative course.  Anxiety problems. PLAN: Continue meds. Keep today.  Hopefully calmer tomorrow to go home    Electronically signed by Maxx Murray MD on 5/5/19 at 8:14 AM

## 2019-05-05 NOTE — PROGRESS NOTES
Facility-Administered Medications   Medication Dose Route Frequency Provider Last Rate Last Dose    ipratropium-albuterol (DUONEB) nebulizer solution 1 ampule  1 ampule Inhalation Once Shiv Damir Jara MD        magnesium hydroxide (MILK OF MAGNESIA) 400 MG/5ML suspension 30 mL  30 mL Oral Daily PRN Kasia Vora RN   30 mL at 05/04/19 0554    bisacodyl (DULCOLAX) suppository 10 mg  10 mg Rectal Daily PRN Kasia Vora RN        ALPRAZolam Sammye [place]) tablet 1 mg  1 mg Oral TID PRN Shobha Stevens MD   1 mg at 05/05/19 0438    ketorolac (TORADOL) injection 30 mg  30 mg Intravenous Q6H PRN Shobha Stevens MD   30 mg at 05/05/19 0058    amLODIPine (NORVASC) tablet 5 mg  5 mg Oral Daily Shobha Stevens MD   5 mg at 05/04/19 0914    busPIRone (BUSPAR) tablet 10 mg  10 mg Oral TID PRN Shobha Stevens MD   10 mg at 05/05/19 0438    FLUoxetine (PROZAC) capsule 20 mg  20 mg Oral Daily Shobha Stevens MD   20 mg at 05/04/19 0914    atenolol (TENORMIN) tablet 50 mg  50 mg Oral Daily Shobha Stevens MD   50 mg at 05/04/19 0914    metFORMIN (GLUCOPHAGE) tablet 500 mg  500 mg Oral BID WC Shobha Stevens MD   500 mg at 05/04/19 1732    sodium chloride flush 0.9 % injection 10 mL  10 mL Intravenous 2 times per day Shobha Stevens MD   10 mL at 05/04/19 2018    sodium chloride flush 0.9 % injection 10 mL  10 mL Intravenous PRN Shobha Stevens MD        potassium chloride 10 mEq/100 mL IVPB (Peripheral Line)  10 mEq Intravenous PRN Shobha Stevens MD        acetaminophen (TYLENOL) tablet 650 mg  650 mg Oral Q4H PRN Shobha Stevens MD   650 mg at 05/03/19 1057    oxyCODONE (ROXICODONE) immediate release tablet 5 mg  5 mg Oral Q4H PRN MD Declan Nieto oxyCODONE (ROXICODONE) immediate release tablet 10 mg  10 mg Oral Q4H PRN Shobha Stevens MD   10 mg at 05/05/19 0438    zolpidem (AMBIEN) tablet 5 mg  5 mg Oral Nightly PRN Shobha Stevens MD        docusate sodium (COLACE) capsule 100 mg  100 mg Oral BID Elsie Singh MD   100 mg at 05/04/19 2015    ondansetron (ZOFRAN) injection 4 mg  4 mg Intravenous Q8H PRN Elsie Singh MD        famotidine (PEPCID) tablet 20 mg  20 mg Oral BID Elsie Singh MD   20 mg at 05/04/19 2100    atorvastatin (LIPITOR) tablet 20 mg  20 mg Oral Nightly Elsie Singh MD   20 mg at 05/04/19 2042    aspirin EC tablet 81 mg  81 mg Oral Daily Elsie Singh MD   81 mg at 05/04/19 0914    clopidogrel (PLAVIX) tablet 75 mg  75 mg Oral Daily Elsie Singh MD   75 mg at 05/04/19 0914    ipratropium-albuterol (DUONEB) nebulizer solution 1 ampule  1 ampule Inhalation Q4H WA Elsie Singh MD   1 ampule at 05/05/19 8191    0.9 % sodium chloride bolus  500 mL Intravenous Continuous PRN Elsie Singh MD   Stopped at 05/01/19 1900    insulin regular (HUMULIN R;NOVOLIN R) 100 Units in sodium chloride 0.9 % 100 mL infusion  1 Units/hr Intravenous Continuous Elsie Singh MD   Stopped at 05/02/19 0727    insulin glargine (LANTUS) injection vial 14 Units  0.15 Units/kg Subcutaneous Nightly Elsie Singh MD   14 Units at 05/04/19 2016    insulin lispro (HUMALOG) injection vial 0-6 Units  0-6 Units Subcutaneous TID WC Elsie Singh MD   1 Units at 05/04/19 1733    insulin lispro (HUMALOG) injection vial 0-3 Units  0-3 Units Subcutaneous Nightly Elsie Singh MD   1 Units at 05/04/19 2100    glucose (GLUTOSE) 40 % oral gel 15 g  15 g Oral PRN Elsie Singh MD        dextrose 50 % solution 12.5 g  12.5 g Intravenous PRN Elsie Singh MD        glucagon (rDNA) injection 1 mg  1 mg Intramuscular PRN Elsie Singh MD        dextrose 5 % solution  100 mL/hr Intravenous PRN Elsie Singh MD        HYDROmorphone (DILAUDID) injection 1 mg  1 mg Intravenous Q2H PRN Elsie Singh MD   1 mg at 05/05/19 0054         sodium chloride Stopped (05/01/19 1900)    insulin (HUMAN R) non-weight based infusion Stopped (05/02/19 0727)    dextrose        ipratropium-albuterol  1 ampule Inhalation Once    amLODIPine  5 mg Oral Daily    FLUoxetine  20 mg Oral Daily    atenolol  50 mg Oral Daily    metFORMIN  500 mg Oral BID     sodium chloride flush  10 mL Intravenous 2 times per day    docusate sodium  100 mg Oral BID    famotidine  20 mg Oral BID    atorvastatin  20 mg Oral Nightly    aspirin  81 mg Oral Daily    clopidogrel  75 mg Oral Daily    ipratropium-albuterol  1 ampule Inhalation Q4H WA    insulin glargine  0.15 Units/kg Subcutaneous Nightly    insulin lispro  0-6 Units Subcutaneous TID     insulin lispro  0-3 Units Subcutaneous Nightly     magnesium hydroxide, bisacodyl, ALPRAZolam, ketorolac, busPIRone, sodium chloride flush, potassium chloride, acetaminophen, oxyCODONE **OR** oxyCODONE, zolpidem, ondansetron, sodium chloride, glucose, dextrose, glucagon (rDNA), dextrose, HYDROmorphone  DIET GENERAL; No Caffeine       Labs:     Recent Labs     05/04/19  1342 05/05/19  0052 05/05/19 0414   WBC 9.7 12.4* 14.8*   RBC 2.35* 2.52* 2.68*   HGB 7.1* 7.3* 7.9*   HCT 21.4* 22.6* 24.4*   MCV 91.1 89.7 91.0   MCH 30.2 29.0 29.5   MCHC 33.2 32.3* 32.4*    184 220     Recent Labs     05/03/19  0348 05/04/19  0202 05/05/19 0414   * 134* 135*   K 4.6 4.6 4.2   ANIONGAP 11 15 17   CL 99 97* 96*   CO2 20* 22 22   BUN 27* 25* 30*   CREATININE 1.1 1.0 1.1   GLUCOSE 181* 172* 137*   CALCIUM 8.4* 8.9 9.0     No results for input(s): MG, PHOS in the last 72 hours. Recent Labs     05/05/19 0414   *   *   BILITOT 0.7   ALKPHOS 281*     HgBA1c:  No components found for: HGBA1C  FLP:    Lab Results   Component Value Date    TRIG 1,127 04/26/2019    HDL 27 04/26/2019    LDLCALC see below 04/26/2019    LDLDIRECT 109 04/26/2019     TSH:    Lab Results   Component Value Date    TSH 2.450 04/02/2019     Troponin T: No results for input(s): TROPONINI in the last 72 hours.   Pro-BNP: No results for input(s): BNP in the last 72 hours. INR:   No results for input(s): INR in the last 72 hours. ABGs:   Lab Results   Component Value Date    PHART 7.380 05/02/2019    PO2ART 76.0 05/02/2019    FOF3OWD 37.0 05/02/2019     UA:  No results for input(s): NITRITE, COLORU, PHUR, LABCAST, WBCUA, RBCUA, MUCUS, TRICHOMONAS, YEAST, BACTERIA, CLARITYU, SPECGRAV, LEUKOCYTESUR, UROBILINOGEN, BILIRUBINUR, BLOODU, GLUCOSEU, AMORPHOUS in the last 72 hours. Invalid input(s): Claudy Canales      RAD:   Xr Chest Standard (2 Vw)    Result Date: 4/24/2019  EXAMINATION:  XR CHEST (2 VW)  4/24/2019 3:09 PM HISTORY: Chest pain shortness of breath COMPARISON: No comparison study. FINDINGS:  PA and lateral views of the chest were obtained. The lungs are clear and normally expanded. There are few scattered nodular densities in the left lung base, at least one is calcified, another of the nodules may a represent nipple shadow artifact. Repeat PA chest with nipple markers in place is recommended. Heart size is normal. No pneumothorax or pleural effusion is identified. The osseous structures are unremarkable. No pulmonary consolidation or acute findings. A few small nodules in the left lung base, at least one is calcified. One of the nodules may represent a nipple shadow artifact. Consider repeat PA chest with nipple markers in place. Signed by Dr Mykel Carpio. Devika on 4/24/2019 3:12 PM    Xr Cervical Spine (2-3 Views)    Result Date: 4/10/2019  EXAMINATION: XR CERVICAL SPINE (2-3 VIEWS) 4/10/2019 9:34 AM HISTORY: Neck pain 4 views cervical spine are obtained. Cervical vertebra are normally aligned. There is slight loss of height at C5-6 disc space level and loss of height of C6-7 disc space level. Mild uncinate spurring is noted at the C6-7 level. Minimal anterior hypertrophic degenerative changes noted inferior endplates of Z7-K3 and C6. The odontoid process is intact. Calcification left carotid artery is noted.  [place] the lungs appears clear as visualized. Impression degenerative spondylosis is noted in the cervical spine as described above Signed by Dr Earnest Spangler on 4/10/2019 9:36 AM    Ct Head Wo Contrast    Result Date: 4/24/2019  EXAMINATION: CT HEAD WO CONTRAST 4/24/2019 5:48 PM HISTORY: Daily headaches, history of bladder cancer Comparison: CT head without contrast 2/17/2011 Technique: Sequential imaging was performed from the vertex through the base of the skull without the use of IV contrast. Sagittal and coronal reformations were made from the original source data and reviewed. Automated exposure control was utilized for radiation dose reduction. Radiation dose:  mGy-cm Findings: There is no evidence of acute intracranial hemorrhage, mass, or midline shift. There is no evidence of acute territorial infarct. The ventricles appear normal in configuration. Basilar cisterns are patent. The posterior fossa appears grossly normal. The calvarium appears intact. There is near complete opacification of the visualized left maxillary sinus. The visualized mastoid air cells appear clear. Calcifications are seen in the cavernous carotid arteries. Impression: No acute intracranial findings. Sinus disease. Signed by Dr Roberto Glaser on 4/24/2019 5:54 PM    Ct Chest W Contrast    Result Date: 4/27/2019  CT CHEST W CONTRAST 4/27/2019 5:00 AM HISTORY: Left lower lobe pulmonary nodule COMPARISON: Chest radiograph dated 4/26/2019 and 4/24/2019 TECHNIQUE:  Radiation dose equals  mGy cm. AUTOMATED EXPOSURE CONTROL dose reduction technique was implemented. Thin section axial images were obtained with intravenous contrast. Multi projection reconstruction images were generated. FINDINGS: There are no measurable pulmonary nodules identified in the lungs. Particularly, in the left lower lobe in the region of the chest radiograph abnormality, no CT correlate is identified.  There are linear opacities identified in the right lung base compatible with mild platelike atelectasis associated with the diaphragm position. There are no pleural effusions or pneumothoraces or parenchymal infiltrates otherwise. There are small mediastinal lymph nodes. There are no pathologically enlarged nodes. There are coronary artery calcifications. There are atherosclerotic aortic calcifications. There is fatty infiltration of the liver. Limited imaging of the upper abdomen demonstrate no acute abnormality. Bony structures are intact without acute osseous abnormalities. Degenerative spurring noted diffusely in the thoracic spine. 1. No measurable pulmonary nodules identified in the lungs. Minimal platelike atelectasis in the right lung base associated with diaphragm position. Lung fields are clear without acute cardiopulmonary process. 2. Atherosclerotic calcifications. 4. Hepatic steatosis. Signed by Dr Damien Mcdowell on 4/27/2019 8:30 AM    Xr Chest 1 Vw    Result Date: 4/26/2019  XR CHEST 1 VIEW 4/26/2019 7:00 PM HISTORY: Repeat chest x-ray with nipple markers COMPARISON: 4/24/2019. FINDINGS: Frontal view of the chest was obtained. Nipple markers are present. The nipple markers are separate from the nodular opacity seen on the previous study, but the nodule is not well visualized on this exam. The lungs are otherwise clear. The cardiomediastinal silhouette and pulmonary vascularity are unchanged. The osseous structures and surrounding soft tissues demonstrate no acute abnormality. 1. The previously seen nodule is not well visualized but is in a separate position than the nipple marker. Given the patient's age, consider further evaluation with CT to ensure no suspicious nodule is present. Signed by Dr Anitra Espinoza on 4/26/2019 8:35 PM    Nm Myocardial Spect Rest Exercise Or Rx    Result Date: 4/26/2019  Lexiscan Nuclear Stress Test Report Procedure date: 04/25/2019 Indications: Chest pain Procedure: Stress was performed with injection of 0.4 mg Lexiscan.  Vital signs and EKG were monitored. Technetium-99 sestamibi was injected in divided doses, approximately 10 mCi and 30 mCi respectively for rest and stress imaging. The patient was discharged in stable condition. Results: Patient had symptoms of dyspnea, dizziness, nausea, and stomach cramping during infusion that resolved in recovery. Baseline EKG showed normal sinus rhythm. During stress there were no significant EKG changes or rhythm changes. Baseline and peak blood pressures were 151/103, and 163/92 respectively. Baseline and peak heart rates were 87 and  99 respectively. Radioactive pharmaceuticals used: Rest images 10.67 mCi technetium 99m sestamibi Stress images 32.83 mCi technetium 99m sestamibi Review of rest and stress images obtained in a SPECT acquisition protocol low with review of the polar plot revealed: 1. Ejection fraction normal 54% 2. Wall motion study suggested mild inferoseptal hypokinesis 3. Myocardial perfusion imaging demonstrated homogeneous uptake of the tracer in all visualized segments with the exception a few of the more distal short axis slices showed diminished uptake in the inferior wall which appeared to reverse on some of the slices. The sum stress score was 1.     Summary impression: Probably normal study normal ejection fraction 54% minimal distal inferior wall ischemia cannot be entirely excluded more likely normal correlate clinically Signed by Dr Oneyda Brar on 4/25/2019 4:00 PM    Us Carotid Artery Bilateral    Result Date: 4/18/2019  Vascular Carotid Procedure  Demographics   Patient Name    Kathleen Randolph    Age                   44                  [place]   Patient Number  [id number]           Gender                Male   Visit Number    [id number]        Interpreting          Pancho Borjas MD                                   Physician   Date of Birth   1970       Referring Physician   Kezia Bull   Accession       [id number]        [address] [place] [address]  Number Procedure Type of Study:   Cerebral:Carotid, US CAROTID ARTERY BILATERAL [[id number]]. Indications for Study:Dizziness/Vertigo. Risk Factors   - The patient's risk factor(s) include: diabetes mellitus, dyslipidemia     and arterial hypertension.   - Current - Every day. Impression   There is no plaque visualized in the bilateral internal carotid  artery(ies). There is no stenosis in the right internal carotid artery. There is no stenosis of the left internal carotid artery. There is normal antegrade flow in the bilateral vertebral artery(ies). Signature   ----------------------------------------------------------------  Electronically signed by Lopez Hurt MD(Interpreting  physician) on 04/18/2019 11:12 AM  ----------------------------------------------------------------  Blood Pressure:Right arm 176/86 mmHg. Left arm 184/90 mmHg. Velocities are measured in cm/s ; Diameters are measured in mm Carotid Right Measurements +------------+-------+-------+--------+-------+------------+---------------+ ! Location    ! PSV    ! EDV    ! Angle   ! RI     !%Stenosis   ! Tortuosity     ! +------------+-------+-------+--------+-------+------------+---------------+ ! Prox CCA    !111    !18.2   ! 60      !0.84   !            !               ! +------------+-------+-------+--------+-------+------------+---------------+ ! Mid CCA     ! 85     !19.9   !60      !0.77   !            !               ! +------------+-------+-------+--------+-------+------------+---------------+ ! Prox ICA    !103    !27     !60      !0.74   !            !               ! +------------+-------+-------+--------+-------+------------+---------------+ ! Mid ICA     !101    !31.7   !60      !0.69   !            !               ! +------------+-------+-------+--------+-------+------------+---------------+ ! Dist ICA    !76.8   !28.1   ! 60      !0.63   !            !               ! +------------+-------+-------+--------+-------+------------+---------------+ ! Prox ECA ! 173    !16.7   !60      !0.9    ! !               ! +------------+-------+-------+--------+-------+------------+---------------+ ! Vertebral   !47.1   !11.4   !60      !0.76   !            !               ! +------------+-------+-------+--------+-------+------------+---------------+   - There is antegrade vertebral flow noted on the right side. - Additional Measurements:ICAPSV/CCAPSV 0.93. ICAEDV/CCAEDV 1.74. Carotid Left Measurements +------------+-------+-------+--------+-------+------------+---------------+ ! Location    ! PSV    ! EDV    ! Angle   ! RI     !%Stenosis   ! Tortuosity     ! +------------+-------+-------+--------+-------+------------+---------------+ ! Prox CCA    !78.2   !15.7   !60      !0.8    ! !               ! +------------+-------+-------+--------+-------+------------+---------------+ ! Mid CCA     !107    !22.3   !60      !0.79   !            !               ! +------------+-------+-------+--------+-------+------------+---------------+ ! Prox ICA    !57.8   !14.9   !60      !0.74   !            !               ! +------------+-------+-------+--------+-------+------------+---------------+ ! Mid ICA     ! 96.7   !35.2   ! 60      !0.64   !            !               ! +------------+-------+-------+--------+-------+------------+---------------+ ! Dist ICA    ! 96.7   !38.1   ! 60      !0.61   !            !               ! +------------+-------+-------+--------+-------+------------+---------------+ ! Prox ECA    !136    !14.1   ! 60      !0.9    ! !               ! +------------+-------+-------+--------+-------+------------+---------------+ ! Vertebral   !50.7   !16.5   !60      !0.67   !            !               ! +------------+-------+-------+--------+-------+------------+---------------+   - There is antegrade vertebral flow noted on the left side. - Additional Measurements:ICAPSV/CCAPSV 1.24. ICAEDV/CCAEDV 2.43.     Vl Vein Mapping Lower Bilateral    Result Date: physician)  Saurav Thao 04/25/2019 10:25 AM   ----------------------------------------------------------------        Diagnostic Cardiac Cath - 04/26/2019  Conclusions   Normal LV systolic function.   Severe multivessel coronary artery disease      Recommendations   Routine Post Diagnostic Cath orders.   Risk factor modification.   Cardiovascular surgical consultation      Signatures   ----------------------------------------------------------------   Electronically signed by Fabian Villarreal MD(Performing   Physician) on 04/26/2019 15:20   ----------------------------------------------------------------        Objective:   Vitals: BP (!) 147/76   Pulse 74   Temp 97.6 °F (36.4 °C) (Temporal)   Resp 18   Ht 5' 11\" (1.803 m)   Wt 215 lb 12.8 oz (97.9 kg)   SpO2 98%   BMI 30.10 kg/m²   24HR INTAKE/OUTPUT:      Intake/Output Summary (Last 24 hours) at 5/5/2019 0941  Last data filed at 5/5/2019 0400  Gross per 24 hour   Intake 760 ml   Output 700 ml   Net 60 ml       Physical Exam  General appearance: Awake, alert, appears stated age, in no acute distress. HEENT: atraumatic, eyes with clear conjunctiva and normal lids, pupils and irises normal, external ears and nose are normal, lips normal. Neck without masses, lympadenopathy, bruit  Lungs: Patient in no acute respiratory distress, no increased work of breathing, \"clear to auscultation bilaterally\" without rales, rhonchi or wheezes  Heart: regular rate and rhythm, S1, S2 normal, no murmur, click, rub or gallop  Abdomen: soft, non-tender; non-distended normal bowel sounds no masses, no organomegaly  Musculoskeletal: No joint tenderness or deformities throughout. Range of motion intact in bilateral upper and lower extremities. Extremities: extremities normal, atraumatic, no cyanosis or edema  Neurologic: No focal neurologic deficits, normal sensation, Unable to assess patient's oriented, affect and mood appropriate.    Skin: Skin color, texture, turgor normal. No rashes or lesions      Assessment/plan:         Principal Problem:    Coronary artery disease involving native coronary artery of native heart with unstable angina pectoris (HCC)  Active Problems:    Unstable angina pectoris ([address]    Essential hypertension    Hx of bladder cancer    Type 2 diabetes mellitus without complication, without long-term current use of insulin (HCC)    Mixed anxiety depressive disorder    Mixed hyperlipidemia    Chest pain    CAD in native artery  Resolved Problems:    * No resolved hospital problems. *      Coronary artery disease  · Cardiology on board  · CT Surgery on board, on account of  Severe multivessel coronary artery disease  · S/p CABG (05/01/2019)  · Continue management as per CTS recommendation. · Continue pain management  · Anticipate likely discharge tomorrow          Continue management of other chronic medical conditions - see above and orders.               Advance Directive: Full Code    DIET GENERAL; No Caffeine     DVT prophylaxis: Enoxaparin    Consults Made:   IP CONSULT TO CARDIOLOGY  IP CONSULT TO CARDIOTHORACIC SURGERY  IP CONSULT TO CASE MANAGEMENT    Discharge planning: Likely discharge tomorrow       Electronically signed by   Marina Buckner MD, MPH,   Internal Medicine Hospitalist   5/5/2019 9:41 AM

## 2019-05-05 NOTE — PROGRESS NOTES
Physical Therapy  Name: Vanessa Larson  MRN:  [id number]  Date of service:  5/5/2019    Attempt this pm.  Patient sitting EOB and states that he feels nauseated and has increasing pain. Nursing notified. Physical Therapy will continue to follow and progress as able.   Electronically signed by Edward Gonzalez PTA on 5/5/2019 at 2:39 PM

## 2019-05-05 NOTE — PROGRESS NOTES
Patient's wife states that he has been emptying his urostomy bag into toilet instead of graduated cylinder measuring device. Patient and wife instructed to measure urine output for accurate I/O monitoring. Verbalized understanding.    Electronically signed by Tonja Muir RN on 5/5/2019 at 1:17 PM

## 2019-05-05 NOTE — PLAN OF CARE
Problem: Falls - Risk of:  Goal: Will remain free from falls  Description  Will remain free from falls  Outcome: Ongoing  Goal: Absence of physical injury  Description  Absence of physical injury  Outcome: Ongoing     Problem: SAFETY  Goal: Free from accidental physical injury  Outcome: Ongoing  Goal: Free from intentional harm  Outcome: Ongoing     Problem: DAILY CARE  Goal: Daily care needs are met  Outcome: Ongoing     Problem: PAIN  Goal: Patient's pain/discomfort is manageable  Outcome: Ongoing     Problem: SKIN INTEGRITY  Goal: Skin integrity is maintained or improved  Outcome: Ongoing     Problem: KNOWLEDGE DEFICIT  Goal: Patient/S.O. demonstrates understanding of disease process, treatment plan, medications, and discharge instructions. Outcome: Ongoing     Problem: DISCHARGE BARRIERS  Goal: Patient's continuum of care needs are met  Outcome: Ongoing     Problem: Pain:  Goal: Pain level will decrease  Description  Pain level will decrease  Outcome: Ongoing  Goal: Control of acute pain  Description  Control of acute pain  Outcome: Ongoing  Goal: Control of chronic pain  Description  Control of chronic pain  Outcome: Ongoing     Problem: Discharge Planning:  Goal: Discharged to appropriate level of care  Description  Discharged to appropriate level of care  Outcome: Ongoing     Problem:  Activity Intolerance:  Goal: Able to perform prescribed physical activity  Description  Able to perform prescribed physical activity  Outcome: Ongoing  Goal: Ability to tolerate increased activity will improve  Description  Ability to tolerate increased activity will improve  Outcome: Ongoing     Problem: Anxiety:  Goal: Level of anxiety will decrease  Description  Level of anxiety will decrease  Outcome: Ongoing     Problem: Fluid Volume - Imbalance:  Goal: Ability to achieve a balanced intake and output will improve  Description  Ability to achieve a balanced intake and output will improve  Outcome: Ongoing  Goal: Chest tube drainage is within specified parameters  Description  Chest tube drainage is within specified parameters  Outcome: Ongoing     Problem: Gas Exchange - Impaired:  Goal: Levels of oxygenation will improve  Description  Levels of oxygenation will improve  Outcome: Ongoing  Goal: Ability to maintain adequate ventilation will improve  Description  Ability to maintain adequate ventilation will improve  Outcome: Ongoing     Problem: Pain:  Goal: Control of acute pain  Description  Control of acute pain  Outcome: Ongoing  Goal: Control of chronic pain  Description  Control of chronic pain  Outcome: Ongoing     Problem: Tissue Perfusion - Cardiopulmonary, Altered:  Goal: Absence of angina  Description  Absence of angina  Outcome: Ongoing  Goal: Hemodynamic stability will improve  Description  Hemodynamic stability will improve  Outcome: Ongoing  Goal: Will show no evidence of cardiac arrhythmias  Description  Will show no evidence of cardiac arrhythmias  Outcome: Ongoing     Problem: Tobacco Use:  Goal: Will participate in inpatient tobacco-use cessation counseling  Description  Will participate in inpatient tobacco-use cessation counseling  Outcome: Ongoing

## 2019-05-06 VITALS
DIASTOLIC BLOOD PRESSURE: 78 MMHG | SYSTOLIC BLOOD PRESSURE: 147 MMHG | BODY MASS INDEX: 30.3 KG/M2 | HEIGHT: 71 IN | HEART RATE: 80 BPM | OXYGEN SATURATION: 95 % | RESPIRATION RATE: 18 BRPM | TEMPERATURE: 97 F | WEIGHT: 216.4 LBS

## 2019-05-06 PROBLEM — D62 POSTOPERATIVE ANEMIA DUE TO ACUTE BLOOD LOSS: Status: ACTIVE | Noted: 2019-05-06

## 2019-05-06 LAB
BASOPHILS ABSOLUTE: 0.1 K/UL (ref 0–0.2)
BASOPHILS RELATIVE PERCENT: 0.6 % (ref 0–1)
EOSINOPHILS ABSOLUTE: 0.3 K/UL (ref 0–0.6)
EOSINOPHILS RELATIVE PERCENT: 2.7 % (ref 0–5)
GLUCOSE BLD-MCNC: 105 MG/DL (ref 70–99)
HCT VFR BLD CALC: 22.4 % (ref 42–52)
HEMOGLOBIN: 7.2 G/DL (ref 14–18)
LYMPHOCYTES ABSOLUTE: 1.3 K/UL (ref 1.1–4.5)
LYMPHOCYTES RELATIVE PERCENT: 12.9 % (ref 20–40)
MCH RBC QN AUTO: 29.1 PG (ref 27–31)
MCHC RBC AUTO-ENTMCNC: 32.1 G/DL (ref 33–37)
MCV RBC AUTO: 90.7 FL (ref 80–94)
MONOCYTES ABSOLUTE: 1.2 K/UL (ref 0–0.9)
MONOCYTES RELATIVE PERCENT: 12.2 % (ref 0–10)
NEUTROPHILS ABSOLUTE: 7.1 K/UL (ref 1.5–7.5)
NEUTROPHILS RELATIVE PERCENT: 70 % (ref 50–65)
PDW BLD-RTO: 12.5 % (ref 11.5–14.5)
PERFORMED ON: ABNORMAL
PLATELET # BLD: 191 K/UL (ref 130–400)
PMV BLD AUTO: 10.9 FL (ref 9.4–12.4)
RBC # BLD: 2.47 M/UL (ref 4.7–6.1)
WBC # BLD: 10.2 K/UL (ref 4.8–10.8)

## 2019-05-06 PROCEDURE — 6370000000 HC RX 637 (ALT 250 FOR IP): Performed by: THORACIC SURGERY (CARDIOTHORACIC VASCULAR SURGERY)

## 2019-05-06 PROCEDURE — 36415 COLL VENOUS BLD VENIPUNCTURE: CPT

## 2019-05-06 PROCEDURE — 6360000002 HC RX W HCPCS: Performed by: THORACIC SURGERY (CARDIOTHORACIC VASCULAR SURGERY)

## 2019-05-06 PROCEDURE — 82948 REAGENT STRIP/BLOOD GLUCOSE: CPT

## 2019-05-06 PROCEDURE — 85025 COMPLETE CBC W/AUTO DIFF WBC: CPT

## 2019-05-06 RX ORDER — ASPIRIN 81 MG/1
81 TABLET ORAL ONCE
Status: DISCONTINUED | OUTPATIENT
Start: 2019-05-06 | End: 2019-05-06 | Stop reason: HOSPADM

## 2019-05-06 RX ORDER — SODIUM CHLORIDE 9 MG/ML
INJECTION, SOLUTION INTRAVENOUS CONTINUOUS
Status: DISCONTINUED | OUTPATIENT
Start: 2019-05-06 | End: 2019-05-06 | Stop reason: HOSPADM

## 2019-05-06 RX ORDER — ALPRAZOLAM 0.5 MG/1
0.5 TABLET ORAL
Status: DISCONTINUED | OUTPATIENT
Start: 2019-05-06 | End: 2019-05-06 | Stop reason: HOSPADM

## 2019-05-06 RX ORDER — ONDANSETRON 2 MG/ML
4 INJECTION INTRAMUSCULAR; INTRAVENOUS EVERY 6 HOURS PRN
Status: DISCONTINUED | OUTPATIENT
Start: 2019-05-06 | End: 2019-05-06 | Stop reason: HOSPADM

## 2019-05-06 RX ORDER — SODIUM CHLORIDE 0.9 % (FLUSH) 0.9 %
10 SYRINGE (ML) INJECTION PRN
Status: DISCONTINUED | OUTPATIENT
Start: 2019-05-06 | End: 2019-05-06 | Stop reason: HOSPADM

## 2019-05-06 RX ORDER — LISINOPRIL 10 MG/1
10 TABLET ORAL DAILY
Qty: 30 TABLET | Refills: 1 | Status: SHIPPED | OUTPATIENT
Start: 2019-05-06 | End: 2019-07-09 | Stop reason: SDUPTHER

## 2019-05-06 RX ORDER — SODIUM CHLORIDE 0.9 % (FLUSH) 0.9 %
10 SYRINGE (ML) INJECTION EVERY 12 HOURS SCHEDULED
Status: DISCONTINUED | OUTPATIENT
Start: 2019-05-06 | End: 2019-05-06 | Stop reason: HOSPADM

## 2019-05-06 RX ORDER — CLOPIDOGREL BISULFATE 75 MG/1
75 TABLET ORAL DAILY
Qty: 30 TABLET | Refills: 1 | Status: SHIPPED | OUTPATIENT
Start: 2019-05-06 | End: 2020-01-30 | Stop reason: ALTCHOICE

## 2019-05-06 RX ORDER — ASPIRIN 81 MG/1
81 TABLET ORAL DAILY
Qty: 30 TABLET | Refills: 3 | COMMUNITY
Start: 2019-05-06

## 2019-05-06 RX ADMIN — METFORMIN HYDROCHLORIDE 500 MG: 500 TABLET ORAL at 08:02

## 2019-05-06 RX ADMIN — FLUOXETINE HYDROCHLORIDE 20 MG: 20 CAPSULE ORAL at 08:03

## 2019-05-06 RX ADMIN — OXYCODONE HYDROCHLORIDE 5 MG: 5 TABLET ORAL at 09:22

## 2019-05-06 RX ADMIN — DOCUSATE SODIUM 100 MG: 100 CAPSULE, LIQUID FILLED ORAL at 08:02

## 2019-05-06 RX ADMIN — OXYCODONE HYDROCHLORIDE 5 MG: 5 TABLET ORAL at 04:57

## 2019-05-06 RX ADMIN — ATENOLOL 50 MG: 50 TABLET ORAL at 08:03

## 2019-05-06 RX ADMIN — KETOROLAC TROMETHAMINE 30 MG: 30 INJECTION, SOLUTION INTRAMUSCULAR; INTRAVENOUS at 08:03

## 2019-05-06 RX ADMIN — CLOPIDOGREL BISULFATE 75 MG: 75 TABLET ORAL at 08:03

## 2019-05-06 RX ADMIN — OXYCODONE HYDROCHLORIDE 5 MG: 5 TABLET ORAL at 01:00

## 2019-05-06 RX ADMIN — AMLODIPINE BESYLATE 5 MG: 5 TABLET ORAL at 08:03

## 2019-05-06 RX ADMIN — KETOROLAC TROMETHAMINE 30 MG: 30 INJECTION, SOLUTION INTRAMUSCULAR; INTRAVENOUS at 00:03

## 2019-05-06 RX ADMIN — ASPIRIN 81 MG: 81 TABLET, COATED ORAL at 08:02

## 2019-05-06 RX ADMIN — FAMOTIDINE 20 MG: 20 TABLET ORAL at 08:03

## 2019-05-06 ASSESSMENT — PAIN SCALES - GENERAL
PAINLEVEL_OUTOF10: 6
PAINLEVEL_OUTOF10: 6
PAINLEVEL_OUTOF10: 9
PAINLEVEL_OUTOF10: 10
PAINLEVEL_OUTOF10: 6
PAINLEVEL_OUTOF10: 10

## 2019-05-06 NOTE — PROGRESS NOTES
Physical Therapy     05/06/19 0906   Subjective   Subjective Pt stated he was going home and did not feel like walking.      Electronically signed by Mario Alvares, PTA Student on 5/6/2019 at 9:07 AM

## 2019-05-06 NOTE — DISCHARGE SUMMARY
[address] [facility]  Discharge Summary    Patient ID: Javid Larson      Patient's PCP: Zeynep Greene MD    Admit Date: 4/24/2019     Discharge Date:   05/06/2019    Admitting Physician: Sho Lucero MD     Discharge Physician: Dr. Massimo Contreras     Discharge Diagnoses:     Primary:   1. Unstable Angina with Acute Coronary Syndrome Associated with Severe Multivessel CAD      Secondary:   2. Postoperative Anemia 2' to Acute Blood Loss, Expected  3. Newly Diagnosed DM, Type 2  4. Essential HTN  5. Mixed Hyperlipidemia  6. Mixed Anxiety Depressive Disorder  7. PMHx Bladder Cancer - S/P Cystoprostatectomy with Bilateral Pelvic Lymphadenectomy and Ileal Conduit Urinary Diversion 2017      Procedures This Admit:   1. On 04/24/2019, Cardiac Catheterization by Dr. Liz Bernal  2. On 05/01/2019, 3-Vessel Perfusion-Assisted Coronary Artery Bypass Grafting, Placing LIMA-LAD, SVG-OM, and SVG-PDA by Dr. Massimo Contreras    Consults:   1. Dr. Liz Bernal, Cardiology  2. Dr. Benedicto Veloz, Cardiothoracic Surgery      Complications:   1. Postoperative Anemia 2' to Acute Blood Loss, Expected      The patient was seen and examined on day of discharge and this discharge summary is in conjunction with any daily progress note from day of discharge. History of Present Illness and Hospital Course:   Mr. Alisha Romo is a 44year old newly diagnosed diabetic male who presented to his family physician with complaints of severe chest discomfort with diaphoresis and shortness of breath. He was sent urgently to the emergency department. An ECG demonstrated no acute changes and cardiac enzymes were negative. He then underwent Lexiscan and demonstrated evidence of myocardial ischemia with ejection fraction of 54%. The patient then underwent cardiac catheterization.  This demonstrated a left ventricle, which showed that  the inferior wall was daily     atenolol (TENORMIN) 50 MG tablet  TAKE ONE TABLET EVERY DAY     atorvastatin (LIPITOR) 20 MG tablet  Take 1 tablet by mouth daily     busPIRone (BUSPAR) 10 MG tablet  Take 1 tablet by mouth 3 times daily as needed (anxiety)     clopidogrel (PLAVIX) 75 MG tablet  Take 1 tablet by mouth daily     ergocalciferol (ERGOCALCIFEROL) 91361 units capsule  Take 1 capsule by mouth once a week     fenofibrate 160 MG tablet  Take 1 tablet by mouth daily     FLUoxetine (PROZAC) 20 MG capsule  Take 1 capsule by mouth daily     HYDROcodone-acetaminophen (NORCO)  MG per tablet  Take 2 tablets by mouth every 4 hours as needed for Pain.      lisinopril (PRINIVIL;ZESTRIL) 10 MG tablet  Take 1 tablet by mouth daily     metFORMIN (GLUCOPHAGE) 500 MG tablet  Take 1 tablet by mouth 2 times daily (with meals)     omeprazole (PRILOSEC) 40 MG delayed release capsule  Take 1 capsule by mouth daily     tiZANidine (ZANAFLEX) 4 MG tablet  TAKE 1 TABLET BY MOUTH EVERY 6 HOURS FOR MUSCLE PAIN

## 2019-05-06 NOTE — PROGRESS NOTES
POST OP CARDIOTHORACIC SURGERY PROGRESS NOTE    Post op day 5    SUBJECTIVE:  Resting in bed. Complaining of chest soreness. Still anxious. BP (!) 147/78   Pulse 80   Temp 97 °F (36.1 °C) (Temporal)   Resp 18   Ht 5' 11\" (1.803 m)   Wt 216 lb 6.4 oz (98.2 kg)   SpO2 95%   BMI 30.18 kg/m²   Average, Min, and Max for last 24 hours Vitals:  TEMPERATURE:  Temp  Av.5 °F (36.4 °C)  Min: 96.6 °F (35.9 °C)  Max: 98.6 °F (37 °C)  RESPIRATIONS RANGE: Resp  Av  Min: 16  Max: 20  PULSE RANGE: Pulse  Av.2  Min: 78  Max: 82  BLOOD PRESSURE RANGE:  Systolic (27ZPH), OBL:271 , Min:125 , HUT:894   ; Diastolic (98XVM), XB, Min:55, Max:80    PULSE OXIMETRY RANGE: SpO2  Av.4 %  Min: 92 %  Max: 96 %    I/O last 3 completed shifts: In: 7718 [P.O.:1080; I.V.:10]  Out: 575 [Urine:575]    CHEST: Clear bilateral breath sounds without wheezes or rhonchi. CARDIOVASCULAR: Normal HT with RRR. No murmurs, gallops or rubs. Telemetry - SR 72-93. INCISION: Sternal incision - open to air. Edges approximated. No drainage or erythema. Right EVH incision - open to air. Edges approximated. No drainage or erythema. LABS:  CBC:   Lab Results   Component Value Date    WBC 10.2 2019    RBC 2.47 2019    HGB 7.2 2019    HCT 22.4 2019    MCV 90.7 2019    MCH 29.1 2019    MCHC 32.1 2019    RDW 12.5 2019     2019     2011    MPV 10.9 2019         ASSESSMENT:     1. Unstable Angina with Acute Coronary Syndrome Associated with Severe Multivessel CAD - S/P 3V PACABG on 2019  2. Postoperative Anemia 2' to Acute Blood Loss, Expected  3. Newly Diagnosed DM, Type 2  4. Essential HTN  5. Mixed Hyperlipidemia  6. Mixed Anxiety Depressive Disorder  7.  PMHx Bladder Cancer - S/P Cystoprostatectomy with Bilateral Pelvic Lymphadenectomy and Ileal Conduit Urinary Diversion       PLAN:     1. D/C Home    Electronically signed by BRYAN Best on 5/6/19 at 7:16 AM

## 2019-05-06 NOTE — PROGRESS NOTES
Nutrition Education    Type and Reason for Visit:   CR in the beginning education--CABG    Patient stated I can do this    · Verbally reviewed following information with patient and family. Patient was in pain so really concentrated on family and answering their questions  · Written educational materials provided. CR in the beginning  · Contact name and number provided. · Refer to Patient Education activity for more details.     Electronically signed by Justen Torres MS, RD, LD on 5/6/19 at 2:10 PM    Contact Number: [phone]

## 2019-05-06 NOTE — PROGRESS NOTES
IV removed and monitor removed. Pt teaching completed and instruction given to pt. All new meds reviewed. Follow up discussed. Stopping smoking discussed. All questions answered.

## 2019-05-06 NOTE — CONSULTS
Patient was discharged prior to MI/PTCA/STENT TEACHING being conducted. Cardiac Rehab education packet was sent to the patient's address on record. Handouts included were titled; \"Home Instructions Following a Cardiac Event\", \"Cardiac Home Exercise Program - Phase I\", \"Risk Factors for Heart Disease and Stroke\" and \"Cardiac Diet/Low Cholesterol\". Patient was instructed once released by doctors all he needs to do is call [address] [place], [place] [address]ab for appointment to start rehab.

## 2019-05-07 ENCOUNTER — TELEPHONE (OUTPATIENT)
Dept: INTERNAL MEDICINE | Age: 49
End: 2019-05-07

## 2019-05-07 NOTE — TELEPHONE ENCOUNTER
to get back to his normal.    Bowels and bladder working OK. Appetite is not back. Patient is drinking Boost to aid with calories. Not sleeping very well but patient didn't before surgery. Patient to bring all medications to HFU and will contact office with any concerns.     Scheduled appointment with PCP within 7-14 days    Follow Up  Future Appointments   Date Time Provider Milton Puentesi   5/14/2019  2:45 PM BRYAN Garnett [facility]   6/4/2019  2:30 PM Maxx Murray MD [facility]   6/10/2019  3:45 PM Seda Cordero MD [facility]       Morgan Fontaine LPN

## 2019-05-09 LAB
BLOOD BANK DISPENSE STATUS: NORMAL
BLOOD BANK PRODUCT CODE: NORMAL
BPU ID: NORMAL
DESCRIPTION BLOOD BANK: NORMAL

## 2019-05-14 ENCOUNTER — OFFICE VISIT (OUTPATIENT)
Dept: PRIMARY CARE CLINIC | Age: 49
End: 2019-05-14
Payer: MEDICAID

## 2019-05-14 ENCOUNTER — HOSPITAL ENCOUNTER (OUTPATIENT)
Dept: GENERAL RADIOLOGY | Age: 49
Discharge: HOME | End: 2019-05-14
Payer: MEDICAID

## 2019-05-14 VITALS
TEMPERATURE: 97 F | DIASTOLIC BLOOD PRESSURE: 60 MMHG | SYSTOLIC BLOOD PRESSURE: 100 MMHG | HEIGHT: 71 IN | RESPIRATION RATE: 19 BRPM | BODY MASS INDEX: 27.72 KG/M2 | HEART RATE: 84 BPM | OXYGEN SATURATION: 98 % | WEIGHT: 198 LBS

## 2019-05-14 DIAGNOSIS — R05.9 COUGH: ICD-10-CM

## 2019-05-14 DIAGNOSIS — Z09 HOSPITAL DISCHARGE FOLLOW-UP: Primary | ICD-10-CM

## 2019-05-14 DIAGNOSIS — D64.9 ANEMIA, UNSPECIFIED TYPE: ICD-10-CM

## 2019-05-14 DIAGNOSIS — Z09 HOSPITAL DISCHARGE FOLLOW-UP: ICD-10-CM

## 2019-05-14 LAB
ALBUMIN SERPL-MCNC: 4.1 G/DL (ref 3.5–5.2)
ALP BLD-CCNC: 262 U/L (ref 40–130)
ALT SERPL-CCNC: 31 U/L (ref 5–41)
ANION GAP SERPL CALCULATED.3IONS-SCNC: 15 MMOL/L (ref 7–19)
AST SERPL-CCNC: 16 U/L (ref 5–40)
BASOPHILS ABSOLUTE: 0.1 K/UL (ref 0–0.2)
BASOPHILS RELATIVE PERCENT: 0.4 % (ref 0–1)
BILIRUB SERPL-MCNC: 0.6 MG/DL (ref 0.2–1.2)
BUN BLDV-MCNC: 19 MG/DL (ref 6–20)
CALCIUM SERPL-MCNC: 9.7 MG/DL (ref 8.6–10)
CHLORIDE BLD-SCNC: 100 MMOL/L (ref 98–111)
CO2: 24 MMOL/L (ref 22–29)
CREAT SERPL-MCNC: 1 MG/DL (ref 0.5–1.2)
EOSINOPHILS ABSOLUTE: 0.7 K/UL (ref 0–0.6)
EOSINOPHILS RELATIVE PERCENT: 3.5 % (ref 0–5)
GFR NON-AFRICAN AMERICAN: >60
GLUCOSE BLD-MCNC: 139 MG/DL (ref 74–109)
HCT VFR BLD CALC: 34.2 % (ref 42–52)
HEMOGLOBIN: 10.5 G/DL (ref 14–18)
HGB, POC: 8.7
LYMPHOCYTES ABSOLUTE: 1.9 K/UL (ref 1.1–4.5)
LYMPHOCYTES RELATIVE PERCENT: 9.5 % (ref 20–40)
MCH RBC QN AUTO: 29.7 PG (ref 27–31)
MCHC RBC AUTO-ENTMCNC: 30.7 G/DL (ref 33–37)
MCV RBC AUTO: 96.6 FL (ref 80–94)
MONOCYTES ABSOLUTE: 1.4 K/UL (ref 0–0.9)
MONOCYTES RELATIVE PERCENT: 6.8 % (ref 0–10)
NEUTROPHILS ABSOLUTE: 15.8 K/UL (ref 1.5–7.5)
NEUTROPHILS RELATIVE PERCENT: 79.2 % (ref 50–65)
PDW BLD-RTO: 14.8 % (ref 11.5–14.5)
PLATELET # BLD: 433 K/UL (ref 130–400)
PMV BLD AUTO: 9.4 FL (ref 9.4–12.4)
POTASSIUM SERPL-SCNC: 4.6 MMOL/L (ref 3.5–5)
RBC # BLD: 3.54 M/UL (ref 4.7–6.1)
SODIUM BLD-SCNC: 139 MMOL/L (ref 136–145)
TOTAL PROTEIN: 7.9 G/DL (ref 6.6–8.7)
WBC # BLD: 19.9 K/UL (ref 4.8–10.8)

## 2019-05-14 PROCEDURE — 1111F DSCHRG MED/CURRENT MED MERGE: CPT | Performed by: NURSE PRACTITIONER

## 2019-05-14 PROCEDURE — 85018 HEMOGLOBIN: CPT | Performed by: NURSE PRACTITIONER

## 2019-05-14 PROCEDURE — 99496 TRANSJ CARE MGMT HIGH F2F 7D: CPT | Performed by: NURSE PRACTITIONER

## 2019-05-14 PROCEDURE — 71046 X-RAY EXAM CHEST 2 VIEWS: CPT

## 2019-05-14 RX ORDER — AMOXICILLIN AND CLAVULANATE POTASSIUM 875; 125 MG/1; MG/1
1 TABLET, FILM COATED ORAL 2 TIMES DAILY
Qty: 20 TABLET | Refills: 0 | Status: SHIPPED | OUTPATIENT
Start: 2019-05-14 | End: 2019-05-24

## 2019-05-14 RX ORDER — AZITHROMYCIN 250 MG/1
250 TABLET, FILM COATED ORAL SEE ADMIN INSTRUCTIONS
Qty: 6 TABLET | Refills: 0 | Status: SHIPPED | OUTPATIENT
Start: 2019-05-14 | End: 2019-05-19

## 2019-05-14 RX ORDER — PREDNISONE 10 MG/1
10 TABLET ORAL DAILY
Qty: 7 TABLET | Refills: 0 | Status: SHIPPED | OUTPATIENT
Start: 2019-05-14 | End: 2019-05-21

## 2019-05-14 ASSESSMENT — ENCOUNTER SYMPTOMS
COUGH: 1
EYES NEGATIVE: 1
GASTROINTESTINAL NEGATIVE: 1

## 2019-05-14 NOTE — PROGRESS NOTES
Post-Discharge Transitional Care Management Services or Hospital Follow Up      Martínez Larson   YOB: 1970    Date of Office Visit:  5/14/2019  Date of Hospital Admission: 4/24/19  Date of Hospital Discharge: 5/6/19  Readmission Risk Score(high >=14%. Medium >=10%):Readmission Risk Score: 11      Care management risk score Rising risk (score 2-5) and Complex Care (Scores >=6): 6     Non face to face  following discharge, date last encounter closed (first attempt may have been earlier): 5/7/2019  5:13 PM 5/7/2019  5:13 PM    Call initiated 2 business days of discharge: Yes     Patient Active Problem List   Diagnosis    Essential hypertension    Hx of bladder cancer    [facility] [address]    Type 2 diabetes mellitus without complication, without long-term current use of insulin ([facility] [address]    Mixed anxiety depressive disorder    Mixed hyperlipidemia    Coronary artery disease involving native coronary artery of native heart with unstable angina pectoris ([facility] [address]    Postoperative anemia due to acute blood loss       Allergies   Allergen Reactions    Latex Other (See Comments)     BOILS AND BLISTERS- ALLERGY CALLED TO [facility] SURGERY SCHEDULING.     Demerol Hcl [Meperidine]        Medications listed as ordered at the time of discharge from hospital   [facility] Medication Instructions NIELS:    Printed on:05/14/19 3841   Medication Information                      amoxicillin-clavulanate (AUGMENTIN) 875-125 MG per tablet  Take 1 tablet by mouth 2 times daily for 10 days             aspirin 81 MG EC tablet  Take 1 tablet by mouth daily             atenolol (TENORMIN) 50 MG tablet  TAKE ONE TABLET EVERY DAY             atorvastatin (LIPITOR) 20 MG tablet  Take 1 tablet by mouth daily             azithromycin (ZITHROMAX) 250 MG tablet  Take 1 tablet by mouth See Admin Instructions for 5 days 500mg on day 1 followed by 250mg on days 2 - 5             busPIRone (BUSPAR) 10 MG tablet  Take 1 tablet by mouth 3 times daily as needed (anxiety)             clopidogrel (PLAVIX) 75 MG tablet  Take 1 tablet by mouth daily             ergocalciferol (ERGOCALCIFEROL) 59974 units capsule  Take 1 capsule by mouth once a week             fenofibrate 160 MG tablet  Take 1 tablet by mouth daily             FLUoxetine (PROZAC) 20 MG capsule  Take 1 capsule by mouth daily             HYDROcodone-acetaminophen (NORCO)  MG per tablet  Take 2 tablets by mouth every 4 hours as needed for Pain.              lisinopril (PRINIVIL;ZESTRIL) 10 MG tablet  Take 1 tablet by mouth daily             metFORMIN (GLUCOPHAGE) 500 MG tablet  Take 1 tablet by mouth 2 times daily (with meals)             omeprazole (PRILOSEC) 40 MG delayed release capsule  Take 1 capsule by mouth daily             predniSONE (DELTASONE) 10 MG tablet  Take 1 tablet by mouth daily for 7 days             tiZANidine (ZANAFLEX) 4 MG tablet  TAKE 1 TABLET BY MOUTH EVERY 6 HOURS FOR MUSCLE PAIN                   Medications marked \"taking\" at this time  Outpatient Medications Marked as Taking for the 5/14/19 encounter (Office Visit) with BRYAN Lorenzo   Medication Sig Dispense Refill    amoxicillin-clavulanate (AUGMENTIN) 875-125 MG per tablet Take 1 tablet by mouth 2 times daily for 10 days 20 tablet 0    azithromycin (ZITHROMAX) 250 MG tablet Take 1 tablet by mouth See Admin Instructions for 5 days 500mg on day 1 followed by 250mg on days 2 - 5 6 tablet 0    predniSONE (DELTASONE) 10 MG tablet Take 1 tablet by mouth daily for 7 days 7 tablet 0    aspirin 81 MG EC tablet Take 1 tablet by mouth daily 30 tablet 3    clopidogrel (PLAVIX) 75 MG tablet Take 1 tablet by mouth daily 30 tablet 1    lisinopril (PRINIVIL;ZESTRIL) 10 MG tablet Take 1 tablet by mouth daily 30 tablet 1    FLUoxetine (PROZAC) 20 MG capsule Take 1 capsule by mouth daily 30 capsule 2    HYDROcodone-acetaminophen (NORCO)  MG per tablet Take 2 tablets by mouth every 4 hours as needed for Pain.  ergocalciferol (ERGOCALCIFEROL) 52253 units capsule Take 1 capsule by mouth once a week 4 capsule 3    busPIRone (BUSPAR) 10 MG tablet Take 1 tablet by mouth 3 times daily as needed (anxiety) 90 tablet 1    fenofibrate 160 MG tablet Take 1 tablet by mouth daily 30 tablet 5    atorvastatin (LIPITOR) 20 MG tablet Take 1 tablet by mouth daily 30 tablet 3    metFORMIN (GLUCOPHAGE) 500 MG tablet Take 1 tablet by mouth 2 times daily (with meals) 60 tablet 0    atenolol (TENORMIN) 50 MG tablet TAKE ONE TABLET EVERY DAY (Patient taking differently: TAKE ONE TABLET EVERY DAY AT LUNCH) 30 tablet 5    tiZANidine (ZANAFLEX) 4 MG tablet TAKE 1 TABLET BY MOUTH EVERY 6 HOURS FOR MUSCLE PAIN 60 tablet 5    omeprazole (PRILOSEC) 40 MG delayed release capsule Take 1 capsule by mouth daily 30 capsule 1        Medications patient taking as of now reconciled against medications ordered at time of hospital discharge: Yes    Chief Complaint   Patient presents with    Follow-Up from [address] [place] [address]  Patient is here for a hospital follow-up. Patient was seen in our office on 4/24 and was sent to ER due to chest pain. He was noted to have severe multivessel disease. Patient ended up having open heart surgery with Dr. Aleena Terry on May 1. Triple bypass was noted. Patient is also noted to have some postoperative anemia secondary to acute blood loss. Patient has developed cough since getting home. Denies fever      Inpatient course: Discharge summary reviewed- see chart. Interval history/Current status: feeling bad with cough and chest congestion    Review of Systems   Constitutional: Positive for chills and fatigue. Negative for fever. HENT: Positive for congestion. Eyes: Negative. Respiratory: Positive for cough. Cardiovascular: Negative. Gastrointestinal: Negative. Endocrine: Negative. Genitourinary: Negative. Musculoskeletal: Negative. Skin: Negative. Neurological: Positive for weakness. Hematological: Negative. Psychiatric/Behavioral: Negative. Vitals:    05/14/19 1433   BP: 100/60   Pulse: 84   Resp: 19   Temp: 97 °F (36.1 °C)   TempSrc: Temporal   SpO2: 98%   Weight: 198 lb (89.8 kg)   Height: 5' 11\" (1.803 m)     Body mass index is 27.62 kg/m². Wt Readings from Last 3 Encounters:   05/14/19 198 lb (89.8 kg)   05/06/19 216 lb 6.4 oz (98.2 kg)   04/24/19 213 lb (96.6 kg)     BP Readings from Last 3 Encounters:   05/14/19 100/60   05/06/19 (!) 147/78   05/01/19 (!) 85/50       Physical Exam   Constitutional: He is oriented to person, place, and time. Vital signs are normal. He appears well-developed and well-nourished. HENT:   Head: Normocephalic and atraumatic. Right Ear: Hearing and external ear normal.   Left Ear: Hearing and external ear normal.   Nose: Nose normal.   Mouth/Throat: Uvula is midline, oropharynx is clear and moist and mucous membranes are normal.   Eyes: Pupils are equal, round, and reactive to light. Conjunctivae, EOM and lids are normal.   Neck: Trachea normal and normal range of motion. Neck supple. No thyroid mass and no thyromegaly present. Cardiovascular: Normal rate, regular rhythm, normal heart sounds and intact distal pulses. Pulmonary/Chest: Effort normal. He has decreased breath sounds. He has wheezes in the right middle field and the right lower field. Abdominal: Soft. Normal appearance and bowel sounds are normal.   Musculoskeletal: Normal range of motion. Cervical back: Normal. He exhibits normal range of motion and no tenderness. Thoracic back: Normal. He exhibits normal range of motion and no tenderness. Lumbar back: Normal. He exhibits normal range of motion and no tenderness. Legs:  Neurological: He is alert and oriented to person, place, and time. He has normal strength. Skin: Skin is warm, dry and intact. Psychiatric: He has a normal mood and affect.  His speech

## 2019-05-15 ENCOUNTER — TELEPHONE (OUTPATIENT)
Dept: PRIMARY CARE CLINIC | Age: 49
End: 2019-05-15

## 2019-05-15 NOTE — TELEPHONE ENCOUNTER
----- Message from BRYAN Lopez sent at 5/14/2019  5:17 PM CDT -----  CXR did show small right pleural effusion. Will treat with abx and have him return in 2 weeks as discussed. If symptoms worsen will need to go tot ER.

## 2019-05-28 ENCOUNTER — OFFICE VISIT (OUTPATIENT)
Dept: PRIMARY CARE CLINIC | Age: 49
End: 2019-05-28
Payer: MEDICAID

## 2019-05-28 VITALS
RESPIRATION RATE: 18 BRPM | BODY MASS INDEX: 27.02 KG/M2 | HEART RATE: 106 BPM | WEIGHT: 193 LBS | SYSTOLIC BLOOD PRESSURE: 116 MMHG | OXYGEN SATURATION: 99 % | HEIGHT: 71 IN | DIASTOLIC BLOOD PRESSURE: 68 MMHG | TEMPERATURE: 97 F

## 2019-05-28 DIAGNOSIS — F32.A ANXIETY AND DEPRESSION: Primary | ICD-10-CM

## 2019-05-28 DIAGNOSIS — K21.9 GASTROESOPHAGEAL REFLUX DISEASE WITHOUT ESOPHAGITIS: ICD-10-CM

## 2019-05-28 DIAGNOSIS — E11.9 TYPE 2 DIABETES MELLITUS WITHOUT COMPLICATION, WITHOUT LONG-TERM CURRENT USE OF INSULIN (HCC): ICD-10-CM

## 2019-05-28 DIAGNOSIS — F51.01 PRIMARY INSOMNIA: ICD-10-CM

## 2019-05-28 DIAGNOSIS — F41.9 ANXIETY AND DEPRESSION: Primary | ICD-10-CM

## 2019-05-28 DIAGNOSIS — J42 CHRONIC BRONCHITIS, UNSPECIFIED CHRONIC BRONCHITIS TYPE (HCC): ICD-10-CM

## 2019-05-28 PROCEDURE — G8419 CALC BMI OUT NRM PARAM NOF/U: HCPCS | Performed by: NURSE PRACTITIONER

## 2019-05-28 PROCEDURE — G8427 DOCREV CUR MEDS BY ELIG CLIN: HCPCS | Performed by: NURSE PRACTITIONER

## 2019-05-28 PROCEDURE — 3017F COLORECTAL CA SCREEN DOC REV: CPT | Performed by: NURSE PRACTITIONER

## 2019-05-28 PROCEDURE — G8926 SPIRO NO PERF OR DOC: HCPCS | Performed by: NURSE PRACTITIONER

## 2019-05-28 PROCEDURE — 1111F DSCHRG MED/CURRENT MED MERGE: CPT | Performed by: NURSE PRACTITIONER

## 2019-05-28 PROCEDURE — 3045F PR MOST RECENT HEMOGLOBIN A1C LEVEL 7.0-9.0%: CPT | Performed by: NURSE PRACTITIONER

## 2019-05-28 PROCEDURE — 4004F PT TOBACCO SCREEN RCVD TLK: CPT | Performed by: NURSE PRACTITIONER

## 2019-05-28 PROCEDURE — 3023F SPIROM DOC REV: CPT | Performed by: NURSE PRACTITIONER

## 2019-05-28 PROCEDURE — 2022F DILAT RTA XM EVC RTNOPTHY: CPT | Performed by: NURSE PRACTITIONER

## 2019-05-28 PROCEDURE — 99214 OFFICE O/P EST MOD 30 MIN: CPT | Performed by: NURSE PRACTITIONER

## 2019-05-28 PROCEDURE — G8598 ASA/ANTIPLAT THER USED: HCPCS | Performed by: NURSE PRACTITIONER

## 2019-05-28 RX ORDER — TRAZODONE HYDROCHLORIDE 50 MG/1
50 TABLET ORAL NIGHTLY PRN
Qty: 30 TABLET | Refills: 1 | Status: SHIPPED | OUTPATIENT
Start: 2019-05-28 | End: 2019-06-10

## 2019-05-28 RX ORDER — OMEPRAZOLE 40 MG/1
40 CAPSULE, DELAYED RELEASE ORAL DAILY
Qty: 30 CAPSULE | Refills: 3 | Status: SHIPPED | OUTPATIENT
Start: 2019-05-28 | End: 2019-09-30 | Stop reason: SDUPTHER

## 2019-05-28 RX ORDER — BUSPIRONE HYDROCHLORIDE 10 MG/1
10 TABLET ORAL 3 TIMES DAILY PRN
Qty: 90 TABLET | Refills: 3 | Status: SHIPPED | OUTPATIENT
Start: 2019-05-28 | End: 2019-11-13 | Stop reason: SDUPTHER

## 2019-05-28 RX ORDER — FLUOXETINE HYDROCHLORIDE 40 MG/1
40 CAPSULE ORAL DAILY
Qty: 30 CAPSULE | Refills: 1 | Status: SHIPPED | OUTPATIENT
Start: 2019-05-28 | End: 2019-07-29 | Stop reason: SDUPTHER

## 2019-05-28 ASSESSMENT — ENCOUNTER SYMPTOMS
COUGH: 1
EYES NEGATIVE: 1
GASTROINTESTINAL NEGATIVE: 1

## 2019-05-28 NOTE — PROGRESS NOTES
1 capsule by mouth daily 30 capsule 3    busPIRone (BUSPAR) 10 MG tablet Take 1 tablet by mouth 3 times daily as needed (anxiety) 90 tablet 3    tiotropium (SPIRIVA RESPIMAT) 1.25 MCG/ACT AERS inhaler Inhale 2 puffs into the lungs daily 4 g 1    aspirin 81 MG EC tablet Take 1 tablet by mouth daily 30 tablet 3    clopidogrel (PLAVIX) 75 MG tablet Take 1 tablet by mouth daily 30 tablet 1    lisinopril (PRINIVIL;ZESTRIL) 10 MG tablet Take 1 tablet by mouth daily 30 tablet 1    HYDROcodone-acetaminophen (NORCO)  MG per tablet Take 2 tablets by mouth every 4 hours as needed for Pain.  ergocalciferol (ERGOCALCIFEROL) 85320 units capsule Take 1 capsule by mouth once a week 4 capsule 3    fenofibrate 160 MG tablet Take 1 tablet by mouth daily 30 tablet 5    atorvastatin (LIPITOR) 20 MG tablet Take 1 tablet by mouth daily 30 tablet 3    atenolol (TENORMIN) 50 MG tablet TAKE ONE TABLET EVERY DAY (Patient taking differently: TAKE ONE TABLET EVERY DAY AT LUNCH) 30 tablet 5    tiZANidine (ZANAFLEX) 4 MG tablet TAKE 1 TABLET BY MOUTH EVERY 6 HOURS FOR MUSCLE PAIN 60 tablet 5     No current facility-administered medications for this visit. Allergies   Allergen Reactions    Latex Other (See Comments)     BOILS AND BLISTERS- ALLERGY CALLED TO [facility] SURGERY SCHEDULING.  Demerol Hcl [Meperidine]        Family History   Problem Relation Age of Onset    Cancer Mother     Vision Loss Mother     Breast Cancer Mother     Coronary Art Dis Father     Obesity Father     Kidney Disease Father     High Blood Pressure Father     High Cholesterol Father     Hypercalcemia Sister     Obesity Brother     High Blood Pressure Brother            Subjective:      Review of Systems   Constitutional: Negative. HENT: Negative. Eyes: Negative. Respiratory: Positive for cough. Cardiovascular: Negative. Gastrointestinal: Negative. Endocrine: Negative. Genitourinary: Negative.     Musculoskeletal: Negative. Skin: Negative. Neurological: Negative. Hematological: Negative. Psychiatric/Behavioral: Positive for sleep disturbance. The patient is nervous/anxious. Objective:     Physical Exam   Constitutional: He is oriented to person, place, and time. Vital signs are normal. He appears well-developed and well-nourished. HENT:   Head: Normocephalic and atraumatic. Right Ear: Hearing and external ear normal.   Left Ear: Hearing and external ear normal.   Nose: Nose normal.   Mouth/Throat: Uvula is midline, oropharynx is clear and moist and mucous membranes are normal.   Eyes: Pupils are equal, round, and reactive to light. Conjunctivae, EOM and lids are normal.   Neck: Trachea normal and normal range of motion. Neck supple. No thyroid mass and no thyromegaly present. Cardiovascular: Normal rate, regular rhythm, normal heart sounds and intact distal pulses. Pulmonary/Chest: Effort normal and breath sounds normal.   Abdominal: Soft. Normal appearance and bowel sounds are normal.   Musculoskeletal: Normal range of motion. Cervical back: Normal. He exhibits normal range of motion and no tenderness. Thoracic back: Normal. He exhibits normal range of motion and no tenderness. Lumbar back: Normal. He exhibits normal range of motion and no tenderness. Neurological: He is alert and oriented to person, place, and time. He has normal strength. Skin: Skin is warm, dry and intact. Psychiatric: He has a normal mood and affect. His speech is normal and behavior is normal. Judgment and thought content normal. Cognition and memory are normal.   Nursing note and vitals reviewed. /68   Pulse 106   Temp 97 °F (36.1 °C) (Temporal)   Resp 18   Ht 5' 11\" (1.803 m)   Wt 193 lb (87.5 kg)   SpO2 99%   BMI 26.92 kg/m²     Assessment:      Diagnosis Orders   1. Anxiety and depression  FLUoxetine (PROZAC) 40 MG capsule    busPIRone (BUSPAR) 10 MG tablet   2.  Type 2 diabetes

## 2019-06-10 ENCOUNTER — OFFICE VISIT (OUTPATIENT)
Dept: CARDIOLOGY | Age: 49
End: 2019-06-10
Payer: MEDICAID

## 2019-06-10 VITALS
HEIGHT: 71 IN | SYSTOLIC BLOOD PRESSURE: 112 MMHG | DIASTOLIC BLOOD PRESSURE: 62 MMHG | BODY MASS INDEX: 27.3 KG/M2 | HEART RATE: 70 BPM | WEIGHT: 195 LBS

## 2019-06-10 DIAGNOSIS — I25.110 CORONARY ARTERY DISEASE INVOLVING NATIVE CORONARY ARTERY OF NATIVE HEART WITH UNSTABLE ANGINA PECTORIS (HCC): Primary | ICD-10-CM

## 2019-06-10 DIAGNOSIS — E78.2 MIXED HYPERLIPIDEMIA: Chronic | ICD-10-CM

## 2019-06-10 DIAGNOSIS — I10 ESSENTIAL HYPERTENSION: Chronic | ICD-10-CM

## 2019-06-10 DIAGNOSIS — Z95.1 HX OF CABG: ICD-10-CM

## 2019-06-10 PROCEDURE — 99213 OFFICE O/P EST LOW 20 MIN: CPT | Performed by: INTERNAL MEDICINE

## 2019-06-10 PROCEDURE — 3017F COLORECTAL CA SCREEN DOC REV: CPT | Performed by: INTERNAL MEDICINE

## 2019-06-10 PROCEDURE — 93000 ELECTROCARDIOGRAM COMPLETE: CPT | Performed by: INTERNAL MEDICINE

## 2019-06-10 PROCEDURE — G8598 ASA/ANTIPLAT THER USED: HCPCS | Performed by: INTERNAL MEDICINE

## 2019-06-10 PROCEDURE — G8427 DOCREV CUR MEDS BY ELIG CLIN: HCPCS | Performed by: INTERNAL MEDICINE

## 2019-06-10 PROCEDURE — G8419 CALC BMI OUT NRM PARAM NOF/U: HCPCS | Performed by: INTERNAL MEDICINE

## 2019-06-10 PROCEDURE — 4004F PT TOBACCO SCREEN RCVD TLK: CPT | Performed by: INTERNAL MEDICINE

## 2019-06-10 ASSESSMENT — ENCOUNTER SYMPTOMS
GASTROINTESTINAL NEGATIVE: 1
EYES NEGATIVE: 1
VOMITING: 0
RESPIRATORY NEGATIVE: 1
NAUSEA: 0
SHORTNESS OF BREATH: 0
DIARRHEA: 0

## 2019-06-10 NOTE — PROGRESS NOTES
[facility] Progress Note                            Date:  6/10/2019  Patient: Christine Larson  Age:  44 y.o., 1970      Reason for evaluation:         SUBJECTIVE:    Returns today for follow-up assessment. Underwent CABG 5/1/2019 LIMA graft total of 3 grafts. Smoking. Wounds seemed to have healed. He is scheduled to see Dr. Emma Jauregui tomorrow. Getting cardiac rehab. Sleeping poorly otherwise doing well. Denies anginal chest pain. Dyspnea stable. No new complaints. Denies palpitations. Review of Systems   Constitutional: Negative. Negative for chills, fever and unexpected weight change. HENT: Negative. Eyes: Negative. Respiratory: Negative. Negative for shortness of breath. Cardiovascular: Negative. Negative for chest pain. Gastrointestinal: Negative. Negative for diarrhea, nausea and vomiting. Endocrine: Negative. Genitourinary: Negative. Musculoskeletal: Negative. Skin: Negative. Neurological: Negative. All other systems reviewed and are negative. OBJECTIVE:     /62   Pulse 70   Ht 5' 11\" (1.803 m)   Wt 195 lb (88.5 kg)   BMI 27.20 kg/m²     Labs:   CBC: No results for input(s): WBC, HGB, HCT, PLT in the last 72 hours. BMP:No results for input(s): NA, K, CO2, BUN, CREATININE, LABGLOM, GLUCOSE in the last 72 hours. BNP: No results for input(s): BNP in the last 72 hours. PT/INR: No results for input(s): PROTIME, INR in the last 72 hours. APTT:No results for input(s): APTT in the last 72 hours. CARDIAC ENZYMES:No results for input(s): CKTOTAL, CKMB, CKMBINDEX, TROPONINI in the last 72 hours. FASTING LIPID PANEL:  Lab Results   Component Value Date    HDL 27 04/26/2019    LDLDIRECT 109 04/26/2019    LDLCALC see below 04/26/2019    TRIG 1,127 04/26/2019     LIVER PROFILE:No results for input(s): AST, ALT, LABALBU in the last 72 hours.         Past Medical History:   Diagnosis Date    CAD (coronary artery disease)     Cancer ([facility] [address] Bladder    COPD (chronic obstructive pulmonary disease) (HCC)     Depression     Diabetes mellitus (HCC)     Hypertension      Past Surgical History:   Procedure Laterality Date    BACK SURGERY      X3     BLADDER REMOVAL      CORONARY ARTERY BYPASS GRAFT N/A 5/1/2019    CORONARY ARTERY BYPASS GRAFT X 3 WITH LEFT INTERNAL MAMMARY ARTERY, ENDOSCOPIC  VEIN HARVEST, WITH PERFUSION. performed by Jazmyn Samaniego MD at [address]      lower ext    PROSTATECTOMY       Family History   Problem Relation Age of Onset    Cancer Mother     Vision Loss Mother     Breast Cancer Mother     Coronary Art Dis Father     Obesity Father     Kidney Disease Father     High Blood Pressure Father     High Cholesterol Father     Hypercalcemia Sister     Obesity Brother     High Blood Pressure Brother      Allergies   Allergen Reactions    Latex Other (See Comments)     BOILS AND BLISTERS- ALLERGY CALLED TO [facility] SURGERY SCHEDULING.  Demerol Hcl [Meperidine]      Current Outpatient Medications   Medication Sig Dispense Refill    FLUoxetine (PROZAC) 40 MG capsule Take 1 capsule by mouth daily 30 capsule 1    metFORMIN (GLUCOPHAGE) 500 MG tablet Take 1 tablet by mouth 2 times daily (with meals) 60 tablet 3    omeprazole (PRILOSEC) 40 MG delayed release capsule Take 1 capsule by mouth daily 30 capsule 3    busPIRone (BUSPAR) 10 MG tablet Take 1 tablet by mouth 3 times daily as needed (anxiety) 90 tablet 3    tiotropium (SPIRIVA RESPIMAT) 1.25 MCG/ACT AERS inhaler Inhale 2 puffs into the lungs daily 4 g 1    aspirin 81 MG EC tablet Take 1 tablet by mouth daily 30 tablet 3    clopidogrel (PLAVIX) 75 MG tablet Take 1 tablet by mouth daily 30 tablet 1    lisinopril (PRINIVIL;ZESTRIL) 10 MG tablet Take 1 tablet by mouth daily 30 tablet 1    HYDROcodone-acetaminophen (NORCO)  MG per tablet Take 2 tablets by mouth every 4 hours as needed for Pain.       ergocalciferol (ERGOCALCIFEROL) 70718  16 years second marriage    On disability due to multiple back surgeries and bladder cancer    [address] [facility]    Education high school    Smokes 1-1/2 pack per day drinks alcohol occasionally denies substance usage    Physically sedentary    Former  at an [address] [facility]    Never in the [employer]       Physical Examination:  /62   Pulse 70   Ht 5' 11\" (1.803 m)   Wt 195 lb (88.5 kg)   BMI 27.20 kg/m²   Physical Exam   Constitutional: He appears well-developed and well-nourished. Neck: No JVD present. Carotid bruit is not present. Cardiovascular: Normal rate, regular rhythm and normal heart sounds. Exam reveals no gallop and no friction rub. No murmur heard. Pulmonary/Chest: Effort normal and breath sounds normal. No respiratory distress. He has no wheezes. He has no rales. Abdominal: He exhibits no distension. There is no tenderness. Musculoskeletal: He exhibits no edema. Lymphadenopathy:     He has no cervical adenopathy. Skin: Skin is warm and dry. Vitals reviewed. ASSESSMENT:     Diagnosis Orders   1. Coronary artery disease involving native coronary artery of native heart with unstable angina pectoris ([facility] [address]  EKG 12 lead   2. Essential hypertension     3. Mixed hyperlipidemia     4. Hx of CABG         PLAN:  Orders Placed This Encounter   Procedures    EKG 12 lead     No orders of the defined types were placed in this encounter. 1. Continue present medications  2. Encouraged him to join cardiac rehab  3. Encourage the patient to quit smoking  4. Recommend follow-up assessment in 3 months    Return in about 3 months (around 9/10/2019). Flor Farias MD 6/10/2019 4:25 PM    [facility]      Thisdictation was generated by voice recognition computer software. Although all attempts are made to edit the dictation for accuracy, there may be errors in the transcription that are not intended.

## 2019-06-11 ENCOUNTER — OFFICE VISIT (OUTPATIENT)
Dept: CARDIOTHORACIC SURGERY | Age: 49
End: 2019-06-11

## 2019-06-11 VITALS
SYSTOLIC BLOOD PRESSURE: 99 MMHG | BODY MASS INDEX: 27.44 KG/M2 | HEART RATE: 78 BPM | WEIGHT: 196 LBS | OXYGEN SATURATION: 99 % | HEIGHT: 71 IN | DIASTOLIC BLOOD PRESSURE: 62 MMHG

## 2019-06-11 DIAGNOSIS — Z95.1 POSTSURGICAL AORTOCORONARY BYPASS STATUS: ICD-10-CM

## 2019-06-11 DIAGNOSIS — I25.110 ATHEROSCLEROSIS OF NATIVE CORONARY ARTERY OF NATIVE HEART WITH UNSTABLE ANGINA PECTORIS (HCC): Primary | ICD-10-CM

## 2019-06-11 DIAGNOSIS — Z95.1 HX OF CABG: Primary | ICD-10-CM

## 2019-06-11 PROCEDURE — 99024 POSTOP FOLLOW-UP VISIT: CPT | Performed by: THORACIC SURGERY (CARDIOTHORACIC VASCULAR SURGERY)

## 2019-06-12 ENCOUNTER — TELEPHONE (OUTPATIENT)
Dept: CARDIOLOGY | Age: 49
End: 2019-06-12

## 2019-06-12 NOTE — PROGRESS NOTES
[address], [address], [address] [place] [address]  Phone: [phone]  Fax: [phone]      Office Visit:  19    Ernestina Larson - : 1970    Reason For Visit:  Ernestina Chavis is a 44 y.o. male who is here for Post-Op Check      History of Present Illness:      I had the pleasure of seeing Joanna Neri in the office today. As you know, he is a 44 y.o. male who presented with stable angina symptoms catheterization demonstrated severe multivessel disease the patient to the operating room on 2019 where I performed a three-vessel coronary artery bypass grafting. .    Mr. Larson did well during the postoperative course. He suffered no complications and was discharged home on the postoperative day. Since his return home, he has continued to increase his ambulation as much as possible, now walking several times per day. His appetite is improving and he otherwise has the normal amount of postoperative discomfort.     ALLERGIES: Latex and Demerol hcl [meperidine]    Current Outpatient Medications   Medication Sig Dispense Refill    FLUoxetine (PROZAC) 40 MG capsule Take 1 capsule by mouth daily 30 capsule 1    metFORMIN (GLUCOPHAGE) 500 MG tablet Take 1 tablet by mouth 2 times daily (with meals) 60 tablet 3    omeprazole (PRILOSEC) 40 MG delayed release capsule Take 1 capsule by mouth daily 30 capsule 3    busPIRone (BUSPAR) 10 MG tablet Take 1 tablet by mouth 3 times daily as needed (anxiety) 90 tablet 3    tiotropium (SPIRIVA RESPIMAT) 1.25 MCG/ACT AERS inhaler Inhale 2 puffs into the lungs daily 4 g 1    aspirin 81 MG EC tablet Take 1 tablet by mouth daily 30 tablet 3    clopidogrel (PLAVIX) 75 MG tablet Take 1 tablet by mouth daily 30 tablet 1    lisinopril (PRINIVIL;ZESTRIL) 10 MG tablet Take 1 tablet by mouth daily 30 tablet 1    HYDROcodone-acetaminophen (NORCO)  MG per tablet Take 2 tablets by mouth every 4 hours as needed for Pain.  ergocalciferol (ERGOCALCIFEROL) 69838 units capsule Take 1 capsule by mouth once a week 4 capsule 3    fenofibrate 160 MG tablet Take 1 tablet by mouth daily 30 tablet 5    atorvastatin (LIPITOR) 20 MG tablet Take 1 tablet by mouth daily 30 tablet 3    atenolol (TENORMIN) 50 MG tablet TAKE ONE TABLET EVERY DAY (Patient taking differently: TAKE ONE TABLET EVERY DAY AT LUNCH) 30 tablet 5    tiZANidine (ZANAFLEX) 4 MG tablet TAKE 1 TABLET BY MOUTH EVERY 6 HOURS FOR MUSCLE PAIN 60 tablet 5     No current facility-administered medications for this visit. He is to stop the Plavix after 60 days. Review of Systems:   General: Denies any fever or chills. Energy level and endurance are returning to                              normal.    Respiratory: Denies any cough or hoarseness. Denies any SOB or PERDOMO. Cardiac: Denies any chest pain or pressure. Denies any palpitations. Denies any                             presyncope or syncope. Denies any orthopnea or PND. Physical Examination:   Vital signs: Blood pressure 99/62, pulse 78, height 5' 11\" (1.803 m), weight 196 lb (88.9 kg), SpO2 99 %. Lungs: Both lungs are clear with equal breath sounds. Cardiac: Demonstrates a regular rate and rhythm without murmurs, rubs, or                              gallops. Chest: The sternal incision has healed well, and the sternum appears to be                           stable. Lower Extremities: The right leg incision has healed well. Assessment: At this time, Mr. Nicholas Wang is progressing very well following his vessel bypass grafting. Plan:   He may resume driving and may lift up to 15 pounds over the next 8 weeks, then resume normal activity. He is also advised to begin the cardiac rehab program as soon as possible. At this time, I will defer medical management to your discretion. Please call me if you have any questions regarding his care.

## 2019-06-12 NOTE — TELEPHONE ENCOUNTER
Spoke with Monet Dorsey in cardiac rehab today they received referral for patient and will start working on it.

## 2019-06-18 ENCOUNTER — HOSPITAL ENCOUNTER (OUTPATIENT)
Dept: CARDIAC REHAB | Age: 49
Setting detail: THERAPIES SERIES
Discharge: HOME | End: 2019-06-18
Payer: MEDICAID

## 2019-06-24 ENCOUNTER — HOSPITAL ENCOUNTER (OUTPATIENT)
Dept: CARDIAC REHAB | Age: 49
Setting detail: THERAPIES SERIES
Discharge: HOME | End: 2019-06-24
Payer: MEDICAID

## 2019-06-24 PROCEDURE — 93798 PHYS/QHP OP CAR RHAB W/ECG: CPT

## 2019-06-26 ENCOUNTER — HOSPITAL ENCOUNTER (OUTPATIENT)
Dept: CARDIAC REHAB | Age: 49
Setting detail: THERAPIES SERIES
Discharge: HOME | End: 2019-06-26
Payer: MEDICAID

## 2019-06-26 PROCEDURE — 93798 PHYS/QHP OP CAR RHAB W/ECG: CPT

## 2019-06-28 ENCOUNTER — HOSPITAL ENCOUNTER (OUTPATIENT)
Dept: CARDIAC REHAB | Age: 49
Setting detail: THERAPIES SERIES
Discharge: HOME | End: 2019-06-28
Payer: MEDICAID

## 2019-06-28 PROCEDURE — 93798 PHYS/QHP OP CAR RHAB W/ECG: CPT

## 2019-07-01 ENCOUNTER — HOSPITAL ENCOUNTER (OUTPATIENT)
Dept: CARDIAC REHAB | Age: 49
Setting detail: THERAPIES SERIES
Discharge: HOME | End: 2019-07-01
Payer: MEDICAID

## 2019-07-01 PROCEDURE — 93798 PHYS/QHP OP CAR RHAB W/ECG: CPT

## 2019-07-03 ENCOUNTER — HOSPITAL ENCOUNTER (OUTPATIENT)
Dept: CARDIAC REHAB | Age: 49
Setting detail: THERAPIES SERIES
Discharge: HOME | End: 2019-07-03
Payer: MEDICAID

## 2019-07-03 PROCEDURE — 93798 PHYS/QHP OP CAR RHAB W/ECG: CPT

## 2019-07-05 ENCOUNTER — HOSPITAL ENCOUNTER (OUTPATIENT)
Dept: CARDIAC REHAB | Age: 49
Setting detail: THERAPIES SERIES
Discharge: HOME | End: 2019-07-05
Payer: MEDICAID

## 2019-07-05 PROCEDURE — 93798 PHYS/QHP OP CAR RHAB W/ECG: CPT

## 2019-07-08 ENCOUNTER — HOSPITAL ENCOUNTER (OUTPATIENT)
Dept: CARDIAC REHAB | Age: 49
Setting detail: THERAPIES SERIES
Discharge: HOME | End: 2019-07-08
Payer: MEDICAID

## 2019-07-08 PROCEDURE — 93798 PHYS/QHP OP CAR RHAB W/ECG: CPT

## 2019-07-09 ENCOUNTER — OFFICE VISIT (OUTPATIENT)
Dept: PRIMARY CARE CLINIC | Age: 49
End: 2019-07-09
Payer: MEDICAID

## 2019-07-09 VITALS
DIASTOLIC BLOOD PRESSURE: 82 MMHG | SYSTOLIC BLOOD PRESSURE: 122 MMHG | TEMPERATURE: 97.6 F | HEIGHT: 71 IN | OXYGEN SATURATION: 98 % | WEIGHT: 197 LBS | HEART RATE: 78 BPM | BODY MASS INDEX: 27.58 KG/M2

## 2019-07-09 DIAGNOSIS — E11.9 TYPE 2 DIABETES MELLITUS WITHOUT COMPLICATION, WITHOUT LONG-TERM CURRENT USE OF INSULIN (HCC): ICD-10-CM

## 2019-07-09 DIAGNOSIS — F41.9 ANXIETY AND DEPRESSION: Primary | ICD-10-CM

## 2019-07-09 DIAGNOSIS — I10 ESSENTIAL HYPERTENSION: ICD-10-CM

## 2019-07-09 DIAGNOSIS — F51.01 PRIMARY INSOMNIA: ICD-10-CM

## 2019-07-09 DIAGNOSIS — J42 CHRONIC BRONCHITIS, UNSPECIFIED CHRONIC BRONCHITIS TYPE (HCC): ICD-10-CM

## 2019-07-09 DIAGNOSIS — F32.A ANXIETY AND DEPRESSION: Primary | ICD-10-CM

## 2019-07-09 PROCEDURE — 3023F SPIROM DOC REV: CPT | Performed by: NURSE PRACTITIONER

## 2019-07-09 PROCEDURE — 2022F DILAT RTA XM EVC RTNOPTHY: CPT | Performed by: NURSE PRACTITIONER

## 2019-07-09 PROCEDURE — G8598 ASA/ANTIPLAT THER USED: HCPCS | Performed by: NURSE PRACTITIONER

## 2019-07-09 PROCEDURE — G8926 SPIRO NO PERF OR DOC: HCPCS | Performed by: NURSE PRACTITIONER

## 2019-07-09 PROCEDURE — G8419 CALC BMI OUT NRM PARAM NOF/U: HCPCS | Performed by: NURSE PRACTITIONER

## 2019-07-09 PROCEDURE — G8427 DOCREV CUR MEDS BY ELIG CLIN: HCPCS | Performed by: NURSE PRACTITIONER

## 2019-07-09 PROCEDURE — 4004F PT TOBACCO SCREEN RCVD TLK: CPT | Performed by: NURSE PRACTITIONER

## 2019-07-09 PROCEDURE — 99214 OFFICE O/P EST MOD 30 MIN: CPT | Performed by: NURSE PRACTITIONER

## 2019-07-09 PROCEDURE — 3017F COLORECTAL CA SCREEN DOC REV: CPT | Performed by: NURSE PRACTITIONER

## 2019-07-09 PROCEDURE — 3045F PR MOST RECENT HEMOGLOBIN A1C LEVEL 7.0-9.0%: CPT | Performed by: NURSE PRACTITIONER

## 2019-07-09 RX ORDER — ESZOPICLONE 1 MG/1
1 TABLET, FILM COATED ORAL NIGHTLY
Qty: 30 TABLET | Refills: 2 | Status: CANCELLED | OUTPATIENT
Start: 2019-07-09 | End: 2019-08-08

## 2019-07-09 RX ORDER — BACLOFEN 10 MG/1
10 TABLET ORAL 3 TIMES DAILY PRN
Qty: 90 TABLET | Refills: 1 | Status: SHIPPED | OUTPATIENT
Start: 2019-07-09 | End: 2019-09-19 | Stop reason: SDUPTHER

## 2019-07-09 RX ORDER — GLUCOSAMINE HCL/CHONDROITIN SU 500-400 MG
CAPSULE ORAL
Qty: 100 STRIP | Refills: 3 | Status: SHIPPED | OUTPATIENT
Start: 2019-07-09 | End: 2021-10-20

## 2019-07-09 RX ORDER — LISINOPRIL 10 MG/1
10 TABLET ORAL DAILY
Qty: 30 TABLET | Refills: 1 | Status: SHIPPED | OUTPATIENT
Start: 2019-07-09 | End: 2019-08-20 | Stop reason: SDUPTHER

## 2019-07-09 RX ORDER — BLOOD-GLUCOSE METER
KIT MISCELLANEOUS
Qty: 1 KIT | Refills: 0 | Status: SHIPPED | OUTPATIENT
Start: 2019-07-09

## 2019-07-09 RX ORDER — ESZOPICLONE 1 MG/1
1 TABLET, FILM COATED ORAL NIGHTLY
Qty: 30 TABLET | Refills: 2 | Status: SHIPPED | OUTPATIENT
Start: 2019-07-09 | End: 2019-08-08

## 2019-07-09 ASSESSMENT — ENCOUNTER SYMPTOMS
EYES NEGATIVE: 1
GASTROINTESTINAL NEGATIVE: 1
COUGH: 1
SHORTNESS OF BREATH: 1

## 2019-07-10 ENCOUNTER — HOSPITAL ENCOUNTER (OUTPATIENT)
Dept: CARDIAC REHAB | Age: 49
Setting detail: THERAPIES SERIES
Discharge: HOME | End: 2019-07-10
Payer: MEDICAID

## 2019-07-10 PROCEDURE — 93798 PHYS/QHP OP CAR RHAB W/ECG: CPT

## 2019-07-12 ENCOUNTER — HOSPITAL ENCOUNTER (OUTPATIENT)
Dept: CARDIAC REHAB | Age: 49
Setting detail: THERAPIES SERIES
Discharge: HOME | End: 2019-07-12
Payer: MEDICAID

## 2019-07-12 PROCEDURE — 93798 PHYS/QHP OP CAR RHAB W/ECG: CPT

## 2019-07-15 ENCOUNTER — HOSPITAL ENCOUNTER (OUTPATIENT)
Dept: CARDIAC REHAB | Age: 49
Setting detail: THERAPIES SERIES
Discharge: HOME | End: 2019-07-15
Payer: MEDICAID

## 2019-07-15 PROCEDURE — 93798 PHYS/QHP OP CAR RHAB W/ECG: CPT

## 2019-07-17 ENCOUNTER — HOSPITAL ENCOUNTER (OUTPATIENT)
Dept: CARDIAC REHAB | Age: 49
Setting detail: THERAPIES SERIES
Discharge: HOME | End: 2019-07-17
Payer: MEDICAID

## 2019-07-17 PROCEDURE — 93798 PHYS/QHP OP CAR RHAB W/ECG: CPT

## 2019-07-19 ENCOUNTER — APPOINTMENT (OUTPATIENT)
Dept: CARDIAC REHAB | Age: 49
End: 2019-07-19
Payer: MEDICAID

## 2019-07-19 ENCOUNTER — HOSPITAL ENCOUNTER (OUTPATIENT)
Dept: CARDIAC REHAB | Age: 49
Setting detail: THERAPIES SERIES
Discharge: HOME | End: 2019-07-19
Payer: MEDICAID

## 2019-07-19 PROCEDURE — 93798 PHYS/QHP OP CAR RHAB W/ECG: CPT

## 2019-07-22 ENCOUNTER — HOSPITAL ENCOUNTER (OUTPATIENT)
Dept: CARDIAC REHAB | Age: 49
Setting detail: THERAPIES SERIES
Discharge: HOME | End: 2019-07-22
Payer: MEDICAID

## 2019-07-22 ENCOUNTER — APPOINTMENT (OUTPATIENT)
Dept: CARDIAC REHAB | Age: 49
End: 2019-07-22
Payer: MEDICAID

## 2019-07-22 PROCEDURE — 93798 PHYS/QHP OP CAR RHAB W/ECG: CPT

## 2019-07-24 ENCOUNTER — HOSPITAL ENCOUNTER (OUTPATIENT)
Dept: CARDIAC REHAB | Age: 49
Setting detail: THERAPIES SERIES
Discharge: HOME | End: 2019-07-24
Payer: MEDICAID

## 2019-07-24 ENCOUNTER — APPOINTMENT (OUTPATIENT)
Dept: CARDIAC REHAB | Age: 49
End: 2019-07-24
Payer: MEDICAID

## 2019-07-24 PROCEDURE — 93798 PHYS/QHP OP CAR RHAB W/ECG: CPT

## 2019-07-25 DIAGNOSIS — F51.01 PRIMARY INSOMNIA: ICD-10-CM

## 2019-07-25 RX ORDER — TRAZODONE HYDROCHLORIDE 50 MG/1
50 TABLET ORAL NIGHTLY PRN
Qty: 30 TABLET | Refills: 1 | Status: SHIPPED | OUTPATIENT
Start: 2019-07-25 | End: 2019-08-20 | Stop reason: SDUPTHER

## 2019-07-26 ENCOUNTER — HOSPITAL ENCOUNTER (OUTPATIENT)
Dept: CARDIAC REHAB | Age: 49
Setting detail: THERAPIES SERIES
Discharge: HOME | End: 2019-07-26
Payer: MEDICAID

## 2019-07-26 ENCOUNTER — APPOINTMENT (OUTPATIENT)
Dept: CARDIAC REHAB | Age: 49
End: 2019-07-26
Payer: MEDICAID

## 2019-07-26 PROCEDURE — 93798 PHYS/QHP OP CAR RHAB W/ECG: CPT

## 2019-07-29 ENCOUNTER — APPOINTMENT (OUTPATIENT)
Dept: CARDIAC REHAB | Age: 49
End: 2019-07-29
Payer: MEDICAID

## 2019-07-29 ENCOUNTER — HOSPITAL ENCOUNTER (OUTPATIENT)
Dept: CARDIAC REHAB | Age: 49
Setting detail: THERAPIES SERIES
Discharge: HOME | End: 2019-07-29
Payer: MEDICAID

## 2019-07-29 DIAGNOSIS — F41.9 ANXIETY AND DEPRESSION: ICD-10-CM

## 2019-07-29 DIAGNOSIS — F32.A ANXIETY AND DEPRESSION: ICD-10-CM

## 2019-07-29 PROCEDURE — 93798 PHYS/QHP OP CAR RHAB W/ECG: CPT

## 2019-07-29 RX ORDER — FLUOXETINE HYDROCHLORIDE 40 MG/1
40 CAPSULE ORAL DAILY
Qty: 30 CAPSULE | Refills: 1 | Status: SHIPPED | OUTPATIENT
Start: 2019-07-29 | End: 2019-08-20 | Stop reason: SDUPTHER

## 2019-07-31 ENCOUNTER — HOSPITAL ENCOUNTER (OUTPATIENT)
Dept: CARDIAC REHAB | Age: 49
Setting detail: THERAPIES SERIES
Discharge: HOME | End: 2019-07-31
Payer: MEDICAID

## 2019-07-31 ENCOUNTER — APPOINTMENT (OUTPATIENT)
Dept: CARDIAC REHAB | Age: 49
End: 2019-07-31
Payer: MEDICAID

## 2019-07-31 PROCEDURE — 93798 PHYS/QHP OP CAR RHAB W/ECG: CPT

## 2019-08-02 ENCOUNTER — HOSPITAL ENCOUNTER (OUTPATIENT)
Dept: CARDIAC REHAB | Age: 49
Setting detail: THERAPIES SERIES
Discharge: HOME | End: 2019-08-02
Payer: MEDICAID

## 2019-08-02 ENCOUNTER — APPOINTMENT (OUTPATIENT)
Dept: CARDIAC REHAB | Age: 49
End: 2019-08-02
Payer: MEDICAID

## 2019-08-02 PROCEDURE — 93798 PHYS/QHP OP CAR RHAB W/ECG: CPT

## 2019-08-05 ENCOUNTER — APPOINTMENT (OUTPATIENT)
Dept: CARDIAC REHAB | Age: 49
End: 2019-08-05
Payer: MEDICAID

## 2019-08-05 ENCOUNTER — HOSPITAL ENCOUNTER (OUTPATIENT)
Dept: CARDIAC REHAB | Age: 49
Setting detail: THERAPIES SERIES
Discharge: HOME | End: 2019-08-05
Payer: MEDICAID

## 2019-08-05 PROCEDURE — 93798 PHYS/QHP OP CAR RHAB W/ECG: CPT

## 2019-08-07 ENCOUNTER — APPOINTMENT (OUTPATIENT)
Dept: CARDIAC REHAB | Age: 49
End: 2019-08-07
Payer: MEDICAID

## 2019-08-07 ENCOUNTER — HOSPITAL ENCOUNTER (OUTPATIENT)
Dept: CARDIAC REHAB | Age: 49
Setting detail: THERAPIES SERIES
Discharge: HOME | End: 2019-08-07
Payer: MEDICAID

## 2019-08-07 PROCEDURE — 93798 PHYS/QHP OP CAR RHAB W/ECG: CPT

## 2019-08-08 DIAGNOSIS — E55.9 VITAMIN D DEFICIENCY: ICD-10-CM

## 2019-08-08 RX ORDER — ERGOCALCIFEROL 1.25 MG/1
CAPSULE ORAL
Qty: 4 CAPSULE | Refills: 3 | Status: SHIPPED | OUTPATIENT
Start: 2019-08-08 | End: 2019-11-16 | Stop reason: SDUPTHER

## 2019-08-09 ENCOUNTER — APPOINTMENT (OUTPATIENT)
Dept: CARDIAC REHAB | Age: 49
End: 2019-08-09
Payer: MEDICAID

## 2019-08-09 ENCOUNTER — HOSPITAL ENCOUNTER (OUTPATIENT)
Dept: CARDIAC REHAB | Age: 49
Setting detail: THERAPIES SERIES
Discharge: HOME | End: 2019-08-09
Payer: MEDICAID

## 2019-08-09 PROCEDURE — 93798 PHYS/QHP OP CAR RHAB W/ECG: CPT

## 2019-08-12 ENCOUNTER — APPOINTMENT (OUTPATIENT)
Dept: CARDIAC REHAB | Age: 49
End: 2019-08-12
Payer: MEDICAID

## 2019-08-14 ENCOUNTER — APPOINTMENT (OUTPATIENT)
Dept: CARDIAC REHAB | Age: 49
End: 2019-08-14
Payer: MEDICAID

## 2019-08-14 ENCOUNTER — HOSPITAL ENCOUNTER (OUTPATIENT)
Dept: CARDIAC REHAB | Age: 49
Setting detail: THERAPIES SERIES
Discharge: HOME | End: 2019-08-14
Payer: MEDICAID

## 2019-08-14 PROCEDURE — 93798 PHYS/QHP OP CAR RHAB W/ECG: CPT

## 2019-08-16 ENCOUNTER — APPOINTMENT (OUTPATIENT)
Dept: CARDIAC REHAB | Age: 49
End: 2019-08-16
Payer: MEDICAID

## 2019-08-16 ENCOUNTER — HOSPITAL ENCOUNTER (OUTPATIENT)
Dept: CARDIAC REHAB | Age: 49
Setting detail: THERAPIES SERIES
Discharge: HOME | End: 2019-08-16
Payer: MEDICAID

## 2019-08-16 PROCEDURE — 93798 PHYS/QHP OP CAR RHAB W/ECG: CPT

## 2019-08-19 ENCOUNTER — APPOINTMENT (OUTPATIENT)
Dept: CARDIAC REHAB | Age: 49
End: 2019-08-19
Payer: MEDICAID

## 2019-08-20 ENCOUNTER — OFFICE VISIT (OUTPATIENT)
Dept: PRIMARY CARE CLINIC | Age: 49
End: 2019-08-20
Payer: MEDICAID

## 2019-08-20 ENCOUNTER — HOSPITAL ENCOUNTER (OUTPATIENT)
Dept: GENERAL RADIOLOGY | Age: 49
Discharge: HOME | End: 2019-08-20
Payer: MEDICAID

## 2019-08-20 VITALS
HEART RATE: 74 BPM | WEIGHT: 198 LBS | SYSTOLIC BLOOD PRESSURE: 138 MMHG | HEIGHT: 71 IN | TEMPERATURE: 97.4 F | OXYGEN SATURATION: 98 % | DIASTOLIC BLOOD PRESSURE: 88 MMHG | BODY MASS INDEX: 27.72 KG/M2

## 2019-08-20 DIAGNOSIS — R07.1 CHEST PAIN ON BREATHING: ICD-10-CM

## 2019-08-20 DIAGNOSIS — M54.12 CERVICAL RADICULOPATHY: ICD-10-CM

## 2019-08-20 DIAGNOSIS — E11.9 TYPE 2 DIABETES MELLITUS WITHOUT COMPLICATION, WITHOUT LONG-TERM CURRENT USE OF INSULIN (HCC): Primary | Chronic | ICD-10-CM

## 2019-08-20 DIAGNOSIS — F32.A ANXIETY AND DEPRESSION: ICD-10-CM

## 2019-08-20 DIAGNOSIS — M48.9 CERVICAL SPINE DISEASE: ICD-10-CM

## 2019-08-20 DIAGNOSIS — R20.2 PARESTHESIAS: ICD-10-CM

## 2019-08-20 DIAGNOSIS — I10 ESSENTIAL HYPERTENSION: ICD-10-CM

## 2019-08-20 DIAGNOSIS — F41.9 ANXIETY AND DEPRESSION: ICD-10-CM

## 2019-08-20 DIAGNOSIS — Z12.11 COLON CANCER SCREENING: ICD-10-CM

## 2019-08-20 DIAGNOSIS — F51.01 PRIMARY INSOMNIA: ICD-10-CM

## 2019-08-20 LAB — HBA1C MFR BLD: 6.6 %

## 2019-08-20 PROCEDURE — G8598 ASA/ANTIPLAT THER USED: HCPCS | Performed by: NURSE PRACTITIONER

## 2019-08-20 PROCEDURE — G8419 CALC BMI OUT NRM PARAM NOF/U: HCPCS | Performed by: NURSE PRACTITIONER

## 2019-08-20 PROCEDURE — 83036 HEMOGLOBIN GLYCOSYLATED A1C: CPT | Performed by: NURSE PRACTITIONER

## 2019-08-20 PROCEDURE — 4004F PT TOBACCO SCREEN RCVD TLK: CPT | Performed by: NURSE PRACTITIONER

## 2019-08-20 PROCEDURE — 99214 OFFICE O/P EST MOD 30 MIN: CPT | Performed by: NURSE PRACTITIONER

## 2019-08-20 PROCEDURE — 71046 X-RAY EXAM CHEST 2 VIEWS: CPT

## 2019-08-20 PROCEDURE — 2022F DILAT RTA XM EVC RTNOPTHY: CPT | Performed by: NURSE PRACTITIONER

## 2019-08-20 PROCEDURE — 3017F COLORECTAL CA SCREEN DOC REV: CPT | Performed by: NURSE PRACTITIONER

## 2019-08-20 PROCEDURE — 3044F HG A1C LEVEL LT 7.0%: CPT | Performed by: NURSE PRACTITIONER

## 2019-08-20 PROCEDURE — G8427 DOCREV CUR MEDS BY ELIG CLIN: HCPCS | Performed by: NURSE PRACTITIONER

## 2019-08-20 RX ORDER — FLUOXETINE HYDROCHLORIDE 40 MG/1
40 CAPSULE ORAL DAILY
Qty: 30 CAPSULE | Refills: 1 | Status: SHIPPED | OUTPATIENT
Start: 2019-08-20 | End: 2020-03-23

## 2019-08-20 RX ORDER — LISINOPRIL 10 MG/1
10 TABLET ORAL DAILY
Qty: 30 TABLET | Refills: 1 | Status: SHIPPED | OUTPATIENT
Start: 2019-08-20 | End: 2019-09-19 | Stop reason: SDUPTHER

## 2019-08-20 RX ORDER — TRAZODONE HYDROCHLORIDE 50 MG/1
100 TABLET ORAL NIGHTLY PRN
Qty: 60 TABLET | Refills: 1 | Status: SHIPPED | OUTPATIENT
Start: 2019-08-20 | End: 2019-11-04 | Stop reason: SDUPTHER

## 2019-08-20 RX ORDER — ARIPIPRAZOLE 5 MG/1
5 TABLET ORAL DAILY
Qty: 30 TABLET | Refills: 3 | Status: SHIPPED | OUTPATIENT
Start: 2019-08-20 | End: 2019-12-23

## 2019-08-20 ASSESSMENT — ENCOUNTER SYMPTOMS
EYES NEGATIVE: 1
GASTROINTESTINAL NEGATIVE: 1
RESPIRATORY NEGATIVE: 1

## 2019-08-20 NOTE — PROGRESS NOTES
Packs/day: 1.00     Years: 30.00     Pack years: 30.00    Smokeless tobacco: Never Used   Substance Use Topics    Alcohol use: Yes     Comment: Occ        Current Outpatient Medications   Medication Sig Dispense Refill    metFORMIN (GLUCOPHAGE) 1000 MG tablet Take 1 tablet by mouth 2 times daily (with meals) 60 tablet 3    traZODone (DESYREL) 50 MG tablet Take 2 tablets by mouth nightly as needed for Sleep 60 tablet 1    FLUoxetine (PROZAC) 40 MG capsule Take 1 capsule by mouth daily 30 capsule 1    lisinopril (PRINIVIL;ZESTRIL) 10 MG tablet Take 1 tablet by mouth daily 30 tablet 1    ARIPiprazole (ABILIFY) 5 MG tablet Take 1 tablet by mouth daily 30 tablet 3    vitamin D (ERGOCALCIFEROL) 29431 units CAPS capsule TAKE 1 CAPSULE BY MOUTH ONCE A WEEK 4 capsule 3    tiotropium (SPIRIVA) 18 MCG inhalation capsule Inhale 1 capsule into the lungs daily 30 capsule 3    fluticasone-salmeterol (ADVAIR DISKUS) 100-50 MCG/DOSE diskus inhaler Inhale 1 puff into the lungs every 12 hours 60 each 3    baclofen (LIORESAL) 10 MG tablet Take 1 tablet by mouth 3 times daily as needed (pain/spasms) 90 tablet 1    blood glucose monitor strips Test 4 times a day & as needed for symptoms of irregular blood glucose. 100 strip 3    glucose monitoring kit (FREESTYLE) monitoring kit Please provide glucometer that INS will cover for DM type 2 1 kit 0    Lancets 30G MISC 1 each by Does not apply route daily 4 times daily 100 each 3    omeprazole (PRILOSEC) 40 MG delayed release capsule Take 1 capsule by mouth daily 30 capsule 3    busPIRone (BUSPAR) 10 MG tablet Take 1 tablet by mouth 3 times daily as needed (anxiety) 90 tablet 3    aspirin 81 MG EC tablet Take 1 tablet by mouth daily 30 tablet 3    clopidogrel (PLAVIX) 75 MG tablet Take 1 tablet by mouth daily 30 tablet 1    HYDROcodone-acetaminophen (NORCO)  MG per tablet Take 2 tablets by mouth every 4 hours as needed for Pain.       fenofibrate 160 MG tablet Take 1 tablet by mouth daily 30 tablet 5    atorvastatin (LIPITOR) 20 MG tablet Take 1 tablet by mouth daily 30 tablet 3    atenolol (TENORMIN) 50 MG tablet TAKE ONE TABLET EVERY DAY (Patient taking differently: TAKE ONE TABLET EVERY DAY AT LUNCH) 30 tablet 5     No current facility-administered medications for this visit. Allergies   Allergen Reactions    Latex Other (See Comments)     BOILS AND BLISTERS- ALLERGY CALLED TO [facility] SURGERY SCHEDULING.  Demerol Hcl [Meperidine]        Family History   Problem Relation Age of Onset   Juliet Shine Cancer Mother     Vision Loss Mother     Breast Cancer Mother     Coronary Art Dis Father     Obesity Father     Kidney Disease Father     High Blood Pressure Father     High Cholesterol Father     Hypercalcemia Sister     Obesity Brother     High Blood Pressure Brother          Subjective:      Review of Systems   Constitutional: Positive for fatigue. HENT: Negative. Eyes: Negative. Respiratory: Negative. Cardiovascular: Positive for chest pain (with movement). Gastrointestinal: Negative. Endocrine: Negative. Genitourinary: Negative. Musculoskeletal: Positive for neck pain. Radiating down right arm   Skin: Negative. Neurological: Positive for weakness. Hematological: Negative. Psychiatric/Behavioral: Positive for dysphoric mood. Objective:     Physical Exam   Constitutional: He is oriented to person, place, and time. Vital signs are normal. He appears well-developed and well-nourished. HENT:   Head: Normocephalic and atraumatic. Right Ear: Hearing and external ear normal.   Left Ear: Hearing and external ear normal.   Nose: Nose normal.   Mouth/Throat: Uvula is midline, oropharynx is clear and moist and mucous membranes are normal.   Eyes: Pupils are equal, round, and reactive to light. Conjunctivae, EOM and lids are normal.   Neck: Trachea normal and normal range of motion. Neck supple.  No thyroid mass and no thyromegaly

## 2019-08-21 ENCOUNTER — APPOINTMENT (OUTPATIENT)
Dept: CARDIAC REHAB | Age: 49
End: 2019-08-21
Payer: MEDICAID

## 2019-08-21 ENCOUNTER — HOSPITAL ENCOUNTER (OUTPATIENT)
Dept: CARDIAC REHAB | Age: 49
Setting detail: THERAPIES SERIES
Discharge: HOME | End: 2019-08-21
Payer: MEDICAID

## 2019-08-21 PROCEDURE — 93798 PHYS/QHP OP CAR RHAB W/ECG: CPT

## 2019-08-23 ENCOUNTER — APPOINTMENT (OUTPATIENT)
Dept: CARDIAC REHAB | Age: 49
End: 2019-08-23
Payer: MEDICAID

## 2019-08-23 ENCOUNTER — HOSPITAL ENCOUNTER (OUTPATIENT)
Dept: CARDIAC REHAB | Age: 49
Setting detail: THERAPIES SERIES
Discharge: HOME | End: 2019-08-23
Payer: MEDICAID

## 2019-08-23 PROCEDURE — 93798 PHYS/QHP OP CAR RHAB W/ECG: CPT

## 2019-08-26 ENCOUNTER — APPOINTMENT (OUTPATIENT)
Dept: CARDIAC REHAB | Age: 49
End: 2019-08-26
Payer: MEDICAID

## 2019-08-27 ENCOUNTER — TELEPHONE (OUTPATIENT)
Dept: PRIMARY CARE CLINIC | Age: 49
End: 2019-08-27

## 2019-08-27 NOTE — TELEPHONE ENCOUNTER
Oneta Rater requests that a nurse return their call. He had an MRI scheduled but his insurance denied it. He wants to know what the next step would be to get this done. He also wants to know if his insurance will cover the Cologuard that was giving to him instead of getting the actual preventative colonoscopy done. Please advise. The best time to reach him is anytime. Thank you.

## 2019-08-28 ENCOUNTER — APPOINTMENT (OUTPATIENT)
Dept: CARDIAC REHAB | Age: 49
End: 2019-08-28
Payer: MEDICAID

## 2019-08-28 DIAGNOSIS — E78.2 MIXED HYPERLIPIDEMIA: ICD-10-CM

## 2019-08-28 RX ORDER — ATORVASTATIN CALCIUM 20 MG/1
20 TABLET, FILM COATED ORAL DAILY
Qty: 30 TABLET | Refills: 3 | Status: SHIPPED | OUTPATIENT
Start: 2019-08-28 | End: 2019-12-23

## 2019-08-28 NOTE — TELEPHONE ENCOUNTER
Made contact with pt. And explained to him why his MRI was not covered, and he VU. I also explained to him since the colorguard testing showed a positive result that it is protocol that a colonoscopy be completed for further dx so he should not have any problems with his insurance covering the diagnostic colonoscopy and he VU. Pt. Also said he had a few spells last night where he blacked out. I tried to explain to pt. That he really needs to be seen to be worked up on this since this is a new issue. When asked if he would like to schedule an appointment pt. Has refused at this time. He said he will think about it and call back to schedule.

## 2019-08-28 NOTE — TELEPHONE ENCOUNTER
Looks like the reason it was denied is that he has to do 6 weeks of conservative treatment, so rest ice, etc from most recent charting. Since everything that has happened this year we had to start fresh on his shoulder issue and they are making us wait 6 weeks prior to trying to reorder MRI.

## 2019-08-30 ENCOUNTER — APPOINTMENT (OUTPATIENT)
Dept: CARDIAC REHAB | Age: 49
End: 2019-08-30
Payer: MEDICAID

## 2019-09-02 ENCOUNTER — APPOINTMENT (OUTPATIENT)
Dept: CARDIAC REHAB | Age: 49
End: 2019-09-02
Payer: MEDICAID

## 2019-09-04 ENCOUNTER — HOSPITAL ENCOUNTER (OUTPATIENT)
Dept: CARDIAC REHAB | Age: 49
Setting detail: THERAPIES SERIES
Discharge: HOME | End: 2019-09-04
Payer: MEDICAID

## 2019-09-04 ENCOUNTER — APPOINTMENT (OUTPATIENT)
Dept: CARDIAC REHAB | Age: 49
End: 2019-09-04
Payer: MEDICAID

## 2019-09-04 ENCOUNTER — OFFICE VISIT (OUTPATIENT)
Dept: PRIMARY CARE CLINIC | Age: 49
End: 2019-09-04
Payer: MEDICAID

## 2019-09-04 ENCOUNTER — CARE COORDINATION (OUTPATIENT)
Dept: CARE COORDINATION | Age: 49
End: 2019-09-04

## 2019-09-04 ENCOUNTER — TELEPHONE (OUTPATIENT)
Dept: GASTROENTEROLOGY | Age: 49
End: 2019-09-04

## 2019-09-04 VITALS
HEART RATE: 87 BPM | DIASTOLIC BLOOD PRESSURE: 78 MMHG | TEMPERATURE: 98.2 F | HEIGHT: 71 IN | WEIGHT: 196 LBS | SYSTOLIC BLOOD PRESSURE: 122 MMHG | RESPIRATION RATE: 20 BRPM | OXYGEN SATURATION: 100 % | BODY MASS INDEX: 27.44 KG/M2

## 2019-09-04 DIAGNOSIS — Z72.0 TOBACCO ABUSE: ICD-10-CM

## 2019-09-04 DIAGNOSIS — I95.2 HYPOTENSION DUE TO MEDICATION: ICD-10-CM

## 2019-09-04 DIAGNOSIS — J42 CHRONIC BRONCHITIS, UNSPECIFIED CHRONIC BRONCHITIS TYPE (HCC): ICD-10-CM

## 2019-09-04 DIAGNOSIS — M54.12 CERVICAL RADICULOPATHY: Primary | ICD-10-CM

## 2019-09-04 DIAGNOSIS — Z71.6 TOBACCO ABUSE COUNSELING: ICD-10-CM

## 2019-09-04 PROCEDURE — 99406 BEHAV CHNG SMOKING 3-10 MIN: CPT | Performed by: NURSE PRACTITIONER

## 2019-09-04 PROCEDURE — G8419 CALC BMI OUT NRM PARAM NOF/U: HCPCS | Performed by: NURSE PRACTITIONER

## 2019-09-04 PROCEDURE — 3017F COLORECTAL CA SCREEN DOC REV: CPT | Performed by: NURSE PRACTITIONER

## 2019-09-04 PROCEDURE — 3023F SPIROM DOC REV: CPT | Performed by: NURSE PRACTITIONER

## 2019-09-04 PROCEDURE — 99214 OFFICE O/P EST MOD 30 MIN: CPT | Performed by: NURSE PRACTITIONER

## 2019-09-04 PROCEDURE — 4004F PT TOBACCO SCREEN RCVD TLK: CPT | Performed by: NURSE PRACTITIONER

## 2019-09-04 PROCEDURE — G8598 ASA/ANTIPLAT THER USED: HCPCS | Performed by: NURSE PRACTITIONER

## 2019-09-04 PROCEDURE — G8926 SPIRO NO PERF OR DOC: HCPCS | Performed by: NURSE PRACTITIONER

## 2019-09-04 PROCEDURE — 96160 PT-FOCUSED HLTH RISK ASSMT: CPT | Performed by: NURSE PRACTITIONER

## 2019-09-04 PROCEDURE — G8427 DOCREV CUR MEDS BY ELIG CLIN: HCPCS | Performed by: NURSE PRACTITIONER

## 2019-09-04 PROCEDURE — 93798 PHYS/QHP OP CAR RHAB W/ECG: CPT

## 2019-09-04 ASSESSMENT — PATIENT HEALTH QUESTIONNAIRE - PHQ9
9. THOUGHTS THAT YOU WOULD BE BETTER OFF DEAD, OR OF HURTING YOURSELF: 0
3. TROUBLE FALLING OR STAYING ASLEEP: 3
1. LITTLE INTEREST OR PLEASURE IN DOING THINGS: 0
7. TROUBLE CONCENTRATING ON THINGS, SUCH AS READING THE NEWSPAPER OR WATCHING TELEVISION: 2
6. FEELING BAD ABOUT YOURSELF - OR THAT YOU ARE A FAILURE OR HAVE LET YOURSELF OR YOUR FAMILY DOWN: 2
SUM OF ALL RESPONSES TO PHQ QUESTIONS 1-9: 16
SUM OF ALL RESPONSES TO PHQ9 QUESTIONS 1 & 2: 3
8. MOVING OR SPEAKING SO SLOWLY THAT OTHER PEOPLE COULD HAVE NOTICED. OR THE OPPOSITE, BEING SO FIGETY OR RESTLESS THAT YOU HAVE BEEN MOVING AROUND A LOT MORE THAN USUAL: 1
4. FEELING TIRED OR HAVING LITTLE ENERGY: 3
2. FEELING DOWN, DEPRESSED OR HOPELESS: 3
DEPRESSION UNABLE TO ASSESS: FUNCTIONAL CAPACITY MOTIVATION LIMITS ACCURACY
SUM OF ALL RESPONSES TO PHQ QUESTIONS 1-9: 16
5. POOR APPETITE OR OVEREATING: 2
10. IF YOU CHECKED OFF ANY PROBLEMS, HOW DIFFICULT HAVE THESE PROBLEMS MADE IT FOR YOU TO DO YOUR WORK, TAKE CARE OF THINGS AT HOME, OR GET ALONG WITH OTHER PEOPLE: 2

## 2019-09-04 ASSESSMENT — ENCOUNTER SYMPTOMS
GASTROINTESTINAL NEGATIVE: 1
RESPIRATORY NEGATIVE: 1
EYES NEGATIVE: 1

## 2019-09-04 NOTE — PROGRESS NOTES
DISKUS) 100-50 MCG/DOSE diskus inhaler Inhale 1 puff into the lungs every 12 hours 60 each 3    atorvastatin (LIPITOR) 20 MG tablet TAKE 1 TABLET BY MOUTH DAILY 30 tablet 3    metFORMIN (GLUCOPHAGE) 1000 MG tablet Take 1 tablet by mouth 2 times daily (with meals) 60 tablet 3    traZODone (DESYREL) 50 MG tablet Take 2 tablets by mouth nightly as needed for Sleep 60 tablet 1    FLUoxetine (PROZAC) 40 MG capsule Take 1 capsule by mouth daily 30 capsule 1    lisinopril (PRINIVIL;ZESTRIL) 10 MG tablet Take 1 tablet by mouth daily 30 tablet 1    ARIPiprazole (ABILIFY) 5 MG tablet Take 1 tablet by mouth daily 30 tablet 3    vitamin D (ERGOCALCIFEROL) 52615 units CAPS capsule TAKE 1 CAPSULE BY MOUTH ONCE A WEEK 4 capsule 3    tiotropium (SPIRIVA) 18 MCG inhalation capsule Inhale 1 capsule into the lungs daily 30 capsule 3    baclofen (LIORESAL) 10 MG tablet Take 1 tablet by mouth 3 times daily as needed (pain/spasms) 90 tablet 1    blood glucose monitor strips Test 4 times a day & as needed for symptoms of irregular blood glucose. 100 strip 3    glucose monitoring kit (FREESTYLE) monitoring kit Please provide glucometer that INS will cover for DM type 2 1 kit 0    Lancets 30G MISC 1 each by Does not apply route daily 4 times daily 100 each 3    omeprazole (PRILOSEC) 40 MG delayed release capsule Take 1 capsule by mouth daily 30 capsule 3    busPIRone (BUSPAR) 10 MG tablet Take 1 tablet by mouth 3 times daily as needed (anxiety) 90 tablet 3    aspirin 81 MG EC tablet Take 1 tablet by mouth daily 30 tablet 3    clopidogrel (PLAVIX) 75 MG tablet Take 1 tablet by mouth daily 30 tablet 1    HYDROcodone-acetaminophen (NORCO)  MG per tablet Take 2 tablets by mouth every 4 hours as needed for Pain.       fenofibrate 160 MG tablet Take 1 tablet by mouth daily 30 tablet 5    atenolol (TENORMIN) 50 MG tablet TAKE ONE TABLET EVERY DAY (Patient taking differently: TAKE ONE TABLET EVERY DAY AT LUNCH) 30 tablet 5 He exhibits normal range of motion and no tenderness. Thoracic back: Normal. He exhibits normal range of motion and no tenderness. Lumbar back: Normal. He exhibits normal range of motion and no tenderness. Back:    Neurological: He is alert and oriented to person, place, and time. He has normal strength. Skin: Skin is warm, dry and intact. Psychiatric: He has a normal mood and affect. His speech is normal and behavior is normal. Judgment and thought content normal. Cognition and memory are normal.   Nursing note and vitals reviewed. /78   Pulse 87   Temp 98.2 °F (36.8 °C) (Temporal)   Resp 20   Ht 5' 11\" (1.803 m)   Wt 196 lb (88.9 kg)   SpO2 100%   BMI 27.34 kg/m²     Assessment:      Diagnosis Orders   1. Cervical radiculopathy  XR CERVICAL SPINE (2-3 VIEWS)   2. Chronic bronchitis, unspecified chronic bronchitis type (HCC)  fluticasone-salmeterol (ADVAIR DISKUS) 100-50 MCG/DOSE diskus inhaler   3. Hypotension due to medication     4. Tobacco abuse     5. Tobacco abuse counseling         No results found for this visit on 09/04/19. Plan:     Patient is to restart Abilify as discussed. Starting metformin at 1000 mg in PM and 500 mg in AM next week. Patient is to hold lisinopril if BP is 100/60 or less. He is to hold any otc meds as discussed. Xray of cervical spine due to worsening cervical radiculopathy. Approximately 5 minutes of education was provided about quit smoking and the harms of tobacco.  Patient does show understanding. Patient does not have  the desire to quit smoking in the future. Return for Follow up chronic conditions.     Orders Placed This Encounter   Procedures    XR CERVICAL SPINE (2-3 VIEWS)     Standing Status:   Future     Standing Expiration Date:   9/4/2020       Orders Placed This Encounter   Medications    fluticasone-salmeterol (ADVAIR DISKUS) 100-50 MCG/DOSE diskus inhaler     Sig: Inhale 1 puff into the lungs every 12 hours Dispense:  60 each     Refill:  3        Patient offered educational handouts and has had all questions answered. Patient voices understanding and agrees to plans along with risks and benefits of plan. Patient is instructed to continue prior meds, diet, and exercise plans as instructed. Patient agrees to follow up as instructed and sooner if needed. Patient agrees to go to ER if condition becomes emergent. EMR Dragon/transcription disclaimer: Some of this encounter note is an electronic transcription/translation of spoken language to printed text. The electronic translation of spoken language may permit erroneous, or at times, nonsensical words or phrases to be inadvertently transcribed.  Although I have reviewed the note for such errors, some may still exist.    Electronically signed by BRYAN Malcolm on 9/4/2019 at 11:16 AM

## 2019-09-06 ENCOUNTER — ANESTHESIA EVENT (OUTPATIENT)
Dept: OPERATING ROOM | Age: 49
End: 2019-09-06

## 2019-09-06 ENCOUNTER — APPOINTMENT (OUTPATIENT)
Dept: CARDIAC REHAB | Age: 49
End: 2019-09-06
Payer: MEDICAID

## 2019-09-09 ENCOUNTER — HOSPITAL ENCOUNTER (OUTPATIENT)
Dept: CARDIAC REHAB | Age: 49
Setting detail: THERAPIES SERIES
Discharge: HOME | End: 2019-09-09
Payer: MEDICAID

## 2019-09-09 ENCOUNTER — APPOINTMENT (OUTPATIENT)
Dept: CARDIAC REHAB | Age: 49
End: 2019-09-09
Payer: MEDICAID

## 2019-09-09 ENCOUNTER — OFFICE VISIT (OUTPATIENT)
Dept: CARDIOLOGY | Age: 49
End: 2019-09-09
Payer: MEDICAID

## 2019-09-09 VITALS
SYSTOLIC BLOOD PRESSURE: 128 MMHG | HEIGHT: 71 IN | DIASTOLIC BLOOD PRESSURE: 60 MMHG | HEART RATE: 78 BPM | BODY MASS INDEX: 27.16 KG/M2 | WEIGHT: 194 LBS

## 2019-09-09 DIAGNOSIS — Z95.1 HX OF CABG: ICD-10-CM

## 2019-09-09 DIAGNOSIS — I25.10 CORONARY ARTERY DISEASE INVOLVING NATIVE CORONARY ARTERY OF NATIVE HEART WITHOUT ANGINA PECTORIS: ICD-10-CM

## 2019-09-09 DIAGNOSIS — I10 ESSENTIAL HYPERTENSION: Primary | Chronic | ICD-10-CM

## 2019-09-09 DIAGNOSIS — E78.2 MIXED HYPERLIPIDEMIA: Chronic | ICD-10-CM

## 2019-09-09 DIAGNOSIS — Z85.51 HX OF BLADDER CANCER: Chronic | ICD-10-CM

## 2019-09-09 PROCEDURE — 99214 OFFICE O/P EST MOD 30 MIN: CPT | Performed by: INTERNAL MEDICINE

## 2019-09-09 PROCEDURE — 3017F COLORECTAL CA SCREEN DOC REV: CPT | Performed by: INTERNAL MEDICINE

## 2019-09-09 PROCEDURE — 93798 PHYS/QHP OP CAR RHAB W/ECG: CPT

## 2019-09-09 PROCEDURE — 4004F PT TOBACCO SCREEN RCVD TLK: CPT | Performed by: INTERNAL MEDICINE

## 2019-09-09 PROCEDURE — G8598 ASA/ANTIPLAT THER USED: HCPCS | Performed by: INTERNAL MEDICINE

## 2019-09-09 PROCEDURE — G8419 CALC BMI OUT NRM PARAM NOF/U: HCPCS | Performed by: INTERNAL MEDICINE

## 2019-09-09 PROCEDURE — G8427 DOCREV CUR MEDS BY ELIG CLIN: HCPCS | Performed by: INTERNAL MEDICINE

## 2019-09-09 ASSESSMENT — ENCOUNTER SYMPTOMS
SHORTNESS OF BREATH: 0
VOMITING: 0
RESPIRATORY NEGATIVE: 1
NAUSEA: 0
EYES NEGATIVE: 1
DIARRHEA: 0
GASTROINTESTINAL NEGATIVE: 1

## 2019-09-09 NOTE — PROGRESS NOTES
release capsule Take 1 capsule by mouth daily 30 capsule 3    busPIRone (BUSPAR) 10 MG tablet Take 1 tablet by mouth 3 times daily as needed (anxiety) 90 tablet 3    aspirin 81 MG EC tablet Take 1 tablet by mouth daily 30 tablet 3    clopidogrel (PLAVIX) 75 MG tablet Take 1 tablet by mouth daily 30 tablet 1    HYDROcodone-acetaminophen (NORCO)  MG per tablet Take 2 tablets by mouth every 4 hours as needed for Pain.  fenofibrate 160 MG tablet Take 1 tablet by mouth daily 30 tablet 5    atenolol (TENORMIN) 50 MG tablet TAKE ONE TABLET EVERY DAY (Patient taking differently: TAKE ONE TABLET EVERY DAY AT LUNCH) 30 tablet 5    glucose monitoring kit (FREESTYLE) monitoring kit Please provide glucometer that INS will cover for DM type 2 1 kit 0    Lancets 30G MISC 1 each by Does not apply route daily 4 times daily 100 each 3     No current facility-administered medications for this visit. Social History     Socioeconomic History    Marital status:      Spouse name: Not on file    Number of children: Not on file    Years of education: Not on file    Highest education level: Not on file   Occupational History    Not on file   Social Needs    Financial resource strain: Not on file    Food insecurity:     Worry: Not on file     Inability: Not on file    Transportation needs:     Medical: Not on file     Non-medical: Not on file   Tobacco Use    Smoking status: Current Every Day Smoker     Packs/day: 1.00     Years: 30.00     Pack years: 30.00    Smokeless tobacco: Never Used   Substance and Sexual Activity    Alcohol use: Yes     Comment:  Occ    Drug use: Never    Sexual activity: Not on file   Lifestyle    Physical activity:     Days per week: Not on file     Minutes per session: Not on file    Stress: Not on file   Relationships    Social connections:     Talks on phone: Not on file     Gets together: Not on file     Attends [employer] service: Not on file     Active member of club or organization: Not on file     Attends meetings of clubs or organizations: Not on file     Relationship status: Not on file    Intimate partner violence:     Fear of current or ex partner: Not on file     Emotionally abused: Not on file     Physically abused: Not on file     Forced sexual activity: Not on file   Other Topics Concern    Not on file   Social History Narrative     16 years second marriage    On disability due to multiple back surgeries and bladder cancer    [address] [facility]    Education high school    Smokes 1-1/2 pack per day drinks alcohol occasionally denies substance usage    Physically sedentary    Former  at [address]    Never in the [employer]       Physical Examination:  /60   Pulse 78   Ht 5' 11\" (1.803 m)   Wt 194 lb (88 kg)   BMI 27.06 kg/m²   Physical Exam        ASSESSMENT:     Diagnosis Orders   1. Essential hypertension  Lipid Panel   2. Coronary artery disease involving native coronary artery of native heart without angina pectoris  Lipid Panel   3. Mixed hyperlipidemia  Lipid Panel   4. Hx of CABG  Lipid Panel   5. Hx of bladder cancer  Lipid Panel       PLAN:  Orders Placed This Encounter   Procedures    Lipid Panel     No orders of the defined types were placed in this encounter. 1. Continue present medications  2. Recommend fasting lipid profile  3. Recommend follow-up assessment in 6 months    Return in about 6 months (around 3/9/2020). Inderjit Young MD 9/9/2019 1:27 PM    [facility]      Thisdictation was generated by voice recognition computer software. Although all attempts are made to edit the dictation for accuracy, there may be errors in the transcription that are not intended.

## 2019-09-10 ENCOUNTER — APPOINTMENT (OUTPATIENT)
Dept: OPERATING ROOM | Age: 49
End: 2019-09-10

## 2019-09-10 ENCOUNTER — ANESTHESIA (OUTPATIENT)
Dept: OPERATING ROOM | Age: 49
End: 2019-09-10

## 2019-09-10 ENCOUNTER — HOSPITAL ENCOUNTER (OUTPATIENT)
Age: 49
Setting detail: OUTPATIENT SURGERY
Discharge: HOME | End: 2019-09-10
Attending: INTERNAL MEDICINE | Admitting: INTERNAL MEDICINE
Payer: MEDICAID

## 2019-09-10 VITALS
HEIGHT: 71 IN | HEART RATE: 70 BPM | DIASTOLIC BLOOD PRESSURE: 57 MMHG | WEIGHT: 196 LBS | SYSTOLIC BLOOD PRESSURE: 109 MMHG | OXYGEN SATURATION: 95 % | BODY MASS INDEX: 27.44 KG/M2 | RESPIRATION RATE: 18 BRPM | TEMPERATURE: 97.9 F

## 2019-09-10 VITALS — OXYGEN SATURATION: 98 % | DIASTOLIC BLOOD PRESSURE: 59 MMHG | SYSTOLIC BLOOD PRESSURE: 138 MMHG

## 2019-09-10 PROCEDURE — 45378 DIAGNOSTIC COLONOSCOPY: CPT | Performed by: INTERNAL MEDICINE

## 2019-09-10 PROCEDURE — G0121 COLON CA SCRN NOT HI RSK IND: HCPCS

## 2019-09-10 RX ORDER — LIDOCAINE HYDROCHLORIDE 10 MG/ML
INJECTION, SOLUTION INFILTRATION; PERINEURAL PRN
Status: DISCONTINUED | OUTPATIENT
Start: 2019-09-10 | End: 2019-09-10 | Stop reason: SDUPTHER

## 2019-09-10 RX ORDER — PROPOFOL 10 MG/ML
INJECTION, EMULSION INTRAVENOUS PRN
Status: DISCONTINUED | OUTPATIENT
Start: 2019-09-10 | End: 2019-09-10 | Stop reason: SDUPTHER

## 2019-09-10 RX ORDER — SODIUM CHLORIDE 9 MG/ML
INJECTION, SOLUTION INTRAVENOUS CONTINUOUS
Status: DISCONTINUED | OUTPATIENT
Start: 2019-09-10 | End: 2019-09-10 | Stop reason: HOSPADM

## 2019-09-10 RX ADMIN — SODIUM CHLORIDE: 9 INJECTION, SOLUTION INTRAVENOUS at 09:10

## 2019-09-10 RX ADMIN — SODIUM CHLORIDE: 9 INJECTION, SOLUTION INTRAVENOUS at 09:26

## 2019-09-10 RX ADMIN — LIDOCAINE HYDROCHLORIDE 30 MG: 10 INJECTION, SOLUTION INFILTRATION; PERINEURAL at 09:38

## 2019-09-10 RX ADMIN — PROPOFOL 300 MG: 10 INJECTION, EMULSION INTRAVENOUS at 09:38

## 2019-09-10 NOTE — H&P
Patient Name: Trey Elias  : 1970  MRN: [id number]  DATE: 09/10/19    Allergies: Allergies   Allergen Reactions    Latex Other (See Comments)     BOILS AND BLISTERS- ALLERGY CALLED TO [facility] SURGERY SCHEDULING.  Demerol Hcl [Meperidine]         ENDOSCOPY  History and Physical    Procedure:    [] Diagnostic Colonoscopy       [x] Screening Colonoscopy  [] EGD      [] ERCP      [] EUS       [] Other    [x] Previous office notes/History and Physical reviewed from the patients chart. Please see EMR for further details of HPI. I have examined the patient's status immediately prior to the procedure and:      Indications/HPI:       [x] Screening              [] History of Polyps      []Fhx of colon CA/polyps       Anesthesia:   [x] MAC [] Moderate Sedation   [] General   [] None     ROS: 12 pt review of systems was negative unless stated above    Medications:   Prior to Admission medications    Medication Sig Start Date End Date Taking?  Authorizing Provider   fluticasone-salmeterol (ADVAIR DISKUS) 100-50 MCG/DOSE diskus inhaler Inhale 1 puff into the lungs every 12 hours 19  Yes BRYAN Hinson   atorvastatin (LIPITOR) 20 MG tablet TAKE 1 TABLET BY MOUTH DAILY 19  Yes BRYAN Hinson   metFORMIN (GLUCOPHAGE) 1000 MG tablet Take 1 tablet by mouth 2 times daily (with meals) 19  Yes BRYAN Hinson   traZODone (DESYREL) 50 MG tablet Take 2 tablets by mouth nightly as needed for Sleep 19  Yes BRYAN Hinson   FLUoxetine (PROZAC) 40 MG capsule Take 1 capsule by mouth daily 19  Yes BRYAN Hinson   lisinopril (PRINIVIL;ZESTRIL) 10 MG tablet Take 1 tablet by mouth daily 19  Yes BRYAN Hinson   ARIPiprazole (ABILIFY) 5 MG tablet Take 1 tablet by mouth daily 19  Yes BRYAN Hinson   vitamin D (ERGOCALCIFEROL) 41282 units CAPS capsule TAKE 1 CAPSULE BY MOUTH ONCE A WEEK 19  Yes BRYAN Hinson   baclofen (LIORESAL) 10 MG tablet Take 1 tablet by mouth 3

## 2019-09-10 NOTE — OP NOTE
Patient: Kati Mills : 1970  Med Rec#: [id number] Acc#: [id number]   Primary Care Provider BRYAN Hilton    Date of Procedure:  9/10/2019    Endoscopist: Jesus Francisco MD    Referring Provider: BRYAN Hilton,     Operation Performed: Colonoscopy    Indications: screening    Anesthesia:  Sedation was administered by anesthesia who monitored the patient during the procedure. I met with Lynn Larson prior to procedure. We discussed the procedure itself, and I have discussed the risks of endoscopy (including-- but not limited to-- pain, discomfort, bleeding potentially requiring second endoscopic procedure and/or blood transfusion, organ perforation requiring operative repair, damage to organs near the colon, infection, aspiration, cardiopulmonary/allergic reaction), benefits, indications to endoscopy. Additionally, we discussed options other than colonoscopy. The patient expressed understanding. All questions answered. The patient decided to proceed with the procedure. Signed informed consent was placed on the chart. Blood Loss: minimal    Withdrawal time: > 6 minutes  Bowel Prep: adequate     Complications: no immediate complications    DESCRIPTION OF PROCEDURE:     A time out was performed. After written informed consent was obtained, the patient was placed in the left lateral position. The perianal area was inspected, and a digital rectal exam was performed. A rectal exam was performed: normal tone, no palpable lesions. At this point, a forward viewing Olympus colonoscope was inserted into the anus and carefully advanced to the cecum. The cecum was identified by the ileocecal valve and the appendiceal orifice. The colonoscope was then slowly withdrawn with careful inspection of the mucosa in a linear and circumferential fashion. The scope was retroflexed in the rectum. Suction was utilized during the procedure to remove as much air as possible from the bowel.  The colonoscope was

## 2019-09-11 ENCOUNTER — APPOINTMENT (OUTPATIENT)
Dept: CARDIAC REHAB | Age: 49
End: 2019-09-11
Payer: MEDICAID

## 2019-09-11 ENCOUNTER — HOSPITAL ENCOUNTER (OUTPATIENT)
Dept: GENERAL RADIOLOGY | Age: 49
Discharge: HOME | End: 2019-09-11
Payer: MEDICAID

## 2019-09-11 ENCOUNTER — HOSPITAL ENCOUNTER (OUTPATIENT)
Dept: CARDIAC REHAB | Age: 49
Setting detail: THERAPIES SERIES
Discharge: HOME | End: 2019-09-11
Payer: MEDICAID

## 2019-09-11 DIAGNOSIS — M54.12 CERVICAL RADICULOPATHY: ICD-10-CM

## 2019-09-11 PROCEDURE — 93798 PHYS/QHP OP CAR RHAB W/ECG: CPT

## 2019-09-11 PROCEDURE — 72040 X-RAY EXAM NECK SPINE 2-3 VW: CPT

## 2019-09-11 PROCEDURE — 72050 X-RAY EXAM NECK SPINE 4/5VWS: CPT

## 2019-09-13 ENCOUNTER — APPOINTMENT (OUTPATIENT)
Dept: CARDIAC REHAB | Age: 49
End: 2019-09-13
Payer: MEDICAID

## 2019-09-13 ENCOUNTER — HOSPITAL ENCOUNTER (OUTPATIENT)
Dept: CARDIAC REHAB | Age: 49
Setting detail: THERAPIES SERIES
Discharge: HOME | End: 2019-09-13
Payer: MEDICAID

## 2019-09-13 PROCEDURE — 93798 PHYS/QHP OP CAR RHAB W/ECG: CPT

## 2019-09-16 ENCOUNTER — APPOINTMENT (OUTPATIENT)
Dept: CARDIAC REHAB | Age: 49
End: 2019-09-16
Payer: MEDICAID

## 2019-09-16 ENCOUNTER — HOSPITAL ENCOUNTER (OUTPATIENT)
Dept: CARDIAC REHAB | Age: 49
Setting detail: THERAPIES SERIES
Discharge: HOME | End: 2019-09-16
Payer: MEDICAID

## 2019-09-16 PROCEDURE — 93798 PHYS/QHP OP CAR RHAB W/ECG: CPT

## 2019-09-18 ENCOUNTER — HOSPITAL ENCOUNTER (OUTPATIENT)
Dept: CARDIAC REHAB | Age: 49
Setting detail: THERAPIES SERIES
Discharge: HOME | End: 2019-09-18
Payer: MEDICAID

## 2019-09-18 ENCOUNTER — APPOINTMENT (OUTPATIENT)
Dept: CARDIAC REHAB | Age: 49
End: 2019-09-18
Payer: MEDICAID

## 2019-09-18 PROCEDURE — 93798 PHYS/QHP OP CAR RHAB W/ECG: CPT

## 2019-09-19 ENCOUNTER — OFFICE VISIT (OUTPATIENT)
Dept: PRIMARY CARE CLINIC | Age: 49
End: 2019-09-19
Payer: MEDICAID

## 2019-09-19 VITALS
RESPIRATION RATE: 16 BRPM | HEIGHT: 71 IN | SYSTOLIC BLOOD PRESSURE: 132 MMHG | DIASTOLIC BLOOD PRESSURE: 84 MMHG | BODY MASS INDEX: 27.02 KG/M2 | WEIGHT: 193 LBS | OXYGEN SATURATION: 98 % | TEMPERATURE: 97.2 F | HEART RATE: 76 BPM

## 2019-09-19 DIAGNOSIS — R25.2 SPASM: ICD-10-CM

## 2019-09-19 DIAGNOSIS — I10 ESSENTIAL HYPERTENSION: ICD-10-CM

## 2019-09-19 DIAGNOSIS — F32.A ANXIETY AND DEPRESSION: Primary | ICD-10-CM

## 2019-09-19 DIAGNOSIS — F41.9 ANXIETY AND DEPRESSION: Primary | ICD-10-CM

## 2019-09-19 PROCEDURE — 3017F COLORECTAL CA SCREEN DOC REV: CPT | Performed by: NURSE PRACTITIONER

## 2019-09-19 PROCEDURE — 99214 OFFICE O/P EST MOD 30 MIN: CPT | Performed by: NURSE PRACTITIONER

## 2019-09-19 PROCEDURE — G8419 CALC BMI OUT NRM PARAM NOF/U: HCPCS | Performed by: NURSE PRACTITIONER

## 2019-09-19 PROCEDURE — 4004F PT TOBACCO SCREEN RCVD TLK: CPT | Performed by: NURSE PRACTITIONER

## 2019-09-19 PROCEDURE — G8598 ASA/ANTIPLAT THER USED: HCPCS | Performed by: NURSE PRACTITIONER

## 2019-09-19 PROCEDURE — G8427 DOCREV CUR MEDS BY ELIG CLIN: HCPCS | Performed by: NURSE PRACTITIONER

## 2019-09-19 RX ORDER — LISINOPRIL 10 MG/1
10 TABLET ORAL DAILY
Qty: 30 TABLET | Refills: 1 | Status: SHIPPED | OUTPATIENT
Start: 2019-09-19 | End: 2020-01-23

## 2019-09-19 RX ORDER — BACLOFEN 10 MG/1
10 TABLET ORAL 3 TIMES DAILY PRN
Qty: 90 TABLET | Refills: 1 | Status: SHIPPED | OUTPATIENT
Start: 2019-09-19 | End: 2019-11-13 | Stop reason: SDUPTHER

## 2019-09-19 ASSESSMENT — ENCOUNTER SYMPTOMS
GASTROINTESTINAL NEGATIVE: 1
RESPIRATORY NEGATIVE: 1
EYES NEGATIVE: 1

## 2019-09-20 ENCOUNTER — APPOINTMENT (OUTPATIENT)
Dept: CARDIAC REHAB | Age: 49
End: 2019-09-20
Payer: MEDICAID

## 2019-09-20 ENCOUNTER — HOSPITAL ENCOUNTER (OUTPATIENT)
Dept: CARDIAC REHAB | Age: 49
Setting detail: THERAPIES SERIES
Discharge: HOME | End: 2019-09-20
Payer: MEDICAID

## 2019-09-20 PROCEDURE — 93798 PHYS/QHP OP CAR RHAB W/ECG: CPT

## 2019-09-23 ENCOUNTER — HOSPITAL ENCOUNTER (OUTPATIENT)
Dept: CARDIAC REHAB | Age: 49
Setting detail: THERAPIES SERIES
Discharge: HOME | End: 2019-09-23
Payer: MEDICAID

## 2019-09-23 ENCOUNTER — APPOINTMENT (OUTPATIENT)
Dept: CARDIAC REHAB | Age: 49
End: 2019-09-23
Payer: MEDICAID

## 2019-09-23 PROCEDURE — 93798 PHYS/QHP OP CAR RHAB W/ECG: CPT

## 2019-09-25 ENCOUNTER — HOSPITAL ENCOUNTER (OUTPATIENT)
Dept: CARDIAC REHAB | Age: 49
Setting detail: THERAPIES SERIES
Discharge: HOME | End: 2019-09-25
Payer: MEDICAID

## 2019-09-25 PROCEDURE — 93798 PHYS/QHP OP CAR RHAB W/ECG: CPT

## 2019-09-27 ENCOUNTER — HOSPITAL ENCOUNTER (OUTPATIENT)
Dept: CARDIAC REHAB | Age: 49
Setting detail: THERAPIES SERIES
Discharge: HOME | End: 2019-09-27
Payer: MEDICAID

## 2019-09-27 PROCEDURE — 93798 PHYS/QHP OP CAR RHAB W/ECG: CPT

## 2019-09-30 ENCOUNTER — HOSPITAL ENCOUNTER (OUTPATIENT)
Dept: CARDIAC REHAB | Age: 49
Setting detail: THERAPIES SERIES
Discharge: HOME | End: 2019-09-30
Payer: MEDICAID

## 2019-09-30 DIAGNOSIS — K21.9 GASTROESOPHAGEAL REFLUX DISEASE WITHOUT ESOPHAGITIS: ICD-10-CM

## 2019-09-30 PROCEDURE — 93798 PHYS/QHP OP CAR RHAB W/ECG: CPT

## 2019-09-30 RX ORDER — OMEPRAZOLE 40 MG/1
40 CAPSULE, DELAYED RELEASE ORAL DAILY
Qty: 30 CAPSULE | Refills: 3 | Status: SHIPPED | OUTPATIENT
Start: 2019-09-30 | End: 2020-01-23

## 2019-10-01 ENCOUNTER — RESULTS ENCOUNTER (OUTPATIENT)
Dept: UROLOGY | Facility: CLINIC | Age: 49
End: 2019-10-01

## 2019-10-01 DIAGNOSIS — C67.8 MALIGNANT NEOPLASM OF OVERLAPPING SITES OF BLADDER (HCC): ICD-10-CM

## 2019-10-04 DIAGNOSIS — C67.8 MALIGNANT NEOPLASM OF OVERLAPPING SITES OF BLADDER (HCC): Primary | ICD-10-CM

## 2019-10-29 ENCOUNTER — RESULTS ENCOUNTER (OUTPATIENT)
Dept: UROLOGY | Facility: CLINIC | Age: 49
End: 2019-10-29

## 2019-10-29 DIAGNOSIS — C67.8 MALIGNANT NEOPLASM OF OVERLAPPING SITES OF BLADDER (HCC): ICD-10-CM

## 2019-11-01 ENCOUNTER — HOSPITAL ENCOUNTER (OUTPATIENT)
Dept: GENERAL RADIOLOGY | Facility: HOSPITAL | Age: 49
Discharge: HOME | End: 2019-11-01

## 2019-11-01 ENCOUNTER — HOSPITAL ENCOUNTER (OUTPATIENT)
Dept: ULTRASOUND IMAGING | Facility: HOSPITAL | Age: 49
Discharge: HOME | End: 2019-11-01
Admitting: UROLOGY

## 2019-11-01 DIAGNOSIS — C67.8 MALIGNANT NEOPLASM OF OVERLAPPING SITES OF BLADDER (HCC): Primary | ICD-10-CM

## 2019-11-01 DIAGNOSIS — C67.8 MALIGNANT NEOPLASM OF OVERLAPPING SITES OF BLADDER (HCC): ICD-10-CM

## 2019-11-01 PROCEDURE — 76775 US EXAM ABDO BACK WALL LIM: CPT

## 2019-11-01 PROCEDURE — 71046 X-RAY EXAM CHEST 2 VIEWS: CPT

## 2019-11-04 ENCOUNTER — LAB (OUTPATIENT)
Dept: LAB | Facility: HOSPITAL | Age: 49
End: 2019-11-04

## 2019-11-04 DIAGNOSIS — C67.8 MALIGNANT NEOPLASM OF OVERLAPPING SITES OF BLADDER (HCC): ICD-10-CM

## 2019-11-04 DIAGNOSIS — C67.8 MALIGNANT NEOPLASM OF OVERLAPPING SITES OF BLADDER (HCC): Primary | ICD-10-CM

## 2019-11-04 DIAGNOSIS — F51.01 PRIMARY INSOMNIA: ICD-10-CM

## 2019-11-04 PROCEDURE — 80053 COMPREHEN METABOLIC PANEL: CPT | Performed by: UROLOGY

## 2019-11-04 PROCEDURE — 85027 COMPLETE CBC AUTOMATED: CPT | Performed by: UROLOGY

## 2019-11-04 PROCEDURE — 36415 COLL VENOUS BLD VENIPUNCTURE: CPT

## 2019-11-04 RX ORDER — TRAZODONE HYDROCHLORIDE 50 MG/1
100 TABLET ORAL NIGHTLY PRN
Qty: 60 TABLET | Refills: 1 | Status: SHIPPED | OUTPATIENT
Start: 2019-11-04 | End: 2020-01-16

## 2019-11-04 NOTE — PROGRESS NOTES
Mr. Tafoya is 49 y.o. male    CHIEF COMPLAINT: I am here for my 6 month follow up for bladder cancer.      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  Duration: He was diagnosed in June 2016 when he first presented with carcinoma in situ that was high-grade.  Context: This was identified during for an evaluation of dysuria and gross hematuria  Modifying factors: Patient underwent induction BCG on 2 separate occasions followed by a single maintenance BCG after the second induction.  Subsequent biopsy showed that he was BCG refractory. He then underwent cystectomy and ileal loop which has resulted in KYLEE status.   Associated signs or symptoms: No hematuria or flank pain. History related to this issue:   -11/2017: Radical cystoprostatectomy/BPLND by Jessie Badillo [facility] [place].   Final Diagnosis            1. DISTAL RIGHT URETER, BIOPSY:               BENIGN URETERAL TISSUE.           2. DISTAL LEFT URETER, BIOPSY:               BENIGN URETERAL TISSUE.            3. PELVIC APICAL MARGIN, BIOPSY:               BENIGN FIBROVASCULAR, FATTY, AND NERVE TISSUES, WITH GANGLION.              4. RADICAL CYSTOPROSTATECTOMY SPECIMEN:               FLAT UROTHELIAL CARCINOMA IN SITU.               EXTENSIVE MUCOSAL/SUBMUCOSAL FIBROSIS WITH CHRONIC                             INFLAMMATION                            AND REACTIVE CHANGE.               NEGATIVE SEMINAL VESICLE AND VAS DEFERENS MARGINS.               NEGATIVE URETERAL MARGINS.               NEGATIVE PROSTATIC URETHRAL MARGIN.               PROSTATE WITH NECROTIZING GRANULOMATOUS INFLAMMATION.           5. RIGHT PELVIC LYMPH NODES (4):               NO TUMOR IDENTIFIED.               6. LEFT PELVIC LYMPH NODES (6):               NO TUMOR IDENTIFIED.         -09/2017: BLADDER BIOPSY  Final Diagnosis   1.  Urinary bladder,  right erythema, biopsy:  Focal , flat high-grade urothelial carcinoma in situ.   Severe mixed inflammation  Negative for invasive carcinoma     2.  Urinary bladder, left lateral wall tumor, biopsy:  Focal , flat high-grade urothelial carcinoma in situ.   Severe mixed inflammation  Negative for invasive carcinoma      Addendum electronically signed by Gini Ho MD on 9/13/2017 at 1224         - 03/2017: TURBT  Final Diagnosis   1.  Urinary bladder, lesion, biopsy:  Reactive urothelium  Severe mixed inflammation     2.  Urinary bladder, dome tumor, biopsy:  Non-invasive, high-grade papillary urothelial carcinoma  Muscularis propria (detrusor muscle) present   Electronically signed by Gini Ho MD on 4/4/2017 at 1440         - 11/2016: TURBT/Bx's after second course of induction BCG  Final Diagnosis   1.  Urinary bladder, right lateral wall, biopsy :  Severe mixed inflammation  Denuded urothelium  Rare urothelium present in demonstrates reactive changes  Negative for invasive carcinoma     2.  Urinary bladder, dome, biopsy :  Reactive urothelium  Mixed inflammation  Negative for invasive carcinoma     3.  Urinary bladder, left lateral wall, biopsy:  Reactive urothelium  Mixed inflammation  Negative for invasive carcinoma   Electronically signed by Gini Ho MD on 12/7/2016 at 0834         -09/2016: TURBT after intial induction BCG                          Final Diagnosis                           1.     Urinary bladder, biopsy at previous tumor site:                                      A.     Focal high-grade urothelial atypia consistent with residual                           flat urothelial carcinoma in                                           situ.                                       B.     Negative for evidence of invasive malignancy.                                           C.     Changes compatible with previous biopsy site identified                           with mild to focally  moderate chronic inflammation.                                                       2.      Urinary bladder, random bladder biopsies:                                      A.     Flat high-grade urothelial carcinoma in situ.                                      B.     Mild to focally moderate chronic inflammation.                                      C.     Negative for evidence of invasive malignancy.                                                             AJCC stage:  pTis, pNX, pMX.    - 6/2016: TURBT -  Initial                          Final Diagnosis                           1.     Urinary bladder, biopsy of urothelium around tumor:                                      A.     Focal changes of a high-grade non-invasive papillary urothelial                           carcinoma.                                      B.     Urothelial carcinoma in situ.                                                        2.     Urinary bladder, transurethral resection:                                      A.     Non-invasive papillary carcinoma, high-grade.                                       B.     Detrusor muscle present.                                                        Pathologic AJCC classification:  pTa and pTis.          -His erectile dysfunction continues.  He did try sildenafil but has not had any available to him in some time.  He did undergo coronary artery bypass graft surgery a few months ago but does not require nitrates.  He is on Plavix.          The following portions of the patient's history were reviewed and updated as appropriate: allergies, current medications, past family history, past medical history, past social history, past surgical history and problem list.      Review of Systems   Constitutional: Negative for chills and fever.   Gastrointestinal: Negative for abdominal pain, anal bleeding and blood in stool.   Genitourinary: Negative for dysuria and hematuria.         Current Outpatient  Medications:   •  ARIPiprazole (ABILIFY) 5 MG tablet, Take 5 mg by mouth Daily., Disp: , Rfl:   •  aspirin 81 MG EC tablet, Take 81 mg by mouth Daily., Disp: , Rfl:   •  atenolol (TENORMIN) 50 MG tablet, Take 1 tablet by mouth 2 (Two) Times a Day., Disp: 60 tablet, Rfl: 0  •  atorvastatin (LIPITOR) 20 MG tablet, Take 20 mg by mouth Daily., Disp: , Rfl:   •  baclofen (LIORESAL) 10 MG tablet, Take 10 mg by mouth 3 (Three) Times a Day., Disp: , Rfl:   •  busPIRone (BUSPAR) 5 MG tablet, Take 5 mg by mouth., Disp: , Rfl:   •  clopidogrel (PLAVIX) 75 MG tablet, Take 75 mg by mouth Daily., Disp: , Rfl:   •  fenofibrate 160 MG tablet, Take 160 mg by mouth Daily., Disp: , Rfl:   •  FLUoxetine (PROzac) 20 MG capsule, Take 40 mg by mouth Daily., Disp: , Rfl:   •  HYDROcodone-acetaminophen (NORCO)  MG per tablet, Take 1 tablet by mouth Every 6 (Six) Hours As Needed for Moderate Pain ., Disp: , Rfl:   •  lisinopril (PRINIVIL,ZESTRIL) 10 MG tablet, Take 10 mg by mouth Daily., Disp: , Rfl:   •  metFORMIN (GLUCOPHAGE) 500 MG tablet, Take 1 tablet by mouth 2 (Two) Times a Day With Meals., Disp: 60 tablet, Rfl: 1  •  omeprazole (priLOSEC) 40 MG capsule, Take 40 mg by mouth Daily., Disp: , Rfl:   •  tiotropium (SPIRIVA) 18 MCG per inhalation capsule, Place 1 capsule into inhaler and inhale Daily., Disp: , Rfl:   •  traZODone (DESYREL) 50 MG tablet, Take 50 mg by mouth Every Night., Disp: , Rfl:   •  vitamin D (ERGOCALCIFEROL) 1.25 MG (30319 UT) capsule capsule, Take 50,000 Units by mouth 1 (One) Time Per Week., Disp: , Rfl:   •  sildenafil (REVATIO) 20 MG tablet, Take 1-5 tablets 1-2 hours before sexual activity PRN, Disp: 30 tablet, Rfl: 11  •  sildenafil (VIAGRA) 100 MG tablet, Take 1 tablet by mouth As Needed for Erectile Dysfunction (1-4 Hours before activity)., Disp: 10 tablet, Rfl: 11    Past Medical History:   Diagnosis Date   • Anxiety    • Back pain    • Bladder carcinoma (CMS/HCC) 06/2016    High grade CIS & ta low  grade   • H/O hematuria    • History of pneumonia 2011   • Hypertension    • Overweight    • Smoking    • Urinary retention    • Wheezing     r/t smoking       Past Surgical History:   Procedure Laterality Date   • BACK SURGERY      x3   • CYSTECTOMY N/A 11/2/2017    Procedure: CYSTOPROSTATECTOMY WITH BILATERAL PELVIC LYMPHADENECTOMY AND ILEAL CONDUIT URINARY DIVERSON;  Surgeon: Vijay Badillo Jr., MD;  Location: [facility] MAIN OR;  Service:    • CYSTOSCOPY N/A 10/9/2017    Procedure: CYSTOSCOPY AND EXAM UNDER ANESTHESIA;  Surgeon: Vijay Badillo Jr., MD;  Location: [facility] MAIN OR;  Service:    • CYSTOSCOPY BLADDER BIOPSY N/A 11/30/2016    Procedure: CYSTOSCOPY BLADDER BIOPSY fulgeration of 3cm area of bladder;  Surgeon: Brandon Wolfe MD;  Location:  [facility] OR;  Service:    • CYSTOSCOPY RETROGRADE PYELOGRAM Bilateral 7/14/2017    Procedure: CYSTOSCOPY RETROGRADE PYELOGRAM;  Surgeon: Brandon Wolfe MD;  Location:  [facility] OR;  Service:    • TRANSURETHRAL RESECTION OF BLADDER TUMOR N/A 3/31/2017    Procedure: CYSTOSCOPY , BLADDER BIOPSY, AND TRANSURETHRAL RESECTION OF BLADDER TUMOR ;  Surgeon: Brandon Wolfe MD;  Location: [facility] OR;  Service:    • TRANSURETHRAL RESECTION OF BLADDER TUMOR  06/2016    High Grade CIS & Low grade ta disease   • TRANSURETHRAL RESECTION OF BLADDER TUMOR N/A 7/14/2017    Procedure: CYSTOSCOPY TRANSURETHRAL RESECTION OF BLADDER TUMOR & BILATERAL RETROGRADE URETEROPYELOGRAMS;  Surgeon: Brandon Wolfe MD;  Location:  [facility] OR;  Service:        Social History     Socioeconomic History   • Marital status:      Spouse name: Not on file   • Number of children: Not on file   • Years of education: Not on file   • Highest education level: Not on file   Tobacco Use   • Smoking status: Current Every Day Smoker     Packs/day: 1.00     Years: 34.00     Pack years: 34.00     Types: Cigarettes   • Smokeless tobacco: Never Used   Substance and Sexual Activity   • Alcohol use: No   • Drug  "use: No   • Sexual activity: Defer       Family History   Problem Relation Age of Onset   • No Known Problems Father    • No Known Problems Mother    • Malig Hyperthermia Neg Hx          Temp 98 °F (36.7 °C)   Ht 180.3 cm (71\")   Wt 95.3 kg (210 lb)   BMI 29.29 kg/m²       Physical Exam  Constitutional:  They  appear well-developed and well-nourished. There are no obvious deformities. No distress. The vital signs are reviewed  Pulmonary/Chest: Effort normal.   GI: Soft. The patient exhibits no distension and no mass. There is no tenderness. There is no rebound and no guarding. No hernia.   Neurological: Patient is alert and oriented to person, place, and time.   Skin: Skin is warm and dry. Not diaphoretic.   Psychiatric:  normal mood and affect. Not agitated.       Data  Results for orders placed or performed in visit on 11/04/19   Comprehensive Metabolic Panel   Result Value Ref Range    Glucose 117 (H) 65 - 99 mg/dL    BUN 18 6 - 20 mg/dL    Creatinine 1.23 0.76 - 1.27 mg/dL    Sodium 140 136 - 145 mmol/L    Potassium 4.1 3.5 - 5.2 mmol/L    Chloride 102 98 - 107 mmol/L    CO2 29.0 22.0 - 29.0 mmol/L    Calcium 8.9 8.6 - 10.5 mg/dL    Total Protein 7.2 6.0 - 8.5 g/dL    Albumin 4.10 3.50 - 5.20 g/dL    ALT (SGPT) 9 1 - 41 U/L    AST (SGOT) 20 1 - 40 U/L    Alkaline Phosphatase 63 39 - 117 U/L    Total Bilirubin 0.2 0.2 - 1.2 mg/dL    eGFR Non African Amer 62 >60 mL/min/1.73    Globulin 3.1 gm/dL    A/G Ratio 1.3 g/dL    BUN/Creatinine Ratio 14.6 7.0 - 25.0    Anion Gap 9.0 5.0 - 15.0 mmol/L   CBC (No Diff)   Result Value Ref Range    WBC 9.65 3.40 - 10.80 10*3/mm3    RBC 4.36 4.14 - 5.80 10*6/mm3    Hemoglobin 11.2 (L) 13.0 - 17.7 g/dL    Hematocrit 35.7 (L) 37.5 - 51.0 %    MCV 81.9 79.0 - 97.0 fL    MCH 25.7 (L) 26.6 - 33.0 pg    MCHC 31.4 (L) 31.5 - 35.7 g/dL    RDW 15.1 12.3 - 15.4 %    RDW-SD 45.5 37.0 - 54.0 fl    MPV 10.4 6.0 - 12.0 fL    Platelets 205 140 - 450 10*3/mm3     11/1/19 Renal " ultrasound  Indication: Status post cystectomy, rule out hydronephrosis  Comparison: 04/01/2019  Finding:     The right kidney measures 11.7 cm in length.   The left kidney measures 12.0 cm in length.  The kidneys demonstrate normal echogenicity.  No shadowing calculi or hydronephrosis.  Urinary bladder is surgically absent.  Incidentally noted, hepatic steatosis.     IMPRESSION:     Negative for hydronephrosis or renal atrophy.  This report was finalized on 11/01/2019 07:47 by Dr. Javier Ayers MD.    XR CHEST 2 VW- 11/1/2019   HISTORY: History of bladder cancer, evaluate for metastatic disease  COMPARISON: 11/15/2017  FINDINGS:  The heart and mediastinal contours appear normal. There has been prior  median sternotomy. The lungs appear clear without focal consolidation or  effusion. No pneumothorax is appreciated. The pulmonary vasculature  appears grossly normal. There is mild coarsening of interstitial  markings, similar to prior exams. There is question of a small nodule in  the left lung base.     IMPRESSION:  Questionable nodule in the left lung base for which  follow-up nonemergent CT chest is recommended for complete evaluation.  This report was finalized on 11/01/2019 07:40 by Dr. Alfredo Rincon MD.    Assessment and Plan  Diagnoses and all orders for this visit:    Malignant neoplasm of overlapping sites of bladder (CMS/HCC)  -     POC Urinalysis Dipstick, Multipro  -     CT Abdomen Pelvis With Contrast; Future  -     CT chest wo contrast; Future    Erectile dysfunction after radical cystectomy  -     sildenafil (VIAGRA) 100 MG tablet; Take 1 tablet by mouth As Needed for Erectile Dysfunction (1-4 Hours before activity).    Nodule of left lung  -     CT chest wo contrast; Future    - No evidence of recurrent disease.   -Increase sildenafil for his ED.   -Left lung nodule on a CXR. CT was recommended. This is a new issue that needs further f/u. He is asymptomatic.       (Please note that portions of this  note were completed with a voice recognition program.)  Brandon Wolfe MD  11/07/19  1:35 PM

## 2019-11-05 ENCOUNTER — OFFICE VISIT (OUTPATIENT)
Dept: UROLOGY | Facility: CLINIC | Age: 49
End: 2019-11-05

## 2019-11-05 VITALS — BODY MASS INDEX: 29.4 KG/M2 | WEIGHT: 210 LBS | HEIGHT: 71 IN | TEMPERATURE: 98 F

## 2019-11-05 DIAGNOSIS — C67.8 MALIGNANT NEOPLASM OF OVERLAPPING SITES OF BLADDER (HCC): Primary | ICD-10-CM

## 2019-11-05 DIAGNOSIS — R91.1 NODULE OF LEFT LUNG: ICD-10-CM

## 2019-11-05 DIAGNOSIS — N52.32 ERECTILE DYSFUNCTION AFTER RADICAL CYSTECTOMY: ICD-10-CM

## 2019-11-05 LAB
ALBUMIN SERPL-MCNC: 4.1 G/DL (ref 3.5–5.2)
ALBUMIN/GLOB SERPL: 1.3 G/DL
ALP SERPL-CCNC: 63 U/L (ref 39–117)
ALT SERPL W P-5'-P-CCNC: 9 U/L (ref 1–41)
ANION GAP SERPL CALCULATED.3IONS-SCNC: 9 MMOL/L (ref 5–15)
AST SERPL-CCNC: 20 U/L (ref 1–40)
BILIRUB SERPL-MCNC: 0.2 MG/DL (ref 0.2–1.2)
BUN BLD-MCNC: 18 MG/DL (ref 6–20)
BUN/CREAT SERPL: 14.6 (ref 7–25)
CALCIUM SPEC-SCNC: 8.9 MG/DL (ref 8.6–10.5)
CHLORIDE SERPL-SCNC: 102 MMOL/L (ref 98–107)
CO2 SERPL-SCNC: 29 MMOL/L (ref 22–29)
CREAT BLD-MCNC: 1.23 MG/DL (ref 0.76–1.27)
DEPRECATED RDW RBC AUTO: 45.5 FL (ref 37–54)
ERYTHROCYTE [DISTWIDTH] IN BLOOD BY AUTOMATED COUNT: 15.1 % (ref 12.3–15.4)
GFR SERPL CREATININE-BSD FRML MDRD: 62 ML/MIN/1.73
GLOBULIN UR ELPH-MCNC: 3.1 GM/DL
GLUCOSE BLD-MCNC: 117 MG/DL (ref 65–99)
HCT VFR BLD AUTO: 35.7 % (ref 37.5–51)
HGB BLD-MCNC: 11.2 G/DL (ref 13–17.7)
MCH RBC QN AUTO: 25.7 PG (ref 26.6–33)
MCHC RBC AUTO-ENTMCNC: 31.4 G/DL (ref 31.5–35.7)
MCV RBC AUTO: 81.9 FL (ref 79–97)
PLATELET # BLD AUTO: 205 10*3/MM3 (ref 140–450)
PMV BLD AUTO: 10.4 FL (ref 6–12)
POTASSIUM BLD-SCNC: 4.1 MMOL/L (ref 3.5–5.2)
PROT SERPL-MCNC: 7.2 G/DL (ref 6–8.5)
RBC # BLD AUTO: 4.36 10*6/MM3 (ref 4.14–5.8)
SODIUM BLD-SCNC: 140 MMOL/L (ref 136–145)
WBC NRBC COR # BLD: 9.65 10*3/MM3 (ref 3.4–10.8)

## 2019-11-05 PROCEDURE — 99214 OFFICE O/P EST MOD 30 MIN: CPT | Performed by: UROLOGY

## 2019-11-05 RX ORDER — LISINOPRIL 10 MG/1
10 TABLET ORAL DAILY
COMMUNITY

## 2019-11-05 RX ORDER — FENOFIBRATE 160 MG/1
160 TABLET ORAL DAILY
COMMUNITY

## 2019-11-05 RX ORDER — SILDENAFIL 100 MG/1
100 TABLET, FILM COATED ORAL AS NEEDED
Qty: 10 TABLET | Refills: 11 | Status: SHIPPED | OUTPATIENT
Start: 2019-11-05 | End: 2021-01-07

## 2019-11-05 RX ORDER — ATORVASTATIN CALCIUM 20 MG/1
20 TABLET, FILM COATED ORAL DAILY
COMMUNITY

## 2019-11-05 RX ORDER — ERGOCALCIFEROL 1.25 MG/1
50000 CAPSULE ORAL WEEKLY
COMMUNITY

## 2019-11-05 RX ORDER — ARIPIPRAZOLE 5 MG/1
5 TABLET ORAL DAILY
COMMUNITY

## 2019-11-05 RX ORDER — OMEPRAZOLE 40 MG/1
40 CAPSULE, DELAYED RELEASE ORAL DAILY
COMMUNITY

## 2019-11-05 RX ORDER — ASPIRIN 81 MG/1
81 TABLET ORAL DAILY
COMMUNITY

## 2019-11-05 RX ORDER — BACLOFEN 10 MG/1
10 TABLET ORAL 3 TIMES DAILY
COMMUNITY

## 2019-11-05 RX ORDER — TRAZODONE HYDROCHLORIDE 50 MG/1
50 TABLET ORAL NIGHTLY
COMMUNITY

## 2019-11-05 RX ORDER — CLOPIDOGREL BISULFATE 75 MG/1
75 TABLET ORAL DAILY
COMMUNITY

## 2019-11-05 RX ORDER — FLUOXETINE HYDROCHLORIDE 20 MG/1
40 CAPSULE ORAL DAILY
COMMUNITY

## 2019-11-11 ENCOUNTER — TELEPHONE (OUTPATIENT)
Dept: UROLOGY | Facility: CLINIC | Age: 49
End: 2019-11-11

## 2019-11-12 DIAGNOSIS — C67.8 MALIGNANT NEOPLASM OF OVERLAPPING SITES OF BLADDER (HCC): Primary | ICD-10-CM

## 2019-11-16 DIAGNOSIS — E55.9 VITAMIN D DEFICIENCY: ICD-10-CM

## 2019-11-17 RX ORDER — ERGOCALCIFEROL 1.25 MG/1
CAPSULE ORAL
Qty: 4 CAPSULE | Refills: 5 | Status: SHIPPED | OUTPATIENT
Start: 2019-11-17 | End: 2020-01-30

## 2019-11-18 ENCOUNTER — HOSPITAL ENCOUNTER (OUTPATIENT)
Dept: CT IMAGING | Facility: HOSPITAL | Age: 49
Discharge: HOME | End: 2019-11-18
Admitting: UROLOGY

## 2019-11-18 DIAGNOSIS — R91.1 NODULE OF LEFT LUNG: ICD-10-CM

## 2019-11-18 PROCEDURE — 71250 CT THORAX DX C-: CPT

## 2019-12-06 ENCOUNTER — TELEPHONE (OUTPATIENT)
Dept: UROLOGY | Facility: CLINIC | Age: 49
End: 2019-12-06

## 2019-12-06 NOTE — TELEPHONE ENCOUNTER
Refaxed pt ostomy bag supplies to [facility] after wife called stating that they had not received updated order from Dr. Wolfe. Confirmed with wife this was faxed 11/21/2019 but I will refax to them today for refill to be sent out for pt.

## 2019-12-16 ENCOUNTER — HOSPITAL ENCOUNTER (OUTPATIENT)
Dept: MRI IMAGING | Age: 49
Discharge: HOME | End: 2019-12-16
Payer: MEDICAID

## 2019-12-16 DIAGNOSIS — M54.12 CERVICAL RADICULOPATHY: ICD-10-CM

## 2019-12-16 DIAGNOSIS — M48.9 CERVICAL SPINE DISEASE: ICD-10-CM

## 2019-12-16 DIAGNOSIS — R20.2 PARESTHESIAS: ICD-10-CM

## 2019-12-16 PROCEDURE — 72141 MRI NECK SPINE W/O DYE: CPT

## 2019-12-17 DIAGNOSIS — M50.30 BULGING OF CERVICAL INTERVERTEBRAL DISC: Primary | ICD-10-CM

## 2019-12-20 ENCOUNTER — TELEPHONE (OUTPATIENT)
Dept: NEUROSURGERY | Age: 49
End: 2019-12-20

## 2019-12-23 DIAGNOSIS — E78.2 MIXED HYPERLIPIDEMIA: ICD-10-CM

## 2019-12-23 RX ORDER — ATORVASTATIN CALCIUM 20 MG/1
TABLET, FILM COATED ORAL
Qty: 30 TABLET | Refills: 3 | Status: SHIPPED | OUTPATIENT
Start: 2019-12-23 | End: 2020-01-30

## 2019-12-23 RX ORDER — ARIPIPRAZOLE 5 MG/1
5 TABLET ORAL DAILY
Qty: 30 TABLET | Refills: 3 | Status: SHIPPED | OUTPATIENT
Start: 2019-12-23 | End: 2020-03-24

## 2020-01-08 ENCOUNTER — OFFICE VISIT (OUTPATIENT)
Dept: NEUROSURGERY | Age: 50
End: 2020-01-08
Payer: MEDICAID

## 2020-01-08 VITALS
BODY MASS INDEX: 27.44 KG/M2 | HEIGHT: 71 IN | DIASTOLIC BLOOD PRESSURE: 79 MMHG | SYSTOLIC BLOOD PRESSURE: 160 MMHG | WEIGHT: 196 LBS | HEART RATE: 89 BPM | OXYGEN SATURATION: 98 %

## 2020-01-08 PROCEDURE — G8419 CALC BMI OUT NRM PARAM NOF/U: HCPCS | Performed by: NURSE PRACTITIONER

## 2020-01-08 PROCEDURE — 3017F COLORECTAL CA SCREEN DOC REV: CPT | Performed by: NURSE PRACTITIONER

## 2020-01-08 PROCEDURE — 99244 OFF/OP CNSLTJ NEW/EST MOD 40: CPT | Performed by: NURSE PRACTITIONER

## 2020-01-08 PROCEDURE — 4004F PT TOBACCO SCREEN RCVD TLK: CPT | Performed by: NURSE PRACTITIONER

## 2020-01-08 PROCEDURE — G8484 FLU IMMUNIZE NO ADMIN: HCPCS | Performed by: NURSE PRACTITIONER

## 2020-01-08 PROCEDURE — G8427 DOCREV CUR MEDS BY ELIG CLIN: HCPCS | Performed by: NURSE PRACTITIONER

## 2020-01-08 RX ORDER — SILDENAFIL 100 MG/1
TABLET, FILM COATED ORAL
Refills: 11 | COMMUNITY
Start: 2019-11-16 | End: 2020-01-30 | Stop reason: SDUPTHER

## 2020-01-08 RX ORDER — SILDENAFIL CITRATE 20 MG/1
TABLET ORAL
COMMUNITY
Start: 2019-04-04

## 2020-01-08 RX ORDER — VENLAFAXINE HYDROCHLORIDE 150 MG/1
150 CAPSULE, EXTENDED RELEASE ORAL DAILY
Refills: 5 | COMMUNITY
Start: 2019-10-15 | End: 2020-03-06

## 2020-01-08 ASSESSMENT — ENCOUNTER SYMPTOMS
EYES NEGATIVE: 1
BACK PAIN: 1
RESPIRATORY NEGATIVE: 1
GASTROINTESTINAL NEGATIVE: 1

## 2020-01-08 NOTE — PROGRESS NOTES
Review of Systems   Constitutional: Negative. HENT: Negative. Eyes: Negative. Respiratory: Negative. Cardiovascular: Negative. Gastrointestinal: Negative. Genitourinary: Negative. Musculoskeletal: Positive for back pain, joint pain, myalgias and neck pain. Skin: Negative. Neurological: Positive for dizziness and tingling. Endo/Heme/Allergies: Negative. Psychiatric/Behavioral: Negative.

## 2020-01-08 NOTE — PROGRESS NOTES
Take 1 capsule by mouth daily 30 capsule 1    blood glucose monitor strips Test 4 times a day & as needed for symptoms of irregular blood glucose. 100 strip 3    glucose monitoring kit (FREESTYLE) monitoring kit Please provide glucometer that INS will cover for DM type 2 1 kit 0    Lancets 30G MISC 1 each by Does not apply route daily 4 times daily 100 each 3    aspirin 81 MG EC tablet Take 1 tablet by mouth daily 30 tablet 3    clopidogrel (PLAVIX) 75 MG tablet Take 1 tablet by mouth daily 30 tablet 1    HYDROcodone-acetaminophen (NORCO)  MG per tablet Take 2 tablets by mouth every 4 hours as needed for Pain.  atenolol (TENORMIN) 50 MG tablet TAKE ONE TABLET EVERY DAY (Patient taking differently: TAKE ONE TABLET EVERY DAY AT LUNCH) 30 tablet 5     No current facility-administered medications for this visit. Allergies:  Latex and Demerol hcl [meperidine]    Social History:   Social History     Tobacco Use   Smoking Status Current Every Day Smoker    Packs/day: 1.00    Years: 30.00    Pack years: 30.00   Smokeless Tobacco Never Used     Social History     Substance and Sexual Activity   Alcohol Use Yes    Comment: Occ         Family History:   Family History   Problem Relation Age of Onset    Cancer Mother     Vision Loss Mother     Breast Cancer Mother     Coronary Art Dis Father     Obesity Father     Kidney Disease Father     High Blood Pressure Father     High Cholesterol Father     Hypercalcemia Sister     Obesity Brother     High Blood Pressure Brother        REVIEW OF SYSTEMS:  Constitutional: Negative. HENT: Negative. Eyes: Negative. Respiratory: Negative. Cardiovascular: Negative. Gastrointestinal: Negative. Genitourinary: Negative. Musculoskeletal: Positive for back pain, joint pain, myalgias and neck pain. Skin: Negative. Neurological: Positive for dizziness and tingling. Endo/Heme/Allergies: Negative. Psychiatric/Behavioral: Negative. PHYSICAL EXAM:  Vitals:    01/08/20 1019   BP: (!) 160/79   Pulse:    SpO2:      Constitutional: appears well-developed and well-nourished. Eyes - conjunctiva normal.  Pupils react to light  Ear, nose, throat - hearing intact to finger rub, No scars, masses, or lesions over external nose or ears, no atrophy oftongue  Neck- symmetric, no masses noted, no jugular vein distension  Respiration- chest wall appears symmetric, good expansion, normal effort without use of accessory muscles  Musculoskeletal - no significant wasting of muscles noted, no bony deformities, gait no gross ataxia  Extremities- no clubbing, cyanosis oredema  Skin - warm, dry, and intact. No rash, erythema, or pallor. Psychiatric - mood, affect, and behavior appear normal.     Neurologic Examination  Awake, Alert and oriented x 4  Normal speech pattern, following commands    Motor:  RIGHT: hand grasp 5/5    finger extension 5/5    bicep 4-/5    triceps 4-/5    deltoid 5/5    LEFT:   hand grasp 5/5    finger extension 5/5    bicep 5/5    triceps 5/5    deltoid 5/5    Decrease to pinprick sensation bilateral hands  Reflexes are 2+ and symmetric  No clonus or Hoffmans sign  No myofacial tenderness to palpation  Normal Gait pattern          DATA and IMAGING:    Nursing/pcp notes, imaging, labs, and vitals reviewed.      PT,OT and/or speech notes reviewed    Lab Results   Component Value Date    WBC 19.9 (H) 05/14/2019    HGB 10.5 (L) 05/14/2019    HCT 34.2 (L) 05/14/2019    MCV 96.6 (H) 05/14/2019     (H) 05/14/2019     Lab Results   Component Value Date     05/14/2019    K 4.6 05/14/2019     05/14/2019    CO2 24 05/14/2019    BUN 19 05/14/2019    CREATININE 1.0 05/14/2019    GLUCOSE 139 (H) 05/14/2019    CALCIUM 9.7 05/14/2019    PROT 7.9 05/14/2019    LABALBU 4.1 05/14/2019    BILITOT 0.6 05/14/2019    ALKPHOS 262 (H) 05/14/2019    AST 16 05/14/2019    ALT 31 05/14/2019    LABGLOM >60 05/14/2019     Lab Results   Component Value Date    INR 1.34 (H) 05/01/2019    INR 1.02 04/30/2019    PROTIME 15.9 (H) 05/01/2019    PROTIME 12.8 04/30/2019     Narrative   Examination. MRI CERVICAL SPINE WO CONTRAST 12/16/2019 6:13 AM   History: Chronic neck pain and right arm pain, numbness and tingling. Multiplanar, multisequence MR imaging of the brain is performed   without intravenous contrast enhancement. There is no previous study for comparison. The correlation made with   the previous radiographs of the cervical spine dated 9/11/2019. There is loss of cervical lordosis. The alignment is normal. The   vertebral body heights are normal.   There is loss of height and signal of the intervertebral disc of the   entire cervical spine, most pronounced at C4-5 and C6-7. The bone marrow signal of the vertebral bodies and the posterior   elements appear normal.   The atlantoaxial articulation is normal in intact. The ligaments are   normal.   C2, space C2-3. No disc bulging or herniation. There is bilateral   facet arthropathy. The neural foramina spinal canal are patent. C3, space C3-4. There are posterior osteophytes and moderate diffuse   disc bulging, asymmetrically more towards left. There is mild   compression on the thecal sac. There is bilateral facet arthropathy. There is narrowing of the neural foramina bilaterally. No significant   spinal stenosis. C4, space C4-5. There are large posterior osteophytes and moderate   diffuse disc bulging. There is compression and displacement of the   thecal sac. There is bilateral facet arthropathy. There is bilateral   neural foraminal stenosis and moderate spinal stenosis. C5, space C5-6. There are prominent posterior osteophytes. There is   moderate diffuse disc bulging with compression on the thecal sac. There is bilateral facet arthropathy. There is bilateral neural   foraminal stenosis and moderate spinal stenosis. C6, space C6-7.  There is moderate posterior osteophytes and a large stenosis, his physical exam does not correlate with a myelopathy, although some of his symptoms correlate. I feel that that patient should definitely be evaluated by Dr. Nabil Qiu. He has attempted most non-operative treatments with little help.    -Will see Dr. Nabil Qiu next Thursday     He will definitely need cardiac clearance and will need to come off of his ASA and Plavix. Note: A total of >50% (23 minutes) of 45 minutes was spent discussing the pathophysiology and treatment and/or coordination of care of the above diagnoses.       BRYAN Santos

## 2020-01-16 ENCOUNTER — PREP FOR PROCEDURE (OUTPATIENT)
Dept: NEUROSURGERY | Age: 50
End: 2020-01-16

## 2020-01-16 ENCOUNTER — OFFICE VISIT (OUTPATIENT)
Dept: NEUROSURGERY | Age: 50
End: 2020-01-16
Payer: MEDICAID

## 2020-01-16 ENCOUNTER — TELEPHONE (OUTPATIENT)
Dept: CARDIOLOGY | Age: 50
End: 2020-01-16

## 2020-01-16 VITALS
WEIGHT: 194 LBS | DIASTOLIC BLOOD PRESSURE: 71 MMHG | SYSTOLIC BLOOD PRESSURE: 135 MMHG | HEART RATE: 73 BPM | HEIGHT: 71 IN | BODY MASS INDEX: 27.16 KG/M2 | OXYGEN SATURATION: 98 %

## 2020-01-16 PROCEDURE — 3017F COLORECTAL CA SCREEN DOC REV: CPT | Performed by: NEUROLOGICAL SURGERY

## 2020-01-16 PROCEDURE — G8419 CALC BMI OUT NRM PARAM NOF/U: HCPCS | Performed by: NEUROLOGICAL SURGERY

## 2020-01-16 PROCEDURE — G8427 DOCREV CUR MEDS BY ELIG CLIN: HCPCS | Performed by: NEUROLOGICAL SURGERY

## 2020-01-16 PROCEDURE — 99215 OFFICE O/P EST HI 40 MIN: CPT | Performed by: NEUROLOGICAL SURGERY

## 2020-01-16 PROCEDURE — G8484 FLU IMMUNIZE NO ADMIN: HCPCS | Performed by: NEUROLOGICAL SURGERY

## 2020-01-16 PROCEDURE — 4004F PT TOBACCO SCREEN RCVD TLK: CPT | Performed by: NEUROLOGICAL SURGERY

## 2020-01-16 RX ORDER — SODIUM CHLORIDE 0.9 % (FLUSH) 0.9 %
10 SYRINGE (ML) INJECTION PRN
Status: CANCELLED | OUTPATIENT
Start: 2020-01-16

## 2020-01-16 RX ORDER — SODIUM CHLORIDE 0.9 % (FLUSH) 0.9 %
10 SYRINGE (ML) INJECTION EVERY 12 HOURS SCHEDULED
Status: CANCELLED | OUTPATIENT
Start: 2020-01-16

## 2020-01-16 ASSESSMENT — ENCOUNTER SYMPTOMS
RESPIRATORY NEGATIVE: 1
GASTROINTESTINAL NEGATIVE: 1
BACK PAIN: 1
EYES NEGATIVE: 1

## 2020-01-16 NOTE — TELEPHONE ENCOUNTER
Patient is ok to have surgery. Dr. Santhosh Wong says he can hold his plavix and aspirin 5-7 days prior to surgery. The clearance request is under letters. If you want to type up the letter for me I will have MD sign it.

## 2020-01-16 NOTE — PROGRESS NOTES
[facility]  Office Visit      Chief Complaint   Patient presents with    Follow-up     Discuss tx plan     1/16/2020: Mr. Hope Lima returns to clinic today to discuss his treatment plan. He continues to admit to mostly right arm pain and weakness, left arm numbness, and neck pain. He does have some fine motor impairment and often drops objects at home. He states that over the last 6 months he has worsened and feels it is time this be addressed. HISTORY OF PRESENT ILLNESS:    Ansley Medel is a 44 y.o. male who presents with neck pain bilateral arm pain. He has had chronic neck pain for about 8 years, however, it has been worsening. The pain does radiate into the entire left arm. The right arm has started to hurt into the bicep. He will sometimes have to sleep with his right up raised up. His pain is mostly located in the left arm. The patient complains of numbness of the entire left hand. He does have paraesthesias of the left neck, entire arm. He does not have numbness in the fingertips, trouble using hands to perform fine motor tasks. He does drop objects often, he states that he can barely lift a jug of tea with the left arm. Of note, he has had three lumbar surgeries per Dr. Era Berrios, and the last surgery per Dr. Trell Pereira in 2012. States he has had a fusion with no hardware. He states that he did improve after his surgeries. The patient has underwent a non-operative treatment course that has included:  NSAIDs  Muscle Relaxers (baclofen)  Opiates (Maksim Cai - Dr. Theresa Salazar)  Physical Therapy with core strengthening  Epidural Steroid Injections (one time, did not have good experience)  Chiropractic Manipulation (no help after leaving)    Of note he does use tobacco and does take blood thinning medications (ASA and Plavix) his cardiologist is Dr. Grace Herman.                 Past Medical History:   Diagnosis Date    CAD (coronary artery disease)     Cancer ([facility] [address]     Bladder  lisinopril (PRINIVIL;ZESTRIL) 10 MG tablet Take 1 tablet by mouth daily 30 tablet 1    fluticasone-salmeterol (ADVAIR DISKUS) 100-50 MCG/DOSE diskus inhaler Inhale 1 puff into the lungs every 12 hours 60 each 3    FLUoxetine (PROZAC) 40 MG capsule Take 1 capsule by mouth daily 30 capsule 1    blood glucose monitor strips Test 4 times a day & as needed for symptoms of irregular blood glucose. 100 strip 3    glucose monitoring kit (FREESTYLE) monitoring kit Please provide glucometer that INS will cover for DM type 2 1 kit 0    Lancets 30G MISC 1 each by Does not apply route daily 4 times daily 100 each 3    aspirin 81 MG EC tablet Take 1 tablet by mouth daily 30 tablet 3    clopidogrel (PLAVIX) 75 MG tablet Take 1 tablet by mouth daily 30 tablet 1    HYDROcodone-acetaminophen (NORCO)  MG per tablet Take 2 tablets by mouth every 4 hours as needed for Pain.  atenolol (TENORMIN) 50 MG tablet TAKE ONE TABLET EVERY DAY (Patient taking differently: TAKE ONE TABLET EVERY DAY AT LUNCH) 30 tablet 5     No current facility-administered medications for this visit. Allergies:  Latex and Demerol hcl [meperidine]    Social History:   Social History     Tobacco Use   Smoking Status Current Every Day Smoker    Packs/day: 1.00    Years: 30.00    Pack years: 30.00   Smokeless Tobacco Never Used     Social History     Substance and Sexual Activity   Alcohol Use Yes    Comment: Occ         Family History:   Family History   Problem Relation Age of Onset    Cancer Mother     Vision Loss Mother     Breast Cancer Mother     Coronary Art Dis Father     Obesity Father     Kidney Disease Father     High Blood Pressure Father     High Cholesterol Father     Hypercalcemia Sister     Obesity Brother     High Blood Pressure Brother        REVIEW OF SYSTEMS:  Constitutional: Negative. HENT: Negative. Eyes: Negative. Respiratory: Negative. Cardiovascular: Negative.     Gastrointestinal: Negative. Genitourinary: Negative. Musculoskeletal: Positive for back pain, joint pain, myalgias and neck pain. Skin: Negative. Neurological: Positive for dizziness and tingling. Endo/Heme/Allergies: Negative. Psychiatric/Behavioral: Negative. PHYSICAL EXAM:  Vitals:    01/16/20 0835   BP: 135/71   Pulse: 73   SpO2: 98%     Constitutional: appears well-developed and well-nourished. Eyes - conjunctiva normal.  Pupils react to light  Ear, nose, throat - hearing intact to finger rub, No scars, masses, or lesions over external nose or ears, no atrophy oftongue  Neck- symmetric, no masses noted, no jugular vein distension  Respiration- chest wall appears symmetric, good expansion, normal effort without use of accessory muscles  Musculoskeletal - no significant wasting of muscles noted, no bony deformities, gait no gross ataxia  Extremities- no clubbing, cyanosis oredema  Skin - warm, dry, and intact. No rash, erythema, or pallor. Psychiatric - mood, affect, and behavior appear normal.     Neurologic Examination  Awake, Alert and oriented x 4  Normal speech pattern, following commands    Motor:  RIGHT: hand grasp 5/5    finger extension 5/5    bicep 4+/5    triceps 4+/5    deltoid 5/5    LEFT:   hand grasp 5/5    finger extension 5/5    bicep 5/5    triceps 5/5    deltoid 5/5    Decrease to pinprick sensation bilateral hands  Reflexes are 2+ and symmetric  No clonus or Hoffmans sign  No myofacial tenderness to palpation  Normal Gait pattern          DATA and IMAGING:    Nursing/pcp notes, imaging, labs, and vitals reviewed.      PT,OT and/or speech notes reviewed    Lab Results   Component Value Date    WBC 19.9 (H) 05/14/2019    HGB 10.5 (L) 05/14/2019    HCT 34.2 (L) 05/14/2019    MCV 96.6 (H) 05/14/2019     (H) 05/14/2019     Lab Results   Component Value Date     05/14/2019    K 4.6 05/14/2019     05/14/2019    CO2 24 05/14/2019    BUN 19 05/14/2019    CREATININE 1.0 05/14/2019    GLUCOSE 139 (H) 05/14/2019    CALCIUM 9.7 05/14/2019    PROT 7.9 05/14/2019    LABALBU 4.1 05/14/2019    BILITOT 0.6 05/14/2019    ALKPHOS 262 (H) 05/14/2019    AST 16 05/14/2019    ALT 31 05/14/2019    LABGLOM >60 05/14/2019     Lab Results   Component Value Date    INR 1.34 (H) 05/01/2019    INR 1.02 04/30/2019    PROTIME 15.9 (H) 05/01/2019    PROTIME 12.8 04/30/2019     Narrative   Examination. MRI CERVICAL SPINE WO CONTRAST 12/16/2019 6:13 AM   History: Chronic neck pain and right arm pain, numbness and tingling. Multiplanar, multisequence MR imaging of the brain is performed   without intravenous contrast enhancement. There is no previous study for comparison. The correlation made with   the previous radiographs of the cervical spine dated 9/11/2019. There is loss of cervical lordosis. The alignment is normal. The   vertebral body heights are normal.   There is loss of height and signal of the intervertebral disc of the   entire cervical spine, most pronounced at C4-5 and C6-7. The bone marrow signal of the vertebral bodies and the posterior   elements appear normal.   The atlantoaxial articulation is normal in intact. The ligaments are   normal.   C2, space C2-3. No disc bulging or herniation. There is bilateral   facet arthropathy. The neural foramina spinal canal are patent. C3, space C3-4. There are posterior osteophytes and moderate diffuse   disc bulging, asymmetrically more towards left. There is mild   compression on the thecal sac. There is bilateral facet arthropathy. There is narrowing of the neural foramina bilaterally. No significant   spinal stenosis. C4, space C4-5. There are large posterior osteophytes and moderate   diffuse disc bulging. There is compression and displacement of the   thecal sac. There is bilateral facet arthropathy. There is bilateral   neural foraminal stenosis and moderate spinal stenosis. C5, space C5-6.  There are prominent posterior osteophytes. There is   moderate diffuse disc bulging with compression on the thecal sac. There is bilateral facet arthropathy. There is bilateral neural   foraminal stenosis and moderate spinal stenosis. C6, space C6-7. There is moderate posterior osteophytes and a large   diffuse/broad-based disc bulging with compression and displacement of   the thecal sac. There is bilateral facet arthropathy. There is   bilateral neural foraminal stenosis and moderately severe spinal   stenosis. There is moderate narrowing of the thecal sac/cervical spinal cord   opposite C4-5, C5-6 and C6-7 due to prominent disc bulging and   osteophytes. No abnormal signal. The remaining cervical spinal cord. Unremarkable. The visualized edi, the medulla oblongata and cerebellum appear   normal.       Impression   The severe cervical spondylosis. Multilevel disc osteophyte complexes and facetal arthropathy and   resultant neural foramina spinal canal stenosis as detailed above. Signed by Dr Froilan Mohan on 12/16/2019 9:06 AM   I have personally reviewed these images and my interpretation is:  DDD throughout  C3-4 moderate canal stenosis, the canal measures 7mm, severe left foraminal stenosis  C4-5 severe canal stenosis, the canal measures 5mm, severe left and moderate right foraminal stenosis  C5-6 severe canal stenosis, the canal measures 4mm, severe bilateral foraminal stenosis  C6-7 severe canal stenosis, the canal measures 5mm, severe bilateral foraminal stenosis       ASSESSMENT:    Rita Prado is a 44 y.o. male with complaints of neck pain, bilateral arm pain and right arm weakness. ICD-10-CM    1. Cervical stenosis of spinal canal M48.02 APTT     CBC     Comprehensive Metabolic Panel     EKG 12 Lead     Protime-INR     Type and Screen     Urinalysis Reflex to Culture     XR CHEST STANDARD (2 VW)   2.  Foraminal stenosis of cervical region M48.02 APTT     CBC     Comprehensive Metabolic Panel     EKG 12 Lead Protime-INR     Type and Screen     Urinalysis Reflex to Culture     XR CHEST STANDARD (2 VW)   3. Ossification of posterior longitudinal ligament ([address] M48.8X9    4. Cervical myelopathy with cervical radiculopathy M47.12    5. Bilateral arm pain M79.601     M79.602    6. Right arm weakness R29.898    7. Numbness of left hand R20.0    8. DDD (degenerative disc disease), cervical M50.30    9. Fine motor impairment R29.818     R29.898    10. Neck pain M54.2        PLAN:  We have again discussed and reviewed the results of the MRI cervical spine with Mr. Larson at length. We explained that he does have severe canal stenosis with several levels of severe foraminal narrowing as well. We explained that his pain syndrome does correlate well with the imaging and that we could likely eliminate the majority of his arm pain with a surgical intervention. We discussed both operative versus non-operative treatments. He would need a C5, C6 corpectomy with placement of expandable cage with anterior cervical plate K2-K2, followed by 2nd phase with placement of lateral mass screws C3-C7    He will definitely need cardiac clearance and will need to come off of his ASA and Plavix. Note: A total of >50% (21 minutes) of 40 minutes was spent discussing the pathophysiology and treatment and/or coordination of care of the above diagnoses.       Bernice Jefferson DO

## 2020-01-30 ENCOUNTER — HOSPITAL ENCOUNTER (OUTPATIENT)
Dept: GENERAL RADIOLOGY | Age: 50
Discharge: HOME | End: 2020-01-30
Payer: MEDICAID

## 2020-01-30 ENCOUNTER — HOSPITAL ENCOUNTER (OUTPATIENT)
Dept: PREADMISSION TESTING | Age: 50
Discharge: HOME | End: 2020-02-03
Payer: MEDICAID

## 2020-01-30 VITALS — WEIGHT: 190 LBS | BODY MASS INDEX: 26.6 KG/M2 | HEIGHT: 71 IN

## 2020-01-30 LAB
ABO/RH: NORMAL
ALBUMIN SERPL-MCNC: 4 G/DL (ref 3.5–5.2)
ALP BLD-CCNC: 78 U/L (ref 40–130)
ALT SERPL-CCNC: 11 U/L (ref 5–41)
ANION GAP SERPL CALCULATED.3IONS-SCNC: 13 MMOL/L (ref 7–19)
ANTIBODY SCREEN: NORMAL
APTT: 26.6 SEC (ref 26–36.2)
AST SERPL-CCNC: 17 U/L (ref 5–40)
BACTERIA: ABNORMAL /HPF
BILIRUB SERPL-MCNC: <0.2 MG/DL (ref 0.2–1.2)
BILIRUBIN URINE: NEGATIVE
BLOOD, URINE: ABNORMAL
BUN BLDV-MCNC: 21 MG/DL (ref 6–20)
CALCIUM SERPL-MCNC: 9.3 MG/DL (ref 8.6–10)
CHLORIDE BLD-SCNC: 100 MMOL/L (ref 98–111)
CLARITY: CLEAR
CO2: 25 MMOL/L (ref 22–29)
COLOR: YELLOW
CREAT SERPL-MCNC: 1.1 MG/DL (ref 0.5–1.2)
EPITHELIAL CELLS, UA: 3 /HPF (ref 0–5)
GFR NON-AFRICAN AMERICAN: >60
GLUCOSE BLD-MCNC: 89 MG/DL (ref 74–109)
GLUCOSE URINE: NEGATIVE MG/DL
HCT VFR BLD CALC: 38.9 % (ref 42–52)
HEMOGLOBIN: 11.6 G/DL (ref 14–18)
HYALINE CASTS: 2 /HPF (ref 0–8)
INR BLD: 1.03 (ref 0.88–1.18)
KETONES, URINE: NEGATIVE MG/DL
LEUKOCYTE ESTERASE, URINE: ABNORMAL
MCH RBC QN AUTO: 24.9 PG (ref 27–31)
MCHC RBC AUTO-ENTMCNC: 29.8 G/DL (ref 33–37)
MCV RBC AUTO: 83.7 FL (ref 80–94)
NITRITE, URINE: POSITIVE
PDW BLD-RTO: 15.2 % (ref 11.5–14.5)
PH UA: 7 (ref 5–8)
PLATELET # BLD: 282 K/UL (ref 130–400)
PMV BLD AUTO: 9.9 FL (ref 9.4–12.4)
POTASSIUM SERPL-SCNC: 4.8 MMOL/L (ref 3.5–5)
PROTEIN UA: NEGATIVE MG/DL
PROTHROMBIN TIME: 12.9 SEC (ref 12–14.6)
RBC # BLD: 4.65 M/UL (ref 4.7–6.1)
RBC UA: 1 /HPF (ref 0–4)
SODIUM BLD-SCNC: 138 MMOL/L (ref 136–145)
SPECIFIC GRAVITY UA: 1.01 (ref 1–1.03)
TOTAL PROTEIN: 7.1 G/DL (ref 6.6–8.7)
URINE REFLEX TO CULTURE: YES
UROBILINOGEN, URINE: 0.2 E.U./DL
WBC # BLD: 8.3 K/UL (ref 4.8–10.8)
WBC UA: 9 /HPF (ref 0–5)

## 2020-01-30 PROCEDURE — 87186 SC STD MICRODIL/AGAR DIL: CPT

## 2020-01-30 PROCEDURE — 71046 X-RAY EXAM CHEST 2 VIEWS: CPT

## 2020-01-30 PROCEDURE — 93005 ELECTROCARDIOGRAM TRACING: CPT

## 2020-01-30 PROCEDURE — 81001 URINALYSIS AUTO W/SCOPE: CPT

## 2020-01-30 PROCEDURE — 85730 THROMBOPLASTIN TIME PARTIAL: CPT

## 2020-01-30 PROCEDURE — 87086 URINE CULTURE/COLONY COUNT: CPT

## 2020-01-30 PROCEDURE — 86850 RBC ANTIBODY SCREEN: CPT

## 2020-01-30 PROCEDURE — 87077 CULTURE AEROBIC IDENTIFY: CPT

## 2020-01-30 PROCEDURE — 86900 BLOOD TYPING SEROLOGIC ABO: CPT

## 2020-01-30 PROCEDURE — 85610 PROTHROMBIN TIME: CPT

## 2020-01-30 PROCEDURE — 86901 BLOOD TYPING SEROLOGIC RH(D): CPT

## 2020-01-30 PROCEDURE — 85027 COMPLETE CBC AUTOMATED: CPT

## 2020-01-30 PROCEDURE — 80053 COMPREHEN METABOLIC PANEL: CPT

## 2020-01-30 RX ORDER — CHOLECALCIFEROL (VITAMIN D3) 1250 MCG
1 CAPSULE ORAL WEEKLY
COMMUNITY
End: 2020-12-08 | Stop reason: SDUPTHER

## 2020-01-30 RX ORDER — ATORVASTATIN CALCIUM 20 MG/1
20 TABLET, FILM COATED ORAL DAILY
COMMUNITY
End: 2020-04-13

## 2020-01-30 RX ORDER — BACLOFEN 10 MG/1
10 TABLET ORAL 3 TIMES DAILY PRN
COMMUNITY
End: 2020-02-26

## 2020-01-31 LAB
EKG P AXIS: 49 DEGREES
EKG P-R INTERVAL: 116 MS
EKG Q-T INTERVAL: 378 MS
EKG QRS DURATION: 94 MS
EKG QTC CALCULATION (BAZETT): 399 MS
EKG T AXIS: 59 DEGREES

## 2020-02-03 ENCOUNTER — TELEPHONE (OUTPATIENT)
Dept: NEUROSURGERY | Age: 50
End: 2020-02-03

## 2020-02-03 LAB
ORGANISM: ABNORMAL
URINE CULTURE, ROUTINE: ABNORMAL

## 2020-02-03 RX ORDER — CIPROFLOXACIN 500 MG/1
500 TABLET, FILM COATED ORAL 2 TIMES DAILY
Qty: 20 TABLET | Refills: 0 | Status: SHIPPED | OUTPATIENT
Start: 2020-02-03 | End: 2020-02-13

## 2020-02-05 ENCOUNTER — OFFICE VISIT (OUTPATIENT)
Dept: PRIMARY CARE CLINIC | Age: 50
End: 2020-02-05
Payer: MEDICAID

## 2020-02-05 VITALS
RESPIRATION RATE: 18 BRPM | OXYGEN SATURATION: 99 % | HEART RATE: 86 BPM | TEMPERATURE: 97.6 F | HEIGHT: 71 IN | DIASTOLIC BLOOD PRESSURE: 84 MMHG | BODY MASS INDEX: 27.58 KG/M2 | SYSTOLIC BLOOD PRESSURE: 128 MMHG | WEIGHT: 197 LBS

## 2020-02-05 PROCEDURE — G8484 FLU IMMUNIZE NO ADMIN: HCPCS | Performed by: NURSE PRACTITIONER

## 2020-02-05 PROCEDURE — 4004F PT TOBACCO SCREEN RCVD TLK: CPT | Performed by: NURSE PRACTITIONER

## 2020-02-05 PROCEDURE — 3046F HEMOGLOBIN A1C LEVEL >9.0%: CPT | Performed by: NURSE PRACTITIONER

## 2020-02-05 PROCEDURE — 3017F COLORECTAL CA SCREEN DOC REV: CPT | Performed by: NURSE PRACTITIONER

## 2020-02-05 PROCEDURE — G8419 CALC BMI OUT NRM PARAM NOF/U: HCPCS | Performed by: NURSE PRACTITIONER

## 2020-02-05 PROCEDURE — 2022F DILAT RTA XM EVC RTNOPTHY: CPT | Performed by: NURSE PRACTITIONER

## 2020-02-05 PROCEDURE — 99214 OFFICE O/P EST MOD 30 MIN: CPT | Performed by: NURSE PRACTITIONER

## 2020-02-05 PROCEDURE — G8427 DOCREV CUR MEDS BY ELIG CLIN: HCPCS | Performed by: NURSE PRACTITIONER

## 2020-02-05 PROCEDURE — 69210 REMOVE IMPACTED EAR WAX UNI: CPT | Performed by: NURSE PRACTITIONER

## 2020-02-05 ASSESSMENT — ENCOUNTER SYMPTOMS
BACK PAIN: 1
GASTROINTESTINAL NEGATIVE: 1
EYES NEGATIVE: 1
RESPIRATORY NEGATIVE: 1

## 2020-02-05 NOTE — PROGRESS NOTES
MG per tablet Take 2 tablets by mouth every 4 hours as needed for Pain.  atenolol (TENORMIN) 50 MG tablet TAKE ONE TABLET EVERY DAY (Patient taking differently: Take 50 mg by mouth daily TAKE ONE TABLET EVERY DAY AT LUNCH) 30 tablet 5     No current facility-administered medications for this visit. Allergies   Allergen Reactions    Latex Other (See Comments)     BOILS AND BLISTERS- ALLERGY CALLED TO [facility] SURGERY SCHEDULING.  Demerol Hcl [Meperidine] Hives     And n/v       Family History   Problem Relation Age of Onset    Cancer Mother     Vision Loss Mother     Breast Cancer Mother     Coronary Art Dis Father     Obesity Father     Kidney Disease Father     High Blood Pressure Father     High Cholesterol Father     Hypercalcemia Sister     Obesity Brother     High Blood Pressure Brother            Subjective:      Review of Systems   Constitutional: Negative. HENT: Negative. Eyes: Negative. Respiratory: Negative. Cardiovascular: Negative. Gastrointestinal: Negative. Endocrine: Negative. Genitourinary: Negative. Musculoskeletal: Positive for back pain. Skin: Negative. Neurological: Negative. Hematological: Negative. Psychiatric/Behavioral: Negative. Objective:     Physical Exam  Vitals signs and nursing note reviewed. Constitutional:       Appearance: Normal appearance. He is well-developed. HENT:      Head: Normocephalic and atraumatic. Right Ear: Hearing and external ear normal. There is impacted cerumen. Left Ear: Hearing and external ear normal.      Nose: Nose normal.      Mouth/Throat:      Pharynx: Uvula midline. Eyes:      General: Lids are normal.      Conjunctiva/sclera: Conjunctivae normal.      Pupils: Pupils are equal, round, and reactive to light. Neck:      Musculoskeletal: Normal range of motion and neck supple. Thyroid: No thyroid mass or thyromegaly.       Trachea: Trachea normal.   Cardiovascular: answered. Patient voices understanding and agrees to plans along with risks and benefits of plan. Patient is instructed to continue prior meds, diet, and exercise plans as instructed. Patient agrees to follow up as instructed and sooner if needed. Patient agrees to go to ER if condition becomes emergent. EMR Dragon/transcription disclaimer: Some of this encounter note is an electronic transcription/translation of spoken language to printed text. The electronic translation of spoken language may permit erroneous, or at times, nonsensical words or phrases to be inadvertently transcribed.  Although I have reviewed the note for such errors, some may still exist.    Electronically signed by BRYAN Simons on 2/9/2020 at 7:38 PM

## 2020-02-18 ENCOUNTER — TELEPHONE (OUTPATIENT)
Dept: NEUROSURGERY | Age: 50
End: 2020-02-18

## 2020-02-18 RX ORDER — TRAZODONE HYDROCHLORIDE 50 MG/1
100 TABLET ORAL NIGHTLY PRN
Qty: 60 TABLET | Refills: 1 | Status: SHIPPED | OUTPATIENT
Start: 2020-02-18 | End: 2020-06-22

## 2020-02-18 NOTE — TELEPHONE ENCOUNTER
Spoke to patient to confirm surgery date/time.       Surgery date: 2/19/2020  Arrival at OP registration: 6:00AM  Surgery start: 7:30AM

## 2020-02-19 ENCOUNTER — APPOINTMENT (OUTPATIENT)
Dept: GENERAL RADIOLOGY | Age: 50
DRG: 455 | End: 2020-02-19
Attending: NEUROLOGICAL SURGERY
Payer: MEDICAID

## 2020-02-19 ENCOUNTER — ANESTHESIA (OUTPATIENT)
Dept: OPERATING ROOM | Age: 50
DRG: 455 | End: 2020-02-19
Payer: MEDICAID

## 2020-02-19 ENCOUNTER — ANESTHESIA EVENT (OUTPATIENT)
Dept: OPERATING ROOM | Age: 50
DRG: 455 | End: 2020-02-19
Payer: MEDICAID

## 2020-02-19 ENCOUNTER — HOSPITAL ENCOUNTER (INPATIENT)
Age: 50
LOS: 2 days | Discharge: HOME | DRG: 455 | End: 2020-02-21
Attending: NEUROLOGICAL SURGERY | Admitting: NEUROLOGICAL SURGERY
Payer: MEDICAID

## 2020-02-19 VITALS
TEMPERATURE: 99 F | OXYGEN SATURATION: 99 % | RESPIRATION RATE: 2 BRPM | SYSTOLIC BLOOD PRESSURE: 150 MMHG | DIASTOLIC BLOOD PRESSURE: 70 MMHG

## 2020-02-19 PROBLEM — M47.22 CERVICAL SPONDYLOSIS WITH MYELOPATHY AND RADICULOPATHY: Status: ACTIVE | Noted: 2020-02-19

## 2020-02-19 PROBLEM — M47.12 CERVICAL SPONDYLOSIS WITH MYELOPATHY AND RADICULOPATHY: Status: ACTIVE | Noted: 2020-02-19

## 2020-02-19 LAB
ABO/RH: NORMAL
ANTIBODY SCREEN: NORMAL
GLUCOSE BLD-MCNC: 134 MG/DL (ref 70–99)
GLUCOSE BLD-MCNC: 148 MG/DL (ref 70–99)
GLUCOSE BLD-MCNC: 166 MG/DL (ref 70–99)
GLUCOSE BLD-MCNC: 174 MG/DL (ref 70–99)
GLUCOSE BLD-MCNC: 176 MG/DL (ref 70–99)
HBA1C MFR BLD: 6.3 % (ref 4–6)
PERFORMED ON: ABNORMAL

## 2020-02-19 PROCEDURE — 94761 N-INVAS EAR/PLS OXIMETRY MLT: CPT

## 2020-02-19 PROCEDURE — 0RG20A0 FUSION OF 2 OR MORE CERVICAL VERTEBRAL JOINTS WITH INTERBODY FUSION DEVICE, ANTERIOR APPROACH, ANTERIOR COLUMN, OPEN APPROACH: ICD-10-PCS | Performed by: NEUROLOGICAL SURGERY

## 2020-02-19 PROCEDURE — 0RB30ZZ EXCISION OF CERVICAL VERTEBRAL DISC, OPEN APPROACH: ICD-10-PCS | Performed by: NEUROLOGICAL SURGERY

## 2020-02-19 PROCEDURE — 6360000002 HC RX W HCPCS: Performed by: NURSE ANESTHETIST, CERTIFIED REGISTERED

## 2020-02-19 PROCEDURE — 6370000000 HC RX 637 (ALT 250 FOR IP): Performed by: NEUROLOGICAL SURGERY

## 2020-02-19 PROCEDURE — 2780000010 HC IMPLANT OTHER: Performed by: NEUROLOGICAL SURGERY

## 2020-02-19 PROCEDURE — 3700000001 HC ADD 15 MINUTES (ANESTHESIA): Performed by: NEUROLOGICAL SURGERY

## 2020-02-19 PROCEDURE — 6360000002 HC RX W HCPCS: Performed by: ANESTHESIOLOGY

## 2020-02-19 PROCEDURE — 2580000003 HC RX 258: Performed by: NEUROLOGICAL SURGERY

## 2020-02-19 PROCEDURE — 2500000003 HC RX 250 WO HCPCS: Performed by: NEUROLOGICAL SURGERY

## 2020-02-19 PROCEDURE — 22845 INSERT SPINE FIXATION DEVICE: CPT | Performed by: NEUROLOGICAL SURGERY

## 2020-02-19 PROCEDURE — 83036 HEMOGLOBIN GLYCOSYLATED A1C: CPT

## 2020-02-19 PROCEDURE — 3600000005 HC SURGERY LEVEL 5 BASE: Performed by: NEUROLOGICAL SURGERY

## 2020-02-19 PROCEDURE — 22854 INSJ BIOMECHANICAL DEVICE: CPT | Performed by: NEUROLOGICAL SURGERY

## 2020-02-19 PROCEDURE — 2700000000 HC OXYGEN THERAPY PER DAY

## 2020-02-19 PROCEDURE — 86850 RBC ANTIBODY SCREEN: CPT

## 2020-02-19 PROCEDURE — 6360000002 HC RX W HCPCS: Performed by: NEUROLOGICAL SURGERY

## 2020-02-19 PROCEDURE — 7100000001 HC PACU RECOVERY - ADDTL 15 MIN: Performed by: NEUROLOGICAL SURGERY

## 2020-02-19 PROCEDURE — 2720000010 HC SURG SUPPLY STERILE: Performed by: NEUROLOGICAL SURGERY

## 2020-02-19 PROCEDURE — C1713 ANCHOR/SCREW BN/BN,TIS/BN: HCPCS | Performed by: NEUROLOGICAL SURGERY

## 2020-02-19 PROCEDURE — 72040 X-RAY EXAM NECK SPINE 2-3 VW: CPT

## 2020-02-19 PROCEDURE — 3600000015 HC SURGERY LEVEL 5 ADDTL 15MIN: Performed by: NEUROLOGICAL SURGERY

## 2020-02-19 PROCEDURE — 7100000000 HC PACU RECOVERY - FIRST 15 MIN: Performed by: NEUROLOGICAL SURGERY

## 2020-02-19 PROCEDURE — 2709999900 HC NON-CHARGEABLE SUPPLY: Performed by: NEUROLOGICAL SURGERY

## 2020-02-19 PROCEDURE — 1210000000 HC MED SURG R&B

## 2020-02-19 PROCEDURE — 2500000003 HC RX 250 WO HCPCS: Performed by: NURSE ANESTHETIST, CERTIFIED REGISTERED

## 2020-02-19 PROCEDURE — P9045 ALBUMIN (HUMAN), 5%, 250 ML: HCPCS | Performed by: NURSE ANESTHETIST, CERTIFIED REGISTERED

## 2020-02-19 PROCEDURE — 63082 REMOVE VERTEBRAL BODY ADD-ON: CPT | Performed by: NEUROLOGICAL SURGERY

## 2020-02-19 PROCEDURE — 94640 AIRWAY INHALATION TREATMENT: CPT

## 2020-02-19 PROCEDURE — 22842 INSERT SPINE FIXATION DEVICE: CPT | Performed by: NEUROLOGICAL SURGERY

## 2020-02-19 PROCEDURE — 63081 REMOVE VERT BODY DCMPRN CRVL: CPT | Performed by: NEUROLOGICAL SURGERY

## 2020-02-19 PROCEDURE — 22600 ARTHRD PST TQ 1NTRSPC CRV: CPT | Performed by: NEUROLOGICAL SURGERY

## 2020-02-19 PROCEDURE — 82948 REAGENT STRIP/BLOOD GLUCOSE: CPT

## 2020-02-19 PROCEDURE — 2580000003 HC RX 258: Performed by: NURSE ANESTHETIST, CERTIFIED REGISTERED

## 2020-02-19 PROCEDURE — 0RG2071 FUSION OF 2 OR MORE CERVICAL VERTEBRAL JOINTS WITH AUTOLOGOUS TISSUE SUBSTITUTE, POSTERIOR APPROACH, POSTERIOR COLUMN, OPEN APPROACH: ICD-10-PCS | Performed by: NEUROLOGICAL SURGERY

## 2020-02-19 PROCEDURE — 22585 ARTHRD ANT NTRBD MIN DSC EA: CPT | Performed by: NEUROLOGICAL SURGERY

## 2020-02-19 PROCEDURE — 86901 BLOOD TYPING SEROLOGIC RH(D): CPT

## 2020-02-19 PROCEDURE — 6370000000 HC RX 637 (ALT 250 FOR IP): Performed by: ANESTHESIOLOGY

## 2020-02-19 PROCEDURE — 22554 ARTHRD ANT NTRBD MIN DSC CRV: CPT | Performed by: NEUROLOGICAL SURGERY

## 2020-02-19 PROCEDURE — 22614 ARTHRD PST TQ 1NTRSPC EA ADD: CPT | Performed by: NEUROLOGICAL SURGERY

## 2020-02-19 PROCEDURE — 86900 BLOOD TYPING SEROLOGIC ABO: CPT

## 2020-02-19 PROCEDURE — 3700000000 HC ANESTHESIA ATTENDED CARE: Performed by: NEUROLOGICAL SURGERY

## 2020-02-19 PROCEDURE — 36415 COLL VENOUS BLD VENIPUNCTURE: CPT

## 2020-02-19 DEVICE — DBM 7509211 MAGNIFUSE 1 X 10CM
Type: IMPLANTABLE DEVICE | Site: SPINE CERVICAL | Status: FUNCTIONAL
Brand: MAGNIFUSE® BONE GRAFT

## 2020-02-19 DEVICE — MULTI AXIAL SCREW 3603512 3.5 X 12MM
Type: IMPLANTABLE DEVICE | Site: SPINE CERVICAL | Status: FUNCTIONAL
Brand: INFINITY™ OCCIPITOCERVICAL UPPER THORACIC SYSTEM

## 2020-02-19 DEVICE — CENTERPIECE 436013D 13MM D-SIZE
Type: IMPLANTABLE DEVICE | Status: FUNCTIONAL
Brand: T2 STRATOSPHERE™ EXPANDABLE CORPECTOMY SYSTEM

## 2020-02-19 DEVICE — SCREW 3120517 4.0 X 17 SELF DRILL VAR
Type: IMPLANTABLE DEVICE | Site: SPINE CERVICAL | Status: FUNCTIONAL
Brand: ATLANTIS® ANTERIOR CERVICAL PLATE SYSTEM

## 2020-02-19 DEVICE — AGENT HEMSTAT 8ML FLX TIP MTRX + DISP SURGIFLO: Type: IMPLANTABLE DEVICE | Site: SPINE CERVICAL | Status: FUNCTIONAL

## 2020-02-19 RX ORDER — SODIUM CHLORIDE 0.9 % (FLUSH) 0.9 %
10 SYRINGE (ML) INJECTION EVERY 12 HOURS SCHEDULED
Status: DISCONTINUED | OUTPATIENT
Start: 2020-02-19 | End: 2020-02-19 | Stop reason: HOSPADM

## 2020-02-19 RX ORDER — SODIUM CHLORIDE 0.9 % (FLUSH) 0.9 %
10 SYRINGE (ML) INJECTION PRN
Status: DISCONTINUED | OUTPATIENT
Start: 2020-02-19 | End: 2020-02-19 | Stop reason: HOSPADM

## 2020-02-19 RX ORDER — DEXAMETHASONE SODIUM PHOSPHATE 10 MG/ML
INJECTION, SOLUTION INTRAMUSCULAR; INTRAVENOUS PRN
Status: DISCONTINUED | OUTPATIENT
Start: 2020-02-19 | End: 2020-02-19 | Stop reason: SDUPTHER

## 2020-02-19 RX ORDER — SODIUM CHLORIDE, SODIUM LACTATE, POTASSIUM CHLORIDE, CALCIUM CHLORIDE 600; 310; 30; 20 MG/100ML; MG/100ML; MG/100ML; MG/100ML
INJECTION, SOLUTION INTRAVENOUS CONTINUOUS
Status: DISCONTINUED | OUTPATIENT
Start: 2020-02-19 | End: 2020-02-19

## 2020-02-19 RX ORDER — BUSPIRONE HYDROCHLORIDE 10 MG/1
10 TABLET ORAL 3 TIMES DAILY PRN
Status: DISCONTINUED | OUTPATIENT
Start: 2020-02-19 | End: 2020-02-21 | Stop reason: HOSPADM

## 2020-02-19 RX ORDER — NICOTINE POLACRILEX 4 MG
15 LOZENGE BUCCAL PRN
Status: DISCONTINUED | OUTPATIENT
Start: 2020-02-19 | End: 2020-02-21 | Stop reason: HOSPADM

## 2020-02-19 RX ORDER — SODIUM CHLORIDE 0.9 % (FLUSH) 0.9 %
10 SYRINGE (ML) INJECTION PRN
Status: DISCONTINUED | OUTPATIENT
Start: 2020-02-19 | End: 2020-02-21 | Stop reason: HOSPADM

## 2020-02-19 RX ORDER — MORPHINE SULFATE 4 MG/ML
4 INJECTION, SOLUTION INTRAMUSCULAR; INTRAVENOUS EVERY 5 MIN PRN
Status: DISCONTINUED | OUTPATIENT
Start: 2020-02-19 | End: 2020-02-19 | Stop reason: HOSPADM

## 2020-02-19 RX ORDER — PROPOFOL 10 MG/ML
INJECTION, EMULSION INTRAVENOUS PRN
Status: DISCONTINUED | OUTPATIENT
Start: 2020-02-19 | End: 2020-02-19 | Stop reason: SDUPTHER

## 2020-02-19 RX ORDER — LABETALOL 20 MG/4 ML (5 MG/ML) INTRAVENOUS SYRINGE
5 EVERY 10 MIN PRN
Status: DISCONTINUED | OUTPATIENT
Start: 2020-02-19 | End: 2020-02-19 | Stop reason: HOSPADM

## 2020-02-19 RX ORDER — DIPHENHYDRAMINE HYDROCHLORIDE 50 MG/ML
12.5 INJECTION INTRAMUSCULAR; INTRAVENOUS
Status: DISCONTINUED | OUTPATIENT
Start: 2020-02-19 | End: 2020-02-19 | Stop reason: HOSPADM

## 2020-02-19 RX ORDER — NALOXONE HYDROCHLORIDE 0.4 MG/ML
0.4 INJECTION, SOLUTION INTRAMUSCULAR; INTRAVENOUS; SUBCUTANEOUS PRN
Status: DISCONTINUED | OUTPATIENT
Start: 2020-02-19 | End: 2020-02-20

## 2020-02-19 RX ORDER — LISINOPRIL 10 MG/1
10 TABLET ORAL DAILY
Status: DISCONTINUED | OUTPATIENT
Start: 2020-02-19 | End: 2020-02-21 | Stop reason: HOSPADM

## 2020-02-19 RX ORDER — KETAMINE HYDROCHLORIDE 50 MG/ML
INJECTION, SOLUTION, CONCENTRATE INTRAMUSCULAR; INTRAVENOUS PRN
Status: DISCONTINUED | OUTPATIENT
Start: 2020-02-19 | End: 2020-02-19 | Stop reason: SDUPTHER

## 2020-02-19 RX ORDER — SODIUM CHLORIDE 0.9 % (FLUSH) 0.9 %
10 SYRINGE (ML) INJECTION EVERY 12 HOURS SCHEDULED
Status: DISCONTINUED | OUTPATIENT
Start: 2020-02-19 | End: 2020-02-21 | Stop reason: HOSPADM

## 2020-02-19 RX ORDER — EPHEDRINE SULFATE 50 MG/ML
INJECTION, SOLUTION INTRAVENOUS PRN
Status: DISCONTINUED | OUTPATIENT
Start: 2020-02-19 | End: 2020-02-19 | Stop reason: SDUPTHER

## 2020-02-19 RX ORDER — ALBUMIN, HUMAN INJ 5% 5 %
SOLUTION INTRAVENOUS PRN
Status: DISCONTINUED | OUTPATIENT
Start: 2020-02-19 | End: 2020-02-19 | Stop reason: SDUPTHER

## 2020-02-19 RX ORDER — BACLOFEN 10 MG/1
10 TABLET ORAL 3 TIMES DAILY PRN
Status: DISCONTINUED | OUTPATIENT
Start: 2020-02-19 | End: 2020-02-21 | Stop reason: HOSPADM

## 2020-02-19 RX ORDER — LIDOCAINE HYDROCHLORIDE 10 MG/ML
1 INJECTION, SOLUTION EPIDURAL; INFILTRATION; INTRACAUDAL; PERINEURAL
Status: DISCONTINUED | OUTPATIENT
Start: 2020-02-19 | End: 2020-02-19 | Stop reason: HOSPADM

## 2020-02-19 RX ORDER — DIPHENHYDRAMINE HYDROCHLORIDE 50 MG/ML
25 INJECTION INTRAMUSCULAR; INTRAVENOUS EVERY 6 HOURS PRN
Status: DISCONTINUED | OUTPATIENT
Start: 2020-02-19 | End: 2020-02-21 | Stop reason: HOSPADM

## 2020-02-19 RX ORDER — METOCLOPRAMIDE HYDROCHLORIDE 5 MG/ML
10 INJECTION INTRAMUSCULAR; INTRAVENOUS
Status: DISCONTINUED | OUTPATIENT
Start: 2020-02-19 | End: 2020-02-19 | Stop reason: HOSPADM

## 2020-02-19 RX ORDER — ARFORMOTEROL TARTRATE 15 UG/2ML
15 SOLUTION RESPIRATORY (INHALATION) 2 TIMES DAILY
Status: DISCONTINUED | OUTPATIENT
Start: 2020-02-19 | End: 2020-02-21 | Stop reason: HOSPADM

## 2020-02-19 RX ORDER — ATENOLOL 50 MG/1
50 TABLET ORAL DAILY
Status: DISCONTINUED | OUTPATIENT
Start: 2020-02-20 | End: 2020-02-21 | Stop reason: HOSPADM

## 2020-02-19 RX ORDER — MORPHINE SULFATE/0.9% NACL/PF 1 MG/ML
SYRINGE (ML) INJECTION CONTINUOUS
Status: DISCONTINUED | OUTPATIENT
Start: 2020-02-19 | End: 2020-02-20

## 2020-02-19 RX ORDER — SUFENTANIL CITRATE 50 UG/ML
INJECTION EPIDURAL; INTRAVENOUS PRN
Status: DISCONTINUED | OUTPATIENT
Start: 2020-02-19 | End: 2020-02-19 | Stop reason: SDUPTHER

## 2020-02-19 RX ORDER — ATENOLOL 50 MG/1
50 TABLET ORAL ONCE
Status: COMPLETED | OUTPATIENT
Start: 2020-02-19 | End: 2020-02-19

## 2020-02-19 RX ORDER — PROMETHAZINE HYDROCHLORIDE 25 MG/1
12.5 TABLET ORAL EVERY 6 HOURS PRN
Status: DISCONTINUED | OUTPATIENT
Start: 2020-02-19 | End: 2020-02-21 | Stop reason: HOSPADM

## 2020-02-19 RX ORDER — HYDRALAZINE HYDROCHLORIDE 20 MG/ML
5 INJECTION INTRAMUSCULAR; INTRAVENOUS EVERY 10 MIN PRN
Status: DISCONTINUED | OUTPATIENT
Start: 2020-02-19 | End: 2020-02-19 | Stop reason: HOSPADM

## 2020-02-19 RX ORDER — BUDESONIDE 0.25 MG/2ML
250 INHALANT ORAL 2 TIMES DAILY
Status: DISCONTINUED | OUTPATIENT
Start: 2020-02-19 | End: 2020-02-21 | Stop reason: HOSPADM

## 2020-02-19 RX ORDER — MORPHINE SULFATE 4 MG/ML
2 INJECTION, SOLUTION INTRAMUSCULAR; INTRAVENOUS
Status: DISCONTINUED | OUTPATIENT
Start: 2020-02-19 | End: 2020-02-21 | Stop reason: HOSPADM

## 2020-02-19 RX ORDER — ARIPIPRAZOLE 5 MG/1
5 TABLET ORAL DAILY
Status: DISCONTINUED | OUTPATIENT
Start: 2020-02-20 | End: 2020-02-21 | Stop reason: HOSPADM

## 2020-02-19 RX ORDER — SODIUM CHLORIDE 9 MG/ML
INJECTION, SOLUTION INTRAVENOUS CONTINUOUS
Status: DISCONTINUED | OUTPATIENT
Start: 2020-02-19 | End: 2020-02-19

## 2020-02-19 RX ORDER — ENALAPRILAT 2.5 MG/2ML
1.25 INJECTION INTRAVENOUS
Status: DISCONTINUED | OUTPATIENT
Start: 2020-02-19 | End: 2020-02-19 | Stop reason: HOSPADM

## 2020-02-19 RX ORDER — ONDANSETRON 2 MG/ML
INJECTION INTRAMUSCULAR; INTRAVENOUS PRN
Status: DISCONTINUED | OUTPATIENT
Start: 2020-02-19 | End: 2020-02-19 | Stop reason: SDUPTHER

## 2020-02-19 RX ORDER — AMLODIPINE BESYLATE 10 MG/1
10 TABLET ORAL DAILY
Status: DISCONTINUED | OUTPATIENT
Start: 2020-02-19 | End: 2020-02-21 | Stop reason: HOSPADM

## 2020-02-19 RX ORDER — SODIUM CHLORIDE 9 MG/ML
INJECTION, SOLUTION INTRAVENOUS CONTINUOUS
Status: DISCONTINUED | OUTPATIENT
Start: 2020-02-19 | End: 2020-02-20

## 2020-02-19 RX ORDER — PROMETHAZINE HYDROCHLORIDE 25 MG/ML
6.25 INJECTION, SOLUTION INTRAMUSCULAR; INTRAVENOUS
Status: DISCONTINUED | OUTPATIENT
Start: 2020-02-19 | End: 2020-02-19 | Stop reason: HOSPADM

## 2020-02-19 RX ORDER — PANTOPRAZOLE SODIUM 40 MG/1
40 TABLET, DELAYED RELEASE ORAL
Status: DISCONTINUED | OUTPATIENT
Start: 2020-02-20 | End: 2020-02-21 | Stop reason: HOSPADM

## 2020-02-19 RX ORDER — FENTANYL CITRATE 50 UG/ML
25 INJECTION, SOLUTION INTRAMUSCULAR; INTRAVENOUS
Status: DISCONTINUED | OUTPATIENT
Start: 2020-02-19 | End: 2020-02-19 | Stop reason: HOSPADM

## 2020-02-19 RX ORDER — LIDOCAINE HYDROCHLORIDE 10 MG/ML
INJECTION, SOLUTION INFILTRATION; PERINEURAL PRN
Status: DISCONTINUED | OUTPATIENT
Start: 2020-02-19 | End: 2020-02-19 | Stop reason: SDUPTHER

## 2020-02-19 RX ORDER — FENTANYL CITRATE 50 UG/ML
50 INJECTION, SOLUTION INTRAMUSCULAR; INTRAVENOUS
Status: DISCONTINUED | OUTPATIENT
Start: 2020-02-19 | End: 2020-02-19 | Stop reason: HOSPADM

## 2020-02-19 RX ORDER — FLUOXETINE HYDROCHLORIDE 20 MG/1
40 CAPSULE ORAL DAILY
Status: DISCONTINUED | OUTPATIENT
Start: 2020-02-20 | End: 2020-02-21 | Stop reason: HOSPADM

## 2020-02-19 RX ORDER — TRAZODONE HYDROCHLORIDE 100 MG/1
100 TABLET ORAL NIGHTLY PRN
Status: DISCONTINUED | OUTPATIENT
Start: 2020-02-19 | End: 2020-02-21 | Stop reason: HOSPADM

## 2020-02-19 RX ORDER — MIDAZOLAM HYDROCHLORIDE 1 MG/ML
2 INJECTION INTRAMUSCULAR; INTRAVENOUS
Status: COMPLETED | OUTPATIENT
Start: 2020-02-19 | End: 2020-02-19

## 2020-02-19 RX ORDER — DEXTROSE MONOHYDRATE 50 MG/ML
100 INJECTION, SOLUTION INTRAVENOUS PRN
Status: DISCONTINUED | OUTPATIENT
Start: 2020-02-19 | End: 2020-02-21 | Stop reason: HOSPADM

## 2020-02-19 RX ORDER — SODIUM CHLORIDE, SODIUM LACTATE, POTASSIUM CHLORIDE, CALCIUM CHLORIDE 600; 310; 30; 20 MG/100ML; MG/100ML; MG/100ML; MG/100ML
INJECTION, SOLUTION INTRAVENOUS CONTINUOUS PRN
Status: DISCONTINUED | OUTPATIENT
Start: 2020-02-19 | End: 2020-02-19 | Stop reason: SDUPTHER

## 2020-02-19 RX ORDER — ERGOCALCIFEROL 1.25 MG/1
50000 CAPSULE ORAL WEEKLY
Status: DISCONTINUED | OUTPATIENT
Start: 2020-02-23 | End: 2020-02-21 | Stop reason: HOSPADM

## 2020-02-19 RX ORDER — ATORVASTATIN CALCIUM 20 MG/1
20 TABLET, FILM COATED ORAL DAILY
Status: DISCONTINUED | OUTPATIENT
Start: 2020-02-20 | End: 2020-02-21 | Stop reason: HOSPADM

## 2020-02-19 RX ORDER — VENLAFAXINE HYDROCHLORIDE 75 MG/1
150 CAPSULE, EXTENDED RELEASE ORAL DAILY
Status: DISCONTINUED | OUTPATIENT
Start: 2020-02-20 | End: 2020-02-21 | Stop reason: HOSPADM

## 2020-02-19 RX ORDER — FENOFIBRATE 54 MG/1
54 TABLET ORAL DAILY
Status: DISCONTINUED | OUTPATIENT
Start: 2020-02-20 | End: 2020-02-21 | Stop reason: HOSPADM

## 2020-02-19 RX ORDER — ONDANSETRON 2 MG/ML
4 INJECTION INTRAMUSCULAR; INTRAVENOUS EVERY 6 HOURS PRN
Status: DISCONTINUED | OUTPATIENT
Start: 2020-02-19 | End: 2020-02-21 | Stop reason: HOSPADM

## 2020-02-19 RX ORDER — HYDROCODONE BITARTRATE AND ACETAMINOPHEN 10; 325 MG/1; MG/1
2 TABLET ORAL EVERY 4 HOURS PRN
Status: DISCONTINUED | OUTPATIENT
Start: 2020-02-19 | End: 2020-02-21 | Stop reason: HOSPADM

## 2020-02-19 RX ORDER — DEXTROSE MONOHYDRATE 25 G/50ML
12.5 INJECTION, SOLUTION INTRAVENOUS PRN
Status: DISCONTINUED | OUTPATIENT
Start: 2020-02-19 | End: 2020-02-21 | Stop reason: HOSPADM

## 2020-02-19 RX ORDER — ROCURONIUM BROMIDE 10 MG/ML
INJECTION, SOLUTION INTRAVENOUS PRN
Status: DISCONTINUED | OUTPATIENT
Start: 2020-02-19 | End: 2020-02-19 | Stop reason: SDUPTHER

## 2020-02-19 RX ORDER — BUDESONIDE AND FORMOTEROL FUMARATE DIHYDRATE 80; 4.5 UG/1; UG/1
2 AEROSOL RESPIRATORY (INHALATION) 2 TIMES DAILY
Status: DISCONTINUED | OUTPATIENT
Start: 2020-02-19 | End: 2020-02-19 | Stop reason: CLARIF

## 2020-02-19 RX ORDER — MORPHINE SULFATE 4 MG/ML
4 INJECTION, SOLUTION INTRAMUSCULAR; INTRAVENOUS
Status: DISCONTINUED | OUTPATIENT
Start: 2020-02-19 | End: 2020-02-21 | Stop reason: HOSPADM

## 2020-02-19 RX ADMIN — ROCURONIUM BROMIDE 20 MG: 10 SOLUTION INTRAVENOUS at 12:14

## 2020-02-19 RX ADMIN — ALBUMIN (HUMAN) 12.5 G: 2.5 SOLUTION INTRAVENOUS at 14:16

## 2020-02-19 RX ADMIN — PHENYLEPHRINE HYDROCHLORIDE 80 MCG: 10 INJECTION INTRAVENOUS at 14:11

## 2020-02-19 RX ADMIN — ONDANSETRON HYDROCHLORIDE 4 MG: 2 INJECTION, SOLUTION INTRAMUSCULAR; INTRAVENOUS at 14:22

## 2020-02-19 RX ADMIN — ROCURONIUM BROMIDE 20 MG: 10 SOLUTION INTRAVENOUS at 09:00

## 2020-02-19 RX ADMIN — SUFENTANIL CITRATE 10 MCG: 50 INJECTION EPIDURAL; INTRAVENOUS at 09:22

## 2020-02-19 RX ADMIN — EPHEDRINE SULFATE 5 MG: 50 INJECTION INTRAMUSCULAR; INTRAVENOUS; SUBCUTANEOUS at 14:07

## 2020-02-19 RX ADMIN — BUDESONIDE 250 MCG: 0.25 INHALANT RESPIRATORY (INHALATION) at 19:48

## 2020-02-19 RX ADMIN — SODIUM CHLORIDE: 9 INJECTION, SOLUTION INTRAVENOUS at 17:52

## 2020-02-19 RX ADMIN — PHENYLEPHRINE HYDROCHLORIDE 80 MCG: 10 INJECTION INTRAVENOUS at 09:37

## 2020-02-19 RX ADMIN — PHENYLEPHRINE HYDROCHLORIDE 80 MCG: 10 INJECTION INTRAVENOUS at 12:14

## 2020-02-19 RX ADMIN — MIDAZOLAM 2 MG: 1 INJECTION INTRAMUSCULAR; INTRAVENOUS at 07:17

## 2020-02-19 RX ADMIN — Medication 2 G: at 11:38

## 2020-02-19 RX ADMIN — PHENYLEPHRINE HYDROCHLORIDE 80 MCG: 10 INJECTION INTRAVENOUS at 13:09

## 2020-02-19 RX ADMIN — PHENYLEPHRINE HYDROCHLORIDE 80 MCG: 10 INJECTION INTRAVENOUS at 11:18

## 2020-02-19 RX ADMIN — AMLODIPINE BESYLATE 10 MG: 10 TABLET ORAL at 17:37

## 2020-02-19 RX ADMIN — SODIUM CHLORIDE, SODIUM LACTATE, POTASSIUM CHLORIDE, AND CALCIUM CHLORIDE: 600; 310; 30; 20 INJECTION, SOLUTION INTRAVENOUS at 09:38

## 2020-02-19 RX ADMIN — MORPHINE SULFATE 30 MG: 1 INJECTION INTRAVENOUS at 16:44

## 2020-02-19 RX ADMIN — HYDROMORPHONE HYDROCHLORIDE 0.25 MG: 1 INJECTION, SOLUTION INTRAMUSCULAR; INTRAVENOUS; SUBCUTANEOUS at 14:49

## 2020-02-19 RX ADMIN — SUFENTANIL CITRATE 20 MCG: 50 INJECTION EPIDURAL; INTRAVENOUS at 08:07

## 2020-02-19 RX ADMIN — SUFENTANIL CITRATE 15 MCG: 50 INJECTION EPIDURAL; INTRAVENOUS at 11:32

## 2020-02-19 RX ADMIN — Medication 10 ML: at 06:22

## 2020-02-19 RX ADMIN — SODIUM CHLORIDE, SODIUM LACTATE, POTASSIUM CHLORIDE, AND CALCIUM CHLORIDE: 600; 310; 30; 20 INJECTION, SOLUTION INTRAVENOUS at 07:40

## 2020-02-19 RX ADMIN — ROCURONIUM BROMIDE 20 MG: 10 SOLUTION INTRAVENOUS at 10:23

## 2020-02-19 RX ADMIN — PHENYLEPHRINE HYDROCHLORIDE 80 MCG: 10 INJECTION INTRAVENOUS at 11:12

## 2020-02-19 RX ADMIN — PHENYLEPHRINE HYDROCHLORIDE 80 MCG: 10 INJECTION INTRAVENOUS at 10:56

## 2020-02-19 RX ADMIN — Medication 2 G: at 07:42

## 2020-02-19 RX ADMIN — PROPOFOL 20 MG: 10 INJECTION, EMULSION INTRAVENOUS at 12:54

## 2020-02-19 RX ADMIN — ATENOLOL 50 MG: 50 TABLET ORAL at 07:03

## 2020-02-19 RX ADMIN — SUFENTANIL CITRATE 20 MCG: 50 INJECTION EPIDURAL; INTRAVENOUS at 08:16

## 2020-02-19 RX ADMIN — SODIUM CHLORIDE, SODIUM LACTATE, POTASSIUM CHLORIDE, AND CALCIUM CHLORIDE: 600; 310; 30; 20 INJECTION, SOLUTION INTRAVENOUS at 11:39

## 2020-02-19 RX ADMIN — ROCURONIUM BROMIDE 80 MG: 10 SOLUTION INTRAVENOUS at 07:31

## 2020-02-19 RX ADMIN — SUFENTANIL CITRATE 20 MCG: 50 INJECTION EPIDURAL; INTRAVENOUS at 07:31

## 2020-02-19 RX ADMIN — HYDROCODONE BITARTRATE AND ACETAMINOPHEN 2 TABLET: 10; 325 TABLET ORAL at 16:47

## 2020-02-19 RX ADMIN — SODIUM CHLORIDE, SODIUM LACTATE, POTASSIUM CHLORIDE, AND CALCIUM CHLORIDE: 600; 310; 30; 20 INJECTION, SOLUTION INTRAVENOUS at 07:27

## 2020-02-19 RX ADMIN — PROPOFOL 100 MG: 10 INJECTION, EMULSION INTRAVENOUS at 07:31

## 2020-02-19 RX ADMIN — PHENYLEPHRINE HYDROCHLORIDE 80 MCG: 10 INJECTION INTRAVENOUS at 08:51

## 2020-02-19 RX ADMIN — PHENYLEPHRINE HYDROCHLORIDE 80 MCG: 10 INJECTION INTRAVENOUS at 14:06

## 2020-02-19 RX ADMIN — PHENYLEPHRINE HYDROCHLORIDE 80 MCG: 10 INJECTION INTRAVENOUS at 12:47

## 2020-02-19 RX ADMIN — ROCURONIUM BROMIDE 20 MG: 10 SOLUTION INTRAVENOUS at 11:24

## 2020-02-19 RX ADMIN — PHENYLEPHRINE HYDROCHLORIDE 40 MCG: 10 INJECTION INTRAVENOUS at 10:39

## 2020-02-19 RX ADMIN — LIDOCAINE HYDROCHLORIDE 50 MG: 10 INJECTION, SOLUTION INFILTRATION; PERINEURAL at 07:31

## 2020-02-19 RX ADMIN — SUFENTANIL CITRATE 15 MCG: 50 INJECTION EPIDURAL; INTRAVENOUS at 12:20

## 2020-02-19 RX ADMIN — ARFORMOTEROL TARTRATE 15 MCG: 15 SOLUTION RESPIRATORY (INHALATION) at 19:48

## 2020-02-19 RX ADMIN — PHENYLEPHRINE HYDROCHLORIDE 80 MCG: 10 INJECTION INTRAVENOUS at 13:48

## 2020-02-19 RX ADMIN — PHENYLEPHRINE HYDROCHLORIDE 80 MCG: 10 INJECTION INTRAVENOUS at 10:05

## 2020-02-19 RX ADMIN — ROCURONIUM BROMIDE 20 MG: 10 SOLUTION INTRAVENOUS at 08:04

## 2020-02-19 RX ADMIN — HYDROMORPHONE HYDROCHLORIDE 0.25 MG: 1 INJECTION, SOLUTION INTRAMUSCULAR; INTRAVENOUS; SUBCUTANEOUS at 14:47

## 2020-02-19 RX ADMIN — PHENYLEPHRINE HYDROCHLORIDE 160 MCG: 10 INJECTION INTRAVENOUS at 12:00

## 2020-02-19 RX ADMIN — PHENYLEPHRINE HYDROCHLORIDE 80 MCG: 10 INJECTION INTRAVENOUS at 08:42

## 2020-02-19 RX ADMIN — LISINOPRIL 10 MG: 10 TABLET ORAL at 16:47

## 2020-02-19 RX ADMIN — Medication 30 MG: at 07:42

## 2020-02-19 RX ADMIN — PHENYLEPHRINE HYDROCHLORIDE 80 MCG: 10 INJECTION INTRAVENOUS at 10:49

## 2020-02-19 RX ADMIN — PHENYLEPHRINE HYDROCHLORIDE 80 MCG: 10 INJECTION INTRAVENOUS at 12:25

## 2020-02-19 RX ADMIN — EPHEDRINE SULFATE 5 MG: 50 INJECTION INTRAMUSCULAR; INTRAVENOUS; SUBCUTANEOUS at 14:11

## 2020-02-19 RX ADMIN — HYDROMORPHONE HYDROCHLORIDE 0.5 MG: 1 INJECTION, SOLUTION INTRAMUSCULAR; INTRAVENOUS; SUBCUTANEOUS at 15:26

## 2020-02-19 RX ADMIN — ROCURONIUM BROMIDE 20 MG: 10 SOLUTION INTRAVENOUS at 09:37

## 2020-02-19 RX ADMIN — DEXAMETHASONE SODIUM PHOSPHATE 10 MG: 10 INJECTION, SOLUTION INTRAMUSCULAR; INTRAVENOUS at 08:17

## 2020-02-19 RX ADMIN — Medication 20 MG: at 08:16

## 2020-02-19 RX ADMIN — PHENYLEPHRINE HYDROCHLORIDE 160 MCG: 10 INJECTION INTRAVENOUS at 12:33

## 2020-02-19 RX ADMIN — SODIUM CHLORIDE, SODIUM LACTATE, POTASSIUM CHLORIDE, AND CALCIUM CHLORIDE: 600; 310; 30; 20 INJECTION, SOLUTION INTRAVENOUS at 06:21

## 2020-02-19 RX ADMIN — HYDROMORPHONE HYDROCHLORIDE 0.5 MG: 1 INJECTION, SOLUTION INTRAMUSCULAR; INTRAVENOUS; SUBCUTANEOUS at 15:15

## 2020-02-19 RX ADMIN — PHENYLEPHRINE HYDROCHLORIDE 40 MCG: 10 INJECTION INTRAVENOUS at 10:46

## 2020-02-19 RX ADMIN — SUGAMMADEX 200 MG: 100 INJECTION, SOLUTION INTRAVENOUS at 14:44

## 2020-02-19 ASSESSMENT — PAIN DESCRIPTION - LOCATION
LOCATION: NECK

## 2020-02-19 ASSESSMENT — PAIN SCALES - GENERAL
PAINLEVEL_OUTOF10: 10
PAINLEVEL_OUTOF10: 0
PAINLEVEL_OUTOF10: 5
PAINLEVEL_OUTOF10: 10
PAINLEVEL_OUTOF10: 7
PAINLEVEL_OUTOF10: 7

## 2020-02-19 ASSESSMENT — PAIN - FUNCTIONAL ASSESSMENT: PAIN_FUNCTIONAL_ASSESSMENT: 0-10

## 2020-02-19 ASSESSMENT — PAIN DESCRIPTION - PAIN TYPE
TYPE: SURGICAL PAIN

## 2020-02-19 ASSESSMENT — ENCOUNTER SYMPTOMS: SHORTNESS OF BREATH: 0

## 2020-02-19 ASSESSMENT — LIFESTYLE VARIABLES: SMOKING_STATUS: 1

## 2020-02-19 NOTE — PROGRESS NOTES
Patient's blood pressure 203/83. Dr. Papo Cheng notified. Awaiting further orders.  Electronically signed by Kvng Gutierrez RN on 2/19/2020 at 5:15 PM

## 2020-02-19 NOTE — ANESTHESIA POSTPROCEDURE EVALUATION
Department of Anesthesiology  Postprocedure Note    Patient: Victorino Riedel  MRN: [id number]  YOB: 1970  Date of evaluation: 2/19/2020  Time:  2:59 PM     Procedure Summary     Date:  02/19/20 Room / Location:  [address]    Anesthesia Start:  8882 Anesthesia Stop:      Procedures:       Phase 1:  C5 and C6 corpectomy with placement of an expandable cage and anterior cervical plate Y2-V3. (N/A )      Phase 2:  Posterior instrumented fusion C3-C7 using lateral mass screws and rods. (N/A ) Diagnosis:  (Cervical spondylosis with myelopathy and radiculopathy)    Surgeon:  Ga Avery DO Responsible Provider:  BRYAN Méndez CRNA    Anesthesia Type:  general ASA Status:  3          Anesthesia Type: general    Faye Phase I: Faye Score: 10    Faye Phase II:      Last vitals: Reviewed and per EMR flowsheets.        Anesthesia Post Evaluation    Patient location during evaluation: PACU  Patient participation: complete - patient participated  Level of consciousness: sleepy but conscious  Pain score: 3  Airway patency: patent  Nausea & Vomiting: no vomiting and no nausea  Complications: no  Cardiovascular status: blood pressure returned to baseline and hemodynamically stable  Respiratory status: acceptable, face mask, nonlabored ventilation and intubated  Hydration status: stable

## 2020-02-19 NOTE — BRIEF OP NOTE
Brief Postoperative Note  ______________________________________________________________    Patient: Fernando Stevens  YOB: 1970  MRN: [id number]  Date of Procedure: 2/19/2020    Pre-Op Diagnosis: Cervical spondylosis with myelopathy and radiculopathy    Post-Op Diagnosis: Same       Procedure(s):  Phase 1:  C5 and C6 corpectomy with placement of an expandable cage and anterior cervical plate P1-P5. Phase 2:  Posterior instrumented fusion C3-C7 using lateral mass screws and rods. Anesthesia: General    Surgeon(s):  Guillermina Dwyer DO    Assistant: Jennifer Pearce    Estimated Blood Loss (mL): 975 mL    Complications: None    Specimens:   * No specimens in log *    Implants:  Implant Name Type Inv. Item Serial No.  Lot No. LRB No. Used   IMPL SEALANT SURGIFLO HEMOSTAT MATRIC Bone/Graft/Tissue/Human/Synth IMPL SEALANT SURGIFLO HEMOSTAT MATRIC  [place]: [employer] [id number] N/A 3   SCREW 4.0X16 SELFDRILL LAI Spine SCREW 4.0X16 SELFDRILL LAI  [employer] [employer]  N/A 2   SCREW 4.0X17 SELFDRILL LAI Spine SCREW 4.0X17 SEL[employer]LL LAI  [employer] [employer]  N/A 2   13mm 31-46mm T2 STRATOSPHERE EXPANDABLE CAGE      N/A 1   MAGNIFUSE PC 1CM X 10CM - [id number] Spine MAGNIFUSE PC 1CM X 10CM [id number] [employer] [place] [employer]  N/A 1   SCREW MULTI AXIAL 3.5X12MM Spine SCREW MULTI AXIAL 3.5X12MM  [employer] [employer]  N/A 1   SCREW MULTI AXIAL 3.5X14MM Spine SCREW MULTI AXIAL 3.5X14MM  [employer] [employer]  N/A 4   SCREW MULTI AXIAL 3.5X16MM Spine SCREW MULTI AXIAL 3.5X16MM  [employer] [employer]  N/A 5   SCREW LK INFINITY TI SET Spine SCREW LK INFINITY TI SET  [employer] [employer]  N/A 10   IMPL INGRID SPINE INFINITY 240MM Spine IMPL INGRID SPINE INFINITY 240MM  [employer] [employer]  N/A 1   PLATE ZAK ELITE 86PQ Spine PLATE ZAK ELITE 44XE  [employer] [employer]  N/A 1         Drains:   Closed/Suction Drain Right; Anterior Neck Bulb 10 [employer] (Active)       Closed/Suction Drain Posterior Neck [place] 10 [place] (Active)       Urostomy RLQ (Active)

## 2020-02-19 NOTE — OP NOTE
implant. It was packed with autograft. Traction was produced by the anesthesia team and the implant was gently placed into the intervertebral space between C4 and C7. Once the implant was in excellent position as well as confirmed visually and fluoroscopically. An anterior cervical plate was applied. This was a 55 mm length Atlantis plate. The plate was 1st tacked into position: PIolit hose were drilled and screws were placed: Two 17 mm length screws were placed bilaterally at C4 and two 16 mm length screws were placed bilaterally at C7. Good purchase was achieved with the screws. Once the screws had been fully tightened the locking mechanism was engaged both superiorly and inferiorly. The plate tacks were removed. The final construct appeared quite sound both visually and fluoroscopically. Meticulous hemostasis was once again assured. A small drain was placed. The retractor was removed. The wound was closed in a layered flat fashion. A sterile dressing was applied. The patient was flipped prone onto the regular OR table with bolsters and the second phase of the operation was commenced. The patient's head was controlled with the Narayan 3 pin apparatus. The pateint's head was positioned neutrally. The C-arm fluoroscopy fluoroscope and operating microscope were pre-position and draped. The posterior cervical region was clipped and prepared for a full 10 minutes with Betadine scrub and Betadine paint and DuraPrep and the usual draping procedure was then performed. After the patient had been prepped and draped in linear incision was made beginning just below the suboccipital region and extending to the lower portion of the cervical region. This incision was carried down through the underlying subcutaneous tissues and fascia. The posterior bony elements of C3 to C7 were exposed in a subperiosteal fashion bilaterally. Self-retaining Mederos retractors were inserted into the wound.  Piolot holes

## 2020-02-19 NOTE — H&P
Eyes: Negative.    Respiratory: Negative.    Cardiovascular: Negative.    Gastrointestinal: Negative.    Genitourinary: Negative.    Musculoskeletal: Positive for back pain, joint pain, myalgias and neck pain. Skin: Negative.    Neurological: Positive for dizziness and tingling. Endo/Heme/Allergies: Negative.    Psychiatric/Behavioral: Negative.            PHYSICAL EXAM:      Vitals:     01/16/20 0835   BP: 135/71   Pulse: 73   SpO2: 98%      Constitutional: appears well-developed and well-nourished. Eyes - conjunctiva normal.  Pupils react to light  Ear, nose, throat - hearing intact to finger rub, No scars, masses, or lesions over external nose or ears, no atrophy oftongue  Neck- symmetric, no masses noted, no jugular vein distension  Respiration- chest wall appears symmetric, good expansion, normal effort without use of accessory muscles  Musculoskeletal - no significant wasting of muscles noted, no bony deformities, gait no gross ataxia  Extremities- no clubbing, cyanosis oredema  Skin - warm, dry, and intact. No rash, erythema, or pallor.   Psychiatric - mood, affect, and behavior appear normal.      Neurologic Examination  Awake, Alert and oriented x 4  Normal speech pattern, following commands     Motor:  RIGHT: hand grasp 5/5               finger extension 5/5               bicep 4+/5               triceps 4+/5               deltoid 5/5     LEFT:   hand grasp 5/5               finger extension 5/5               bicep 5/5               triceps 5/5               deltoid 5/5     Decrease to pinprick sensation bilateral hands  Reflexes are 2+ and symmetric  No clonus or Hoffmans sign  No myofacial tenderness to palpation  Normal Gait pattern              DATA and IMAGING:     Nursing/pcp notes, imaging, labs, and vitals reviewed.      PT,OT and/or speech notes reviewed           Lab Results   Component Value Date     WBC 19.9 (H) 05/14/2019     HGB 10.5 (L) 05/14/2019     HCT 34.2 (L) 05/14/2019     MCV 96.6 (H) 05/14/2019      (H) 05/14/2019            Lab Results   Component Value Date      05/14/2019     K 4.6 05/14/2019      05/14/2019     CO2 24 05/14/2019     BUN 19 05/14/2019     CREATININE 1.0 05/14/2019     GLUCOSE 139 (H) 05/14/2019     CALCIUM 9.7 05/14/2019     PROT 7.9 05/14/2019     LABALBU 4.1 05/14/2019     BILITOT 0.6 05/14/2019     ALKPHOS 262 (H) 05/14/2019     AST 16 05/14/2019     ALT 31 05/14/2019     LABGLOM >60 05/14/2019            Lab Results   Component Value Date     INR 1.34 (H) 05/01/2019     INR 1.02 04/30/2019     PROTIME 15.9 (H) 05/01/2019     PROTIME 12.8 04/30/2019      Narrative   Examination. MRI CERVICAL SPINE WO CONTRAST 12/16/2019 6:13 AM   History: Chronic neck pain and right arm pain, numbness and tingling. Multiplanar, multisequence MR imaging of the brain is performed   without intravenous contrast enhancement. There is no previous study for comparison. The correlation made with   the previous radiographs of the cervical spine dated 9/11/2019. There is loss of cervical lordosis. The alignment is normal. The   vertebral body heights are normal.   There is loss of height and signal of the intervertebral disc of the   entire cervical spine, most pronounced at C4-5 and C6-7. The bone marrow signal of the vertebral bodies and the posterior   elements appear normal.   The atlantoaxial articulation is normal in intact. The ligaments are   normal.   C2, space C2-3. No disc bulging or herniation. There is bilateral   facet arthropathy. The neural foramina spinal canal are patent. C3, space C3-4. There are posterior osteophytes and moderate diffuse   disc bulging, asymmetrically more towards left. There is mild   compression on the thecal sac. There is bilateral facet arthropathy. There is narrowing of the neural foramina bilaterally. No significant   spinal stenosis. C4, space C4-5.  There are large posterior osteophytes and moderate   diffuse disc bulging. There is compression and displacement of the   thecal sac. There is bilateral facet arthropathy. There is bilateral   neural foraminal stenosis and moderate spinal stenosis. C5, space C5-6. There are prominent posterior osteophytes. There is   moderate diffuse disc bulging with compression on the thecal sac. There is bilateral facet arthropathy. There is bilateral neural   foraminal stenosis and moderate spinal stenosis. C6, space C6-7. There is moderate posterior osteophytes and a large   diffuse/broad-based disc bulging with compression and displacement of   the thecal sac. There is bilateral facet arthropathy. There is   bilateral neural foraminal stenosis and moderately severe spinal   stenosis. There is moderate narrowing of the thecal sac/cervical spinal cord   opposite C4-5, C5-6 and C6-7 due to prominent disc bulging and   osteophytes. No abnormal signal. The remaining cervical spinal cord. Unremarkable. The visualized edi, the medulla oblongata and cerebellum appear   normal.       Impression   The severe cervical spondylosis. Multilevel disc osteophyte complexes and facetal arthropathy and   resultant neural foramina spinal canal stenosis as detailed above. Signed by Dr Reinoso on 12/16/2019 9:06 AM   I have personally reviewed these images and my interpretation is:  DDD throughout  C3-4 moderate canal stenosis, the canal measures 7mm, severe left foraminal stenosis  C4-5 severe canal stenosis, the canal measures 5mm, severe left and moderate right foraminal stenosis  C5-6 severe canal stenosis, the canal measures 4mm, severe bilateral foraminal stenosis  C6-7 severe canal stenosis, the canal measures 5mm, severe bilateral foraminal stenosis         ASSESSMENT:     Rosa Maria Larson is a 44 y.o. male with complaints of neck pain, bilateral arm pain and right arm weakness.           ICD-10-CM     1.  Cervical stenosis of spinal canal M48.02 APTT       CBC       Comprehensive Metabolic Panel       EKG 12 Lead       Protime-INR       Type and Screen       Urinalysis Reflex to Culture       XR CHEST STANDARD (2 VW)   2. Foraminal stenosis of cervical region M48.02 APTT       CBC       Comprehensive Metabolic Panel       EKG 12 Lead       Protime-INR       Type and Screen       Urinalysis Reflex to Culture       XR CHEST STANDARD (2 VW)   3. Ossification of posterior longitudinal ligament (HCC) M48.8X9     4. Cervical myelopathy with cervical radiculopathy M47.12     5. Bilateral arm pain M79.601       M79.602     6. Right arm weakness R29.898     7. Numbness of left hand R20.0     8. DDD (degenerative disc disease), cervical M50.30     9. Fine motor impairment R29.818       R29.898     10. Neck pain M54.2           PLAN:  We have again discussed and reviewed the results of the MRI cervical spine with Mr. Larson at length. We explained that he does have severe canal stenosis with several levels of severe foraminal narrowing as well. We explained that his pain syndrome does correlate well with the imaging and that we could likely eliminate the majority of his arm pain with a surgical intervention.   We discussed both operative versus non-operative treatments.       He would need a C5, C6 corpectomy with placement of expandable cage with anterior cervical plate T1-R0, followed by 2nd phase with placement of lateral mass screws C3-C7     He will definitely need cardiac clearance and will need to come off of his ASA and Plavix.       Note: A total of >50% (21 minutes) of 40 minutes was spent discussing the pathophysiology and treatment and/or coordination of care of the above diagnoses.       Viola Chung, DO

## 2020-02-19 NOTE — ANESTHESIA PRE PROCEDURE
n/v       Problem List:    Patient Active Problem List   Diagnosis Code    Essential hypertension I10    Hx of bladder cancer Z85.51    Chronic bronchitis ([facility] J42    Type 2 diabetes mellitus without complication, without long-term current use of insulin (HCC) E11.9    Mixed anxiety depressive disorder F41.8    Mixed hyperlipidemia E78.2    Postoperative anemia due to acute blood loss D62    Hx of CABG Z95.1    Primary insomnia F51.01    Anxiety and depression F41.9, F32.9    Coronary artery disease I25.10       Past Medical History:        Diagnosis Date    CAD (coronary artery disease)     sees [facility]ology    Cancer [facility])     Bladder    COPD (chronic obstructive pulmonary disease) ([facility] [address] [facility]     Depression     Diabetes mellitus ([facility] [address] [facility]     History of blood transfusion     unsure thinks he did    Hyperlipidemia     Hypertension        Past Surgical History:        Procedure Laterality Date    BACK SURGERY      X3     BLADDER REMOVAL      CARDIAC CATHETERIZATION      COLONOSCOPY N/A 9/10/2019    Dr Angie Copeland, 5 yr recall    CORONARY ARTERY BYPASS GRAFT N/A 5/1/2019    CORONARY ARTERY BYPASS GRAFT X 3 WITH LEFT INTERNAL MAMMARY ARTERY, ENDOSCOPIC  VEIN HARVEST, WITH PERFUSION. performed by Vijay Mancia MD at [address] [place] [address]      lower ext    PROSTATECTOMY         Social History:    Social History     Tobacco Use    Smoking status: Current Every Day Smoker     Packs/day: 1.00     Years: 30.00     Pack years: 30.00    Smokeless tobacco: Never Used   Substance Use Topics    Alcohol use: Yes     Comment:  Occ                                Ready to quit: Not Answered  Counseling given: Not Answered      Vital Signs (Current):   Vitals:    02/19/20 0610   BP: (!) 169/72   Pulse: 74   Resp: 14   Temp: 98.2 °F (36.8 °C)   TempSrc: Tympanic   SpO2: 98%   Weight: 190 lb (86.2 kg)   Height: 5' 11\" (1.803 m)                                              BP Readings from Last 3 Encounters:   02/19/20 (!) 169/72   02/05/20 128/84   01/16/20 135/71       NPO Status: Time of last liquid consumption: 2200                        Time of last solid consumption: 2200                        Date of last liquid consumption: 02/18/20                        Date of last solid food consumption: 02/18/20    BMI:   Wt Readings from Last 3 Encounters:   02/19/20 190 lb (86.2 kg)   02/05/20 197 lb (89.4 kg)   01/30/20 190 lb (86.2 kg)     Body mass index is 26.5 kg/m².     CBC:   Lab Results   Component Value Date    WBC 8.3 01/30/2020    RBC 4.65 01/30/2020    HGB 11.6 01/30/2020    HCT 38.9 01/30/2020    HCT 43.8 02/17/2011    MCV 83.7 01/30/2020    RDW 15.2 01/30/2020     01/30/2020     02/17/2011       CMP:   Lab Results   Component Value Date     01/30/2020     02/17/2011    K 4.8 01/30/2020    K 4.4 04/30/2019    K 4.1 02/17/2011     01/30/2020     02/17/2011    CO2 25 01/30/2020    BUN 21 01/30/2020    CREATININE 1.1 01/30/2020    CREATININE 1.0 02/17/2011    LABGLOM >60 01/30/2020    GLUCOSE 89 01/30/2020    PROT 7.1 01/30/2020    PROT 6.9 02/17/2011    CALCIUM 9.3 01/30/2020    BILITOT <0.2 01/30/2020    ALKPHOS 78 01/30/2020    ALKPHOS 74 02/17/2011    AST 17 01/30/2020    ALT 11 01/30/2020       POC Tests:   Recent Labs     02/19/20  0615   POCGLU 134*       Coags:   Lab Results   Component Value Date    PROTIME 12.9 01/30/2020    INR 1.03 01/30/2020    APTT 26.6 01/30/2020       HCG (If Applicable): No results found for: PREGTESTUR, PREGSERUM, HCG, HCGQUANT     ABGs:   Lab Results   Component Value Date    PHART 7.380 05/02/2019    PO2ART 76.0 05/02/2019    GUF7OHE 37.0 05/02/2019    IRB9ZZM 21.9 05/02/2019    BEART -2.9 05/02/2019    Z1UUAKLD 93.6 05/02/2019        Type & Screen (If Applicable):  No results found for: [facility]    Anesthesia Evaluation  Patient summary reviewed and Nursing notes reviewed no history of anesthetic complications: blood products.                  Alok Chavez,    2/19/2020

## 2020-02-20 ENCOUNTER — APPOINTMENT (OUTPATIENT)
Dept: CT IMAGING | Age: 50
DRG: 455 | End: 2020-02-20
Attending: NEUROLOGICAL SURGERY
Payer: MEDICAID

## 2020-02-20 LAB
ANION GAP SERPL CALCULATED.3IONS-SCNC: 11 MMOL/L (ref 7–19)
ANISOCYTOSIS: ABNORMAL
BASOPHILS ABSOLUTE: 0 K/UL (ref 0–0.2)
BASOPHILS RELATIVE PERCENT: 0.2 % (ref 0–1)
BUN BLDV-MCNC: 17 MG/DL (ref 6–20)
CALCIUM SERPL-MCNC: 8.8 MG/DL (ref 8.6–10)
CHLORIDE BLD-SCNC: 99 MMOL/L (ref 98–111)
CO2: 26 MMOL/L (ref 22–29)
CREAT SERPL-MCNC: 1 MG/DL (ref 0.5–1.2)
EOSINOPHILS ABSOLUTE: 0 K/UL (ref 0–0.6)
EOSINOPHILS RELATIVE PERCENT: 0.1 % (ref 0–5)
GFR NON-AFRICAN AMERICAN: >60
GLUCOSE BLD-MCNC: 139 MG/DL (ref 74–109)
GLUCOSE BLD-MCNC: 146 MG/DL (ref 70–99)
GLUCOSE BLD-MCNC: 157 MG/DL (ref 70–99)
GLUCOSE BLD-MCNC: 168 MG/DL (ref 70–99)
HCT VFR BLD CALC: 32 % (ref 42–52)
HEMOGLOBIN: 9.2 G/DL (ref 14–18)
HYPOCHROMIA: ABNORMAL
IMMATURE GRANULOCYTES #: 0.1 K/UL
LYMPHOCYTES ABSOLUTE: 2.1 K/UL (ref 1.1–4.5)
LYMPHOCYTES RELATIVE PERCENT: 14.2 % (ref 20–40)
MCH RBC QN AUTO: 24 PG (ref 27–31)
MCHC RBC AUTO-ENTMCNC: 28.8 G/DL (ref 33–37)
MCV RBC AUTO: 83.6 FL (ref 80–94)
MONOCYTES ABSOLUTE: 1.9 K/UL (ref 0–0.9)
MONOCYTES RELATIVE PERCENT: 12.7 % (ref 0–10)
NEUTROPHILS ABSOLUTE: 10.8 K/UL (ref 1.5–7.5)
NEUTROPHILS RELATIVE PERCENT: 72.5 % (ref 50–65)
PDW BLD-RTO: 14.6 % (ref 11.5–14.5)
PERFORMED ON: ABNORMAL
PLATELET # BLD: 238 K/UL (ref 130–400)
PLATELET SLIDE REVIEW: ADEQUATE
PMV BLD AUTO: 9.9 FL (ref 9.4–12.4)
POLYCHROMASIA: ABNORMAL
POTASSIUM REFLEX MAGNESIUM: 4.2 MMOL/L (ref 3.5–5)
RBC # BLD: 3.83 M/UL (ref 4.7–6.1)
SODIUM BLD-SCNC: 136 MMOL/L (ref 136–145)
WBC # BLD: 14.9 K/UL (ref 4.8–10.8)

## 2020-02-20 PROCEDURE — 94762 N-INVAS EAR/PLS OXIMTRY CONT: CPT

## 2020-02-20 PROCEDURE — 94761 N-INVAS EAR/PLS OXIMETRY MLT: CPT

## 2020-02-20 PROCEDURE — 1210000000 HC MED SURG R&B

## 2020-02-20 PROCEDURE — 85025 COMPLETE CBC W/AUTO DIFF WBC: CPT

## 2020-02-20 PROCEDURE — 36415 COLL VENOUS BLD VENIPUNCTURE: CPT

## 2020-02-20 PROCEDURE — 2700000000 HC OXYGEN THERAPY PER DAY

## 2020-02-20 PROCEDURE — 82948 REAGENT STRIP/BLOOD GLUCOSE: CPT

## 2020-02-20 PROCEDURE — 6370000000 HC RX 637 (ALT 250 FOR IP): Performed by: NEUROLOGICAL SURGERY

## 2020-02-20 PROCEDURE — 6360000002 HC RX W HCPCS: Performed by: NEUROLOGICAL SURGERY

## 2020-02-20 PROCEDURE — 80048 BASIC METABOLIC PNL TOTAL CA: CPT

## 2020-02-20 PROCEDURE — 6370000000 HC RX 637 (ALT 250 FOR IP): Performed by: NURSE PRACTITIONER

## 2020-02-20 PROCEDURE — 94640 AIRWAY INHALATION TREATMENT: CPT

## 2020-02-20 PROCEDURE — 72125 CT NECK SPINE W/O DYE: CPT

## 2020-02-20 PROCEDURE — 99024 POSTOP FOLLOW-UP VISIT: CPT | Performed by: NURSE PRACTITIONER

## 2020-02-20 PROCEDURE — 97161 PT EVAL LOW COMPLEX 20 MIN: CPT

## 2020-02-20 RX ORDER — NICOTINE 21 MG/24HR
1 PATCH, TRANSDERMAL 24 HOURS TRANSDERMAL DAILY
Status: DISCONTINUED | OUTPATIENT
Start: 2020-02-20 | End: 2020-02-21 | Stop reason: HOSPADM

## 2020-02-20 RX ADMIN — MORPHINE SULFATE 4 MG: 4 INJECTION, SOLUTION INTRAMUSCULAR; INTRAVENOUS at 12:05

## 2020-02-20 RX ADMIN — MORPHINE SULFATE 4 MG: 4 INJECTION, SOLUTION INTRAMUSCULAR; INTRAVENOUS at 22:56

## 2020-02-20 RX ADMIN — INSULIN LISPRO 1 UNITS: 100 INJECTION, SOLUTION INTRAVENOUS; SUBCUTANEOUS at 20:34

## 2020-02-20 RX ADMIN — HYDROCODONE BITARTRATE AND ACETAMINOPHEN 2 TABLET: 10; 325 TABLET ORAL at 19:02

## 2020-02-20 RX ADMIN — ARIPIPRAZOLE 5 MG: 5 TABLET ORAL at 08:38

## 2020-02-20 RX ADMIN — ARFORMOTEROL TARTRATE 15 MCG: 15 SOLUTION RESPIRATORY (INHALATION) at 07:48

## 2020-02-20 RX ADMIN — BUDESONIDE 250 MCG: 0.25 INHALANT RESPIRATORY (INHALATION) at 07:48

## 2020-02-20 RX ADMIN — BUDESONIDE 250 MCG: 0.25 INHALANT RESPIRATORY (INHALATION) at 19:19

## 2020-02-20 RX ADMIN — MORPHINE SULFATE 30 MG: 1 INJECTION INTRAVENOUS at 03:42

## 2020-02-20 RX ADMIN — MORPHINE SULFATE 4 MG: 4 INJECTION, SOLUTION INTRAMUSCULAR; INTRAVENOUS at 10:06

## 2020-02-20 RX ADMIN — ATORVASTATIN CALCIUM 20 MG: 20 TABLET, FILM COATED ORAL at 08:38

## 2020-02-20 RX ADMIN — FLUOXETINE HYDROCHLORIDE 40 MG: 20 CAPSULE ORAL at 08:38

## 2020-02-20 RX ADMIN — FENOFIBRATE 54 MG: 54 TABLET ORAL at 08:38

## 2020-02-20 RX ADMIN — ATENOLOL 50 MG: 50 TABLET ORAL at 08:38

## 2020-02-20 RX ADMIN — ARFORMOTEROL TARTRATE 15 MCG: 15 SOLUTION RESPIRATORY (INHALATION) at 19:19

## 2020-02-20 RX ADMIN — TIOTROPIUM BROMIDE INHALATION SPRAY 2 PUFF: 3.12 SPRAY, METERED RESPIRATORY (INHALATION) at 08:38

## 2020-02-20 RX ADMIN — TRAZODONE HYDROCHLORIDE 100 MG: 100 TABLET ORAL at 20:35

## 2020-02-20 RX ADMIN — LISINOPRIL 10 MG: 10 TABLET ORAL at 08:38

## 2020-02-20 RX ADMIN — AMLODIPINE BESYLATE 10 MG: 10 TABLET ORAL at 08:38

## 2020-02-20 RX ADMIN — VENLAFAXINE HYDROCHLORIDE 150 MG: 75 CAPSULE, EXTENDED RELEASE ORAL at 08:38

## 2020-02-20 RX ADMIN — HYDROCODONE BITARTRATE AND ACETAMINOPHEN 2 TABLET: 10; 325 TABLET ORAL at 14:34

## 2020-02-20 ASSESSMENT — PAIN SCALES - GENERAL
PAINLEVEL_OUTOF10: 2
PAINLEVEL_OUTOF10: 9
PAINLEVEL_OUTOF10: 7
PAINLEVEL_OUTOF10: 10
PAINLEVEL_OUTOF10: 7
PAINLEVEL_OUTOF10: 10

## 2020-02-20 NOTE — PROGRESS NOTES
Physical Therapy    Facility/Department: [facility] SURG SERVICES  Initial Assessment    NAME: Hung Larson  : 1970  MRN: [id number]    Date of Service: 2020    Discharge Recommendations:  Home with assist PRN        Assessment   Assessment: pt CURRENTLY HAVING PAIN ISSUES, BUT STATES HE HAS UNDERSTANDING OF DON/DOFF CERVICAL COLLAR, POSITIONING TECHNIQUES AND PROPER SUP<>SIT TO AVOID STRAIN ON NECK  Prognosis: Good  Decision Making: Low Complexity  Patient Education: CERVICAL COLLAR DON/DOFF, POSITIONING, FALL PREVENTION  REQUIRES PT FOLLOW UP: No  Activity Tolerance  Activity Tolerance: Patient Tolerated treatment well       Patient Diagnosis(es): There were no encounter diagnoses. has a past medical history of CAD (coronary artery disease), Cancer ([facility] [address] COPD (chronic obstructive pulmonary disease) ([facility] [address] Depression, Diabetes mellitus ([facility] History of blood transfusion, Hyperlipidemia, and Hypertension. has a past surgical history that includes back surgery; Bladder removal; Prostatectomy; lymphadenectomy; Coronary artery bypass graft (N/A, 2019); Colonoscopy (N/A, 9/10/2019); and Cardiac catheterization. Restrictions  Restrictions/Precautions  Required Braces or Orthoses?: Yes  Required Braces or Orthoses  Cervical: c-collar(can remove collar for meals)  Vision/Hearing        Subjective  General  Diagnosis: ACF  Follows Commands: Within Functional Limits  Subjective  Subjective: Pt STATES HE HAS HAD PAIN CONTROL ISSUES THAT ARE LOCATED AT SX SITE AND NOT AS MUCH READIATING INTO UE'S. pt AND WIFE STATE THEY HAVE REMOVED CERVICAL COLLAR FOR LUNCH AND DO NOT FEEL LIKE THEY NEED FURTHER INSTRUCTION IN APPLICATION OF COLLAR.   Pain Assessment  Pain Assessment: (HAD PAIN MEDS AN HR PRIOR TO PT)       Orientation  Orientation  Overall Orientation Status: Within Functional Limits  Social/Functional History  Social/Functional History  Lives With: Spouse  ADL Assistance: Independent  Ambulation Assistance: Independent  Transfer Assistance: Independent  Cognition        Objective          AROM RLE (degrees)  RLE AROM: WFL  AROM LLE (degrees)  LLE AROM : WFL  Strength RLE  Strength RLE: WFL  Strength LLE  Strength LLE: WFL        Bed mobility  Rolling to Left: Independent  Supine to Sit: Supervision  Transfers  Sit to Stand: Stand by assistance;Contact guard assistance  Stand to sit: Contact guard assistance;Stand by assistance  Ambulation  Ambulation?: Yes  Ambulation 1  Surface: level tile  Device: No Device  Assistance: Stand by assistance;Contact guard assistance  Quality of Gait: SLOW AND GUARDED DUE TO PAIN. NO LOB NOTED. Pt HAS BEEN UP AD DEANGELO IN ROOM AND CAN CONTINUE TO AMB WITH WIFE OR NURSING STAFF PER POLICY.   Gait Deviations: Slow Mary  Distance: 150 FT              Plan   Plan  Times per week: EVAL ONLY  Safety Devices  Type of devices: (LEFT IN BR WITH WIFE ASSIST. WIFE HAS BEEN ASSISTING HIM TO BR PREVIOUSLY)    G-Code       OutComes Score                                                  AM-PAC Score             Goals  Short term goals  Short term goal 1: GOAL MET TO EVAL FOR NEED FOR SKILLED PT SERVICES IN THIS SETTING       Therapy Time   Individual Concurrent Group Co-treatment   Time In           Time Out           Minutes                   Nci Cavazos PT    Electronically signed by Nic Cavazos PT on 2/20/2020 at 3:43 PM

## 2020-02-20 NOTE — PROGRESS NOTES
[facility]  Post Op   Daily Progress Note        SUBJECTIVE:  Patient seen and examined. Mr. Rishi Billings is now s/p Phase 1: C5 and C6 corpectomy with placement of an expandable cage and anterior cervical plate B7-G8. Phase 2:  Posterior instrumented fusion C3-C7 using lateral mass screws and rods and is POD #1. He is sitting up in bed this morning stating that he did not sleep well due to various reasons such as the machine beeping. He complains of typical post-operative pain. Past Medical History:   Diagnosis Date    CAD (coronary artery disease)     sees [facility] cardiology    Cancer [facility])     Bladder    [facility]) [facility] [address] [address]     Depression     Diabetes mellitus ([facility] [address]     History of blood transfusion     unsure thinks he did    Hyperlipidemia     Hypertension        Past Surgical History:   Procedure Laterality Date    BACK SURGERY      X3     BLADDER REMOVAL      CARDIAC CATHETERIZATION      COLONOSCOPY N/A 9/10/2019    Dr Marilyn Flores, 5 yr recall    CORONARY ARTERY BYPASS GRAFT N/A 5/1/2019    CORONARY ARTERY BYPASS GRAFT X 3 WITH LEFT INTERNAL MAMMARY ARTERY, ENDOSCOPIC  VEIN HARVEST, WITH PERFUSION.  performed by Marcelina Dhillon MD at [address] [place] [address]    PROSTATECTOMY         Current Facility-Administered Medications   Medication Dose Route Frequency Provider Last Rate Last Dose    ARIPiprazole (ABILIFY) tablet 5 mg  5 mg Oral Daily Navya Iker, DO        atenolol (TENORMIN) tablet 50 mg  50 mg Oral Daily Navya Iker, DO        atorvastatin (LIPITOR) tablet 20 mg  20 mg Oral Daily Navya Iker, DO        baclofen (LIORESAL) tablet 10 mg  10 mg Oral TID PRN Navya Iker, DO        busPIRone (BUSPAR) tablet 10 mg  10 mg Oral TID PRN Navya Iker, DO        [START ON 2/23/2020] vitamin D (ERGOCALCIFEROL) capsule 50,000 Units  50,000 Units Oral Weekly Navya Iker, DO        fenofibrate (TRICOR) tablet Guillermina Beecham, DO   30 mg at 02/20/20 0342    insulin lispro (HUMALOG) injection vial 0-6 Units  0-6 Units Subcutaneous TID WC Guillermina Beecham, DO        insulin lispro (HUMALOG) injection vial 0-3 Units  0-3 Units Subcutaneous Nightly Guillermina Beecham, DO        budesonide (PULMICORT) nebulizer suspension 250 mcg  250 mcg Nebulization BID Guillermina Beecham, DO   250 mcg at 02/20/20 6548    Arformoterol Tartrate (BROVANA) nebulizer solution 15 mcg  15 mcg Nebulization BID Guillermina Beecham, DO   15 mcg at 02/20/20 0748    amLODIPine (NORVASC) tablet 10 mg  10 mg Oral Daily Guillermina Beecham, DO   10 mg at 02/19/20 1737       Allergies:  Latex and Demerol hcl [meperidine]    Social History:   Social History     Tobacco Use   Smoking Status Current Every Day Smoker    Packs/day: 1.00    Years: 30.00    Pack years: 30.00   Smokeless Tobacco Never Used     Social History     Substance and Sexual Activity   Alcohol Use Yes    Comment: Occ         Family History:   Family History   Problem Relation Age of Onset    Cancer Mother     Vision Loss Mother     Breast Cancer Mother     Coronary Art Dis Father     Obesity Father     Kidney Disease Father     High Blood Pressure Father     High Cholesterol Father     Hypercalcemia Sister     Obesity Brother     High Blood Pressure Brother        OBJECTIVE    PHYSICAL EXAM:  Vitals:    02/20/20 0710   BP: 136/67   Pulse: 66   Resp: 20   Temp: 95.4 °F (35.2 °C)   SpO2: 100%     Constitutional: He appears well-developed and well-nourished.    Eyes - conjunctiva normal.  Pupils react to light  Ear, nose, throat -hearing intact to finger rub, No scars, masses, or lesions over external nose or ears, no atrophy of tongue  Neck-symmetric, no masses noted, no jugular vein distension  Respiration- chest wall appears symmetric, good expansion, normal effort without use of accessory muscles  Musculoskeletal - no significant wasting of muscles noted, no bony deformities, gait no gross ataxia  Extremities-no clubbing, cyanosis or edema  Skin - warm, dry, and intact. No rash, erythema, or pallor. Psychiatric - mood, affect, and behavior appear normal.     Neurologic Examination  Awake, Alert and oriented x 4  Normal speech pattern, following commands     Motor:  RIGHT: hand grasp 5/5               finger extension 5/5               bicep 4+/5               triceps 4+/5               deltoid 5/5     LEFT:   hand grasp 5/5               finger extension 5/5               bicep 5/5               triceps 5/5               deltoid 5/5     Decrease to pinprick sensation bilateral hands  Reflexes are 2+ and symmetric  No clonus or Hoffmans sign      Wound: clean, dry and intact  Posterior Drain: 280  Anterior Drain: 70      DATA and IMAGING:    Nursing/pcp notes, imaging, labs, and vitals reviewed.      PT,OT and/or speech notes reviewed    Lab Results   Component Value Date    WBC 14.9 (H) 02/20/2020    HGB 9.2 (L) 02/20/2020    HCT 32.0 (L) 02/20/2020    MCV 83.6 02/20/2020     02/20/2020     Lab Results   Component Value Date     02/20/2020    K 4.2 02/20/2020    CL 99 02/20/2020    CO2 26 02/20/2020    BUN 17 02/20/2020    CREATININE 1.0 02/20/2020    GLUCOSE 139 (H) 02/20/2020    CALCIUM 8.8 02/20/2020    PROT 7.1 01/30/2020    LABALBU 4.0 01/30/2020    BILITOT <0.2 01/30/2020    ALKPHOS 78 01/30/2020    AST 17 01/30/2020    ALT 11 01/30/2020    LABGLOM >60 02/20/2020     Lab Results   Component Value Date    INR 1.03 01/30/2020    INR 1.34 (H) 05/01/2019    INR 1.02 04/30/2019    PROTIME 12.9 01/30/2020    PROTIME 15.9 (H) 05/01/2019    PROTIME 12.8 04/30/2019     Narrative   EXAMINATION: CT CERVICAL SPINE WO CONTRAST 2/20/2020 8:00 AM   HISTORY: CT CERVICAL SPINE without contrast dated 2/20/2020 4:00 AM   HISTORY: Anterior posterior fusion cervical spine   COMPARISON: None    DOSE LENGTH PRODUCT: 650 mGy cm   TECHNIQUE: Serial helical tomographic images of the cervical spine   were

## 2020-02-21 VITALS
WEIGHT: 190 LBS | HEART RATE: 65 BPM | OXYGEN SATURATION: 97 % | HEIGHT: 71 IN | RESPIRATION RATE: 16 BRPM | SYSTOLIC BLOOD PRESSURE: 126 MMHG | TEMPERATURE: 96 F | BODY MASS INDEX: 26.6 KG/M2 | DIASTOLIC BLOOD PRESSURE: 61 MMHG

## 2020-02-21 LAB
GLUCOSE BLD-MCNC: 170 MG/DL (ref 70–99)
PERFORMED ON: ABNORMAL

## 2020-02-21 PROCEDURE — 94640 AIRWAY INHALATION TREATMENT: CPT

## 2020-02-21 PROCEDURE — 6360000002 HC RX W HCPCS: Performed by: NEUROLOGICAL SURGERY

## 2020-02-21 PROCEDURE — 82948 REAGENT STRIP/BLOOD GLUCOSE: CPT

## 2020-02-21 PROCEDURE — 6370000000 HC RX 637 (ALT 250 FOR IP): Performed by: NURSE PRACTITIONER

## 2020-02-21 PROCEDURE — 99024 POSTOP FOLLOW-UP VISIT: CPT | Performed by: NURSE PRACTITIONER

## 2020-02-21 PROCEDURE — 6370000000 HC RX 637 (ALT 250 FOR IP): Performed by: NEUROLOGICAL SURGERY

## 2020-02-21 RX ORDER — METHOCARBAMOL 750 MG/1
750 TABLET, FILM COATED ORAL 4 TIMES DAILY PRN
Qty: 40 TABLET | Refills: 1 | Status: SHIPPED | OUTPATIENT
Start: 2020-02-21 | End: 2020-03-02

## 2020-02-21 RX ORDER — DOCUSATE SODIUM 100 MG/1
100 CAPSULE, LIQUID FILLED ORAL 2 TIMES DAILY PRN
Qty: 30 CAPSULE | Refills: 1 | Status: SHIPPED | OUTPATIENT
Start: 2020-02-21 | End: 2020-03-06

## 2020-02-21 RX ORDER — ONDANSETRON 4 MG/1
4 TABLET, FILM COATED ORAL EVERY 8 HOURS PRN
Qty: 30 TABLET | Refills: 1 | Status: SHIPPED | OUTPATIENT
Start: 2020-02-21 | End: 2020-03-06

## 2020-02-21 RX ORDER — HYDROCODONE BITARTRATE AND ACETAMINOPHEN 10; 325 MG/1; MG/1
2 TABLET ORAL EVERY 4 HOURS PRN
Qty: 90 TABLET | Refills: 0 | Status: SHIPPED | OUTPATIENT
Start: 2020-02-21 | End: 2020-03-22

## 2020-02-21 RX ADMIN — ARFORMOTEROL TARTRATE 15 MCG: 15 SOLUTION RESPIRATORY (INHALATION) at 08:17

## 2020-02-21 RX ADMIN — FLUOXETINE HYDROCHLORIDE 40 MG: 20 CAPSULE ORAL at 07:24

## 2020-02-21 RX ADMIN — LISINOPRIL 10 MG: 10 TABLET ORAL at 07:24

## 2020-02-21 RX ADMIN — FENOFIBRATE 54 MG: 54 TABLET ORAL at 07:25

## 2020-02-21 RX ADMIN — PANTOPRAZOLE SODIUM 40 MG: 40 TABLET, DELAYED RELEASE ORAL at 06:00

## 2020-02-21 RX ADMIN — MORPHINE SULFATE 4 MG: 4 INJECTION, SOLUTION INTRAMUSCULAR; INTRAVENOUS at 02:31

## 2020-02-21 RX ADMIN — ARIPIPRAZOLE 5 MG: 5 TABLET ORAL at 07:24

## 2020-02-21 RX ADMIN — MORPHINE SULFATE 4 MG: 4 INJECTION, SOLUTION INTRAMUSCULAR; INTRAVENOUS at 07:23

## 2020-02-21 RX ADMIN — ATORVASTATIN CALCIUM 20 MG: 20 TABLET, FILM COATED ORAL at 07:24

## 2020-02-21 RX ADMIN — AMLODIPINE BESYLATE 10 MG: 10 TABLET ORAL at 07:25

## 2020-02-21 RX ADMIN — BUDESONIDE 250 MCG: 0.25 INHALANT RESPIRATORY (INHALATION) at 08:17

## 2020-02-21 RX ADMIN — HYDROCODONE BITARTRATE AND ACETAMINOPHEN 2 TABLET: 10; 325 TABLET ORAL at 09:48

## 2020-02-21 RX ADMIN — HYDROCODONE BITARTRATE AND ACETAMINOPHEN 2 TABLET: 10; 325 TABLET ORAL at 01:01

## 2020-02-21 RX ADMIN — ATENOLOL 50 MG: 50 TABLET ORAL at 07:25

## 2020-02-21 RX ADMIN — TIOTROPIUM BROMIDE INHALATION SPRAY 2 PUFF: 3.12 SPRAY, METERED RESPIRATORY (INHALATION) at 07:26

## 2020-02-21 RX ADMIN — HYDROCODONE BITARTRATE AND ACETAMINOPHEN 2 TABLET: 10; 325 TABLET ORAL at 06:00

## 2020-02-21 ASSESSMENT — PAIN SCALES - GENERAL
PAINLEVEL_OUTOF10: 10
PAINLEVEL_OUTOF10: 7
PAINLEVEL_OUTOF10: 9
PAINLEVEL_OUTOF10: 7
PAINLEVEL_OUTOF10: 7

## 2020-02-21 NOTE — DISCHARGE SUMMARY
[facility]  Discharge Summary     Patient:   Smitha Romano  MR#:    [id number]      YOB: 1970  Date of Evaluation:  2/21/2020  Time of Note:                          7:44 AM  Primary/Referring Physician:  BRYAN Estes   Note Author:    BRYAN Cabrera        Admission Date: 2/19/2020    Discharge Date: 2/21/2020    Final Diagnoses: Cervical spondylosis with myelopathy and radiculopathy [M47.12, M47.22]    Procedures: Phase 1:  C5 and C6 corpectomy with placement of an expandable cage and anterior cervical plate Q5-M2. Phase 2:  Posterior instrumented fusion C3-C7 using lateral mass screws and rods    Consultations: PT, OT    Reason for Admission: Surgery     Hospital Course:  Ralene Cockayne Burden was admitted with the above named diagnosis, surgery was performed and tolerated well. At the time of discharge, the patient was afebrile, vitals stable, pain was controlled with oral medication, they were tolerating a by mouth diet, and voiding appropriately. Patient Condition: Stable    Disposition: Home    Wound Instructions: Able to shower. Please keep wound dry.   DO NOT SOAK WOUND    Diet: Diabetic diet     Resume home meds:      Medication List      START taking these medications    docusate sodium 100 MG capsule  Commonly known as:  Colace  Take 1 capsule by mouth 2 times daily as needed for Constipation     methocarbamol 750 MG tablet  Commonly known as:  Robaxin-750  Take 1 tablet by mouth 4 times daily as needed (spasm)     ondansetron 4 MG tablet  Commonly known as:  Zofran  Take 1 tablet by mouth every 8 hours as needed for Nausea or Vomiting        CHANGE how you take these medications    atenolol 50 MG tablet  Commonly known as:  TENORMIN  TAKE ONE TABLET EVERY DAY  What changed:    · how much to take  · how to take this  · when to take this  · additional instructions        CONTINUE taking these medications    ARIPiprazole 5 MG tablet  Commonly known as:  ABILIFY  TAKE 1

## 2020-02-21 NOTE — PROGRESS NOTES
Education given to patient along with prescriptions and importance of going to ER if throat swelling is present, education understood and follow up appointments have been made and given to the patient. Electronically signed by Jim Iraheta RN on 2/21/2020 at 10:17 AM

## 2020-02-24 ENCOUNTER — TELEPHONE (OUTPATIENT)
Dept: INTERNAL MEDICINE | Age: 50
End: 2020-02-24

## 2020-02-25 ENCOUNTER — TELEPHONE (OUTPATIENT)
Dept: INTERNAL MEDICINE | Age: 50
End: 2020-02-25

## 2020-02-25 NOTE — TELEPHONE ENCOUNTER
[facility]    Outreach made within 2 business days of discharge: Yes    Patient: Leslee Zarco Patient : 1970   MRN: [id number]    Reason for Admission:   Final Diagnoses: Cervical spondylosis with myelopathy and radiculopathy [M47.12, M47.22]     Procedures: Phase 1:  C5 and C6 corpectomy with placement of an expandable cage and anterior cervical plate H4-V3. Phase 2:  Posterior instrumented fusion C3-C7 using lateral mass screws and rods     Admission Date: 2020  Discharge Date: 20                 Spoke with: patient    Discharge department/facility:[facility] Patient Contact:  Was patient able to fill all prescriptions: Yes  Was patient instructed to bring all medications to the follow-up visit: Yes  Is patient taking all medications as directed in the discharge summary? Yes  Does patient understand their discharge instructions: Yes  Does patient have questions or concerns that need addressed prior to 7-14 day follow up office visit: no    Per patient it is painful, but he is surviving. He reports that his appetite is good and he has been resting well. Pt requested an appt for tomorrow with Mojgan Vaughn. Appt made for 10:30am tomorrow morning.      Scheduled appointment with PCP within 7-14 days    Follow Up  Future Appointments   Date Time Provider Milton Strickland   2020  9:45 AM BRYAN Almanza [facility]   2020 10:30 AM BRYAN [facility]   3/12/2020 10:45 AM MD HENRIETTA Boyle [facility]   2020  7:00 AM [facility]        Benny Torres LPN

## 2020-02-26 ENCOUNTER — OFFICE VISIT (OUTPATIENT)
Dept: NEUROSURGERY | Age: 50
End: 2020-02-26

## 2020-02-26 VITALS
WEIGHT: 193 LBS | HEIGHT: 71 IN | HEART RATE: 88 BPM | DIASTOLIC BLOOD PRESSURE: 83 MMHG | SYSTOLIC BLOOD PRESSURE: 149 MMHG | BODY MASS INDEX: 27.02 KG/M2

## 2020-02-26 PROCEDURE — 99024 POSTOP FOLLOW-UP VISIT: CPT | Performed by: NURSE PRACTITIONER

## 2020-02-26 ASSESSMENT — ENCOUNTER SYMPTOMS
BACK PAIN: 1
GASTROINTESTINAL NEGATIVE: 1
EYES NEGATIVE: 1

## 2020-02-26 NOTE — PROGRESS NOTES
WITH LEFT INTERNAL MAMMARY ARTERY, ENDOSCOPIC  VEIN HARVEST, WITH PERFUSION. performed by Barb Medellin MD at [place] [address] N/A 2/19/2020    Phase 2:  Posterior instrumented fusion C3-C7 using lateral mass screws and rods. performed by Chantel Patino DO at [address] [place] [address] ext    PROSTATECTOMY          Medications    Current Outpatient Medications:     HYDROcodone-acetaminophen (NORCO)  MG per tablet, Take 2 tablets by mouth every 4 hours as needed for Pain for up to 30 days. , Disp: 90 tablet, Rfl: 0    methocarbamol (ROBAXIN-750) 750 MG tablet, Take 1 tablet by mouth 4 times daily as needed (spasm), Disp: 40 tablet, Rfl: 1    docusate sodium (COLACE) 100 MG capsule, Take 1 capsule by mouth 2 times daily as needed for Constipation, Disp: 30 capsule, Rfl: 1    ondansetron (ZOFRAN) 4 MG tablet, Take 1 tablet by mouth every 8 hours as needed for Nausea or Vomiting, Disp: 30 tablet, Rfl: 1    traZODone (DESYREL) 50 MG tablet, TAKE 2 TABLETS BY MOUTH NIGHTLY AS NEEDED FOR SLEEP, Disp: 60 tablet, Rfl: 1    metFORMIN (GLUCOPHAGE) 1000 MG tablet, Take 1 tablet by mouth 2 times daily (with meals), Disp: 60 tablet, Rfl: 5    Cholecalciferol (VITAMIN D3) 1.25 MG (20787 UT) CAPS, Take 1 capsule by mouth once a week, Disp: , Rfl:     atorvastatin (LIPITOR) 20 MG tablet, Take 20 mg by mouth daily Indications: Changes in Cholesterol, Disp: , Rfl:     lisinopril (PRINIVIL;ZESTRIL) 10 MG tablet, TAKE 1 TABLET BY MOUTH DAILY, Disp: 30 tablet, Rfl: 2    omeprazole (PRILOSEC) 40 MG delayed release capsule, TAKE 1 CAPSULE BY MOUTH DAILY, Disp: 30 capsule, Rfl: 2    sildenafil (REVATIO) 20 MG tablet, Take 1-5 tablets 1-2 hours before sexual activity PRN, Disp: , Rfl:     tiotropium (SPIRIVA) 18 MCG inhalation capsule, Inhale 1 capsule into the lungs, Disp: , Rfl:     venlafaxine (EFFEXOR XR) 150 MG extended release capsule, Take 150 mg by mouth daily , Disp: , Rfl: 5  

## 2020-03-06 ENCOUNTER — TELEPHONE (OUTPATIENT)
Dept: CARDIOLOGY | Age: 50
End: 2020-03-06

## 2020-03-06 ENCOUNTER — OFFICE VISIT (OUTPATIENT)
Dept: PRIMARY CARE CLINIC | Age: 50
End: 2020-03-06
Payer: MEDICAID

## 2020-03-06 VITALS
HEIGHT: 71 IN | DIASTOLIC BLOOD PRESSURE: 72 MMHG | HEART RATE: 85 BPM | TEMPERATURE: 98.7 F | SYSTOLIC BLOOD PRESSURE: 138 MMHG | OXYGEN SATURATION: 98 % | WEIGHT: 190 LBS | BODY MASS INDEX: 26.6 KG/M2 | RESPIRATION RATE: 14 BRPM

## 2020-03-06 PROCEDURE — 1111F DSCHRG MED/CURRENT MED MERGE: CPT | Performed by: NURSE PRACTITIONER

## 2020-03-06 PROCEDURE — 99214 OFFICE O/P EST MOD 30 MIN: CPT | Performed by: NURSE PRACTITIONER

## 2020-03-06 ASSESSMENT — ENCOUNTER SYMPTOMS
ALLERGIC/IMMUNOLOGIC NEGATIVE: 1
TROUBLE SWALLOWING: 1
EYES NEGATIVE: 1
SHORTNESS OF BREATH: 1
SORE THROAT: 1
GASTROINTESTINAL NEGATIVE: 1

## 2020-03-06 NOTE — PROGRESS NOTES
day & as needed for symptoms of irregular blood glucose. busPIRone (BUSPAR) 10 MG tablet  Take 1 tablet by mouth 3 times daily as needed (anxiety)             Cholecalciferol (VITAMIN D3) 1.25 MG (81400 UT) CAPS  Take 1 capsule by mouth once a week             fenofibrate 160 MG tablet  TAKE 1 TABLET BY MOUTH DAILY             FLUoxetine (PROZAC) 40 MG capsule  Take 1 capsule by mouth daily             fluticasone-salmeterol (ADVAIR DISKUS) 100-50 MCG/DOSE diskus inhaler  Inhale 1 puff into the lungs every 12 hours             glucose monitoring kit (FREESTYLE) monitoring kit  Please provide glucometer that INS will cover for DM type 2             HYDROcodone-acetaminophen (NORCO)  MG per tablet  Take 2 tablets by mouth every 4 hours as needed for Pain for up to 30 days.              Lancets 30G MISC  1 each by Does not apply route daily 4 times daily             lisinopril (PRINIVIL;ZESTRIL) 10 MG tablet  TAKE 1 TABLET BY MOUTH DAILY             metFORMIN (GLUCOPHAGE) 1000 MG tablet  Take 1 tablet by mouth 2 times daily (with meals)             omeprazole (PRILOSEC) 40 MG delayed release capsule  TAKE 1 CAPSULE BY MOUTH DAILY             sildenafil (REVATIO) 20 MG tablet  Take 1-5 tablets 1-2 hours before sexual activity PRN             tiotropium (SPIRIVA) 18 MCG inhalation capsule  Inhale 1 capsule into the lungs             traZODone (DESYREL) 50 MG tablet  TAKE 2 TABLETS BY MOUTH NIGHTLY AS NEEDED FOR SLEEP                   Medications marked \"taking\" at this time  Outpatient Medications Marked as Taking for the 3/6/20 encounter (Office Visit) with BRYAN Anderson NP   Medication Sig Dispense Refill    baclofen (LIORESAL) 10 MG tablet TAKE ONE TABLET BY MOUTH THREE TIMES A DAY AS NEEDED FOR PAIN/SPASMS 90 tablet 0    busPIRone (BUSPAR) 10 MG tablet Take 1 tablet by mouth 3 times daily as needed (anxiety) 90 tablet 0    HYDROcodone-acetaminophen (NORCO)  MG per tablet Take 2 tablets by mouth every 4 hours as needed for Pain for up to 30 days. 90 tablet 0    traZODone (DESYREL) 50 MG tablet TAKE 2 TABLETS BY MOUTH NIGHTLY AS NEEDED FOR SLEEP 60 tablet 1    metFORMIN (GLUCOPHAGE) 1000 MG tablet Take 1 tablet by mouth 2 times daily (with meals) 60 tablet 5    Cholecalciferol (VITAMIN D3) 1.25 MG (57116 UT) CAPS Take 1 capsule by mouth once a week      atorvastatin (LIPITOR) 20 MG tablet Take 20 mg by mouth daily Indications: Changes in Cholesterol      lisinopril (PRINIVIL;ZESTRIL) 10 MG tablet TAKE 1 TABLET BY MOUTH DAILY 30 tablet 2    omeprazole (PRILOSEC) 40 MG delayed release capsule TAKE 1 CAPSULE BY MOUTH DAILY 30 capsule 2    sildenafil (REVATIO) 20 MG tablet Take 1-5 tablets 1-2 hours before sexual activity PRN      tiotropium (SPIRIVA) 18 MCG inhalation capsule Inhale 1 capsule into the lungs      ARIPiprazole (ABILIFY) 5 MG tablet TAKE 1 TABLET BY MOUTH DAILY 30 tablet 3    fenofibrate 160 MG tablet TAKE 1 TABLET BY MOUTH DAILY 30 tablet 5    fluticasone-salmeterol (ADVAIR DISKUS) 100-50 MCG/DOSE diskus inhaler Inhale 1 puff into the lungs every 12 hours 60 each 3    FLUoxetine (PROZAC) 40 MG capsule Take 1 capsule by mouth daily 30 capsule 1    blood glucose monitor strips Test 4 times a day & as needed for symptoms of irregular blood glucose.  100 strip 3    glucose monitoring kit (FREESTYLE) monitoring kit Please provide glucometer that INS will cover for DM type 2 1 kit 0    Lancets 30G MISC 1 each by Does not apply route daily 4 times daily 100 each 3    aspirin 81 MG EC tablet Take 1 tablet by mouth daily 30 tablet 3    atenolol (TENORMIN) 50 MG tablet TAKE ONE TABLET EVERY DAY (Patient taking differently: Take 50 mg by mouth daily TAKE ONE TABLET EVERY DAY AT LUNCH) 30 tablet 5        Medications patient taking as of now reconciled against medications ordered at time of hospital discharge: Yes    Chief Complaint   Patient presents with    [facility]     Cervical Spondylosis    Congestion     thinks he is getting over pneumonia or has it    Cough    Shortness of Breath       Patient is here for a post-op TCM visit related to cervical spondylosis. Patient reports he has experienced congestion, cough, shortness of breath, and nasal drainage. Patient reports he feels like he is having some swelling from the front cervical incision. Patient is currently wearing cervical spine collar correctly. Denies choking. Inpatient course: Discharge summary reviewed- see chart. Interval history/Current status: stable    Review of Systems   Constitutional: Positive for fatigue. HENT: Positive for congestion, sore throat and trouble swallowing. Eyes: Negative. Respiratory: Positive for shortness of breath. Cardiovascular: Negative. Gastrointestinal: Negative. Endocrine: Negative. Genitourinary: Negative. Musculoskeletal: Positive for arthralgias, neck pain and neck stiffness. Skin: Negative. Allergic/Immunologic: Negative. Neurological: Negative. Hematological: Negative. Psychiatric/Behavioral: Negative. Vitals:    03/06/20 1355   BP: 138/72   Pulse: 85   Resp: 14   Temp: 98.7 °F (37.1 °C)   TempSrc: Temporal   SpO2: 98%   Weight: 190 lb (86.2 kg)   Height: 5' 11\" (1.803 m)     Body mass index is 26.5 kg/m². Wt Readings from Last 3 Encounters:   03/06/20 190 lb (86.2 kg)   02/26/20 193 lb (87.5 kg)   02/19/20 190 lb (86.2 kg)     BP Readings from Last 3 Encounters:   03/06/20 138/72   02/26/20 (!) 149/83   02/21/20 126/61       Physical Exam  Vitals signs and nursing note reviewed. Constitutional:       General: He is not in acute distress. Appearance: He is well-developed. He is not diaphoretic. HENT:      Head: Normocephalic. Right Ear: External ear normal.      Left Ear: External ear normal.      Nose: Rhinorrhea present.       Mouth/Throat:      Mouth: Mucous membranes are moist.      Pharynx: Posterior oropharyngeal erythema present. No oropharyngeal exudate. Eyes:      General:         Right eye: No discharge. Left eye: No discharge. Conjunctiva/sclera: Conjunctivae normal.      Pupils: Pupils are equal, round, and reactive to light. Neck:      Musculoskeletal: Decreased range of motion. Edema and pain with movement present. Trachea: No tracheal deviation. Comments: Two healing surgical wounds as mentioned in the diagram  Cardiovascular:      Rate and Rhythm: Normal rate and regular rhythm. Pulses: Normal pulses. Pulmonary:      Effort: Pulmonary effort is normal. No respiratory distress. Breath sounds: Normal breath sounds. No stridor. Abdominal:      General: Bowel sounds are normal.      Palpations: Abdomen is soft. Tenderness: There is no abdominal tenderness. Musculoskeletal:         General: No tenderness or deformity. Lymphadenopathy:      Cervical: No cervical adenopathy. Skin:     General: Skin is warm and dry. Capillary Refill: Capillary refill takes less than 2 seconds. Findings: No rash. Neurological:      Mental Status: He is alert and oriented to person, place, and time. Cranial Nerves: No cranial nerve deficit. Psychiatric:         Behavior: Behavior normal.         Thought Content: Thought content normal.         Judgment: Judgment normal.         Assessment/Plan:  1. Hospital discharge follow-up  - MD DISCHARGE MEDS RECONCILED W/ CURRENT OUTPATIENT MED LIST    2. Postop check  - Patient is doing well; patient denies any needs at this time    3.  Dysphagia, unspecified type  - take smaller bites when eating meals; drink water with meals        Medical Decision Making: moderate complexity

## 2020-03-12 ENCOUNTER — OFFICE VISIT (OUTPATIENT)
Dept: CARDIOLOGY | Age: 50
End: 2020-03-12
Payer: MEDICAID

## 2020-03-12 VITALS
BODY MASS INDEX: 26.5 KG/M2 | HEART RATE: 80 BPM | SYSTOLIC BLOOD PRESSURE: 120 MMHG | HEIGHT: 71 IN | DIASTOLIC BLOOD PRESSURE: 70 MMHG

## 2020-03-12 DIAGNOSIS — E78.2 MIXED HYPERLIPIDEMIA: ICD-10-CM

## 2020-03-12 LAB
CHOLESTEROL, TOTAL: 122 MG/DL (ref 160–199)
HDLC SERPL-MCNC: 43 MG/DL (ref 55–121)
LDL CHOLESTEROL CALCULATED: 59 MG/DL
TRIGL SERPL-MCNC: 101 MG/DL (ref 0–149)

## 2020-03-12 PROCEDURE — 3017F COLORECTAL CA SCREEN DOC REV: CPT | Performed by: INTERNAL MEDICINE

## 2020-03-12 PROCEDURE — 1111F DSCHRG MED/CURRENT MED MERGE: CPT | Performed by: INTERNAL MEDICINE

## 2020-03-12 PROCEDURE — G8427 DOCREV CUR MEDS BY ELIG CLIN: HCPCS | Performed by: INTERNAL MEDICINE

## 2020-03-12 PROCEDURE — 4004F PT TOBACCO SCREEN RCVD TLK: CPT | Performed by: INTERNAL MEDICINE

## 2020-03-12 PROCEDURE — 99213 OFFICE O/P EST LOW 20 MIN: CPT | Performed by: INTERNAL MEDICINE

## 2020-03-12 PROCEDURE — G8419 CALC BMI OUT NRM PARAM NOF/U: HCPCS | Performed by: INTERNAL MEDICINE

## 2020-03-12 PROCEDURE — G8484 FLU IMMUNIZE NO ADMIN: HCPCS | Performed by: INTERNAL MEDICINE

## 2020-03-12 ASSESSMENT — ENCOUNTER SYMPTOMS
SHORTNESS OF BREATH: 0
NAUSEA: 0
GASTROINTESTINAL NEGATIVE: 1
VOMITING: 0
DIARRHEA: 0
RESPIRATORY NEGATIVE: 1
EYES NEGATIVE: 1

## 2020-03-12 NOTE — PROGRESS NOTES
90 tablet 0    traZODone (DESYREL) 50 MG tablet TAKE 2 TABLETS BY MOUTH NIGHTLY AS NEEDED FOR SLEEP 60 tablet 1    metFORMIN (GLUCOPHAGE) 1000 MG tablet Take 1 tablet by mouth 2 times daily (with meals) 60 tablet 5    Cholecalciferol (VITAMIN D3) 1.25 MG (33641 UT) CAPS Take 1 capsule by mouth once a week      atorvastatin (LIPITOR) 20 MG tablet Take 20 mg by mouth daily Indications: Changes in Cholesterol      lisinopril (PRINIVIL;ZESTRIL) 10 MG tablet TAKE 1 TABLET BY MOUTH DAILY 30 tablet 2    omeprazole (PRILOSEC) 40 MG delayed release capsule TAKE 1 CAPSULE BY MOUTH DAILY 30 capsule 2    sildenafil (REVATIO) 20 MG tablet Take 1-5 tablets 1-2 hours before sexual activity PRN      tiotropium (SPIRIVA) 18 MCG inhalation capsule Inhale 1 capsule into the lungs      ARIPiprazole (ABILIFY) 5 MG tablet TAKE 1 TABLET BY MOUTH DAILY 30 tablet 3    fenofibrate 160 MG tablet TAKE 1 TABLET BY MOUTH DAILY 30 tablet 5    fluticasone-salmeterol (ADVAIR DISKUS) 100-50 MCG/DOSE diskus inhaler Inhale 1 puff into the lungs every 12 hours 60 each 3    FLUoxetine (PROZAC) 40 MG capsule Take 1 capsule by mouth daily 30 capsule 1    blood glucose monitor strips Test 4 times a day & as needed for symptoms of irregular blood glucose. 100 strip 3    glucose monitoring kit (FREESTYLE) monitoring kit Please provide glucometer that INS will cover for DM type 2 1 kit 0    Lancets 30G MISC 1 each by Does not apply route daily 4 times daily 100 each 3    aspirin 81 MG EC tablet Take 1 tablet by mouth daily 30 tablet 3    atenolol (TENORMIN) 50 MG tablet TAKE ONE TABLET EVERY DAY (Patient taking differently: Take 50 mg by mouth daily TAKE ONE TABLET EVERY DAY AT LUNCH) 30 tablet 5     No current facility-administered medications for this visit.       Social History     Socioeconomic History    Marital status:      Spouse name: Not on file    Number of children: Not on file    Years of education: Not on file    Highest education level: Not on file   Occupational History    Not on file   Social Needs    Financial resource strain: Not on file    Food insecurity     Worry: Not on file     Inability: Not on file    Transportation needs     Medical: Not on file     Non-medical: Not on file   Tobacco Use    Smoking status: Current Every Day Smoker     Packs/day: 1.00     Years: 30.00     Pack years: 30.00    Smokeless tobacco: Never Used   Substance and Sexual Activity    Alcohol use: Yes     Comment: Occ    Drug use: Never    Sexual activity: Not on file   Lifestyle    Physical activity     Days per week: Not on file     Minutes per session: Not on file    Stress: Not on file   Relationships    Social connections     Talks on phone: Not on file     Gets together: Not on file     Attends Buddhism service: Not on file     Active member of club or organization: Not on file     Attends meetings of clubs or organizations: Not on file     Relationship status: Not on file    Intimate partner violence     Fear of current or ex partner: Not on file     Emotionally abused: Not on file     Physically abused: Not on file     Forced sexual activity: Not on file   Other Topics Concern    Not on file   Social History Narrative     16 years second marriage    On disability due to multiple back surgeries and bladder cancer    [address] [place] [address]    Education high school    Smokes 1-1/2 pack per day drinks alcohol occasionally denies substance usage    Physically sedentary    Former  at an [address] [place] [address]    Never in the [employer]       Physical Examination:  /70   Pulse 80   Ht 5' 11\" (1.803 m)   BMI 26.50 kg/m²   Physical Exam  Vitals signs reviewed. Constitutional:       Appearance: He is well-developed. Neck:      Vascular: No carotid bruit or JVD. Cardiovascular:      Rate and Rhythm: Normal rate and regular rhythm. Heart sounds: Normal heart sounds. No murmur. No friction rub. No gallop. Pulmonary:      Effort: Pulmonary effort is normal. No respiratory distress. Breath sounds: Normal breath sounds. No wheezing or rales. Abdominal:      General: There is no distension. Tenderness: There is no abdominal tenderness. Lymphadenopathy:      Cervical: No cervical adenopathy. Skin:     General: Skin is warm and dry. ASSESSMENT:     Diagnosis Orders   1. Mixed hyperlipidemia     2. Essential hypertension     3. Coronary artery disease involving native coronary artery of native heart without angina pectoris     4. Hx of CABG     5. Hx of bladder cancer         PLAN:  No orders of the defined types were placed in this encounter. No orders of the defined types were placed in this encounter. 1. Continue present medications  2. Recommend follow-up assessment in 6 months    Return in about 6 months (around 9/12/2020) for return to Dr. Lulu Galvan only. Kaila Miller MD 3/12/2020 11:34 AM CDT    [facility]      Thisdictation was generated by voice recognition computer software. Although all attempts are made to edit the dictation for accuracy, there may be errors in the transcription that are not intended.

## 2020-03-23 RX ORDER — FLUOXETINE HYDROCHLORIDE 40 MG/1
40 CAPSULE ORAL DAILY
Qty: 30 CAPSULE | Refills: 3 | Status: SHIPPED | OUTPATIENT
Start: 2020-03-23 | End: 2020-07-21

## 2020-03-23 NOTE — TELEPHONE ENCOUNTER
Chris Larson called to request a refill on his medication.       Last office visit : 3/6/2020   Next office visit : 5/8/2020     Requested Prescriptions     Pending Prescriptions Disp Refills    FLUoxetine (PROZAC) 40 MG capsule [Pharmacy Med Name: FLUOXETINE HCL 40 MG CAP 40 CAP] 30 capsule 3     Sig: TAKE 1 CAPSULE BY MOUTH DAILY            Fay Cash MA

## 2020-04-13 RX ORDER — ATORVASTATIN CALCIUM 20 MG/1
TABLET, FILM COATED ORAL
Qty: 30 TABLET | Refills: 3 | Status: SHIPPED | OUTPATIENT
Start: 2020-04-13 | End: 2020-08-19

## 2020-04-13 RX ORDER — FENOFIBRATE 160 MG/1
TABLET ORAL
Qty: 30 TABLET | Refills: 5 | Status: SHIPPED | OUTPATIENT
Start: 2020-04-13 | End: 2020-10-16

## 2020-04-13 RX ORDER — BUSPIRONE HYDROCHLORIDE 10 MG/1
10 TABLET ORAL 3 TIMES DAILY PRN
Qty: 90 TABLET | Refills: 0 | Status: SHIPPED | OUTPATIENT
Start: 2020-04-13 | End: 2020-05-12

## 2020-04-13 RX ORDER — ERGOCALCIFEROL 1.25 MG/1
CAPSULE ORAL
Qty: 4 CAPSULE | Refills: 5 | Status: SHIPPED | OUTPATIENT
Start: 2020-04-13 | End: 2020-06-17

## 2020-04-13 RX ORDER — OMEPRAZOLE 40 MG/1
40 CAPSULE, DELAYED RELEASE ORAL DAILY
Qty: 30 CAPSULE | Refills: 2 | Status: SHIPPED | OUTPATIENT
Start: 2020-04-13 | End: 2020-07-21

## 2020-04-23 RX ORDER — BACLOFEN 10 MG/1
TABLET ORAL
Qty: 90 TABLET | Refills: 0 | Status: SHIPPED | OUTPATIENT
Start: 2020-04-23 | End: 2020-05-22

## 2020-04-27 ENCOUNTER — RESULTS ENCOUNTER (OUTPATIENT)
Dept: UROLOGY | Facility: CLINIC | Age: 50
End: 2020-04-27

## 2020-04-27 DIAGNOSIS — C67.8 MALIGNANT NEOPLASM OF OVERLAPPING SITES OF BLADDER (HCC): ICD-10-CM

## 2020-04-28 ENCOUNTER — TELEPHONE (OUTPATIENT)
Dept: NEUROSURGERY | Age: 50
End: 2020-04-28

## 2020-05-04 NOTE — PROGRESS NOTES
Mr. Tafoya is 50 y.o. male    CHIEF COMPLAINT: I am here today for my 6 month follow up for Bladder Cancer. Imaging and labs were done at [facility].     HPI    Bladder cancer  The location, severity of disease, quality, duration of onset, and treatment initiated are reviewed. This is unchanged from last visit except where updated as need. His interval history is as follows:     Jarad is doing well.  He lost some weight but this is intentional..  He denies hematuria.  His appetite is good.    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  Duration: He was diagnosed in June 2016 when he first presented with carcinoma in situ that was high-grade.  Context: This was identified during for an evaluation of dysuria and gross hematuria  Modifying factors: Patient underwent induction BCG on 2 separate occasions followed by a single maintenance BCG after the second induction.  Subsequent biopsy showed that he was BCG refractory. He then underwent cystectomy and ileal loop which has resulted in KYLEE status.        History related to this issue:   -11/2017: Radical cystoprostatectomy/BPLND by Jessie Badillo [facility] [place].   Final Diagnosis            1. DISTAL RIGHT URETER, BIOPSY:               BENIGN URETERAL TISSUE.           2. DISTAL LEFT URETER, BIOPSY:               BENIGN URETERAL TISSUE.            3. PELVIC APICAL MARGIN, BIOPSY:               BENIGN FIBROVASCULAR, FATTY, AND NERVE TISSUES, WITH GANGLION.              4. RADICAL CYSTOPROSTATECTOMY SPECIMEN:               FLAT UROTHELIAL CARCINOMA IN SITU.               EXTENSIVE MUCOSAL/SUBMUCOSAL FIBROSIS WITH CHRONIC                             INFLAMMATION                            AND REACTIVE CHANGE.               NEGATIVE SEMINAL VESICLE AND VAS DEFERENS MARGINS.               NEGATIVE URETERAL MARGINS.               NEGATIVE PROSTATIC  URETHRAL MARGIN.               PROSTATE WITH NECROTIZING GRANULOMATOUS INFLAMMATION.           5. RIGHT PELVIC LYMPH NODES (4):               NO TUMOR IDENTIFIED.               6. LEFT PELVIC LYMPH NODES (6):               NO TUMOR IDENTIFIED.         -09/2017: BLADDER BIOPSY  Final Diagnosis   1.  Urinary bladder, right erythema, biopsy:  Focal , flat high-grade urothelial carcinoma in situ.   Severe mixed inflammation  Negative for invasive carcinoma     2.  Urinary bladder, left lateral wall tumor, biopsy:  Focal , flat high-grade urothelial carcinoma in situ.   Severe mixed inflammation  Negative for invasive carcinoma      Addendum electronically signed by Gini Ho MD on 9/13/2017 at 1224         - 03/2017: TURBT  Final Diagnosis   1.  Urinary bladder, lesion, biopsy:  Reactive urothelium  Severe mixed inflammation     2.  Urinary bladder, dome tumor, biopsy:  Non-invasive, high-grade papillary urothelial carcinoma  Muscularis propria (detrusor muscle) present   Electronically signed by Gini Ho MD on 4/4/2017 at 1440         - 11/2016: TURBT/Bx's after second course of induction BCG  Final Diagnosis   1.  Urinary bladder, right lateral wall, biopsy :  Severe mixed inflammation  Denuded urothelium  Rare urothelium present in demonstrates reactive changes  Negative for invasive carcinoma     2.  Urinary bladder, dome, biopsy :  Reactive urothelium  Mixed inflammation  Negative for invasive carcinoma     3.  Urinary bladder, left lateral wall, biopsy:  Reactive urothelium  Mixed inflammation  Negative for invasive carcinoma   Electronically signed by Gini Ho MD on 12/7/2016 at 0834         -09/2016: TURBT after intial induction BCG                          Final Diagnosis                           1.     Urinary bladder, biopsy at previous tumor site:                                      A.     Focal high-grade urothelial atypia consistent with residual                           flat  urothelial carcinoma in                                           situ.                                       B.     Negative for evidence of invasive malignancy.                                           C.     Changes compatible with previous biopsy site identified                           with mild to focally moderate chronic inflammation.                                                       2.      Urinary bladder, random bladder biopsies:                                      A.     Flat high-grade urothelial carcinoma in situ.                                      B.     Mild to focally moderate chronic inflammation.                                      C.     Negative for evidence of invasive malignancy.                                                             AJCC stage:  pTis, pNX, pMX.    - 6/2016: TURBT -  Initial                          Final Diagnosis                           1.     Urinary bladder, biopsy of urothelium around tumor:                                      A.     Focal changes of a high-grade non-invasive papillary urothelial                           carcinoma.                                      B.     Urothelial carcinoma in situ.                                                        2.     Urinary bladder, transurethral resection:                                      A.     Non-invasive papillary carcinoma, high-grade.                                       B.     Detrusor muscle present.                                                        Pathologic AJCC classification:  pTa and pTis.        Other issues to be evaluated/managed today:  -Erectile dysfunction: His erectile dysfunction persists.  He has a both of vascular etiology as well as neurologic having undergone cystoprostatectomy.  He is only partially responsive to sildenafil.           The following portions of the patient's history were reviewed and updated as appropriate: allergies, current medications, past family  history, past medical history, past social history, past surgical history and problem list.      Review of Systems   Constitutional: Negative for chills and fever.   Gastrointestinal: Negative for abdominal pain, anal bleeding and blood in stool.   Genitourinary: Negative for dysuria, frequency, hematuria and urgency.         Current Outpatient Medications:   •  ARIPiprazole (ABILIFY) 5 MG tablet, Take 5 mg by mouth Daily., Disp: , Rfl:   •  aspirin 81 MG EC tablet, Take 81 mg by mouth Daily., Disp: , Rfl:   •  atenolol (TENORMIN) 50 MG tablet, Take 1 tablet by mouth 2 (Two) Times a Day., Disp: 60 tablet, Rfl: 0  •  atorvastatin (LIPITOR) 20 MG tablet, Take 20 mg by mouth Daily., Disp: , Rfl:   •  baclofen (LIORESAL) 10 MG tablet, Take 10 mg by mouth 3 (Three) Times a Day., Disp: , Rfl:   •  busPIRone (BUSPAR) 5 MG tablet, Take 5 mg by mouth., Disp: , Rfl:   •  clopidogrel (PLAVIX) 75 MG tablet, Take 75 mg by mouth Daily., Disp: , Rfl:   •  fenofibrate 160 MG tablet, Take 160 mg by mouth Daily., Disp: , Rfl:   •  FLUoxetine (PROzac) 20 MG capsule, Take 40 mg by mouth Daily., Disp: , Rfl:   •  HYDROcodone-acetaminophen (NORCO)  MG per tablet, Take 1 tablet by mouth Every 6 (Six) Hours As Needed for Moderate Pain ., Disp: , Rfl:   •  lisinopril (PRINIVIL,ZESTRIL) 10 MG tablet, Take 10 mg by mouth Daily., Disp: , Rfl:   •  metFORMIN (GLUCOPHAGE) 500 MG tablet, Take 1 tablet by mouth 2 (Two) Times a Day With Meals., Disp: 60 tablet, Rfl: 1  •  omeprazole (priLOSEC) 40 MG capsule, Take 40 mg by mouth Daily., Disp: , Rfl:   •  sildenafil (REVATIO) 20 MG tablet, Take 1-5 tablets 1-2 hours before sexual activity PRN, Disp: 30 tablet, Rfl: 11  •  sildenafil (VIAGRA) 100 MG tablet, Take 1 tablet by mouth As Needed for Erectile Dysfunction (1-4 Hours before activity)., Disp: 10 tablet, Rfl: 11  •  tiotropium (SPIRIVA) 18 MCG per inhalation capsule, Place 1 capsule into inhaler and inhale Daily., Disp: , Rfl:   •   traZODone (DESYREL) 50 MG tablet, Take 50 mg by mouth Every Night., Disp: , Rfl:   •  vitamin D (ERGOCALCIFEROL) 1.25 MG (07251 UT) capsule capsule, Take 50,000 Units by mouth 1 (One) Time Per Week., Disp: , Rfl:     Past Medical History:   Diagnosis Date   • Anxiety    • Back pain    • Bladder carcinoma (CMS/HCC) 06/2016    High grade CIS & ta low grade   • H/O hematuria    • History of pneumonia 2011   • Hypertension    • Overweight    • Smoking    • Urinary retention    • Wheezing     r/t smoking       Past Surgical History:   Procedure Laterality Date   • BACK SURGERY      x3   • CYSTECTOMY N/A 11/2/2017    Procedure: CYSTOPROSTATECTOMY WITH BILATERAL PELVIC LYMPHADENECTOMY AND ILEAL CONDUIT URINARY DIVERSON;  Surgeon: Vijay Badillo Jr., MD;  Location: [facility] MAIN OR;  Service:    • CYSTOSCOPY N/A 10/9/2017    Procedure: CYSTOSCOPY AND EXAM UNDER ANESTHESIA;  Surgeon: Vijay Badillo Jr., MD;  Location: [facility] MAIN OR;  Service:    • CYSTOSCOPY BLADDER BIOPSY N/A 11/30/2016    Procedure: CYSTOSCOPY BLADDER BIOPSY fulgeration of 3cm area of bladder;  Surgeon: Brandon Wolfe MD;  Location:  [facility] OR;  Service:    • CYSTOSCOPY RETROGRADE PYELOGRAM Bilateral 7/14/2017    Procedure: CYSTOSCOPY RETROGRADE PYELOGRAM;  Surgeon: Brandon Wolfe MD;  Location: [facility] OR;  Service:    • TRANSURETHRAL RESECTION OF BLADDER TUMOR N/A 3/31/2017    Procedure: CYSTOSCOPY , BLADDER BIOPSY, AND TRANSURETHRAL RESECTION OF BLADDER TUMOR ;  Surgeon: Brandon Wolfe MD;  Location:  [facility] OR;  Service:    • TRANSURETHRAL RESECTION OF BLADDER TUMOR  06/2016    High Grade CIS & Low grade ta disease   • TRANSURETHRAL RESECTION OF BLADDER TUMOR N/A 7/14/2017    Procedure: CYSTOSCOPY TRANSURETHRAL RESECTION OF BLADDER TUMOR & BILATERAL RETROGRADE URETEROPYELOGRAMS;  Surgeon: Brandon Wolfe MD;  Location:  [facility] OR;  Service:        Social History     Socioeconomic History   • Marital status:      Spouse name: Not on  "file   • Number of children: Not on file   • Years of education: Not on file   • Highest education level: Not on file   Tobacco Use   • Smoking status: Current Every Day Smoker     Packs/day: 1.00     Years: 34.00     Pack years: 34.00     Types: Cigarettes   • Smokeless tobacco: Never Used   Substance and Sexual Activity   • Alcohol use: No   • Drug use: No   • Sexual activity: Defer       Family History   Problem Relation Age of Onset   • No Known Problems Father    • No Known Problems Mother    • Malig Hyperthermia Neg Hx          Temp 96.2 °F (35.7 °C)   Ht 180.3 cm (71\")   Wt 87.1 kg (192 lb)   BMI 26.78 kg/m²       Physical Exam  Constitutional:  They  appear well-developed and well-nourished. There are no obvious deformities. No distress. The vital signs are reviewed  Pulmonary/Chest: Effort normal.   GI: Soft. The patient exhibits no distension and no mass. There is no tenderness. There is no rebound and no guarding. No hernia. Stoma is pink patent and protruding but not an abnormal amount.  Neurological: Patient is alert and oriented to person, place, and time.   Skin: Skin is warm and dry. Not diaphoretic.   Psychiatric:  normal mood and affect. Not agitated.         Data  Serum creatinine 4/24/2020 was 1.7  Serum creatinine 5/7/2020 was 1.39      Study Result     CT ABDOMEN PELVIS W CONTRAST- 5/5/2020 8:39 AM CDT     HISTORY: bladder cancer - s/p cystectomy/ileal loop; C67.8-Malignant  neoplasm of overlapping sites of bladder      COMPARISON: 04/01/2019     DOSE LENGTH PRODUCT: 382 mGy cm. Automated exposure control was also  utilized to decrease patient radiation dose.     TECHNIQUE: Axial images of the abdomen pelvis are obtained following IV  and oral contrast     FINDINGS:  There is no suspicious focal liver or splenic mass. No  pancreatic cyst or mass. No peripancreatic inflammation. Gallbladder  unremarkable. No suspicious adrenal nodule. No enhancing renal mass. No  hydronephrosis. No abnormal " perinephric collection.     Prior cystectomy with right ileal conduit no free intraperitoneal air  loculated abscess. Gastric wall thickening may be due to  underdistention. Wall thickening of the rectosigmoid portions of the  descending colon may be artifactual due to underdistention and  peristalsis. Nonspecific colitis considered. No bowel obstruction.  Moderate dense vascular calcification with no aneurysm or dissection. No  pathologic lymphadenopathy.     Postoperative changes lower lumbar spine. Grade 1 spondylolisthesis at  L4/L5. Diffuse degenerative change of the regional skeleton.     IMPRESSION:  1. No evidence of intra-abdominal or pelvic tumor recurrence or  metastasis. Previous cystectomy with ileal conduit.  2. Regions of wall thickening involving the left colon could be  artifactual due to underdistention and peristalsis. Nonspecific colitis  considered but thought to be less likely. No free air or abscess.  This report was finalized on 05/05/2020 09:18 by Dr. Megan Aiken MD.     Study Result     CT CHEST WO CONTRAST- 5/5/2020 8:38 AM CDT     HISTORY: left lung nodule on cxr, hx bladder cancer s/p cystectomy;  C67.8-Malignant neoplasm of overlapping sites of bladder      COMPARISON: 11/18/2019     DOSE LENGTH PRODUCT: 383 mGy cm. Automated exposure control was also  utilized to decrease patient radiation dose.     TECHNIQUE: Axial images of the chest are obtained without contrast.     FINDINGS:  Median sternotomy wires changes of coronary artery bypass.  Scattered coronary calcification. Mild thoracic aortic calcification. No  thoracic aortic aneurysm. No pericardial or pleural effusions. No  pathologic intrathoracic or axillary lymphadenopathy.     Please refer to CT abdomen and pelvis report for findings below the  hemidiaphragms.     No pulmonary consolidation or suspicious nodularity. There is no  abnormal left lower lobe pulmonary nodule. Minimal scarring in the left  lower lobe on image  111. No pneumothorax. Mild emphysematous changes. No  discrete endobronchial lesions.     Postoperative changes of the cervical spine. Moderate degenerative  change of the thoracic spine.     IMPRESSION:  1. No acute intrathoracic abnormality. No evidence of intrathoracic  metastasis. No suspicious pulmonary nodules or consolidation.   2. Prior mediastinal surgery and likely coronary artery bypass. Moderate  coronary artery calcifications.  This report was finalized on 05/05/2020 09:14 by Dr. Megan Aiken MD.         Assessment and Plan  Diagnoses and all orders for this visit:    Malignant neoplasm of overlapping sites of bladder (CMS/HCC)  -     Cancel: POC Urinalysis Dipstick, Multipro    Erectile dysfunction after radical cystectomy    -No evidence of recurrent disease.  The loop appears to be draining well.  -The patient has tried Viagra and daily Cialis without success. We discussed reasons these medications can fail after confirming that he took them appropriately. I discussed other options with him today including penile injections, vacuum erection devices, intraurethral alprostadil suppositories, and penile prosthetic options. I went over the mechanism of action of each of these. Satisfaction rates were discussed. We dicussed the possible adverse reactions and complications of these.   (Please note that portions of this note were completed with a voice recognition program.)  Brandon Wolfe MD  05/07/20  10:33

## 2020-05-05 ENCOUNTER — HOSPITAL ENCOUNTER (OUTPATIENT)
Dept: CT IMAGING | Facility: HOSPITAL | Age: 50
Discharge: HOME | End: 2020-05-05

## 2020-05-05 ENCOUNTER — HOSPITAL ENCOUNTER (OUTPATIENT)
Dept: CT IMAGING | Facility: HOSPITAL | Age: 50
Discharge: HOME | End: 2020-05-05
Admitting: UROLOGY

## 2020-05-05 DIAGNOSIS — C67.8 MALIGNANT NEOPLASM OF OVERLAPPING SITES OF BLADDER (HCC): ICD-10-CM

## 2020-05-05 LAB — CREAT BLDA-MCNC: 1.7 MG/DL (ref 0.6–1.3)

## 2020-05-05 PROCEDURE — 82565 ASSAY OF CREATININE: CPT

## 2020-05-05 PROCEDURE — 74177 CT ABD & PELVIS W/CONTRAST: CPT

## 2020-05-05 PROCEDURE — 25010000002 IOPAMIDOL 61 % SOLUTION: Performed by: UROLOGY

## 2020-05-05 PROCEDURE — 71250 CT THORAX DX C-: CPT

## 2020-05-05 RX ADMIN — IOPAMIDOL 100 ML: 612 INJECTION, SOLUTION INTRAVENOUS at 08:41

## 2020-05-07 ENCOUNTER — OFFICE VISIT (OUTPATIENT)
Dept: UROLOGY | Facility: CLINIC | Age: 50
End: 2020-05-07

## 2020-05-07 VITALS — WEIGHT: 192 LBS | TEMPERATURE: 96.2 F | BODY MASS INDEX: 26.88 KG/M2 | HEIGHT: 71 IN

## 2020-05-07 DIAGNOSIS — N52.32 ERECTILE DYSFUNCTION AFTER RADICAL CYSTECTOMY: ICD-10-CM

## 2020-05-07 DIAGNOSIS — C67.8 MALIGNANT NEOPLASM OF OVERLAPPING SITES OF BLADDER (HCC): Primary | ICD-10-CM

## 2020-05-07 PROCEDURE — 99214 OFFICE O/P EST MOD 30 MIN: CPT | Performed by: UROLOGY

## 2020-05-08 ENCOUNTER — TELEPHONE (OUTPATIENT)
Dept: UROLOGY | Facility: CLINIC | Age: 50
End: 2020-05-08

## 2020-05-08 LAB
BUN SERPL-MCNC: 26 MG/DL (ref 6–20)
CREAT SERPL-MCNC: 1.39 MG/DL (ref 0.76–1.27)

## 2020-05-08 NOTE — TELEPHONE ENCOUNTER
Creatinine has improved.  He may resume his metformin.  I will have staff notify.  Brandon Wolfe MD  5/8/2020  09:30

## 2020-05-12 RX ORDER — BUSPIRONE HYDROCHLORIDE 10 MG/1
10 TABLET ORAL 3 TIMES DAILY PRN
Qty: 90 TABLET | Refills: 0 | Status: SHIPPED | OUTPATIENT
Start: 2020-05-12 | End: 2020-05-22

## 2020-05-22 RX ORDER — BACLOFEN 10 MG/1
TABLET ORAL
Qty: 90 TABLET | Refills: 0 | Status: SHIPPED | OUTPATIENT
Start: 2020-05-22 | End: 2020-06-21

## 2020-05-22 RX ORDER — BUSPIRONE HYDROCHLORIDE 10 MG/1
10 TABLET ORAL 3 TIMES DAILY PRN
Qty: 90 TABLET | Refills: 0 | Status: ON HOLD
Start: 2020-05-22 | End: 2020-06-26 | Stop reason: HOSPADM

## 2020-05-26 ENCOUNTER — OFFICE VISIT (OUTPATIENT)
Dept: NEUROSURGERY | Age: 50
End: 2020-05-26
Payer: MEDICAID

## 2020-05-26 ENCOUNTER — HOSPITAL ENCOUNTER (OUTPATIENT)
Dept: GENERAL RADIOLOGY | Age: 50
Discharge: HOME | End: 2020-05-26
Payer: MEDICAID

## 2020-05-26 VITALS
HEART RATE: 74 BPM | HEIGHT: 71 IN | BODY MASS INDEX: 26.88 KG/M2 | DIASTOLIC BLOOD PRESSURE: 60 MMHG | WEIGHT: 192 LBS | OXYGEN SATURATION: 99 % | SYSTOLIC BLOOD PRESSURE: 101 MMHG

## 2020-05-26 PROCEDURE — 99213 OFFICE O/P EST LOW 20 MIN: CPT | Performed by: NURSE PRACTITIONER

## 2020-05-26 PROCEDURE — G8427 DOCREV CUR MEDS BY ELIG CLIN: HCPCS | Performed by: NURSE PRACTITIONER

## 2020-05-26 PROCEDURE — 72040 X-RAY EXAM NECK SPINE 2-3 VW: CPT

## 2020-05-26 PROCEDURE — G8419 CALC BMI OUT NRM PARAM NOF/U: HCPCS | Performed by: NURSE PRACTITIONER

## 2020-05-26 PROCEDURE — 4004F PT TOBACCO SCREEN RCVD TLK: CPT | Performed by: NURSE PRACTITIONER

## 2020-05-26 PROCEDURE — 3017F COLORECTAL CA SCREEN DOC REV: CPT | Performed by: NURSE PRACTITIONER

## 2020-05-26 RX ORDER — LIDOCAINE 4 G/G
1 PATCH TOPICAL DAILY
Qty: 30 PATCH | Refills: 0 | Status: ON HOLD
Start: 2020-05-26 | End: 2020-06-18 | Stop reason: HOSPADM

## 2020-05-26 ASSESSMENT — ENCOUNTER SYMPTOMS
RESPIRATORY NEGATIVE: 1
GASTROINTESTINAL NEGATIVE: 1
EYES NEGATIVE: 1

## 2020-05-26 NOTE — PROGRESS NOTES
04/30/2019    No results found. Narrative   EXAMINATION:  XR CERVICAL SPINE (2-3 VIEWS)  5/26/2020 8:52 AM   HISTORY: Status post corpectomy and cervical fusion. COMPARISON: No comparison study. TECHNIQUE: 2 views were obtained. FINDINGS: There has been corpectomy at C5 and C6 with hardware in this   location. There is an anterior plate extending from C3-4 through C7. There is a small space between the anterior vertebral line at C4 and   the plate measuring about 1.7 mm. There has been posterior measurement   effusion extending from C3 through C7. Straightening on the lateral   view may be positional. There is no significant prevertebral soft   tissue swelling.       Impression   1. Status post corpectomy at C5 and C6 with placement of expandable   cage device. 2. Anterior fusion plate extending from C4 through C7. Small gap   measuring 1.7 mm between the superior plate and C4 vertebral body. 3. Posterior segment of fusion from C3 through C7. .   Signed by Dr Eduardo Guzman on 5/26/2020 8:59 AM   I have personally reviewed the images and my interpretation is:  Hardware in stable position, no evidence of screw pullout or malfunction  The expandable cage is in similar position when compared to imaging in February 2020  There is some bony fusion posteriorly noted         ASSESSMENT:   Guy Villar is a 44 y.o. male who underwent a Phase 1:  C5 and C6 corpectomy with placement of an expandable cage and anterior cervical plate Q2-X5. Phase 2:  Posterior instrumented fusion C3-C7 using lateral mass screws and rods for cervical spondylosis with myelopathy and radiculopathy on 2/19/2020 and now he is 3 months out from his surgery. PLAN:  -He would like some Lidocaine pain patches  -Follow up 3 months w.XR cervical         ICD-10-CM    1. S/P cervical spinal fusion Z98.1 XR CERVICAL SPINE (2-3 VIEWS)   2. Cervical myelopathy with cervical radiculopathy M47.12    3.  Neck pain M54.2         Tristen Rodríguez

## 2020-06-02 ENCOUNTER — TELEPHONE (OUTPATIENT)
Dept: NEUROLOGY | Age: 50
End: 2020-06-02

## 2020-06-02 NOTE — TELEPHONE ENCOUNTER
I received a voicemail from the pt where he was explaining that he had been getting lidocaine 5% patches but was prescribed 4% patches instead. I called pt back and had to leave voicemail that pt was supposed to be on 4% patches and to call back with any questions or concerns.

## 2020-06-16 ENCOUNTER — TELEPHONE (OUTPATIENT)
Dept: NEUROSURGERY | Age: 50
End: 2020-06-16

## 2020-06-17 ENCOUNTER — ANESTHESIA (OUTPATIENT)
Dept: OPERATING ROOM | Age: 50
DRG: 908 | End: 2020-06-17
Payer: MEDICAID

## 2020-06-17 ENCOUNTER — APPOINTMENT (OUTPATIENT)
Dept: GENERAL RADIOLOGY | Age: 50
DRG: 908 | End: 2020-06-17
Payer: MEDICAID

## 2020-06-17 ENCOUNTER — APPOINTMENT (OUTPATIENT)
Dept: CT IMAGING | Age: 50
DRG: 908 | End: 2020-06-17
Payer: MEDICAID

## 2020-06-17 ENCOUNTER — ANESTHESIA EVENT (OUTPATIENT)
Dept: OPERATING ROOM | Age: 50
DRG: 908 | End: 2020-06-17
Payer: MEDICAID

## 2020-06-17 ENCOUNTER — HOSPITAL ENCOUNTER (INPATIENT)
Age: 50
LOS: 1 days | Discharge: HOME | DRG: 908 | End: 2020-06-18
Attending: EMERGENCY MEDICINE | Admitting: NEUROLOGICAL SURGERY
Payer: MEDICAID

## 2020-06-17 VITALS
DIASTOLIC BLOOD PRESSURE: 53 MMHG | TEMPERATURE: 97.3 F | RESPIRATION RATE: 2 BRPM | OXYGEN SATURATION: 96 % | SYSTOLIC BLOOD PRESSURE: 112 MMHG

## 2020-06-17 PROBLEM — T81.31XA RUPTURE OF OPERATION WOUND: Status: ACTIVE | Noted: 2020-06-17

## 2020-06-17 PROBLEM — T81.30XA WOUND DEHISCENCE: Status: ACTIVE | Noted: 2020-06-17

## 2020-06-17 LAB
ABO/RH: NORMAL
ALBUMIN SERPL-MCNC: 3 G/DL (ref 3.5–5.2)
ALP BLD-CCNC: 128 U/L (ref 40–130)
ALT SERPL-CCNC: 16 U/L (ref 5–41)
ANION GAP SERPL CALCULATED.3IONS-SCNC: 14 MMOL/L (ref 7–19)
ANTIBODY SCREEN: NORMAL
AST SERPL-CCNC: 24 U/L (ref 5–40)
BACTERIA: ABNORMAL /HPF
BASOPHILS ABSOLUTE: 0 K/UL (ref 0–0.2)
BASOPHILS RELATIVE PERCENT: 0.2 % (ref 0–1)
BILIRUB SERPL-MCNC: 0.4 MG/DL (ref 0.2–1.2)
BILIRUBIN URINE: NEGATIVE
BLOOD, URINE: ABNORMAL
BUN BLDV-MCNC: 33 MG/DL (ref 6–20)
CALCIUM SERPL-MCNC: 9 MG/DL (ref 8.6–10)
CHLORIDE BLD-SCNC: 101 MMOL/L (ref 98–111)
CLARITY: ABNORMAL
CO2: 21 MMOL/L (ref 22–29)
COLOR: YELLOW
CREAT SERPL-MCNC: 2.4 MG/DL (ref 0.5–1.2)
CREATININE URINE: 113.3 MG/DL (ref 4.2–622)
EOSINOPHILS ABSOLUTE: 0.3 K/UL (ref 0–0.6)
EOSINOPHILS RELATIVE PERCENT: 2.4 % (ref 0–5)
EPITHELIAL CELLS, UA: 0 /HPF (ref 0–5)
FERRITIN: 128.4 NG/ML (ref 30–400)
FOLATE: 6.1 NG/ML (ref 4.5–32.2)
GFR NON-AFRICAN AMERICAN: 29
GLUCOSE BLD-MCNC: 151 MG/DL (ref 74–109)
GLUCOSE BLD-MCNC: 163 MG/DL (ref 70–99)
GLUCOSE BLD-MCNC: 289 MG/DL (ref 70–99)
GLUCOSE URINE: >=1000 MG/DL
HBA1C MFR BLD: 6.3 % (ref 4–6)
HCT VFR BLD CALC: 22.1 % (ref 42–52)
HCT VFR BLD CALC: 22.6 % (ref 42–52)
HEMOGLOBIN: 6.5 G/DL (ref 14–18)
HEMOGLOBIN: 6.6 G/DL (ref 14–18)
HYALINE CASTS: 7 /HPF (ref 0–8)
IMMATURE GRANULOCYTES #: 0.1 K/UL
IRON SATURATION: 5 % (ref 14–50)
IRON: 14 UG/DL (ref 59–158)
KETONES, URINE: NEGATIVE MG/DL
LACTIC ACID: 2.1 MMOL/L (ref 0.5–1.9)
LEUKOCYTE ESTERASE, URINE: ABNORMAL
LYMPHOCYTES ABSOLUTE: 0.5 K/UL (ref 1.1–4.5)
LYMPHOCYTES RELATIVE PERCENT: 4.4 % (ref 20–40)
MCH RBC QN AUTO: 21 PG (ref 27–31)
MCH RBC QN AUTO: 21.6 PG (ref 27–31)
MCHC RBC AUTO-ENTMCNC: 28.8 G/DL (ref 33–37)
MCHC RBC AUTO-ENTMCNC: 29.9 G/DL (ref 33–37)
MCV RBC AUTO: 72.2 FL (ref 80–94)
MCV RBC AUTO: 73.1 FL (ref 80–94)
MONOCYTES ABSOLUTE: 1 K/UL (ref 0–0.9)
MONOCYTES RELATIVE PERCENT: 8.6 % (ref 0–10)
NEUTROPHILS ABSOLUTE: 9.4 K/UL (ref 1.5–7.5)
NEUTROPHILS RELATIVE PERCENT: 83.8 % (ref 50–65)
NITRITE, URINE: NEGATIVE
PDW BLD-RTO: 18.6 % (ref 11.5–14.5)
PDW BLD-RTO: 18.7 % (ref 11.5–14.5)
PERFORMED ON: ABNORMAL
PERFORMED ON: ABNORMAL
PH UA: 5.5 (ref 5–8)
PLATELET # BLD: 288 K/UL (ref 130–400)
PLATELET # BLD: 294 K/UL (ref 130–400)
PMV BLD AUTO: 10.1 FL (ref 9.4–12.4)
PMV BLD AUTO: 9.7 FL (ref 9.4–12.4)
POTASSIUM REFLEX MAGNESIUM: 4.2 MMOL/L (ref 3.5–5)
POTASSIUM, UR: 17.23 MMOL/L
PROTEIN PROTEIN: 56 MG/DL (ref 15–45)
PROTEIN UA: 30 MG/DL
RBC # BLD: 3.06 M/UL (ref 4.7–6.1)
RBC # BLD: 3.09 M/UL (ref 4.7–6.1)
RBC UA: 4 /HPF (ref 0–4)
SARS-COV-2, NAAT: NOT DETECTED
SODIUM BLD-SCNC: 136 MMOL/L (ref 136–145)
SODIUM URINE: 69 MMOL/L
SPECIFIC GRAVITY UA: 1.02 (ref 1–1.03)
TOTAL IRON BINDING CAPACITY: 300 UG/DL (ref 250–400)
TOTAL PROTEIN: 6.7 G/DL (ref 6.6–8.7)
UROBILINOGEN, URINE: 1 E.U./DL
VANCOMYCIN TROUGH: 16.9 UG/ML (ref 10–20)
VITAMIN B-12: 718 PG/ML (ref 211–946)
WBC # BLD: 10 K/UL (ref 4.8–10.8)
WBC # BLD: 11.2 K/UL (ref 4.8–10.8)
WBC UA: 36 /HPF (ref 0–5)
YEAST: ABNORMAL /HPF

## 2020-06-17 PROCEDURE — 94640 AIRWAY INHALATION TREATMENT: CPT

## 2020-06-17 PROCEDURE — 2580000003 HC RX 258: Performed by: NEUROLOGICAL SURGERY

## 2020-06-17 PROCEDURE — 93005 ELECTROCARDIOGRAM TRACING: CPT | Performed by: EMERGENCY MEDICINE

## 2020-06-17 PROCEDURE — 7100000000 HC PACU RECOVERY - FIRST 15 MIN: Performed by: NEUROLOGICAL SURGERY

## 2020-06-17 PROCEDURE — 6360000002 HC RX W HCPCS: Performed by: ANESTHESIOLOGY

## 2020-06-17 PROCEDURE — 6370000000 HC RX 637 (ALT 250 FOR IP): Performed by: NEUROLOGICAL SURGERY

## 2020-06-17 PROCEDURE — P9016 RBC LEUKOCYTES REDUCED: HCPCS

## 2020-06-17 PROCEDURE — 87075 CULTR BACTERIA EXCEPT BLOOD: CPT

## 2020-06-17 PROCEDURE — 3700000000 HC ANESTHESIA ATTENDED CARE: Performed by: NEUROLOGICAL SURGERY

## 2020-06-17 PROCEDURE — 83036 HEMOGLOBIN GLYCOSYLATED A1C: CPT

## 2020-06-17 PROCEDURE — 84133 ASSAY OF URINE POTASSIUM: CPT

## 2020-06-17 PROCEDURE — U0002 COVID-19 LAB TEST NON-CDC: HCPCS

## 2020-06-17 PROCEDURE — 87186 SC STD MICRODIL/AGAR DIL: CPT

## 2020-06-17 PROCEDURE — 86901 BLOOD TYPING SEROLOGIC RH(D): CPT

## 2020-06-17 PROCEDURE — 0JD40ZZ EXTRACTION OF RIGHT NECK SUBCUTANEOUS TISSUE AND FASCIA, OPEN APPROACH: ICD-10-PCS | Performed by: NEUROLOGICAL SURGERY

## 2020-06-17 PROCEDURE — 83605 ASSAY OF LACTIC ACID: CPT

## 2020-06-17 PROCEDURE — 82607 VITAMIN B-12: CPT

## 2020-06-17 PROCEDURE — 1210000000 HC MED SURG R&B

## 2020-06-17 PROCEDURE — 3600000005 HC SURGERY LEVEL 5 BASE: Performed by: NEUROLOGICAL SURGERY

## 2020-06-17 PROCEDURE — 85027 COMPLETE CBC AUTOMATED: CPT

## 2020-06-17 PROCEDURE — 82947 ASSAY GLUCOSE BLOOD QUANT: CPT

## 2020-06-17 PROCEDURE — 6370000000 HC RX 637 (ALT 250 FOR IP): Performed by: ANESTHESIOLOGY

## 2020-06-17 PROCEDURE — 2580000003 HC RX 258: Performed by: EMERGENCY MEDICINE

## 2020-06-17 PROCEDURE — 80053 COMPREHEN METABOLIC PANEL: CPT

## 2020-06-17 PROCEDURE — 2500000003 HC RX 250 WO HCPCS: Performed by: NEUROLOGICAL SURGERY

## 2020-06-17 PROCEDURE — 84300 ASSAY OF URINE SODIUM: CPT

## 2020-06-17 PROCEDURE — 6360000002 HC RX W HCPCS: Performed by: NEUROLOGICAL SURGERY

## 2020-06-17 PROCEDURE — 80202 ASSAY OF VANCOMYCIN: CPT

## 2020-06-17 PROCEDURE — 83540 ASSAY OF IRON: CPT

## 2020-06-17 PROCEDURE — 3700000001 HC ADD 15 MINUTES (ANESTHESIA): Performed by: NEUROLOGICAL SURGERY

## 2020-06-17 PROCEDURE — 86923 COMPATIBILITY TEST ELECTRIC: CPT

## 2020-06-17 PROCEDURE — 0J940ZZ DRAINAGE OF RIGHT NECK SUBCUTANEOUS TISSUE AND FASCIA, OPEN APPROACH: ICD-10-PCS | Performed by: NEUROLOGICAL SURGERY

## 2020-06-17 PROCEDURE — 2580000003 HC RX 258: Performed by: ANESTHESIOLOGY

## 2020-06-17 PROCEDURE — 72125 CT NECK SPINE W/O DYE: CPT

## 2020-06-17 PROCEDURE — 36415 COLL VENOUS BLD VENIPUNCTURE: CPT

## 2020-06-17 PROCEDURE — 82746 ASSAY OF FOLIC ACID SERUM: CPT

## 2020-06-17 PROCEDURE — 2709999900 HC NON-CHARGEABLE SUPPLY: Performed by: NEUROLOGICAL SURGERY

## 2020-06-17 PROCEDURE — 83550 IRON BINDING TEST: CPT

## 2020-06-17 PROCEDURE — 99222 1ST HOSP IP/OBS MODERATE 55: CPT | Performed by: NEUROLOGICAL SURGERY

## 2020-06-17 PROCEDURE — 3600000015 HC SURGERY LEVEL 5 ADDTL 15MIN: Performed by: NEUROLOGICAL SURGERY

## 2020-06-17 PROCEDURE — 84156 ASSAY OF PROTEIN URINE: CPT

## 2020-06-17 PROCEDURE — 87102 FUNGUS ISOLATION CULTURE: CPT

## 2020-06-17 PROCEDURE — 2500000003 HC RX 250 WO HCPCS

## 2020-06-17 PROCEDURE — 2720000010 HC SURG SUPPLY STERILE: Performed by: NEUROLOGICAL SURGERY

## 2020-06-17 PROCEDURE — 7100000001 HC PACU RECOVERY - ADDTL 15 MIN: Performed by: NEUROLOGICAL SURGERY

## 2020-06-17 PROCEDURE — 6360000002 HC RX W HCPCS

## 2020-06-17 PROCEDURE — 71045 X-RAY EXAM CHEST 1 VIEW: CPT

## 2020-06-17 PROCEDURE — 85025 COMPLETE CBC W/AUTO DIFF WBC: CPT

## 2020-06-17 PROCEDURE — 82570 ASSAY OF URINE CREATININE: CPT

## 2020-06-17 PROCEDURE — 2500000003 HC RX 250 WO HCPCS: Performed by: NURSE ANESTHETIST, CERTIFIED REGISTERED

## 2020-06-17 PROCEDURE — 86850 RBC ANTIBODY SCREEN: CPT

## 2020-06-17 PROCEDURE — 86403 PARTICLE AGGLUT ANTBDY SCRN: CPT

## 2020-06-17 PROCEDURE — 6370000000 HC RX 637 (ALT 250 FOR IP): Performed by: PHYSICIAN ASSISTANT

## 2020-06-17 PROCEDURE — 81001 URINALYSIS AUTO W/SCOPE: CPT

## 2020-06-17 PROCEDURE — 87086 URINE CULTURE/COLONY COUNT: CPT

## 2020-06-17 PROCEDURE — 6360000002 HC RX W HCPCS: Performed by: NURSE ANESTHETIST, CERTIFIED REGISTERED

## 2020-06-17 PROCEDURE — 87205 SMEAR GRAM STAIN: CPT

## 2020-06-17 PROCEDURE — 87077 CULTURE AEROBIC IDENTIFY: CPT

## 2020-06-17 PROCEDURE — 99285 EMERGENCY DEPT VISIT HI MDM: CPT

## 2020-06-17 PROCEDURE — 86900 BLOOD TYPING SEROLOGIC ABO: CPT

## 2020-06-17 PROCEDURE — 87176 TISSUE HOMOGENIZATION CULTR: CPT

## 2020-06-17 PROCEDURE — 82728 ASSAY OF FERRITIN: CPT

## 2020-06-17 PROCEDURE — 87070 CULTURE OTHR SPECIMN AEROBIC: CPT

## 2020-06-17 RX ORDER — ACETAMINOPHEN 325 MG/1
650 TABLET ORAL EVERY 4 HOURS PRN
Status: DISCONTINUED | OUTPATIENT
Start: 2020-06-17 | End: 2020-06-18 | Stop reason: HOSPADM

## 2020-06-17 RX ORDER — VANCOMYCIN HYDROCHLORIDE 1 G/20ML
INJECTION, POWDER, LYOPHILIZED, FOR SOLUTION INTRAVENOUS PRN
Status: DISCONTINUED | OUTPATIENT
Start: 2020-06-17 | End: 2020-06-17 | Stop reason: SDUPTHER

## 2020-06-17 RX ORDER — SODIUM CHLORIDE 0.9 % (FLUSH) 0.9 %
10 SYRINGE (ML) INJECTION EVERY 12 HOURS SCHEDULED
Status: DISCONTINUED | OUTPATIENT
Start: 2020-06-17 | End: 2020-06-18 | Stop reason: HOSPADM

## 2020-06-17 RX ORDER — PROMETHAZINE HYDROCHLORIDE 25 MG/ML
6.25 INJECTION, SOLUTION INTRAMUSCULAR; INTRAVENOUS
Status: DISCONTINUED | OUTPATIENT
Start: 2020-06-17 | End: 2020-06-17 | Stop reason: HOSPADM

## 2020-06-17 RX ORDER — BUDESONIDE AND FORMOTEROL FUMARATE DIHYDRATE 80; 4.5 UG/1; UG/1
2 AEROSOL RESPIRATORY (INHALATION) 2 TIMES DAILY
Status: DISCONTINUED | OUTPATIENT
Start: 2020-06-17 | End: 2020-06-17 | Stop reason: CLARIF

## 2020-06-17 RX ORDER — LIDOCAINE HYDROCHLORIDE 10 MG/ML
INJECTION, SOLUTION EPIDURAL; INFILTRATION; INTRACAUDAL; PERINEURAL PRN
Status: DISCONTINUED | OUTPATIENT
Start: 2020-06-17 | End: 2020-06-17 | Stop reason: SDUPTHER

## 2020-06-17 RX ORDER — DIPHENHYDRAMINE HYDROCHLORIDE 50 MG/ML
12.5 INJECTION INTRAMUSCULAR; INTRAVENOUS
Status: DISCONTINUED | OUTPATIENT
Start: 2020-06-17 | End: 2020-06-17 | Stop reason: HOSPADM

## 2020-06-17 RX ORDER — VANCOMYCIN HYDROCHLORIDE 1 G/20ML
INJECTION, POWDER, LYOPHILIZED, FOR SOLUTION INTRAVENOUS PRN
Status: DISCONTINUED | OUTPATIENT
Start: 2020-06-17 | End: 2020-06-17 | Stop reason: HOSPADM

## 2020-06-17 RX ORDER — ARFORMOTEROL TARTRATE 15 UG/2ML
15 SOLUTION RESPIRATORY (INHALATION) 2 TIMES DAILY
Status: DISCONTINUED | OUTPATIENT
Start: 2020-06-17 | End: 2020-06-18 | Stop reason: HOSPADM

## 2020-06-17 RX ORDER — MORPHINE SULFATE 4 MG/ML
4 INJECTION, SOLUTION INTRAMUSCULAR; INTRAVENOUS EVERY 5 MIN PRN
Status: DISCONTINUED | OUTPATIENT
Start: 2020-06-17 | End: 2020-06-17 | Stop reason: HOSPADM

## 2020-06-17 RX ORDER — ATENOLOL 50 MG/1
50 TABLET ORAL DAILY
Status: DISCONTINUED | OUTPATIENT
Start: 2020-06-17 | End: 2020-06-18 | Stop reason: HOSPADM

## 2020-06-17 RX ORDER — SODIUM CHLORIDE 0.9 % (FLUSH) 0.9 %
10 SYRINGE (ML) INJECTION PRN
Status: DISCONTINUED | OUTPATIENT
Start: 2020-06-17 | End: 2020-06-18 | Stop reason: HOSPADM

## 2020-06-17 RX ORDER — LABETALOL HYDROCHLORIDE 5 MG/ML
5 INJECTION, SOLUTION INTRAVENOUS EVERY 10 MIN PRN
Status: DISCONTINUED | OUTPATIENT
Start: 2020-06-17 | End: 2020-06-17 | Stop reason: HOSPADM

## 2020-06-17 RX ORDER — LIDOCAINE HYDROCHLORIDE 10 MG/ML
1 INJECTION, SOLUTION EPIDURAL; INFILTRATION; INTRACAUDAL; PERINEURAL
Status: ACTIVE | OUTPATIENT
Start: 2020-06-17 | End: 2020-06-17

## 2020-06-17 RX ORDER — MORPHINE SULFATE 4 MG/ML
2 INJECTION, SOLUTION INTRAMUSCULAR; INTRAVENOUS
Status: DISCONTINUED | OUTPATIENT
Start: 2020-06-17 | End: 2020-06-18 | Stop reason: HOSPADM

## 2020-06-17 RX ORDER — LISINOPRIL 10 MG/1
10 TABLET ORAL DAILY
Status: DISCONTINUED | OUTPATIENT
Start: 2020-06-17 | End: 2020-06-18 | Stop reason: HOSPADM

## 2020-06-17 RX ORDER — SCOLOPAMINE TRANSDERMAL SYSTEM 1 MG/1
1 PATCH, EXTENDED RELEASE TRANSDERMAL ONCE
Status: DISCONTINUED | OUTPATIENT
Start: 2020-06-17 | End: 2020-06-18 | Stop reason: HOSPADM

## 2020-06-17 RX ORDER — METOCLOPRAMIDE HYDROCHLORIDE 5 MG/ML
10 INJECTION INTRAMUSCULAR; INTRAVENOUS
Status: DISCONTINUED | OUTPATIENT
Start: 2020-06-17 | End: 2020-06-17 | Stop reason: HOSPADM

## 2020-06-17 RX ORDER — ATORVASTATIN CALCIUM 20 MG/1
20 TABLET, FILM COATED ORAL DAILY
Status: DISCONTINUED | OUTPATIENT
Start: 2020-06-17 | End: 2020-06-18 | Stop reason: HOSPADM

## 2020-06-17 RX ORDER — HYDROCODONE BITARTRATE AND ACETAMINOPHEN 7.5; 325 MG/1; MG/1
1 TABLET ORAL EVERY 4 HOURS PRN
Status: DISCONTINUED | OUTPATIENT
Start: 2020-06-17 | End: 2020-06-18 | Stop reason: HOSPADM

## 2020-06-17 RX ORDER — HYDRALAZINE HYDROCHLORIDE 20 MG/ML
5 INJECTION INTRAMUSCULAR; INTRAVENOUS EVERY 10 MIN PRN
Status: DISCONTINUED | OUTPATIENT
Start: 2020-06-17 | End: 2020-06-17 | Stop reason: HOSPADM

## 2020-06-17 RX ORDER — SODIUM CHLORIDE, SODIUM LACTATE, POTASSIUM CHLORIDE, CALCIUM CHLORIDE 600; 310; 30; 20 MG/100ML; MG/100ML; MG/100ML; MG/100ML
INJECTION, SOLUTION INTRAVENOUS CONTINUOUS
Status: DISCONTINUED | OUTPATIENT
Start: 2020-06-17 | End: 2020-06-18 | Stop reason: HOSPADM

## 2020-06-17 RX ORDER — DEXTROSE MONOHYDRATE 50 MG/ML
100 INJECTION, SOLUTION INTRAVENOUS PRN
Status: DISCONTINUED | OUTPATIENT
Start: 2020-06-17 | End: 2020-06-18 | Stop reason: HOSPADM

## 2020-06-17 RX ORDER — FLUOXETINE HYDROCHLORIDE 20 MG/1
40 CAPSULE ORAL DAILY
Status: DISCONTINUED | OUTPATIENT
Start: 2020-06-17 | End: 2020-06-18 | Stop reason: HOSPADM

## 2020-06-17 RX ORDER — MORPHINE SULFATE 4 MG/ML
2 INJECTION, SOLUTION INTRAMUSCULAR; INTRAVENOUS EVERY 5 MIN PRN
Status: DISCONTINUED | OUTPATIENT
Start: 2020-06-17 | End: 2020-06-17 | Stop reason: HOSPADM

## 2020-06-17 RX ORDER — MIDAZOLAM HYDROCHLORIDE 1 MG/ML
2 INJECTION INTRAMUSCULAR; INTRAVENOUS
Status: ACTIVE | OUTPATIENT
Start: 2020-06-17 | End: 2020-06-17

## 2020-06-17 RX ORDER — 0.9 % SODIUM CHLORIDE 0.9 %
1000 INTRAVENOUS SOLUTION INTRAVENOUS ONCE
Status: COMPLETED | OUTPATIENT
Start: 2020-06-17 | End: 2020-06-17

## 2020-06-17 RX ORDER — HYDROCODONE BITARTRATE AND ACETAMINOPHEN 5; 325 MG/1; MG/1
2 TABLET ORAL EVERY 6 HOURS PRN
Status: DISCONTINUED | OUTPATIENT
Start: 2020-06-17 | End: 2020-06-18 | Stop reason: HOSPADM

## 2020-06-17 RX ORDER — FENTANYL CITRATE 50 UG/ML
INJECTION, SOLUTION INTRAMUSCULAR; INTRAVENOUS PRN
Status: DISCONTINUED | OUTPATIENT
Start: 2020-06-17 | End: 2020-06-17 | Stop reason: SDUPTHER

## 2020-06-17 RX ORDER — MORPHINE SULFATE 4 MG/ML
4 INJECTION, SOLUTION INTRAMUSCULAR; INTRAVENOUS
Status: DISCONTINUED | OUTPATIENT
Start: 2020-06-17 | End: 2020-06-18 | Stop reason: HOSPADM

## 2020-06-17 RX ORDER — ARIPIPRAZOLE 5 MG/1
5 TABLET ORAL DAILY
Status: DISCONTINUED | OUTPATIENT
Start: 2020-06-17 | End: 2020-06-18 | Stop reason: HOSPADM

## 2020-06-17 RX ORDER — TRAZODONE HYDROCHLORIDE 100 MG/1
100 TABLET ORAL NIGHTLY PRN
Status: DISCONTINUED | OUTPATIENT
Start: 2020-06-17 | End: 2020-06-18 | Stop reason: HOSPADM

## 2020-06-17 RX ORDER — 0.9 % SODIUM CHLORIDE 0.9 %
20 INTRAVENOUS SOLUTION INTRAVENOUS ONCE
Status: COMPLETED | OUTPATIENT
Start: 2020-06-17 | End: 2020-06-18

## 2020-06-17 RX ORDER — BUDESONIDE 0.25 MG/2ML
250 INHALANT ORAL 2 TIMES DAILY
Status: DISCONTINUED | OUTPATIENT
Start: 2020-06-17 | End: 2020-06-18 | Stop reason: HOSPADM

## 2020-06-17 RX ORDER — FENTANYL CITRATE 50 UG/ML
50 INJECTION, SOLUTION INTRAMUSCULAR; INTRAVENOUS
Status: ACTIVE | OUTPATIENT
Start: 2020-06-17 | End: 2020-06-17

## 2020-06-17 RX ORDER — MIDAZOLAM HYDROCHLORIDE 1 MG/ML
INJECTION INTRAMUSCULAR; INTRAVENOUS PRN
Status: DISCONTINUED | OUTPATIENT
Start: 2020-06-17 | End: 2020-06-17 | Stop reason: SDUPTHER

## 2020-06-17 RX ORDER — ERGOCALCIFEROL 1.25 MG/1
50000 CAPSULE ORAL WEEKLY
Status: DISCONTINUED | OUTPATIENT
Start: 2020-06-17 | End: 2020-06-18 | Stop reason: HOSPADM

## 2020-06-17 RX ORDER — NICOTINE POLACRILEX 4 MG
15 LOZENGE BUCCAL PRN
Status: DISCONTINUED | OUTPATIENT
Start: 2020-06-17 | End: 2020-06-18 | Stop reason: HOSPADM

## 2020-06-17 RX ORDER — HYDROMORPHONE HYDROCHLORIDE 1 MG/ML
0.25 INJECTION, SOLUTION INTRAMUSCULAR; INTRAVENOUS; SUBCUTANEOUS EVERY 5 MIN PRN
Status: DISCONTINUED | OUTPATIENT
Start: 2020-06-17 | End: 2020-06-17 | Stop reason: HOSPADM

## 2020-06-17 RX ORDER — MORPHINE SULFATE 4 MG/ML
4 INJECTION, SOLUTION INTRAMUSCULAR; INTRAVENOUS
Status: ACTIVE | OUTPATIENT
Start: 2020-06-17 | End: 2020-06-17

## 2020-06-17 RX ORDER — ONDANSETRON 2 MG/ML
4 INJECTION INTRAMUSCULAR; INTRAVENOUS EVERY 8 HOURS PRN
Status: DISCONTINUED | OUTPATIENT
Start: 2020-06-17 | End: 2020-06-18 | Stop reason: HOSPADM

## 2020-06-17 RX ORDER — DEXTROSE MONOHYDRATE 25 G/50ML
12.5 INJECTION, SOLUTION INTRAVENOUS PRN
Status: DISCONTINUED | OUTPATIENT
Start: 2020-06-17 | End: 2020-06-18 | Stop reason: HOSPADM

## 2020-06-17 RX ORDER — PROPOFOL 10 MG/ML
INJECTION, EMULSION INTRAVENOUS PRN
Status: DISCONTINUED | OUTPATIENT
Start: 2020-06-17 | End: 2020-06-17 | Stop reason: SDUPTHER

## 2020-06-17 RX ORDER — HYDROMORPHONE HYDROCHLORIDE 1 MG/ML
0.5 INJECTION, SOLUTION INTRAMUSCULAR; INTRAVENOUS; SUBCUTANEOUS EVERY 5 MIN PRN
Status: DISCONTINUED | OUTPATIENT
Start: 2020-06-17 | End: 2020-06-17 | Stop reason: HOSPADM

## 2020-06-17 RX ORDER — PANTOPRAZOLE SODIUM 40 MG/1
40 TABLET, DELAYED RELEASE ORAL
Status: DISCONTINUED | OUTPATIENT
Start: 2020-06-17 | End: 2020-06-18 | Stop reason: HOSPADM

## 2020-06-17 RX ORDER — SODIUM CHLORIDE 9 MG/ML
INJECTION, SOLUTION INTRAVENOUS CONTINUOUS
Status: DISCONTINUED | OUTPATIENT
Start: 2020-06-17 | End: 2020-06-18 | Stop reason: HOSPADM

## 2020-06-17 RX ORDER — ROCURONIUM BROMIDE 10 MG/ML
INJECTION, SOLUTION INTRAVENOUS PRN
Status: DISCONTINUED | OUTPATIENT
Start: 2020-06-17 | End: 2020-06-17 | Stop reason: SDUPTHER

## 2020-06-17 RX ORDER — DEXAMETHASONE SODIUM PHOSPHATE 10 MG/ML
INJECTION, SOLUTION INTRAMUSCULAR; INTRAVENOUS PRN
Status: DISCONTINUED | OUTPATIENT
Start: 2020-06-17 | End: 2020-06-17 | Stop reason: SDUPTHER

## 2020-06-17 RX ORDER — ONDANSETRON 2 MG/ML
INJECTION INTRAMUSCULAR; INTRAVENOUS PRN
Status: DISCONTINUED | OUTPATIENT
Start: 2020-06-17 | End: 2020-06-17 | Stop reason: SDUPTHER

## 2020-06-17 RX ORDER — BUSPIRONE HYDROCHLORIDE 10 MG/1
10 TABLET ORAL 3 TIMES DAILY PRN
Status: DISCONTINUED | OUTPATIENT
Start: 2020-06-17 | End: 2020-06-18 | Stop reason: HOSPADM

## 2020-06-17 RX ADMIN — HYDROCODONE BITARTRATE AND ACETAMINOPHEN 2 TABLET: 5; 325 TABLET ORAL at 18:26

## 2020-06-17 RX ADMIN — ROCURONIUM BROMIDE 50 MG: 10 INJECTION, SOLUTION INTRAVENOUS at 14:58

## 2020-06-17 RX ADMIN — SODIUM CHLORIDE: 9 INJECTION, SOLUTION INTRAVENOUS at 21:03

## 2020-06-17 RX ADMIN — ARFORMOTEROL TARTRATE 15 MCG: 15 SOLUTION RESPIRATORY (INHALATION) at 19:56

## 2020-06-17 RX ADMIN — MIDAZOLAM 2 MG: 1 INJECTION INTRAMUSCULAR; INTRAVENOUS at 14:53

## 2020-06-17 RX ADMIN — MORPHINE SULFATE 4 MG: 4 INJECTION INTRAVENOUS at 20:59

## 2020-06-17 RX ADMIN — SODIUM CHLORIDE, PRESERVATIVE FREE 10 ML: 5 INJECTION INTRAVENOUS at 20:59

## 2020-06-17 RX ADMIN — LIDOCAINE HYDROCHLORIDE 50 MG: 10 INJECTION, SOLUTION EPIDURAL; INFILTRATION; INTRACAUDAL; PERINEURAL at 14:58

## 2020-06-17 RX ADMIN — IPRATROPIUM BROMIDE 0.5 MG: 0.5 SOLUTION RESPIRATORY (INHALATION) at 19:56

## 2020-06-17 RX ADMIN — MORPHINE SULFATE 4 MG: 4 INJECTION, SOLUTION INTRAMUSCULAR; INTRAVENOUS at 16:33

## 2020-06-17 RX ADMIN — SODIUM CHLORIDE: 9 INJECTION, SOLUTION INTRAVENOUS at 17:07

## 2020-06-17 RX ADMIN — VANCOMYCIN HYDROCHLORIDE 1000 MG: 1 INJECTION, POWDER, LYOPHILIZED, FOR SOLUTION INTRAVENOUS at 15:10

## 2020-06-17 RX ADMIN — DEXAMETHASONE SODIUM PHOSPHATE 10 MG: 10 INJECTION, SOLUTION INTRAMUSCULAR; INTRAVENOUS at 15:12

## 2020-06-17 RX ADMIN — ONDANSETRON HYDROCHLORIDE 4 MG: 2 INJECTION, SOLUTION INTRAMUSCULAR; INTRAVENOUS at 15:28

## 2020-06-17 RX ADMIN — SUGAMMADEX 250 MG: 100 INJECTION, SOLUTION INTRAVENOUS at 15:41

## 2020-06-17 RX ADMIN — SODIUM CHLORIDE, SODIUM LACTATE, POTASSIUM CHLORIDE, AND CALCIUM CHLORIDE: 600; 310; 30; 20 INJECTION, SOLUTION INTRAVENOUS at 14:14

## 2020-06-17 RX ADMIN — BUSPIRONE HYDROCHLORIDE 10 MG: 10 TABLET ORAL at 20:59

## 2020-06-17 RX ADMIN — BUDESONIDE 250 MCG: 0.25 INHALANT RESPIRATORY (INHALATION) at 19:56

## 2020-06-17 RX ADMIN — SODIUM CHLORIDE, PRESERVATIVE FREE 10 ML: 5 INJECTION INTRAVENOUS at 21:01

## 2020-06-17 RX ADMIN — MORPHINE SULFATE 4 MG: 4 INJECTION, SOLUTION INTRAMUSCULAR; INTRAVENOUS at 16:23

## 2020-06-17 RX ADMIN — TRAZODONE HYDROCHLORIDE 100 MG: 100 TABLET ORAL at 20:59

## 2020-06-17 RX ADMIN — SODIUM CHLORIDE 1000 ML: 9 INJECTION, SOLUTION INTRAVENOUS at 12:32

## 2020-06-17 RX ADMIN — FENTANYL CITRATE 100 MCG: 50 INJECTION INTRAMUSCULAR; INTRAVENOUS at 15:40

## 2020-06-17 RX ADMIN — INSULIN LISPRO 3 UNITS: 100 INJECTION, SOLUTION INTRAVENOUS; SUBCUTANEOUS at 21:00

## 2020-06-17 RX ADMIN — PROPOFOL 200 MG: 10 INJECTION, EMULSION INTRAVENOUS at 14:58

## 2020-06-17 ASSESSMENT — PAIN SCALES - GENERAL
PAINLEVEL_OUTOF10: 4
PAINLEVEL_OUTOF10: 8
PAINLEVEL_OUTOF10: 10
PAINLEVEL_OUTOF10: 6
PAINLEVEL_OUTOF10: 10
PAINLEVEL_OUTOF10: 7
PAINLEVEL_OUTOF10: 10
PAINLEVEL_OUTOF10: 0
PAINLEVEL_OUTOF10: 10

## 2020-06-17 ASSESSMENT — PAIN DESCRIPTION - DESCRIPTORS: DESCRIPTORS: JABBING

## 2020-06-17 ASSESSMENT — PAIN DESCRIPTION - PROGRESSION: CLINICAL_PROGRESSION: NOT CHANGED

## 2020-06-17 ASSESSMENT — ENCOUNTER SYMPTOMS: SHORTNESS OF BREATH: 0

## 2020-06-17 ASSESSMENT — LIFESTYLE VARIABLES: SMOKING_STATUS: 0

## 2020-06-17 ASSESSMENT — PAIN - FUNCTIONAL ASSESSMENT: PAIN_FUNCTIONAL_ASSESSMENT: PREVENTS OR INTERFERES SOME ACTIVE ACTIVITIES AND ADLS

## 2020-06-17 ASSESSMENT — PAIN DESCRIPTION - LOCATION
LOCATION: NECK
LOCATION: NECK

## 2020-06-17 ASSESSMENT — PAIN DESCRIPTION - FREQUENCY: FREQUENCY: CONTINUOUS

## 2020-06-17 ASSESSMENT — PAIN DESCRIPTION - PAIN TYPE: TYPE: SURGICAL PAIN

## 2020-06-17 ASSESSMENT — PAIN DESCRIPTION - ORIENTATION: ORIENTATION: POSTERIOR

## 2020-06-17 ASSESSMENT — PAIN DESCRIPTION - ONSET: ONSET: ON-GOING

## 2020-06-17 NOTE — ED PROVIDER NOTES
[facility]  eMERGENCY dEPARTMENT eNCOUnter      Pt Name: Neisha Segura  MRN: [id number]  [place] 1970  Date of evaluation: 6/17/2020  Provider: Gali Green MD    [address] [place] [address]       Chief Complaint   Patient presents with    Post-op Problem     neck surgery in February, infection in incision site         HISTORY OF PRESENT ILLNESS   (Location/Symptom, Timing/Onset,Context/Setting, Quality, Duration, Modifying Factors, Severity)  Note limiting factors. Gary Larson is a 44 y.o. male who presents to the emergency department      The history is provided by the patient. Wound Check    Treated in ED: Cervical spine surgery 2/20, was doing well, swelling developed last week, burst this am, \"liquid\" came out, now has dehiscence. Fever duration: no fever. There has been colored discharge from the wound. There is no redness present. The swelling has improved. The pain has improved. NursingNotes were reviewed. REVIEW OF SYSTEMS    (2-9 systems for level 4, 10 or more for level 5)     Review of Systems   All other systems reviewed and are negative. Except as noted above the remainder of the review of systems was reviewed and negative.        PAST MEDICAL HISTORY     Past Medical History:   Diagnosis Date    CAD (coronary artery disease)     sees [facility] cardiology    Cancer [facility])     Bladder    [facility] disease[facility]     Depression     Diabetes mellitus ([facility]     History of blood transfusion     unsure thinks he did    Hyperlipidemia     Hypertension          SURGICALHISTORY       Past Surgical History:   Procedure Laterality Date    BACK SURGERY      X3     BLADDER REMOVAL      CARDIAC CATHETERIZATION      CERVICAL FUSION N/A 2/19/2020    Phase 1:  C5 and C6 corpectomy with placement of an expandable cage and anterior cervical plate Q8-H9. performed by Noemi Camarena DO at [address] [place] [address] N/A 9/10/2019    Dr Yuval Blackwell, 5 yr recall   [employer] CORONARY ARTERY BYPASS GRAFT N/A 5/1/2019    CORONARY ARTERY BYPASS GRAFT X 3 WITH LEFT INTERNAL MAMMARY ARTERY, ENDOSCOPIC  VEIN HARVEST, WITH PERFUSION. performed by Philly Gage MD at [address] [place] [address]  N/A 2/19/2020    Phase 2:  Posterior instrumented fusion C3-C7 using lateral mass screws and rods. performed by Kym Willis DO at [address] [place] [address] ext   [address] [facility]       Previous Medications    ARIPIPRAZOLE (ABILIFY) 5 MG TABLET    TAKE ONE TABLET BY MOUTH ONCE DAILY. ASPIRIN 81 MG EC TABLET    Take 1 tablet by mouth daily    ATENOLOL (TENORMIN) 50 MG TABLET    TAKE ONE TABLET BY MOUTH EVERY DAY    ATORVASTATIN (LIPITOR) 20 MG TABLET    TAKE ONE TABLET BY MOUTH ONCE DAILY. BACLOFEN (LIORESAL) 10 MG TABLET    TAKE 1 TABLET BY MOUTH 3 TIMES DAILY AS NEEDED FOR PAIN/SPASMS PAIN/SPASMS    BLOOD GLUCOSE MONITOR STRIPS    Test 4 times a day & as needed for symptoms of irregular blood glucose. BUSPIRONE (BUSPAR) 10 MG TABLET    TAKE 1 TABLET BY MOUTH 3 TIMES DAILY AS NEEDED (ANXIETY)    CHOLECALCIFEROL (VITAMIN D3) 1.25 MG (10338 UT) CAPS    Take 1 capsule by mouth once a week    FENOFIBRATE (TRIGLIDE) 160 MG TABLET    TAKE ONE TABLET BY MOUTH DAILY.     FLUOXETINE (PROZAC) 40 MG CAPSULE    TAKE 1 CAPSULE BY MOUTH DAILY    FLUTICASONE-SALMETEROL (ADVAIR DISKUS) 100-50 MCG/DOSE DISKUS INHALER    Inhale 1 puff into the lungs every 12 hours    GLUCOSE MONITORING KIT (FREESTYLE) MONITORING KIT    Please provide glucometer that INS will cover for DM type 2    LANCETS 30G MISC    1 each by Does not apply route daily 4 times daily    LIDOCAINE 4 % EXTERNAL PATCH    Place 1 patch onto the skin daily    LISINOPRIL (PRINIVIL;ZESTRIL) 10 MG TABLET    TAKE 1 TABLET BY MOUTH DAILY    METFORMIN (GLUCOPHAGE) 1000 MG TABLET    Take 1 tablet by mouth 2 times daily (with meals)    OMEPRAZOLE (PRILOSEC) 40 MG DELAYED RELEASE CAPSULE    TAKE 1 CAPSULE BY MOUTH DAILY    SILDENAFIL (REVATIO) 20 MG TABLET    Take 1-5 tablets 1-2 hours before sexual activity PRN    TIOTROPIUM (SPIRIVA) 18 MCG INHALATION CAPSULE    Inhale 1 capsule into the lungs    TRAZODONE (DESYREL) 50 MG TABLET    TAKE 2 TABLETS BY MOUTH NIGHTLY AS NEEDED FOR SLEEP       ALLERGIES     Latex and Demerol hcl [meperidine]    FAMILY HISTORY       Family History   Problem Relation Age of Onset    Cancer Mother     Vision Loss Mother     Breast Cancer Mother     Coronary Art Dis Father     Obesity Father     Kidney Disease Father     High Blood Pressure Father     High Cholesterol Father     Hypercalcemia Sister     Obesity Brother     High Blood Pressure Brother           SOCIAL HISTORY       Social History     Socioeconomic History    Marital status:      Spouse name: None    Number of children: None    Years of education: None    Highest education level: None   Occupational History    None   Social Needs    Financial resource strain: None    Food insecurity     Worry: None     Inability: None    Transportation needs     Medical: None     Non-medical: None   Tobacco Use    Smoking status: Current Every Day Smoker     Packs/day: 1.00     Years: 30.00     Pack years: 30.00    Smokeless tobacco: Never Used   Substance and Sexual Activity    Alcohol use: Yes     Comment:  Occ    Drug use: Never    Sexual activity: None   Lifestyle    Physical activity     Days per week: None     Minutes per session: None    Stress: None   Relationships    Social connections     Talks on phone: None     Gets together: None     Attends Jehov[employer]'s witness service: None     Active member of club or organization: None     Attends meetings of clubs or organizations: None     Relationship status: None    Intimate partner violence     Fear of current or ex partner: None     Emotionally abused: None     Physically abused: None     Forced sexual activity: None   Other Topics Concern    None   Social History Narrative     16 years second marriage    On disability due to multiple back surgeries and bladder cancer    [address] [facility]    Education high school    Smokes 1-1/2 pack per day drinks alcohol occasionally denies substance usage    Physically sedentary    Former  at  [employer] [employer]    Never in the [address] Opening: Spontaneous  Best Verbal Response: Oriented  Best Motor Response: Obeys commands  Lia Coma Scale Score: 15 @FLOW([id number])@      PHYSICAL EXAM    (up to 7 for level 4, 8 or more for level 5)     ED Triage Vitals [06/17/20 1103]   BP Temp Temp Source Pulse Resp SpO2 Height Weight   (!) 90/48 97.9 °F (36.6 °C) Oral 97 20 94 % 5' 11\" (1.803 m) 192 lb (87.1 kg)       Physical Exam  Vitals signs and nursing note reviewed. Constitutional:       Appearance: Normal appearance. He is normal weight. HENT:      Mouth/Throat:      Mouth: Mucous membranes are moist.      Pharynx: Oropharynx is clear. Eyes:      Conjunctiva/sclera: Conjunctivae normal.   Neck:      Comments: 3 areas dehiscence in 2 incision sites (largest ~ 2x1 cm), no erythema, no foul odor, deep through SQ probably, cannot view spinous processes. Cardiovascular:      Rate and Rhythm: Normal rate and regular rhythm. Heart sounds: Normal heart sounds. Pulmonary:      Effort: Pulmonary effort is normal.      Breath sounds: Normal breath sounds. Musculoskeletal:      Right lower leg: No edema. Left lower leg: No edema. Skin:     General: Skin is warm and dry. Findings: No erythema. Neurological:      General: No focal deficit present. Mental Status: He is alert and oriented to person, place, and time.    Psychiatric:         Mood and Affect: Mood normal.         DIAGNOSTIC RESULTS     EKG: All EKG's are interpreted by the Emergency Department Physician who either signs or Co-signsthis chart in the absence of a cardiologist.    EKG: sinus, VR 70, LVH    RADIOLOGY:   Non-plain filmimages such as CT, Ultrasound and MRI are read by the radiologist. Plain radiographic images are visualized and preliminarily interpreted by the emergency physician with the below findings:        Interpretation per the Radiologist below, if available at the time ofthis note:    CT Cervical Spine WO Contrast    (Results Pending)   XR CHEST PORTABLE    (Results Pending)         ED BEDSIDE ULTRASOUND:   Performed by ED Physician - none    LABS:  Labs Reviewed   CBC WITH AUTO DIFFERENTIAL - Abnormal; Notable for the following components:       Result Value    WBC 11.2 (*)     RBC 3.06 (*)     Hemoglobin 6.6 (*)     Hematocrit 22.1 (*)     MCV 72.2 (*)     MCH 21.6 (*)     MCHC 29.9 (*)     RDW 18.6 (*)     Neutrophils % 83.8 (*)     Lymphocytes % 4.4 (*)     Neutrophils Absolute 9.4 (*)     Lymphocytes Absolute 0.5 (*)     Monocytes Absolute 1.00 (*)     All other components within normal limits   COMPREHENSIVE METABOLIC PANEL W/ REFLEX TO MG FOR LOW K - Abnormal; Notable for the following components:    CO2 21 (*)     Glucose 151 (*)     BUN 33 (*)     CREATININE 2.4 (*)     GFR Non- 29 (*)     Alb 3.0 (*)     All other components within normal limits   LACTIC ACID, PLASMA - Abnormal; Notable for the following components:    Lactic Acid 2.1 (*)     All other components within normal limits    Narrative:     CALL  [facility] [place] tel. ,  Chemistry results called to and read back by Nirav Méndez rn at ed, 06/17/2020  12:42, by Kelin [id number]    Narrative:     Chauncey Sheikh tel. ,  called Yoandy Henson RN ER, 06/17/2020 13:29, by [facility]        All other labs were within normal range or not returned as of this dictation.     EMERGENCY DEPARTMENT COURSE and DIFFERENTIAL DIAGNOSIS/MDM:   Vitals:    Vitals:    06/17/20 1103 06/17/20 1147 06/17/20 1202 06/17/20 1303   BP: (!) 90/48 (!) 93/46 (!) 95/44 98/62   Pulse: 97   95   Resp: 20   20   Temp: 97.9 °F (36.6 °C) TempSrc: Oral      SpO2: 94%   97%   Weight: 192 lb (87.1 kg)      Height: 5' 11\" (1.803 m)              MDM  Number of Diagnoses or Management Options  Dehiscence of operative wound, initial encounter:   Diagnosis management comments: Discussed with Dr. Marston Gaucher - admit. Sees in ED. Would like Medicine consult. CRITICAL CARE TIME   Total Critical Care time was 0 minutes, excluding separately reportable procedures. There was a high probability of clinically significant/lifethreatening deterioration in the patient's condition which required my urgent intervention. CONSULTS:  IP CONSULT TO INTERNAL MEDICINE    PROCEDURES:  Unless otherwise noted below, none     Procedures    FINAL IMPRESSION      1.  Dehiscence of operative wound, initial encounter          DISPOSITION/PLAN   DISPOSITION        PATIENT REFERRED TO:  Scot Oconnor, APRN  [address], [address] [facility]  [address] [place] [phone]            DISCHARGE MEDICATIONS:  New Prescriptions    No medications on file          (Please note that portions of this note were completed with a voice recognition program.  Efforts were made to edit the dictations but occasionally words are mis-transcribed.)    Connie Acuña MD (electronically signed)  Attending Emergency Physician          Connie Acuña MD  06/17/20 1400

## 2020-06-17 NOTE — ANESTHESIA PRE PROCEDURE
NIGHTLY AS NEEDED FOR SLEEP 60 tablet 1    metFORMIN (GLUCOPHAGE) 1000 MG tablet Take 1 tablet by mouth 2 times daily (with meals) 60 tablet 5    Cholecalciferol (VITAMIN D3) 1.25 MG (64157 UT) CAPS Take 1 capsule by mouth once a week      sildenafil (REVATIO) 20 MG tablet Take 1-5 tablets 1-2 hours before sexual activity PRN      tiotropium (SPIRIVA) 18 MCG inhalation capsule Inhale 1 capsule into the lungs      fluticasone-salmeterol (ADVAIR DISKUS) 100-50 MCG/DOSE diskus inhaler Inhale 1 puff into the lungs every 12 hours 60 each 3    aspirin 81 MG EC tablet Take 1 tablet by mouth daily 30 tablet 3    blood glucose monitor strips Test 4 times a day & as needed for symptoms of irregular blood glucose. 100 strip 3    glucose monitoring kit (FREESTYLE) monitoring kit Please provide glucometer that INS will cover for DM type 2 1 kit 0    Lancets 30G MISC 1 each by Does not apply route daily 4 times daily 100 each 3       Allergies: Allergies   Allergen Reactions    Latex Other (See Comments)     BOILS AND BLISTERS- ALLERGY CALLED TO [facility] SURGERY SCHEDULING.  Demerol Hcl [Meperidine] Hives     And n/v       Problem List:    Patient Active Problem List   Diagnosis Code    Essential hypertension I10    Hx of bladder cancer Z85.51    Chronic bronchitis ([facility] [address] J42    Type 2 diabetes mellitus without complication, without long-term current use of insulin ([facility]) E11.9    Mixed anxiety depressive disorder F41.8    Mixed hyperlipidemia E78.2    Postoperative anemia due to acute blood loss D62    Hx of CABG Z95.1    Primary insomnia F51.01    Anxiety and depression F41.9, F32.9    Coronary artery disease I25.10    Cervical spondylosis with myelopathy and radiculopathy M47.12, M47.22    Rupture of operation wound T81. 31XA       Past Medical History:        Diagnosis Date    CAD (coronary artery disease)     sees [facility]ology    Cancer [facility])     Bladder    COPD (chronic obstructive pulmonary 03/06/20 190 lb (86.2 kg)     Body mass index is 26.78 kg/m². CBC:   Lab Results   Component Value Date    WBC 11.2 06/17/2020    RBC 3.06 06/17/2020    HGB 6.6 06/17/2020    HCT 22.1 06/17/2020    HCT 43.8 02/17/2011    MCV 72.2 06/17/2020    RDW 18.6 06/17/2020     06/17/2020     02/17/2011       CMP:   Lab Results   Component Value Date     06/17/2020     02/17/2011    K 4.2 06/17/2020    K 4.1 02/17/2011     06/17/2020     02/17/2011    CO2 21 06/17/2020    BUN 33 06/17/2020    CREATININE 2.4 06/17/2020    CREATININE 1.0 02/17/2011    LABGLOM 29 06/17/2020    GLUCOSE 151 06/17/2020    PROT 6.7 06/17/2020    PROT 6.9 02/17/2011    CALCIUM 9.0 06/17/2020    BILITOT 0.4 06/17/2020    ALKPHOS 128 06/17/2020    ALKPHOS 74 02/17/2011    AST 24 06/17/2020    ALT 16 06/17/2020       POC Tests: No results for input(s): POCGLU, POCNA, POCK, POCCL, POCBUN, POCHEMO, POCHCT in the last 72 hours.     Coags:   Lab Results   Component Value Date    PROTIME 12.9 01/30/2020    INR 1.03 01/30/2020    APTT 26.6 01/30/2020       HCG (If Applicable): No results found for: PREGTESTUR, PREGSERUM, HCG, HCGQUANT     ABGs:   Lab Results   Component Value Date    PHART 7.380 05/02/2019    PO2ART 76.0 05/02/2019    GWA0DJJ 37.0 05/02/2019    RLH2LWJ 21.9 05/02/2019    BEART -2.9 05/02/2019    B4CUJOOM 93.6 05/02/2019        Type & Screen (If Applicable):  No results found for: LABABO, LABRH    Drug/Infectious Status (If Applicable):  No results found for: HIV, HEPCAB    COVID-19 Screening (If Applicable):   Lab Results   Component Value Date    COVID19 Not Detected 06/17/2020         Anesthesia Evaluation  Patient summary reviewed and Nursing notes reviewed no history of anesthetic complications:   Airway: Mallampati: II  TM distance: >3 FB   Neck ROM: full  Mouth opening: > = 3 FB Dental: normal exam         Pulmonary:Negative Pulmonary ROS and normal exam  breath sounds clear to auscultation  (+)

## 2020-06-17 NOTE — OP NOTE
Next, the would was closed. 1 Vicrly was used to close the deep layer and eliminate dead space approximating the walls of the pseudomembrane. An additional layer of 2-0 vicryls was then used. Finally, the skin was closed with 2-0 nylon suture.         After the wound had been closed, a sterile dressing was applied.  The patient was returned to the stretcher in a supine position, extubated and returned to the recovery room in stable condition.

## 2020-06-17 NOTE — BRIEF OP NOTE
Brief Postoperative Note      Patient: Eusebio Larson  YOB: 1970  MRN: [id number]    Date of Procedure: 6/17/2020    Pre-Op Diagnosis: Posterior Wound Dehiscense    Post-Op Diagnosis: Same       Procedure(s):  POSTERIOR CERVICAL WOUND [address] OUT AND CLOSURE    Surgeon(s):  Wisam Canales DO    Assistant:  * No surgical staff found *    Anesthesia: General    Estimated Blood Loss (mL): less than 50     Complications: None    Specimens:   ID Type Source Tests Collected by Time Destination   1 : posterior neck  Swab Neck CULTURE, FUNGUS, CULTURE, SURGICAL Wisam Canales DO 6/17/2020 1537    2 : tip of spinous process Tissue Neck CULTURE, FUNGUS, CULTURE, [address], DO 6/17/2020 1538        Implants:  * No implants in log *      Drains:   Closed/Suction Drain Right; Anterior Neck Bulb 10 Western Qi (Active)       Closed/Suction Drain Posterior Neck Accordion 10 Western Qi (Active)       Closed/Suction Drain Posterior Neck Accordion 10 Frisian (Active)       Urostomy RLQ (Active)       Findings: Collection of clear fluid drained. Cultures of fluid sent as well as tip of spinous process.       Electronically signed by Wisam Canales DO on 6/17/2020 at 4:08 PM

## 2020-06-17 NOTE — H&P
 PROSTATECTOMY          Medications    Current Facility-Administered Medications:     0.9 % sodium chloride bolus, 1,000 mL, Intravenous, Once, Lavern Chau MD, Last Rate: 1,000 mL/hr at 06/17/20 1232, 1,000 mL at 06/17/20 1232    Current Outpatient Medications:     lidocaine 4 % external patch, Place 1 patch onto the skin daily, Disp: 30 patch, Rfl: 0    baclofen (LIORESAL) 10 MG tablet, TAKE 1 TABLET BY MOUTH 3 TIMES DAILY AS NEEDED FOR PAIN/SPASMS PAIN/SPASMS, Disp: 90 tablet, Rfl: 0    busPIRone (BUSPAR) 10 MG tablet, TAKE 1 TABLET BY MOUTH 3 TIMES DAILY AS NEEDED (ANXIETY), Disp: 90 tablet, Rfl: 0    atorvastatin (LIPITOR) 20 MG tablet, TAKE ONE TABLET BY MOUTH ONCE DAILY. , Disp: 30 tablet, Rfl: 3    fenofibrate (TRIGLIDE) 160 MG tablet, TAKE ONE TABLET BY MOUTH DAILY. , Disp: 30 tablet, Rfl: 5    omeprazole (PRILOSEC) 40 MG delayed release capsule, TAKE 1 CAPSULE BY MOUTH DAILY, Disp: 30 capsule, Rfl: 2    lisinopril (PRINIVIL;ZESTRIL) 10 MG tablet, TAKE 1 TABLET BY MOUTH DAILY, Disp: 30 tablet, Rfl: 3    ARIPiprazole (ABILIFY) 5 MG tablet, TAKE ONE TABLET BY MOUTH ONCE DAILY. , Disp: 30 tablet, Rfl: 3    FLUoxetine (PROZAC) 40 MG capsule, TAKE 1 CAPSULE BY MOUTH DAILY, Disp: 30 capsule, Rfl: 3    atenolol (TENORMIN) 50 MG tablet, TAKE ONE TABLET BY MOUTH EVERY DAY, Disp: 30 tablet, Rfl: 5    traZODone (DESYREL) 50 MG tablet, TAKE 2 TABLETS BY MOUTH NIGHTLY AS NEEDED FOR SLEEP, Disp: 60 tablet, Rfl: 1    metFORMIN (GLUCOPHAGE) 1000 MG tablet, Take 1 tablet by mouth 2 times daily (with meals), Disp: 60 tablet, Rfl: 5    Cholecalciferol (VITAMIN D3) 1.25 MG (62868 UT) CAPS, Take 1 capsule by mouth once a week, Disp: , Rfl:     sildenafil (REVATIO) 20 MG tablet, Take 1-5 tablets 1-2 hours before sexual activity PRN, Disp: , Rfl:     tiotropium (SPIRIVA) 18 MCG inhalation capsule, Inhale 1 capsule into the lungs, Disp: , Rfl:     fluticasone-salmeterol (ADVAIR DISKUS) 100-50 MCG/DOSE diskus inhaler, Inhale 1 puff into the lungs every 12 hours, Disp: 60 each, Rfl: 3    aspirin 81 MG EC tablet, Take 1 tablet by mouth daily, Disp: 30 tablet, Rfl: 3    blood glucose monitor strips, Test 4 times a day & as needed for symptoms of irregular blood glucose., Disp: 100 strip, Rfl: 3    glucose monitoring kit (FREESTYLE) monitoring kit, Please provide glucometer that INS will cover for DM type 2, Disp: 1 kit, Rfl: 0    Lancets 30G MISC, 1 each by Does not apply route daily 4 times daily, Disp: 100 each, Rfl: 3  Latex and Demerol hcl [meperidine]    Social History  Social History     Tobacco Use   Smoking Status Current Every Day Smoker    Packs/day: 1.00    Years: 30.00    Pack years: 30.00   Smokeless Tobacco Never Used     Social History     Substance and Sexual Activity   Alcohol Use Yes    Comment: Occ         Family History   Problem Relation Age of Onset    Cancer Mother     Vision Loss Mother     Breast Cancer Mother     Coronary Art Dis Father     Obesity Father     Kidney Disease Father     High Blood Pressure Father     High Cholesterol Father     Hypercalcemia Sister     Obesity Brother     High Blood Pressure Brother          REVIEW OF SYSTEMS:  Constitutional: No fevers No chills  Neck:No stiffness  Respiratory: No shortness of breath  Cardiovascular: No chest pain No palpitations  Gastrointestinal: No abdominal pain    Genitourinary: No Dysuria  Neurological: No headache, no confusion    PHYSICAL EXAM:  Vitals:    06/17/20 1202   BP: (!) 95/44   Pulse:    Resp:    Temp:    SpO2:        Constitutional: The patient appears well-developed and well-nourished.    Eyes - conjunctiva normal.  Conjugate Gaze  Ear, nose, throat - No scars, masses, or lesions over external nose or ears, no atrophy of tongue  Neck-symmetric, no masses noted, no jugular vein distension  Respiration- chest wall appears symmetric, good expansion, normal effort without use of accessory muscles  Musculoskeletal - no significant wasting of muscles noted, no bony deformities  Extremities-no clubbing, cyanosis or edema  Skin - warm, dry, and intact. No rash, erythema, or pallor. Psychiatric - mood, affect, and behavior appear normal.     Neurologic Examination  Awake, Alert and oriented x 4  Normal speech pattern, following commands  Motor 5/5 all extremities  No deficits to light touch or pinprick sensation  Reflexes are 2+ and symmetric  Babinski sign was downgoing bilaterally  No clonus or Hoffmans sign  No myofacial tenderness to palpation  Normal Gait pattern    Wound: The inferior portion of the wound has dehisced is about 4-5 cm total.  The dehiscence is relatively superficial and does not go beneath the fascia. No sharri purulent material noted. DATA:  Nursing/pcp notes, imaging,labs and vitals reviewed. PT,OT and/or speech notes reviewed    Lab Results   Component Value Date    WBC 11.2 (H) 06/17/2020    HGB 6.6 (L) 06/17/2020    HCT 22.1 (L) 06/17/2020    MCV 72.2 (L) 06/17/2020     06/17/2020     Lab Results   Component Value Date     06/17/2020    K 4.2 06/17/2020     06/17/2020    CO2 21 (L) 06/17/2020    BUN 33 (H) 06/17/2020    CREATININE 2.4 (H) 06/17/2020    GLUCOSE 151 (H) 06/17/2020    CALCIUM 9.0 06/17/2020    PROT 6.7 06/17/2020    LABALBU 3.0 (L) 06/17/2020    BILITOT 0.4 06/17/2020    ALKPHOS 128 06/17/2020    AST 24 06/17/2020    ALT 16 06/17/2020    LABGLOM 29 (A) 06/17/2020     Lab Results   Component Value Date    INR 1.03 01/30/2020    INR 1.34 (H) 05/01/2019    INR 1.02 04/30/2019    PROTIME 12.9 01/30/2020    PROTIME 15.9 (H) 05/01/2019    PROTIME 12.8 04/30/2019         IMPRESSION  44year old male s/p anterior/posterior cervical fusion with dehiscence of his posterior incision. RECOMMENDATIONS:    To OR today for wound washout, debridement and closure. Will obtain intraoperative cultures. Will hold abx until specimen obtained. NPO, pre-op labs  COVID-19 test  CT cervical spine    We discussed risks, complications and expectations, including but not limited to infection, paralysis, bowel and bladder dysfunction, need for revision procedure, persistent pain, spinal fluid leak, stroke and death. In addition, the benefits of the surgery were thoroughly discussed and the patient demonstrated a deep understanding. The patient wishes to proceed with surgical intervention.           Merril Slim, DO

## 2020-06-17 NOTE — BRIEF OP NOTE
NEUROSURGICAL DEPARTMENT OPERATIVE REPORT     NAME OF SURGEON: Huong TinocoDO christian     DATE OF SERVICE: 6/17/2020     PREOPERATIVE DIAGNOSES    Dehiscense of wound     POSTOPERATIVE DIAGNOSES    Same     OPERATIVE PROCEDURE  Drainage of posterior cervical seroma, washout, debridement and closure of wound    SURGEON  Rima William DO     FIRST ASSIST    [place]      Estimated blood loss:  Minimal     DESCRIPTION OF PROCEDURE  The patient was brought to the operating room where general endotracheal anesthesia was induced. The patient was carefully positioned prone on the Forrest table with bolsters. The patient's head was positioned neutrally. The C-arm fluoroscope and operating microscope were pre-position and draped. The posterior cervical region was clipped and prepared for a full 10 minutes with Betadine scrub and Betadine paint and DuraPrep and the usual draping procedure was then performed. After the patient had been prepped and draped the dehisced wound was opened further with scissors. There was a large collection of clear fluid noted. A specimen swab was placed in the fluid and a culture sent. There was a pseudomembrane that had formed. The wound had not opened to expose the hardware and was superficial, supratentorial in nature. The tip of the C6 spinous process was slightly exposed and a small specimen was obtained. At this point, the patient was given antibiotics. We then pulse lavaged the wound copiously with antibiotic infused saline. The would was throughly washed out and dead tissues was removed and debrided. A small amount of vancomycin powder was given. After the would was throughly cleaned a hemovac drain was placed. Next, the would was closed. 1 Vicrly was used to close the deep layer and eliminate dead space approximating the walls of the pseudomembrane. An additional layer of 2-0 vicryls was then used. Finally, the skin was closed with 2-0 nylon suture.         After the wound had been closed, a sterile dressing was applied. The patient was returned to the stretcher in a supine position, extubated and returned to the recovery room in stable condition.

## 2020-06-17 NOTE — ED NOTES
Pt got adjusted on Thursday of last week and started having inflammation to cervical incision, pt stating that yesterday is when the incision \"broke open\"     Ana Marai Johnston RN  06/17/20 2046

## 2020-06-18 ENCOUNTER — TELEPHONE (OUTPATIENT)
Dept: NEUROLOGY | Age: 50
End: 2020-06-18

## 2020-06-18 VITALS
BODY MASS INDEX: 26.88 KG/M2 | TEMPERATURE: 96.6 F | HEART RATE: 75 BPM | RESPIRATION RATE: 16 BRPM | WEIGHT: 192 LBS | DIASTOLIC BLOOD PRESSURE: 72 MMHG | SYSTOLIC BLOOD PRESSURE: 146 MMHG | HEIGHT: 71 IN | OXYGEN SATURATION: 96 %

## 2020-06-18 LAB
ANION GAP SERPL CALCULATED.3IONS-SCNC: 13 MMOL/L (ref 7–19)
BLOOD BANK DISPENSE STATUS: NORMAL
BLOOD BANK PRODUCT CODE: NORMAL
BPU ID: NORMAL
BUN BLDV-MCNC: 27 MG/DL (ref 6–20)
CALCIUM SERPL-MCNC: 8.4 MG/DL (ref 8.6–10)
CHLORIDE BLD-SCNC: 102 MMOL/L (ref 98–111)
CO2: 22 MMOL/L (ref 22–29)
CREAT SERPL-MCNC: 1.4 MG/DL (ref 0.5–1.2)
DESCRIPTION BLOOD BANK: NORMAL
EKG P AXIS: 62 DEGREES
EKG P-R INTERVAL: 120 MS
EKG Q-T INTERVAL: 428 MS
EKG QRS DURATION: 104 MS
EKG QTC CALCULATION (BAZETT): 445 MS
EKG T AXIS: 56 DEGREES
GFR NON-AFRICAN AMERICAN: 53
GLUCOSE BLD-MCNC: 214 MG/DL (ref 70–99)
GLUCOSE BLD-MCNC: 247 MG/DL (ref 74–109)
HCT VFR BLD CALC: 24.1 % (ref 42–52)
HCT VFR BLD CALC: 24.4 % (ref 42–52)
HEMOGLOBIN: 7.3 G/DL (ref 14–18)
HEMOGLOBIN: 7.3 G/DL (ref 14–18)
MCH RBC QN AUTO: 21.9 PG (ref 27–31)
MCHC RBC AUTO-ENTMCNC: 29.9 G/DL (ref 33–37)
MCV RBC AUTO: 73.1 FL (ref 80–94)
PDW BLD-RTO: 19.1 % (ref 11.5–14.5)
PERFORMED ON: ABNORMAL
PLATELET # BLD: 289 K/UL (ref 130–400)
PMV BLD AUTO: 10.2 FL (ref 9.4–12.4)
POTASSIUM SERPL-SCNC: 5.1 MMOL/L (ref 3.5–5)
RBC # BLD: 3.34 M/UL (ref 4.7–6.1)
SODIUM BLD-SCNC: 137 MMOL/L (ref 136–145)
WBC # BLD: 9.8 K/UL (ref 4.8–10.8)

## 2020-06-18 PROCEDURE — 6360000002 HC RX W HCPCS: Performed by: NEUROLOGICAL SURGERY

## 2020-06-18 PROCEDURE — 2580000003 HC RX 258: Performed by: HOSPITALIST

## 2020-06-18 PROCEDURE — 82947 ASSAY GLUCOSE BLOOD QUANT: CPT

## 2020-06-18 PROCEDURE — 80048 BASIC METABOLIC PNL TOTAL CA: CPT

## 2020-06-18 PROCEDURE — 36430 TRANSFUSION BLD/BLD COMPNT: CPT

## 2020-06-18 PROCEDURE — 36415 COLL VENOUS BLD VENIPUNCTURE: CPT

## 2020-06-18 PROCEDURE — 94640 AIRWAY INHALATION TREATMENT: CPT

## 2020-06-18 PROCEDURE — P9016 RBC LEUKOCYTES REDUCED: HCPCS

## 2020-06-18 PROCEDURE — 85018 HEMOGLOBIN: CPT

## 2020-06-18 PROCEDURE — 85014 HEMATOCRIT: CPT

## 2020-06-18 PROCEDURE — 99024 POSTOP FOLLOW-UP VISIT: CPT | Performed by: NEUROLOGICAL SURGERY

## 2020-06-18 PROCEDURE — 6370000000 HC RX 637 (ALT 250 FOR IP): Performed by: PHYSICIAN ASSISTANT

## 2020-06-18 PROCEDURE — 85027 COMPLETE CBC AUTOMATED: CPT

## 2020-06-18 PROCEDURE — 6370000000 HC RX 637 (ALT 250 FOR IP): Performed by: NEUROLOGICAL SURGERY

## 2020-06-18 RX ORDER — LANOLIN ALCOHOL/MO/W.PET/CERES
325 CREAM (GRAM) TOPICAL 2 TIMES DAILY
Qty: 90 TABLET | Refills: 0 | Status: SHIPPED | OUTPATIENT
Start: 2020-06-18 | End: 2020-08-11 | Stop reason: ALTCHOICE

## 2020-06-18 RX ORDER — LIDOCAINE 50 MG/G
1 PATCH TOPICAL DAILY
Qty: 30 PATCH | Refills: 0 | Status: ON HOLD | OUTPATIENT
Start: 2020-06-18 | End: 2020-06-26 | Stop reason: HOSPADM

## 2020-06-18 RX ORDER — SULFAMETHOXAZOLE AND TRIMETHOPRIM 400; 80 MG/1; MG/1
1 TABLET ORAL 2 TIMES DAILY
Qty: 60 TABLET | Refills: 0 | Status: SHIPPED | OUTPATIENT
Start: 2020-06-18 | End: 2020-06-28

## 2020-06-18 RX ADMIN — INSULIN LISPRO 4 UNITS: 100 INJECTION, SOLUTION INTRAVENOUS; SUBCUTANEOUS at 09:02

## 2020-06-18 RX ADMIN — ATENOLOL 50 MG: 50 TABLET ORAL at 09:02

## 2020-06-18 RX ADMIN — FLUOXETINE 40 MG: 20 CAPSULE ORAL at 09:02

## 2020-06-18 RX ADMIN — PANTOPRAZOLE SODIUM 40 MG: 40 TABLET, DELAYED RELEASE ORAL at 06:07

## 2020-06-18 RX ADMIN — ARFORMOTEROL TARTRATE 15 MCG: 15 SOLUTION RESPIRATORY (INHALATION) at 07:09

## 2020-06-18 RX ADMIN — SODIUM CHLORIDE 20 ML: 9 INJECTION, SOLUTION INTRAVENOUS at 00:49

## 2020-06-18 RX ADMIN — BUDESONIDE 250 MCG: 0.25 INHALANT RESPIRATORY (INHALATION) at 07:11

## 2020-06-18 RX ADMIN — MORPHINE SULFATE 4 MG: 4 INJECTION INTRAVENOUS at 00:54

## 2020-06-18 RX ADMIN — HYDROCODONE BITARTRATE AND ACETAMINOPHEN 2 TABLET: 5; 325 TABLET ORAL at 06:06

## 2020-06-18 RX ADMIN — IPRATROPIUM BROMIDE 0.5 MG: 0.5 SOLUTION RESPIRATORY (INHALATION) at 07:09

## 2020-06-18 RX ADMIN — ATORVASTATIN CALCIUM 20 MG: 20 TABLET, FILM COATED ORAL at 09:02

## 2020-06-18 RX ADMIN — ARIPIPRAZOLE 5 MG: 5 TABLET ORAL at 09:02

## 2020-06-18 RX ADMIN — MORPHINE SULFATE 4 MG: 4 INJECTION INTRAVENOUS at 04:46

## 2020-06-18 ASSESSMENT — PAIN - FUNCTIONAL ASSESSMENT
PAIN_FUNCTIONAL_ASSESSMENT: PREVENTS OR INTERFERES SOME ACTIVE ACTIVITIES AND ADLS

## 2020-06-18 ASSESSMENT — PAIN DESCRIPTION - LOCATION
LOCATION: NECK

## 2020-06-18 ASSESSMENT — PAIN DESCRIPTION - FREQUENCY
FREQUENCY: CONTINUOUS

## 2020-06-18 ASSESSMENT — PAIN DESCRIPTION - ONSET
ONSET: ON-GOING

## 2020-06-18 ASSESSMENT — PAIN SCALES - GENERAL
PAINLEVEL_OUTOF10: 7
PAINLEVEL_OUTOF10: 7
PAINLEVEL_OUTOF10: 0
PAINLEVEL_OUTOF10: 0
PAINLEVEL_OUTOF10: 6
PAINLEVEL_OUTOF10: 0

## 2020-06-18 ASSESSMENT — PAIN DESCRIPTION - ORIENTATION
ORIENTATION: POSTERIOR

## 2020-06-18 ASSESSMENT — PAIN DESCRIPTION - PAIN TYPE
TYPE: SURGICAL PAIN

## 2020-06-18 ASSESSMENT — PAIN DESCRIPTION - PROGRESSION
CLINICAL_PROGRESSION: NOT CHANGED

## 2020-06-18 ASSESSMENT — PAIN DESCRIPTION - DESCRIPTORS
DESCRIPTORS: JABBING

## 2020-06-18 NOTE — CONSULTS
[employer] - Consult    PCP: BRYAN Manriquez    Date of Admission: 6/17/2020    Date of Service: 6/17/2020    Chief Complaint/Reason for consultation:  Medical management LINDSEY, Anemia    History Of Present Illness: The patient is a 44 y.o. male with PMH CAD, Bladder cancer, COPD, DM II, HTN, HLD who presented to [address] [place] [address] ED complaining of posterior neck wound dehiscence. He underwent C5, C6 corpectomy with anterior cervical plating along with posterior instrumented fusion of C3-C7 on 02/19/2020. He has been followed by Neurosurgery and has been admitted to their service. Mr. Melanie Verma states that about 3 days ago he noticed worsening posterior neck pain, edema and today experienced copious clear fluid drainage that he describes as a \"burst\". He was taken to OR this afternoon for wound washout, debridement and closure. Intraoperative cultures obtained and he has now been placed on Vancomycin. Hospitalists have been consulted for creatinine 2.4, GFR 29 as well as hemoglobin 6.6. Last hemoglobin noted to be 9.2 on 02/20/2020. Last creatinine was 1.2 and GFR >60. Mr. Larson complains of back pain, requests pain medication be brought as soon as possible. Denies chest pain, heart palpitations. States he has had to have a blood transfusion in the remote past. States his urine was blood tinged for 3-4 days up until yesterday. Currently it is dark yellow. He does have history of bladder cancer s/p cystectomy. Otherwise denies melena, hematochezia, hematemesis or hemoptysis.      Past Medical History:        Diagnosis Date    CAD (coronary artery disease)     sees [facility] cardiology    Cancer [facility])     Bladder    [facility]ch[facility]     Depression     Diabetes mellitus ([facility]     History of blood transfusion     unsure thinks he did    Hyperlipidemia     Hypertension      Past Surgical History:        Procedure Laterality Date    BACK SURGERY      X3     BLADDER REMOVAL      CARDIAC CATHETERIZATION      CERVICAL FUSION N/A 2/19/2020    Phase 1:  C5 and C6 corpectomy with placement of an expandable cage and anterior cervical plate [id number]. performed by Mary Arango DO at [address] [place] [address] N/A 9/10/2019    Dr Chandana Matthews, 5 yr recall    CORONARY ARTERY BYPASS GRAFT N/A 5/1/2019    CORONARY ARTERY BYPASS GRAFT X 3 WITH LEFT INTERNAL MAMMARY ARTERY, ENDOSCOPIC  VEIN HARVEST, WITH PERFUSION. performed by Anju Tompkins MD at [place] [address] N/A 2/19/2020    Phase 2:  Posterior instrumented fusion C3-C7 using lateral mass screws and rods. performed by Mary Arango DO at [facility]      lower ext   [address] [facility] Medications:  Prior to Admission medications    Medication Sig Start Date End Date Taking? Authorizing Provider   lidocaine 4 % external patch Place 1 patch onto the skin daily 5/26/20 6/25/20 Yes BRYAN Monique   baclofen (LIORESAL) 10 MG tablet TAKE 1 TABLET BY MOUTH 3 TIMES DAILY AS NEEDED FOR PAIN/SPASMS PAIN/SPASMS 5/22/20  Yes Adolm Jakub, APRN   busPIRone (BUSPAR) 10 MG tablet TAKE 1 TABLET BY MOUTH 3 TIMES DAILY AS NEEDED (ANXIETY) 5/22/20  Yes Adolm Flowery Branch, APRN   atorvastatin (LIPITOR) 20 MG tablet TAKE ONE TABLET BY MOUTH ONCE DAILY. 4/13/20  Yes Adolm Flowery Branch, APRN   fenofibrate (TRIGLIDE) 160 MG tablet TAKE ONE TABLET BY MOUTH DAILY. 4/13/20  Yes Adolm Flowery Branch, APRN   omeprazole (PRILOSEC) 40 MG delayed release capsule TAKE 1 CAPSULE BY MOUTH DAILY 4/13/20  Yes Adolm Jakub, APRN   lisinopril (PRINIVIL;ZESTRIL) 10 MG tablet TAKE 1 TABLET BY MOUTH DAILY 3/24/20  Yes Adolm Flowery Branch, APRN   ARIPiprazole (ABILIFY) 5 MG tablet TAKE ONE TABLET BY MOUTH ONCE DAILY.  3/24/20  Yes Adolm Jakub, APRN   FLUoxetine (PROZAC) 40 MG capsule TAKE 1 CAPSULE BY MOUTH DAILY 3/23/20  Yes Omar Castellanos MD   atenolol (TENORMIN) 50 MG tablet TAKE ONE TABLET BY MOUTH EVERY DAY 3/16/20  Yes Adolm Flowery Branch, APRN   traZOCarlitoe stiffness / trauma / visual change  Eyes:  Denies blurry vision / acute visual change or loss / itching / redness  ENT: Denies sore throat / hoarseness / nasal drainage / ear pain  CV:  Denies chest pain / palpitations/ orthopnea   Respiratory:  Denies cough / shortness of breath / sputum / hemoptysis  GI: Denies nausea / vomiting / abdominal pain / diarrhea / constipation  :  Denies dysuria / hesitancy / urgency /+ hematuria-now resolved  Neuro: Denies paralysis / syncope / seizure / dysphagia / headache / paresthesias  Musculoskeletal:  Denies muscle weakness /joint stiffness /+ pain  Vascular: Denies edema / claudication / varicosities  Heme / endocrine: Denies easy bruising / bleeding / excessive sweating / heat or cold intolerance  Psychiatric:  Denies depression / anxiety / insomnia / mood changes  Skin:  Denies new rashes / lesions / skin hair or nail changes    14 point review of systems is negative except as specifically addressed above. Physical Examination:  BP (!) 122/53   Pulse 76   Temp 97.8 °F (36.6 °C) (Temporal)   Resp 14   Ht 5' 11\" (1.803 m)   Wt 192 lb (87.1 kg)   SpO2 95%   BMI 26.78 kg/m²   General appearance: alert, appears stated age, cooperative and no distress  Head: Normocephalic, without obvious abnormality, atraumatic  Eyes: conjunctivae/corneas clear. PERRL, EOM's intact. Ears: normal external ears and nose, throat without exudate  Neck: no adenopathy, no carotid bruit, no JVD, supple, symmetrical, trachea midline   Lungs: diminished throughout otherwise clear to auscultation bilaterally,no rales or wheezes   Heart: regular rate and rhythm, S1, S2 normal, no murmur  Abdomen:soft, non-tender; non-distended, normal bowel sounds no masses, no organomegaly, RLQ ostomy drained with dark yellow urine noted  Extremities: No lower extremity edema,  No erythema, no tenderness to palpation  Skin: Skin color, texture, turgor normal. No rashes. Posterior neck wound.  Dressing in place-I did not remove. Hemovac drain in place with serous drainage noted   Lymphatic: No palpable lymph node enlargment  Neurologic: Alert and oriented X 3, normal strength and tone. No focal deficits, fluent speech, answers questions appropriately   Psychiatric: Slightly agitated. Diagnostic Data:  CBC:  Recent Labs     06/17/20  1200   WBC 11.2*   HGB 6.6*   HCT 22.1*        BMP:  Recent Labs     06/17/20  1200      K 4.2      CO2 21*   BUN 33*   CREATININE 2.4*   CALCIUM 9.0     Recent Labs     06/17/20  1200   AST 24   ALT 16   BILITOT 0.4   ALKPHOS 128     A1C:   Recent Labs     06/17/20  1750   LABA1C 6.3*     Ct Cervical Spine Wo Contrast  1. Large area of wound dehiscence and surrounding fluid and inflammation in the posterior neck extending from C3 through C7. The abnormal attenuation extends to the posterior dural sac. Intrathecal and evaluation is limited without MRI with and without contrast. 2. Fusion hardware anteriorly and posteriorly is in stable position. Signed by Dr Delgado Hernández on 6/17/2020 2:01 PM    Xr Chest Portable  1. No radiographic evidence of acute cardiopulmonary process. Signed by Dr Delgado Hernández on 6/17/2020 2:03 PM    Assessment/Plan:  Principal Problem:    Rupture of operation wound-management per Neurosurgery,s/p drainage of posterior cervical seroma, washout, debridement and closure of wound today     Active Problems:   Acute kidney injury-continue IVF's, transfuse if <7.0, check urine sediment. Hold nephrotoxic medications. If no improvement in AM recommend Nephrology consultation    Anemia-likely 2/2 chronic disease/hematuria-check iron panel, repeat CBC now. Transfuse if <7.0      Coronary artery disease-no chest pain, stable, statin, BB continued. Ace held 2/2 LINDSEY       Essential hypertension-stable monitor closely.  Hold Zestril for now 2/2 LINDSEY      Hx of bladder cancer-noted, followed as OP by [facility]      Type 2 diabetes mellitus without complication, without long-term current use of insulin (HCC)-continue SSI, accuchecks, hypoglycemia tx orders      Mixed hyperlipidemia-statin continued    Stat CBC pending. If Hgb <7.0 transfuse PRBC. Repeat labs in AM. If no improvement in Crt consider Nephrology consultation. Hematuria appears resolved at present. If returns may need Urologic consultation vs OP follow up appointment with Dr. Sadiq Greenwood. Thank you for allowing us to participate in the care of this patient. We will follow along closely.      Signed:  Bianca Parker PA-C

## 2020-06-18 NOTE — DISCHARGE SUMMARY
BUSPAR  TAKE 1 TABLET BY MOUTH 3 TIMES DAILY AS NEEDED (ANXIETY)     fenofibrate 160 MG tablet  Commonly known as:  TRIGLIDE  TAKE ONE TABLET BY MOUTH DAILY.      FLUoxetine 40 MG capsule  Commonly known as:  PROZAC  TAKE 1 CAPSULE BY MOUTH DAILY     fluticasone-salmeterol 100-50 MCG/DOSE diskus inhaler  Commonly known as:  Advair Diskus  Inhale 1 puff into the lungs every 12 hours     glucose monitoring kit monitoring kit  Please provide glucometer that INS will cover for DM type 2     Lancets 30G Misc  1 each by Does not apply route daily 4 times daily     lidocaine 4 % external patch  Place 1 patch onto the skin daily     lisinopril 10 MG tablet  Commonly known as:  PRINIVIL;ZESTRIL  TAKE 1 TABLET BY MOUTH DAILY     metFORMIN 1000 MG tablet  Commonly known as:  GLUCOPHAGE  Take 1 tablet by mouth 2 times daily (with meals)     omeprazole 40 MG delayed release capsule  Commonly known as:  PRILOSEC  TAKE 1 CAPSULE BY MOUTH DAILY     sildenafil 20 MG tablet  Commonly known as:  REVATIO     tiotropium 18 MCG inhalation capsule  Commonly known as:  SPIRIVA     traZODone 50 MG tablet  Commonly known as:  DESYREL  TAKE 2 TABLETS BY MOUTH NIGHTLY AS NEEDED FOR SLEEP     Vitamin D3 1.25 MG (63281 UT) Caps            Return to Clinic: 1 week

## 2020-06-18 NOTE — PROGRESS NOTES
[facility] [place]      Patient:  Jelena Larson  YOB: 1970  Date of Service: 6/18/2020  MRN: [id number]   Acct: [de-identified]   Primary Care Physician: BRYAN Li  Advance Directive: Full Code  Admit Date: 6/17/2020       Hospital Day: 1  Portions of this note have been copied forward, however, changed to reflect the most current clinical status of this patient. CHIEF COMPLAINT LINDSEY, Anemia    SUBJECTIVE:  Mr. Emma Jewell has no new complaints. He is ready for discharge home today. Discussed need to monitor labs closely given LINDSEY, Anemia as well as to follow up with PCP and possibly Nephrology if renal function does not continue to improve. Review of Systems:   14 point review of systems is negative except as specifically addressed above. Objective:   VITALS:  BP (!) 146/72   Pulse 75   Temp 96.6 °F (35.9 °C)   Resp 16   Ht 5' 11\" (1.803 m)   Wt 192 lb (87.1 kg)   SpO2 96%   BMI 26.78 kg/m²   24HR INTAKE/OUTPUT:      Intake/Output Summary (Last 24 hours) at 6/18/2020 1134  Last data filed at 6/18/2020 9244  Gross per 24 hour   Intake 2021.67 ml   Output 710 ml   Net 1311.67 ml       General appearance: alert, appears stated age, cooperative and no distress  Head: Normocephalic, without obvious abnormality, atraumatic  Eyes: conjunctivae/corneas clear. PERRL, EOM's intact.    Ears: normal external ears and nose, throat without exudate  Neck: no adenopathy, no carotid bruit, no JVD, supple, symmetrical, trachea midline   Lungs: clear to auscultation bilaterally,no rales or wheezes   Heart: regular rate and rhythm, S1, S2 normal, no murmur  Abdomen:soft, non-tender; non-distended, normal bowel sounds no masses, no organomegaly, ostomy with clear, yellow urine  Extremities:No lower extremity edema,  No erythema, no tenderness to palpation  Skin: Skin color, texture, turgor normal. No rashes or lesions  Lymphatic: No palpable lymph node enlargment  Neurologic: Alert and oriented X 3, normal strength and tone.  No focal deficits  Psychiatric: Alert and oriented, thought content appropriate, normal insight, mood appropriate    Medications:      sodium chloride 100 mL/hr at 06/17/20 2103    lactated ringers      [MAR Hold] lactated ringers 100 mL/hr at 06/17/20 1414    dextrose        sodium chloride flush  10 mL Intravenous 2 times per day    famotidine (PEPCID) injection  20 mg Intravenous Once    scopolamine  1 patch Transdermal Once    ARIPiprazole  5 mg Oral Daily    atenolol  50 mg Oral Daily    atorvastatin  20 mg Oral Daily    vitamin D  50,000 Units Oral Weekly    FLUoxetine  40 mg Oral Daily    [Held by provider] lisinopril  10 mg Oral Daily    pantoprazole  40 mg Oral QAM AC    ipratropium  0.5 mg Nebulization TID    sodium chloride flush  10 mL Intravenous 2 times per day    budesonide  250 mcg Nebulization BID    Arformoterol Tartrate  15 mcg Nebulization BID    vancomycin  1,000 mg Intravenous Q24H    vancomycin (VANCOCIN) intermittent dosing (placeholder)   Other RX Placeholder    insulin lispro  0-12 Units Subcutaneous TID WC    insulin lispro  0-6 Units Subcutaneous Nightly     HYDROcodone 5 mg - acetaminophen, acetaminophen, ondansetron, sodium chloride flush, busPIRone, traZODone, sodium chloride flush, morphine **OR** morphine, HYDROcodone-acetaminophen, glucose, dextrose, glucagon (rDNA), dextrose, sodium chloride flush  DIET CARB CONTROL;     Lab and other Data:     Recent Labs     06/17/20  1200 06/17/20  2041 06/18/20  0229 06/18/20  0336   WBC 11.2* 10.0 9.8  --    HGB 6.6* 6.5* 7.3* 7.3*    294 289  --      Recent Labs     06/17/20  1200 06/18/20  0229    137   K 4.2 5.1*    102   CO2 21* 22   BUN 33* 27*   CREATININE 2.4* 1.4*   GLUCOSE 151* 247*     Recent Labs     06/17/20  1200   AST 24   ALT 16   BILITOT 0.4   ALKPHOS 128     UA:  Recent Labs     06/17/20 2115   COLORU YELLOW   PHUR 5.5   WBCUA 36*   RBCUA 4   YEAST NEG*   BACTERIA TRACE*   CLARITYU CLOUDY*   SPECGRAV 1.020   LEUKOCYTESUR SMALL*   UROBILINOGEN 1.0   BILIRUBINUR Negative   BLOODU SMALL*   GLUCOSEU >=1000*     A1C:   Recent Labs     06/17/20  1750   LABA1C 6.3*     RAD:   Ct Cervical Spine Wo Contrast  1. Large area of wound dehiscence and surrounding fluid and inflammation in the posterior neck extending from C3 through C7. The abnormal attenuation extends to the posterior dural sac. Intrathecal and evaluation is limited without MRI with and without contrast. 2. Fusion hardware anteriorly and posteriorly is in stable position. Signed by Dr Irma Malone on 6/17/2020 2:01 PM    Xr Chest Portable  1. No radiographic evidence of acute cardiopulmonary process. Signed by Dr Irma Malone on 6/17/2020 2:03 PM    Assessment/Plan   Principal Problem:    Rupture of operation wound  Active Problems:    Coronary artery disease    Essential hypertension    Hx of bladder cancer    Type 2 diabetes mellitus without complication, without long-term current use of insulin (HCC)    Mixed hyperlipidemia  Resolved Problems:    * No resolved hospital problems. *    OK for discharge home. Recommend follow labs to monitor renal function and hemoglobin which have been ordered and discussed with patient. Recommended close follow up with PCP to monitor renal parameters as well as Hemoglobin.      Timothy Mason PA-C

## 2020-06-18 NOTE — PROGRESS NOTES
Physical Therapy  Attempted PT eval, but pt declined stating he has been up to BR without difficulty and plans to d/c home today. RN notified.   Electronically signed by Cassy Cui PT on 6/18/2020 at 9:28 AM

## 2020-06-18 NOTE — PROGRESS NOTES
Nutrition Assessment    Type and Reason for Visit: Initial, Positive Nutrition Screen    Nutrition Recommendations: start double protein portions with all meals    Nutrition Assessment: Pt appears adequately nourished AEB fat and muscle mass. Pt is not at risk for nutritional compromise as po intake is good, 100% of breakfast consumed. Is in need of additional nutrients d/t wound. Will start double protein portions    Malnutrition Assessment:  · Malnutrition Status: No malnutrition  · Context: Acute illness or injury  · Findings of the 6 clinical characteristics of malnutrition (Minimum of 2 out of 6 clinical characteristics is required to make the diagnosis of moderate or severe Protein Calorie Malnutrition based on AND/ASPEN Guidelines):  1. Energy Intake-Greater than 75% of estimated energy requirement, Unable to assess    2. Weight Loss-No significant weight loss,    3. Fat Loss-No significant subcutaneous fat loss,    4. Muscle Loss-No significant muscle mass loss,    5. Fluid Accumulation-No significant fluid accumulation,    6.   Strength-Not measured    Nutrition Risk Level: Low    Nutrition Diagnosis:   · Problem: Increased nutrient needs  · Etiology: related to Increased demand for energy/nutrients     Signs and symptoms:  as evidenced by Presence of wounds    Objective Information:  · Nutrition-Focused Physical Findings: well nourished  · Wound Type: Surgical Wound  · Current Nutrition Therapies:  · Oral Diet Orders: Carb Control 4 Carbs/Meal   · Oral Diet intake: %  · Oral Nutrition Supplement (ONS) Orders: None    · Anthropometric Measures:  · Ht: 5' 11\" (180.3 cm)   · Current Body Wt: 192 lb (87.1 kg)  · Admission Body Wt: 192 lb (87.1 kg)(stated)  · Usual Body Wt: 190 lb (86.2 kg)(3/2020)  · Ideal Body Wt: 172 lb (78 kg), % Ideal Body 111.6%  · BMI Classification: BMI 25.0 - 29.9 Overweight    Nutrition Interventions:   Modify current diet  Continued Inpatient Monitoring    Nutrition

## 2020-06-18 NOTE — PROGRESS NOTES
CLINICAL PHARMACY NOTE: MEDS TO [facility] Patient?: No  Total # of Prescriptions Filled: 2   The following medications were delivered to the patient:  · Sulfamethoxazole-trimet 400-80mg  · Lidocaine 5% patches  Total # of Interventions Completed: 1  Time Spent (min): 15    Additional Documentation:  Had to send a 3rd Rx to [employer] [facility] as it came in to us after discharge for ferrous sulfate

## 2020-06-18 NOTE — PLAN OF CARE
Problem: Pain:  Goal: Pain level will decrease  Description: Pain level will decrease  Outcome: Ongoing  Goal: Control of acute pain  Description: Control of acute pain  Outcome: Ongoing  Goal: Control of chronic pain  Description: Control of chronic pain  Outcome: Ongoing  Goal: Patient's pain/discomfort is manageable  Description: Patient's pain/discomfort is manageable  Outcome: Ongoing     Problem: Discharge Planning:  Goal: Participates in care planning  Description: Participates in care planning  Outcome: Ongoing  Goal: Discharged to appropriate level of care  Description: Discharged to appropriate level of care  Outcome: Ongoing     Problem: Activity Intolerance:  Goal: Ability to tolerate increased activity will improve  Description: Ability to tolerate increased activity will improve  Outcome: Ongoing     Problem: Anxiety/Stress:  Goal: Level of anxiety will decrease  Description: Level of anxiety will decrease  Outcome: Ongoing     Problem:  Bowel Function - Altered:  Goal: Bowel elimination is within specified parameters  Description: Bowel elimination is within specified parameters  Outcome: Ongoing     Problem: Fluid Volume - Deficit:  Goal: Absence of fluid volume deficit signs and symptoms  Description: Absence of fluid volume deficit signs and symptoms  Outcome: Ongoing  Goal: Electrolytes within specified parameters  Description: Electrolytes within specified parameters  Outcome: Ongoing     Problem: Mental Status - Impaired:  Goal: Absence of continued neurological deterioration signs and symptoms  Description: Absence of continued neurological deterioration signs and symptoms  Outcome: Ongoing  Goal: Absence of physical injury  Description: Absence of physical injury  Outcome: Ongoing  Goal: Mental status will be restored to baseline  Description: Mental status will be restored to baseline  Outcome: Ongoing     Problem: Mobility - Impaired:  Goal: Mobility will improve to maximum level  Description: Mobility will improve to maximum level  Outcome: Ongoing     Problem: Nutrition Deficit:  Goal: Ability to achieve adequate nutritional intake will improve  Description: Ability to achieve adequate nutritional intake will improve  Outcome: Ongoing     Problem: Pain:  Goal: Pain level will decrease  Description: Pain level will decrease  Outcome: Ongoing  Goal: Ability to notify healthcare provider of pain before it becomes unmanageable or unbearable will improve  Description: Ability to notify healthcare provider of pain before it becomes unmanageable or unbearable will improve  Outcome: Ongoing  Goal: Control of acute pain  Description: Control of acute pain  Outcome: Ongoing  Goal: Control of chronic pain  Description: Control of chronic pain  Outcome: Ongoing     Problem: Serum Glucose Level - Abnormal:  Goal: Ability to maintain appropriate glucose levels will improve to within specified parameters  Description: Ability to maintain appropriate glucose levels will improve to within specified parameters  Outcome: Ongoing  Goal: Ability to maintain appropriate glucose levels will improve  Description: Ability to maintain appropriate glucose levels will improve  Outcome: Ongoing     Problem: Skin Integrity - Impaired:  Goal: Will show no infection signs and symptoms  Description: Will show no infection signs and symptoms  Outcome: Ongoing  Goal: Absence of new skin breakdown  Description: Absence of new skin breakdown  Outcome: Ongoing     Problem: Sleep Pattern Disturbance:  Goal: Appears well-rested  Description: Appears well-rested  Outcome: Ongoing     Problem: Falls - Risk of:  Goal: Absence of physical injury  Description: Absence of physical injury  Outcome: Ongoing  Goal: Will remain free from falls  Description: Will remain free from falls  Outcome: Ongoing     Problem: ABCDS Injury Assessment  Goal: Absence of physical injury  Outcome: Ongoing     Problem: Infection:  Goal: Will remain free from infection  Description: Will remain free from infection  Outcome: Ongoing     Problem: Safety:  Goal: Free from accidental physical injury  Description: Free from accidental physical injury  Outcome: Ongoing  Goal: Free from intentional harm  Description: Free from intentional harm  Outcome: Ongoing     Problem: Daily Care:  Goal: Daily care needs are met  Description: Daily care needs are met  Outcome: Ongoing     Problem: Skin Integrity:  Goal: Skin integrity will stabilize  Description: Skin integrity will stabilize  Outcome: Ongoing     Problem: Discharge Planning:  Goal: Patients continuum of care needs are met  Description: Patients continuum of care needs are met  Outcome: Ongoing     Problem:  Activity:  Goal: Amount of time patient spends in regular exercise will increase  Description: Amount of time patient spends in regular exercise will increase  Outcome: Ongoing  Goal: Sleep-wake cycle will improve  Description: Sleep-wake cycle will improve  Outcome: Ongoing  Goal: Risk for activity intolerance will decrease  Description: Risk for activity intolerance will decrease  Outcome: Ongoing     Problem: Nutritional:  Goal: Consumption of the prescribed amount of daily calories will improve  Description: Consumption of the prescribed amount of daily calories will improve  Outcome: Ongoing  Goal: Ability to make healthy dietary choices will improve  Description: Ability to make healthy dietary choices will improve  Outcome: Ongoing  Goal: Progress toward achieving an optimal weight will improve  Description: Progress toward achieving an optimal weight will improve  Outcome: Ongoing  Goal: Nutritional status will improve  Description: Nutritional status will improve  Outcome: Ongoing  Goal: Maintenance of adequate nutrition will improve  Description: Maintenance of adequate nutrition will improve  Outcome: Ongoing     Problem: Physical Regulation:  Goal: Will remain free from infection  Description: Will remain free from infection  Outcome: Ongoing  Goal: Diagnostic test results will improve  Description: Diagnostic test results will improve  Outcome: Ongoing  Goal: Ability to maintain vital signs within normal range will improve  Description: Ability to maintain vital signs within normal range will improve  Outcome: Ongoing  Goal: Complications related to the disease process, condition or treatment will be avoided or minimized  Description: Complications related to the disease process, condition or treatment will be avoided or minimized  Outcome: Ongoing     Problem: Respiratory:  Goal: Ability to maintain normal respiratory secretions will improve  Description: Ability to maintain normal respiratory secretions will improve  Outcome: Ongoing     Problem: Skin Integrity:  Goal: Demonstration of wound healing without infection will improve  Description: Demonstration of wound healing without infection will improve  Outcome: Ongoing  Goal: Complications related to intravenous access or infusion will be avoided or minimized  Description: Complications related to intravenous access or infusion will be avoided or minimized  Outcome: Ongoing  Goal: Risk for impaired skin integrity will decrease  Description: Risk for impaired skin integrity will decrease  Outcome: Ongoing     Problem: Coping:  Goal: Ability to adjust to condition or change in health will improve  Description: Ability to adjust to condition or change in health will improve  Outcome: Ongoing     Problem: Fluid Volume:  Goal: Ability to maintain a balanced intake and output will improve  Description: Ability to maintain a balanced intake and output will improve  Outcome: Ongoing     Problem: Health Behavior:  Goal: Ability to identify and utilize available resources and services will improve  Description: Ability to identify and utilize available resources and services will improve  Outcome: Ongoing  Goal: Ability to manage health-related needs will improve  Description: Ability to manage health-related needs will improve  Outcome: Ongoing     Problem: Metabolic:  Goal: Ability to maintain appropriate glucose levels will improve  Description: Ability to maintain appropriate glucose levels will improve  Outcome: Ongoing     Problem: Tissue Perfusion:  Goal: Adequacy of tissue perfusion will improve  Description: Adequacy of tissue perfusion will improve  Outcome: Ongoing     Problem: Sensory Perception - Impaired:  Goal: Ability to maintain a stable neurologic state will improve  Description: Ability to maintain a stable neurologic state will improve  Outcome: Ongoing

## 2020-06-19 ENCOUNTER — TELEPHONE (OUTPATIENT)
Dept: NEUROSURGERY | Age: 50
End: 2020-06-19

## 2020-06-19 ENCOUNTER — TELEPHONE (OUTPATIENT)
Dept: INTERNAL MEDICINE | Age: 50
End: 2020-06-19

## 2020-06-19 ENCOUNTER — OFFICE VISIT (OUTPATIENT)
Dept: NEUROSURGERY | Age: 50
End: 2020-06-19

## 2020-06-19 VITALS
HEART RATE: 79 BPM | HEIGHT: 71 IN | WEIGHT: 190 LBS | SYSTOLIC BLOOD PRESSURE: 145 MMHG | DIASTOLIC BLOOD PRESSURE: 58 MMHG | BODY MASS INDEX: 26.6 KG/M2

## 2020-06-19 LAB
ORGANISM: ABNORMAL
URINE CULTURE, ROUTINE: ABNORMAL
URINE CULTURE, ROUTINE: ABNORMAL

## 2020-06-19 PROCEDURE — 99024 POSTOP FOLLOW-UP VISIT: CPT | Performed by: NEUROLOGICAL SURGERY

## 2020-06-19 RX ORDER — CLINDAMYCIN HYDROCHLORIDE 300 MG/1
300 CAPSULE ORAL 4 TIMES DAILY
Qty: 14 CAPSULE | Refills: 0 | Status: SHIPPED | OUTPATIENT
Start: 2020-06-19 | End: 2020-06-29

## 2020-06-19 ASSESSMENT — ENCOUNTER SYMPTOMS
RESPIRATORY NEGATIVE: 1
GASTROINTESTINAL NEGATIVE: 1
EYES NEGATIVE: 1
BACK PAIN: 1

## 2020-06-19 NOTE — PROGRESS NOTES
[facility] Office Visit          Chief Complaint   Patient presents with    Follow-up     wound drainage. HISTORY OF PRESENT ILLNESS:      Gabriella Stahl is a 44 y.o. male who underwent a C5,C6 corpectomy with anterior cervical plating along with posterior instrumented fusion C3-C7 on 2/19/2020. On 6/17/2020 his wound dehisced and he underwent drainage of posterior seroma, washout and closure of wound. His wound was noted to be draining she we had him come to our clinic for inspection. He denies any fever or chills. He continues to smoke and is unaware of his blood glucose at today's visit. Past Medical History:   Diagnosis Date    CAD (coronary artery disease)     sees [facility] cardiology    Cancer [facility])     Bladder    [facility]     Depression     Diabetes mellitus [facility]     History of blood transfusion     unsure thinks he did    Hyperlipidemia     Hypertension        Past Surgical History:   Procedure Laterality Date    BACK SURGERY      X3     BLADDER REMOVAL      CARDIAC CATHETERIZATION      CERVICAL FUSION N/A 2/19/2020    Phase 1:  C5 and C6 corpectomy with placement of an expandable cage and anterior cervical plate F4-A9. performed by Supao Siemens DO at [address] [place] [address] N/A 6/17/2020    POSTERIOR CERVICAL WOUND [address] OUT AND CLOSURE performed by Cornelio Siemens, DO at [address] [place] [address] N/A 9/10/2019    Dr Steve Green, 5 yr recall    CORONARY ARTERY BYPASS GRAFT N/A 5/1/2019    CORONARY ARTERY BYPASS GRAFT X 3 WITH LEFT INTERNAL MAMMARY ARTERY, ENDOSCOPIC  VEIN HARVEST, WITH PERFUSION. performed by Tiffany Nieves MD at [address] [facility] N/A 2/19/2020    Phase 2:  Posterior instrumented fusion C3-C7 using lateral mass screws and rods.  performed by Cornelio Siemens DO at [facility]      lower ext    PROSTATECTOMY          Medications    Current Outpatient Medications:     clindamycin (CLEOCIN) 300 MG capsule, Take 1 capsule by mouth 4 times daily for 10 days, Disp: 14 capsule, Rfl: 0    sulfamethoxazole-trimethoprim (BACTRIM) 400-80 MG per tablet, Take 1 tablet by mouth 2 times daily for 10 days, Disp: 60 tablet, Rfl: 0    lidocaine (LIDODERM) 5 %, Place 1 patch onto the skin daily 12 hours on, 12 hours off., Disp: 30 patch, Rfl: 0    ferrous sulfate (FE TABS) 325 (65 Fe) MG EC tablet, Take 1 tablet by mouth 2 times daily, Disp: 90 tablet, Rfl: 0    baclofen (LIORESAL) 10 MG tablet, TAKE 1 TABLET BY MOUTH 3 TIMES DAILY AS NEEDED FOR PAIN/SPASMS PAIN/SPASMS, Disp: 90 tablet, Rfl: 0    busPIRone (BUSPAR) 10 MG tablet, TAKE 1 TABLET BY MOUTH 3 TIMES DAILY AS NEEDED (ANXIETY), Disp: 90 tablet, Rfl: 0    atorvastatin (LIPITOR) 20 MG tablet, TAKE ONE TABLET BY MOUTH ONCE DAILY. , Disp: 30 tablet, Rfl: 3    fenofibrate (TRIGLIDE) 160 MG tablet, TAKE ONE TABLET BY MOUTH DAILY. , Disp: 30 tablet, Rfl: 5    omeprazole (PRILOSEC) 40 MG delayed release capsule, TAKE 1 CAPSULE BY MOUTH DAILY, Disp: 30 capsule, Rfl: 2    lisinopril (PRINIVIL;ZESTRIL) 10 MG tablet, TAKE 1 TABLET BY MOUTH DAILY, Disp: 30 tablet, Rfl: 3    ARIPiprazole (ABILIFY) 5 MG tablet, TAKE ONE TABLET BY MOUTH ONCE DAILY. , Disp: 30 tablet, Rfl: 3    FLUoxetine (PROZAC) 40 MG capsule, TAKE 1 CAPSULE BY MOUTH DAILY, Disp: 30 capsule, Rfl: 3    atenolol (TENORMIN) 50 MG tablet, TAKE ONE TABLET BY MOUTH EVERY DAY, Disp: 30 tablet, Rfl: 5    traZODone (DESYREL) 50 MG tablet, TAKE 2 TABLETS BY MOUTH NIGHTLY AS NEEDED FOR SLEEP, Disp: 60 tablet, Rfl: 1    metFORMIN (GLUCOPHAGE) 1000 MG tablet, Take 1 tablet by mouth 2 times daily (with meals), Disp: 60 tablet, Rfl: 5    Cholecalciferol (VITAMIN D3) 1.25 MG (89131 UT) CAPS, Take 1 capsule by mouth once a week, Disp: , Rfl:     sildenafil (REVATIO) 20 MG tablet, Take 1-5 tablets 1-2 hours before sexual activity PRN, Disp: , Rfl:     tiotropium (SPIRIVA) external nose or ears, no atrophy of tongue  Neck-symmetric, no masses noted, no jugular vein distension  Respiration- chest wall appears symmetric, goodexpansion, normal effort without use of accessory muscles  Musculoskeletal - no significant wasting of muscles noted, no bony deformities, gait no gross ataxia  Extremities-no clubbing, cyanosis or edema  Skin- warm, dry, and intact. No rash, erythema, or pallor. Psychiatric - mood, affect, and behavior appear normal.     Neurologic Examination  Awake, Alert andoriented x 3  Normal speech pattern, following commands  Motor 5/5 all extremities  No deficits to light touch or pinprick sensation    Normal Gait pattern      Wound:  Healing superiorly but in the middle and more inferiorly there area small areas that were noted to be draining clear fluid. These areas were closed. DATA and IMAGING:    Lab Results   Component Value Date    WBC 9.8 06/18/2020    HGB 7.3 (L) 06/18/2020    HCT 24.1 (L) 06/18/2020    MCV 73.1 (L) 06/18/2020     06/18/2020     Lab Results   Component Value Date     06/18/2020    K 5.1 (H) 06/18/2020     06/18/2020    CO2 22 06/18/2020    BUN 27 (H) 06/18/2020    CREATININE 1.4 (H) 06/18/2020    GLUCOSE 247 (H) 06/18/2020    CALCIUM 8.4 (L) 06/18/2020    PROT 6.7 06/17/2020    LABALBU 3.0 (L) 06/17/2020    BILITOT 0.4 06/17/2020    ALKPHOS 128 06/17/2020    AST 24 06/17/2020    ALT 16 06/17/2020    LABGLOM 53 (A) 06/18/2020     Lab Results   Component Value Date    INR 1.03 01/30/2020    INR 1.34 (H) 05/01/2019    INR 1.02 04/30/2019    PROTIME 12.9 01/30/2020    PROTIME 15.9 (H) 05/01/2019    PROTIME 12.8 04/30/2019    No results found. ASSESSMENT   44year old male s/p C5,C6 corpectomy with anterior cervical plating along with posterior instrumented fusion C3-C7 on 2/19/2020. On 6/17/2020 his wound dehisced and he underwent drainage of posterior seroma, washout and closure of wound.   Cultures were positive

## 2020-06-21 LAB
ANAEROBIC CULTURE: ABNORMAL
ANAEROBIC CULTURE: ABNORMAL
CULTURE SURGICAL: ABNORMAL
CULTURE SURGICAL: ABNORMAL
GRAM STAIN RESULT: ABNORMAL
GRAM STAIN RESULT: ABNORMAL
ORGANISM: ABNORMAL
ORGANISM: ABNORMAL

## 2020-06-21 RX ORDER — BACLOFEN 10 MG/1
TABLET ORAL
Qty: 90 TABLET | Refills: 0 | Status: SHIPPED | OUTPATIENT
Start: 2020-06-21 | End: 2020-07-21

## 2020-06-22 RX ORDER — TRAZODONE HYDROCHLORIDE 50 MG/1
100 TABLET ORAL NIGHTLY PRN
Qty: 60 TABLET | Refills: 1 | Status: SHIPPED | OUTPATIENT
Start: 2020-06-22 | End: 2020-08-19

## 2020-06-23 ENCOUNTER — HOSPITAL ENCOUNTER (INPATIENT)
Age: 50
LOS: 6 days | Discharge: HOME HEALTH | DRG: 857 | End: 2020-06-29
Attending: NEUROLOGICAL SURGERY | Admitting: NEUROLOGICAL SURGERY
Payer: MEDICAID

## 2020-06-23 ENCOUNTER — OFFICE VISIT (OUTPATIENT)
Dept: NEUROSURGERY | Age: 50
End: 2020-06-23

## 2020-06-23 VITALS
DIASTOLIC BLOOD PRESSURE: 63 MMHG | HEART RATE: 68 BPM | HEIGHT: 71 IN | OXYGEN SATURATION: 97 % | WEIGHT: 190 LBS | SYSTOLIC BLOOD PRESSURE: 135 MMHG | BODY MASS INDEX: 26.6 KG/M2

## 2020-06-23 PROBLEM — T81.30XA WOUND DEHISCENCE: Status: ACTIVE | Noted: 2020-06-23

## 2020-06-23 LAB
ANION GAP SERPL CALCULATED.3IONS-SCNC: 11 MMOL/L (ref 7–19)
BASOPHILS ABSOLUTE: 0.1 K/UL (ref 0–0.2)
BASOPHILS RELATIVE PERCENT: 0.8 % (ref 0–1)
BUN BLDV-MCNC: 16 MG/DL (ref 6–20)
CALCIUM SERPL-MCNC: 8.9 MG/DL (ref 8.6–10)
CHLORIDE BLD-SCNC: 102 MMOL/L (ref 98–111)
CO2: 24 MMOL/L (ref 22–29)
CREAT SERPL-MCNC: 1.2 MG/DL (ref 0.5–1.2)
EOSINOPHILS ABSOLUTE: 0.1 K/UL (ref 0–0.6)
EOSINOPHILS RELATIVE PERCENT: 1.3 % (ref 0–5)
GFR NON-AFRICAN AMERICAN: >60
GLUCOSE BLD-MCNC: 112 MG/DL (ref 70–99)
GLUCOSE BLD-MCNC: 174 MG/DL (ref 70–99)
GLUCOSE BLD-MCNC: 90 MG/DL (ref 74–109)
HBA1C MFR BLD: 6.2 % (ref 4–6)
HCT VFR BLD CALC: 30.9 % (ref 42–52)
HEMOGLOBIN: 8.9 G/DL (ref 14–18)
HYPOCHROMIA: ABNORMAL
IMMATURE GRANULOCYTES #: 0.2 K/UL
LYMPHOCYTES ABSOLUTE: 2.1 K/UL (ref 1.1–4.5)
LYMPHOCYTES RELATIVE PERCENT: 23.2 % (ref 20–40)
MCH RBC QN AUTO: 21.6 PG (ref 27–31)
MCHC RBC AUTO-ENTMCNC: 28.8 G/DL (ref 33–37)
MCV RBC AUTO: 75 FL (ref 80–94)
MONOCYTES ABSOLUTE: 0.8 K/UL (ref 0–0.9)
MONOCYTES RELATIVE PERCENT: 9 % (ref 0–10)
NEUTROPHILS ABSOLUTE: 5.7 K/UL (ref 1.5–7.5)
NEUTROPHILS RELATIVE PERCENT: 63.1 % (ref 50–65)
PDW BLD-RTO: 19.9 % (ref 11.5–14.5)
PERFORMED ON: ABNORMAL
PERFORMED ON: ABNORMAL
PLATELET # BLD: 421 K/UL (ref 130–400)
PLATELET SLIDE REVIEW: ADEQUATE
PMV BLD AUTO: 8.8 FL (ref 9.4–12.4)
POLYCHROMASIA: ABNORMAL
POTASSIUM SERPL-SCNC: 4.2 MMOL/L (ref 3.5–5)
RBC # BLD: 4.12 M/UL (ref 4.7–6.1)
SODIUM BLD-SCNC: 137 MMOL/L (ref 136–145)
VANCOMYCIN TROUGH: <4 UG/ML (ref 10–20)
WBC # BLD: 9 K/UL (ref 4.8–10.8)

## 2020-06-23 PROCEDURE — 82947 ASSAY GLUCOSE BLOOD QUANT: CPT

## 2020-06-23 PROCEDURE — 6360000002 HC RX W HCPCS: Performed by: NEUROLOGICAL SURGERY

## 2020-06-23 PROCEDURE — 80202 ASSAY OF VANCOMYCIN: CPT

## 2020-06-23 PROCEDURE — 99024 POSTOP FOLLOW-UP VISIT: CPT | Performed by: NEUROLOGICAL SURGERY

## 2020-06-23 PROCEDURE — 85025 COMPLETE CBC W/AUTO DIFF WBC: CPT

## 2020-06-23 PROCEDURE — 6370000000 HC RX 637 (ALT 250 FOR IP): Performed by: NEUROLOGICAL SURGERY

## 2020-06-23 PROCEDURE — 36415 COLL VENOUS BLD VENIPUNCTURE: CPT

## 2020-06-23 PROCEDURE — 80048 BASIC METABOLIC PNL TOTAL CA: CPT

## 2020-06-23 PROCEDURE — 87040 BLOOD CULTURE FOR BACTERIA: CPT

## 2020-06-23 PROCEDURE — 1210000000 HC MED SURG R&B

## 2020-06-23 PROCEDURE — 2580000003 HC RX 258: Performed by: NEUROLOGICAL SURGERY

## 2020-06-23 PROCEDURE — 83036 HEMOGLOBIN GLYCOSYLATED A1C: CPT

## 2020-06-23 PROCEDURE — 94640 AIRWAY INHALATION TREATMENT: CPT

## 2020-06-23 RX ORDER — LISINOPRIL 10 MG/1
10 TABLET ORAL DAILY
Status: DISCONTINUED | OUTPATIENT
Start: 2020-06-24 | End: 2020-06-29 | Stop reason: HOSPADM

## 2020-06-23 RX ORDER — DEXTROSE MONOHYDRATE 25 G/50ML
12.5 INJECTION, SOLUTION INTRAVENOUS PRN
Status: DISCONTINUED | OUTPATIENT
Start: 2020-06-23 | End: 2020-06-29 | Stop reason: HOSPADM

## 2020-06-23 RX ORDER — HYDROCODONE BITARTRATE AND ACETAMINOPHEN 10; 325 MG/1; MG/1
2 TABLET ORAL EVERY 6 HOURS PRN
Status: DISCONTINUED | OUTPATIENT
Start: 2020-06-23 | End: 2020-06-29 | Stop reason: HOSPADM

## 2020-06-23 RX ORDER — ONDANSETRON 2 MG/ML
4 INJECTION INTRAMUSCULAR; INTRAVENOUS EVERY 6 HOURS PRN
Status: DISCONTINUED | OUTPATIENT
Start: 2020-06-23 | End: 2020-06-29 | Stop reason: HOSPADM

## 2020-06-23 RX ORDER — FLUOXETINE HYDROCHLORIDE 20 MG/1
40 CAPSULE ORAL DAILY
Status: DISCONTINUED | OUTPATIENT
Start: 2020-06-24 | End: 2020-06-29 | Stop reason: HOSPADM

## 2020-06-23 RX ORDER — ACETAMINOPHEN 325 MG/1
650 TABLET ORAL EVERY 6 HOURS PRN
Status: DISCONTINUED | OUTPATIENT
Start: 2020-06-23 | End: 2020-06-29 | Stop reason: HOSPADM

## 2020-06-23 RX ORDER — SODIUM CHLORIDE 0.9 % (FLUSH) 0.9 %
10 SYRINGE (ML) INJECTION PRN
Status: DISCONTINUED | OUTPATIENT
Start: 2020-06-23 | End: 2020-06-25 | Stop reason: SDUPTHER

## 2020-06-23 RX ORDER — POLYETHYLENE GLYCOL 3350 17 G/17G
17 POWDER, FOR SOLUTION ORAL DAILY PRN
Status: DISCONTINUED | OUTPATIENT
Start: 2020-06-23 | End: 2020-06-28

## 2020-06-23 RX ORDER — ACETAMINOPHEN 650 MG/1
650 SUPPOSITORY RECTAL EVERY 6 HOURS PRN
Status: DISCONTINUED | OUTPATIENT
Start: 2020-06-23 | End: 2020-06-29 | Stop reason: HOSPADM

## 2020-06-23 RX ORDER — FERROUS SULFATE 325(65) MG
325 TABLET ORAL 2 TIMES DAILY
Status: DISCONTINUED | OUTPATIENT
Start: 2020-06-23 | End: 2020-06-29 | Stop reason: HOSPADM

## 2020-06-23 RX ORDER — PANTOPRAZOLE SODIUM 40 MG/1
40 TABLET, DELAYED RELEASE ORAL
Status: DISCONTINUED | OUTPATIENT
Start: 2020-06-24 | End: 2020-06-29 | Stop reason: HOSPADM

## 2020-06-23 RX ORDER — ARFORMOTEROL TARTRATE 15 UG/2ML
15 SOLUTION RESPIRATORY (INHALATION) 2 TIMES DAILY
Status: DISCONTINUED | OUTPATIENT
Start: 2020-06-23 | End: 2020-06-29 | Stop reason: HOSPADM

## 2020-06-23 RX ORDER — DEXTROSE MONOHYDRATE 50 MG/ML
100 INJECTION, SOLUTION INTRAVENOUS PRN
Status: DISCONTINUED | OUTPATIENT
Start: 2020-06-23 | End: 2020-06-29 | Stop reason: HOSPADM

## 2020-06-23 RX ORDER — SODIUM CHLORIDE 0.9 % (FLUSH) 0.9 %
10 SYRINGE (ML) INJECTION EVERY 12 HOURS SCHEDULED
Status: DISCONTINUED | OUTPATIENT
Start: 2020-06-23 | End: 2020-06-25 | Stop reason: SDUPTHER

## 2020-06-23 RX ORDER — NICOTINE 21 MG/24HR
1 PATCH, TRANSDERMAL 24 HOURS TRANSDERMAL DAILY
Status: DISCONTINUED | OUTPATIENT
Start: 2020-06-23 | End: 2020-06-29 | Stop reason: HOSPADM

## 2020-06-23 RX ORDER — PROMETHAZINE HYDROCHLORIDE 12.5 MG/1
12.5 TABLET ORAL EVERY 6 HOURS PRN
Status: DISCONTINUED | OUTPATIENT
Start: 2020-06-23 | End: 2020-06-29 | Stop reason: HOSPADM

## 2020-06-23 RX ORDER — BUDESONIDE 0.25 MG/2ML
250 INHALANT ORAL 2 TIMES DAILY
Status: DISCONTINUED | OUTPATIENT
Start: 2020-06-23 | End: 2020-06-29 | Stop reason: HOSPADM

## 2020-06-23 RX ORDER — HYDROCODONE BITARTRATE AND ACETAMINOPHEN 10; 325 MG/1; MG/1
2 TABLET ORAL EVERY 6 HOURS PRN
COMMUNITY

## 2020-06-23 RX ORDER — BUSPIRONE HYDROCHLORIDE 10 MG/1
10 TABLET ORAL 3 TIMES DAILY PRN
Status: DISCONTINUED | OUTPATIENT
Start: 2020-06-23 | End: 2020-06-29 | Stop reason: HOSPADM

## 2020-06-23 RX ORDER — TRAZODONE HYDROCHLORIDE 100 MG/1
100 TABLET ORAL NIGHTLY PRN
Status: DISCONTINUED | OUTPATIENT
Start: 2020-06-23 | End: 2020-06-29 | Stop reason: HOSPADM

## 2020-06-23 RX ORDER — NICOTINE POLACRILEX 4 MG
15 LOZENGE BUCCAL PRN
Status: DISCONTINUED | OUTPATIENT
Start: 2020-06-23 | End: 2020-06-29 | Stop reason: HOSPADM

## 2020-06-23 RX ORDER — BUDESONIDE AND FORMOTEROL FUMARATE DIHYDRATE 80; 4.5 UG/1; UG/1
2 AEROSOL RESPIRATORY (INHALATION) 2 TIMES DAILY
Status: DISCONTINUED | OUTPATIENT
Start: 2020-06-23 | End: 2020-06-23 | Stop reason: CLARIF

## 2020-06-23 RX ORDER — ARIPIPRAZOLE 5 MG/1
5 TABLET ORAL DAILY
Status: DISCONTINUED | OUTPATIENT
Start: 2020-06-23 | End: 2020-06-29 | Stop reason: HOSPADM

## 2020-06-23 RX ORDER — ATORVASTATIN CALCIUM 20 MG/1
20 TABLET, FILM COATED ORAL DAILY
Status: DISCONTINUED | OUTPATIENT
Start: 2020-06-23 | End: 2020-06-29 | Stop reason: HOSPADM

## 2020-06-23 RX ORDER — ATENOLOL 50 MG/1
50 TABLET ORAL DAILY
Status: DISCONTINUED | OUTPATIENT
Start: 2020-06-24 | End: 2020-06-29 | Stop reason: HOSPADM

## 2020-06-23 RX ADMIN — IPRATROPIUM BROMIDE 0.5 MG: 0.5 SOLUTION RESPIRATORY (INHALATION) at 19:19

## 2020-06-23 RX ADMIN — FERROUS SULFATE TAB 325 MG (65 MG ELEMENTAL FE) 325 MG: 325 (65 FE) TAB at 21:15

## 2020-06-23 RX ADMIN — SODIUM CHLORIDE, PRESERVATIVE FREE 10 ML: 5 INJECTION INTRAVENOUS at 19:33

## 2020-06-23 RX ADMIN — INSULIN LISPRO 1 UNITS: 100 INJECTION, SOLUTION INTRAVENOUS; SUBCUTANEOUS at 21:15

## 2020-06-23 RX ADMIN — HYDROCODONE BITARTRATE AND ACETAMINOPHEN 2 TABLET: 10; 325 TABLET ORAL at 17:12

## 2020-06-23 RX ADMIN — DEXTROSE MONOHYDRATE 1250 MG: 5 INJECTION, SOLUTION INTRAVENOUS at 19:30

## 2020-06-23 RX ADMIN — BUDESONIDE 250 MCG: 0.25 INHALANT RESPIRATORY (INHALATION) at 19:19

## 2020-06-23 RX ADMIN — METFORMIN HYDROCHLORIDE 1000 MG: 500 TABLET, FILM COATED ORAL at 17:12

## 2020-06-23 RX ADMIN — HYDROCODONE BITARTRATE AND ACETAMINOPHEN 2 TABLET: 10; 325 TABLET ORAL at 23:16

## 2020-06-23 RX ADMIN — ARFORMOTEROL TARTRATE 15 MCG: 15 SOLUTION RESPIRATORY (INHALATION) at 19:19

## 2020-06-23 ASSESSMENT — PAIN DESCRIPTION - FREQUENCY: FREQUENCY: CONTINUOUS

## 2020-06-23 ASSESSMENT — PAIN DESCRIPTION - ONSET: ONSET: ON-GOING

## 2020-06-23 ASSESSMENT — PAIN SCALES - GENERAL
PAINLEVEL_OUTOF10: 3
PAINLEVEL_OUTOF10: 3

## 2020-06-23 ASSESSMENT — ENCOUNTER SYMPTOMS
GASTROINTESTINAL NEGATIVE: 1
RESPIRATORY NEGATIVE: 1
EYES NEGATIVE: 1

## 2020-06-23 ASSESSMENT — PAIN - FUNCTIONAL ASSESSMENT: PAIN_FUNCTIONAL_ASSESSMENT: PREVENTS OR INTERFERES SOME ACTIVE ACTIVITIES AND ADLS

## 2020-06-23 ASSESSMENT — PAIN DESCRIPTION - LOCATION: LOCATION: NECK

## 2020-06-23 ASSESSMENT — PAIN DESCRIPTION - ORIENTATION: ORIENTATION: POSTERIOR

## 2020-06-23 ASSESSMENT — PAIN DESCRIPTION - PROGRESSION: CLINICAL_PROGRESSION: NOT CHANGED

## 2020-06-23 ASSESSMENT — PAIN DESCRIPTION - DESCRIPTORS: DESCRIPTORS: TENDER;THROBBING

## 2020-06-23 ASSESSMENT — PAIN DESCRIPTION - PAIN TYPE: TYPE: CHRONIC PAIN

## 2020-06-23 NOTE — PROGRESS NOTES
Pharmacy Note  Vancomycin Consult    Yumiko Larson is a 44 y.o. male started on Vancomycin for MRSA wound infection; consult received from Dr. Domonique Mcqueen to manage therapy. Outpatient treatment with oral Clindamycin and Bactrim DS failed. Active Problems:    Wound dehiscence  Resolved Problems:    * No resolved hospital problems. *      Allergies:  Latex and Demerol hcl [meperidine]     Temp max: 96.3    Recent Labs     06/23/20  1639   BUN 16       Recent Labs     06/23/20  1639   CREATININE 1.2       Recent Labs     06/23/20  1639   WBC 9.0       No intake or output data in the 24 hours ending 06/23/20 1756    Culture Date Source Results   06/23/20 Blood sent   06/17/20 Surgical-neck MRSA            Ht Readings from Last 1 Encounters:   06/23/20 5' 11\" (1.803 m)        Wt Readings from Last 1 Encounters:   06/23/20 180 lb 14.4 oz (82.1 kg)         Body mass index is 25.23 kg/m². Estimated Creatinine Clearance: 77 mL/min (based on SCr of 1.2 mg/dL). Assessment/Plan:  Will initiate vancomycin 1250 mg IV every 12 hours. Timing of trough level will be determined based on culture results, renal function, and clinical response. A trough level was ordered prior to starting of Vancomycin by Dr. DIANNE ROSS that returned as < 4. Nursing was unaware if any Vancomycin had been given IV prior to patient's admission today. Thank you for the consult. Will continue to follow.     Electronically signed by Kvng Sherwood [facility] on 6/23/2020 at 5:56 PM

## 2020-06-23 NOTE — H&P
[facility]  H&P                   Chief Complaint   Patient presents with    Wound Check       S/P cervical spinal fusion.            HISTORY OF PRESENT ILLNESS:       Gary Larson is a 44 y.o. male who underwent a C5,C6 corpectomy with anterior cervical plating along with posterior instrumented fusion C3-C7 on 2/19/2020. On 6/17/2020 his wound dehisced and he underwent drainage of posterior seroma, washout and closure of wound. His wound was noted to be draining she we had him come to our clinic for inspection. Cultures revealed a MRSA infection. I attempted to manage his wound as an outpatient with oral antibiotics, however, it appears he will need more aggressive IV antibiotics, evaluation by the ID and wound care team.       He denies any fever or chills.       Past Medical History        Past Medical History:   Diagnosis Date    CAD (coronary artery disease)       sees [facility] cardiology    Cancer [facility])       Bladder    [facility]      Depression      Diabetes mellitus ([facility]      History of blood transfusion       unsure thinks he did    Hyperlipidemia      Hypertension              Past Surgical History         Past Surgical History:   Procedure Laterality Date    BACK SURGERY         X3     BLADDER REMOVAL        CARDIAC CATHETERIZATION        CERVICAL FUSION N/A 2/19/2020     Phase 1:  C5 and C6 corpectomy with placement of an expandable cage and anterior cervical plate W7-G5. performed by Noemi Camarena DO at [address] [place] [address] N/A 6/17/2020     POSTERIOR CERVICAL WOUND [address] OUT AND CLOSURE performed by Noemi Camarena DO at [address] [place] [address] N/A 9/10/2019     Dr Cami Perez, 5 yr recall    CORONARY ARTERY BYPASS GRAFT N/A 5/1/2019     CORONARY ARTERY BYPASS GRAFT X 3 WITH LEFT INTERNAL MAMMARY ARTERY, ENDOSCOPIC  VEIN HARVEST, WITH PERFUSION.  performed by Jessi Haney MD at [facility] 2/19/2020     Phase 2:  Posterior instrumented fusion C3-C7 using lateral mass screws and rods. performed by Lane Forbes DO at [facility] OR    LYMPHADENECTOMY         lower ext    PROSTATECTOMY                 Medications    Current Medication      Current Outpatient Medications:     traZODone (DESYREL) 50 MG tablet, TAKE 2 TABLETS BY MOUTH NIGHTLY AS NEEDED FOR SLEEP, Disp: 60 tablet, Rfl: 1    baclofen (LIORESAL) 10 MG tablet, TAKE 1 TABLET BY MOUTH 3 TIMES DAILY AS NEEDED FOR PAIN/SPASMS PAIN/SPASMS, Disp: 90 tablet, Rfl: 0    clindamycin (CLEOCIN) 300 MG capsule, Take 1 capsule by mouth 4 times daily for 10 days, Disp: 14 capsule, Rfl: 0    sulfamethoxazole-trimethoprim (BACTRIM) 400-80 MG per tablet, Take 1 tablet by mouth 2 times daily for 10 days, Disp: 60 tablet, Rfl: 0    lidocaine (LIDODERM) 5 %, Place 1 patch onto the skin daily 12 hours on, 12 hours off., Disp: 30 patch, Rfl: 0    ferrous sulfate (FE TABS) 325 (65 Fe) MG EC tablet, Take 1 tablet by mouth 2 times daily, Disp: 90 tablet, Rfl: 0    busPIRone (BUSPAR) 10 MG tablet, TAKE 1 TABLET BY MOUTH 3 TIMES DAILY AS NEEDED (ANXIETY), Disp: 90 tablet, Rfl: 0    atorvastatin (LIPITOR) 20 MG tablet, TAKE ONE TABLET BY MOUTH ONCE DAILY. , Disp: 30 tablet, Rfl: 3    fenofibrate (TRIGLIDE) 160 MG tablet, TAKE ONE TABLET BY MOUTH DAILY. , Disp: 30 tablet, Rfl: 5    omeprazole (PRILOSEC) 40 MG delayed release capsule, TAKE 1 CAPSULE BY MOUTH DAILY, Disp: 30 capsule, Rfl: 2    lisinopril (PRINIVIL;ZESTRIL) 10 MG tablet, TAKE 1 TABLET BY MOUTH DAILY, Disp: 30 tablet, Rfl: 3    ARIPiprazole (ABILIFY) 5 MG tablet, TAKE ONE TABLET BY MOUTH ONCE DAILY. , Disp: 30 tablet, Rfl: 3    FLUoxetine (PROZAC) 40 MG capsule, TAKE 1 CAPSULE BY MOUTH DAILY, Disp: 30 capsule, Rfl: 3    atenolol (TENORMIN) 50 MG tablet, TAKE ONE TABLET BY MOUTH EVERY DAY, Disp: 30 tablet, Rfl: 5    metFORMIN (GLUCOPHAGE) 1000 MG tablet, Take 1 tablet by mouth 2 times daily (with    Neurological: Negative.    Endo/Heme/Allergies: Negative.    Psychiatric/Behavioral: Negative.       PHYSICAL EXAM:      Vitals:     06/23/20 1010   BP: 135/63   Pulse: 68   SpO2: 97%      Constitutional: The patient appears well-developed andwell-nourished. Eyes - conjunctiva normal.  Conjugate gaze  Ear, nose, throat -No scars, masses, or lesions over external nose or ears, no atrophy of tongue  Neck-symmetric, no masses noted, no jugular vein distension  Respiration- chest wall appears symmetric, goodexpansion, normal effort without use of accessory muscles  Musculoskeletal - no significant wasting of muscles noted, no bony deformities, gait no gross ataxia  Extremities-no clubbing, cyanosis or edema  Skin- warm, dry, and intact. No rash, erythema, or pallor.   Psychiatric - mood, affect, and behavior appear normal.      Neurologic Examination  Awake, Alert andoriented x 3  Normal speech pattern, following commands  Motor 5/5 all extremities  No deficits to light touch or pinprick sensation     Normal Gait pattern        Wound:  Draining some, some areas have started to heal and some have re-opened.        DATA and IMAGING:           Lab Results   Component Value Date     WBC 9.8 06/18/2020     HGB 7.3 (L) 06/18/2020     HCT 24.1 (L) 06/18/2020     MCV 73.1 (L) 06/18/2020      06/18/2020            Lab Results   Component Value Date      06/18/2020     K 5.1 (H) 06/18/2020      06/18/2020     CO2 22 06/18/2020     BUN 27 (H) 06/18/2020     CREATININE 1.4 (H) 06/18/2020     GLUCOSE 247 (H) 06/18/2020     CALCIUM 8.4 (L) 06/18/2020     PROT 6.7 06/17/2020     LABALBU 3.0 (L) 06/17/2020     BILITOT 0.4 06/17/2020     ALKPHOS 128 06/17/2020     AST 24 06/17/2020     ALT 16 06/17/2020     LABGLOM 53 (A) 06/18/2020      Lab Results   Component Value Date     INR 1.03 01/30/2020     INR 1.34 (H) 05/01/2019     INR 1.02 04/30/2019     PROTIME 12.9 01/30/2020     PROTIME 15.9 (H) 05/01/2019     PROTIME 12.8 04/30/2019    No results found.              ASSESSMENT   44year old male s/p C5,C6 corpectomy with anterior cervical plating along with posterior instrumented fusion C3-C7 on 2/19/2020. On 6/17/2020 his wound dehisced and he underwent drainage of posterior seroma, washout and closure of wound.   Cultures were positive with MRSA     PLAN:  Admit to 5th floor  ID consult, will start vancomycin  Wound care consult, may need wound vac

## 2020-06-23 NOTE — LETTER
Sania Choi,  at [facility]  [phone] phone  [phone] fax  [phone] CELL (MAURE[address])    Sania Choi  at [facility]  [phone] phone  [phone] fax  [phone] CELL (MAUREEN [address])

## 2020-06-23 NOTE — PROGRESS NOTES
2/19/2020    Phase 2:  Posterior instrumented fusion C3-C7 using lateral mass screws and rods. performed by Cornelio Siemens, DO at [facility]      lower ext    PROSTATECTOMY          Medications    Current Outpatient Medications:     traZODone (DESYREL) 50 MG tablet, TAKE 2 TABLETS BY MOUTH NIGHTLY AS NEEDED FOR SLEEP, Disp: 60 tablet, Rfl: 1    baclofen (LIORESAL) 10 MG tablet, TAKE 1 TABLET BY MOUTH 3 TIMES DAILY AS NEEDED FOR PAIN/SPASMS PAIN/SPASMS, Disp: 90 tablet, Rfl: 0    clindamycin (CLEOCIN) 300 MG capsule, Take 1 capsule by mouth 4 times daily for 10 days, Disp: 14 capsule, Rfl: 0    sulfamethoxazole-trimethoprim (BACTRIM) 400-80 MG per tablet, Take 1 tablet by mouth 2 times daily for 10 days, Disp: 60 tablet, Rfl: 0    lidocaine (LIDODERM) 5 %, Place 1 patch onto the skin daily 12 hours on, 12 hours off., Disp: 30 patch, Rfl: 0    ferrous sulfate (FE TABS) 325 (65 Fe) MG EC tablet, Take 1 tablet by mouth 2 times daily, Disp: 90 tablet, Rfl: 0    busPIRone (BUSPAR) 10 MG tablet, TAKE 1 TABLET BY MOUTH 3 TIMES DAILY AS NEEDED (ANXIETY), Disp: 90 tablet, Rfl: 0    atorvastatin (LIPITOR) 20 MG tablet, TAKE ONE TABLET BY MOUTH ONCE DAILY. , Disp: 30 tablet, Rfl: 3    fenofibrate (TRIGLIDE) 160 MG tablet, TAKE ONE TABLET BY MOUTH DAILY. , Disp: 30 tablet, Rfl: 5    omeprazole (PRILOSEC) 40 MG delayed release capsule, TAKE 1 CAPSULE BY MOUTH DAILY, Disp: 30 capsule, Rfl: 2    lisinopril (PRINIVIL;ZESTRIL) 10 MG tablet, TAKE 1 TABLET BY MOUTH DAILY, Disp: 30 tablet, Rfl: 3    ARIPiprazole (ABILIFY) 5 MG tablet, TAKE ONE TABLET BY MOUTH ONCE DAILY. , Disp: 30 tablet, Rfl: 3    FLUoxetine (PROZAC) 40 MG capsule, TAKE 1 CAPSULE BY MOUTH DAILY, Disp: 30 capsule, Rfl: 3    atenolol (TENORMIN) 50 MG tablet, TAKE ONE TABLET BY MOUTH EVERY DAY, Disp: 30 tablet, Rfl: 5    metFORMIN (GLUCOPHAGE) 1000 MG tablet, Take 1 tablet by mouth 2 times daily (with meals), Disp: 60 tablet, Rfl: 5   posterior instrumented fusion C3-C7 on 2/19/2020. On 6/17/2020 his wound dehisced and he underwent drainage of posterior seroma, washout and closure of wound. Cultures were positive with MRSA    PLAN:  Mr. Larson wound isn't healing well. At this point, I am going to admit him to the hospital to receive IV antiobiotois. I am going to ask ID to evaluate him as well. ICD-10-CM    1. Wound dehiscence T81.30XA    2. Infection of wound due to methicillin resistant Staphylococcus aureus (MRSA) A49.02    3.  S/P cervical spinal fusion Z98.1         Irineo Estimable, DO

## 2020-06-24 LAB
ANION GAP SERPL CALCULATED.3IONS-SCNC: 9 MMOL/L (ref 7–19)
BASOPHILS ABSOLUTE: 0.1 K/UL (ref 0–0.2)
BASOPHILS RELATIVE PERCENT: 0.7 % (ref 0–1)
BUN BLDV-MCNC: 18 MG/DL (ref 6–20)
CALCIUM SERPL-MCNC: 8.7 MG/DL (ref 8.6–10)
CHLORIDE BLD-SCNC: 103 MMOL/L (ref 98–111)
CO2: 26 MMOL/L (ref 22–29)
CREAT SERPL-MCNC: 1.1 MG/DL (ref 0.5–1.2)
EOSINOPHILS ABSOLUTE: 0.2 K/UL (ref 0–0.6)
EOSINOPHILS RELATIVE PERCENT: 2.1 % (ref 0–5)
GFR NON-AFRICAN AMERICAN: >60
GLUCOSE BLD-MCNC: 101 MG/DL (ref 74–109)
GLUCOSE BLD-MCNC: 112 MG/DL (ref 70–99)
GLUCOSE BLD-MCNC: 135 MG/DL (ref 70–99)
GLUCOSE BLD-MCNC: 98 MG/DL (ref 70–99)
HCT VFR BLD CALC: 30.6 % (ref 42–52)
HEMOGLOBIN: 8.9 G/DL (ref 14–18)
IMMATURE GRANULOCYTES #: 0.2 K/UL
LYMPHOCYTES ABSOLUTE: 2.1 K/UL (ref 1.1–4.5)
LYMPHOCYTES RELATIVE PERCENT: 26.5 % (ref 20–40)
MCH RBC QN AUTO: 22.1 PG (ref 27–31)
MCHC RBC AUTO-ENTMCNC: 29.1 G/DL (ref 33–37)
MCV RBC AUTO: 75.9 FL (ref 80–94)
MONOCYTES ABSOLUTE: 0.8 K/UL (ref 0–0.9)
MONOCYTES RELATIVE PERCENT: 10.1 % (ref 0–10)
NEUTROPHILS ABSOLUTE: 4.7 K/UL (ref 1.5–7.5)
NEUTROPHILS RELATIVE PERCENT: 58.1 % (ref 50–65)
PDW BLD-RTO: 19.9 % (ref 11.5–14.5)
PERFORMED ON: ABNORMAL
PERFORMED ON: ABNORMAL
PERFORMED ON: NORMAL
PLATELET # BLD: 395 K/UL (ref 130–400)
PMV BLD AUTO: 9.6 FL (ref 9.4–12.4)
POTASSIUM SERPL-SCNC: 4.5 MMOL/L (ref 3.5–5)
RBC # BLD: 4.03 M/UL (ref 4.7–6.1)
SODIUM BLD-SCNC: 138 MMOL/L (ref 136–145)
WBC # BLD: 8.1 K/UL (ref 4.8–10.8)

## 2020-06-24 PROCEDURE — 6370000000 HC RX 637 (ALT 250 FOR IP): Performed by: NEUROLOGICAL SURGERY

## 2020-06-24 PROCEDURE — 80048 BASIC METABOLIC PNL TOTAL CA: CPT

## 2020-06-24 PROCEDURE — 94640 AIRWAY INHALATION TREATMENT: CPT

## 2020-06-24 PROCEDURE — 6360000002 HC RX W HCPCS: Performed by: NEUROLOGICAL SURGERY

## 2020-06-24 PROCEDURE — 99231 SBSQ HOSP IP/OBS SF/LOW 25: CPT | Performed by: NEUROLOGICAL SURGERY

## 2020-06-24 PROCEDURE — 36415 COLL VENOUS BLD VENIPUNCTURE: CPT

## 2020-06-24 PROCEDURE — 6360000002 HC RX W HCPCS

## 2020-06-24 PROCEDURE — 2580000003 HC RX 258: Performed by: NEUROLOGICAL SURGERY

## 2020-06-24 PROCEDURE — 82947 ASSAY GLUCOSE BLOOD QUANT: CPT

## 2020-06-24 PROCEDURE — 85025 COMPLETE CBC W/AUTO DIFF WBC: CPT

## 2020-06-24 PROCEDURE — 1210000000 HC MED SURG R&B

## 2020-06-24 RX ORDER — MORPHINE SULFATE 4 MG/ML
2 INJECTION, SOLUTION INTRAMUSCULAR; INTRAVENOUS ONCE
Status: COMPLETED | OUTPATIENT
Start: 2020-06-24 | End: 2020-06-24

## 2020-06-24 RX ORDER — MORPHINE SULFATE 4 MG/ML
INJECTION, SOLUTION INTRAMUSCULAR; INTRAVENOUS
Status: COMPLETED
Start: 2020-06-24 | End: 2020-06-24

## 2020-06-24 RX ADMIN — METFORMIN HYDROCHLORIDE 1000 MG: 500 TABLET, FILM COATED ORAL at 08:02

## 2020-06-24 RX ADMIN — ATENOLOL 50 MG: 50 TABLET ORAL at 09:23

## 2020-06-24 RX ADMIN — IPRATROPIUM BROMIDE 0.5 MG: 0.5 SOLUTION RESPIRATORY (INHALATION) at 14:09

## 2020-06-24 RX ADMIN — LISINOPRIL 10 MG: 10 TABLET ORAL at 09:25

## 2020-06-24 RX ADMIN — ARIPIPRAZOLE 5 MG: 5 TABLET ORAL at 09:23

## 2020-06-24 RX ADMIN — FLUOXETINE HYDROCHLORIDE 40 MG: 20 CAPSULE ORAL at 09:24

## 2020-06-24 RX ADMIN — IPRATROPIUM BROMIDE 0.5 MG: 0.5 SOLUTION RESPIRATORY (INHALATION) at 09:51

## 2020-06-24 RX ADMIN — HYDROCODONE BITARTRATE AND ACETAMINOPHEN 2 TABLET: 10; 325 TABLET ORAL at 06:40

## 2020-06-24 RX ADMIN — HYDROCODONE BITARTRATE AND ACETAMINOPHEN 2 TABLET: 10; 325 TABLET ORAL at 12:12

## 2020-06-24 RX ADMIN — BUDESONIDE 250 MCG: 0.25 INHALANT RESPIRATORY (INHALATION) at 19:37

## 2020-06-24 RX ADMIN — PANTOPRAZOLE SODIUM 40 MG: 40 TABLET, DELAYED RELEASE ORAL at 08:00

## 2020-06-24 RX ADMIN — FERROUS SULFATE TAB 325 MG (65 MG ELEMENTAL FE) 325 MG: 325 (65 FE) TAB at 20:41

## 2020-06-24 RX ADMIN — ARFORMOTEROL TARTRATE 15 MCG: 15 SOLUTION RESPIRATORY (INHALATION) at 19:37

## 2020-06-24 RX ADMIN — MORPHINE SULFATE 2 MG: 4 INJECTION INTRAVENOUS at 07:11

## 2020-06-24 RX ADMIN — IPRATROPIUM BROMIDE 0.5 MG: 0.5 SOLUTION RESPIRATORY (INHALATION) at 19:37

## 2020-06-24 RX ADMIN — SODIUM CHLORIDE, PRESERVATIVE FREE 10 ML: 5 INJECTION INTRAVENOUS at 20:41

## 2020-06-24 RX ADMIN — DEXTROSE MONOHYDRATE 1250 MG: 5 INJECTION, SOLUTION INTRAVENOUS at 18:04

## 2020-06-24 RX ADMIN — DEXTROSE MONOHYDRATE 1250 MG: 5 INJECTION, SOLUTION INTRAVENOUS at 06:37

## 2020-06-24 RX ADMIN — FERROUS SULFATE TAB 325 MG (65 MG ELEMENTAL FE) 325 MG: 325 (65 FE) TAB at 09:24

## 2020-06-24 RX ADMIN — SODIUM CHLORIDE, PRESERVATIVE FREE 10 ML: 5 INJECTION INTRAVENOUS at 09:25

## 2020-06-24 RX ADMIN — METFORMIN HYDROCHLORIDE 1000 MG: 500 TABLET, FILM COATED ORAL at 17:05

## 2020-06-24 RX ADMIN — HYDROCODONE BITARTRATE AND ACETAMINOPHEN 2 TABLET: 10; 325 TABLET ORAL at 20:41

## 2020-06-24 RX ADMIN — MORPHINE SULFATE 2 MG: 4 INJECTION, SOLUTION INTRAMUSCULAR; INTRAVENOUS at 07:11

## 2020-06-24 RX ADMIN — ARFORMOTEROL TARTRATE 15 MCG: 15 SOLUTION RESPIRATORY (INHALATION) at 09:50

## 2020-06-24 RX ADMIN — BUDESONIDE 250 MCG: 0.25 INHALANT RESPIRATORY (INHALATION) at 09:52

## 2020-06-24 RX ADMIN — ATORVASTATIN CALCIUM 20 MG: 20 TABLET, FILM COATED ORAL at 09:24

## 2020-06-24 ASSESSMENT — PAIN - FUNCTIONAL ASSESSMENT: PAIN_FUNCTIONAL_ASSESSMENT: PREVENTS OR INTERFERES SOME ACTIVE ACTIVITIES AND ADLS

## 2020-06-24 ASSESSMENT — PAIN SCALES - GENERAL
PAINLEVEL_OUTOF10: 3
PAINLEVEL_OUTOF10: 9
PAINLEVEL_OUTOF10: 9
PAINLEVEL_OUTOF10: 1
PAINLEVEL_OUTOF10: 3

## 2020-06-24 ASSESSMENT — PAIN DESCRIPTION - PAIN TYPE: TYPE: CHRONIC PAIN

## 2020-06-24 ASSESSMENT — PAIN DESCRIPTION - ONSET: ONSET: ON-GOING

## 2020-06-24 ASSESSMENT — PAIN DESCRIPTION - ORIENTATION: ORIENTATION: POSTERIOR

## 2020-06-24 ASSESSMENT — PAIN DESCRIPTION - PROGRESSION: CLINICAL_PROGRESSION: NOT CHANGED

## 2020-06-24 ASSESSMENT — PAIN DESCRIPTION - DESCRIPTORS: DESCRIPTORS: TENDER;SORE

## 2020-06-24 ASSESSMENT — PAIN DESCRIPTION - LOCATION: LOCATION: NECK

## 2020-06-24 ASSESSMENT — PAIN DESCRIPTION - FREQUENCY: FREQUENCY: CONTINUOUS

## 2020-06-24 NOTE — PROGRESS NOTES
[facility]  Progress Note                   Chief Complaint   Patient presents with    Wound Check       S/P cervical spinal fusion.       INTERVAL HISTORY:  I worked with Johnnye Lanes, wound care nurse this AM and helped apply wound vac. The tips of the spinous processes of C6 and C7 were exposed a small amount. Patient tolerated the application well.           HISTORY OF PRESENT ILLNESS:       Yumiko Larson is a 44 y.o. male who underwent a C5,C6 corpectomy with anterior cervical plating along with posterior instrumented fusion C3-C7 on 2/19/2020. On 6/17/2020 his wound dehisced and he underwent drainage of posterior seroma, washout and closure of wound. His wound was noted to be draining she we had him come to our clinic for inspection. Cultures revealed a MRSA infection.   I attempted to manage his wound as an outpatient with oral antibiotics, however, it appears he will need more aggressive IV antibiotics, evaluation by the ID and wound care team.       He denies any fever or chills.       Past Medical History        Past Medical History:   Diagnosis Date    CAD (coronary artery disease)       sees [facility] cardiology    Cancer [facility])       Bladder    [facility] obstructive [facility] [address]      Depression      Diabetes mellitus ([facility] [address]      History of blood transfusion       unsure thinks he did    Hyperlipidemia      Hypertension              Past Surgical History         Past Surgical History:   Procedure Laterality Date    BACK SURGERY         X3     BLADDER REMOVAL        CARDIAC CATHETERIZATION        CERVICAL FUSION N/A 2/19/2020     Phase 1:  C5 and C6 corpectomy with placement of an expandable cage and anterior cervical plate I3-I4. performed by Domonique Mcqueen DO at [address] N/A 6/17/2020     POSTERIOR CERVICAL WOUND [address] OUT AND CLOSURE performed by Domonique Mcqueen DO at [facility] [address] N/A 9/10/2019     Dr Shaun Schwab, 5 yr recall   [facility] MOUTH DAILY, Disp: 30 capsule, Rfl: 3    atenolol (TENORMIN) 50 MG tablet, TAKE ONE TABLET BY MOUTH EVERY DAY, Disp: 30 tablet, Rfl: 5    metFORMIN (GLUCOPHAGE) 1000 MG tablet, Take 1 tablet by mouth 2 times daily (with meals), Disp: 60 tablet, Rfl: 5    Cholecalciferol (VITAMIN D3) 1.25 MG (74810 UT) CAPS, Take 1 capsule by mouth once a week, Disp: , Rfl:     sildenafil (REVATIO) 20 MG tablet, Take 1-5 tablets 1-2 hours before sexual activity PRN, Disp: , Rfl:     tiotropium (SPIRIVA) 18 MCG inhalation capsule, Inhale 1 capsule into the lungs, Disp: , Rfl:     fluticasone-salmeterol (ADVAIR DISKUS) 100-50 MCG/DOSE diskus inhaler, Inhale 1 puff into the lungs every 12 hours, Disp: 60 each, Rfl: 3    blood glucose monitor strips, Test 4 times a day & as needed for symptoms of irregular blood glucose., Disp: 100 strip, Rfl: 3    glucose monitoring kit (FREESTYLE) monitoring kit, Please provide glucometer that INS will cover for DM type 2, Disp: 1 kit, Rfl: 0    Lancets 30G MISC, 1 each by Does not apply route daily 4 times daily, Disp: 100 each, Rfl: 3    aspirin 81 MG EC tablet, Take 1 tablet by mouth daily, Disp: 30 tablet, Rfl: 3     Latex and Demerol hcl [meperidine]     Social History  Social History           Tobacco Use   Smoking Status Current Every Day Smoker    Packs/day: 1.00    Years: 30.00    Pack years: 30.00   Smokeless Tobacco Never Used      Social History           Substance and Sexual Activity   Alcohol Use Yes     Comment: Occ            Family History         Family History   Problem Relation Age of Onset    Cancer Mother      Vision Loss Mother      Breast Cancer Mother      Coronary Art Dis Father      Obesity Father      Kidney Disease Father      High Blood Pressure Father      High Cholesterol Father      Hypercalcemia Sister      Obesity Brother      High Blood Pressure Brother              Review of Systems:  Constitutional: Negative.    HENT: Negative.    Eyes: Negative.    Respiratory: Negative.    Cardiovascular: Negative.    Gastrointestinal: Negative.    Genitourinary: Negative.    Musculoskeletal: Positive for back pain, joint pain and neck pain. Skin: Negative.    Neurological: Negative.    Endo/Heme/Allergies: Negative.    Psychiatric/Behavioral: Negative.       PHYSICAL EXAM:      Vitals:     06/23/20 1010   BP: 135/63   Pulse: 68   SpO2: 97%      Constitutional: The patient appears well-developed andwell-nourished. Eyes - conjunctiva normal.  Conjugate gaze  Ear, nose, throat -No scars, masses, or lesions over external nose or ears, no atrophy of tongue  Neck-symmetric, no masses noted, no jugular vein distension  Respiration- chest wall appears symmetric, goodexpansion, normal effort without use of accessory muscles  Musculoskeletal - no significant wasting of muscles noted, no bony deformities, gait no gross ataxia  Extremities-no clubbing, cyanosis or edema  Skin- warm, dry, and intact. No rash, erythema, or pallor. Psychiatric - mood, affect, and behavior appear normal.      Neurologic Examination  Awake, Alert andoriented x 3  Normal speech pattern, following commands  Motor 5/5 all extremities  No deficits to light touch or pinprick sensation     Normal Gait pattern        Wound:  Wound vac applied today. Good viable tissue noted. Small amount of C6 and C7 spinous process exposed. No exposure of hardware.   .        DATA and IMAGING:           Lab Results   Component Value Date     WBC 9.8 06/18/2020     HGB 7.3 (L) 06/18/2020     HCT 24.1 (L) 06/18/2020     MCV 73.1 (L) 06/18/2020      06/18/2020            Lab Results   Component Value Date      06/18/2020     K 5.1 (H) 06/18/2020      06/18/2020     CO2 22 06/18/2020     BUN 27 (H) 06/18/2020     CREATININE 1.4 (H) 06/18/2020     GLUCOSE 247 (H) 06/18/2020     CALCIUM 8.4 (L) 06/18/2020     PROT 6.7 06/17/2020     LABALBU 3.0 (L) 06/17/2020     BILITOT 0.4 06/17/2020

## 2020-06-24 NOTE — CARE COORDINATION
Spoke with patient and wife regarding MD orders for [facility] services. Patient agreeable and has chosen [facility]. Referral Faxed. [address] [place] [address] [phone]. [phone]. Please notify [facility] when patient discharges and fax DC Summary,  DC med list and any new [facility] orders. The Patient  was provided with a choice of provider and agrees with the discharge plan. [x] Yes [] No    Freedom of choice list was provided with basic dialogue that supports the patient's individualized plan of care/goals, treatment preferences and shares the quality data associated with the providers.  [x] Yes [] No  Electronically signed by Margarito Dorado RN on 6/24/2020 at 2:07 PM

## 2020-06-25 LAB
ALBUMIN SERPL-MCNC: 3.9 G/DL (ref 3.5–5.2)
ALP BLD-CCNC: 129 U/L (ref 40–130)
ALT SERPL-CCNC: 6 U/L (ref 5–41)
ANION GAP SERPL CALCULATED.3IONS-SCNC: 13 MMOL/L (ref 7–19)
AST SERPL-CCNC: 8 U/L (ref 5–40)
BASOPHILS ABSOLUTE: 0.1 K/UL (ref 0–0.2)
BASOPHILS RELATIVE PERCENT: 0.7 % (ref 0–1)
BILIRUB SERPL-MCNC: <0.2 MG/DL (ref 0.2–1.2)
BUN BLDV-MCNC: 17 MG/DL (ref 6–20)
C-REACTIVE PROTEIN: 1.84 MG/DL (ref 0–0.5)
CALCIUM SERPL-MCNC: 9.4 MG/DL (ref 8.6–10)
CHLORIDE BLD-SCNC: 99 MMOL/L (ref 98–111)
CO2: 23 MMOL/L (ref 22–29)
CREAT SERPL-MCNC: 0.9 MG/DL (ref 0.5–1.2)
EOSINOPHILS ABSOLUTE: 0.2 K/UL (ref 0–0.6)
EOSINOPHILS RELATIVE PERCENT: 1.7 % (ref 0–5)
GFR NON-AFRICAN AMERICAN: >60
GLUCOSE BLD-MCNC: 108 MG/DL (ref 74–109)
GLUCOSE BLD-MCNC: 142 MG/DL (ref 70–99)
GLUCOSE BLD-MCNC: 150 MG/DL (ref 70–99)
GLUCOSE BLD-MCNC: 80 MG/DL (ref 70–99)
HCT VFR BLD CALC: 34 % (ref 42–52)
HEMOGLOBIN: 10 G/DL (ref 14–18)
IMMATURE GRANULOCYTES #: 0.1 K/UL
LYMPHOCYTES ABSOLUTE: 2.2 K/UL (ref 1.1–4.5)
LYMPHOCYTES RELATIVE PERCENT: 21.7 % (ref 20–40)
MCH RBC QN AUTO: 22.4 PG (ref 27–31)
MCHC RBC AUTO-ENTMCNC: 29.4 G/DL (ref 33–37)
MCV RBC AUTO: 76.2 FL (ref 80–94)
MONOCYTES ABSOLUTE: 0.8 K/UL (ref 0–0.9)
MONOCYTES RELATIVE PERCENT: 7.4 % (ref 0–10)
NEUTROPHILS ABSOLUTE: 6.9 K/UL (ref 1.5–7.5)
NEUTROPHILS RELATIVE PERCENT: 67.1 % (ref 50–65)
PDW BLD-RTO: 20.6 % (ref 11.5–14.5)
PERFORMED ON: ABNORMAL
PERFORMED ON: ABNORMAL
PERFORMED ON: NORMAL
PLATELET # BLD: 388 K/UL (ref 130–400)
PMV BLD AUTO: 9.6 FL (ref 9.4–12.4)
POTASSIUM SERPL-SCNC: 5.1 MMOL/L (ref 3.5–5)
RBC # BLD: 4.46 M/UL (ref 4.7–6.1)
SODIUM BLD-SCNC: 135 MMOL/L (ref 136–145)
TOTAL PROTEIN: 7.4 G/DL (ref 6.6–8.7)
VANCOMYCIN TROUGH: 10.8 UG/ML (ref 10–20)
WBC # BLD: 10.2 K/UL (ref 4.8–10.8)

## 2020-06-25 PROCEDURE — 86140 C-REACTIVE PROTEIN: CPT

## 2020-06-25 PROCEDURE — 2500000003 HC RX 250 WO HCPCS: Performed by: INTERNAL MEDICINE

## 2020-06-25 PROCEDURE — 6360000002 HC RX W HCPCS: Performed by: NEUROLOGICAL SURGERY

## 2020-06-25 PROCEDURE — 80202 ASSAY OF VANCOMYCIN: CPT

## 2020-06-25 PROCEDURE — 76937 US GUIDE VASCULAR ACCESS: CPT

## 2020-06-25 PROCEDURE — 80053 COMPREHEN METABOLIC PANEL: CPT

## 2020-06-25 PROCEDURE — 99231 SBSQ HOSP IP/OBS SF/LOW 25: CPT | Performed by: NEUROLOGICAL SURGERY

## 2020-06-25 PROCEDURE — 02HV33Z INSERTION OF INFUSION DEVICE INTO SUPERIOR VENA CAVA, PERCUTANEOUS APPROACH: ICD-10-PCS | Performed by: INTERNAL MEDICINE

## 2020-06-25 PROCEDURE — C1751 CATH, INF, PER/CENT/MIDLINE: HCPCS

## 2020-06-25 PROCEDURE — 6370000000 HC RX 637 (ALT 250 FOR IP): Performed by: NEUROLOGICAL SURGERY

## 2020-06-25 PROCEDURE — 2580000003 HC RX 258: Performed by: NEUROLOGICAL SURGERY

## 2020-06-25 PROCEDURE — 1210000000 HC MED SURG R&B

## 2020-06-25 PROCEDURE — 94640 AIRWAY INHALATION TREATMENT: CPT

## 2020-06-25 PROCEDURE — 85025 COMPLETE CBC W/AUTO DIFF WBC: CPT

## 2020-06-25 PROCEDURE — 36415 COLL VENOUS BLD VENIPUNCTURE: CPT

## 2020-06-25 PROCEDURE — 82947 ASSAY GLUCOSE BLOOD QUANT: CPT

## 2020-06-25 PROCEDURE — 2580000003 HC RX 258: Performed by: INTERNAL MEDICINE

## 2020-06-25 PROCEDURE — 36569 INSJ PICC 5 YR+ W/O IMAGING: CPT

## 2020-06-25 RX ORDER — LIDOCAINE HYDROCHLORIDE 10 MG/ML
5 INJECTION, SOLUTION EPIDURAL; INFILTRATION; INTRACAUDAL; PERINEURAL ONCE
Status: COMPLETED | OUTPATIENT
Start: 2020-06-25 | End: 2020-06-25

## 2020-06-25 RX ORDER — MORPHINE SULFATE 4 MG/ML
2 INJECTION, SOLUTION INTRAMUSCULAR; INTRAVENOUS
Status: DISCONTINUED | OUTPATIENT
Start: 2020-06-25 | End: 2020-06-26

## 2020-06-25 RX ORDER — SODIUM CHLORIDE 0.9 % (FLUSH) 0.9 %
10 SYRINGE (ML) INJECTION EVERY 12 HOURS SCHEDULED
Status: DISCONTINUED | OUTPATIENT
Start: 2020-06-25 | End: 2020-06-29 | Stop reason: HOSPADM

## 2020-06-25 RX ORDER — SODIUM CHLORIDE 0.9 % (FLUSH) 0.9 %
10 SYRINGE (ML) INJECTION PRN
Status: DISCONTINUED | OUTPATIENT
Start: 2020-06-25 | End: 2020-06-29 | Stop reason: HOSPADM

## 2020-06-25 RX ADMIN — IPRATROPIUM BROMIDE 0.5 MG: 0.5 SOLUTION RESPIRATORY (INHALATION) at 15:38

## 2020-06-25 RX ADMIN — TRAZODONE HYDROCHLORIDE 100 MG: 100 TABLET ORAL at 20:00

## 2020-06-25 RX ADMIN — ARIPIPRAZOLE 5 MG: 5 TABLET ORAL at 08:27

## 2020-06-25 RX ADMIN — INSULIN LISPRO 1 UNITS: 100 INJECTION, SOLUTION INTRAVENOUS; SUBCUTANEOUS at 17:53

## 2020-06-25 RX ADMIN — SODIUM CHLORIDE, PRESERVATIVE FREE 10 ML: 5 INJECTION INTRAVENOUS at 12:07

## 2020-06-25 RX ADMIN — LISINOPRIL 10 MG: 10 TABLET ORAL at 08:27

## 2020-06-25 RX ADMIN — IPRATROPIUM BROMIDE 0.5 MG: 0.5 SOLUTION RESPIRATORY (INHALATION) at 07:08

## 2020-06-25 RX ADMIN — Medication 2 MG: at 12:07

## 2020-06-25 RX ADMIN — ATENOLOL 50 MG: 50 TABLET ORAL at 08:27

## 2020-06-25 RX ADMIN — METFORMIN HYDROCHLORIDE 1000 MG: 500 TABLET, FILM COATED ORAL at 08:27

## 2020-06-25 RX ADMIN — PANTOPRAZOLE SODIUM 40 MG: 40 TABLET, DELAYED RELEASE ORAL at 08:44

## 2020-06-25 RX ADMIN — BUSPIRONE HYDROCHLORIDE 10 MG: 10 TABLET ORAL at 20:00

## 2020-06-25 RX ADMIN — HYDROCODONE BITARTRATE AND ACETAMINOPHEN 2 TABLET: 10; 325 TABLET ORAL at 02:55

## 2020-06-25 RX ADMIN — BUDESONIDE 250 MCG: 0.25 INHALANT RESPIRATORY (INHALATION) at 07:08

## 2020-06-25 RX ADMIN — METFORMIN HYDROCHLORIDE 1000 MG: 500 TABLET, FILM COATED ORAL at 16:05

## 2020-06-25 RX ADMIN — POLYETHYLENE GLYCOL 3350 17 G: 17 POWDER, FOR SOLUTION ORAL at 17:53

## 2020-06-25 RX ADMIN — HYDROCODONE BITARTRATE AND ACETAMINOPHEN 2 TABLET: 10; 325 TABLET ORAL at 08:31

## 2020-06-25 RX ADMIN — VANCOMYCIN HYDROCHLORIDE 1500 MG: 10 INJECTION, POWDER, LYOPHILIZED, FOR SOLUTION INTRAVENOUS at 17:44

## 2020-06-25 RX ADMIN — ATORVASTATIN CALCIUM 20 MG: 20 TABLET, FILM COATED ORAL at 08:27

## 2020-06-25 RX ADMIN — HYDROCODONE BITARTRATE AND ACETAMINOPHEN 2 TABLET: 10; 325 TABLET ORAL at 20:00

## 2020-06-25 RX ADMIN — SODIUM CHLORIDE, PRESERVATIVE FREE 10 ML: 5 INJECTION INTRAVENOUS at 08:28

## 2020-06-25 RX ADMIN — BUDESONIDE 250 MCG: 0.25 INHALANT RESPIRATORY (INHALATION) at 19:44

## 2020-06-25 RX ADMIN — IPRATROPIUM BROMIDE 0.5 MG: 0.5 SOLUTION RESPIRATORY (INHALATION) at 19:44

## 2020-06-25 RX ADMIN — HYDROCODONE BITARTRATE AND ACETAMINOPHEN 2 TABLET: 10; 325 TABLET ORAL at 14:22

## 2020-06-25 RX ADMIN — FERROUS SULFATE TAB 325 MG (65 MG ELEMENTAL FE) 325 MG: 325 (65 FE) TAB at 20:00

## 2020-06-25 RX ADMIN — DEXTROSE MONOHYDRATE 1250 MG: 5 INJECTION, SOLUTION INTRAVENOUS at 08:24

## 2020-06-25 RX ADMIN — Medication 2 MG: at 16:05

## 2020-06-25 RX ADMIN — ARFORMOTEROL TARTRATE 15 MCG: 15 SOLUTION RESPIRATORY (INHALATION) at 07:08

## 2020-06-25 RX ADMIN — ARFORMOTEROL TARTRATE 15 MCG: 15 SOLUTION RESPIRATORY (INHALATION) at 19:44

## 2020-06-25 RX ADMIN — FLUOXETINE HYDROCHLORIDE 40 MG: 20 CAPSULE ORAL at 08:27

## 2020-06-25 RX ADMIN — SODIUM CHLORIDE, PRESERVATIVE FREE 10 ML: 5 INJECTION INTRAVENOUS at 20:02

## 2020-06-25 RX ADMIN — FERROUS SULFATE TAB 325 MG (65 MG ELEMENTAL FE) 325 MG: 325 (65 FE) TAB at 08:27

## 2020-06-25 RX ADMIN — LIDOCAINE HYDROCHLORIDE 5 ML: 10 INJECTION, SOLUTION EPIDURAL; INFILTRATION; INTRACAUDAL; PERINEURAL at 10:40

## 2020-06-25 ASSESSMENT — PAIN SCALES - GENERAL
PAINLEVEL_OUTOF10: 8
PAINLEVEL_OUTOF10: 10
PAINLEVEL_OUTOF10: 7
PAINLEVEL_OUTOF10: 5
PAINLEVEL_OUTOF10: 3
PAINLEVEL_OUTOF10: 8
PAINLEVEL_OUTOF10: 7
PAINLEVEL_OUTOF10: 10

## 2020-06-25 NOTE — PROCEDURES
PICC Line Insertion Procedure Note    Procedure: Insertion of #4 FR/18G PICC- Single lumen    Indications:  Home IV therapy    Procedure Details   Informed consent was obtained for the procedure, including local anesthetic. Risks and benefits were discussed. #4 FR/18G PICC inserted to the R Basilic vein per hospital protocol. Blood return:  yes    Findings:  Catheter inserted to 41 cm, with 0 cm exposed. Catheter was flushed with 10 cc NS. Patient did tolerate procedure well. Recommendations:  Tip location confirmed within the SVC by 3CG technology. Okay to use. PICC Brochure given to patient with teaching instruction.

## 2020-06-25 NOTE — CARE COORDINATION
SW sent IV ABX orders to [facility] for approval.  [facility]  [id number]  [phone] [facility] weekend coverage  Electronically signed by Celedonio Litten on 2020 at 9:55 AM     Order Requisition for Ousmane Larson  CSN: [id number]   Order Date:  2020             Patient Information: Ousmane Larson       :  1970  Age:  44 y.o. Sex:  M  Home Phone: [phone]  Work Phone:   SSN: [id number]  Address:  [place] [address] MRN:  [id number]  Facility MRN:  [id number]  PCP: BRYAN Jung  PCP Phone: [phone]           Ordering Dept: [facility] Surg Services     Site: [facility]  Ph: [phone] Fax: Address: [address] [place] [address] Ordering User: Rita Cordoba MD  Provider ID: [id number]  NPI:  [id number]                Test Ordered: Inpatient consult to Social Work Justen Barrett   Code: [address]    ORD #: [id number]  Associated Diagnosis:   Comments: Home IV antibiotic orders:  1. Diagnosis-cervical wound infection. Probable osteomyelitis. MRSA recovered on culture. 2.  Bartolome Marquis MD  Primary care provider-BRYAN Krause  3.  IV access-PICC line  4. Antibiotic order:  Vancomycin 1500 mg IV every 12 hours  Last dose of vancomycin July 15, 2020 (4 weeks) or possibly 2020 (6 weeks)-final duration of antibiotics depending upon clinical course. 5.  Laboratory monitoring:  CMP, CBC, CRP, and vancomycin trough every Monday  Goal vancomycin trough 10-20  6. Follow-up appointment 1 to 2 weeks after hospital discharge     Amy Elkins MD  Reason for Consult?  Home IV antibiotic orders Priority  Routine Class  Hospital Performed      Order Status    Expected Date    Specimen Source    Collection Date    Collection Time    Occurrences Remaining    Interval  ONE TIME       Electronically Signed By  Rita Cordoba MD Date  2020           Responsible Party William Barr Guar-ActID   Relationship Account Type Home Phone   Cody Florez - [id number] [facility] [address] Self P/F [phone]   Employer   Work Phone   UNEMPLOYED [address]  Insurance/Subscriber ID:  [id number]  [address] Name:  Cody Florez              Relationship to Patient: SelfSigned ABN: N    Payor Name:  Shahid Stock   Plan:  [address]   Group:   Worker's Comp Date of Injury:

## 2020-06-25 NOTE — CARE COORDINATION
06/25/20 1443   Readmission Assessment   Previous Disposition Home with Family   Who is being Interviewed Patient   What was the patient's/caregiver's perception as to why they think they needed to return back to the hospital? Other (Comment) patient began to have increase drainage from incision line, went to see Neuro Surgeon and admitted. Did you visit your Primary Care Physician after you left the hospital, before you returned this time? No   Why weren't you able to visit your PCP? Other (Comment)readmitted prior to appointment   Did you see a specialist, such as Cardiac, Pulmonary, Orthopedic Physician, etc. after you left the hospital? Yes   Who advised the patient to return to the hospital? Physician   Does the patient report anything that got in the way of taking their medications? no   In our efforts to provide the best possible care to you and others like you, can you think of anything that we could have done to help you after you left the hospital the first time, so that you might not have needed to return so soon?  Other (Comment)patient went to Neuro Surgeon and re admitted for increased drainage and dehiscence

## 2020-06-25 NOTE — PROGRESS NOTES
[facility]  Progress Note                   Chief Complaint   Patient presents with    Wound Check       S/P cervical spinal fusion.       INTERVAL HISTORY:  No major issues overnight. Evaluated by ID team.  Wound vac on suction with < 50 mL out. No new complaints.             HISTORY OF PRESENT ILLNESS:       Erick Larson is a 44 y.o. male who underwent a C5,C6 corpectomy with anterior cervical plating along with posterior instrumented fusion C3-C7 on 2/19/2020. On 6/17/2020 his wound dehisced and he underwent drainage of posterior seroma, washout and closure of wound. His wound was noted to be draining she we had him come to our clinic for inspection. Cultures revealed a MRSA infection.   I attempted to manage his wound as an outpatient with oral antibiotics, however, it appears he will need more aggressive IV antibiotics, evaluation by the ID and wound care team.       He denies any fever or chills.       Past Medical History        Past Medical History:   Diagnosis Date    CAD (coronary artery disease)       sees [facility] cardiology    Cancer [facility])       Bladder    COPD (chronic obstructive pulmonary disease) ([facility] [address]      Depression      Diabetes mellitus ([facility] [address]      History of blood transfusion       unsure thinks he did    Hyperlipidemia      Hypertension              Past Surgical History         Past Surgical History:   Procedure Laterality Date    BACK SURGERY         X3     BLADDER REMOVAL        CARDIAC CATHETERIZATION        CERVICAL FUSION N/A 2/19/2020     Phase 1:  C5 and C6 corpectomy with placement of an expandable cage and anterior cervical plate H8-I9. performed by Jaylyn Barbour DO at [address] N/A 6/17/2020     POSTERIOR CERVICAL WOUND [address] OUT AND CLOSURE performed by Jaylyn Barbour DO at [facility] [address] N/A 9/10/2019     Dr Karen Alonso, 5 yr recall    CORONARY ARTERY BYPASS GRAFT N/A 5/1/2019     CORONARY ARTERY BYPASS GRAFT X 3 WITH LEFT INTERNAL MAMMARY ARTERY, ENDOSCOPIC  VEIN HARVEST, WITH PERFUSION. performed by Isabel Cruz MD at [address] [place] [address],[address] N/A 2/19/2020     Phase 2:  Posterior instrumented fusion C3-C7 using lateral mass screws and rods. performed by Dariela Stevens DO at [facility] OR    LYMPHADENECTOMY         lower ext    PROSTATECTOMY                 Medications    Current Medication      Current Outpatient Medications:     traZODone (DESYREL) 50 MG tablet, TAKE 2 TABLETS BY MOUTH NIGHTLY AS NEEDED FOR SLEEP, Disp: 60 tablet, Rfl: 1    baclofen (LIORESAL) 10 MG tablet, TAKE 1 TABLET BY MOUTH 3 TIMES DAILY AS NEEDED FOR PAIN/SPASMS PAIN/SPASMS, Disp: 90 tablet, Rfl: 0    clindamycin (CLEOCIN) 300 MG capsule, Take 1 capsule by mouth 4 times daily for 10 days, Disp: 14 capsule, Rfl: 0    sulfamethoxazole-trimethoprim (BACTRIM) 400-80 MG per tablet, Take 1 tablet by mouth 2 times daily for 10 days, Disp: 60 tablet, Rfl: 0    lidocaine (LIDODERM) 5 %, Place 1 patch onto the skin daily 12 hours on, 12 hours off., Disp: 30 patch, Rfl: 0    ferrous sulfate (FE TABS) 325 (65 Fe) MG EC tablet, Take 1 tablet by mouth 2 times daily, Disp: 90 tablet, Rfl: 0    busPIRone (BUSPAR) 10 MG tablet, TAKE 1 TABLET BY MOUTH 3 TIMES DAILY AS NEEDED (ANXIETY), Disp: 90 tablet, Rfl: 0    atorvastatin (LIPITOR) 20 MG tablet, TAKE ONE TABLET BY MOUTH ONCE DAILY. , Disp: 30 tablet, Rfl: 3    fenofibrate (TRIGLIDE) 160 MG tablet, TAKE ONE TABLET BY MOUTH DAILY. , Disp: 30 tablet, Rfl: 5    omeprazole (PRILOSEC) 40 MG delayed release capsule, TAKE 1 CAPSULE BY MOUTH DAILY, Disp: 30 capsule, Rfl: 2    lisinopril (PRINIVIL;ZESTRIL) 10 MG tablet, TAKE 1 TABLET BY MOUTH DAILY, Disp: 30 tablet, Rfl: 3    ARIPiprazole (ABILIFY) 5 MG tablet, TAKE ONE TABLET BY MOUTH ONCE DAILY. , Disp: 30 tablet, Rfl: 3    FLUoxetine (PROZAC) 40 MG capsule, TAKE 1 CAPSULE BY MOUTH DAILY, Disp: 30 capsule, Rfl: 3    atenolol (TENORMIN) 50 MG tablet, TAKE ONE TABLET BY MOUTH EVERY DAY, Disp: 30 tablet, Rfl: 5    metFORMIN (GLUCOPHAGE) 1000 MG tablet, Take 1 tablet by mouth 2 times daily (with meals), Disp: 60 tablet, Rfl: 5    Cholecalciferol (VITAMIN D3) 1.25 MG (39548 UT) CAPS, Take 1 capsule by mouth once a week, Disp: , Rfl:     sildenafil (REVATIO) 20 MG tablet, Take 1-5 tablets 1-2 hours before sexual activity PRN, Disp: , Rfl:     tiotropium (SPIRIVA) 18 MCG inhalation capsule, Inhale 1 capsule into the lungs, Disp: , Rfl:     fluticasone-salmeterol (ADVAIR DISKUS) 100-50 MCG/DOSE diskus inhaler, Inhale 1 puff into the lungs every 12 hours, Disp: 60 each, Rfl: 3    blood glucose monitor strips, Test 4 times a day & as needed for symptoms of irregular blood glucose., Disp: 100 strip, Rfl: 3    glucose monitoring kit (FREESTYLE) monitoring kit, Please provide glucometer that INS will cover for DM type 2, Disp: 1 kit, Rfl: 0    Lancets 30G MISC, 1 each by Does not apply route daily 4 times daily, Disp: 100 each, Rfl: 3    aspirin 81 MG EC tablet, Take 1 tablet by mouth daily, Disp: 30 tablet, Rfl: 3     Latex and Demerol hcl [meperidine]     Social History  Social History           Tobacco Use   Smoking Status Current Every Day Smoker    Packs/day: 1.00    Years: 30.00    Pack years: 30.00   Smokeless Tobacco Never Used      Social History           Substance and Sexual Activity   Alcohol Use Yes     Comment: Occ            Family History         Family History   Problem Relation Age of Onset    Cancer Mother      Vision Loss Mother      Breast Cancer Mother      Coronary Art Dis Father      Obesity Father      Kidney Disease Father      High Blood Pressure Father      High Cholesterol Father      Hypercalcemia Sister      Obesity Brother      High Blood Pressure Brother              Review of Systems:  Constitutional: Negative.    HENT: Negative.    Eyes: Negative.    Respiratory: Negative.    Cardiovascular: Negative.    Gastrointestinal: Negative.    Genitourinary: Negative.    Musculoskeletal: Positive for back pain, joint pain and neck pain. Skin: Negative.    Neurological: Negative.    Endo/Heme/Allergies: Negative.    Psychiatric/Behavioral: Negative.       PHYSICAL EXAM:      Vitals:     06/23/20 1010   BP: 135/63   Pulse: 68   SpO2: 97%      Constitutional: The patient appears well-developed andwell-nourished. Eyes - conjunctiva normal.  Conjugate gaze  Ear, nose, throat -No scars, masses, or lesions over external nose or ears, no atrophy of tongue  Neck-symmetric, no masses noted, no jugular vein distension  Respiration- chest wall appears symmetric, goodexpansion, normal effort without use of accessory muscles  Musculoskeletal - no significant wasting of muscles noted, no bony deformities, gait no gross ataxia  Extremities-no clubbing, cyanosis or edema  Skin- warm, dry, and intact. No rash, erythema, or pallor.   Psychiatric - mood, affect, and behavior appear normal.      Neurologic Examination  Awake, Alert andoriented x 3  Normal speech pattern, following commands  Motor 5/5 all extremities  No deficits to light touch or pinprick sensation     Normal Gait pattern        Wound:  Wound vac on suction, 50 mL output     DATA and IMAGING:           Lab Results   Component Value Date     WBC 9.8 06/18/2020     HGB 7.3 (L) 06/18/2020     HCT 24.1 (L) 06/18/2020     MCV 73.1 (L) 06/18/2020      06/18/2020            Lab Results   Component Value Date      06/18/2020     K 5.1 (H) 06/18/2020      06/18/2020     CO2 22 06/18/2020     BUN 27 (H) 06/18/2020     CREATININE 1.4 (H) 06/18/2020     GLUCOSE 247 (H) 06/18/2020     CALCIUM 8.4 (L) 06/18/2020     PROT 6.7 06/17/2020     LABALBU 3.0 (L) 06/17/2020     BILITOT 0.4 06/17/2020     ALKPHOS 128 06/17/2020     AST 24 06/17/2020     ALT 16 06/17/2020     LABGLOM 53 (A) 06/18/2020      Lab Results   Component Value Date     INR 1.03 01/30/2020     INR 1.34 (H) 05/01/2019     INR 1.02 04/30/2019     PROTIME 12.9 01/30/2020     PROTIME 15.9 (H) 05/01/2019     PROTIME 12.8 04/30/2019    No results found.              ASSESSMENT   44year old male s/p C5,C6 corpectomy with anterior cervical plating along with posterior instrumented fusion C3-C7 on 2/19/2020. On 6/17/2020 his wound dehisced and he underwent drainage of posterior seroma, washout and closure of wound. Cultures were positive with MRSA     PLAN:  Appreciate ID assistance, would treat as if osteomyelitis. Spinous processes culture did reveal MRSA. Blood glucose well controlled, <150   Wound care team to follow and change wound vac M, W, and F.   Will consult case management team to set up home health for IV abx, will need PICC line placed once cleared from ID standpoint

## 2020-06-25 NOTE — PROGRESS NOTES
Pharmacy Vancomycin Consult     Vancomycin Day: 3  Current Dosin mg IV every 12 hours    Temp max:  97.3    Recent Labs     20  0454 20  0625   BUN 18 17       Recent Labs     20  0454 20  0625   CREATININE 1.1 0.9       Recent Labs     20  0454 20  0625   WBC 8.1 10.2         Intake/Output Summary (Last 24 hours) at 2020 0854  Last data filed at 2020 0234  Gross per 24 hour   Intake 960 ml   Output 1550 ml   Net -590 ml       Culture Date Source Results   20 Surgical - Neck, Tissue MRSA   20 Blood Sent       Ht Readings from Last 1 Encounters:   20 5' 11\" (1.803 m)        Wt Readings from Last 1 Encounters:   20 180 lb 14.4 oz (82.1 kg)         Body mass index is 25.23 kg/m². Estimated Creatinine Clearance: 102 mL/min (based on SCr of 0.9 mg/dL). Trough: 10.8 (collected 1 hour late)    Assessment/Plan: Will increase vancomycin to 1500 mg IV every 12 hours. Trough level ordered.     Electronically signed by Hieu Nelson, [facility] on 2020 at 8:54 AM

## 2020-06-25 NOTE — CONSULTS
0-3 Units Subcutaneous Nightly    budesonide  250 mcg Nebulization BID    Arformoterol Tartrate  15 mcg Nebulization BID    nicotine  1 patch Transdermal Daily    vancomycin (VANCOCIN) intermittent dosing (placeholder)   Other RX Placeholder    vancomycin  1,250 mg Intravenous Q12H     Current PRN Medications:  busPIRone, HYDROcodone-acetaminophen, traZODone, sodium chloride flush, acetaminophen **OR** acetaminophen, polyethylene glycol, promethazine **OR** ondansetron, glucose, dextrose, glucagon (rDNA), dextrose    Allergies: Allergies   Allergen Reactions    Latex Other (See Comments)     BOILS AND BLISTERS- ALLERGY CALLED TO [facility] SURGERY SCHEDULING.  Demerol Hcl [Meperidine] Hives     And n/v     Past Medical History: Diabetes mellitus. Hypertension. Coronary artery disease. Bladder cancer. Tobacco use. Chronic obstructive pulmonary disease. Depression. Hyperlipidemia. Past Surgical History: Lumbar back surgeries. Cervical laminectomy. Coronary artery bypass grafting. Prostatectomy. Cystectomy. Creation of urostomy. Social History: He indicates he is on disability. Previously worked at [employer]. He managed buying and selling tires. Ports no heavy alcohol use. Smokes 1 pack/day. Family History: Cancer. Coronary artery disease. Obesity. High blood pressure. High cholesterol. Exposure History: No close contacts have been ill    Review of Systems:  No other skin lesions or boils Except for what is outlined in the HPI  No chest pain or chest pressure  No cough or sputum production  No nausea or vomiting  No change in urostomy output  No change in bowel habits  No upper extremity weakness or numbness  Indicates some chronic mild left lower extremity weakness. Indicates he has had previous back surgeries. This is not new.   He reports no depression or anxiety at present  See HPI for remaining pertinent positive negatives from his complete review of systems    Vital Signs:  BP (!) 141/74   Pulse 65   Temp 97.2 °F (36.2 °C) (Temporal)   Resp 16   Ht 5' 11\" (1.803 m)   Wt 180 lb 14.4 oz (82.1 kg)   SpO2 97%   BMI 25.23 kg/m²  Temp (24hrs), Av.2 °F (36.2 °C), Min:96.7 °F (35.9 °C), Max:97.7 °F (36.5 °C)    Physical Exam:   Vital signs reviewed. Alert, pleasant, no distress  Sclera nonicteric  Posterior neck has a wound VAC in place. No surrounding erythema. No cervical axillary or inguinal adenopathy  Lungs clear to auscultation no crackles  Heart regular rhythm without murmur  Sternum is stable. Well-healed previous sternotomy incision. Abdomen soft. Nontender. Urostomy in place.   Extremities without significant edema  Peripheral IV without erythema or tenderness  Skin without rash    Lab Results:  CBC:   Recent Labs     20  1639 20  0454   WBC 9.0 8.1   HGB 8.9* 8.9*   HCT 30.9* 30.6*   * 395   LYMPHOPCT 23.2 26.5   MONOPCT 9.0 10.1*     CMP:   Recent Labs     20  1639 20  0454    138   K 4.2 4.5    103   CO2 24 26   BUN 16 18   CREATININE 1.2 1.1   CALCIUM 8.9 8.7   GLUCOSE 90 101     Culture:     Surgical culture 2020 labeled tip of spinous process:  Organism Abnormal  2020  2:38 PM [facility]   Staph aureus MRSA    Culture Surgical Abnormal  2020  2:38 PM 1100 [facility]   Moderate growth   CONTACT PRECAUTIONS INDICATED   No further workup   Refer to Neck Culture #1 posterior neck collected [email or web address] for   sensitivity results      Surgical culture 2020-MRSA labeled posterior neck swab:  Staph aureus mrsa (1)     Antibiotic Interpretation AMAN Status    benzylpenicillin Resistant 0.25 mcg/mL     clindamycin Sensitive 0.25 mcg/mL     erythromycin Resistant >=8 mcg/mL     inducible clindamycin resistance Negative neg mcg/mL     oxacillin Resistant >=4 mcg/mL     tetracycline Sensitive <=1 mcg/mL     trimethoprim-sulfamethoxazole Sensitive <=10 mcg/mL     vancomycin Sensitive 1 mcg/mL       Radiology:   CT cervical spine June 17, 2020: Impression   1. Large area of wound dehiscence and surrounding fluid and   inflammation in the posterior neck extending from C3 through C7. The   abnormal attenuation extends to the posterior dural sac. Intrathecal   and evaluation is limited without MRI with and without contrast.   2. Fusion hardware anteriorly and posteriorly is in stable position. Signed by Dr Ketan Hernandez on 6/17/2020 2:01 PM     Additional Studies Reviewed:     Impression:   1. Cervical wound infection/possible osteomyelitis-MRSA recovered from culture  2. Coronary artery disease  3. History of bladder cancer  4. Hypertension  5. Tobacco use  6.   Chronic obstructive pulmonary disease    Recommendations:    Continue treatment with vancomycin  Would like to discuss operative findings further with Dr. Catina Dias toward a longer course of intravenous antibiotic therapy  Will ask micro lab to report out susceptibility to daptomycin, ceftaroline, rifampin, and linezolid in case we need additional treatment options down the road  We will review CT scan of the neck  Additional recommendations to follow  Continue to follow    Stevenson Merlin, MD  06/24/20  7:03 PM

## 2020-06-25 NOTE — CARE COORDINATION
Leeann 1808 notified of new orders for IV abx. Orders have been faxed. Please notify when patient discharges as they will need to see that same evening to teach IV abx infusion.   Electronically signed by Deirdre Estrada RN on 6/25/20 at 3:50 PM CDT

## 2020-06-25 NOTE — PROGRESS NOTES
Infectious Diseases Progress Note    Patient:  Belem Rodriguez  YOB: 1970  MRN: [id number]   Admit date: 6/23/2020   Admitting Physician: Chris Moses DO  Primary Care Physician: BRYAN Bolaños    Chief Complaint/Interval History: He is without new symptoms. His wife is at bedside. He is without fevers or chills. Indicate Dr. Jailyn Stearns had spoken with him about a PICC line. He has no rash or skin itching with his antibiotic treatment. Reviewed culture results with patient and his wife. Explained staph aureus was recovered from both the superficial culture and from a small portion of bone. Given evidence for deeper infection, explained I would recommend a longer course of IV antibiotic therapy-likely 4 to 6 weeks. Explained the process of PICC line placement, home IV antibiotic approval, and home health assisting them in learning how to administer IV antibiotic treatment at home. They were agreeable. Reviewed risks and benefits of IV therapy. In/Out    Intake/Output Summary (Last 24 hours) at 6/25/2020 0798  Last data filed at 6/25/2020 0234  Gross per 24 hour   Intake 960 ml   Output 1550 ml   Net -590 ml     Allergies: Allergies   Allergen Reactions    Latex Other (See Comments)     BOILS AND BLISTERS- ALLERGY CALLED TO [facility] SURGERY SCHEDULING.     Demerol Hcl [Meperidine] Hives     And n/v     Current Meds: ARIPiprazole (ABILIFY) tablet 5 mg, Daily  atenolol (TENORMIN) tablet 50 mg, Daily  atorvastatin (LIPITOR) tablet 20 mg, Daily  busPIRone (BUSPAR) tablet 10 mg, TID PRN  ferrous sulfate (IRON 325) tablet 325 mg, BID  FLUoxetine (PROZAC) capsule 40 mg, Daily  HYDROcodone-acetaminophen (NORCO)  MG per tablet 2 tablet, Q6H PRN  lisinopril (PRINIVIL;ZESTRIL) tablet 10 mg, Daily  metFORMIN (GLUCOPHAGE) tablet 1,000 mg, BID WC  pantoprazole (PROTONIX) tablet 40 mg, QAM AC  ipratropium (ATROVENT) 0.02 % nebulizer solution 0.5 mg, TID  traZODone (DESYREL) tablet 100 mg, Nightly PRN  sodium chloride flush 0.9 % injection 10 mL, 2 times per day  sodium chloride flush 0.9 % injection 10 mL, PRN  acetaminophen (TYLENOL) tablet 650 mg, Q6H PRN    Or  acetaminophen (TYLENOL) suppository 650 mg, Q6H PRN  polyethylene glycol (GLYCOLAX) packet 17 g, Daily PRN  promethazine (PHENERGAN) tablet 12.5 mg, Q6H PRN    Or  ondansetron (ZOFRAN) injection 4 mg, Q6H PRN  glucose (GLUTOSE) 40 % oral gel 15 g, PRN  dextrose 50 % IV solution, PRN  glucagon (rDNA) injection 1 mg, PRN  dextrose 5 % solution, PRN  insulin lispro (HUMALOG) injection vial 0-6 Units, TID WC  insulin lispro (HUMALOG) injection vial 0-3 Units, Nightly  budesonide (PULMICORT) nebulizer suspension 250 mcg, BID  Arformoterol Tartrate (BROVANA) nebulizer solution 15 mcg, BID  nicotine (NICODERM CQ) 21 MG/24HR 1 patch, Daily  vancomycin (VANCOCIN) intermittent dosing (placeholder), RX Placeholder  vancomycin (VANCOCIN) 1,250 mg in dextrose 5 % 250 mL IVPB, Q12H      Review of Systems see HPI    VitalSigns:  BP (!) 144/69   Pulse 60   Temp 97.3 °F (36.3 °C) (Temporal)   Resp 16   Ht 5' 11\" (1.803 m)   Wt 180 lb 14.4 oz (82.1 kg)   SpO2 97%   BMI 25.23 kg/m²      Physical Exam  Line/IV (right arm peripheral) site: No erythema, warmth, induration, or tenderness. Skin without rash  Cervical spine area posteriorly with wound VAC in place.   No significant surrounding erythema  General alert, pleasant, comfortable appearing  Lab Results:  CBC:   Recent Labs     06/23/20  1639 06/24/20  0454 06/25/20  0625   WBC 9.0 8.1 10.2   HGB 8.9* 8.9* 10.0*   * 395 388     BMP:  Recent Labs     06/23/20  1639 06/24/20  0454 06/25/20  0625    138 135*   K 4.2 4.5 5.1*    103 99   CO2 24 26 23   BUN 16 18 17   CREATININE 1.2 1.1 0.9   GLUCOSE 90 101 108     CultureResults:  Surgical culture June 17, 2020 labeled tip of spinous process:  Organism Abnormal  06/17/2020  2:38 PM 1100 [facility]   Staph aureus MRSA    Culture Surgical Abnormal  06/17/2020  2:38 PM 1100 [facility]   Moderate growth   CONTACT PRECAUTIONS INDICATED   No further workup   Refer to Neck Culture #1 posterior neck collected [email or web address] for   sensitivity results       Surgical culture June 17, 2020-MRSA labeled posterior neck swab:  Staph aureus mrsa (1)              Antibiotic Interpretation AMAN Status     benzylpenicillin Resistant 0.25 mcg/mL       clindamycin Sensitive 0.25 mcg/mL       erythromycin Resistant >=8 mcg/mL       inducible clindamycin resistance Negative neg mcg/mL       oxacillin Resistant >=4 mcg/mL       tetracycline Sensitive <=1 mcg/mL       trimethoprim-sulfamethoxazole Sensitive <=10 mcg/mL       vancomycin Sensitive 1 mcg/mL          Radiology:   CT cervical spine June 17, 2020: Impression   1. Large area of wound dehiscence and surrounding fluid and   inflammation in the posterior neck extending from C3 through C7. The   abnormal attenuation extends to the posterior dural sac. Intrathecal   and evaluation is limited without MRI with and without contrast.   2. Fusion hardware anteriorly and posteriorly is in stable position. Signed by Dr Ileana Cunningham on 6/17/2020 2:01 PM     Additional Studies Reviewed:  None    Impression:  1. Cervical wound infection/probable osteomyelitis-MRSA recovered from bone culture from a tip of a spinous process. 2.  Coronary artery disease  3. History of bladder cancer  4. Hypertension  5. Tobacco use  6. Chronic obstructive pulmonary disease    Recommendations:  PICC line placement  Home IV antibiotic orders for 4 to 6 weeks of intravenous vancomycin therapy  See home IV antibiotic orders outlined below  Okay with me for discharge home when ready for release by Dr. Janusz Price and home IV antibiotic orders in place    Home IV antibiotic orders:  1. Diagnosis-cervical wound infection. Probable osteomyelitis. MRSA recovered on culture.   2.  Monique Friedman MD  Primary care provider-[place]

## 2020-06-26 ENCOUNTER — ANESTHESIA EVENT (OUTPATIENT)
Dept: OPERATING ROOM | Age: 50
DRG: 857 | End: 2020-06-26
Payer: MEDICAID

## 2020-06-26 ENCOUNTER — ANESTHESIA (OUTPATIENT)
Dept: OPERATING ROOM | Age: 50
DRG: 857 | End: 2020-06-26
Payer: MEDICAID

## 2020-06-26 VITALS
TEMPERATURE: 98 F | OXYGEN SATURATION: 100 % | DIASTOLIC BLOOD PRESSURE: 50 MMHG | SYSTOLIC BLOOD PRESSURE: 76 MMHG | RESPIRATION RATE: 20 BRPM

## 2020-06-26 LAB
GLUCOSE BLD-MCNC: 158 MG/DL (ref 70–99)
GLUCOSE BLD-MCNC: 170 MG/DL (ref 70–99)
PERFORMED ON: ABNORMAL
PERFORMED ON: ABNORMAL
SARS-COV-2, NAAT: NOT DETECTED
VANCOMYCIN TROUGH: 19.3 UG/ML (ref 10–20)

## 2020-06-26 PROCEDURE — 3700000000 HC ANESTHESIA ATTENDED CARE: Performed by: NEUROLOGICAL SURGERY

## 2020-06-26 PROCEDURE — 6370000000 HC RX 637 (ALT 250 FOR IP): Performed by: NEUROLOGICAL SURGERY

## 2020-06-26 PROCEDURE — 2580000003 HC RX 258: Performed by: ANESTHESIOLOGY

## 2020-06-26 PROCEDURE — 36415 COLL VENOUS BLD VENIPUNCTURE: CPT

## 2020-06-26 PROCEDURE — 6360000002 HC RX W HCPCS: Performed by: NEUROLOGICAL SURGERY

## 2020-06-26 PROCEDURE — 6360000002 HC RX W HCPCS: Performed by: NURSE ANESTHETIST, CERTIFIED REGISTERED

## 2020-06-26 PROCEDURE — U0002 COVID-19 LAB TEST NON-CDC: HCPCS

## 2020-06-26 PROCEDURE — 0JB70ZZ EXCISION OF BACK SUBCUTANEOUS TISSUE AND FASCIA, OPEN APPROACH: ICD-10-PCS | Performed by: NEUROLOGICAL SURGERY

## 2020-06-26 PROCEDURE — 3600000015 HC SURGERY LEVEL 5 ADDTL 15MIN: Performed by: NEUROLOGICAL SURGERY

## 2020-06-26 PROCEDURE — 2580000003 HC RX 258: Performed by: NEUROLOGICAL SURGERY

## 2020-06-26 PROCEDURE — 82947 ASSAY GLUCOSE BLOOD QUANT: CPT

## 2020-06-26 PROCEDURE — 2500000003 HC RX 250 WO HCPCS: Performed by: NURSE ANESTHETIST, CERTIFIED REGISTERED

## 2020-06-26 PROCEDURE — 3700000001 HC ADD 15 MINUTES (ANESTHESIA): Performed by: NEUROLOGICAL SURGERY

## 2020-06-26 PROCEDURE — 3600000005 HC SURGERY LEVEL 5 BASE: Performed by: NEUROLOGICAL SURGERY

## 2020-06-26 PROCEDURE — 7100000001 HC PACU RECOVERY - ADDTL 15 MIN: Performed by: NEUROLOGICAL SURGERY

## 2020-06-26 PROCEDURE — 2709999900 HC NON-CHARGEABLE SUPPLY: Performed by: NEUROLOGICAL SURGERY

## 2020-06-26 PROCEDURE — 94640 AIRWAY INHALATION TREATMENT: CPT

## 2020-06-26 PROCEDURE — 7100000000 HC PACU RECOVERY - FIRST 15 MIN: Performed by: NEUROLOGICAL SURGERY

## 2020-06-26 PROCEDURE — 99231 SBSQ HOSP IP/OBS SF/LOW 25: CPT | Performed by: NEUROLOGICAL SURGERY

## 2020-06-26 PROCEDURE — 1210000000 HC MED SURG R&B

## 2020-06-26 PROCEDURE — 80202 ASSAY OF VANCOMYCIN: CPT

## 2020-06-26 RX ORDER — VANCOMYCIN HYDROCHLORIDE 1 G/20ML
INJECTION, POWDER, LYOPHILIZED, FOR SOLUTION INTRAVENOUS PRN
Status: DISCONTINUED | OUTPATIENT
Start: 2020-06-26 | End: 2020-06-26 | Stop reason: HOSPADM

## 2020-06-26 RX ORDER — SUCCINYLCHOLINE CHLORIDE 20 MG/ML
INJECTION INTRAMUSCULAR; INTRAVENOUS PRN
Status: DISCONTINUED | OUTPATIENT
Start: 2020-06-26 | End: 2020-06-26 | Stop reason: SDUPTHER

## 2020-06-26 RX ORDER — HYDROMORPHONE HYDROCHLORIDE 1 MG/ML
0.5 INJECTION, SOLUTION INTRAMUSCULAR; INTRAVENOUS; SUBCUTANEOUS EVERY 5 MIN PRN
Status: DISCONTINUED | OUTPATIENT
Start: 2020-06-26 | End: 2020-06-26 | Stop reason: HOSPADM

## 2020-06-26 RX ORDER — MORPHINE SULFATE 4 MG/ML
4 INJECTION, SOLUTION INTRAMUSCULAR; INTRAVENOUS EVERY 5 MIN PRN
Status: DISCONTINUED | OUTPATIENT
Start: 2020-06-26 | End: 2020-06-26 | Stop reason: HOSPADM

## 2020-06-26 RX ORDER — LIDOCAINE HYDROCHLORIDE 10 MG/ML
INJECTION, SOLUTION INFILTRATION; PERINEURAL PRN
Status: DISCONTINUED | OUTPATIENT
Start: 2020-06-26 | End: 2020-06-26 | Stop reason: SDUPTHER

## 2020-06-26 RX ORDER — METOCLOPRAMIDE HYDROCHLORIDE 5 MG/ML
10 INJECTION INTRAMUSCULAR; INTRAVENOUS
Status: DISCONTINUED | OUTPATIENT
Start: 2020-06-26 | End: 2020-06-26 | Stop reason: HOSPADM

## 2020-06-26 RX ORDER — PROPOFOL 10 MG/ML
INJECTION, EMULSION INTRAVENOUS PRN
Status: DISCONTINUED | OUTPATIENT
Start: 2020-06-26 | End: 2020-06-26 | Stop reason: SDUPTHER

## 2020-06-26 RX ORDER — MORPHINE SULFATE 4 MG/ML
2 INJECTION, SOLUTION INTRAMUSCULAR; INTRAVENOUS ONCE
Status: DISCONTINUED | OUTPATIENT
Start: 2020-06-26 | End: 2020-06-29 | Stop reason: HOSPADM

## 2020-06-26 RX ORDER — PROMETHAZINE HYDROCHLORIDE 25 MG/ML
6.25 INJECTION, SOLUTION INTRAMUSCULAR; INTRAVENOUS
Status: DISCONTINUED | OUTPATIENT
Start: 2020-06-26 | End: 2020-06-26 | Stop reason: HOSPADM

## 2020-06-26 RX ORDER — LABETALOL HYDROCHLORIDE 5 MG/ML
5 INJECTION, SOLUTION INTRAVENOUS EVERY 10 MIN PRN
Status: DISCONTINUED | OUTPATIENT
Start: 2020-06-26 | End: 2020-06-26 | Stop reason: HOSPADM

## 2020-06-26 RX ORDER — ONDANSETRON 2 MG/ML
INJECTION INTRAMUSCULAR; INTRAVENOUS PRN
Status: DISCONTINUED | OUTPATIENT
Start: 2020-06-26 | End: 2020-06-26 | Stop reason: SDUPTHER

## 2020-06-26 RX ORDER — SODIUM CHLORIDE, SODIUM LACTATE, POTASSIUM CHLORIDE, CALCIUM CHLORIDE 600; 310; 30; 20 MG/100ML; MG/100ML; MG/100ML; MG/100ML
INJECTION, SOLUTION INTRAVENOUS CONTINUOUS
Status: DISCONTINUED | OUTPATIENT
Start: 2020-06-26 | End: 2020-06-26

## 2020-06-26 RX ORDER — MORPHINE SULFATE 4 MG/ML
4 INJECTION, SOLUTION INTRAMUSCULAR; INTRAVENOUS
Status: DISCONTINUED | OUTPATIENT
Start: 2020-06-26 | End: 2020-06-29 | Stop reason: HOSPADM

## 2020-06-26 RX ORDER — ENALAPRILAT 2.5 MG/2ML
1.25 INJECTION INTRAVENOUS
Status: DISCONTINUED | OUTPATIENT
Start: 2020-06-26 | End: 2020-06-26 | Stop reason: HOSPADM

## 2020-06-26 RX ORDER — DIPHENHYDRAMINE HYDROCHLORIDE 50 MG/ML
12.5 INJECTION INTRAMUSCULAR; INTRAVENOUS
Status: DISCONTINUED | OUTPATIENT
Start: 2020-06-26 | End: 2020-06-26 | Stop reason: HOSPADM

## 2020-06-26 RX ORDER — HYDROMORPHONE HYDROCHLORIDE 1 MG/ML
0.25 INJECTION, SOLUTION INTRAMUSCULAR; INTRAVENOUS; SUBCUTANEOUS EVERY 5 MIN PRN
Status: DISCONTINUED | OUTPATIENT
Start: 2020-06-26 | End: 2020-06-26 | Stop reason: HOSPADM

## 2020-06-26 RX ORDER — HYDRALAZINE HYDROCHLORIDE 20 MG/ML
5 INJECTION INTRAMUSCULAR; INTRAVENOUS EVERY 10 MIN PRN
Status: DISCONTINUED | OUTPATIENT
Start: 2020-06-26 | End: 2020-06-26 | Stop reason: HOSPADM

## 2020-06-26 RX ORDER — MORPHINE SULFATE 4 MG/ML
2 INJECTION, SOLUTION INTRAMUSCULAR; INTRAVENOUS EVERY 5 MIN PRN
Status: DISCONTINUED | OUTPATIENT
Start: 2020-06-26 | End: 2020-06-26 | Stop reason: HOSPADM

## 2020-06-26 RX ORDER — FENTANYL CITRATE 50 UG/ML
INJECTION, SOLUTION INTRAMUSCULAR; INTRAVENOUS PRN
Status: DISCONTINUED | OUTPATIENT
Start: 2020-06-26 | End: 2020-06-26 | Stop reason: SDUPTHER

## 2020-06-26 RX ORDER — EPHEDRINE SULFATE 50 MG/ML
INJECTION, SOLUTION INTRAVENOUS PRN
Status: DISCONTINUED | OUTPATIENT
Start: 2020-06-26 | End: 2020-06-26 | Stop reason: SDUPTHER

## 2020-06-26 RX ADMIN — BUDESONIDE 250 MCG: 0.25 INHALANT RESPIRATORY (INHALATION) at 18:41

## 2020-06-26 RX ADMIN — BUSPIRONE HYDROCHLORIDE 10 MG: 10 TABLET ORAL at 21:00

## 2020-06-26 RX ADMIN — METFORMIN HYDROCHLORIDE 1000 MG: 500 TABLET, FILM COATED ORAL at 08:08

## 2020-06-26 RX ADMIN — ONDANSETRON HYDROCHLORIDE 4 MG: 2 INJECTION, SOLUTION INTRAMUSCULAR; INTRAVENOUS at 16:19

## 2020-06-26 RX ADMIN — Medication 2 MG: at 08:25

## 2020-06-26 RX ADMIN — SODIUM CHLORIDE, PRESERVATIVE FREE 10 ML: 5 INJECTION INTRAVENOUS at 21:06

## 2020-06-26 RX ADMIN — FERROUS SULFATE TAB 325 MG (65 MG ELEMENTAL FE) 325 MG: 325 (65 FE) TAB at 21:00

## 2020-06-26 RX ADMIN — HYDROCODONE BITARTRATE AND ACETAMINOPHEN 2 TABLET: 10; 325 TABLET ORAL at 10:34

## 2020-06-26 RX ADMIN — SUCCINYLCHOLINE CHLORIDE 140 MG: 20 INJECTION, SOLUTION INTRAMUSCULAR; INTRAVENOUS at 15:50

## 2020-06-26 RX ADMIN — Medication 4 MG: at 18:55

## 2020-06-26 RX ADMIN — Medication 2 MG: at 14:57

## 2020-06-26 RX ADMIN — VANCOMYCIN HYDROCHLORIDE 1500 MG: 10 INJECTION, POWDER, LYOPHILIZED, FOR SOLUTION INTRAVENOUS at 18:52

## 2020-06-26 RX ADMIN — ARFORMOTEROL TARTRATE 15 MCG: 15 SOLUTION RESPIRATORY (INHALATION) at 06:25

## 2020-06-26 RX ADMIN — EPHEDRINE SULFATE 15 MG: 50 INJECTION INTRAMUSCULAR; INTRAVENOUS; SUBCUTANEOUS at 16:28

## 2020-06-26 RX ADMIN — LIDOCAINE HYDROCHLORIDE 50 MG: 10 INJECTION, SOLUTION INFILTRATION; PERINEURAL at 15:50

## 2020-06-26 RX ADMIN — Medication 2 MG: at 06:40

## 2020-06-26 RX ADMIN — IPRATROPIUM BROMIDE 0.5 MG: 0.5 SOLUTION RESPIRATORY (INHALATION) at 18:41

## 2020-06-26 RX ADMIN — ARIPIPRAZOLE 5 MG: 5 TABLET ORAL at 08:09

## 2020-06-26 RX ADMIN — BUDESONIDE 250 MCG: 0.25 INHALANT RESPIRATORY (INHALATION) at 06:25

## 2020-06-26 RX ADMIN — Medication 2 MG: at 00:13

## 2020-06-26 RX ADMIN — SODIUM CHLORIDE, SODIUM LACTATE, POTASSIUM CHLORIDE, AND CALCIUM CHLORIDE: 600; 310; 30; 20 INJECTION, SOLUTION INTRAVENOUS at 15:38

## 2020-06-26 RX ADMIN — ARFORMOTEROL TARTRATE 15 MCG: 15 SOLUTION RESPIRATORY (INHALATION) at 18:41

## 2020-06-26 RX ADMIN — FLUOXETINE HYDROCHLORIDE 40 MG: 20 CAPSULE ORAL at 08:09

## 2020-06-26 RX ADMIN — HYDROCODONE BITARTRATE AND ACETAMINOPHEN 2 TABLET: 10; 325 TABLET ORAL at 04:21

## 2020-06-26 RX ADMIN — EPHEDRINE SULFATE 10 MG: 50 INJECTION INTRAMUSCULAR; INTRAVENOUS; SUBCUTANEOUS at 16:13

## 2020-06-26 RX ADMIN — HYDROCODONE BITARTRATE AND ACETAMINOPHEN 2 TABLET: 10; 325 TABLET ORAL at 21:00

## 2020-06-26 RX ADMIN — LISINOPRIL 10 MG: 10 TABLET ORAL at 08:09

## 2020-06-26 RX ADMIN — IPRATROPIUM BROMIDE 0.5 MG: 0.5 SOLUTION RESPIRATORY (INHALATION) at 06:25

## 2020-06-26 RX ADMIN — Medication 4 MG: at 23:40

## 2020-06-26 RX ADMIN — ATENOLOL 50 MG: 50 TABLET ORAL at 08:09

## 2020-06-26 RX ADMIN — PROPOFOL 150 MG: 10 INJECTION, EMULSION INTRAVENOUS at 15:50

## 2020-06-26 RX ADMIN — FENTANYL CITRATE 100 MCG: 50 INJECTION INTRAMUSCULAR; INTRAVENOUS at 15:50

## 2020-06-26 RX ADMIN — ACETAMINOPHEN 650 MG: 325 TABLET, FILM COATED ORAL at 08:08

## 2020-06-26 RX ADMIN — HYDROMORPHONE HYDROCHLORIDE 1 MG: 1 INJECTION, SOLUTION INTRAMUSCULAR; INTRAVENOUS; SUBCUTANEOUS at 16:34

## 2020-06-26 RX ADMIN — ATORVASTATIN CALCIUM 20 MG: 20 TABLET, FILM COATED ORAL at 08:09

## 2020-06-26 RX ADMIN — FERROUS SULFATE TAB 325 MG (65 MG ELEMENTAL FE) 325 MG: 325 (65 FE) TAB at 08:09

## 2020-06-26 RX ADMIN — TRAZODONE HYDROCHLORIDE 100 MG: 100 TABLET ORAL at 21:00

## 2020-06-26 RX ADMIN — Medication 2 MG: at 11:49

## 2020-06-26 RX ADMIN — VANCOMYCIN HYDROCHLORIDE 1500 MG: 10 INJECTION, POWDER, LYOPHILIZED, FOR SOLUTION INTRAVENOUS at 06:40

## 2020-06-26 RX ADMIN — PANTOPRAZOLE SODIUM 40 MG: 40 TABLET, DELAYED RELEASE ORAL at 06:00

## 2020-06-26 ASSESSMENT — PAIN SCALES - GENERAL
PAINLEVEL_OUTOF10: 4
PAINLEVEL_OUTOF10: 7
PAINLEVEL_OUTOF10: 10
PAINLEVEL_OUTOF10: 10
PAINLEVEL_OUTOF10: 0
PAINLEVEL_OUTOF10: 9
PAINLEVEL_OUTOF10: 10
PAINLEVEL_OUTOF10: 7
PAINLEVEL_OUTOF10: 7
PAINLEVEL_OUTOF10: 0
PAINLEVEL_OUTOF10: 5
PAINLEVEL_OUTOF10: 9
PAINLEVEL_OUTOF10: 7
PAINLEVEL_OUTOF10: 10
PAINLEVEL_OUTOF10: 5

## 2020-06-26 ASSESSMENT — PAIN DESCRIPTION - PAIN TYPE
TYPE: SURGICAL PAIN
TYPE: SURGICAL PAIN

## 2020-06-26 ASSESSMENT — PAIN DESCRIPTION - ORIENTATION: ORIENTATION: POSTERIOR

## 2020-06-26 ASSESSMENT — ENCOUNTER SYMPTOMS: SHORTNESS OF BREATH: 0

## 2020-06-26 ASSESSMENT — PAIN DESCRIPTION - DESCRIPTORS: DESCRIPTORS: SORE

## 2020-06-26 ASSESSMENT — PAIN DESCRIPTION - FREQUENCY: FREQUENCY: INTERMITTENT

## 2020-06-26 ASSESSMENT — PAIN DESCRIPTION - LOCATION: LOCATION: NECK

## 2020-06-26 ASSESSMENT — LIFESTYLE VARIABLES: SMOKING_STATUS: 0

## 2020-06-26 NOTE — OP NOTE
Operative Note  NEUROSURGICAL DEPARTMENT OPERATIVE REPORT     NAME OF SURGEON: Ghanshyam Hunter DO     DATE OF SERVICE: 6/26/2020     PREOPERATIVE DIAGNOSES    Posterior cervical wound dehiscence and MRSA infection     POSTOPERATIVE DIAGNOSES    Same     OPERATIVE PROCEDURE  Posterior cervical wound debridement with placement of wound VAC      SURGEON  Maury Suero. Rigo Leonardo DO     FIRST ASSIST    Diamond Pablo      Estimated blood loss:  Minimal     DESCRIPTION OF PROCEDURE  The patient was brought to the operating room where general endotracheal anesthesia was induced. The patient was carefully positioned prone on the Forrest table and all pressure points were carefully checked and padded. The wound vac was removed. The posterior cervical region was and prepared for a full 10 minutes with Betadine scrub and Betadine paint and the usual draping procedure was then performed. After the patient had been prepped and draped the necrotic tissue was scraped and cut away with curets and scissors. This tissue was then discarded. The would was then irrigated with copious amounts of antibiotic irrigation. The wound vac nurse then assisted me placement of a new would vac. The would vac was placed on suction and was functional.        The patient was returned to the stretcher in a supine position, extubated and returned to the recovery room in stable condition.

## 2020-06-26 NOTE — ANESTHESIA PRE PROCEDURE
Department of Anesthesiology  Preprocedure Note       Name:  Aleja Riding   Age:  44 y.o.  :  1970                                          MRN:  [id number]         Date:  2020      Surgeon: Franchesca Angelo):  Irineo Estimjuliane,     Procedure: Procedure(s):  POSTERIOR CERVICAL WOUND [address] OUT AND CLOSURE    Medications prior to admission:   Prior to Admission medications    Medication Sig Start Date End Date Taking? Authorizing Provider   vancomycin (VANCOCIN) infusion Infuse 1,500 mg intravenously every 12 hours Compound per protocol. 20  Irineo Estimable, DO   HYDROcodone-acetaminophen (NORCO)  MG per tablet Take 2 tablets by mouth every 6 hours as needed for Pain. Historical Provider, MD   traZODone (DESYREL) 50 MG tablet TAKE 2 TABLETS BY MOUTH NIGHTLY AS NEEDED FOR SLEEP 20   BRYAN Sheikh   baclofen (LIORESAL) 10 MG tablet TAKE 1 TABLET BY MOUTH 3 TIMES DAILY AS NEEDED FOR PAIN/SPASMS PAIN/SPASMS 20   BRYAN Sheikh   clindamycin (CLEOCIN) 300 MG capsule Take 1 capsule by mouth 4 times daily for 10 days 20  Irineo Estimable, DO   sulfamethoxazole-trimethoprim (BACTRIM) 400-80 MG per tablet Take 1 tablet by mouth 2 times daily for 10 days 20  Irineo Estimable, DO   ferrous sulfate (FE TABS) 325 (65 Fe) MG EC tablet Take 1 tablet by mouth 2 times daily 20   Nayana Manuel PA-C   atorvastatin (LIPITOR) 20 MG tablet TAKE ONE TABLET BY MOUTH ONCE DAILY. 20   BRYAN Sheikh   fenofibrate (TRIGLIDE) 160 MG tablet TAKE ONE TABLET BY MOUTH DAILY. 20   BRYAN Sheikh   omeprazole (PRILOSEC) 40 MG delayed release capsule TAKE 1 CAPSULE BY MOUTH DAILY 20   BRYAN Sheikh   lisinopril (PRINIVIL;ZESTRIL) 10 MG tablet TAKE 1 TABLET BY MOUTH DAILY 3/24/20   BRYAN Sheikh   ARIPiprazole (ABILIFY) 5 MG tablet TAKE ONE TABLET BY MOUTH ONCE DAILY.  3/24/20   EstivenBRYAN Bejarano   FLUoxetine (PROZAC) 40 MG capsule TAKE 1 CAPSULE BY MOUTH DAILY 3/23/20   Megan Ray MD   atenolol (TENORMIN) 50 MG tablet TAKE ONE TABLET BY MOUTH EVERY DAY 3/16/20   BRYAN Marion   metFORMIN (GLUCOPHAGE) 1000 MG tablet Take 1 tablet by mouth 2 times daily (with meals) 2/5/20   BRYAN Marion   Cholecalciferol (VITAMIN D3) 1.25 MG (86147 UT) CAPS Take 1 capsule by mouth once a week    Historical Provider, MD   sildenafil (REVATIO) 20 MG tablet Take 1-5 tablets 1-2 hours before sexual activity PRN 4/4/19   Historical Provider, MD   tiotropium (SPIRIVA) 18 MCG inhalation capsule Inhale 1 capsule into the lungs    Historical Provider, MD   fluticasone-salmeterol (ADVAIR DISKUS) 100-50 MCG/DOSE diskus inhaler Inhale 1 puff into the lungs every 12 hours 9/4/19   BRYAN Marion   blood glucose monitor strips Test 4 times a day & as needed for symptoms of irregular blood glucose. 7/9/19   BRYAN Marion   glucose monitoring kit (FREESTYLE) monitoring kit Please provide glucometer that INS will cover for DM type 2 7/9/19   BRYAN Marion   Lancets 30G MISC 1 each by Does not apply route daily 4 times daily 7/9/19   BRYAN Marion   aspirin 81 MG EC tablet Take 1 tablet by mouth daily 5/6/19   BRYAN Dutta       Current medications:    No current facility-administered medications for this visit. Current Outpatient Medications   Medication Sig Dispense Refill    vancomycin (VANCOCIN) infusion Infuse 1,500 mg intravenously every 12 hours Compound per protocol.  35233 mg 1     Facility-Administered Medications Ordered in Other Visits   Medication Dose Route Frequency Provider Last Rate Last Dose    [MAR Hold] morphine injection 2 mg  2 mg Intravenous Once Adeline Flakes, DO        lactated ringers infusion   Intravenous Continuous Jennifer Conklin MD        [facility] Hold] vancomycin (VANCOCIN) 1,500 mg in dextrose 5 % 500 mL IVPB  1,500 mg Intravenous Q12H Adeline Flakes, DO   Stopped at 06/26/20 0832    [MAR Hold] sodium chloride Rectal Q6H PRN Bhakta Dickens, DO        [MAR Hold] polyethylene glycol (GLYCOLAX) packet 17 g  17 g Oral Daily PRN Bhakta Dickens, DO   17 g at 06/25/20 1753    [MAR Hold] promethazine (PHENERGAN) tablet 12.5 mg  12.5 mg Oral Q6H PRN Bhakta Dickens, DO        Or    [MAR Hold] ondansetron (ZOFRAN) injection 4 mg  4 mg Intravenous Q6H PRN Bhakta Dickens, DO        [facility] Hold] glucose (GLUTOSE) 40 % oral gel 15 g  15 g Oral PRN Bhakta Dickens, DO        [facility] Hold] dextrose 50 % IV solution  12.5 g Intravenous PRN Bhakta Dickens, DO        [facility] Hold] glucagon (rDNA) injection 1 mg  1 mg Intramuscular PRN Bhakta Dickens, DO        [facility] Hold] dextrose 5 % solution  100 mL/hr Intravenous PRN Bhakta Dickens, DO        [facility] Hold] insulin lispro (HUMALOG) injection vial 0-6 Units  0-6 Units Subcutaneous TID WC Bhakta Dickens, DO   1 Units at 06/25/20 1753    [MAR Hold] insulin lispro (HUMALOG) injection vial 0-3 Units  0-3 Units Subcutaneous Nightly Bhakta Dickens, DO   1 Units at 06/23/20 2115    [MAR Hold] budesonide (PULMICORT) nebulizer suspension 250 mcg  250 mcg Nebulization BID Bhakta Dickens, DO   250 mcg at 06/26/20 0625    [MAR Hold] Arformoterol Tartrate (BROVANA) nebulizer solution 15 mcg  15 mcg Nebulization BID Bhakta Dickens, DO   15 mcg at 06/26/20 0625    [MAR Hold] nicotine (NICODERM CQ) 21 MG/24HR 1 patch  1 patch Transdermal Daily Bhakta Dickens, DO   1 patch at 06/25/20 1605    [MAR Hold] vancomycin (VANCOCIN) intermittent dosing (placeholder)   Other RX Placeholder Bhakta Dickens, DO           Allergies: Allergies   Allergen Reactions    Latex Other (See Comments)     BOILS AND BLISTERS- ALLERGY CALLED TO OMID SURGERY SCHEDULING.     Demerol Hcl [Meperidine] Hives     And n/v       Problem List:    Patient Active Problem List   Diagnosis Code    Essential hypertension I10    Hx of bladder cancer Z85.51    Chronic bronchitis ([address] J42    Type 2 diabetes mellitus without complication, without long-term current use of insulin (HCC) E11.9    Mixed anxiety depressive disorder F41.8    Mixed hyperlipidemia E78.2    Postoperative anemia due to acute blood loss D62    Hx of CABG Z95.1    Primary insomnia F51.01    Anxiety and depression F41.9, F32.9    Coronary artery disease I25.10    Cervical spondylosis with myelopathy and radiculopathy M47.12, M47.22    Rupture of operation wound T81. 31XA    Wound dehiscence T81.30XA       Past Medical History:        Diagnosis Date    CAD (coronary artery disease)     se[facility]    Cancer [facility])     Bladder    COPD ([facility] obstructive [facility] disease) [facility]     Depression     Diabetes mellitus ([facility]     History of blood transfusion     unsure thinks he did    Hyperlipidemia     Hypertension        Past Surgical History:        Procedure Laterality Date    BACK SURGERY      X3     BLADDER REMOVAL      CARDIAC CATHETERIZATION      CERVICAL FUSION N/A 2/19/2020    Phase 1:  C5 and C6 corpectomy with placement of an expandable cage and anterior cervical plate L5-C8. performed by Yony Dickens DO at [address] [place] [address] N/A 6/17/2020    POSTERIOR CERVICAL WOUND [address] OUT AND CLOSURE performed by Yony Dickens DO at [address] [place] [address] N/A 9/10/2019    Dr Robbie Torrse, 5 yr recall    CORONARY ARTERY BYPASS GRAFT N/A 5/1/2019    CORONARY ARTERY BYPASS GRAFT X 3 WITH LEFT INTERNAL MAMMARY ARTERY, ENDOSCOPIC  VEIN HARVEST, WITH PERFUSION. performed by Adam Marrero MD at [place] [address] N/A 2/19/2020    Phase 2:  Posterior instrumented fusion C3-C7 using lateral mass screws and rods.  performed by Yony Dickens DO at [address] [place] [address]      lower ext    PROSTATECTOMY         Social History:    Social History     Tobacco Use    Smoking status: Current Every Day Smoker     Packs/day: 1.00     Years: 30.00     Pack years: 30.00    Smokeless tobacco: Never Used   Substance Use Topics   

## 2020-06-26 NOTE — PROGRESS NOTES
Neurosurgery  Wound discussed with Sunil Hidalgo, wound care nurse. Lots of dead tissue, recommended debridement. To OR today for washout and debridement. Will place fresh wound VAC    Risks discussed, patient wishes to proceed.

## 2020-06-26 NOTE — PROGRESS NOTES
[facility]  Follow-up Progress Note       NAME:  Mateo Larson  MEDICAL RECORD NUMBER:  [id number]  AGE:  44 y.o. GENDER:  male  :  1970  TODAY'S DATE:  2020    Subjective:   Wound Identification:  Wound Type: non-healing surgical  Contributing Factors: diabetes and smoking        Patient Goal of Care:  [x] Wound Healing  [] Odor Control  [] Palliative Care  [] Pain Control   [] Other:     Objective:    /64   Pulse 55   Temp 97.1 °F (36.2 °C) (Temporal)   Resp 20   Ht 5' 11\" (1.803 m)   Wt 180 lb 14.4 oz (82.1 kg)   SpO2 96%   BMI 25.23 kg/m²   Torito Risk Score: Torito Scale Score: 21  Assessment:   Measurements:  Negative Pressure Wound Therapy Other (Comment) Posterior (Active)   Wound Type Surgical 2020  8:30 AM   Unit Type KCI 2020  8:30 AM   Dressing Type Black foam 2020  8:30 AM   Number of pieces used 1 2020  8:30 AM   Cycle Intermittent 2020  8:30 AM   Target Pressure (mmHg) 125 2020  8:30 AM   Canister changed? No 2020  8:30 AM   Dressing Status Dry; Intact 2020  8:30 AM   Dressing Changed Changed/New 2020  8:30 AM   Drainage Description Serous 2020  8:30 AM   Dressing Change Due 20  8:30 AM   Output (ml) 50 ml 2020  8:30 AM   Wound Assessment Pink; White;Yellow 2020  8:30 AM   Chrissy-wound Assessment Dry; Intact 2020  8:30 AM   Shape oval 2020  8:30 AM   Odor None 2020  8:30 AM   Number of days: 2       Wound 20 Neck Posterior POSTERIOR NECK SURGICAL (Active)   Wound Image    2020  8:30 AM   Wound Non-Healing Surgical 2020  8:30 AM   Dressing Status Old drainage; Intact 2020  8:30 AM   Wound Cleansed Soap and water 2020  8:30 AM   Wound Length (cm) 9 cm 2020  8:30 AM   Wound Width (cm) 4 cm 2020  8:30 AM   Wound Depth (cm) 3 cm 2020 8:30 AM   Wound Surface Area (cm^2) 36 cm^2 6/26/2020  8:30 AM   Wound Volume (cm^3) 108 cm^3 6/26/2020  8:30 AM   Undermining Starts ___ O'Clock 12 6/26/2020  8:30 AM   Undermining Ends___ O'Clock 12 6/26/2020  8:30 AM   Undermining Maxium Distance (cm) 2 6/26/2020  8:30 AM   Wound Assessment Pink; White;Yellow 6/26/2020  8:30 AM   Drainage Amount Moderate 6/26/2020  8:30 AM   Drainage Description Serosanguinous 6/26/2020  8:30 AM   Odor None 6/26/2020  8:30 AM   Margins Unattached edges 6/26/2020  8:30 AM   Exposed structure Bone 6/26/2020  8:30 AM   Chrissy-wound Assessment Dry; Intact; Pink 6/26/2020  8:30 AM   Non-staged Wound Description Full thickness 6/26/2020  8:30 AM   [place]%Wound Bed 20 6/26/2020  8:30 AM   Yellow%Wound Bed 80 6/26/2020  8:30 AM   Other%Wound Bed 80 6/24/2020  7:49 AM   Number of days: 2       Response to treatment:  Well tolerated by patient. Pain Assessment:  Severity:  7 / 10  Quality of pain: aching  Wound Pain Timing/Severity: intermittent  Premedicated: Yes  Plan:   Plan of Care:  Incision 06/17/20 Neck Posterior-Dressing/Treatment: Vacuum dressing    Specialty Bed Required : N/A   [] Low Air Loss   [] Pressure Redistribution  [] Fluid Immersion  [] Bariatric  [] Total Pressure Relief  [] Other:     Current Diet: Diet NPO Effective Now  Dietician consult:  N/A    Discharge Plan:  Placement for patient upon discharge: home with support   Patient appropriate for Outpatient [address]: Yes    Referrals:  []   [x] [facility]  [x] Supplies  [] Other    Patient/Caregiver Teaching:  Level of patient/caregiver understanding able to:   [x] Indicates understanding       [] Needs reinforcement  [] Unsuccessful      [x] Verbal Understanding  [x] Demonstrated understanding       [] No evidence of learning  [] Refused teaching         [] N/A       I have applied wound vac to posterior neck and bridged track pad to right upper chest. Pain medication was given prior to removal and re-application of wound vac. I have sent documentation to [facility] get ready vac approved through insurance for home vac use. I have contacted Dr Maxwell Lopez and recommended more debridement due to appearance of wound and fibrin slough remaining in wound bed for improved healing. Patient tolerated wound vac relatively well.     Electronically signed by Chase Lundberg RN, Pauline Alegria on 6/26/2020 at 9:48 AM

## 2020-06-26 NOTE — PROGRESS NOTES
Pharmacy Vancomycin Consult     Vancomycin Day: 4  Current Dosing: Vancomycin 1500 mg IV Q 12 Hours. Temp max:  97.7    Recent Labs     06/24/20  0454 06/25/20  0625   BUN 18 17       Recent Labs     06/24/20  0454 06/25/20  0625   CREATININE 1.1 0.9       Recent Labs     06/24/20  0454 06/25/20  0625   WBC 8.1 10.2         Intake/Output Summary (Last 24 hours) at 6/26/2020 1847  Last data filed at 6/26/2020 1641  Gross per 24 hour   Intake 2280 ml   Output 1805 ml   Net 475 ml     Culture Date Source Results   06/17/20 Surgical - Neck, Tissue MRSA   06/23/20 Blood Sent             Ht Readings from Last 1 Encounters:   06/23/20 5' 11\" (1.803 m)        Wt Readings from Last 1 Encounters:   06/23/20 180 lb 14.4 oz (82.1 kg)         Body mass index is 25.23 kg/m². Estimated Creatinine Clearance: 102 mL/min (based on SCr of 0.9 mg/dL). Trough: 19.3    Assessment/Plan: Level not accurate due to patient receiving a 1 gram dose 1.5 hours before level drawn. Will order trough for tomorrow night and reevaluate. Continue Vancomycin 1500 mg IV Q 12 hours for now.     Electronically signed by Gilford Fleck, [facility] on 6/26/2020 at 6:47 PM

## 2020-06-26 NOTE — DISCHARGE SUMMARY
[facility]  Discharge Summary     Patient:   Kareen Aguillon  MR#:    [id number]      YOB: 1970  Date of Evaluation:  6/26/2020  Time of Note:                          8:36 AM  Primary/Referring Physician:  BRYAN Whaley   Note Author:    Kym Willis DO        Admission Date: 6/23/2020    Discharge Date: 6/26/2020    Final Diagnoses: Wound dehiscence [T81.30XA]    Procedures: None    Consultations: ID    Reason for Admission: IV antibiotics and treatment of wound    Hospital Course:  Lizbeth Larson was admitted with a posterior cervical wound dehiscence with cultures noted to be positive for MRSA. ID was consulted and IV antibiotics were started. A wound VAC was placed. PICC line was placed. Home health with IV antibiotics was set up, likely 4-6 weeks per ID recommendations and his clinical course. The Wound VAC is to be changed at home as well three days per week. Patient Condition: Stable    Disposition: Home    Wound Instructions: Able to shower. Please keep wound dry. DO NOT SOAK WOUND    Diet: diabetic diet    Resume home meds:      Medication List      START taking these medications    vancomycin  infusion  Commonly known as:  VANCOCIN  Infuse 1,500 mg intravenously every 12 hours Compound per protocol. CONTINUE taking these medications    ARIPiprazole 5 MG tablet  Commonly known as:  ABILIFY  TAKE ONE TABLET BY MOUTH ONCE DAILY. aspirin 81 MG EC tablet  Take 1 tablet by mouth daily     atenolol 50 MG tablet  Commonly known as:  TENORMIN  TAKE ONE TABLET BY MOUTH EVERY DAY     atorvastatin 20 MG tablet  Commonly known as:  LIPITOR  TAKE ONE TABLET BY MOUTH ONCE DAILY. baclofen 10 MG tablet  Commonly known as:  LIORESAL  TAKE 1 TABLET BY MOUTH 3 TIMES DAILY AS NEEDED FOR PAIN/SPASMS PAIN/SPASMS     blood glucose test strips  Test 4 times a day & as needed for symptoms of irregular blood glucose.      clindamycin 300 MG capsule  Commonly known as:

## 2020-06-26 NOTE — PROGRESS NOTES
[facility]  Progress Note                   Chief Complaint   Patient presents with    Wound Check       S/P cervical spinal fusion.       INTERVAL HISTORY:  No major issues overnight. PICC line place. Home IV abx have been set up. Wound care team to change vac today.         HISTORY OF PRESENT ILLNESS:       [facility] is a 44 y.o. male who underwent a C5,C6 corpectomy with anterior cervical plating along with posterior instrumented fusion C3-C7 on 2/19/2020. On 6/17/2020 his wound dehisced and he underwent drainage of posterior seroma, washout and closure of wound. His wound was noted to be draining she we had him come to our clinic for inspection. Cultures revealed a MRSA infection.   I attempted to manage his wound as an outpatient with oral antibiotics, however, it appears he will need more aggressive IV antibiotics, evaluation by the ID and wound care team.       He denies any fever or chills.       Past Medical History        Past Medical History:   Diagnosis Date    CAD (coronary artery disease)       sees [facility] cardiology    Cancer [facility])       Bladder    [facility] [address]      Depression      Diabetes mellitus [facility] [address]      History of blood transfusion       unsure thinks he did    Hyperlipidemia      Hypertension              Past Surgical History         Past Surgical History:   Procedure Laterality Date    BACK SURGERY         X3     BLADDER REMOVAL        CARDIAC CATHETERIZATION        CERVICAL FUSION N/A 2/19/2020     Phase 1:  C5 and C6 corpectomy with placement of an expandable cage and anterior cervical plate A1-J5. performed by Adams Rodriguez DO at [address] N/A 6/17/2020     POSTERIOR CERVICAL WOUND [address] OUT AND CLOSURE performed by Adams Rodriguez DO at [address] [place] [address] Street N/A 9/10/2019     Dr Elliott Gonzalez, 5 yr recall    CORONARY ARTERY BYPASS GRAFT N/A 5/1/2019     CORONARY ARTERY BYPASS GRAFT X 3 WITH LEFT INTERNAL MAMMARY ARTERY, ENDOSCOPIC  VEIN HARVEST, WITH PERFUSION. performed by Cheryle Churn, MD at [address] [place] [address]  N/A 2/19/2020     Phase 2:  Posterior instrumented fusion C3-C7 using lateral mass screws and rods. performed by Anay Hallman DO at [facility] OR    LYMPHADENECTOMY         lower ext    PROSTATECTOMY                 Medications    Current Medication      Current Outpatient Medications:     traZODone (DESYREL) 50 MG tablet, TAKE 2 TABLETS BY MOUTH NIGHTLY AS NEEDED FOR SLEEP, Disp: 60 tablet, Rfl: 1    baclofen (LIORESAL) 10 MG tablet, TAKE 1 TABLET BY MOUTH 3 TIMES DAILY AS NEEDED FOR PAIN/SPASMS PAIN/SPASMS, Disp: 90 tablet, Rfl: 0    clindamycin (CLEOCIN) 300 MG capsule, Take 1 capsule by mouth 4 times daily for 10 days, Disp: 14 capsule, Rfl: 0    sulfamethoxazole-trimethoprim (BACTRIM) 400-80 MG per tablet, Take 1 tablet by mouth 2 times daily for 10 days, Disp: 60 tablet, Rfl: 0    lidocaine (LIDODERM) 5 %, Place 1 patch onto the skin daily 12 hours on, 12 hours off., Disp: 30 patch, Rfl: 0    ferrous sulfate (FE TABS) 325 (65 Fe) MG EC tablet, Take 1 tablet by mouth 2 times daily, Disp: 90 tablet, Rfl: 0    busPIRone (BUSPAR) 10 MG tablet, TAKE 1 TABLET BY MOUTH 3 TIMES DAILY AS NEEDED (ANXIETY), Disp: 90 tablet, Rfl: 0    atorvastatin (LIPITOR) 20 MG tablet, TAKE ONE TABLET BY MOUTH ONCE DAILY. , Disp: 30 tablet, Rfl: 3    fenofibrate (TRIGLIDE) 160 MG tablet, TAKE ONE TABLET BY MOUTH DAILY. , Disp: 30 tablet, Rfl: 5    omeprazole (PRILOSEC) 40 MG delayed release capsule, TAKE 1 CAPSULE BY MOUTH DAILY, Disp: 30 capsule, Rfl: 2    lisinopril (PRINIVIL;ZESTRIL) 10 MG tablet, TAKE 1 TABLET BY MOUTH DAILY, Disp: 30 tablet, Rfl: 3    ARIPiprazole (ABILIFY) 5 MG tablet, TAKE ONE TABLET BY MOUTH ONCE DAILY. , Disp: 30 tablet, Rfl: 3    FLUoxetine (PROZAC) 40 MG capsule, TAKE 1 CAPSULE BY MOUTH DAILY, Disp: 30 capsule, Rfl: 3    atenolol (TENORMIN) 50 MG tablet, TAKE    Gastrointestinal: Negative.    Genitourinary: Negative.    Musculoskeletal: Positive for back pain, joint pain and neck pain. Skin: Negative.    Neurological: Negative.    Endo/Heme/Allergies: Negative.    Psychiatric/Behavioral: Negative.       PHYSICAL EXAM:      Vitals:     06/23/20 1010   BP: 135/63   Pulse: 68   SpO2: 97%      Constitutional: The patient appears well-developed andwell-nourished. Eyes - conjunctiva normal.  Conjugate gaze  Ear, nose, throat -No scars, masses, or lesions over external nose or ears, no atrophy of tongue  Neck-symmetric, no masses noted, no jugular vein distension  Respiration- chest wall appears symmetric, goodexpansion, normal effort without use of accessory muscles  Musculoskeletal - no significant wasting of muscles noted, no bony deformities, gait no gross ataxia  Extremities-no clubbing, cyanosis or edema  Skin- warm, dry, and intact. No rash, erythema, or pallor.   Psychiatric - mood, affect, and behavior appear normal.      Neurologic Examination  Awake, Alert andoriented x 3  Normal speech pattern, following commands  Motor 5/5 all extremities  No deficits to light touch or pinprick sensation     Normal Gait pattern        Wound:  Wound vac on suction, 100 mL output     DATA and IMAGING:           Lab Results   Component Value Date     WBC 9.8 06/18/2020     HGB 7.3 (L) 06/18/2020     HCT 24.1 (L) 06/18/2020     MCV 73.1 (L) 06/18/2020      06/18/2020            Lab Results   Component Value Date      06/18/2020     K 5.1 (H) 06/18/2020      06/18/2020     CO2 22 06/18/2020     BUN 27 (H) 06/18/2020     CREATININE 1.4 (H) 06/18/2020     GLUCOSE 247 (H) 06/18/2020     CALCIUM 8.4 (L) 06/18/2020     PROT 6.7 06/17/2020     LABALBU 3.0 (L) 06/17/2020     BILITOT 0.4 06/17/2020     ALKPHOS 128 06/17/2020     AST 24 06/17/2020     ALT 16 06/17/2020     LABGLOM 53 (A) 06/18/2020      Lab Results   Component Value Date     INR 1.03 01/30/2020     INR 1.34 (H) 05/01/2019     INR 1.02 04/30/2019     PROTIME 12.9 01/30/2020     PROTIME 15.9 (H) 05/01/2019     PROTIME 12.8 04/30/2019    No results found.              ASSESSMENT   44year old male s/p C5,C6 corpectomy with anterior cervical plating along with posterior instrumented fusion C3-C7 on 2/19/2020. On 6/17/2020 his wound dehisced and he underwent drainage of posterior seroma, washout and closure of wound. Cultures were positive with MRSA     PLAN:  PICC line has been placed  Home IV abx arranged  Appreciate ID assistance, would treat as if osteomyelitis. Spinous processes culture did reveal MRSA. Blood glucose well controlled, <150   Wound care team to follow and change wound vac M, W, and CHUNG. Avel berman discharge home once wound vac changed.

## 2020-06-26 NOTE — PROGRESS NOTES
Or  ondansetron (ZOFRAN) injection 4 mg, Q6H PRN  glucose (GLUTOSE) 40 % oral gel 15 g, PRN  dextrose 50 % IV solution, PRN  glucagon (rDNA) injection 1 mg, PRN  dextrose 5 % solution, PRN  insulin lispro (HUMALOG) injection vial 0-6 Units, TID WC  insulin lispro (HUMALOG) injection vial 0-3 Units, Nightly  budesonide (PULMICORT) nebulizer suspension 250 mcg, BID  Arformoterol Tartrate (BROVANA) nebulizer solution 15 mcg, BID  nicotine (NICODERM CQ) 21 MG/24HR 1 patch, Daily  vancomycin (VANCOCIN) intermittent dosing (placeholder), RX Placeholder      Review of Systems no cardiopulmonary symptoms. No rash or skin itching. VitalSigns:  BP (!) 109/53   Pulse 62   Temp 97.4 °F (36.3 °C) (Temporal)   Resp 12   Ht 5' 11\" (1.803 m)   Wt 180 lb 14.4 oz (82.1 kg)   SpO2 93%   BMI 25.23 kg/m²      Physical Exam  Line/IV (right arm PIC) site: No erythema, warmth, induration, or tenderness. Neck with wound VAC in place.   Skin without drug rash  Abdomen soft and nontender  Lungs without crackles    Lab Results:  CBC:   Recent Labs     06/24/20  0454 06/25/20  0625   WBC 8.1 10.2   HGB 8.9* 10.0*    388     BMP:  Recent Labs     06/24/20  0454 06/25/20  0625    135*   K 4.5 5.1*    99   CO2 26 23   BUN 18 17   CREATININE 1.1 0.9   GLUCOSE 101 108     Vancomycin trough today 19.3    CultureResults:  Previous surgical cultures from June 17, 2020:    Surgical culture June 17, 2020 labeled tip of spinous process:  Organism Abnormal  06/17/2020  2:38 PM [address] [facility]   Staph aureus MRSA    Culture Surgical Abnormal  06/17/2020  2:38 PM [facility]   Moderate growth   CONTACT PRECAUTIONS INDICATED   No further workup   Refer to Neck Culture #1 posterior neck collected [email or web address] for   sensitivity results       Surgical culture June 17, 2020-MRSA labeled posterior neck swab:  Staph aureus mrsa (1)                    Antibiotic Interpretation AMAN Status     benzylpenicillin

## 2020-06-26 NOTE — BRIEF OP NOTE
Brief Postoperative Note      Patient: Kirvin Due [place]  YOB: 1970  MRN: [id number]    Date of Procedure: 6/26/2020    Pre-Op Diagnosis: Posterior cervical wound dehiscence and MRSA infection    Post-Op Diagnosis: Same       Procedure(s):  POSTERIOR CERVICAL WOUND DEBRIDEMENT WITH PLACEMENT OF [facility]    Surgeon(s):  Rian Byrd DO    Assistant:  * No surgical staff found *    Anesthesia: General    Estimated Blood Loss (mL): Minimal    Complications: None    Specimens:   * No specimens in log *    Implants:  * No implants in log *      Drains:   Closed/Suction Drain Posterior Neck Accordion 10 [place]ian (Active)   Site Description Unable to view 6/17/2020  5:00 PM   Dressing Status Clean;Dry; Intact 6/17/2020  5:00 PM   Drainage Appearance Serosanguinous 6/17/2020  5:00 PM   Status Compressed 6/17/2020  5:00 PM       Negative Pressure Wound Therapy Other (Comment) Posterior (Active)   Wound Type Surgical 6/26/2020  8:30 AM   Unit Type KCI 6/26/2020  8:30 AM   Dressing Type Black foam 6/26/2020  8:30 AM   Number of pieces used 1 6/26/2020  8:30 AM   Cycle Intermittent 6/26/2020  8:30 AM   Target Pressure (mmHg) 125 6/26/2020  8:30 AM   Canister changed? No 6/26/2020  8:30 AM   Dressing Status Dry; Intact 6/26/2020  8:30 AM   Dressing Changed Changed/New 6/26/2020  8:30 AM   Drainage Description Serous 6/26/2020  8:30 AM   Dressing Change Due 06/29/20 6/26/2020  8:30 AM   Output (ml) 50 ml 6/26/2020  8:30 AM   Wound Assessment Pink; White;Yellow 6/26/2020  8:30 AM   Chrissy-wound Assessment Dry; Intact 6/26/2020  8:30 AM   Shape oval 6/26/2020  8:30 AM   Odor None 6/26/2020  8:30 AM       Urostomy RLQ (Active)   Stomal Appliance 2 piece 6/26/2020  7:45 AM   Stoma  Assessment Pink;Protrudes; Moist;Red 6/26/2020  7:45 AM   Mucocutaneous Junction Intact 6/17/2020  8:34 PM   Peristomal Assessment Clean; Intact 6/26/2020  7:45 AM   Urine Color Yellow 6/24/2020  8:45 PM   Urine Appearance Clear 6/24/2020  8:45 PM   Output (ml) 700 ml 6/26/2020  5:58 AM       [REMOVED] Closed/Suction Drain Right; Anterior Neck Bulb 10 [place] (Removed)   Site Description Unable to view 6/17/2020  4:43 PM   Dressing Status Clean;Dry; Intact 6/17/2020  4:43 PM   Drainage Appearance Serosanguinous 6/17/2020  4:43 PM   Status Compressed 6/17/2020  4:43 PM       [REMOVED] Closed/Suction Drain Posterior Neck Accordion 10 [place] (Removed)       Findings: Dead tissues removed and wound cleaned with copious antibiotic irrigation.       Electronically signed by Derrell Palomo DO on 6/26/2020 at 4:45 PM

## 2020-06-27 LAB
GLUCOSE BLD-MCNC: 102 MG/DL (ref 70–99)
GLUCOSE BLD-MCNC: 126 MG/DL (ref 70–99)
GLUCOSE BLD-MCNC: 164 MG/DL (ref 70–99)
PERFORMED ON: ABNORMAL
VANCOMYCIN TROUGH: 21.7 UG/ML (ref 10–20)

## 2020-06-27 PROCEDURE — 6360000002 HC RX W HCPCS: Performed by: NEUROLOGICAL SURGERY

## 2020-06-27 PROCEDURE — 80202 ASSAY OF VANCOMYCIN: CPT

## 2020-06-27 PROCEDURE — 82947 ASSAY GLUCOSE BLOOD QUANT: CPT

## 2020-06-27 PROCEDURE — 99231 SBSQ HOSP IP/OBS SF/LOW 25: CPT | Performed by: NEUROLOGICAL SURGERY

## 2020-06-27 PROCEDURE — 2580000003 HC RX 258: Performed by: NEUROLOGICAL SURGERY

## 2020-06-27 PROCEDURE — 1210000000 HC MED SURG R&B

## 2020-06-27 PROCEDURE — 6370000000 HC RX 637 (ALT 250 FOR IP): Performed by: NEUROLOGICAL SURGERY

## 2020-06-27 PROCEDURE — 94640 AIRWAY INHALATION TREATMENT: CPT

## 2020-06-27 RX ADMIN — INSULIN LISPRO 1 UNITS: 100 INJECTION, SOLUTION INTRAVENOUS; SUBCUTANEOUS at 08:27

## 2020-06-27 RX ADMIN — BUDESONIDE 250 MCG: 0.25 INHALANT RESPIRATORY (INHALATION) at 07:05

## 2020-06-27 RX ADMIN — TRAZODONE HYDROCHLORIDE 100 MG: 100 TABLET ORAL at 21:55

## 2020-06-27 RX ADMIN — HYDROCODONE BITARTRATE AND ACETAMINOPHEN 2 TABLET: 10; 325 TABLET ORAL at 04:25

## 2020-06-27 RX ADMIN — ATORVASTATIN CALCIUM 20 MG: 20 TABLET, FILM COATED ORAL at 07:37

## 2020-06-27 RX ADMIN — VANCOMYCIN HYDROCHLORIDE 1500 MG: 10 INJECTION, POWDER, LYOPHILIZED, FOR SOLUTION INTRAVENOUS at 06:03

## 2020-06-27 RX ADMIN — Medication 4 MG: at 13:06

## 2020-06-27 RX ADMIN — LISINOPRIL 10 MG: 10 TABLET ORAL at 07:37

## 2020-06-27 RX ADMIN — METFORMIN HYDROCHLORIDE 1000 MG: 500 TABLET, FILM COATED ORAL at 07:36

## 2020-06-27 RX ADMIN — IPRATROPIUM BROMIDE 0.5 MG: 0.5 SOLUTION RESPIRATORY (INHALATION) at 07:05

## 2020-06-27 RX ADMIN — BUDESONIDE 250 MCG: 0.25 INHALANT RESPIRATORY (INHALATION) at 18:22

## 2020-06-27 RX ADMIN — HYDROCODONE BITARTRATE AND ACETAMINOPHEN 2 TABLET: 10; 325 TABLET ORAL at 21:55

## 2020-06-27 RX ADMIN — ARFORMOTEROL TARTRATE 15 MCG: 15 SOLUTION RESPIRATORY (INHALATION) at 07:05

## 2020-06-27 RX ADMIN — HYDROCODONE BITARTRATE AND ACETAMINOPHEN 2 TABLET: 10; 325 TABLET ORAL at 10:29

## 2020-06-27 RX ADMIN — FERROUS SULFATE TAB 325 MG (65 MG ELEMENTAL FE) 325 MG: 325 (65 FE) TAB at 07:36

## 2020-06-27 RX ADMIN — FLUOXETINE HYDROCHLORIDE 40 MG: 20 CAPSULE ORAL at 07:37

## 2020-06-27 RX ADMIN — HYDROCODONE BITARTRATE AND ACETAMINOPHEN 2 TABLET: 10; 325 TABLET ORAL at 16:56

## 2020-06-27 RX ADMIN — ARIPIPRAZOLE 5 MG: 5 TABLET ORAL at 07:36

## 2020-06-27 RX ADMIN — SODIUM CHLORIDE, PRESERVATIVE FREE 10 ML: 5 INJECTION INTRAVENOUS at 07:37

## 2020-06-27 RX ADMIN — VANCOMYCIN HYDROCHLORIDE 1250 MG: 10 INJECTION, POWDER, LYOPHILIZED, FOR SOLUTION INTRAVENOUS at 22:14

## 2020-06-27 RX ADMIN — METFORMIN HYDROCHLORIDE 1000 MG: 500 TABLET, FILM COATED ORAL at 16:56

## 2020-06-27 RX ADMIN — PANTOPRAZOLE SODIUM 40 MG: 40 TABLET, DELAYED RELEASE ORAL at 06:03

## 2020-06-27 RX ADMIN — BUSPIRONE HYDROCHLORIDE 10 MG: 10 TABLET ORAL at 21:55

## 2020-06-27 RX ADMIN — ARFORMOTEROL TARTRATE 15 MCG: 15 SOLUTION RESPIRATORY (INHALATION) at 18:22

## 2020-06-27 RX ADMIN — SODIUM CHLORIDE, PRESERVATIVE FREE 10 ML: 5 INJECTION INTRAVENOUS at 21:57

## 2020-06-27 RX ADMIN — ATENOLOL 50 MG: 50 TABLET ORAL at 07:37

## 2020-06-27 RX ADMIN — Medication 4 MG: at 07:36

## 2020-06-27 RX ADMIN — Medication 4 MG: at 17:57

## 2020-06-27 RX ADMIN — FERROUS SULFATE TAB 325 MG (65 MG ELEMENTAL FE) 325 MG: 325 (65 FE) TAB at 21:57

## 2020-06-27 RX ADMIN — IPRATROPIUM BROMIDE 0.5 MG: 0.5 SOLUTION RESPIRATORY (INHALATION) at 18:22

## 2020-06-27 RX ADMIN — VANCOMYCIN HYDROCHLORIDE 1500 MG: 10 INJECTION, POWDER, LYOPHILIZED, FOR SOLUTION INTRAVENOUS at 17:58

## 2020-06-27 ASSESSMENT — PAIN SCALES - GENERAL
PAINLEVEL_OUTOF10: 9
PAINLEVEL_OUTOF10: 9
PAINLEVEL_OUTOF10: 8
PAINLEVEL_OUTOF10: 10
PAINLEVEL_OUTOF10: 9
PAINLEVEL_OUTOF10: 10
PAINLEVEL_OUTOF10: 9

## 2020-06-27 NOTE — PROGRESS NOTES
[facility]  Progress Note                   Chief Complaint   Patient presents with    Wound Check       S/P cervical spinal fusion.       INTERVAL HISTORY:  No major issues overnight. Wound debrided yesterday afternoon, wound VAC placed.           HISTORY OF PRESENT ILLNESS:       Carlos Larson is a 44 y.o. male who underwent a C5,C6 corpectomy with anterior cervical plating along with posterior instrumented fusion C3-C7 on 2/19/2020. On 6/17/2020 his wound dehisced and he underwent drainage of posterior seroma, washout and closure of wound. His wound was noted to be draining she we had him come to our clinic for inspection. Cultures revealed a MRSA infection.   I attempted to manage his wound as an outpatient with oral antibiotics, however, it appears he will need more aggressive IV antibiotics, evaluation by the ID and wound care team.       He denies any fever or chills.       Past Medical History        Past Medical History:   Diagnosis Date    CAD (coronary artery disease)       sees [facility] cardiology    Cancer [facility])       Bladder    COPD (chronic obstructive pulmonary disease) ([facility] [address]      Depression      Diabetes mellitus ([facility] [address]      History of blood transfusion       unsure thinks he did    Hyperlipidemia      Hypertension              Past Surgical History         Past Surgical History:   Procedure Laterality Date    BACK SURGERY         X3     BLADDER REMOVAL        CARDIAC CATHETERIZATION        CERVICAL FUSION N/A 2/19/2020     Phase 1:  C5 and C6 corpectomy with placement of an expandable cage and anterior cervical plate R6-A0. performed by Margy Ray DO at [facility] N/A 6/17/2020     POSTERIOR CERVICAL WOUND [address] OUT AND CLOSURE performed by Margy Ray DO at [address] [place] [address] N/A 9/10/2019     Dr Pineda Cunningham, 5 yr recall    CORONARY ARTERY BYPASS GRAFT N/A 5/1/2019     CORONARY ARTERY BYPASS GRAFT X 3 WITH LEFT INTERNAL MAMMARY ARTERY, Disp: 30 tablet, Rfl: 5    metFORMIN (GLUCOPHAGE) 1000 MG tablet, Take 1 tablet by mouth 2 times daily (with meals), Disp: 60 tablet, Rfl: 5    Cholecalciferol (VITAMIN D3) 1.25 MG (31356 UT) CAPS, Take 1 capsule by mouth once a week, Disp: , Rfl:     sildenafil (REVATIO) 20 MG tablet, Take 1-5 tablets 1-2 hours before sexual activity PRN, Disp: , Rfl:     tiotropium (SPIRIVA) 18 MCG inhalation capsule, Inhale 1 capsule into the lungs, Disp: , Rfl:     fluticasone-salmeterol (ADVAIR DISKUS) 100-50 MCG/DOSE diskus inhaler, Inhale 1 puff into the lungs every 12 hours, Disp: 60 each, Rfl: 3    blood glucose monitor strips, Test 4 times a day & as needed for symptoms of irregular blood glucose., Disp: 100 strip, Rfl: 3    glucose monitoring kit (FREESTYLE) monitoring kit, Please provide glucometer that INS will cover for DM type 2, Disp: 1 kit, Rfl: 0    Lancets 30G MISC, 1 each by Does not apply route daily 4 times daily, Disp: 100 each, Rfl: 3    aspirin 81 MG EC tablet, Take 1 tablet by mouth daily, Disp: 30 tablet, Rfl: 3     Latex and Demerol hcl [meperidine]     Social History  Social History           Tobacco Use   Smoking Status Current Every Day Smoker    Packs/day: 1.00    Years: 30.00    Pack years: 30.00   Smokeless Tobacco Never Used      Social History           Substance and Sexual Activity   Alcohol Use Yes     Comment: Occ            Family History         Family History   Problem Relation Age of Onset    Cancer Mother      Vision Loss Mother      Breast Cancer Mother      Coronary Art Dis Father      Obesity Father      Kidney Disease Father      High Blood Pressure Father      High Cholesterol Father      Hypercalcemia Sister      Obesity Brother      High Blood Pressure Brother              Review of Systems:  Constitutional: Negative.    HENT: Negative.    Eyes: Negative.    Respiratory: Negative.    Cardiovascular: Negative.    Gastrointestinal: Negative. 04/30/2019     PROTIME 12.9 01/30/2020     PROTIME 15.9 (H) 05/01/2019     PROTIME 12.8 04/30/2019    No results found.              ASSESSMENT   44year old male s/p C5,C6 corpectomy with anterior cervical plating along with posterior instrumented fusion C3-C7 on 2/19/2020. On 6/17/2020 his wound dehisced and he underwent drainage of posterior seroma, washout and closure of wound. Cultures were positive with MRSA     PLAN:  Home IV abx arranged  Appreciate ID assistance, would treat as if osteomyelitis. Spinous processes culture did reveal MRSA. Blood glucose well controlled, <150   Wound care team to follow and change wound vac M, W, and F.   Awaiting set up at home  Will likley discharge on Monday

## 2020-06-28 LAB
GLUCOSE BLD-MCNC: 101 MG/DL (ref 70–99)
GLUCOSE BLD-MCNC: 103 MG/DL (ref 70–99)
GLUCOSE BLD-MCNC: 120 MG/DL (ref 70–99)
GLUCOSE BLD-MCNC: 89 MG/DL (ref 70–99)
PERFORMED ON: ABNORMAL
PERFORMED ON: NORMAL
VANCOMYCIN TROUGH: 16.4 UG/ML (ref 10–20)

## 2020-06-28 PROCEDURE — 80202 ASSAY OF VANCOMYCIN: CPT

## 2020-06-28 PROCEDURE — 2580000003 HC RX 258: Performed by: NEUROLOGICAL SURGERY

## 2020-06-28 PROCEDURE — 6360000002 HC RX W HCPCS: Performed by: NEUROLOGICAL SURGERY

## 2020-06-28 PROCEDURE — 99231 SBSQ HOSP IP/OBS SF/LOW 25: CPT | Performed by: NEUROLOGICAL SURGERY

## 2020-06-28 PROCEDURE — 6370000000 HC RX 637 (ALT 250 FOR IP): Performed by: NEUROLOGICAL SURGERY

## 2020-06-28 PROCEDURE — 6370000000 HC RX 637 (ALT 250 FOR IP): Performed by: INTERNAL MEDICINE

## 2020-06-28 PROCEDURE — 82947 ASSAY GLUCOSE BLOOD QUANT: CPT

## 2020-06-28 PROCEDURE — 94640 AIRWAY INHALATION TREATMENT: CPT

## 2020-06-28 PROCEDURE — 1210000000 HC MED SURG R&B

## 2020-06-28 RX ORDER — POLYETHYLENE GLYCOL 3350 17 G/17G
17 POWDER, FOR SOLUTION ORAL 2 TIMES DAILY
Status: DISCONTINUED | OUTPATIENT
Start: 2020-06-28 | End: 2020-06-29 | Stop reason: HOSPADM

## 2020-06-28 RX ORDER — DOCUSATE SODIUM 100 MG/1
100 CAPSULE, LIQUID FILLED ORAL 2 TIMES DAILY
Status: DISCONTINUED | OUTPATIENT
Start: 2020-06-28 | End: 2020-06-29 | Stop reason: HOSPADM

## 2020-06-28 RX ADMIN — Medication 4 MG: at 11:14

## 2020-06-28 RX ADMIN — SODIUM CHLORIDE, PRESERVATIVE FREE 10 ML: 5 INJECTION INTRAVENOUS at 20:45

## 2020-06-28 RX ADMIN — ATENOLOL 50 MG: 50 TABLET ORAL at 07:48

## 2020-06-28 RX ADMIN — FERROUS SULFATE TAB 325 MG (65 MG ELEMENTAL FE) 325 MG: 325 (65 FE) TAB at 07:47

## 2020-06-28 RX ADMIN — POLYETHYLENE GLYCOL 3350 17 G: 17 POWDER, FOR SOLUTION ORAL at 07:49

## 2020-06-28 RX ADMIN — IPRATROPIUM BROMIDE 0.5 MG: 0.5 SOLUTION RESPIRATORY (INHALATION) at 18:41

## 2020-06-28 RX ADMIN — Medication 4 MG: at 20:45

## 2020-06-28 RX ADMIN — METFORMIN HYDROCHLORIDE 1000 MG: 500 TABLET, FILM COATED ORAL at 17:17

## 2020-06-28 RX ADMIN — Medication 4 MG: at 23:54

## 2020-06-28 RX ADMIN — DOCUSATE SODIUM 100 MG: 100 CAPSULE, LIQUID FILLED ORAL at 07:55

## 2020-06-28 RX ADMIN — Medication 4 MG: at 17:52

## 2020-06-28 RX ADMIN — FLUOXETINE HYDROCHLORIDE 40 MG: 20 CAPSULE ORAL at 07:48

## 2020-06-28 RX ADMIN — ARIPIPRAZOLE 5 MG: 5 TABLET ORAL at 07:48

## 2020-06-28 RX ADMIN — Medication 4 MG: at 14:43

## 2020-06-28 RX ADMIN — SODIUM CHLORIDE, PRESERVATIVE FREE 10 ML: 5 INJECTION INTRAVENOUS at 22:09

## 2020-06-28 RX ADMIN — ARFORMOTEROL TARTRATE 15 MCG: 15 SOLUTION RESPIRATORY (INHALATION) at 06:32

## 2020-06-28 RX ADMIN — FERROUS SULFATE TAB 325 MG (65 MG ELEMENTAL FE) 325 MG: 325 (65 FE) TAB at 20:44

## 2020-06-28 RX ADMIN — IPRATROPIUM BROMIDE 0.5 MG: 0.5 SOLUTION RESPIRATORY (INHALATION) at 14:21

## 2020-06-28 RX ADMIN — HYDROCODONE BITARTRATE AND ACETAMINOPHEN 2 TABLET: 10; 325 TABLET ORAL at 06:39

## 2020-06-28 RX ADMIN — VANCOMYCIN HYDROCHLORIDE 1250 MG: 10 INJECTION, POWDER, LYOPHILIZED, FOR SOLUTION INTRAVENOUS at 09:26

## 2020-06-28 RX ADMIN — SODIUM CHLORIDE, PRESERVATIVE FREE 10 ML: 5 INJECTION INTRAVENOUS at 07:47

## 2020-06-28 RX ADMIN — HYDROCODONE BITARTRATE AND ACETAMINOPHEN 2 TABLET: 10; 325 TABLET ORAL at 13:01

## 2020-06-28 RX ADMIN — ATORVASTATIN CALCIUM 20 MG: 20 TABLET, FILM COATED ORAL at 07:48

## 2020-06-28 RX ADMIN — PANTOPRAZOLE SODIUM 40 MG: 40 TABLET, DELAYED RELEASE ORAL at 06:39

## 2020-06-28 RX ADMIN — HYDROCODONE BITARTRATE AND ACETAMINOPHEN 2 TABLET: 10; 325 TABLET ORAL at 19:01

## 2020-06-28 RX ADMIN — Medication 4 MG: at 01:01

## 2020-06-28 RX ADMIN — DOCUSATE SODIUM 100 MG: 100 CAPSULE, LIQUID FILLED ORAL at 20:44

## 2020-06-28 RX ADMIN — BUDESONIDE 250 MCG: 0.25 INHALANT RESPIRATORY (INHALATION) at 06:32

## 2020-06-28 RX ADMIN — ARFORMOTEROL TARTRATE 15 MCG: 15 SOLUTION RESPIRATORY (INHALATION) at 18:41

## 2020-06-28 RX ADMIN — METFORMIN HYDROCHLORIDE 1000 MG: 500 TABLET, FILM COATED ORAL at 07:47

## 2020-06-28 RX ADMIN — Medication 4 MG: at 07:47

## 2020-06-28 RX ADMIN — VANCOMYCIN HYDROCHLORIDE 1250 MG: 10 INJECTION, POWDER, LYOPHILIZED, FOR SOLUTION INTRAVENOUS at 22:08

## 2020-06-28 RX ADMIN — IPRATROPIUM BROMIDE 0.5 MG: 0.5 SOLUTION RESPIRATORY (INHALATION) at 06:32

## 2020-06-28 RX ADMIN — BUDESONIDE 250 MCG: 0.25 INHALANT RESPIRATORY (INHALATION) at 18:41

## 2020-06-28 ASSESSMENT — PAIN DESCRIPTION - PAIN TYPE
TYPE: SURGICAL PAIN

## 2020-06-28 ASSESSMENT — PAIN SCALES - GENERAL
PAINLEVEL_OUTOF10: 4
PAINLEVEL_OUTOF10: 9
PAINLEVEL_OUTOF10: 9
PAINLEVEL_OUTOF10: 4
PAINLEVEL_OUTOF10: 9
PAINLEVEL_OUTOF10: 9
PAINLEVEL_OUTOF10: 8
PAINLEVEL_OUTOF10: 9
PAINLEVEL_OUTOF10: 9
PAINLEVEL_OUTOF10: 10
PAINLEVEL_OUTOF10: 9

## 2020-06-28 ASSESSMENT — PAIN DESCRIPTION - ONSET: ONSET: ON-GOING

## 2020-06-28 ASSESSMENT — PAIN DESCRIPTION - ORIENTATION
ORIENTATION: POSTERIOR

## 2020-06-28 ASSESSMENT — PAIN DESCRIPTION - DESCRIPTORS: DESCRIPTORS: ACHING

## 2020-06-28 ASSESSMENT — PAIN - FUNCTIONAL ASSESSMENT: PAIN_FUNCTIONAL_ASSESSMENT: PREVENTS OR INTERFERES SOME ACTIVE ACTIVITIES AND ADLS

## 2020-06-28 ASSESSMENT — PAIN DESCRIPTION - DIRECTION: RADIATING_TOWARDS: NO

## 2020-06-28 ASSESSMENT — PAIN DESCRIPTION - FREQUENCY: FREQUENCY: INTERMITTENT

## 2020-06-28 ASSESSMENT — PAIN DESCRIPTION - PROGRESSION: CLINICAL_PROGRESSION: NOT CHANGED

## 2020-06-28 ASSESSMENT — PAIN DESCRIPTION - LOCATION
LOCATION: NECK

## 2020-06-28 NOTE — PROGRESS NOTES
[facility]  Progress Note                   Chief Complaint   Patient presents with    Wound Check       S/P cervical spinal fusion.       INTERVAL HISTORY:  No major issues overnight. Pain controlled on current regimen.       HISTORY OF PRESENT ILLNESS:       Gunnar Larson is a 44 y.o. male who underwent a C5,C6 corpectomy with anterior cervical plating along with posterior instrumented fusion C3-C7 on 2/19/2020. On 6/17/2020 his wound dehisced and he underwent drainage of posterior seroma, washout and closure of wound. His wound was noted to be draining she we had him come to our clinic for inspection. Cultures revealed a MRSA infection.   I attempted to manage his wound as an outpatient with oral antibiotics, however, it appears he will need more aggressive IV antibiotics, evaluation by the ID and wound care team.       He denies any fever or chills.       Past Medical History        Past Medical History:   Diagnosis Date    CAD (coronary artery disease)       sees [facility]    Cancer [facility])       Bladder    [facility] obstructive [facility]) [facility]      Depression      Diabetes mellitus ([facility]      History of blood transfusion       unsure thinks he did    Hyperlipidemia      Hypertension              Past Surgical History         Past Surgical History:   Procedure Laterality Date    BACK SURGERY         X3     BLADDER REMOVAL        CARDIAC CATHETERIZATION        CERVICAL FUSION N/A 2/19/2020     Phase 1:  C5 and C6 corpectomy with placement of an expandable cage and anterior cervical plate G3-V5. performed by Lane Forbes DO at [facility] N/A 6/17/2020     POSTERIOR CERVICAL WOUND [address] OUT AND CLOSURE performed by Lane Forbes DO at [address] [place] [address] N/A 9/10/2019     Dr Pinky Camacho, 5 yr recall    CORONARY ARTERY BYPASS GRAFT N/A 5/1/2019     CORONARY ARTERY BYPASS GRAFT X 3 WITH LEFT INTERNAL MAMMARY ARTERY, ENDOSCOPIC  VEIN HARVEST, WITH PERFUSION. performed by Isabell Austin MD at [facility] N/A 2/19/2020     Phase 2:  Posterior instrumented fusion C3-C7 using lateral mass screws and rods. performed by Domonique Mcqueen DO at [facility] OR    LYMPHADENECTOMY         lower ext    PROSTATECTOMY                 Medications    Current Medication      Current Outpatient Medications:     traZODone (DESYREL) 50 MG tablet, TAKE 2 TABLETS BY MOUTH NIGHTLY AS NEEDED FOR SLEEP, Disp: 60 tablet, Rfl: 1    baclofen (LIORESAL) 10 MG tablet, TAKE 1 TABLET BY MOUTH 3 TIMES DAILY AS NEEDED FOR PAIN/SPASMS PAIN/SPASMS, Disp: 90 tablet, Rfl: 0    clindamycin (CLEOCIN) 300 MG capsule, Take 1 capsule by mouth 4 times daily for 10 days, Disp: 14 capsule, Rfl: 0    sulfamethoxazole-trimethoprim (BACTRIM) 400-80 MG per tablet, Take 1 tablet by mouth 2 times daily for 10 days, Disp: 60 tablet, Rfl: 0    lidocaine (LIDODERM) 5 %, Place 1 patch onto the skin daily 12 hours on, 12 hours off., Disp: 30 patch, Rfl: 0    ferrous sulfate (FE TABS) 325 (65 Fe) MG EC tablet, Take 1 tablet by mouth 2 times daily, Disp: 90 tablet, Rfl: 0    busPIRone (BUSPAR) 10 MG tablet, TAKE 1 TABLET BY MOUTH 3 TIMES DAILY AS NEEDED (ANXIETY), Disp: 90 tablet, Rfl: 0    atorvastatin (LIPITOR) 20 MG tablet, TAKE ONE TABLET BY MOUTH ONCE DAILY. , Disp: 30 tablet, Rfl: 3    fenofibrate (TRIGLIDE) 160 MG tablet, TAKE ONE TABLET BY MOUTH DAILY. , Disp: 30 tablet, Rfl: 5    omeprazole (PRILOSEC) 40 MG delayed release capsule, TAKE 1 CAPSULE BY MOUTH DAILY, Disp: 30 capsule, Rfl: 2    lisinopril (PRINIVIL;ZESTRIL) 10 MG tablet, TAKE 1 TABLET BY MOUTH DAILY, Disp: 30 tablet, Rfl: 3    ARIPiprazole (ABILIFY) 5 MG tablet, TAKE ONE TABLET BY MOUTH ONCE DAILY. , Disp: 30 tablet, Rfl: 3    FLUoxetine (PROZAC) 40 MG capsule, TAKE 1 CAPSULE BY MOUTH DAILY, Disp: 30 capsule, Rfl: 3    atenolol (TENORMIN) 50 MG tablet, TAKE ONE TABLET BY MOUTH EVERY DAY, Disp: 30 tablet, Rfl: 5    metFORMIN (GLUCOPHAGE) 1000 MG tablet, Take 1 tablet by mouth 2 times daily (with meals), Disp: 60 tablet, Rfl: 5    Cholecalciferol (VITAMIN D3) 1.25 MG (19772 UT) CAPS, Take 1 capsule by mouth once a week, Disp: , Rfl:     sildenafil (REVATIO) 20 MG tablet, Take 1-5 tablets 1-2 hours before sexual activity PRN, Disp: , Rfl:     tiotropium (SPIRIVA) 18 MCG inhalation capsule, Inhale 1 capsule into the lungs, Disp: , Rfl:     fluticasone-salmeterol (ADVAIR DISKUS) 100-50 MCG/DOSE diskus inhaler, Inhale 1 puff into the lungs every 12 hours, Disp: 60 each, Rfl: 3    blood glucose monitor strips, Test 4 times a day & as needed for symptoms of irregular blood glucose., Disp: 100 strip, Rfl: 3    glucose monitoring kit (FREESTYLE) monitoring kit, Please provide glucometer that INS will cover for DM type 2, Disp: 1 kit, Rfl: 0    Lancets 30G MISC, 1 each by Does not apply route daily 4 times daily, Disp: 100 each, Rfl: 3    aspirin 81 MG EC tablet, Take 1 tablet by mouth daily, Disp: 30 tablet, Rfl: 3     Latex and Demerol hcl [meperidine]     Social History  Social History           Tobacco Use   Smoking Status Current Every Day Smoker    Packs/day: 1.00    Years: 30.00    Pack years: 30.00   Smokeless Tobacco Never Used      Social History           Substance and Sexual Activity   Alcohol Use Yes     Comment: Occ            Family History         Family History   Problem Relation Age of Onset    Cancer Mother      Vision Loss Mother      Breast Cancer Mother      Coronary Art Dis Father      Obesity Father      Kidney Disease Father      High Blood Pressure Father      High Cholesterol Father      Hypercalcemia Sister      Obesity Brother      High Blood Pressure Brother              Review of Systems:  Constitutional: Negative.    HENT: Negative.    Eyes: Negative.    Respiratory: Negative.    Cardiovascular: Negative.    Gastrointestinal: Negative.    Genitourinary: Negative.    Musculoskeletal: Positive for back pain, joint pain and neck pain. Skin: Negative.    Neurological: Negative.    Endo/Heme/Allergies: Negative.    Psychiatric/Behavioral: Negative.       PHYSICAL EXAM:      Vitals:     06/23/20 1010   BP: 135/63   Pulse: 68   SpO2: 97%      Constitutional: The patient appears well-developed andwell-nourished. Eyes - conjunctiva normal.  Conjugate gaze  Ear, nose, throat -No scars, masses, or lesions over external nose or ears, no atrophy of tongue  Neck-symmetric, no masses noted, no jugular vein distension  Respiration- chest wall appears symmetric, goodexpansion, normal effort without use of accessory muscles  Musculoskeletal - no significant wasting of muscles noted, no bony deformities, gait no gross ataxia  Extremities-no clubbing, cyanosis or edema  Skin- warm, dry, and intact. No rash, erythema, or pallor.   Psychiatric - mood, affect, and behavior appear normal.      Neurologic Examination  Awake, Alert andoriented x 3  Normal speech pattern, following commands  Motor 5/5 all extremities  No deficits to light touch or pinprick sensation     Normal Gait pattern        Wound:  Wound vac on suction, 100 mL output     DATA and IMAGING:           Lab Results   Component Value Date     WBC 9.8 06/18/2020     HGB 7.3 (L) 06/18/2020     HCT 24.1 (L) 06/18/2020     MCV 73.1 (L) 06/18/2020      06/18/2020            Lab Results   Component Value Date      06/18/2020     K 5.1 (H) 06/18/2020      06/18/2020     CO2 22 06/18/2020     BUN 27 (H) 06/18/2020     CREATININE 1.4 (H) 06/18/2020     GLUCOSE 247 (H) 06/18/2020     CALCIUM 8.4 (L) 06/18/2020     PROT 6.7 06/17/2020     LABALBU 3.0 (L) 06/17/2020     BILITOT 0.4 06/17/2020     ALKPHOS 128 06/17/2020     AST 24 06/17/2020     ALT 16 06/17/2020     LABGLOM 53 (A) 06/18/2020      Lab Results   Component Value Date     INR 1.03 01/30/2020     INR 1.34 (H) 05/01/2019     INR 1.02 04/30/2019     PROTIME 12.9 01/30/2020     PROTIME 15.9 (H) 05/01/2019     PROTIME 12.8 04/30/2019    No results found.              ASSESSMENT   44year old male s/p C5,C6 corpectomy with anterior cervical plating along with posterior instrumented fusion C3-C7 on 2/19/2020. On 6/17/2020 his wound dehisced and he underwent drainage of posterior seroma, washout and closure of wound. Cultures were positive with MRSA     PLAN:  Home IV abx arranged  Appreciate ID assistance, would treat as if osteomyelitis. Spinous processes culture did reveal MRSA. Blood glucose well controlled, <150   Wound care team to follow and change wound vac M, W, and F.   Awaiting set up at home  Will likley discharge on tomorrow

## 2020-06-29 VITALS
BODY MASS INDEX: 25.32 KG/M2 | WEIGHT: 180.9 LBS | HEIGHT: 71 IN | RESPIRATION RATE: 20 BRPM | TEMPERATURE: 97 F | SYSTOLIC BLOOD PRESSURE: 135 MMHG | OXYGEN SATURATION: 100 % | DIASTOLIC BLOOD PRESSURE: 58 MMHG | HEART RATE: 62 BPM

## 2020-06-29 LAB
GLUCOSE BLD-MCNC: 155 MG/DL (ref 70–99)
GLUCOSE BLD-MCNC: 165 MG/DL (ref 70–99)
PERFORMED ON: ABNORMAL
PERFORMED ON: ABNORMAL

## 2020-06-29 PROCEDURE — 94640 AIRWAY INHALATION TREATMENT: CPT

## 2020-06-29 PROCEDURE — 6370000000 HC RX 637 (ALT 250 FOR IP): Performed by: INTERNAL MEDICINE

## 2020-06-29 PROCEDURE — 2580000003 HC RX 258: Performed by: NEUROLOGICAL SURGERY

## 2020-06-29 PROCEDURE — 82947 ASSAY GLUCOSE BLOOD QUANT: CPT

## 2020-06-29 PROCEDURE — 99238 HOSP IP/OBS DSCHRG MGMT 30/<: CPT | Performed by: NEUROLOGICAL SURGERY

## 2020-06-29 PROCEDURE — 6360000002 HC RX W HCPCS: Performed by: NEUROLOGICAL SURGERY

## 2020-06-29 PROCEDURE — 6370000000 HC RX 637 (ALT 250 FOR IP): Performed by: NEUROLOGICAL SURGERY

## 2020-06-29 RX ORDER — SODIUM HYPOCHLORITE 1.25 MG/ML
SOLUTION TOPICAL
Qty: 1000 ML | Refills: 2 | Status: SHIPPED | OUTPATIENT
Start: 2020-06-29

## 2020-06-29 RX ADMIN — PANTOPRAZOLE SODIUM 40 MG: 40 TABLET, DELAYED RELEASE ORAL at 08:26

## 2020-06-29 RX ADMIN — ARIPIPRAZOLE 5 MG: 5 TABLET ORAL at 08:26

## 2020-06-29 RX ADMIN — BUDESONIDE 250 MCG: 0.25 INHALANT RESPIRATORY (INHALATION) at 06:10

## 2020-06-29 RX ADMIN — HYDROCODONE BITARTRATE AND ACETAMINOPHEN 2 TABLET: 10; 325 TABLET ORAL at 12:35

## 2020-06-29 RX ADMIN — HYDROCODONE BITARTRATE AND ACETAMINOPHEN 2 TABLET: 10; 325 TABLET ORAL at 05:53

## 2020-06-29 RX ADMIN — ATORVASTATIN CALCIUM 20 MG: 20 TABLET, FILM COATED ORAL at 08:26

## 2020-06-29 RX ADMIN — POLYETHYLENE GLYCOL 3350 17 G: 17 POWDER, FOR SOLUTION ORAL at 08:26

## 2020-06-29 RX ADMIN — VANCOMYCIN HYDROCHLORIDE 1250 MG: 10 INJECTION, POWDER, LYOPHILIZED, FOR SOLUTION INTRAVENOUS at 11:22

## 2020-06-29 RX ADMIN — ARFORMOTEROL TARTRATE 15 MCG: 15 SOLUTION RESPIRATORY (INHALATION) at 06:10

## 2020-06-29 RX ADMIN — Medication 4 MG: at 14:19

## 2020-06-29 RX ADMIN — IPRATROPIUM BROMIDE 0.5 MG: 0.5 SOLUTION RESPIRATORY (INHALATION) at 06:10

## 2020-06-29 RX ADMIN — Medication 4 MG: at 11:30

## 2020-06-29 RX ADMIN — ATENOLOL 50 MG: 50 TABLET ORAL at 08:26

## 2020-06-29 RX ADMIN — METFORMIN HYDROCHLORIDE 1000 MG: 500 TABLET, FILM COATED ORAL at 08:26

## 2020-06-29 RX ADMIN — Medication 4 MG: at 08:27

## 2020-06-29 RX ADMIN — FLUOXETINE HYDROCHLORIDE 40 MG: 20 CAPSULE ORAL at 08:26

## 2020-06-29 RX ADMIN — LISINOPRIL 10 MG: 10 TABLET ORAL at 08:26

## 2020-06-29 RX ADMIN — FERROUS SULFATE TAB 325 MG (65 MG ELEMENTAL FE) 325 MG: 325 (65 FE) TAB at 08:26

## 2020-06-29 RX ADMIN — SODIUM CHLORIDE, PRESERVATIVE FREE 10 ML: 5 INJECTION INTRAVENOUS at 08:27

## 2020-06-29 ASSESSMENT — PAIN SCALES - GENERAL
PAINLEVEL_OUTOF10: 4
PAINLEVEL_OUTOF10: 7
PAINLEVEL_OUTOF10: 9
PAINLEVEL_OUTOF10: 10
PAINLEVEL_OUTOF10: 9
PAINLEVEL_OUTOF10: 3
PAINLEVEL_OUTOF10: 9
PAINLEVEL_OUTOF10: 8

## 2020-06-29 ASSESSMENT — PAIN DESCRIPTION - FREQUENCY: FREQUENCY: INTERMITTENT

## 2020-06-29 ASSESSMENT — PAIN DESCRIPTION - PROGRESSION: CLINICAL_PROGRESSION: NOT CHANGED

## 2020-06-29 ASSESSMENT — PAIN DESCRIPTION - DESCRIPTORS: DESCRIPTORS: BURNING;STABBING

## 2020-06-29 ASSESSMENT — PAIN SCALES - WONG BAKER: WONGBAKER_NUMERICALRESPONSE: 0

## 2020-06-29 ASSESSMENT — PAIN DESCRIPTION - ORIENTATION: ORIENTATION: POSTERIOR

## 2020-06-29 ASSESSMENT — PAIN - FUNCTIONAL ASSESSMENT: PAIN_FUNCTIONAL_ASSESSMENT: PREVENTS OR INTERFERES SOME ACTIVE ACTIVITIES AND ADLS

## 2020-06-29 ASSESSMENT — PAIN DESCRIPTION - LOCATION: LOCATION: NECK

## 2020-06-29 ASSESSMENT — PAIN DESCRIPTION - PAIN TYPE: TYPE: SURGICAL PAIN

## 2020-06-29 ASSESSMENT — PAIN DESCRIPTION - ONSET: ONSET: GRADUAL

## 2020-06-29 NOTE — PROGRESS NOTES
Infectious Diseases Progress Note    Patient:  Vicki Chung  YOB: 1970  MRN: [id number]   Admit date: 6/23/2020   Admitting Physician: Adams Rodriguez DO  Primary Care Physician: BRYAN Davalos    Chief Complaint/Interval History:  I spoke with . His dose of vancomycin was adjusted over the weekend. I placed updated home IV antibiotic orders as outlined below. I tried to reach him before he left the hospital. I was in clinic all day. Was hoping to see him before he was discharged home. I tried to do a telehealth visit with him. He had already left to go home. I was able to connect with him via phone. And get an update. He had just arrived home when I talked with him. He indicates he is feeling okay. He is comfortable at present. No pain at his PICC line site. No rash or skin itching. He and his wife feel comfortable with the plan for IV antibiotic therapy. They have a follow-up with me on July 13. In/Out    Intake/Output Summary (Last 24 hours) at 6/29/2020 1511  Last data filed at 6/29/2020 1230  Gross per 24 hour   Intake 480 ml   Output 1140 ml   Net -660 ml     Allergies: Allergies   Allergen Reactions    Latex Other (See Comments)     BOILS AND BLISTERS- ALLERGY CALLED TO [facility] SURGERY SCHEDULING.     Demerol Hcl [Meperidine] Hives     And n/v     Current Meds: collagenase ointment, Daily  docusate sodium (COLACE) capsule 100 mg, BID  polyethylene glycol (GLYCOLAX) packet 17 g, BID  vancomycin (VANCOCIN) 1,250 mg in dextrose 5 % 250 mL IVPB, Q12H  morphine injection 2 mg, Once  morphine injection 4 mg, Q3H PRN  sodium chloride flush 0.9 % injection 10 mL, 2 times per day  sodium chloride flush 0.9 % injection 10 mL, PRN  ARIPiprazole (ABILIFY) tablet 5 mg, Daily  atenolol (TENORMIN) tablet 50 mg, Daily  atorvastatin (LIPITOR) tablet 20 mg, Daily  busPIRone (BUSPAR) tablet 10 mg, TID PRN  ferrous sulfate (IRON 325) tablet 325 mg, BID  FLUoxetine (PROZAC) capsule 40 mg, Daily  HYDROcodone-acetaminophen (NORCO)  MG per tablet 2 tablet, Q6H PRN  lisinopril (PRINIVIL;ZESTRIL) tablet 10 mg, Daily  metFORMIN (GLUCOPHAGE) tablet 1,000 mg, BID WC  pantoprazole (PROTONIX) tablet 40 mg, QAM AC  ipratropium (ATROVENT) 0.02 % nebulizer solution 0.5 mg, TID  traZODone (DESYREL) tablet 100 mg, Nightly PRN  acetaminophen (TYLENOL) tablet 650 mg, Q6H PRN    Or  acetaminophen (TYLENOL) suppository 650 mg, Q6H PRN  promethazine (PHENERGAN) tablet 12.5 mg, Q6H PRN    Or  ondansetron (ZOFRAN) injection 4 mg, Q6H PRN  glucose (GLUTOSE) 40 % oral gel 15 g, PRN  dextrose 50 % IV solution, PRN  glucagon (rDNA) injection 1 mg, PRN  dextrose 5 % solution, PRN  insulin lispro (HUMALOG) injection vial 0-6 Units, TID WC  insulin lispro (HUMALOG) injection vial 0-3 Units, Nightly  budesonide (PULMICORT) nebulizer suspension 250 mcg, BID  Arformoterol Tartrate (BROVANA) nebulizer solution 15 mcg, BID  nicotine (NICODERM CQ) 21 MG/24HR 1 patch, Daily  vancomycin (VANCOCIN) intermittent dosing (placeholder), RX Placeholder      Review of Systems See HPI    VitalSigns:  BP (!) 135/58   Pulse 62   Temp 97 °F (36.1 °C) (Temporal)   Resp 20   Ht 5' 11\" (1.803 m)   Wt 180 lb 14.4 oz (82.1 kg)   SpO2 100%   BMI 25.23 kg/m²      Physical Exam  Unable to examine. Was only able to speak with him on the phone. Lab Results:  Vancomycin trough yesterday 16.4    Radiology: None    Additional Studies Reviewed:  None    Impression:  1. Cervical wound infection/probable osteomyelitis-MRSA based on previous operative culture. 2.  Coronary artery disease  3. History of bladder cancer  4. Hypertension  5. Tobacco use  6.   Chronic obstructive pulmonary disease    Recommendations:  He has been discharged home  I was able to speak with him right his he got home  He seems to be doing okay  See updated home IV antibiotic orders below  These were sent to  earlier in the day to forward to his home infusion company  He is going to keep follow-up with me on 13 July  We will be checking weekly labs as outlined below  He is going to call us if any new symptoms or new problems with his home IV antibiotic treatment  He was comfortable with that plan    Home IV antibiotic orders:  1.  Diagnosis-cervical wound infection.  Probable osteomyelitis.  MRSA recovered on culture. 2. German Jaimes MD  Primary care provider-BRYAN [place]  3.  IV access-PICC line  4.  Antibiotic order:  Vancomycin 1250 mg IV every 12 hours  Last dose of vancomycin July 15, 2020 (4 weeks) or possibly July 29, 2020 (6 weeks)-final duration of antibiotics depending upon clinical course.   5.  Laboratory monitoring:  CMP, CBC, CRP, and vancomycin trough every Monday  Goal vancomycin trough 10-20  6.  Follow-up appointment 1 to 2 weeks after hospital discharge    Sosa Leblanc MD

## 2020-06-29 NOTE — PROGRESS NOTES
Pharmacy Vancomycin Consult     Vancomycin Day: 6  Current Dosing: Vancomycin 1250 mg IV every 12 hours    Temp max:  99.6    No results for input(s): BUN in the last 72 hours. No results for input(s): CREATININE in the last 72 hours. No results for input(s): WBC in the last 72 hours. Intake/Output Summary (Last 24 hours) at 6/28/2020 2243  Last data filed at 6/28/2020 5725  Gross per 24 hour   Intake 250 ml   Output 1275 ml   Net -1025 ml     Culture Date Source Results   06/17/20 Surgical - Neck, Tissue MRSA   06/23/20 Blood No growth to date         Ht Readings from Last 1 Encounters:   06/23/20 5' 11\" (1.803 m)        Wt Readings from Last 1 Encounters:   06/23/20 180 lb 14.4 oz (82.1 kg)         Body mass index is 25.23 kg/m². Estimated Creatinine Clearance: 102 mL/min (based on SCr of 0.9 mg/dL). Trough: 16.4    Assessment/Plan: Therapeutic trough. Continue Vancomycin 1250 mg IV every 12 hours.     Electronically signed by Chapo Boles [facility] on 6/28/2020 at 10:43 PM

## 2020-06-29 NOTE — PROGRESS NOTES
Pt has two doses of vancomycin - one dose administered at 1130 and next dose due 2300  Pt  wanted to discharge and is choosing to skip 2300 dose.

## 2020-06-29 NOTE — PROGRESS NOTES
[facility]  Nurse  Consult Note       NAME:  Joyce Larson  MEDICAL RECORD NUMBER:  [id number]  AGE: 44 y.o. GENDER: male  : 1970  TODAY'S DATE:  2020    Subjective   Reason for Wound Nurse Evaluation and Assessment: wound      Joyce Larson is a 44 y.o. male referred by:   [] Physician  [x] Nursing  [] Other:     Wound Identification:  Wound Type: non-healing surgical  Contributing Factors: diabetes and smoking    Wound History: Surgical wound  Current Wound Care Treatment:  Wound Vac    Patient Goal of Care:  [x] Wound Healing  [] Odor Control  [] Palliative Care  [] Pain Control   [] Other:         PAST MEDICAL HISTORY        Diagnosis Date    CAD (coronary artery disease)     sees [facility]ology    Cancer [facility])     Bladder    COPD (ch[facility] obstructive [facility] disease) [facility]     Depression     Diabetes mellitus ([facility]     History of blood transfusion     unsure thinks he did    Hyperlipidemia     Hypertension        PAST SURGICAL HISTORY    Past Surgical History:   Procedure Laterality Date    BACK SURGERY      X3     BLADDER REMOVAL      CARDIAC CATHETERIZATION      CERVICAL FUSION N/A 2020    Phase 1:  C5 and C6 corpectomy with placement of an expandable cage and anterior cervical plate A2-B3. performed by Carrillo Mora DO at [address] [place] [address] N/A 2020    [place] [address] [address] OUT AND CLOSURE performed by Carrillo Mora DO at [address] [place] [address] N/A 2020    [address] performed by Carrillo Mora DO at [address] [place] [address] N/A 9/10/2019    Dr Miki Lopez, 5 yr recall    CORONARY ARTERY BYPASS GRAFT N/A 2019    CORONARY ARTERY BYPASS GRAFT X 3 WITH LEFT INTERNAL MAMMARY ARTERY, ENDOSCOPIC  VEIN HARVEST, WITH PERFUSION.  performed by Hosea Rivera MD at [place] [address] N/A 2020    Phase 2:  Posterior instrumented fusion C3-C7 using lateral mass screws and rods. performed by Victoriano Ocasio DO at [facility]      lower ext   [facility]    Family History   Problem Relation Age of Onset    Cancer Mother     Vision Loss Mother     Breast Cancer Mother     Coronary Art Dis Father     Obesity Father     Kidney Disease Father     High Blood Pressure Father     High Cholesterol Father     Hypercalcemia Sister     Obesity Brother     High Blood Pressure Brother        SOCIAL HISTORY    Social History     Tobacco Use    Smoking status: Current Every Day Smoker     Packs/day: 1.00     Years: 30.00     Pack years: 30.00    Smokeless tobacco: Never Used   Substance Use Topics    Alcohol use: Yes     Comment: Occ    Drug use: Never       ALLERGIES    Allergies   Allergen Reactions    Latex Other (See Comments)     BOILS AND BLISTERS- ALLERGY CALLED TO [facility] SURGERY SCHEDULING.  Demerol Hcl [Meperidine] Hives     And n/v       MEDICATIONS    No current facility-administered medications on file prior to encounter. Current Outpatient Medications on File Prior to Encounter   Medication Sig Dispense Refill    HYDROcodone-acetaminophen (NORCO)  MG per tablet Take 2 tablets by mouth every 6 hours as needed for Pain.  traZODone (DESYREL) 50 MG tablet TAKE 2 TABLETS BY MOUTH NIGHTLY AS NEEDED FOR SLEEP 60 tablet 1    baclofen (LIORESAL) 10 MG tablet TAKE 1 TABLET BY MOUTH 3 TIMES DAILY AS NEEDED FOR PAIN/SPASMS PAIN/SPASMS 90 tablet 0    clindamycin (CLEOCIN) 300 MG capsule Take 1 capsule by mouth 4 times daily for 10 days 14 capsule 0    ferrous sulfate (FE TABS) 325 (65 Fe) MG EC tablet Take 1 tablet by mouth 2 times daily 90 tablet 0    atorvastatin (LIPITOR) 20 MG tablet TAKE ONE TABLET BY MOUTH ONCE DAILY. 30 tablet 3    fenofibrate (TRIGLIDE) 160 MG tablet TAKE ONE TABLET BY MOUTH DAILY.  30 tablet 5    omeprazole (PRILOSEC) 40 MG delayed release capsule TAKE 1 CAPSULE BY MOUTH DAILY 30 Problem List   Diagnosis Code    Essential hypertension I10    Hx of bladder cancer Z85.51    Chronic bronchitis ([facility] J42    Type 2 diabetes mellitus without complication, without long-term current use of insulin ([facility]) E11.9    Mixed anxiety depressive disorder F41.8    Mixed hyperlipidemia E78.2    Postoperative anemia due to acute blood loss D62    Hx of CABG Z95.1    Primary insomnia F51.01    Anxiety and depression F41.9, F32.9    Coronary artery disease I25.10    Cervical spondylosis with myelopathy and radiculopathy M47.12, M47.22    Rupture of operation wound T81. 31XA    Wound dehiscence T81.30XA       Measurements:  Negative Pressure Wound Therapy Other (Comment) Posterior (Active)   Wound Type Surgical 6/29/2020  3:44 PM   Unit Type KCI 6/29/2020  3:44 PM   Dressing Type Black foam 6/29/2020  3:44 PM   Number of pieces used 1 6/29/2020  3:44 PM   Cycle Continuous 6/29/2020  3:44 PM   Target Pressure (mmHg) 125 6/29/2020  3:44 PM   Canister changed? No 6/29/2020  3:44 PM   Dressing Status Old drainage; Intact 6/29/2020  3:44 PM   Dressing Changed Changed/New 6/29/2020  3:44 PM   Drainage Amount Small 6/29/2020  3:44 PM   Drainage Description Serosanguinous 6/29/2020  3:44 PM   Dressing Change Due 06/29/20 6/28/2020  7:55 PM   Output (ml) 40 ml 6/28/2020  8:08 PM   Wound Assessment Pink;Slough; Yellow 6/29/2020  3:44 PM   Chrissy-wound Assessment Dry; Intact 6/29/2020  3:44 PM   Shape oval 6/29/2020  3:44 PM   Odor None 6/29/2020  3:44 PM   Number of days: 5       Wound 06/24/20 Neck Posterior POSTERIOR NECK SURGICAL (Active)   Wound Image   6/29/2020  3:44 PM   Wound Non-Healing Surgical 6/29/2020  3:44 PM   Dressing Status Old drainage; Intact 6/29/2020  3:44 PM   Wound Cleansed Soap and water 6/29/2020  3:44 PM   Wound Length (cm) 10.5 cm 6/29/2020  3:44 PM   Wound Width (cm) 4 cm 6/29/2020  3:44 PM   Wound Depth (cm) 2 cm 6/29/2020  3:44 PM   Wound Surface Area (cm^2) 42 cm^2 6/29/2020  3:44 PM Change in Wound Size % (l*w) -16.67 6/29/2020  3:44 PM   Wound Volume (cm^3) 84 cm^3 6/29/2020  3:44 PM   Wound Healing % 22 6/29/2020  3:44 PM   Undermining Starts ___ O'Clock 12 6/29/2020  3:44 PM   Undermining Ends___ O'Clock 12 6/29/2020  3:44 PM   Undermining Maxium Distance (cm) 1.6 6/29/2020  3:44 PM   Wound Assessment Pink;Slough; Yellow 6/29/2020  3:44 PM   Drainage Amount Moderate 6/29/2020  3:44 PM   Drainage Description Serosanguinous 6/29/2020  3:44 PM   Odor None 6/29/2020  3:44 PM   Margins Unattached edges 6/29/2020  3:44 PM   Exposed structure Bone 6/29/2020  3:44 PM   Chrissy-wound Assessment Dry; Intact; Pink 6/29/2020  3:44 PM   Non-staged Wound Description Full thickness 6/29/2020  3:44 PM   Dieterich%Wound Bed 60 6/29/2020  3:44 PM   Yellow%Wound Bed 40 6/29/2020  3:44 PM   Other%Wound Bed 0 6/29/2020  3:44 PM   Number of days: 5            Response to treatment:  Well tolerated by patient. Pain Assessment:  Severity:  7 / 10  Quality of pain: aching  Wound Pain Timing/Severity: intermittent  Premedicated: Yes    Plan   Plan of Care: [REMOVED] Incision 06/17/20 Neck Posterior-Dressing/Treatment: Vacuum dressing  Wound 06/24/20 Neck Posterior POSTERIOR NECK SURGICAL-Dressing/Treatment: (woundvac)    Specialty Bed Required : N/A   [] Low Air Loss   [] Pressure Redistribution  [] Fluid Immersion  [] Bariatric  [] Total Pressure Relief  [] Other:     Current Diet: DIET CARB CONTROL;   Dietician consult:  N/A    Discharge Plan:  Placement for patient upon discharge: home with support    Patient appropriate for Outpatient [address]: Yes    Referrals:  []   [x] [address] [facility]  [x] Supplies  [] Other    Patient/Caregiver Teaching:  Level of patient/caregiver understanding able to:   [x] Indicates understanding       [x] Needs reinforcement  [] Unsuccessful      [] Verbal Understanding  [x] Demonstrated understanding       [] No evidence of learning  [] Refused teaching         [] N/A I have re-applied wound vac dressing today. Patient tolerated relatively well. Patient was pre-medicated prior to changing dressing. I have reviewed the wound vac settings and instructions with the patient. I have also had patient sign proof of delivery form and placed the form in patient's soft chart.  Electronically signed by JOSE Rice on 6/29/2020 at 3:55 PM         Electronically signed by   Hernando Subramanian RN 6/29/2020

## 2020-06-29 NOTE — DISCHARGE SUMMARY
[facility]  Discharge Summary     Patient:   Grace Polk  MR#:    [id number]      YOB: 1970  Date of Evaluation:  6/29/2020  Time of Note:                          7:58 AM  Primary/Referring Physician:  BRYAN Watt   Note Author:    Rashid Henning DO        Admission Date: 6/23/2020    Discharge Date: 6/29/2020    Final Diagnoses: Wound dehiscence [T81.30XA]    Procedures: None    Consultations: ID    Reason for Admission: IV antibiotics and treatment of wound    Hospital Course:  Kavon Larson was admitted with a posterior cervical wound dehiscence with cultures noted to be positive for MRSA. ID was consulted and IV antibiotics were started. A wound VAC was placed. PICC line was placed. Home health with IV antibiotics was set up, likely 4-6 weeks per ID recommendations and his clinical course. The Wound VAC is to be changed at home as well three days per week. Patient Condition: Stable    Disposition: Home    Wound Instructions: Able to shower. Please keep wound dry. DO NOT SOAK WOUND    Diet: diabetic diet    Resume home meds:      Medication List      START taking these medications    vancomycin  infusion  Commonly known as:  VANCOCIN  Infuse 1,500 mg intravenously every 12 hours Compound per protocol. CONTINUE taking these medications    ARIPiprazole 5 MG tablet  Commonly known as:  ABILIFY  TAKE ONE TABLET BY MOUTH ONCE DAILY. aspirin 81 MG EC tablet  Take 1 tablet by mouth daily     atenolol 50 MG tablet  Commonly known as:  TENORMIN  TAKE ONE TABLET BY MOUTH EVERY DAY     atorvastatin 20 MG tablet  Commonly known as:  LIPITOR  TAKE ONE TABLET BY MOUTH ONCE DAILY. baclofen 10 MG tablet  Commonly known as:  LIORESAL  TAKE 1 TABLET BY MOUTH 3 TIMES DAILY AS NEEDED FOR PAIN/SPASMS PAIN/SPASMS     blood glucose test strips  Test 4 times a day & as needed for symptoms of irregular blood glucose.      clindamycin 300 MG capsule  Commonly known as: CLEOCIN  Take 1 capsule by mouth 4 times daily for 10 days     fenofibrate 160 MG tablet  Commonly known as:  TRIGLIDE  TAKE ONE TABLET BY MOUTH DAILY. ferrous sulfate 325 (65 Fe) MG EC tablet  Commonly known as:  Fe Tabs  Take 1 tablet by mouth 2 times daily     FLUoxetine 40 MG capsule  Commonly known as:  PROZAC  TAKE 1 CAPSULE BY MOUTH DAILY     fluticasone-salmeterol 100-50 MCG/DOSE diskus inhaler  Commonly known as:  Advair Diskus  Inhale 1 puff into the lungs every 12 hours     glucose monitoring kit monitoring kit  Please provide glucometer that INS will cover for DM type 2     HYDROcodone-acetaminophen  MG per tablet  Commonly known as:  NORCO     Lancets 30G Misc  1 each by Does not apply route daily 4 times daily     lisinopril 10 MG tablet  Commonly known as:  PRINIVIL;ZESTRIL  TAKE 1 TABLET BY MOUTH DAILY     metFORMIN 1000 MG tablet  Commonly known as:  GLUCOPHAGE  Take 1 tablet by mouth 2 times daily (with meals)     omeprazole 40 MG delayed release capsule  Commonly known as:  PRILOSEC  TAKE 1 CAPSULE BY MOUTH DAILY     sildenafil 20 MG tablet  Commonly known as:  REVATIO     tiotropium 18 MCG inhalation capsule  Commonly known as:  SPIRIVA     traZODone 50 MG tablet  Commonly known as:  DESYREL  TAKE 2 TABLETS BY MOUTH NIGHTLY AS NEEDED FOR SLEEP     Vitamin D3 1.25 MG (44178 UT) Caps        STOP taking these medications    busPIRone 10 MG tablet  Commonly known as:  BUSPAR     lidocaine 5 %  Commonly known as:  LIDODERM        ASK your doctor about these medications    sulfamethoxazole-trimethoprim 400-80 MG per tablet  Commonly known as: Bactrim  Take 1 tablet by mouth 2 times daily for 10 days  Ask about: Should I take this medication?            Where to Get Your Medications      You can get these medications from any pharmacy    Bring a paper prescription for each of these medications  · vancomycin  infusion         Return to Clinic: 2 weeks

## 2020-06-30 ENCOUNTER — TELEPHONE (OUTPATIENT)
Dept: INTERNAL MEDICINE | Age: 50
End: 2020-06-30

## 2020-06-30 ENCOUNTER — TELEPHONE (OUTPATIENT)
Dept: PRIMARY CARE CLINIC | Age: 50
End: 2020-06-30

## 2020-07-03 LAB — BLOOD CULTURE, ROUTINE: NORMAL

## 2020-07-06 LAB
ALBUMIN SERPL-MCNC: 4 G/DL (ref 3.5–5.2)
ALP BLD-CCNC: 83 U/L (ref 40–130)
ALT SERPL-CCNC: 5 U/L (ref 5–41)
ANION GAP SERPL CALCULATED.3IONS-SCNC: 19 MMOL/L (ref 7–19)
AST SERPL-CCNC: 10 U/L (ref 5–40)
BASOPHILS ABSOLUTE: 0.1 K/UL (ref 0–0.2)
BASOPHILS RELATIVE PERCENT: 1 % (ref 0–1)
BILIRUB SERPL-MCNC: <0.2 MG/DL (ref 0.2–1.2)
BUN BLDV-MCNC: 23 MG/DL (ref 6–20)
C-REACTIVE PROTEIN: 0.79 MG/DL (ref 0–0.5)
CALCIUM SERPL-MCNC: 9.1 MG/DL (ref 8.6–10)
CHLORIDE BLD-SCNC: 102 MMOL/L (ref 98–111)
CO2: 18 MMOL/L (ref 22–29)
CREAT SERPL-MCNC: 1.6 MG/DL (ref 0.5–1.2)
EOSINOPHILS ABSOLUTE: 0.1 K/UL (ref 0–0.6)
EOSINOPHILS RELATIVE PERCENT: 1.5 % (ref 0–5)
GFR NON-AFRICAN AMERICAN: 46
GLUCOSE BLD-MCNC: 161 MG/DL (ref 74–109)
HCT VFR BLD CALC: 28.1 % (ref 42–52)
HEMOGLOBIN: 8.3 G/DL (ref 14–18)
IMMATURE GRANULOCYTES #: 0 K/UL
LYMPHOCYTES ABSOLUTE: 1.5 K/UL (ref 1.1–4.5)
LYMPHOCYTES RELATIVE PERCENT: 24.6 % (ref 20–40)
MCH RBC QN AUTO: 22.7 PG (ref 27–31)
MCHC RBC AUTO-ENTMCNC: 29.5 G/DL (ref 33–37)
MCV RBC AUTO: 77 FL (ref 80–94)
MONOCYTES ABSOLUTE: 0.6 K/UL (ref 0–0.9)
MONOCYTES RELATIVE PERCENT: 9.4 % (ref 0–10)
NEUTROPHILS ABSOLUTE: 3.9 K/UL (ref 1.5–7.5)
NEUTROPHILS RELATIVE PERCENT: 63.2 % (ref 50–65)
PDW BLD-RTO: 20.2 % (ref 11.5–14.5)
PLATELET # BLD: 256 K/UL (ref 130–400)
PMV BLD AUTO: 9.8 FL (ref 9.4–12.4)
POTASSIUM SERPL-SCNC: 4.6 MMOL/L (ref 3.5–5)
RBC # BLD: 3.65 M/UL (ref 4.7–6.1)
SODIUM BLD-SCNC: 139 MMOL/L (ref 136–145)
TOTAL PROTEIN: 6.2 G/DL (ref 6.6–8.7)
VANCOMYCIN TROUGH: 19.1 UG/ML (ref 10–20)
WBC # BLD: 6.2 K/UL (ref 4.8–10.8)

## 2020-07-07 ENCOUNTER — VIRTUAL VISIT (OUTPATIENT)
Dept: PRIMARY CARE CLINIC | Age: 50
End: 2020-07-07
Payer: MEDICAID

## 2020-07-07 ENCOUNTER — HOSPITAL ENCOUNTER (OUTPATIENT)
Dept: WOUND CARE | Age: 50
Discharge: HOME | End: 2020-07-07
Payer: MEDICAID

## 2020-07-07 VITALS
HEIGHT: 71 IN | BODY MASS INDEX: 25.2 KG/M2 | TEMPERATURE: 97.3 F | RESPIRATION RATE: 16 BRPM | HEART RATE: 68 BPM | DIASTOLIC BLOOD PRESSURE: 82 MMHG | WEIGHT: 180 LBS | SYSTOLIC BLOOD PRESSURE: 124 MMHG

## 2020-07-07 PROBLEM — S11.90XA OPEN WOUND OF NECK: Chronic | Status: ACTIVE | Noted: 2020-07-07

## 2020-07-07 PROBLEM — Z72.0 TOBACCO ABUSE: Chronic | Status: ACTIVE | Noted: 2020-07-07

## 2020-07-07 PROCEDURE — 99215 OFFICE O/P EST HI 40 MIN: CPT | Performed by: NURSE PRACTITIONER

## 2020-07-07 PROCEDURE — 11045 DBRDMT SUBQ TISS EACH ADDL: CPT | Performed by: SURGERY

## 2020-07-07 PROCEDURE — 11042 DBRDMT SUBQ TIS 1ST 20SQCM/<: CPT

## 2020-07-07 PROCEDURE — 1111F DSCHRG MED/CURRENT MED MERGE: CPT | Performed by: NURSE PRACTITIONER

## 2020-07-07 PROCEDURE — 11045 DBRDMT SUBQ TISS EACH ADDL: CPT

## 2020-07-07 PROCEDURE — 11042 DBRDMT SUBQ TIS 1ST 20SQCM/<: CPT | Performed by: SURGERY

## 2020-07-07 ASSESSMENT — ENCOUNTER SYMPTOMS
GASTROINTESTINAL NEGATIVE: 1
EYES NEGATIVE: 1
RESPIRATORY NEGATIVE: 1

## 2020-07-07 ASSESSMENT — PAIN DESCRIPTION - PROGRESSION: CLINICAL_PROGRESSION: NOT CHANGED

## 2020-07-07 ASSESSMENT — PAIN DESCRIPTION - DESCRIPTORS: DESCRIPTORS: BURNING;STABBING

## 2020-07-07 ASSESSMENT — PAIN DESCRIPTION - PAIN TYPE: TYPE: SURGICAL PAIN

## 2020-07-07 ASSESSMENT — PAIN - FUNCTIONAL ASSESSMENT: PAIN_FUNCTIONAL_ASSESSMENT: PREVENTS OR INTERFERES WITH MANY ACTIVE NOT PASSIVE ACTIVITIES

## 2020-07-07 ASSESSMENT — PAIN DESCRIPTION - LOCATION: LOCATION: NECK

## 2020-07-07 ASSESSMENT — PAIN DESCRIPTION - ONSET: ONSET: ON-GOING

## 2020-07-07 ASSESSMENT — PAIN DESCRIPTION - FREQUENCY: FREQUENCY: CONTINUOUS

## 2020-07-07 ASSESSMENT — PAIN SCALES - GENERAL: PAINLEVEL_OUTOF10: 10

## 2020-07-07 ASSESSMENT — PAIN DESCRIPTION - ORIENTATION: ORIENTATION: POSTERIOR

## 2020-07-07 NOTE — PROGRESS NOTES
[facility]  Progress Note and Procedure Note      Chris Larson  AGE: 44 y.o. GENDER: male  : 1970  TODAY'S DATE:  2020    Subjective:     CHIEF COMPLAINT posterior neck wond     HISTORY of PRESENT ILLNESS TERRIE Larson is a 44 y.o. male who presents today for wound/ulcer evaluation. Ulcer Type:non-healing surgical  Ulcer/Wound Location:posterior neck wound  Contributing factors:diabetes, shear force and smoking    Patient Active Problem List   Diagnosis Code    Essential hypertension I10    Hx of bladder cancer Z85.51    Chronic bronchitis ([facility] [address] J42    Type 2 diabetes mellitus without complication, without long-term current use of insulin (HCC) E11.9    Mixed anxiety depressive disorder F41.8    Mixed hyperlipidemia E78.2    Postoperative anemia due to acute blood loss D62    Hx of CABG Z95.1    Primary insomnia F51.01    Anxiety and depression F41.9, F32.9    Coronary artery disease I25.10    Cervical spondylosis with myelopathy and radiculopathy M47.12, M47.22    Rupture of operation wound T81. 31XA    Wound dehiscence T81.30XA    Tobacco abuse Z72.0    Open wound of neck S11.90XA       ALLERGIES    Allergies   Allergen Reactions    Latex Other (See Comments)     BOILS AND BLISTERS- ALLERGY CALLED TO [facility] SCHEDUL[facility].  Demerol Hcl [Meperidine] Hives     And n/v           Objective:      /82   Pulse 68   Temp 97.3 °F (36.3 °C) (Temporal)   Resp 16   Ht 5' 11\" (1.803 m)   Wt 180 lb (81.6 kg)   BMI 25.10 kg/m²         Assessment:      Problem List Items Addressed This Visit     Tobacco abuse (Chronic)    Open wound of neck (Chronic)          The patients pain isPain Level: 10 Pain Type: Surgical pain. Wound(s) has slightly improved. Please refer to nursing measurements and assessment regarding wound pre and post debridement.   Negative Pressure Wound Therapy Other (Comment) Posterior (Active)   Wound Type Surgical 2020  8:39 AM   Unit Type KCI 7/7/2020  8:39 AM   Dressing Type Black foam 7/7/2020  8:39 AM   Number of pieces used 1 6/29/2020  3:44 PM   Cycle Continuous 7/7/2020  8:39 AM   Target Pressure (mmHg) 125 7/7/2020  8:39 AM   Canister changed? No 7/7/2020  8:39 AM   Dressing Status Old drainage; Intact 7/7/2020  8:39 AM   Dressing Changed Changed/New 6/29/2020  3:44 PM   Drainage Amount Small 7/7/2020  8:39 AM   Drainage Description Serosanguinous 7/7/2020  8:39 AM   Dressing Change Due 06/29/20 6/28/2020  7:55 PM   Output (ml) 40 ml 6/28/2020  8:08 PM   Wound Assessment Pink;Slough; Yellow 7/7/2020  8:39 AM   Chrissy-wound Assessment Dry; Intact 7/7/2020  8:39 AM   Shape oval 6/29/2020  3:44 PM   Odor None 7/7/2020  8:39 AM   Number of days: 13       Wound 06/24/20 Neck Posterior Wound 1: POSTERIOR NECK SURGICAL (Active)   Wound Image   7/7/2020  8:39 AM   Wound Non-Healing Surgical 7/7/2020  8:39 AM   Dressing Status Old drainage 7/7/2020  8:39 AM   Wound Cleansed Soap and water 7/7/2020  8:39 AM   Wound Length (cm) 11 cm 7/7/2020  8:39 AM   Wound Width (cm) 4.2 cm 7/7/2020  8:39 AM   Wound Depth (cm) 1.7 cm 7/7/2020  8:39 AM   Wound Surface Area (cm^2) 46.2 cm^2 7/7/2020  8:39 AM   Change in Wound Size % (l*w) -28.33 7/7/2020  8:39 AM   Wound Volume (cm^3) 78.54 cm^3 7/7/2020  8:39 AM   Wound Healing % 27 7/7/2020  8:39 AM   Post-Procedure Length (cm) 11 cm 7/7/2020  9:19 AM   Post-Procedure Width (cm) 4.2 cm 7/7/2020  9:19 AM   Post-Procedure Depth (cm) 1.7 cm 7/7/2020  9:19 AM   Post-Procedure Surface Area (cm^2) 46.2 cm^2 7/7/2020  9:19 AM   Post-Procedure Volume (cm^3) 78.54 cm^3 7/7/2020  9:19 AM   Undermining Starts ___ O'Clock 12 7/7/2020  8:39 AM   Undermining Ends___ O'Clock 12 7/7/2020  8:39 AM   Undermining Maxium Distance (cm) 1.0 7/7/2020  8:39 AM   Wound Assessment Pink;Slough; Yellow 7/7/2020  8:39 AM   Drainage Amount Moderate 7/7/2020  8:39 AM   Drainage Description Serosanguinous 7/7/2020  8:39 AM   Odor None 7/7/2020  8:39 AM   Margins Unattached edges 7/7/2020  8:39 AM   Exposed structure Bone 7/7/2020  8:39 AM   Chrissy-wound Assessment Dry; Intact; Pink 7/7/2020  8:39 AM   Non-staged Wound Description Full thickness 7/7/2020  8:39 AM   [place]%Wound Bed 75 7/7/2020  8:39 AM   Red%Wound Bed 0 7/7/2020  8:39 AM   Yellow%Wound Bed 25 7/7/2020  8:39 AM   Black%Wound Bed 0 7/7/2020  8:39 AM   Purple%Wound Bed 0 7/7/2020  8:39 AM   Other%Wound Bed 0 6/29/2020  3:44 PM   Number of days: 13           Procedure Note  Indications:  Based on my examination of this patient's wound(s)/ulcer(s) today, debridement is required to promote healing and evaluate the wound base. Performed by: Nic Polk MD    Consent obtained:  Yes    Time out taken:  Yes    Pain Control:         Debridement: Excisional Debridement    Using curette and scissors the wound(s)/ulcer(s) was/were sharply debrided down through and including the removal of epidermis, dermis and subcutaneous tissue. Devitalized Tissue Debrided:  fibrin, biofilm, slough and exudate    Pre Debridement Measurements:  Are located in the Wound/Ulcer Documentation Flow Sheet    Wound/Ulcer #: 1    Post Debridement Measurements:  Wound/Ulcer Descriptions are Pre Debridement except measurements:          Percent of Wound(s)/Ulcer(s) Debrided: 100%    Total Surface Area Debrided:  46.2 sq cm     Diabetic/Pressure/Non Pressure Ulcers only:  Ulcer: N/A     Estimated Blood Loss:  Minimal    Hemostasis Achieved:  not needed    Response to treatment:  Well tolerated by patient. Plan:          Plan for wound - Dress per physician order  Treatment:     Compression : No   Offloading : No   Dressing : see AVS   Additional Therapy : none     1. Discussed appropriate home care of this wound. Wound redressed. 2. Written patient dismissal instructions given to patient and signed by patient or POA. 3. Follow up: 2 week(s).     Mr. Xiang Lovelace is here to follow up concerning his posterior neck wound. His santyl was denied. Will continue with wound vac and see about reordering santyl. Discussed better blood sugar control, increased protein, and decreased smoking. Follow up in 2 weeks.      Electronically signed by Vikas Frausto MD on 7/7/2020 at 9:25 AM

## 2020-07-07 NOTE — PROGRESS NOTES
NEEDED FOR PAIN/SPASMS PAIN/SPASMS             blood glucose monitor strips  Test 4 times a day & as needed for symptoms of irregular blood glucose. Cholecalciferol (VITAMIN D3) 1.25 MG (99512 UT) CAPS  Take 1 capsule by mouth once a week             collagenase (SANTYL) 250 UNIT/GM ointment  Apply topically with wound vac dressing change. (10.5 x 4 x 2)cm             collagenase 250 UNIT/GM ointment  Apply topically twice daily. (11 x 4.2 x 1.7)cm             fenofibrate (TRIGLIDE) 160 MG tablet  TAKE ONE TABLET BY MOUTH DAILY. ferrous sulfate (FE TABS) 325 (65 Fe) MG EC tablet  Take 1 tablet by mouth 2 times daily             FLUoxetine (PROZAC) 40 MG capsule  TAKE 1 CAPSULE BY MOUTH DAILY             fluticasone-salmeterol (ADVAIR DISKUS) 100-50 MCG/DOSE diskus inhaler  Inhale 1 puff into the lungs every 12 hours             glucose monitoring kit (FREESTYLE) monitoring kit  Please provide glucometer that INS will cover for DM type 2             HYDROcodone-acetaminophen (NORCO)  MG per tablet  Take 2 tablets by mouth every 6 hours as needed for Pain.              Lancets 30G MISC  1 each by Does not apply route daily 4 times daily             lisinopril (PRINIVIL;ZESTRIL) 10 MG tablet  TAKE 1 TABLET BY MOUTH DAILY             metFORMIN (GLUCOPHAGE) 1000 MG tablet  Take 1 tablet by mouth 2 times daily (with meals)             omeprazole (PRILOSEC) 40 MG delayed release capsule  TAKE 1 CAPSULE BY MOUTH DAILY             sildenafil (REVATIO) 20 MG tablet  Take 1-5 tablets 1-2 hours before sexual activity PRN             sodium hypochlorite (DAKINS) 0.125 % SOLN external solution  Moisten gauze apply to wound twice daily             tiotropium (SPIRIVA) 18 MCG inhalation capsule  Inhale 1 capsule into the lungs             traZODone (DESYREL) 50 MG tablet  TAKE 2 TABLETS BY MOUTH NIGHTLY AS NEEDED FOR SLEEP             vancomycin (VANCOCIN) infusion  Infuse 1,500 mg intravenously every Nose normal.   Eyes:      Conjunctiva/sclera: Conjunctivae normal.   Neck:      Musculoskeletal: Normal range of motion. Pulmonary:      Effort: Pulmonary effort is normal.   Musculoskeletal: Normal range of motion. Skin:     Findings: No rash. Neurological:      General: No focal deficit present. Mental Status: He is alert and oriented to person, place, and time. Psychiatric:         Mood and Affect: Mood normal.         Behavior: Behavior normal.         Thought Content: Thought content normal.         Judgment: Judgment normal.             Assessment/Plan:  1. Hospital discharge follow-up    - MS DISCHARGE MEDS RECONCILED W/ CURRENT OUTPATIENT MED LIST    2. Anxiety and depression    - External Referral To Psychiatry    3. Open wound of neck, subsequent encounter      Patient is to continue treatment with home health as discussed. Referral to psych placed. He is also to follow-up with wound care as well. I am having him return in approximately 6 weeks for follow-up after health has been completed and IV antibiotics. Medical Decision Making: high complexity      This is a video visit and both audio and video were used. Patient does verbalize understand of the video visit and consents to complete visit today via video visit. Patient verbalizes that they are currently located at their home. Only participants of this visit today are myself, BRYAN Coburn and the patient listed.

## 2020-07-08 LAB
ANION GAP SERPL CALCULATED.3IONS-SCNC: 11 MMOL/L (ref 7–19)
BUN BLDV-MCNC: 21 MG/DL (ref 6–20)
CALCIUM SERPL-MCNC: 9.1 MG/DL (ref 8.6–10)
CHLORIDE BLD-SCNC: 104 MMOL/L (ref 98–111)
CO2: 24 MMOL/L (ref 22–29)
CREAT SERPL-MCNC: 1.1 MG/DL (ref 0.5–1.2)
GFR NON-AFRICAN AMERICAN: >60
GLUCOSE BLD-MCNC: 184 MG/DL (ref 74–109)
POTASSIUM SERPL-SCNC: 5.2 MMOL/L (ref 3.5–5)
SODIUM BLD-SCNC: 139 MMOL/L (ref 136–145)

## 2020-07-10 RX ORDER — SODIUM HYPOCHLORITE 1.25 MG/ML
SOLUTION TOPICAL
Qty: 1000 ML | Refills: 3 | Status: SHIPPED
Start: 2020-07-10 | End: 2020-08-11 | Stop reason: SDUPTHER

## 2020-07-13 LAB
ALBUMIN SERPL-MCNC: 3.9 G/DL (ref 3.5–5.2)
ALP BLD-CCNC: 74 U/L (ref 40–130)
ALT SERPL-CCNC: 6 U/L (ref 5–41)
ANION GAP SERPL CALCULATED.3IONS-SCNC: 12 MMOL/L (ref 7–19)
AST SERPL-CCNC: 12 U/L (ref 5–40)
BASOPHILS ABSOLUTE: 0.1 K/UL (ref 0–0.2)
BASOPHILS RELATIVE PERCENT: 0.9 % (ref 0–1)
BILIRUB SERPL-MCNC: <0.2 MG/DL (ref 0.2–1.2)
BUN BLDV-MCNC: 30 MG/DL (ref 6–20)
C-REACTIVE PROTEIN: 0.61 MG/DL (ref 0–0.5)
CALCIUM SERPL-MCNC: 9.2 MG/DL (ref 8.6–10)
CHLORIDE BLD-SCNC: 104 MMOL/L (ref 98–111)
CO2: 24 MMOL/L (ref 22–29)
CREAT SERPL-MCNC: 1.1 MG/DL (ref 0.5–1.2)
EOSINOPHILS ABSOLUTE: 0.2 K/UL (ref 0–0.6)
EOSINOPHILS RELATIVE PERCENT: 2.4 % (ref 0–5)
GFR NON-AFRICAN AMERICAN: >60
GLUCOSE BLD-MCNC: 131 MG/DL (ref 74–109)
HCT VFR BLD CALC: 29.5 % (ref 42–52)
HEMOGLOBIN: 9 G/DL (ref 14–18)
IMMATURE GRANULOCYTES #: 0 K/UL
LYMPHOCYTES ABSOLUTE: 1.8 K/UL (ref 1.1–4.5)
LYMPHOCYTES RELATIVE PERCENT: 26.7 % (ref 20–40)
MCH RBC QN AUTO: 23.8 PG (ref 27–31)
MCHC RBC AUTO-ENTMCNC: 30.5 G/DL (ref 33–37)
MCV RBC AUTO: 78 FL (ref 80–94)
MONOCYTES ABSOLUTE: 0.6 K/UL (ref 0–0.9)
MONOCYTES RELATIVE PERCENT: 9.3 % (ref 0–10)
NEUTROPHILS ABSOLUTE: 4.1 K/UL (ref 1.5–7.5)
NEUTROPHILS RELATIVE PERCENT: 60.3 % (ref 50–65)
PDW BLD-RTO: 21.3 % (ref 11.5–14.5)
PLATELET # BLD: 276 K/UL (ref 130–400)
PMV BLD AUTO: 10 FL (ref 9.4–12.4)
POTASSIUM SERPL-SCNC: 4.9 MMOL/L (ref 3.5–5)
RBC # BLD: 3.78 M/UL (ref 4.7–6.1)
SODIUM BLD-SCNC: 140 MMOL/L (ref 136–145)
TOTAL PROTEIN: 6.9 G/DL (ref 6.6–8.7)
VANCOMYCIN TROUGH: 6.9 UG/ML (ref 10–20)
WBC # BLD: 6.8 K/UL (ref 4.8–10.8)

## 2020-07-20 LAB
ALBUMIN SERPL-MCNC: 4 G/DL (ref 3.5–5.2)
ALP BLD-CCNC: 73 U/L (ref 40–130)
ALT SERPL-CCNC: 5 U/L (ref 5–41)
ANION GAP SERPL CALCULATED.3IONS-SCNC: 12 MMOL/L (ref 7–19)
AST SERPL-CCNC: 13 U/L (ref 5–40)
BASOPHILS ABSOLUTE: 0.1 K/UL (ref 0–0.2)
BASOPHILS RELATIVE PERCENT: 1.1 % (ref 0–1)
BILIRUB SERPL-MCNC: <0.2 MG/DL (ref 0.2–1.2)
BUN BLDV-MCNC: 41 MG/DL (ref 6–20)
C-REACTIVE PROTEIN: 0.82 MG/DL (ref 0–0.5)
CALCIUM SERPL-MCNC: 9.6 MG/DL (ref 8.6–10)
CHLORIDE BLD-SCNC: 104 MMOL/L (ref 98–111)
CO2: 23 MMOL/L (ref 22–29)
CREAT SERPL-MCNC: 2 MG/DL (ref 0.5–1.2)
EOSINOPHILS ABSOLUTE: 0.1 K/UL (ref 0–0.6)
EOSINOPHILS RELATIVE PERCENT: 1.7 % (ref 0–5)
GFR AFRICAN AMERICAN: 43
GFR NON-AFRICAN AMERICAN: 35
GLUCOSE BLD-MCNC: 92 MG/DL (ref 74–109)
HCT VFR BLD CALC: 31.5 % (ref 42–52)
HEMOGLOBIN: 9.4 G/DL (ref 14–18)
IMMATURE GRANULOCYTES #: 0 K/UL
LYMPHOCYTES ABSOLUTE: 1.8 K/UL (ref 1.1–4.5)
LYMPHOCYTES RELATIVE PERCENT: 27.4 % (ref 20–40)
MCH RBC QN AUTO: 24.5 PG (ref 27–31)
MCHC RBC AUTO-ENTMCNC: 29.8 G/DL (ref 33–37)
MCV RBC AUTO: 82.2 FL (ref 80–94)
MONOCYTES ABSOLUTE: 0.9 K/UL (ref 0–0.9)
MONOCYTES RELATIVE PERCENT: 14.1 % (ref 0–10)
NEUTROPHILS ABSOLUTE: 3.6 K/UL (ref 1.5–7.5)
NEUTROPHILS RELATIVE PERCENT: 55.4 % (ref 50–65)
PDW BLD-RTO: 21.2 % (ref 11.5–14.5)
PLATELET # BLD: 270 K/UL (ref 130–400)
PMV BLD AUTO: 10.5 FL (ref 9.4–12.4)
POTASSIUM SERPL-SCNC: 5.6 MMOL/L (ref 3.5–5)
RBC # BLD: 3.83 M/UL (ref 4.7–6.1)
SODIUM BLD-SCNC: 139 MMOL/L (ref 136–145)
TOTAL PROTEIN: 7.1 G/DL (ref 6.6–8.7)
WBC # BLD: 6.5 K/UL (ref 4.8–10.8)

## 2020-07-21 ENCOUNTER — HOSPITAL ENCOUNTER (OUTPATIENT)
Dept: WOUND CARE | Age: 50
Discharge: HOME | End: 2020-07-21
Payer: MEDICAID

## 2020-07-21 VITALS
RESPIRATION RATE: 16 BRPM | WEIGHT: 180 LBS | DIASTOLIC BLOOD PRESSURE: 64 MMHG | BODY MASS INDEX: 25.2 KG/M2 | HEART RATE: 65 BPM | SYSTOLIC BLOOD PRESSURE: 139 MMHG | TEMPERATURE: 98 F | HEIGHT: 71 IN

## 2020-07-21 DIAGNOSIS — N17.9 AKI (ACUTE KIDNEY INJURY) (HCC): ICD-10-CM

## 2020-07-21 DIAGNOSIS — D50.9 IRON DEFICIENCY ANEMIA, UNSPECIFIED IRON DEFICIENCY ANEMIA TYPE: ICD-10-CM

## 2020-07-21 LAB
ANION GAP SERPL CALCULATED.3IONS-SCNC: 17 MMOL/L (ref 7–19)
BASOPHILS ABSOLUTE: 0.1 K/UL (ref 0–0.2)
BASOPHILS RELATIVE PERCENT: 1.1 % (ref 0–1)
BUN BLDV-MCNC: 30 MG/DL (ref 6–20)
CALCIUM SERPL-MCNC: 9.7 MG/DL (ref 8.6–10)
CHLORIDE BLD-SCNC: 103 MMOL/L (ref 98–111)
CO2: 20 MMOL/L (ref 22–29)
CREAT SERPL-MCNC: 1.3 MG/DL (ref 0.5–1.2)
EOSINOPHILS ABSOLUTE: 0.1 K/UL (ref 0–0.6)
EOSINOPHILS RELATIVE PERCENT: 1.2 % (ref 0–5)
FUNGUS (MYCOLOGY) CULTURE: NORMAL
FUNGUS (MYCOLOGY) CULTURE: NORMAL
GFR AFRICAN AMERICAN: >59
GFR NON-AFRICAN AMERICAN: 58
GLUCOSE BLD-MCNC: 100 MG/DL (ref 74–109)
HCT VFR BLD CALC: 33.8 % (ref 42–52)
HEMOGLOBIN: 9.8 G/DL (ref 14–18)
IMMATURE GRANULOCYTES #: 0 K/UL
KOH PREP: NORMAL
KOH PREP: NORMAL
LYMPHOCYTES ABSOLUTE: 1.8 K/UL (ref 1.1–4.5)
LYMPHOCYTES RELATIVE PERCENT: 24.6 % (ref 20–40)
MCH RBC QN AUTO: 24 PG (ref 27–31)
MCHC RBC AUTO-ENTMCNC: 29 G/DL (ref 33–37)
MCV RBC AUTO: 82.8 FL (ref 80–94)
MONOCYTES ABSOLUTE: 0.7 K/UL (ref 0–0.9)
MONOCYTES RELATIVE PERCENT: 9.6 % (ref 0–10)
NEUTROPHILS ABSOLUTE: 4.7 K/UL (ref 1.5–7.5)
NEUTROPHILS RELATIVE PERCENT: 63.2 % (ref 50–65)
PDW BLD-RTO: 20.6 % (ref 11.5–14.5)
PLATELET # BLD: 277 K/UL (ref 130–400)
PMV BLD AUTO: 10 FL (ref 9.4–12.4)
POTASSIUM SERPL-SCNC: 4.9 MMOL/L (ref 3.5–5)
RBC # BLD: 4.08 M/UL (ref 4.7–6.1)
SODIUM BLD-SCNC: 140 MMOL/L (ref 136–145)
VANCOMYCIN TROUGH: 11.2 UG/ML (ref 10–20)
WBC # BLD: 7.4 K/UL (ref 4.8–10.8)

## 2020-07-21 PROCEDURE — 11045 DBRDMT SUBQ TISS EACH ADDL: CPT | Performed by: SURGERY

## 2020-07-21 PROCEDURE — 80048 BASIC METABOLIC PNL TOTAL CA: CPT

## 2020-07-21 PROCEDURE — 11045 DBRDMT SUBQ TISS EACH ADDL: CPT

## 2020-07-21 PROCEDURE — 11042 DBRDMT SUBQ TIS 1ST 20SQCM/<: CPT | Performed by: SURGERY

## 2020-07-21 PROCEDURE — 36415 COLL VENOUS BLD VENIPUNCTURE: CPT

## 2020-07-21 PROCEDURE — 11042 DBRDMT SUBQ TIS 1ST 20SQCM/<: CPT

## 2020-07-21 PROCEDURE — 85025 COMPLETE CBC W/AUTO DIFF WBC: CPT

## 2020-07-21 RX ORDER — BACLOFEN 10 MG/1
TABLET ORAL
Qty: 90 TABLET | Refills: 0 | Status: SHIPPED | OUTPATIENT
Start: 2020-07-21 | End: 2020-08-19

## 2020-07-21 RX ORDER — FLUOXETINE HYDROCHLORIDE 40 MG/1
40 CAPSULE ORAL DAILY
Qty: 30 CAPSULE | Refills: 3 | Status: SHIPPED | OUTPATIENT
Start: 2020-07-21 | End: 2020-11-15

## 2020-07-21 RX ORDER — OMEPRAZOLE 40 MG/1
40 CAPSULE, DELAYED RELEASE ORAL DAILY
Qty: 30 CAPSULE | Refills: 2 | Status: SHIPPED | OUTPATIENT
Start: 2020-07-21 | End: 2020-10-16

## 2020-07-21 RX ORDER — BUSPIRONE HYDROCHLORIDE 10 MG/1
10 TABLET ORAL 3 TIMES DAILY PRN
Qty: 90 TABLET | Refills: 0 | Status: SHIPPED | OUTPATIENT
Start: 2020-07-21 | End: 2020-08-19

## 2020-07-21 NOTE — PLAN OF CARE
Problem: Wound:  Goal: Will show signs of wound healing; wound closure and no evidence of infection  Description: Will show signs of wound healing; wound closure and no evidence of infection  Outcome: Ongoing     Problem: Smoking cessation:  Goal: Ability to formulate a plan to maintain a tobacco-free life will be supported  Description: Ability to formulate a plan to maintain a tobacco-free life will be supported  Outcome: Ongoing     Problem: Blood Glucose:  Goal: Ability to maintain appropriate glucose levels will improve  Description: Ability to maintain appropriate glucose levels will improve  Outcome: Ongoing

## 2020-07-21 NOTE — PROGRESS NOTES
[address]  Progress Note and Procedure Note      Chris Larson  AGE: 44 y.o. GENDER: male  : 1970  TODAY'S DATE:  2020    Subjective:     CHIEF COMPLAINT neck wound     HISTORY of PRESENT ILLNESS HPI   Chantale Larson is a 44 y.o. male who presents today for wound/ulcer evaluation. Ulcer Type:non-healing surgical  Ulcer/Wound Location:posterior neck  Contributing factors:diabetes and smoking    Patient Active Problem List   Diagnosis Code    Essential hypertension I10    Hx of bladder cancer Z85.51    Chronic bronchitis ([facility] [address] J42    Type 2 diabetes mellitus without complication, without long-term current use of insulin ([facility]) E11.9    Mixed anxiety depressive disorder F41.8    Mixed hyperlipidemia E78.2    Postoperative anemia due to acute blood loss D62    Hx of CABG Z95.1    Primary insomnia F51.01    Anxiety and depression F41.9, F32.9    Coronary artery disease I25.10    Cervical spondylosis with myelopathy and radiculopathy M47.12, M47.22    Rupture of operation wound T81. 31XA    Wound dehiscence T81.30XA    Tobacco abuse Z72.0    Open wound of neck S11.90XA       ALLERGIES    Allergies   Allergen Reactions    Latex Other (See Comments)     BOILS AND BLISTERS- ALLERGY CALLED TO [facility] SCHEDULING.  Demerol Hcl [Meperidine] Hives     And n/v           Objective:      /64   Pulse 65   Temp 98 °F (36.7 °C) (Temporal)   Resp 16   Ht 5' 11\" (1.803 m)   Wt 180 lb (81.6 kg)   BMI 25.10 kg/m²         Assessment:      Problem List Items Addressed This Visit     None          The patients pain isPain Level: 10 Pain Type: Surgical pain. Wound(s) has slightly improved. Please refer to nursing measurements and assessment regarding wound pre and post debridement.   Negative Pressure Wound Therapy Other (Comment) Posterior (Active)   Wound Type Surgical 20 0850   Unit Type KCI 20 0850   Dressing Type Black foam 20 0850   Number of pieces used 1 06/29/20 1544   Cycle Continuous 07/21/20 0850   Target Pressure (mmHg) 125 07/21/20 0850   Canister changed? No 07/21/20 0850   Dressing Status Old drainage; Intact 07/21/20 0850   Dressing Changed Changed/New 06/29/20 1544   Drainage Amount Small 07/21/20 0850   Drainage Description Serosanguinous 07/21/20 0850   Dressing Change Due 06/29/20 06/28/20 1955   Output (ml) 40 ml 06/28/20 2008   Wound Assessment Pink;Slough; Yellow 07/21/20 0850   Chrissy-wound Assessment Dry; Intact 07/21/20 0850   Shape oval 06/29/20 1544   Odor None 07/07/20 0839   Number of days: 27       Wound 06/24/20 Neck Posterior Wound 1: POSTERIOR NECK SURGICAL (Active)   Wound Image   07/21/20 0850   Wound Non-Healing Surgical 07/21/20 0850   Dressing Status Old drainage 07/21/20 0850   Dressing Changed Changed/New 07/07/20 1016   Dressing/Treatment Moist to moist;ABD; Medipore 07/07/20 1016   Wound Cleansed Soap and water 07/21/20 0850   Wound Length (cm) 10 cm 07/21/20 0850   Wound Width (cm) 4 cm 07/21/20 0850   Wound Depth (cm) 0.8 cm 07/21/20 0850   Wound Surface Area (cm^2) 40 cm^2 07/21/20 0850   Change in Wound Size % (l*w) -11.11 07/21/20 0850   Wound Volume (cm^3) 32 cm^3 07/21/20 0850   Wound Healing % 70 07/21/20 0850   Post-Procedure Length (cm) 10 cm 07/21/20 0912   Post-Procedure Width (cm) 4 cm 07/21/20 0912   Post-Procedure Depth (cm) 0.8 cm 07/21/20 0912   Post-Procedure Surface Area (cm^2) 40 cm^2 07/21/20 0912   Post-Procedure Volume (cm^3) 32 cm^3 07/21/20 0912   Distance Tunneling (cm) 0 cm 07/21/20 0850   Tunneling Position ___ O'Clock 0 07/21/20 0850   Undermining Starts ___ O'Clock 0 07/21/20 0850   Undermining Ends___ O'Clock 0 07/21/20 0850   Undermining Maxium Distance (cm) 0 07/21/20 0850   Wound Assessment Pink;Slough; Yellow 07/21/20 0850   Drainage Amount Moderate 07/21/20 0850   Drainage Description Serosanguinous 07/21/20 0850   Odor None 07/21/20 0850   Margins Unattached edges 07/21/20 0850 Exposed structure Bone 07/07/20 0839   Chrissy-wound Assessment Dry; Intact; Pink 07/21/20 0850   Non-staged Wound Description Full thickness 07/21/20 0850   [place]%Wound Bed 75 07/21/20 0850   Red%Wound Bed 0 07/21/20 0850   Yellow%Wound Bed 25 07/21/20 0850   Black%Wound Bed 0 07/21/20 0850   Purple%Wound Bed 0 07/21/20 0850   Other%Wound Bed 0 06/29/20 1544   Number of days: 27           Procedure Note  Indications:  Based on my examination of this patient's wound(s)/ulcer(s) today, debridement is required to promote healing and evaluate the wound base. Performed by: Narendra Leary MD    Consent obtained:  Yes    Time out taken:  Yes    Pain Control:         Debridement: Excisional Debridement    Using curette and forceps the wound(s)/ulcer(s) was/were sharply debrided down through and including the removal of epidermis, dermis and subcutaneous tissue. Devitalized Tissue Debrided:  fibrin, biofilm, slough and small pieces of sponge    Pre Debridement Measurements:  Are located in the Wound/Ulcer Documentation Flow Sheet    Wound/Ulcer #: 1    Post Debridement Measurements:  Wound/Ulcer Descriptions are Pre Debridement except measurements:          Percent of Wound(s)/Ulcer(s) Debrided: 100%    Total Surface Area Debrided:  40 sq cm     Diabetic/Pressure/Non Pressure Ulcers only:  Ulcer: N/A     Estimated Blood Loss:  Minimal    Hemostasis Achieved:  not needed    PResponse to treatment:  Well tolerated by patient., With complaints of pain. Plan:          Plan for wound - Dress per physician order  Treatment:     Compression : No   Offloading : Yes   Dressing : see AVS   Additional Therapy : none     1. Discussed appropriate home care of this wound. Wound redressed. 2. Written patient dismissal instructions given to patient and signed by patient or POA. 3. Follow up: 3 week(s). Mr. Loretta Robert wound is looking a little better. There were some tiny pieces of sponge embedded in the wound.  Will have them pack that crevice with promogran to try to keep the sponge from getting hung up. Encouraged good nutrition, decreased nicotine, and blood sugar control. Follow up in 3 weeks.      Electronically signed by Lexii Tony MD on 7/21/2020 at 9:17 AM

## 2020-07-27 LAB
ALBUMIN SERPL-MCNC: 4.1 G/DL (ref 3.5–5.2)
ALP BLD-CCNC: 82 U/L (ref 40–130)
ALT SERPL-CCNC: 6 U/L (ref 5–41)
ANION GAP SERPL CALCULATED.3IONS-SCNC: 12 MMOL/L (ref 7–19)
AST SERPL-CCNC: 11 U/L (ref 5–40)
BASOPHILS ABSOLUTE: 0.1 K/UL (ref 0–0.2)
BASOPHILS RELATIVE PERCENT: 1.2 % (ref 0–1)
BILIRUB SERPL-MCNC: <0.2 MG/DL (ref 0.2–1.2)
BUN BLDV-MCNC: 27 MG/DL (ref 6–20)
C-REACTIVE PROTEIN: 0.14 MG/DL (ref 0–0.5)
CALCIUM SERPL-MCNC: 9.3 MG/DL (ref 8.6–10)
CHLORIDE BLD-SCNC: 105 MMOL/L (ref 98–111)
CO2: 22 MMOL/L (ref 22–29)
CREAT SERPL-MCNC: 1.4 MG/DL (ref 0.5–1.2)
EOSINOPHILS ABSOLUTE: 0.1 K/UL (ref 0–0.6)
EOSINOPHILS RELATIVE PERCENT: 1.7 % (ref 0–5)
GFR AFRICAN AMERICAN: >59
GFR NON-AFRICAN AMERICAN: 53
GLUCOSE BLD-MCNC: 157 MG/DL (ref 74–109)
HCT VFR BLD CALC: 32.5 % (ref 42–52)
HEMOGLOBIN: 9.9 G/DL (ref 14–18)
IMMATURE GRANULOCYTES #: 0 K/UL
LYMPHOCYTES ABSOLUTE: 1.7 K/UL (ref 1.1–4.5)
LYMPHOCYTES RELATIVE PERCENT: 28.7 % (ref 20–40)
MCH RBC QN AUTO: 24.8 PG (ref 27–31)
MCHC RBC AUTO-ENTMCNC: 30.5 G/DL (ref 33–37)
MCV RBC AUTO: 81.3 FL (ref 80–94)
MONOCYTES ABSOLUTE: 0.7 K/UL (ref 0–0.9)
MONOCYTES RELATIVE PERCENT: 12.1 % (ref 0–10)
NEUTROPHILS ABSOLUTE: 3.3 K/UL (ref 1.5–7.5)
NEUTROPHILS RELATIVE PERCENT: 56.1 % (ref 50–65)
PDW BLD-RTO: 20.5 % (ref 11.5–14.5)
PLATELET # BLD: 240 K/UL (ref 130–400)
PMV BLD AUTO: 10.3 FL (ref 9.4–12.4)
POTASSIUM SERPL-SCNC: 4.9 MMOL/L (ref 3.5–5)
RBC # BLD: 4 M/UL (ref 4.7–6.1)
SODIUM BLD-SCNC: 139 MMOL/L (ref 136–145)
TOTAL PROTEIN: 7 G/DL (ref 6.6–8.7)
VANCOMYCIN TROUGH: 10.2 UG/ML (ref 10–20)
WBC # BLD: 5.9 K/UL (ref 4.8–10.8)

## 2020-08-10 LAB
ALBUMIN SERPL-MCNC: 4.3 G/DL (ref 3.5–5.2)
ALP BLD-CCNC: 78 U/L (ref 40–130)
ALT SERPL-CCNC: 9 U/L (ref 5–41)
ANION GAP SERPL CALCULATED.3IONS-SCNC: 11 MMOL/L (ref 7–19)
AST SERPL-CCNC: 15 U/L (ref 5–40)
BASOPHILS ABSOLUTE: 0.1 K/UL (ref 0–0.2)
BASOPHILS RELATIVE PERCENT: 0.8 % (ref 0–1)
BILIRUB SERPL-MCNC: <0.2 MG/DL (ref 0.2–1.2)
BUN BLDV-MCNC: 30 MG/DL (ref 6–20)
C-REACTIVE PROTEIN: 0.3 MG/DL (ref 0–0.5)
CALCIUM SERPL-MCNC: 9.5 MG/DL (ref 8.6–10)
CHLORIDE BLD-SCNC: 104 MMOL/L (ref 98–111)
CO2: 21 MMOL/L (ref 22–29)
CREAT SERPL-MCNC: 1.5 MG/DL (ref 0.5–1.2)
EOSINOPHILS ABSOLUTE: 0.1 K/UL (ref 0–0.6)
EOSINOPHILS RELATIVE PERCENT: 1.8 % (ref 0–5)
GFR AFRICAN AMERICAN: >59
GFR NON-AFRICAN AMERICAN: 49
GLUCOSE BLD-MCNC: 185 MG/DL (ref 74–109)
HCT VFR BLD CALC: 35.5 % (ref 42–52)
HEMOGLOBIN: 11.1 G/DL (ref 14–18)
IMMATURE GRANULOCYTES #: 0 K/UL
LYMPHOCYTES ABSOLUTE: 2.2 K/UL (ref 1.1–4.5)
LYMPHOCYTES RELATIVE PERCENT: 31 % (ref 20–40)
MCH RBC QN AUTO: 25.2 PG (ref 27–31)
MCHC RBC AUTO-ENTMCNC: 31.3 G/DL (ref 33–37)
MCV RBC AUTO: 80.5 FL (ref 80–94)
MONOCYTES ABSOLUTE: 0.6 K/UL (ref 0–0.9)
MONOCYTES RELATIVE PERCENT: 7.7 % (ref 0–10)
NEUTROPHILS ABSOLUTE: 4.2 K/UL (ref 1.5–7.5)
NEUTROPHILS RELATIVE PERCENT: 58.4 % (ref 50–65)
PDW BLD-RTO: 19.3 % (ref 11.5–14.5)
PLATELET # BLD: 268 K/UL (ref 130–400)
PMV BLD AUTO: 10 FL (ref 9.4–12.4)
POTASSIUM SERPL-SCNC: 5.1 MMOL/L (ref 3.5–5)
RBC # BLD: 4.41 M/UL (ref 4.7–6.1)
SODIUM BLD-SCNC: 136 MMOL/L (ref 136–145)
TOTAL PROTEIN: 7.4 G/DL (ref 6.6–8.7)
WBC # BLD: 7.1 K/UL (ref 4.8–10.8)

## 2020-08-11 ENCOUNTER — HOSPITAL ENCOUNTER (OUTPATIENT)
Dept: WOUND CARE | Age: 50
Discharge: HOME | End: 2020-08-11
Payer: MEDICAID

## 2020-08-11 VITALS
HEIGHT: 71 IN | WEIGHT: 180 LBS | DIASTOLIC BLOOD PRESSURE: 57 MMHG | HEART RATE: 69 BPM | BODY MASS INDEX: 25.2 KG/M2 | TEMPERATURE: 98 F | SYSTOLIC BLOOD PRESSURE: 115 MMHG | RESPIRATION RATE: 18 BRPM

## 2020-08-11 PROCEDURE — 11042 DBRDMT SUBQ TIS 1ST 20SQCM/<: CPT

## 2020-08-11 PROCEDURE — 11045 DBRDMT SUBQ TISS EACH ADDL: CPT

## 2020-08-11 PROCEDURE — 11045 DBRDMT SUBQ TISS EACH ADDL: CPT | Performed by: SURGERY

## 2020-08-11 PROCEDURE — 11042 DBRDMT SUBQ TIS 1ST 20SQCM/<: CPT | Performed by: SURGERY

## 2020-08-11 RX ORDER — CLINDAMYCIN HYDROCHLORIDE 300 MG/1
300 CAPSULE ORAL 3 TIMES DAILY
COMMUNITY
End: 2020-09-29 | Stop reason: ALTCHOICE

## 2020-08-11 ASSESSMENT — PAIN DESCRIPTION - PROGRESSION: CLINICAL_PROGRESSION: NOT CHANGED

## 2020-08-11 ASSESSMENT — PAIN DESCRIPTION - FREQUENCY: FREQUENCY: CONTINUOUS

## 2020-08-11 ASSESSMENT — PAIN SCALES - WONG BAKER: WONGBAKER_NUMERICALRESPONSE: 0

## 2020-08-11 ASSESSMENT — PAIN DESCRIPTION - ONSET: ONSET: ON-GOING

## 2020-08-11 ASSESSMENT — PAIN DESCRIPTION - PAIN TYPE: TYPE: SURGICAL PAIN

## 2020-08-11 ASSESSMENT — PAIN DESCRIPTION - DESCRIPTORS: DESCRIPTORS: ACHING;BURNING;TENDER;SORE

## 2020-08-11 ASSESSMENT — PAIN DESCRIPTION - ORIENTATION: ORIENTATION: POSTERIOR

## 2020-08-11 ASSESSMENT — PAIN DESCRIPTION - LOCATION: LOCATION: NECK

## 2020-08-11 ASSESSMENT — PAIN SCALES - GENERAL: PAINLEVEL_OUTOF10: 8

## 2020-08-11 NOTE — PROGRESS NOTES
[address]  Progress Note and Procedure Note      Chris Larson  AGE: 44 y.o. GENDER: male  : 1970  TODAY'S DATE:  2020    Subjective:     CHIEF COMPLAINT neck wound     HISTORY of PRESENT ILLNESS HPI   Gary Larson is a 44 y.o. male who presents today for wound/ulcer evaluation. Ulcer Type:non-healing surgical  Ulcer/Wound Location:posterior neck  Contributing factors:diabetes and smoking    Patient Active Problem List   Diagnosis Code    Essential hypertension I10    Hx of bladder cancer Z85.51    Chronic bronchitis ([facility] [address] J42    Type 2 diabetes mellitus without complication, without long-term current use of insulin ([facility]) E11.9    Mixed anxiety depressive disorder F41.8    Mixed hyperlipidemia E78.2    Postoperative anemia due to acute blood loss D62    Hx of CABG Z95.1    Primary insomnia F51.01    Anxiety and depression F41.9, F32.9    Coronary artery disease I25.10    Cervical spondylosis with myelopathy and radiculopathy M47.12, M47.22    Rupture of operation wound T81. 31XA    Wound dehiscence T81.30XA    Tobacco abuse Z72.0    Open wound of neck S11.90XA       ALLERGIES    Allergies   Allergen Reactions    Latex Other (See Comments)     BOILS AND BLISTERS- ALLERGY CALLED TO [facility] SURGERY SCHEDULING.  Demerol Hcl [Meperidine] Hives     And n/v           Objective:      BP (!) 115/57   Pulse 69   Temp 98 °F (36.7 °C) (Temporal)   Resp 18   Ht 5' 11\" (1.803 m)   Wt 180 lb (81.6 kg)   BMI 25.10 kg/m²         Assessment:      Problem List Items Addressed This Visit     None          The patients pain isPain Level: 8 Pain Type: Surgical pain. Wound(s) has moderately improved. Please refer to nursing measurements and assessment regarding wound pre and post debridement.   Negative Pressure Wound Therapy Other (Comment) Posterior (Active)   Wound Type Surgical 20 0910   Unit Type KCI 2010   Dressing Type Black foam 2010 Cycle Continuous 08/11/20 0910   Target Pressure (mmHg) 125 08/11/20 0910   Canister changed? No 08/11/20 0910   Dressing Status Old drainage; Intact 08/11/20 0910   Drainage Amount Small 08/11/20 0910   Drainage Description Serosanguinous 08/11/20 0910   Wound Assessment Pink;Slough; Yellow 08/11/20 0910   Chrissy-wound Assessment Dry; Intact 08/11/20 0910   Odor None 08/11/20 0910   Number of days: 48       Wound 06/24/20 Neck Posterior Wound 1: POSTERIOR NECK SURGICAL (Active)   Wound Image   08/11/20 0910   Wound Non-Healing Surgical 08/11/20 0910   Dressing Status Old drainage 08/11/20 0910   Dressing Changed Changed/New 07/07/20 1016   Dressing/Treatment Moist to moist;ABD; Medipore 07/07/20 1016   Wound Cleansed Soap and water 08/11/20 0910   Wound Length (cm) 8.6 cm 08/11/20 0910   Wound Width (cm) 3 cm 08/11/20 0910   Wound Depth (cm) 0.2 cm 08/11/20 0910   Wound Surface Area (cm^2) 25.8 cm^2 08/11/20 0910   Change in Wound Size % (l*w) 28.33 08/11/20 0910   Wound Volume (cm^3) 5.16 cm^3 08/11/20 0910   Wound Healing % 95 08/11/20 0910   Post-Procedure Length (cm) 8.6 cm 08/11/20 0925   Post-Procedure Width (cm) 3 cm 08/11/20 0925   Post-Procedure Depth (cm) 0.2 cm 08/11/20 0925   Post-Procedure Surface Area (cm^2) 25.8 cm^2 08/11/20 0925   Post-Procedure Volume (cm^3) 5.16 cm^3 08/11/20 0925   Distance Tunneling (cm) 0 cm 08/11/20 0910   Tunneling Position ___ O'Clock 0 08/11/20 0910   Undermining Starts ___ O'Clock 0 08/11/20 0910   Undermining Ends___ O'Clock 0 08/11/20 0910   Undermining Maxium Distance (cm) 0 08/11/20 0910   Wound Assessment Pink;Slough; Yellow 08/11/20 0910   Drainage Amount Moderate 08/11/20 0910   Drainage Description Serosanguinous 08/11/20 0910   Odor None 08/11/20 0910   Margins Unattached edges 08/11/20 0910   Exposed structure Bone 07/07/20 0839   Chrissy-wound Assessment Dry; Intact; Pink 08/11/20 0910   Non-staged Wound Description Full thickness 08/11/20 0910   [place]%Wound Bed 75

## 2020-08-11 NOTE — PLAN OF CARE
Problem: Pain:  Description: Pain management should include both nonpharmacologic and pharmacologic interventions.   Goal: Pain level will decrease  Description: Pain level will decrease  Outcome: Ongoing  Goal: Control of acute pain  Description: Control of acute pain  Outcome: Ongoing  Goal: Control of chronic pain  Description: Control of chronic pain  Outcome: Ongoing     Problem: Wound:  Goal: Will show signs of wound healing; wound closure and no evidence of infection  Description: Will show signs of wound healing; wound closure and no evidence of infection  Outcome: Ongoing     Problem: Smoking cessation:  Goal: Ability to formulate a plan to maintain a tobacco-free life will be supported  Description: Ability to formulate a plan to maintain a tobacco-free life will be supported  Outcome: Ongoing     Problem: Blood Glucose:  Goal: Ability to maintain appropriate glucose levels will improve  Description: Ability to maintain appropriate glucose levels will improve  Outcome: Ongoing

## 2020-08-19 RX ORDER — TRAZODONE HYDROCHLORIDE 50 MG/1
100 TABLET ORAL NIGHTLY PRN
Qty: 60 TABLET | Refills: 1 | Status: SHIPPED | OUTPATIENT
Start: 2020-08-19 | End: 2020-10-16

## 2020-08-19 RX ORDER — ARIPIPRAZOLE 5 MG/1
TABLET ORAL
Qty: 30 TABLET | Refills: 3 | Status: SHIPPED | OUTPATIENT
Start: 2020-08-19 | End: 2020-12-08 | Stop reason: SDUPTHER

## 2020-08-19 RX ORDER — BACLOFEN 10 MG/1
TABLET ORAL
Qty: 90 TABLET | Refills: 0 | Status: SHIPPED | OUTPATIENT
Start: 2020-08-19 | End: 2020-09-17

## 2020-08-19 RX ORDER — LISINOPRIL 10 MG/1
10 TABLET ORAL DAILY
Qty: 30 TABLET | Refills: 3 | Status: SHIPPED | OUTPATIENT
Start: 2020-08-19 | End: 2020-12-08 | Stop reason: SDUPTHER

## 2020-08-19 RX ORDER — BUSPIRONE HYDROCHLORIDE 10 MG/1
10 TABLET ORAL 3 TIMES DAILY PRN
Qty: 90 TABLET | Refills: 0 | Status: SHIPPED | OUTPATIENT
Start: 2020-08-19 | End: 2020-09-17

## 2020-08-19 RX ORDER — ATORVASTATIN CALCIUM 20 MG/1
TABLET, FILM COATED ORAL
Qty: 30 TABLET | Refills: 3 | Status: SHIPPED | OUTPATIENT
Start: 2020-08-19 | End: 2020-12-08 | Stop reason: SDUPTHER

## 2020-08-20 ENCOUNTER — VIRTUAL VISIT (OUTPATIENT)
Dept: PRIMARY CARE CLINIC | Age: 50
End: 2020-08-20
Payer: MEDICAID

## 2020-08-20 PROCEDURE — 99214 OFFICE O/P EST MOD 30 MIN: CPT | Performed by: NURSE PRACTITIONER

## 2020-08-20 PROCEDURE — G8428 CUR MEDS NOT DOCUMENT: HCPCS | Performed by: NURSE PRACTITIONER

## 2020-08-20 PROCEDURE — 3017F COLORECTAL CA SCREEN DOC REV: CPT | Performed by: NURSE PRACTITIONER

## 2020-08-20 RX ORDER — GABAPENTIN 100 MG/1
100 CAPSULE ORAL 2 TIMES DAILY
Qty: 60 CAPSULE | Refills: 1 | Status: SHIPPED | OUTPATIENT
Start: 2020-08-20 | End: 2020-09-18 | Stop reason: SDUPTHER

## 2020-08-20 ASSESSMENT — ENCOUNTER SYMPTOMS
EYES NEGATIVE: 1
GASTROINTESTINAL NEGATIVE: 1
RESPIRATORY NEGATIVE: 1
BACK PAIN: 1

## 2020-08-20 ASSESSMENT — PATIENT HEALTH QUESTIONNAIRE - PHQ9
SUM OF ALL RESPONSES TO PHQ QUESTIONS 1-9: 1
1. LITTLE INTEREST OR PLEASURE IN DOING THINGS: 0
2. FEELING DOWN, DEPRESSED OR HOPELESS: 1
SUM OF ALL RESPONSES TO PHQ QUESTIONS 1-9: 1
SUM OF ALL RESPONSES TO PHQ9 QUESTIONS 1 & 2: 1

## 2020-08-20 NOTE — PROGRESS NOTES
[facility] [place] [address]     Phone:  [phone]  Fax:  [phone]      2020    TELEHEALTH EVALUATION -- Audio/Visual (During AECZX-84 public health emergency)    HPI:    Chief Complaint   Patient presents with    Back Pain    Depression         Chris Larson (:  1970) has requested an audio/video evaluation for the following concern(s): This is a video visit for follow up on recent IV abx with home health. He reports stopping the IV abx a few weeks ago and the PICC line was removed. He is still taking oral abx per infectious disease. Patient reports having an appointment with infectious disease today. He is having some back pain and still has the wound vac in place. Patient is having some depression symptoms, but denies homicidal or suicidal ideation. Patient is wanting to see [address] [place] [address] for this. Review of Systems   Constitutional: Negative. HENT: Negative. Eyes: Negative. Respiratory: Negative. Cardiovascular: Negative. Gastrointestinal: Negative. Endocrine: Negative. Genitourinary: Negative. Musculoskeletal: Positive for back pain. Skin: Negative. Neurological: Negative. Hematological: Negative. Psychiatric/Behavioral: Positive for dysphoric mood. Prior to Visit Medications    Medication Sig Taking? Authorizing Provider   gabapentin (NEURONTIN) 100 MG capsule Take 1 capsule by mouth 2 times daily for 30 days. Yes BRYAN Cabello   baclofen (LIORESAL) 10 MG tablet TAKE 1 TABLET BY MOUTH 3 TIMES DAILY AS NEEDED FOR PAIN/SPASMS  BRYAN Cabello   atorvastatin (LIPITOR) 20 MG tablet TAKE ONE TABLET BY MOUTH ONCE DAILY.   BRYAN Cabello   traZODone (DESYREL) 50 MG tablet TAKE 2 TABLETS BY MOUTH NIGHTLY AS NEEDED FOR SLEEP  BRYAN Cabello   busPIRone (BUSPAR) 10 MG tablet TAKE 1 TABLET BY MOUTH 3 TIMES DAILY AS NEEDED (ANXIETY)  BRYAN Cabello   metFORMIN (GLUCOPHAGE) 1000 MG tablet TAKE 1 TABLET BY MOUTH 2 TIMES DAILY (WITH MEALS)  BRYAN Gaona   lisinopril (PRINIVIL;ZESTRIL) 10 MG tablet TAKE 1 TABLET BY MOUTH DAILY  BRYAN Gaona   ARIPiprazole (ABILIFY) 5 MG tablet TAKE ONE TABLET BY MOUTH ONCE DAILY. BRYAN Gaona   clindamycin (CLEOCIN) 300 MG capsule Take 300 mg by mouth 3 times daily  Historical Provider, MD   fluticasone-salmeterol (ADVAIR) 100-50 MCG/DOSE diskus inhaler INHALE 1 PUFF INTO THE LUNGS EVERY 12 HOURS  BRYAN Gaona   omeprazole (PRILOSEC) 40 MG delayed release capsule TAKE 1 CAPSULE BY MOUTH DAILY  BRYAN Gaona   FLUoxetine (PROZAC) 40 MG capsule TAKE 1 CAPSULE BY MOUTH DAILY  Theresa Caballero MD   collagenase (SANTYL) 250 UNIT/GM ointment Apply topically with wound vac dressing change. (10.5 x 4 x 2)cm  Nellene Hem, DO   sodium hypochlorite (DAKINS) 0.125 % SOLN external solution Moisten gauze apply to wound twice daily  Nellene Hem, DO   HYDROcodone-acetaminophen (NORCO)  MG per tablet Take 2 tablets by mouth every 6 hours as needed for Pain. Historical Provider, MD   fenofibrate (TRIGLIDE) 160 MG tablet TAKE ONE TABLET BY MOUTH DAILY. BRYAN Gaona   atenolol (TENORMIN) 50 MG tablet TAKE ONE TABLET BY MOUTH EVERY DAY  BRYAN Gaona   Cholecalciferol (VITAMIN D3) 1.25 MG (96040 UT) CAPS Take 1 capsule by mouth once a week Wednesday  Historical Provider, MD   sildenafil (REVATIO) 20 MG tablet Take 1-5 tablets 1-2 hours before sexual activity PRN  Historical Provider, MD   tiotropium (SPIRIVA) 18 MCG inhalation capsule Inhale 1 capsule into the lungs  Historical Provider, MD   blood glucose monitor strips Test 4 times a day & as needed for symptoms of irregular blood glucose.   BRYAN Gaona   glucose monitoring kit (FREESTYLE) monitoring kit Please provide glucometer that INS will cover for DM type 2  BRYAN Gaona   Lancets 30G MISC 1 each by Does not apply route daily 4 times daily  BRYAN Gaona   aspirin 81 MG EC tablet Take 1 tablet by mouth daily  BRYAN Lawson       Social History     Tobacco Use    Smoking status: Current Every Day Smoker     Packs/day: 1.00     Years: 30.00     Pack years: 30.00    Smokeless tobacco: Never Used   Substance Use Topics    Alcohol use: Yes     Comment: Occ    Drug use: Never        Allergies   Allergen Reactions    Latex Other (See Comments)     BOILS AND BLISTERS- ALLERGY CALLED TO [facility] SURGERY SCHEDULING.  Demerol Hcl [Meperidine] Hives     And n/v   ,   Past Medical History:   Diagnosis Date    CAD (coronary artery disease)     se[facility] cardiology    Cancer [facility])     Bladder    COPD (chronic obstructive pulmonary disease) ([facility] [address]     Depression     Diabetes mellitus ([facility] [address]     History of blood transfusion     unsure thinks he did    Hyperlipidemia     Hypertension    ,   Past Surgical History:   Procedure Laterality Date    BACK SURGERY      X3     BLADDER REMOVAL      CARDIAC CATHETERIZATION      CERVICAL FUSION N/A 2/19/2020    Phase 1:  C5 and C6 corpectomy with placement of an expandable cage and anterior cervical plate S6-Z5. performed by Nena Dietz DO at [address] [place] [address] N/A 6/17/2020    POSTERIOR CERVICAL WOUND [address] OUT AND CLOSURE performed by Nena Dietz DO at [address] [place] [address] N/A 6/26/2020    [place] [address] [place] [address] performed by Nena Dietz DO at [address] [place] [address] N/A 9/10/2019    Dr Tara Kat, 5 yr recall    CORONARY ARTERY BYPASS GRAFT N/A 5/1/2019    CORONARY ARTERY BYPASS GRAFT X 3 WITH LEFT INTERNAL MAMMARY ARTERY, ENDOSCOPIC  VEIN HARVEST, WITH PERFUSION. performed by Glendy Aparicio MD at [place] [address] N/A 2/19/2020    Phase 2:  Posterior instrumented fusion C3-C7 using lateral mass screws and rods.  performed by Nena Dietz DO at [address] [place] [address]      lower ext    PROSTATECTOMY     ,   Family History   Problem Relation Age of Onset    Cancer Mother     Vision Loss Mother     Breast Cancer Mother     Coronary Art Dis Father     Obesity Father     Kidney Disease Father     High Blood Pressure Father     High Cholesterol Father     Hypercalcemia Sister     Obesity Brother     High Blood Pressure Brother        PHYSICAL EXAMINATION:  [ INSTRUCTIONS:  \"[x]\" Indicates a positive item  \"[]\" Indicates a negative item  -- DELETE ALL ITEMS NOT EXAMINED]  Vital Signs: (As obtained by patient/caregiver at home)        Constitutional: [x] Appears well-developed and well-nourished [x] No apparent distress      [] Abnormal   Mental status  [x] Alert and awake  [x] Oriented to person/place/time [x]Able to follow commands        Eyes:  EOM    [x]  Normal  [] Abnormal-  Sclera  [x]  Normal  [] Abnormal -         Discharge []  None visible  [] Abnormal -    HENT:   [x] Normocephalic, atraumatic. [] Abnormal   [x] Mouth/Throat: Mucous membranes are moist.     External Ears [x] Normal  [] Abnormal-    Neck: [x] No visualized mass     Pulmonary/Chest: [x] Respiratory effort normal.  [x] No visualized signs of difficulty breathing or respiratory distress        [] Abnormal      Musculoskeletal:   [x] Normal gait with no signs of ataxia         [x] Normal range of motion of neck        [] Abnormal       Neurological:        [x] No Facial Asymmetry (Cranial nerve 7 motor function) (limited exam to video visit)          [] No gaze palsy        [] Abnormal         Skin:        [x] No significant exanthematous lesions or discoloration noted on facial skin         [] Abnormal            Psychiatric:       [x] Normal Affect [] Abnormal        [] No Hallucinations    Other pertinent observable physical exam findings:-    Due to this being a TeleHealth encounter, evaluation of the following organ systems is limited: Vitals/Constitutional/EENT/Resp/CV/GI//MS/Neuro/Skin/Heme-Lymph-Imm. ASSESSMENT/PLAN:  1.  Lumbar pain    - XR LUMBAR SPINE (2-3 VIEWS); Future  - gabapentin (NEURONTIN) 100 MG capsule; Take 1 capsule by mouth 2 times daily for 30 days. Dispense: 60 capsule; Refill: 1    2. Open wound of neck, subsequent encounter      3. Anxiety and depression      Neurontin for back pain. Xray for worsening lumbar pain. May need additional imaging. Return in about 4 weeks (around 9/17/2020) for Video Visit, medication and lumbar pain. An  electronic signature was used to authenticate this note. --BRYAN Sapp on 8/28/2020 at 9:05 PM        Pursuant to the emergency declaration under the [facility] waiver authority and the [employer] Act, this Virtual  Visit was conducted, with patient's consent, to reduce the patient's risk of exposure to COVID-19 and provide continuity of care for an established patient. Services were provided through a video synchronous discussion virtually to substitute for in-person clinic visit.

## 2020-08-25 ENCOUNTER — HOSPITAL ENCOUNTER (OUTPATIENT)
Dept: WOUND CARE | Age: 50
Discharge: HOME | End: 2020-08-25
Payer: MEDICAID

## 2020-08-25 VITALS
RESPIRATION RATE: 18 BRPM | HEIGHT: 71 IN | SYSTOLIC BLOOD PRESSURE: 122 MMHG | BODY MASS INDEX: 25.81 KG/M2 | WEIGHT: 184.38 LBS | HEART RATE: 73 BPM | TEMPERATURE: 98 F | DIASTOLIC BLOOD PRESSURE: 62 MMHG

## 2020-08-25 PROCEDURE — 11047 DBRDMT BONE EACH ADDL: CPT

## 2020-08-25 PROCEDURE — 11044 DBRDMT BONE 1ST 20 SQ CM/<: CPT

## 2020-08-25 PROCEDURE — 11044 DBRDMT BONE 1ST 20 SQ CM/<: CPT | Performed by: SURGERY

## 2020-08-25 ASSESSMENT — PAIN SCALES - GENERAL: PAINLEVEL_OUTOF10: 9

## 2020-08-25 ASSESSMENT — PAIN DESCRIPTION - PROGRESSION: CLINICAL_PROGRESSION: NOT CHANGED

## 2020-08-25 ASSESSMENT — PAIN DESCRIPTION - DESCRIPTORS: DESCRIPTORS: OTHER (COMMENT)

## 2020-08-25 ASSESSMENT — PAIN DESCRIPTION - ONSET: ONSET: ON-GOING

## 2020-08-25 ASSESSMENT — PAIN DESCRIPTION - PAIN TYPE: TYPE: ACUTE PAIN

## 2020-08-25 ASSESSMENT — PAIN DESCRIPTION - FREQUENCY: FREQUENCY: CONTINUOUS

## 2020-08-25 ASSESSMENT — PAIN DESCRIPTION - LOCATION: LOCATION: NECK

## 2020-08-25 NOTE — PROGRESS NOTES
Continuous 08/25/20 0905   Target Pressure (mmHg) 125 08/25/20 0905   Canister changed? No 08/25/20 0905   Dressing Status Old drainage; Intact 08/11/20 0910   Drainage Amount Small 08/25/20 0905   Drainage Description Serosanguinous 08/11/20 0910   Wound Assessment Pink;Slough; Yellow;Red;Drainage 08/25/20 0905   Chrissy-wound Assessment Pink 08/25/20 0905   Shape elongated ovalish 08/25/20 0905   Odor None 08/25/20 0905   Number of days: 62       Wound 06/24/20 Neck Posterior Wound 1: POSTERIOR NECK SURGICAL (Active)   Wound Image   08/25/20 0905   Wound Non-Healing Surgical 08/25/20 0905   Dressing Status Old drainage 08/25/20 0905   Dressing Changed Changed/New 07/07/20 1016   Dressing/Treatment Moist to moist;ABD; Medipore 07/07/20 1016   Wound Cleansed Soap and water 08/25/20 0905   Wound Length (cm) 7.5 cm 08/25/20 0905   Wound Width (cm) 1.8 cm 08/25/20 0905   Wound Depth (cm) 0.2 cm 08/25/20 0905   Wound Surface Area (cm^2) 13.5 cm^2 08/25/20 0905   Change in Wound Size % (l*w) 62.5 08/25/20 0905   Wound Volume (cm^3) 2.7 cm^3 08/25/20 0905   Wound Healing % 98 08/25/20 0905   Post-Procedure Length (cm) 7.5 cm 08/25/20 0933   Post-Procedure Width (cm) 1.8 cm 08/25/20 0933   Post-Procedure Depth (cm) 0.2 cm 08/25/20 0933   Post-Procedure Surface Area (cm^2) 13.5 cm^2 08/25/20 0933   Post-Procedure Volume (cm^3) 2.7 cm^3 08/25/20 0933   Distance Tunneling (cm) 0 cm 08/25/20 0905   Tunneling Position ___ O'Clock 0 08/25/20 0905   Undermining Starts ___ O'Clock 0 08/25/20 0905   Undermining Ends___ O'Clock 0 08/25/20 0905   Undermining Maxium Distance (cm) 0 08/25/20 0905   Wound Assessment Pink;Slough; Yellow;Painful;Drainage 08/25/20 0905   Drainage Amount Moderate 08/25/20 0905   Drainage Description Serosanguinous 08/25/20 0905   Odor None 08/25/20 0905   Margins Attached edges 08/25/20 0905   Exposed structure Bone 07/07/20 0839   Chrissy-wound Assessment Pale 08/25/20 0905   Non-staged Wound Description Full thickness 08/25/20 0905   [place]%Wound Bed 75 08/25/20 0905   Red%Wound Bed 0 08/25/20 0905   Yellow%Wound Bed 25 08/25/20 0905   Black%Wound Bed 0 08/25/20 0905   Purple%Wound Bed 0 08/25/20 0905   Other%Wound Bed 0 08/25/20 0905   Number of days: 62           Procedure Note  Indications:  Based on my examination of this patient's wound(s)/ulcer(s) today, debridement is required to promote healing and evaluate the wound base. Performed by: René Guevara MD    Consent obtained:  Yes    Time out taken:  Yes    Pain Control: Anesthetic  Anesthetic: 2% Lidocaine Gel Topical       Debridement: Excisional Debridement    Using curette and rongeur the wound(s)/ulcer(s) was/were sharply debrided down through and including the removal of epidermis, dermis, subcutaneous tissue and bone. Devitalized Tissue Debrided:  fibrin, biofilm and slough    Pre Debridement Measurements:  Are located in the Wound/Ulcer Documentation Flow Sheet    Wound/Ulcer #: 1    Post Debridement Measurements:  Wound/Ulcer Descriptions are Pre Debridement except measurements:          Percent of Wound(s)/Ulcer(s) Debrided: 100%    Total Surface Area Debrided:  13.5 sq cm     Diabetic/Pressure/Non Pressure Ulcers only:  Ulcer: N/A     Estimated Blood Loss:  Minimal    Hemostasis Achieved:  by pressure    Response to treatment:  Well tolerated by patient. Plan:          Plan for wound - Dress per physician order  Treatment:     Compression : No   Offloading : No   Dressing : see AVS   Additional Therapy : none     1. Discussed appropriate home care of this wound. Wound redressed. 2. Written patient dismissal instructions given to patient and signed by patient or POA. 3. Follow up: 2 week(s). Wound more flat. Small area of bone debrided using ronjeur. Will use collagen/promogran and xeroform, and take a vac break until his next appointment.      Electronically signed by René Guevara MD on 8/25/2020 at 9:38 AM

## 2020-08-27 ENCOUNTER — OFFICE VISIT (OUTPATIENT)
Dept: NEUROSURGERY | Age: 50
End: 2020-08-27
Payer: MEDICAID

## 2020-08-27 VITALS
OXYGEN SATURATION: 99 % | DIASTOLIC BLOOD PRESSURE: 57 MMHG | HEART RATE: 77 BPM | WEIGHT: 184.6 LBS | SYSTOLIC BLOOD PRESSURE: 133 MMHG | HEIGHT: 71 IN | BODY MASS INDEX: 25.84 KG/M2

## 2020-08-27 PROCEDURE — G8419 CALC BMI OUT NRM PARAM NOF/U: HCPCS | Performed by: NEUROLOGICAL SURGERY

## 2020-08-27 PROCEDURE — 4004F PT TOBACCO SCREEN RCVD TLK: CPT | Performed by: NEUROLOGICAL SURGERY

## 2020-08-27 PROCEDURE — 3017F COLORECTAL CA SCREEN DOC REV: CPT | Performed by: NEUROLOGICAL SURGERY

## 2020-08-27 PROCEDURE — G8427 DOCREV CUR MEDS BY ELIG CLIN: HCPCS | Performed by: NEUROLOGICAL SURGERY

## 2020-08-27 PROCEDURE — 99212 OFFICE O/P EST SF 10 MIN: CPT | Performed by: NEUROLOGICAL SURGERY

## 2020-08-27 ASSESSMENT — ENCOUNTER SYMPTOMS
GASTROINTESTINAL NEGATIVE: 1
RESPIRATORY NEGATIVE: 1
BACK PAIN: 1
EYES NEGATIVE: 1

## 2020-08-27 NOTE — PROGRESS NOTES
Review of Systems   Constitutional: Negative. HENT: Negative. Eyes: Negative. Respiratory: Negative. Cardiovascular: Negative. Gastrointestinal: Negative. Genitourinary: Negative. Musculoskeletal: Positive for back pain and neck pain. Skin: Negative. Neurological: Negative. Endo/Heme/Allergies: Negative. Psychiatric/Behavioral: Negative.

## 2020-08-27 NOTE — PROGRESS NOTES
18 [facility] Office Visit    Chief Complaint   Patient presents with    Follow-up    Neck Pain    Back Pain     8/27/2020:  Mr. Ryan Walker returns to the clinic today. He has been seeing the wound care team and his wound is healing by secondary intention. He states he is doing okay. He saw Dr. Jean Claude Hernandez recently, he remains on oral abx - Clindamycin. He states he is now having hip issues. He wants to talk about his lumbar spine at next appointment. 6/23/2020:  Mr. Ryan Walker returns to the clinic today to allow me to inspect his wound. He has continued to drain some. No fever, chills, or night sweats. He has been taking clindamycin. Cultures have revealed MRSA. HISTORY OF PRESENT ILLNESS:      Vicki Chung is a 44 y.o. male who underwent a C5,C6 corpectomy with anterior cervical plating along with posterior instrumented fusion C3-C7 on 2/19/2020. On 6/17/2020 his wound dehisced and he underwent drainage of posterior seroma, washout and closure of wound. His wound was noted to be draining she we had him come to our clinic for inspection. He denies any fever or chills. He continues to smoke and is unaware of his blood glucose at today's visit.       Past Medical History:   Diagnosis Date    CAD (coronary artery disease)     sees [facility] cardiology    Cancer [facility])     Bladder    [facility]     Depression     Diabetes mellitus ([facility]     History of blood transfusion     unsure thinks he did    Hyperlipidemia     Hypertension        Past Surgical History:   Procedure Laterality Date    BACK SURGERY      X3     BLADDER REMOVAL      CARDIAC CATHETERIZATION      CERVICAL FUSION N/A 2/19/2020    Phase 1:  C5 and C6 corpectomy with placement of an expandable cage and anterior cervical plate Q8-X6. performed by Adams Rodriguez DO at [address] [place] [address] N/A 6/17/2020    POSTERIOR CERVICAL WOUND [facility] OUT AND CLOSURE performed by Ishmael Armstrong DO at [address] N/A 6/26/2020    POSTERIOR CERVICAL WOUND DEBRIDEMENT WITH PLACEMENT OF [facility]ER performed by Ishmael Armstrong DO at [address] COLONOSCOPY N/A 9/10/2019    Dr Oliver Gaona, 5 yr recall    CORONARY ARTERY BYPASS GRAFT N/A 5/1/2019    CORONARY ARTERY BYPASS GRAFT X 3 WITH LEFT INTERNAL MAMMARY ARTERY, ENDOSCOPIC  VEIN HARVEST, WITH PERFUSION. performed by Niru [place] MD at [place] [address] N/A 2/19/2020    Phase 2:  Posterior instrumented fusion C3-C7 using lateral mass screws and rods. performed by Ishmael Armstrong DO at [address] [facility]      lower ext    PROSTATECTOMY          Medications    Current Outpatient Medications:     gabapentin (NEURONTIN) 100 MG capsule, Take 1 capsule by mouth 2 times daily for 30 days. , Disp: 60 capsule, Rfl: 1    baclofen (LIORESAL) 10 MG tablet, TAKE 1 TABLET BY MOUTH 3 TIMES DAILY AS NEEDED FOR PAIN/SPASMS, Disp: 90 tablet, Rfl: 0    atorvastatin (LIPITOR) 20 MG tablet, TAKE ONE TABLET BY MOUTH ONCE DAILY. , Disp: 30 tablet, Rfl: 3    traZODone (DESYREL) 50 MG tablet, TAKE 2 TABLETS BY MOUTH NIGHTLY AS NEEDED FOR SLEEP, Disp: 60 tablet, Rfl: 1    busPIRone (BUSPAR) 10 MG tablet, TAKE 1 TABLET BY MOUTH 3 TIMES DAILY AS NEEDED (ANXIETY), Disp: 90 tablet, Rfl: 0    metFORMIN (GLUCOPHAGE) 1000 MG tablet, TAKE 1 TABLET BY MOUTH 2 TIMES DAILY (WITH MEALS), Disp: 60 tablet, Rfl: 5    lisinopril (PRINIVIL;ZESTRIL) 10 MG tablet, TAKE 1 TABLET BY MOUTH DAILY, Disp: 30 tablet, Rfl: 3    ARIPiprazole (ABILIFY) 5 MG tablet, TAKE ONE TABLET BY MOUTH ONCE DAILY. , Disp: 30 tablet, Rfl: 3    clindamycin (CLEOCIN) 300 MG capsule, Take 300 mg by mouth 3 times daily, Disp: , Rfl:     fluticasone-salmeterol (ADVAIR) 100-50 MCG/DOSE diskus inhaler, INHALE 1 PUFF INTO THE LUNGS EVERY 12 HOURS, Disp: 1 Inhaler, Rfl: 3    omeprazole (PRILOSEC) 40 MG delayed release capsule, TAKE 1 CAPSULE BY MOUTH DAILY, Disp: 30 capsule, Rfl: 2    FLUoxetine (PROZAC) 40 MG capsule, TAKE 1 CAPSULE BY MOUTH DAILY, Disp: 30 capsule, Rfl: 3    collagenase (SANTYL) 250 UNIT/GM ointment, Apply topically with wound vac dressing change. (10.5 x 4 x 2)cm, Disp: 1 Tube, Rfl: 3    sodium hypochlorite (DAKINS) 0.125 % SOLN external solution, Moisten gauze apply to wound twice daily, Disp: 1000 mL, Rfl: 2    HYDROcodone-acetaminophen (NORCO)  MG per tablet, Take 2 tablets by mouth every 6 hours as needed for Pain., Disp: , Rfl:     fenofibrate (TRIGLIDE) 160 MG tablet, TAKE ONE TABLET BY MOUTH DAILY. , Disp: 30 tablet, Rfl: 5    atenolol (TENORMIN) 50 MG tablet, TAKE ONE TABLET BY MOUTH EVERY DAY, Disp: 30 tablet, Rfl: 5    Cholecalciferol (VITAMIN D3) 1.25 MG (60665 UT) CAPS, Take 1 capsule by mouth once a week Wednesday, Disp: , Rfl:     sildenafil (REVATIO) 20 MG tablet, Take 1-5 tablets 1-2 hours before sexual activity PRN, Disp: , Rfl:     tiotropium (SPIRIVA) 18 MCG inhalation capsule, Inhale 1 capsule into the lungs, Disp: , Rfl:     blood glucose monitor strips, Test 4 times a day & as needed for symptoms of irregular blood glucose., Disp: 100 strip, Rfl: 3    glucose monitoring kit (FREESTYLE) monitoring kit, Please provide glucometer that INS will cover for DM type 2, Disp: 1 kit, Rfl: 0    Lancets 30G MISC, 1 each by Does not apply route daily 4 times daily, Disp: 100 each, Rfl: 3    aspirin 81 MG EC tablet, Take 1 tablet by mouth daily, Disp: 30 tablet, Rfl: 3  Latex and Demerol hcl [meperidine]    Social History  Social History     Tobacco Use   Smoking Status Current Every Day Smoker    Packs/day: 1.00    Years: 30.00    Pack years: 30.00   Smokeless Tobacco Never Used     Social History     Substance and Sexual Activity   Alcohol Use Yes    Comment: Occ         Family History   Problem Relation Age of Onset    Cancer Mother     Vision Loss Mother     Breast Cancer Mother     Coronary Art Dis Father    Marcus Reaves Obesity Father     Kidney Disease Father     High Blood Pressure Father     High Cholesterol Father     Hypercalcemia Sister    [facility] Obesity Brother     High Blood Pressure Brother        Review of Systems:  Constitutional: Negative. HENT: Negative. Eyes: Negative. Respiratory: Negative. Cardiovascular: Negative. Gastrointestinal: Negative. Genitourinary: Negative. Musculoskeletal: Positive for back pain, joint pain and neck pain. Skin: Negative. Neurological: Negative. Endo/Heme/Allergies: Negative. Psychiatric/Behavioral: Negative. PHYSICAL EXAM:  Vitals:    08/27/20 0835   BP: (!) 133/57   Pulse: 77   SpO2: 99%     Constitutional: The patient appears well-developed andwell-nourished. Eyes - conjunctiva normal.  Conjugate gaze  Ear, nose, throat -No scars, masses, or lesions over external nose or ears, no atrophy of tongue  Neck-symmetric, no masses noted, no jugular vein distension  Respiration- chest wall appears symmetric, goodexpansion, normal effort without use of accessory muscles  Musculoskeletal - no significant wasting of muscles noted, no bony deformities, gait no gross ataxia  Extremities-no clubbing, cyanosis or edema  Skin- warm, dry, and intact. No rash, erythema, or pallor. Psychiatric - mood, affect, and behavior appear normal.     Neurologic Examination  Awake, Alert andoriented x 3  Normal speech pattern, following commands  Motor 5/5 all extremities  No deficits to light touch or pinprick sensation    Normal Gait pattern      Wound:  Dressing applied to the posterior cervical incision 2 days ago, will not remove at today's appointment. Picture taken on 8/25/2020 viewed.       DATA and IMAGING:    Lab Results   Component Value Date    WBC 7.1 08/10/2020    HGB 11.1 (L) 08/10/2020    HCT 35.5 (L) 08/10/2020    MCV 80.5 08/10/2020     08/10/2020     Lab Results   Component Value Date     08/10/2020    K 5.1 (H) 08/10/2020     08/10/2020 CO2 21 (L) 08/10/2020    BUN 30 (H) 08/10/2020    CREATININE 1.5 (H) 08/10/2020    GLUCOSE 185 (H) 08/10/2020    CALCIUM 9.5 08/10/2020    PROT 7.4 08/10/2020    LABALBU 4.3 08/10/2020    BILITOT <0.2 08/10/2020    ALKPHOS 78 08/10/2020    AST 15 08/10/2020    ALT 9 08/10/2020    LABGLOM 49 (A) 08/10/2020    GFRAA >59 08/10/2020     Lab Results   Component Value Date    INR 1.03 01/30/2020    INR 1.34 (H) 05/01/2019    INR 1.02 04/30/2019    PROTIME 12.9 01/30/2020    PROTIME 15.9 (H) 05/01/2019    PROTIME 12.8 04/30/2019    No results found. ASSESSMENT   44year old male s/p C5,C6 corpectomy with anterior cervical plating along with posterior instrumented fusion C3-C7 on 2/19/2020. On 6/17/2020 his wound dehisced and he underwent drainage of posterior seroma, washout and closure of wound. Cultures were positive with MRSA    PLAN:  Wound appears to be healing well. Follow up 2 months           ICD-10-CM    1. Wound dehiscence  T81.30XA    2. Infection of wound due to methicillin resistant Staphylococcus aureus (MRSA)  A49.02    3.  S/P cervical spinal fusion  Z98.1         Robel Henry DO

## 2020-09-08 ENCOUNTER — HOSPITAL ENCOUNTER (OUTPATIENT)
Dept: WOUND CARE | Age: 50
Discharge: HOME | End: 2020-09-08
Payer: MEDICAID

## 2020-09-08 VITALS
BODY MASS INDEX: 25.76 KG/M2 | HEART RATE: 73 BPM | WEIGHT: 184 LBS | DIASTOLIC BLOOD PRESSURE: 61 MMHG | TEMPERATURE: 97.3 F | RESPIRATION RATE: 16 BRPM | HEIGHT: 71 IN | SYSTOLIC BLOOD PRESSURE: 117 MMHG

## 2020-09-08 PROCEDURE — 11042 DBRDMT SUBQ TIS 1ST 20SQCM/<: CPT

## 2020-09-08 PROCEDURE — 11042 DBRDMT SUBQ TIS 1ST 20SQCM/<: CPT | Performed by: SURGERY

## 2020-09-08 ASSESSMENT — PAIN DESCRIPTION - FREQUENCY: FREQUENCY: CONTINUOUS

## 2020-09-08 ASSESSMENT — PAIN DESCRIPTION - DESCRIPTORS: DESCRIPTORS: ACHING

## 2020-09-08 ASSESSMENT — PAIN DESCRIPTION - ORIENTATION: ORIENTATION: POSTERIOR

## 2020-09-08 ASSESSMENT — PAIN DESCRIPTION - PROGRESSION: CLINICAL_PROGRESSION: NOT CHANGED

## 2020-09-08 ASSESSMENT — PAIN DESCRIPTION - PAIN TYPE: TYPE: ACUTE PAIN

## 2020-09-08 ASSESSMENT — PAIN DESCRIPTION - LOCATION: LOCATION: NECK

## 2020-09-08 ASSESSMENT — PAIN SCALES - GENERAL: PAINLEVEL_OUTOF10: 9

## 2020-09-08 ASSESSMENT — PAIN DESCRIPTION - ONSET: ONSET: ON-GOING

## 2020-09-08 NOTE — PROGRESS NOTES
[address]  Progress Note and Procedure Note      Chris Larson  AGE: 44 y.o. GENDER: male  : 1970  TODAY'S DATE:  2020    Subjective:     CHIEF COMPLAINT posterior neck wound     HISTORY of PRESENT ILLNESS TERRIE Larson is a 44 y.o. male who presents today for wound/ulcer evaluation. Ulcer Type:non-healing surgical  Ulcer/Wound Location:posterior neck  Contributing factors:diabetes and smoking    Patient Active Problem List   Diagnosis Code    Essential hypertension I10    Hx of bladder cancer Z85.51    Chronic bronchitis ([facility] J42    Type 2 diabetes mellitus without complication, without long-term current use of insulin ([facility]) E11.9    Mixed anxiety depressive disorder F41.8    Mixed hyperlipidemia E78.2    Postoperative anemia due to acute blood loss D62    Hx of CABG Z95.1    Primary insomnia F51.01    Anxiety and depression F41.9, F32.9    Coronary artery disease I25.10    Cervical spondylosis with myelopathy and radiculopathy M47.12, M47.22    Rupture of operation wound T81. 31XA    Wound dehiscence T81.30XA    Tobacco abuse Z72.0    Open wound of neck S11.90XA       ALLERGIES    Allergies   Allergen Reactions    Latex Other (See Comments)     BOILS AND BLISTERS- ALLERGY CALLED TO [facility] SURGERY SCHEDULING.  Demerol Hcl [Meperidine] Hives     And n/v           Objective:      /61   Pulse 73   Temp 97.3 °F (36.3 °C) (Temporal)   Resp 16   Ht 5' 11\" (1.803 m)   Wt 184 lb (83.5 kg)   BMI 25.66 kg/m²         Assessment:      Problem List Items Addressed This Visit     None          The patients pain isPain Level: 9 Pain Type: Acute pain. Wound(s) has moderately improved. Please refer to nursing measurements and assessment regarding wound pre and post debridement.   Negative Pressure Wound Therapy Other (Comment) Posterior (Active)   Wound Type Surgical 20 09   Unit Type KCI 20   Dressing Type Black foam 20 Description Full thickness 09/08/20 0918   [place]%Wound Bed 70 09/08/20 0918   Red%Wound Bed 0 09/08/20 0918   Yellow%Wound Bed 30 09/08/20 0918   Black%Wound Bed 0 09/08/20 0918   Purple%Wound Bed 0 09/08/20 0918   Other%Wound Bed 0 08/25/20 0905   Number of days: 76           Procedure Note  Indications:  Based on my examination of this patient's wound(s)/ulcer(s) today, debridement is required to promote healing and evaluate the wound base. Performed by: Crista Bartlett MD    Consent obtained:  Yes    Time out taken:  Yes    Pain Control:         Debridement: Excisional Debridement    Using curette the wound(s)/ulcer(s) was/were sharply debrided down through and including the removal of epidermis, dermis and subcutaneous tissue. Devitalized Tissue Debrided:  fibrin, biofilm and slough    Pre Debridement Measurements:  Are located in the Wound/Ulcer Documentation Flow Sheet    Wound/Ulcer #: 1    Post Debridement Measurements:  Wound/Ulcer Descriptions are Pre Debridement except measurements:          Percent of Wound(s)/Ulcer(s) Debrided: 100%    Total Surface Area Debrided:  4.96 sq cm     Diabetic/Pressure/Non Pressure Ulcers only:  Ulcer: N/A     Estimated Blood Loss:  Minimal    Hemostasis Achieved:  not needed    Response to treatment:  Well tolerated by patient. Plan:          Plan for wound - Dress per physician order  Treatment:     Compression : No   Offloading : No   Dressing : see AVS   Additional Therapy : none     1. Discussed appropriate home care of this wound. Wound redressed. 2. Written patient dismissal instructions given to patient and signed by patient or POA. 3. Follow up: 2 week(s). Mr. Denia Navarro wound is doing well since the wound vac was held. Will dc the wound vac, and continue with xeroform. Follow up in 2 weeks.      Electronically signed by Crista Bartlett MD on 9/8/2020 at 9:39 AM

## 2020-09-17 RX ORDER — ATENOLOL 50 MG/1
TABLET ORAL
Qty: 30 TABLET | Refills: 5 | Status: SHIPPED | OUTPATIENT
Start: 2020-09-17 | End: 2020-12-08 | Stop reason: SDUPTHER

## 2020-09-17 RX ORDER — BUSPIRONE HYDROCHLORIDE 10 MG/1
10 TABLET ORAL 3 TIMES DAILY PRN
Qty: 90 TABLET | Refills: 0 | Status: SHIPPED | OUTPATIENT
Start: 2020-09-17 | End: 2020-10-16

## 2020-09-17 RX ORDER — BACLOFEN 10 MG/1
TABLET ORAL
Qty: 90 TABLET | Refills: 0 | Status: SHIPPED | OUTPATIENT
Start: 2020-09-17 | End: 2020-10-16

## 2020-09-18 ENCOUNTER — VIRTUAL VISIT (OUTPATIENT)
Dept: PRIMARY CARE CLINIC | Age: 50
End: 2020-09-18
Payer: MEDICAID

## 2020-09-18 PROCEDURE — G8428 CUR MEDS NOT DOCUMENT: HCPCS | Performed by: NURSE PRACTITIONER

## 2020-09-18 PROCEDURE — 99214 OFFICE O/P EST MOD 30 MIN: CPT | Performed by: NURSE PRACTITIONER

## 2020-09-18 PROCEDURE — 3017F COLORECTAL CA SCREEN DOC REV: CPT | Performed by: NURSE PRACTITIONER

## 2020-09-18 RX ORDER — GABAPENTIN 300 MG/1
300 CAPSULE ORAL 2 TIMES DAILY
Qty: 60 CAPSULE | Refills: 1 | Status: SHIPPED | OUTPATIENT
Start: 2020-09-18 | End: 2020-10-23 | Stop reason: SDUPTHER

## 2020-09-18 ASSESSMENT — ENCOUNTER SYMPTOMS
EYES NEGATIVE: 1
RESPIRATORY NEGATIVE: 1
BACK PAIN: 1
GASTROINTESTINAL NEGATIVE: 1

## 2020-09-18 NOTE — PROGRESS NOTES
[address] [address]     Phone:  [phone]  Fax:  [phone]      2020    TELEHEALTH EVALUATION -- Audio/Visual ([facility])    HPI:    Chief Complaint   Patient presents with    Back Pain    Hypertension    Anxiety         Chris Larson (:  1970) has requested an audio/video evaluation for the following concern(s): This is a video visit for follow-up on back pain and start of Neurontin. Patient was suppose to have xray, but it has continued to hurt. He reports that the neurontin has helped with his back pain some. He has not had follow up with [facility] yet. He denies suicidal ideation. Review of Systems   Constitutional: Negative. HENT: Negative. Eyes: Negative. Respiratory: Negative. Cardiovascular: Negative. Gastrointestinal: Negative. Endocrine: Negative. Genitourinary: Negative. Musculoskeletal: Positive for back pain (improving). Skin: Negative. Neurological: Negative. Hematological: Negative. Psychiatric/Behavioral: Negative for suicidal ideas. The patient is nervous/anxious. Prior to Visit Medications    Medication Sig Taking? Authorizing Provider   gabapentin (NEURONTIN) 300 MG capsule Take 1 capsule by mouth 2 times daily for 30 days. Yes BRYAN Alcantar   baclofen (LIORESAL) 10 MG tablet TAKE 1 TABLET BY MOUTH 3 TIMES DAILY AS NEEDED FOR PAIN/SPASMS  BRYAN Alcantar   busPIRone (BUSPAR) 10 MG tablet TAKE 1 TABLET BY MOUTH 3 TIMES DAILY AS NEEDED (ANXIETY)  BRYAN Alcantar   atenolol (TENORMIN) 50 MG tablet TAKE ONE TABLET BY MOUTH EVERY DAY  BRYAN Alcantar   atorvastatin (LIPITOR) 20 MG tablet TAKE ONE TABLET BY MOUTH ONCE DAILY.   BRYAN Alcantar   traZODone (DESYREL) 50 MG tablet TAKE 2 TABLETS BY MOUTH NIGHTLY AS NEEDED FOR SLEEP  BRYAN Alcantar   metFORMIN (GLUCOPHAGE) 1000 MG tablet TAKE 1 TABLET BY MOUTH 2 TIMES DAILY (WITH MEALS)  Alexi Quijano BRYAN Garcia   lisinopril (PRINIVIL;ZESTRIL) 10 MG tablet TAKE 1 TABLET BY MOUTH DAILY  BRYAN Kelley   ARIPiprazole (ABILIFY) 5 MG tablet TAKE ONE TABLET BY MOUTH ONCE DAILY. BRYAN Kelley   clindamycin (CLEOCIN) 300 MG capsule Take 300 mg by mouth 3 times daily  Historical Provider, MD   fluticasone-salmeterol (ADVAIR) 100-50 MCG/DOSE diskus inhaler INHALE 1 PUFF INTO THE LUNGS EVERY 12 HOURS  BRYAN Kelley   omeprazole (PRILOSEC) 40 MG delayed release capsule TAKE 1 CAPSULE BY MOUTH DAILY  BRYAN Kelley   FLUoxetine (PROZAC) 40 MG capsule TAKE 1 CAPSULE BY MOUTH DAILY  Sam Thomas MD   collagenase (SANTYL) 250 UNIT/GM ointment Apply topically with wound vac dressing change. (10.5 x 4 x 2)cm  Glekyleigh Limbo, DO   sodium hypochlorite (DAKINS) 0.125 % SOLN external solution Moisten gauze apply to wound twice daily  Johanne Goodrich,    HYDROcodone-acetaminophen (NORCO)  MG per tablet Take 2 tablets by mouth every 6 hours as needed for Pain. Historical Provider, MD   fenofibrate (TRIGLIDE) 160 MG tablet TAKE ONE TABLET BY MOUTH DAILY. BRYAN Kelley   Cholecalciferol (VITAMIN D3) 1.25 MG (92245 UT) CAPS Take 1 capsule by mouth once a week Wednesday  Historical Provider, MD   sildenafil (REVATIO) 20 MG tablet Take 1-5 tablets 1-2 hours before sexual activity PRN  Historical Provider, MD   tiotropium (SPIRIVA) 18 MCG inhalation capsule Inhale 1 capsule into the lungs  Historical Provider, MD   blood glucose monitor strips Test 4 times a day & as needed for symptoms of irregular blood glucose.   BRYAN Kelley   glucose monitoring kit (FREESTYLE) monitoring kit Please provide glucometer that INS will cover for DM type 2  BRYAN Kelley   Lancets 30G MISC 1 each by Does not apply route daily 4 times daily  BRYAN Kelley   aspirin 81 MG EC tablet Take 1 tablet by mouth daily  BRYAN Brown       Social History     Tobacco Use    Smoking status: Current Every Day Smoker     Packs/day: 1.00     Years: 30.00     Pack years: 30.00    Smokeless tobacco: Never Used   Substance Use Topics    Alcohol use: Yes     Comment: Occ    Drug use: Never        Allergies   Allergen Reactions    Latex Other (See Comments)     BOILS AND BLISTERS- ALLERGY CALLED TO [facility] SURGERY SCHEDULING.  Demerol Hcl [Meperidine] Hives     And n/v   ,   Past Medical History:   Diagnosis Date    CAD (coronary artery disease)     sees [facility] cardiology    Cancer [facility])     Bladder    [facility]) [facility]     Depression     Diabetes mellitus ([facility]     History of blood transfusion     unsure thinks he did    Hyperlipidemia     Hypertension    ,   Past Surgical History:   Procedure Laterality Date    BACK SURGERY      X3     BLADDER REMOVAL      CARDIAC CATHETERIZATION      CERVICAL FUSION N/A 2/19/2020    Phase 1:  C5 and C6 corpectomy with placement of an expandable cage and anterior cervical plate R3-X9. performed by Anay Hallman DO at [address] [place] [address] N/A 6/17/2020    POSTERIOR CERVICAL WOUND [address] OUT AND CLOSURE performed by Anay Hallman DO at [address] [place] [address] N/A 6/26/2020    [place] [address] [place] [address] performed by Anay Hallman DO at [address] [place] [address] N/A 9/10/2019    Dr Jh Verduzco, 5 yr recall    CORONARY ARTERY BYPASS GRAFT N/A 5/1/2019    CORONARY ARTERY BYPASS GRAFT X 3 WITH LEFT INTERNAL MAMMARY ARTERY, ENDOSCOPIC  VEIN HARVEST, WITH PERFUSION. performed by Cheryle Churn, MD at [place] [address] N/A 2/19/2020    Phase 2:  Posterior instrumented fusion C3-C7 using lateral mass screws and rods.  performed by Anay Hallman DO at [facility]      lower ext    PROSTATECTOMY     ,   Family History   Problem Relation Age of Onset    Cancer Mother     Vision Loss Mother     Breast Cancer Mother     Coronary Art Dis Father     Obesity Father  Kidney Disease Father     High Blood Pressure Father     High Cholesterol Father     Hypercalcemia Sister     Obesity Brother     High Blood Pressure Brother        PHYSICAL EXAMINATION:  [ INSTRUCTIONS:  \"[x]\" Indicates a positive item  \"[]\" Indicates a negative item  -- DELETE ALL ITEMS NOT EXAMINED]  Vital Signs: (As obtained by patient/caregiver at home)        Constitutional: [x] Appears well-developed and well-nourished [x] No apparent distress      [] Abnormal   Mental status  [x] Alert and awake  [x] Oriented to person/place/time [x]Able to follow commands        Eyes:  EOM    [x]  Normal  [] Abnormal-  Sclera  [x]  Normal  [] Abnormal -         Discharge []  None visible  [] Abnormal -    HENT:   [x] Normocephalic, atraumatic. [] Abnormal   [x] Mouth/Throat: Mucous membranes are moist.     External Ears [x] Normal  [] Abnormal-    Neck: [x] No visualized mass     Pulmonary/Chest: [x] Respiratory effort normal.  [x] No visualized signs of difficulty breathing or respiratory distress        [] Abnormal      Musculoskeletal:   [x] Normal gait with no signs of ataxia         [x] Normal range of motion of neck        [] Abnormal       Neurological:        [x] No Facial Asymmetry (Cranial nerve 7 motor function) (limited exam to video visit)          [] No gaze palsy        [] Abnormal         Skin:        [x] No significant exanthematous lesions or discoloration noted on facial skin         [] Abnormal            Psychiatric:       [x] Normal Affect [] Abnormal        [] No Hallucinations    Other pertinent observable physical exam findings:-    Due to this being a TeleHealth encounter, evaluation of the following organ systems is limited: Vitals/Constitutional/EENT/Resp/CV/GI//MS/Neuro/Skin/Heme-Lymph-Imm. ASSESSMENT/PLAN:  1. Lumbar pain    - gabapentin (NEURONTIN) 300 MG capsule; Take 1 capsule by mouth 2 times daily for 30 days. Dispense: 60 capsule; Refill: 1    2.  Anxiety and depression      3. Open wound of neck, subsequent encounter      I am increasing gabapentin up to 300 mg twice daily to see if this will help with back pain and increased anxiety. He is to follow-up with Susan Mooney as previously discussed. He is also to follow-up with wound care as scheduled. No follow-ups on file. An  electronic signature was used to authenticate this note. --BRYAN Soni on 9/18/2020 at 10:18 AM        Pursuant to the emergency declaration under the [facility] waiver authority and the [employer] and [employer] Act, this Virtual  Visit was conducted, with patient's consent, to reduce the patient's risk of exposure to COVID-19 and provide continuity of care for an established patient. Services were provided through a video synchronous discussion virtually to substitute for in-person clinic visit.

## 2020-09-29 ENCOUNTER — HOSPITAL ENCOUNTER (OUTPATIENT)
Dept: WOUND CARE | Age: 50
Discharge: HOME | End: 2020-09-29
Payer: MEDICAID

## 2020-09-29 VITALS
DIASTOLIC BLOOD PRESSURE: 60 MMHG | HEIGHT: 71 IN | BODY MASS INDEX: 26.46 KG/M2 | TEMPERATURE: 98 F | HEART RATE: 68 BPM | RESPIRATION RATE: 18 BRPM | WEIGHT: 189 LBS | SYSTOLIC BLOOD PRESSURE: 118 MMHG

## 2020-09-29 PROCEDURE — 97597 DBRDMT OPN WND 1ST 20 CM/<: CPT | Performed by: SURGERY

## 2020-09-29 PROCEDURE — 97597 DBRDMT OPN WND 1ST 20 CM/<: CPT

## 2020-09-29 ASSESSMENT — PAIN DESCRIPTION - PAIN TYPE: TYPE: CHRONIC PAIN

## 2020-09-29 ASSESSMENT — PAIN DESCRIPTION - LOCATION: LOCATION: BACK;LEG;NECK

## 2020-09-29 ASSESSMENT — PAIN DESCRIPTION - DESCRIPTORS: DESCRIPTORS: ACHING

## 2020-09-29 ASSESSMENT — PAIN DESCRIPTION - FREQUENCY: FREQUENCY: CONTINUOUS

## 2020-09-29 ASSESSMENT — PAIN DESCRIPTION - ONSET: ONSET: ON-GOING

## 2020-09-29 ASSESSMENT — PAIN DESCRIPTION - PROGRESSION: CLINICAL_PROGRESSION: NOT CHANGED

## 2020-09-29 ASSESSMENT — PAIN SCALES - GENERAL: PAINLEVEL_OUTOF10: 9

## 2020-09-29 NOTE — PROGRESS NOTES
Clean;Dry; Intact 09/29/20 0917   Dressing Changed Changed/New 09/08/20 1212   Dressing/Treatment Xeroform;4x4;Medipore 09/08/20 1212   Wound Cleansed Rinsed/Irrigated with saline 09/29/20 0917   Wound Length (cm) 0.3 cm 09/29/20 0917   Wound Width (cm) 0.2 cm 09/29/20 0917   Wound Depth (cm) 0.1 cm 09/29/20 0917   Wound Surface Area (cm^2) 0.06 cm^2 09/29/20 0917   Change in Wound Size % (l*w) 99.83 09/29/20 0917   Wound Volume (cm^3) 0.01 cm^3 09/29/20 0917   Wound Healing % 100 09/29/20 0917   Post-Procedure Length (cm) 0.3 cm 09/29/20 0937   Post-Procedure Width (cm) 0.2 cm 09/29/20 0937   Post-Procedure Depth (cm) 0.1 cm 09/29/20 0937   Post-Procedure Surface Area (cm^2) 0.06 cm^2 09/29/20 0937   Post-Procedure Volume (cm^3) 0.01 cm^3 09/29/20 0937   Distance Tunneling (cm) 0 cm 09/29/20 0917   Tunneling Position ___ O'Clock 0 09/29/20 0917   Undermining Starts ___ O'Clock 0 09/29/20 0917   Undermining Ends___ O'Clock 0 09/29/20 0917   Undermining Maxium Distance (cm) 0 09/29/20 0917   Wound Assessment Brown;Dry 09/29/20 0917   Drainage Amount None 09/29/20 0917   Drainage Description Serosanguinous 09/08/20 0918   Odor None 09/29/20 0917   Margins Attached edges 09/29/20 0917   Exposed structure Bone 07/07/20 0839   Chrissy-wound Assessment Dry;Pink 09/29/20 0917   Non-staged Wound Description Full thickness 09/29/20 0917   Pink%Wound Bed 0 09/29/20 0917   Red%Wound Bed 0 09/29/20 0917   Yellow%Wound Bed 0 09/29/20 0917   Black%Wound Bed 0 09/29/20 0917   Purple%Wound Bed 0 09/29/20 0917   Other%Wound Bed 100 09/29/20 0917   Number of days: 97           Procedure Note  Indications:  Based on my examination of this patient's wound(s)/ulcer(s) today, debridement is required to promote healing and evaluate the wound base.     Performed by: Zoya Ramírez MD    Consent obtained:  Yes    Time out taken:  Yes    Pain Control:         Debridement: Selective Debridement/Non-Excisional Debridement    Using curette the wound(s)/ulcer(s) was/were sharply debrided down through and including the removal of epidermis and dermis. Devitalized Tissue Debrided:  fibrin and biofilm    Pre Debridement Measurements:  Are located in the Wound/Ulcer Documentation Flow Sheet    Wound/Ulcer #: 1    Post Debridement Measurements:  Wound/Ulcer Descriptions are Pre Debridement except measurements:          Percent of Wound(s)/Ulcer(s) Debrided: 100%    Total Surface Area Debrided:  0.01 sq cm     Diabetic/Pressure/Non Pressure Ulcers only:  Ulcer: N/A     Estimated Blood Loss:  None    Hemostasis Achieved:  not needed    Response to treatment:  Well tolerated by patient. Plan:          Plan for wound - Dress per physician order  Treatment:     Compression : No   Offloading : No   Dressing : see AVS   Additional Therapy : none     1. Discussed appropriate home care of this wound. Wound redressed. 2. Written patient dismissal instructions given to patient and signed by patient or POA. 3. Follow up: 2 week(s). Mr. Christiano Daniels wound has healed over nicely, with just a small opening remaining. Will have him switch to just using a moisturizer such as vasoline or aquaphor. Follow up in two weeks and hopefully it will be closed at that time.      Electronically signed by Constantin Hooper MD on 9/29/2020 at 9:39 AM

## 2020-10-13 ENCOUNTER — HOSPITAL ENCOUNTER (OUTPATIENT)
Dept: WOUND CARE | Age: 50
Discharge: HOME | End: 2020-10-13
Payer: MEDICAID

## 2020-10-13 VITALS
WEIGHT: 189 LBS | BODY MASS INDEX: 26.46 KG/M2 | DIASTOLIC BLOOD PRESSURE: 65 MMHG | TEMPERATURE: 97.1 F | RESPIRATION RATE: 16 BRPM | SYSTOLIC BLOOD PRESSURE: 131 MMHG | HEIGHT: 71 IN | HEART RATE: 64 BPM

## 2020-10-13 PROCEDURE — 99211 OFF/OP EST MAY X REQ PHY/QHP: CPT | Performed by: SURGERY

## 2020-10-13 PROCEDURE — 99212 OFFICE O/P EST SF 10 MIN: CPT

## 2020-10-13 NOTE — PROGRESS NOTES
[address]  Progress Note and Procedure Note      Chris Larson  AGE: 44 y.o. GENDER: male  : 1970  TODAY'S DATE:  10/13/2020    Subjective:     CHIEF COMPLAINT neck wound     HISTORY of PRESENT ILLNESS HPI   Marquis Larson is a 44 y.o. male who presents today for wound/ulcer evaluation. Ulcer Type:non-healing surgical  Ulcer/Wound Location:posterior neck  Contributing factors:diabetes and smoking    Patient Active Problem List   Diagnosis Code    Essential hypertension I10    Hx of bladder cancer Z85.51    Chronic bronchitis ([facility] [address] [facility] J42    Type 2 diabetes mellitus without complication, without long-term current use of insulin ([facility]) E11.9    Mixed anxiety depressive disorder F41.8    Mixed hyperlipidemia E78.2    Postoperative anemia due to acute blood loss D62    Hx of CABG Z95.1    Primary insomnia F51.01    Anxiety and depression F41.9, F32.9    Coronary artery disease I25.10    Cervical spondylosis with myelopathy and radiculopathy M47.12, M47.22    Rupture of operation wound T81. 31XA    Wound dehiscence T81.30XA    Tobacco abuse Z72.0    Open wound of neck S11.90XA       ALLERGIES    Allergies   Allergen Reactions    Latex Other (See Comments)     BOILS AND BLISTERS- ALLERGY CALLED TO [facility] SURGERY SCHEDULING.  Demerol Hcl [Meperidine] Hives     And n/v           Objective:      /65   Pulse 64   Temp 97.1 °F (36.2 °C) (Temporal)   Resp 16   Ht 5' 11\" (1.803 m)   Wt 189 lb (85.7 kg)   BMI 26.36 kg/m²         Assessment:      Problem List Items Addressed This Visit     None          The patients pain isPain Level: 0  . Wound(s) is healed. Please refer to nursing measurements and assessment regarding wound pre and post debridement.   Wound 20 Neck Posterior Wound 1: POSTERIOR NECK SURGICAL (Active)   Wound Image   10/13/20 0936   Wound Etiology Non-Healing Surgical 20 09   Wound Cleansed Rinsed/Irrigated with saline 20 0917

## 2020-10-13 NOTE — PLAN OF CARE
Problem: Wound:  Goal: Will show signs of wound healing; wound closure and no evidence of infection  Description: Will show signs of wound healing; wound closure and no evidence of infection  Outcome: Completed     Problem: Smoking cessation:  Goal: Ability to formulate a plan to maintain a tobacco-free life will be supported  Description: Ability to formulate a plan to maintain a tobacco-free life will be supported  Outcome: Completed     Problem: Blood Glucose:  Goal: Ability to maintain appropriate glucose levels will improve  Description: Ability to maintain appropriate glucose levels will improve  Outcome: Completed The patient is a 8m Male complaining of fever.

## 2020-10-16 RX ORDER — TRAZODONE HYDROCHLORIDE 50 MG/1
100 TABLET ORAL NIGHTLY PRN
Qty: 60 TABLET | Refills: 1 | Status: SHIPPED | OUTPATIENT
Start: 2020-10-16 | End: 2020-12-08 | Stop reason: SDUPTHER

## 2020-10-16 RX ORDER — OMEPRAZOLE 40 MG/1
40 CAPSULE, DELAYED RELEASE ORAL DAILY
Qty: 30 CAPSULE | Refills: 2 | Status: SHIPPED | OUTPATIENT
Start: 2020-10-16 | End: 2020-12-08 | Stop reason: SDUPTHER

## 2020-10-16 RX ORDER — FENOFIBRATE 160 MG/1
TABLET ORAL
Qty: 30 TABLET | Refills: 5 | Status: SHIPPED | OUTPATIENT
Start: 2020-10-16 | End: 2020-12-08 | Stop reason: SDUPTHER

## 2020-10-16 RX ORDER — BUSPIRONE HYDROCHLORIDE 10 MG/1
10 TABLET ORAL 3 TIMES DAILY PRN
Qty: 90 TABLET | Refills: 0 | Status: SHIPPED | OUTPATIENT
Start: 2020-10-16 | End: 2020-11-15

## 2020-10-16 RX ORDER — BACLOFEN 10 MG/1
TABLET ORAL
Qty: 90 TABLET | Refills: 0 | Status: SHIPPED | OUTPATIENT
Start: 2020-10-16 | End: 2020-11-15

## 2020-10-22 LAB
ALBUMIN SERPL-MCNC: 3.9 G/DL (ref 3.5–5.2)
ALP BLD-CCNC: 68 U/L (ref 40–130)
ALT SERPL-CCNC: 10 U/L (ref 5–41)
ANION GAP SERPL CALCULATED.3IONS-SCNC: 13 MMOL/L (ref 7–19)
AST SERPL-CCNC: 11 U/L (ref 5–40)
BASOPHILS ABSOLUTE: 0.1 K/UL (ref 0–0.2)
BASOPHILS RELATIVE PERCENT: 0.6 % (ref 0–1)
BILIRUB SERPL-MCNC: <0.2 MG/DL (ref 0.2–1.2)
BUN BLDV-MCNC: 37 MG/DL (ref 6–20)
C-REACTIVE PROTEIN: 4.36 MG/DL (ref 0–0.5)
CALCIUM SERPL-MCNC: 9.4 MG/DL (ref 8.6–10)
CHLORIDE BLD-SCNC: 103 MMOL/L (ref 98–111)
CO2: 22 MMOL/L (ref 22–29)
CREAT SERPL-MCNC: 1.5 MG/DL (ref 0.5–1.2)
EOSINOPHILS ABSOLUTE: 0.4 K/UL (ref 0–0.6)
EOSINOPHILS RELATIVE PERCENT: 4.6 % (ref 0–5)
GFR AFRICAN AMERICAN: >59
GFR NON-AFRICAN AMERICAN: 49
GLUCOSE BLD-MCNC: 103 MG/DL (ref 74–109)
HCT VFR BLD CALC: 30.8 % (ref 42–52)
HEMOGLOBIN: 9.6 G/DL (ref 14–18)
IMMATURE GRANULOCYTES #: 0.1 K/UL
LYMPHOCYTES ABSOLUTE: 2.2 K/UL (ref 1.1–4.5)
LYMPHOCYTES RELATIVE PERCENT: 27 % (ref 20–40)
MCH RBC QN AUTO: 26 PG (ref 27–31)
MCHC RBC AUTO-ENTMCNC: 31.2 G/DL (ref 33–37)
MCV RBC AUTO: 83.5 FL (ref 80–94)
MONOCYTES ABSOLUTE: 1.3 K/UL (ref 0–0.9)
MONOCYTES RELATIVE PERCENT: 15.3 % (ref 0–10)
NEUTROPHILS ABSOLUTE: 4.3 K/UL (ref 1.5–7.5)
NEUTROPHILS RELATIVE PERCENT: 51.3 % (ref 50–65)
PDW BLD-RTO: 15 % (ref 11.5–14.5)
PLATELET # BLD: 209 K/UL (ref 130–400)
PMV BLD AUTO: 10.6 FL (ref 9.4–12.4)
POTASSIUM SERPL-SCNC: 5.2 MMOL/L (ref 3.5–5)
RBC # BLD: 3.69 M/UL (ref 4.7–6.1)
SODIUM BLD-SCNC: 138 MMOL/L (ref 136–145)
TOTAL PROTEIN: 7 G/DL (ref 6.6–8.7)
WBC # BLD: 8.3 K/UL (ref 4.8–10.8)

## 2020-10-23 ENCOUNTER — VIRTUAL VISIT (OUTPATIENT)
Dept: PRIMARY CARE CLINIC | Age: 50
End: 2020-10-23
Payer: MEDICAID

## 2020-10-23 PROCEDURE — G8428 CUR MEDS NOT DOCUMENT: HCPCS | Performed by: NURSE PRACTITIONER

## 2020-10-23 PROCEDURE — 3017F COLORECTAL CA SCREEN DOC REV: CPT | Performed by: NURSE PRACTITIONER

## 2020-10-23 PROCEDURE — 99213 OFFICE O/P EST LOW 20 MIN: CPT | Performed by: NURSE PRACTITIONER

## 2020-10-23 RX ORDER — GABAPENTIN 300 MG/1
300 CAPSULE ORAL 3 TIMES DAILY
Qty: 90 CAPSULE | Refills: 1 | Status: SHIPPED | OUTPATIENT
Start: 2020-10-23 | End: 2021-01-13

## 2020-10-23 ASSESSMENT — ENCOUNTER SYMPTOMS
BACK PAIN: 1
EYES NEGATIVE: 1
RESPIRATORY NEGATIVE: 1
GASTROINTESTINAL NEGATIVE: 1

## 2020-10-23 NOTE — PROGRESS NOTES
[facility]   [address] [place] [address]     Phone:  [phone]  Fax:  [phone]      10/23/2020    TELEHEALTH EVALUATION -- Audio/Visual (During ERICN-76 public health emergency)    HPI:    Chief Complaint   Patient presents with    Back Pain         Chris Larson (:  1970) has requested an audio/video evaluation for the following concern(s): This is a VV for complaints of worsening lumbar pain. He reports that the pain has been worsening over the last few months, but has been greater than 1 year. He reports not being able to walk very long due to the pain. If he leans over then the pain is improved.  disability hearing. Review of Systems   Constitutional: Negative. HENT: Negative. Eyes: Negative. Respiratory: Negative. Cardiovascular: Negative. Gastrointestinal: Negative. Endocrine: Negative. Genitourinary: Negative. Musculoskeletal: Positive for back pain (lumbar). Skin: Negative. Neurological: Negative. Hematological: Negative. Psychiatric/Behavioral: Negative. Prior to Visit Medications    Medication Sig Taking? Authorizing Provider   gabapentin (NEURONTIN) 300 MG capsule Take 1 capsule by mouth 3 times daily for 30 days. Yes BRYAN Lo   fenofibrate (TRIGLIDE) 160 MG tablet TAKE ONE TABLET BY MOUTH DAILY. Yes BRYAN Lo   omeprazole (PRILOSEC) 40 MG delayed release capsule TAKE 1 CAPSULE BY MOUTH DAILY Yes BRYAN Lo   traZODone (DESYREL) 50 MG tablet TAKE 2 TABLETS BY MOUTH NIGHTLY AS NEEDED FOR SLEEP Yes BRYAN oL   baclofen (LIORESAL) 10 MG tablet TAKE 1 TABLET BY MOUTH 3 TIMES DAILY AS NEEDED FOR PAIN/SPASMS Yes BRYAN Lo   busPIRone (BUSPAR) 10 MG tablet TAKE 1 TABLET BY MOUTH 3 TIMES DAILY AS NEEDED (ANXIETY) Yes BRYAN Lo   atorvastatin (LIPITOR) 20 MG tablet TAKE ONE TABLET BY MOUTH ONCE DAILY.  Yes BRYAN Lo   metFORMIN (GLUCOPHAGE) 1000 MG tablet TAKE 1 TABLET BY MOUTH 2 TIMES DAILY (WITH MEALS) Yes BRYAN Santana   lisinopril (PRINIVIL;ZESTRIL) 10 MG tablet TAKE 1 TABLET BY MOUTH DAILY Yes BRYAN Santana   fluticasone-salmeterol (ADVAIR) 100-50 MCG/DOSE diskus inhaler INHALE 1 PUFF INTO THE LUNGS EVERY 12 HOURS Yes BRYAN Santana   FLUoxetine (PROZAC) 40 MG capsule TAKE 1 CAPSULE BY MOUTH DAILY Yes Estevan Otero MD   blood glucose monitor strips Test 4 times a day & as needed for symptoms of irregular blood glucose. Yes BRYAN Santana   glucose monitoring kit (FREESTYLE) monitoring kit Please provide glucometer that INS will cover for DM type 2 Yes BRYAN Santana   Lancets 30G MISC 1 each by Does not apply route daily 4 times daily Yes BRYAN Santana   aspirin 81 MG EC tablet Take 1 tablet by mouth daily Yes BRYAN Maciel   atenolol (TENORMIN) 50 MG tablet TAKE ONE TABLET BY MOUTH EVERY DAY  BRYAN Santana   ARIPiprazole (ABILIFY) 5 MG tablet TAKE ONE TABLET BY MOUTH ONCE DAILY. BRYAN Santana   sodium hypochlorite (DAKINS) 0.125 % SOLN external solution Moisten gauze apply to wound twice daily  Fidel Gutierrez DO   HYDROcodone-acetaminophen (NORCO)  MG per tablet Take 2 tablets by mouth every 6 hours as needed for Pain. Historical Provider, MD   Cholecalciferol (VITAMIN D3) 1.25 MG (88464 UT) CAPS Take 1 capsule by mouth once a week Wednesday  Historical Provider, MD   sildenafil (REVATIO) 20 MG tablet Take 1-5 tablets 1-2 hours before sexual activity PRN  Historical Provider, MD   tiotropium (SPIRIVA) 18 MCG inhalation capsule Inhale 1 capsule into the lungs  Historical Provider, MD       Social History     Tobacco Use    Smoking status: Current Every Day Smoker     Packs/day: 1.00     Years: 30.00     Pack years: 30.00    Smokeless tobacco: Never Used    Tobacco comment: sometimes less   Substance Use Topics    Alcohol use: Yes     Comment:  Occ    Drug use: Never        Allergies   Allergen Reactions  Latex Other (See Comments)     BOILS AND BLISTERS- ALLERGY CALLED TO [facility] SURGERY SCHEDULING.  Demerol Hcl [Meperidine] Hives     And n/v   ,   Past Medical History:   Diagnosis Date    CAD (coronary artery disease)     sees [facility] cardiology    Cancer [facility])     Bladder    COPD [facility] obstructive pulmonary [facility]) [facility] [address]     Depression     Diabetes mellitus ([facility] [address]     History of blood transfusion     unsure thinks he did    Hyperlipidemia     Hypertension    ,   Past Surgical History:   Procedure Laterality Date    BACK SURGERY      X3     BLADDER REMOVAL      CARDIAC CATHETERIZATION      CERVICAL FUSION N/A 2/19/2020    Phase 1:  C5 and C6 corpectomy with placement of an expandable cage and anterior cervical plate F8-Y3. performed by Dane Ardon DO at [address] [place] [address] N/A 6/17/2020    POSTERIOR CERVICAL WOUND [address] OUT AND CLOSURE performed by Dane Ardon DO at [address] [place] [address] N/A 6/26/2020    [place] [address] [place] [address] performed by Dane Ardon DO at [address] [place] [address] N/A 9/10/2019    Dr Rudolph Siddiqui, 5 yr recall    CORONARY ARTERY BYPASS GRAFT N/A 5/1/2019    CORONARY ARTERY BYPASS GRAFT X 3 WITH LEFT INTERNAL MAMMARY ARTERY, ENDOSCOPIC  VEIN HARVEST, WITH PERFUSION. performed by Yue Cartwright MD at [place] [address] N/A 2/19/2020    Phase 2:  Posterior instrumented fusion C3-C7 using lateral mass screws and rods.  performed by Dane Ardon DO at [facility]      lower ext    PROSTATECTOMY     ,   Family History   Problem Relation Age of Onset    Cancer Mother     Vision Loss Mother     Breast Cancer Mother     Coronary Art Dis Father     Obesity Father     Kidney Disease Father     High Blood Pressure Father     High Cholesterol Father     Hypercalcemia Sister     Obesity Brother     High Blood Pressure Brother        PHYSICAL EXAMINATION:  [ INSTRUCTIONS:  \"[x]\" Indicates a positive item  \"[]\" Indicates a negative item  -- DELETE ALL ITEMS NOT EXAMINED]  Vital Signs: (As obtained by patient/caregiver at home)        Constitutional: [x] Appears well-developed and well-nourished [x] No apparent distress      [] Abnormal   Mental status  [x] Alert and awake  [x] Oriented to person/place/time [x]Able to follow commands        Eyes:  EOM    [x]  Normal  [] Abnormal-  Sclera  [x]  Normal  [] Abnormal -         Discharge []  None visible  [] Abnormal -    HENT:   [x] Normocephalic, atraumatic. [] Abnormal   [x] Mouth/Throat: Mucous membranes are moist.     External Ears [x] Normal  [] Abnormal-    Neck: [x] No visualized mass     Pulmonary/Chest: [x] Respiratory effort normal.  [x] No visualized signs of difficulty breathing or respiratory distress        [] Abnormal      Musculoskeletal:   [] Normal gait with no signs of ataxia         [x] Normal range of motion of neck        [x] Abnormal       Neurological:        [x] No Facial Asymmetry (Cranial nerve 7 motor function) (limited exam to video visit)          [] No gaze palsy        [] Abnormal         Skin:        [x] No significant exanthematous lesions or discoloration noted on facial skin         [] Abnormal            Psychiatric:       [x] Normal Affect [] Abnormal        [] No Hallucinations    Other pertinent observable physical exam findings:-    Due to this being a TeleHealth encounter, evaluation of the following organ systems is limited: Vitals/Constitutional/EENT/Resp/CV/GI//MS/Neuro/Skin/Heme-Lymph-Imm. ASSESSMENT/PLAN:  1. Lumbar pain    - gabapentin (NEURONTIN) 300 MG capsule; Take 1 capsule by mouth 3 times daily for 30 days. Dispense: 90 capsule; Refill: 1    2. Neuropathy      Increasing Neurontin up to TID. Xray on lumbar spine. He will need MRI after xray      Return for Keep follow up as scheduled. An  electronic signature was used to authenticate this note.     --BRYAN Vanessa on 10/23/2020 at 8:53 AM        Pursuant to the emergency declaration under the [facility], [facility] waiver authority and the [employer] Act, this Virtual  Visit was conducted, with patient's consent, to reduce the patient's risk of exposure to COVID-19 and provide continuity of care for an established patient. Services were provided through a video synchronous discussion virtually to substitute for in-person clinic visit.

## 2020-10-27 ENCOUNTER — OFFICE VISIT (OUTPATIENT)
Dept: NEUROSURGERY | Age: 50
End: 2020-10-27
Payer: MEDICAID

## 2020-10-27 VITALS
OXYGEN SATURATION: 85 % | HEIGHT: 71 IN | SYSTOLIC BLOOD PRESSURE: 107 MMHG | BODY MASS INDEX: 26.6 KG/M2 | HEART RATE: 250 BPM | DIASTOLIC BLOOD PRESSURE: 59 MMHG | WEIGHT: 190 LBS

## 2020-10-27 PROCEDURE — 99213 OFFICE O/P EST LOW 20 MIN: CPT | Performed by: NEUROLOGICAL SURGERY

## 2020-10-27 PROCEDURE — G8484 FLU IMMUNIZE NO ADMIN: HCPCS | Performed by: NEUROLOGICAL SURGERY

## 2020-10-27 PROCEDURE — 4004F PT TOBACCO SCREEN RCVD TLK: CPT | Performed by: NEUROLOGICAL SURGERY

## 2020-10-27 PROCEDURE — G8419 CALC BMI OUT NRM PARAM NOF/U: HCPCS | Performed by: NEUROLOGICAL SURGERY

## 2020-10-27 PROCEDURE — 3017F COLORECTAL CA SCREEN DOC REV: CPT | Performed by: NEUROLOGICAL SURGERY

## 2020-10-27 PROCEDURE — G8427 DOCREV CUR MEDS BY ELIG CLIN: HCPCS | Performed by: NEUROLOGICAL SURGERY

## 2020-10-27 ASSESSMENT — ENCOUNTER SYMPTOMS
GASTROINTESTINAL NEGATIVE: 1
RESPIRATORY NEGATIVE: 1
EYES NEGATIVE: 1

## 2020-10-27 NOTE — PROGRESS NOTES
obstructive pulmonary disease) ([facility] [address]     Depression     Diabetes mellitus ([facility] [address] [facility]     History of blood transfusion     unsure thinks he did    Hyperlipidemia     Hypertension        Past Surgical History:   Procedure Laterality Date    BACK SURGERY      X3     BLADDER REMOVAL      CARDIAC CATHETERIZATION      CERVICAL FUSION N/A 2/19/2020    Phase 1:  C5 and C6 corpectomy with placement of an expandable cage and anterior cervical plate H3-O5. performed by Tricia Wallis DO at [facility] N/A 6/17/2020    POSTERIOR CERVICAL WOUND [address] OUT AND CLOSURE performed by Tricia Wallis DO at [facility] N/A 6/26/2020    [place] [facility] performed by Tricia Wallis DO at [facility] COLONOSCOPY N/A 9/10/2019    Dr Anne Higuera, 5 yr recall    CORONARY ARTERY BYPASS GRAFT N/A 5/1/2019    CORONARY ARTERY BYPASS GRAFT X 3 WITH LEFT INTERNAL MAMMARY ARTERY, ENDOSCOPIC  VEIN HARVEST, WITH PERFUSION. performed by Kadeem Pinzon MD at [address] [place] [address] N/A 2/19/2020    Phase 2:  Posterior instrumented fusion C3-C7 using lateral mass screws and rods. performed by Tricia Wallis DO at [facility]      lower ext    PROSTATECTOMY          Medications    Current Outpatient Medications:     gabapentin (NEURONTIN) 300 MG capsule, Take 1 capsule by mouth 3 times daily for 30 days. , Disp: 90 capsule, Rfl: 1    fenofibrate (TRIGLIDE) 160 MG tablet, TAKE ONE TABLET BY MOUTH DAILY. , Disp: 30 tablet, Rfl: 5    omeprazole (PRILOSEC) 40 MG delayed release capsule, TAKE 1 CAPSULE BY MOUTH DAILY, Disp: 30 capsule, Rfl: 2    traZODone (DESYREL) 50 MG tablet, TAKE 2 TABLETS BY MOUTH NIGHTLY AS NEEDED FOR SLEEP, Disp: 60 tablet, Rfl: 1    baclofen (LIORESAL) 10 MG tablet, TAKE 1 TABLET BY MOUTH 3 TIMES DAILY AS NEEDED FOR PAIN/SPASMS, Disp: 90 tablet, Rfl: 0    busPIRone (BUSPAR) 10 MG tablet, TAKE 1 TABLET BY MOUTH 3 TIMES DAILY AS NEEDED (ANXIETY), Disp: 90 tablet, Rfl: 0    atenolol (TENORMIN) 50 MG tablet, TAKE ONE TABLET BY MOUTH EVERY DAY, Disp: 30 tablet, Rfl: 5    atorvastatin (LIPITOR) 20 MG tablet, TAKE ONE TABLET BY MOUTH ONCE DAILY. , Disp: 30 tablet, Rfl: 3    metFORMIN (GLUCOPHAGE) 1000 MG tablet, TAKE 1 TABLET BY MOUTH 2 TIMES DAILY (WITH MEALS), Disp: 60 tablet, Rfl: 5    lisinopril (PRINIVIL;ZESTRIL) 10 MG tablet, TAKE 1 TABLET BY MOUTH DAILY, Disp: 30 tablet, Rfl: 3    ARIPiprazole (ABILIFY) 5 MG tablet, TAKE ONE TABLET BY MOUTH ONCE DAILY. , Disp: 30 tablet, Rfl: 3    fluticasone-salmeterol (ADVAIR) 100-50 MCG/DOSE diskus inhaler, INHALE 1 PUFF INTO THE LUNGS EVERY 12 HOURS, Disp: 1 Inhaler, Rfl: 3    FLUoxetine (PROZAC) 40 MG capsule, TAKE 1 CAPSULE BY MOUTH DAILY, Disp: 30 capsule, Rfl: 3    sodium hypochlorite (DAKINS) 0.125 % SOLN external solution, Moisten gauze apply to wound twice daily, Disp: 1000 mL, Rfl: 2    HYDROcodone-acetaminophen (NORCO)  MG per tablet, Take 2 tablets by mouth every 6 hours as needed for Pain., Disp: , Rfl:     Cholecalciferol (VITAMIN D3) 1.25 MG (57772 UT) CAPS, Take 1 capsule by mouth once a week Wednesday, Disp: , Rfl:     sildenafil (REVATIO) 20 MG tablet, Take 1-5 tablets 1-2 hours before sexual activity PRN, Disp: , Rfl:     tiotropium (SPIRIVA) 18 MCG inhalation capsule, Inhale 1 capsule into the lungs, Disp: , Rfl:     blood glucose monitor strips, Test 4 times a day & as needed for symptoms of irregular blood glucose., Disp: 100 strip, Rfl: 3    glucose monitoring kit (FREESTYLE) monitoring kit, Please provide glucometer that INS will cover for DM type 2, Disp: 1 kit, Rfl: 0    Lancets 30G MISC, 1 each by Does not apply route daily 4 times daily, Disp: 100 each, Rfl: 3    aspirin 81 MG EC tablet, Take 1 tablet by mouth daily, Disp: 30 tablet, Rfl: 3  Latex and Demerol hcl [meperidine]    Social History  Social History     Tobacco Use   Smoking

## 2020-11-05 ENCOUNTER — OFFICE VISIT (OUTPATIENT)
Dept: UROLOGY | Facility: CLINIC | Age: 50
End: 2020-11-05

## 2020-11-05 VITALS — TEMPERATURE: 97.1 F | HEIGHT: 71 IN | BODY MASS INDEX: 27.3 KG/M2 | WEIGHT: 195 LBS

## 2020-11-05 DIAGNOSIS — N52.32 ERECTILE DYSFUNCTION AFTER RADICAL CYSTECTOMY: ICD-10-CM

## 2020-11-05 DIAGNOSIS — E34.9 TESTOSTERONE DEFICIENCY: ICD-10-CM

## 2020-11-05 DIAGNOSIS — C67.8 MALIGNANT NEOPLASM OF OVERLAPPING SITES OF BLADDER (HCC): Primary | ICD-10-CM

## 2020-11-05 LAB
ALBUMIN SERPL-MCNC: 4.3 G/DL (ref 3.5–5.2)
ALP BLD-CCNC: 67 U/L (ref 40–130)
ALT SERPL-CCNC: 7 U/L (ref 5–41)
ANION GAP SERPL CALCULATED.3IONS-SCNC: 14 MMOL/L (ref 7–19)
AST SERPL-CCNC: 13 U/L (ref 5–40)
BILIRUB SERPL-MCNC: <0.2 MG/DL (ref 0.2–1.2)
BUN BLDV-MCNC: 20 MG/DL (ref 6–20)
C-REACTIVE PROTEIN: 0.1 MG/DL (ref 0–0.5)
CALCIUM SERPL-MCNC: 9 MG/DL (ref 8.6–10)
CHLORIDE BLD-SCNC: 106 MMOL/L (ref 98–111)
CO2: 20 MMOL/L (ref 22–29)
CREAT SERPL-MCNC: 1.6 MG/DL (ref 0.5–1.2)
GFR AFRICAN AMERICAN: 55
GFR NON-AFRICAN AMERICAN: 45
GLUCOSE BLD-MCNC: 149 MG/DL (ref 74–109)
POTASSIUM SERPL-SCNC: 4.8 MMOL/L (ref 3.5–5)
SODIUM BLD-SCNC: 140 MMOL/L (ref 136–145)
TOTAL PROTEIN: 6.8 G/DL (ref 6.6–8.7)

## 2020-11-05 PROCEDURE — 99214 OFFICE O/P EST MOD 30 MIN: CPT | Performed by: UROLOGY

## 2020-11-05 RX ORDER — GABAPENTIN 100 MG/1
CAPSULE ORAL
COMMUNITY
Start: 2020-08-20

## 2020-11-06 LAB
BASOPHILS ABSOLUTE: 0.1 K/UL (ref 0–0.2)
BASOPHILS RELATIVE PERCENT: 1.3 % (ref 0–1)
EOSINOPHILS ABSOLUTE: 0.3 K/UL (ref 0–0.6)
EOSINOPHILS RELATIVE PERCENT: 3.4 % (ref 0–5)
HCT VFR BLD CALC: 33.5 % (ref 42–52)
HEMOGLOBIN: 10.2 G/DL (ref 14–18)
IMMATURE GRANULOCYTES #: 0 K/UL
LYMPHOCYTES ABSOLUTE: 2 K/UL (ref 1.1–4.5)
LYMPHOCYTES RELATIVE PERCENT: 24.7 % (ref 20–40)
MCH RBC QN AUTO: 25.8 PG (ref 27–31)
MCHC RBC AUTO-ENTMCNC: 30.4 G/DL (ref 33–37)
MCV RBC AUTO: 84.8 FL (ref 80–94)
MONOCYTES ABSOLUTE: 0.9 K/UL (ref 0–0.9)
MONOCYTES RELATIVE PERCENT: 11.9 % (ref 0–10)
NEUTROPHILS ABSOLUTE: 4.6 K/UL (ref 1.5–7.5)
NEUTROPHILS RELATIVE PERCENT: 58.4 % (ref 50–65)
PDW BLD-RTO: 15 % (ref 11.5–14.5)
PLATELET # BLD: 319 K/UL (ref 130–400)
PMV BLD AUTO: 10 FL (ref 9.4–12.4)
RBC # BLD: 3.95 M/UL (ref 4.7–6.1)
TESTOST SERPL-MCNC: 347 NG/DL (ref 264–916)
WBC # BLD: 7.9 K/UL (ref 4.8–10.8)

## 2020-11-16 ENCOUNTER — OFFICE VISIT (OUTPATIENT)
Dept: CARDIOLOGY | Age: 50
End: 2020-11-16
Payer: MEDICAID

## 2020-11-16 VITALS
BODY MASS INDEX: 27.3 KG/M2 | SYSTOLIC BLOOD PRESSURE: 162 MMHG | HEIGHT: 71 IN | DIASTOLIC BLOOD PRESSURE: 84 MMHG | HEART RATE: 82 BPM | WEIGHT: 195 LBS

## 2020-11-16 PROCEDURE — 3044F HG A1C LEVEL LT 7.0%: CPT | Performed by: INTERNAL MEDICINE

## 2020-11-16 PROCEDURE — 3017F COLORECTAL CA SCREEN DOC REV: CPT | Performed by: INTERNAL MEDICINE

## 2020-11-16 PROCEDURE — 2022F DILAT RTA XM EVC RTNOPTHY: CPT | Performed by: INTERNAL MEDICINE

## 2020-11-16 PROCEDURE — G8427 DOCREV CUR MEDS BY ELIG CLIN: HCPCS | Performed by: INTERNAL MEDICINE

## 2020-11-16 PROCEDURE — G8484 FLU IMMUNIZE NO ADMIN: HCPCS | Performed by: INTERNAL MEDICINE

## 2020-11-16 PROCEDURE — G8419 CALC BMI OUT NRM PARAM NOF/U: HCPCS | Performed by: INTERNAL MEDICINE

## 2020-11-16 PROCEDURE — 99214 OFFICE O/P EST MOD 30 MIN: CPT | Performed by: INTERNAL MEDICINE

## 2020-11-16 PROCEDURE — 4004F PT TOBACCO SCREEN RCVD TLK: CPT | Performed by: INTERNAL MEDICINE

## 2020-11-16 ASSESSMENT — ENCOUNTER SYMPTOMS
NAUSEA: 0
VOMITING: 0
DIARRHEA: 0
SHORTNESS OF BREATH: 0
EYES NEGATIVE: 1
RESPIRATORY NEGATIVE: 1
GASTROINTESTINAL NEGATIVE: 1

## 2020-11-16 NOTE — PROGRESS NOTES
[facility]                            Date:  11/16/2020  Patient: Alfonso Larson  Age:  44 y.o., 1970      Reason for evaluation:         SUBJECTIVE:    Returns to day for follow-up assessment. Continues to smoke denies anginal chest pain denies limiting dyspnea but he does complain of exertional calf discomfort can walk but a short distance has to stop and rest the right leg is more greatly affected than the left no previous assessment of lower extremity circulation no other issues reported. Recent LDL cholesterol was 59 in March 2020. Review of Systems   Constitutional: Negative. Negative for chills, fever and unexpected weight change. HENT: Negative. Eyes: Negative. Respiratory: Negative. Negative for shortness of breath. Cardiovascular: Negative. Negative for chest pain. Gastrointestinal: Negative. Negative for diarrhea, nausea and vomiting. Endocrine: Negative. Genitourinary: Negative. Musculoskeletal: Negative. Skin: Negative. Neurological: Negative. All other systems reviewed and are negative. OBJECTIVE:     BP (!) 162/84   Pulse 82   Ht 5' 11\" (1.803 m)   Wt 195 lb (88.5 kg)   BMI 27.20 kg/m²     Labs:   CBC: No results for input(s): WBC, HGB, HCT, PLT in the last 72 hours. BMP:No results for input(s): NA, K, CO2, BUN, CREATININE, LABGLOM, GLUCOSE in the last 72 hours. BNP: No results for input(s): BNP in the last 72 hours. PT/INR: No results for input(s): PROTIME, INR in the last 72 hours. APTT:No results for input(s): APTT in the last 72 hours. CARDIAC ENZYMES:No results for input(s): CKTOTAL, CKMB, CKMBINDEX, TROPONINI in the last 72 hours. FASTING LIPID PANEL:  Lab Results   Component Value Date    HDL 43 03/12/2020    LDLDIRECT 109 04/26/2019    LDLCALC 59 03/12/2020    TRIG 101 03/12/2020     LIVER PROFILE:No results for input(s): AST, ALT, LABALBU in the last 72 hours.         Past Medical History:   Diagnosis Date  CAD (coronary artery disease)     sees [facility] cardiology    Cancer [facility])     Bladder    [facility] pulmonary disease) ([facility])     Depression     Diabetes mellitus ([facility] [address]     History of blood transfusion     unsure thinks he did    Hyperlipidemia     Hypertension      Past Surgical History:   Procedure Laterality Date    BACK SURGERY      X3     BLADDER REMOVAL      CARDIAC CATHETERIZATION      CERVICAL FUSION N/A 2/19/2020    Phase 1:  C5 and C6 corpectomy with placement of an expandable cage and anterior cervical plate A9-K7. performed by Julius Vicente DO at [address] [place] [address] N/A 6/17/2020    POSTERIOR CERVICAL WOUND [address] OUT AND CLOSURE performed by Julius Vicente DO at [address] [place] [address] N/A 6/26/2020    [facility] [address] [place] [address] performed by Julius Vicente DO at [address] [place] [address] N/A 9/10/2019    Dr Priti Johnson, 5 yr recall    CORONARY ARTERY BYPASS GRAFT N/A 5/1/2019    CORONARY ARTERY BYPASS GRAFT X 3 WITH LEFT INTERNAL MAMMARY ARTERY, ENDOSCOPIC  VEIN HARVEST, WITH PERFUSION. performed by Katrin Rg MD at [place] [address] N/A 2/19/2020    Phase 2:  Posterior instrumented fusion C3-C7 using lateral mass screws and rods. performed by Julius Vicente DO at [facility]      lower ext    PROSTATECTOMY       Family History   Problem Relation Age of Onset    Cancer Mother     Vision Loss Mother     Breast Cancer Mother     Coronary Art Dis Father     Obesity Father     Kidney Disease Father     High Blood Pressure Father     High Cholesterol Father     Hypercalcemia Sister     Obesity Brother     High Blood Pressure Brother      Allergies   Allergen Reactions    Latex Other (See Comments)     BOILS AND BLISTERS- ALLERGY CALLED TO [facility] SURGERY SCHEDULING.     Demerol Hcl [Meperidine] Hives     And n/v     Current Outpatient Medications   Medication Sig Dispense Refill    FLUoxetine (PROZAC) 40 MG capsule TAKE 1 CAPSULE BY MOUTH DAILY 30 capsule 5    baclofen (LIORESAL) 10 MG tablet TAKE 1 TABLET BY MOUTH 3 TIMES DAILY AS NEEDED FOR PAIN/SPASMS 90 tablet 5    busPIRone (BUSPAR) 10 MG tablet TAKE 1 TABLET BY MOUTH 3 TIMES DAILY AS NEEDED (ANXIETY) 90 tablet 5    gabapentin (NEURONTIN) 300 MG capsule Take 1 capsule by mouth 3 times daily for 30 days. 90 capsule 1    fenofibrate (TRIGLIDE) 160 MG tablet TAKE ONE TABLET BY MOUTH DAILY. 30 tablet 5    omeprazole (PRILOSEC) 40 MG delayed release capsule TAKE 1 CAPSULE BY MOUTH DAILY 30 capsule 2    traZODone (DESYREL) 50 MG tablet TAKE 2 TABLETS BY MOUTH NIGHTLY AS NEEDED FOR SLEEP 60 tablet 1    atenolol (TENORMIN) 50 MG tablet TAKE ONE TABLET BY MOUTH EVERY DAY 30 tablet 5    atorvastatin (LIPITOR) 20 MG tablet TAKE ONE TABLET BY MOUTH ONCE DAILY. 30 tablet 3    metFORMIN (GLUCOPHAGE) 1000 MG tablet TAKE 1 TABLET BY MOUTH 2 TIMES DAILY (WITH MEALS) 60 tablet 5    lisinopril (PRINIVIL;ZESTRIL) 10 MG tablet TAKE 1 TABLET BY MOUTH DAILY 30 tablet 3    ARIPiprazole (ABILIFY) 5 MG tablet TAKE ONE TABLET BY MOUTH ONCE DAILY. 30 tablet 3    fluticasone-salmeterol (ADVAIR) 100-50 MCG/DOSE diskus inhaler INHALE 1 PUFF INTO THE LUNGS EVERY 12 HOURS 1 Inhaler 3    sodium hypochlorite (DAKINS) 0.125 % SOLN external solution Moisten gauze apply to wound twice daily 1000 mL 2    HYDROcodone-acetaminophen (NORCO)  MG per tablet Take 2 tablets by mouth every 6 hours as needed for Pain.  Cholecalciferol (VITAMIN D3) 1.25 MG (39189 UT) CAPS Take 1 capsule by mouth once a week Wednesday      sildenafil (REVATIO) 20 MG tablet Take 1-5 tablets 1-2 hours before sexual activity PRN      tiotropium (SPIRIVA) 18 MCG inhalation capsule Inhale 1 capsule into the lungs      blood glucose monitor strips Test 4 times a day & as needed for symptoms of irregular blood glucose.  100 strip 3    glucose monitoring kit (FREESTYLE) monitoring kit Please provide glucometer that INS will cover for DM type 2 1 kit 0    Lancets 30G MISC 1 each by Does not apply route daily 4 times daily 100 each 3    aspirin 81 MG EC tablet Take 1 tablet by mouth daily 30 tablet 3     No current facility-administered medications for this visit. Social History     Socioeconomic History    Marital status:      Spouse name: Not on file    Number of children: Not on file    Years of education: Not on file    Highest education level: Not on file   Occupational History    Not on file   Social Needs    Financial resource strain: Not on file    Food insecurity     Worry: Not on file     Inability: Not on file    Transportation needs     Medical: Not on file     Non-medical: Not on file   Tobacco Use    Smoking status: Current Every Day Smoker     Packs/day: 1.00     Years: 30.00     Pack years: 30.00    Smokeless tobacco: Never Used    Tobacco comment: sometimes less   Substance and Sexual Activity    Alcohol use: Yes     Comment:  Occ    Drug use: Never    Sexual activity: Not on file   Lifestyle    Physical activity     Days per week: Not on file     Minutes per session: Not on file    Stress: Not on file   Relationships    Social connections     Talks on phone: Not on file     Gets together: Not on file     Attends Shinto service: Not on file     Active member of club or organization: Not on file     Attends meetings of clubs or organizations: Not on file     Relationship status: Not on file    Intimate partner violence     Fear of current or ex partner: Not on file     Emotionally abused: Not on file     Physically abused: Not on file     Forced sexual activity: Not on file   Other Topics Concern    Not on file   Social History Narrative     16 years second marriage    On disability due to multiple back surgeries and bladder cancer    [facility]    Education high school    Smokes 1-1/2 pack per day drinks alcohol occasionally denies substance usage    Physically sedentary    Former  at an [address] [facility] [employer]    Never in the [employer]       Physical Examination:  BP (!) 162/84   Pulse 82   Ht 5' 11\" (1.803 m)   Wt 195 lb (88.5 kg)   BMI 27.20 kg/m²   Physical Exam  Vitals signs reviewed. Constitutional:       Appearance: He is well-developed. Neck:      Vascular: No carotid bruit or JVD. Cardiovascular:      Rate and Rhythm: Normal rate and regular rhythm. Heart sounds: Normal heart sounds. No murmur. No friction rub. No gallop. Pulmonary:      Effort: Pulmonary effort is normal. No respiratory distress. Breath sounds: Normal breath sounds. No wheezing or rales. Abdominal:      General: There is no distension. Tenderness: There is no abdominal tenderness. Lymphadenopathy:      Cervical: No cervical adenopathy. Skin:     General: Skin is warm and dry. ASSESSMENT:     Diagnosis Orders   1. Coronary artery disease involving native coronary artery of native heart without angina pectoris     2. Essential hypertension     3. Tobacco abuse     4. Type 2 diabetes mellitus without complication, without long-term current use of insulin ([address]     5. Mixed hyperlipidemia     6. Hx of CABG     7. Claudication [facility])         PLAN:  No orders of the defined types were placed in this encounter. No orders of the defined types were placed in this encounter. 1. Continue present medications  2. Recommend lower extremity noninvasive arterial assessment  3. Encouraged the patient to quit smoking  4. Recommend follow-up assessment in 6 months  5. Check a fasting lipid profile    Return in about 6 months (around 5/16/2021) for return to Dr. Gene Sanford only. Janeen Lopez MD 11/16/2020 1:55 PM [facility]      Thisdictation was generated by voice recognition computer software.   Although all attempts are made to edit the dictation for accuracy, there may be errors in the transcription that are not intended.

## 2020-11-23 DIAGNOSIS — I73.9 CLAUDICATION (HCC): ICD-10-CM

## 2020-11-23 DIAGNOSIS — I25.10 CORONARY ARTERY DISEASE INVOLVING NATIVE CORONARY ARTERY OF NATIVE HEART WITHOUT ANGINA PECTORIS: ICD-10-CM

## 2020-11-23 DIAGNOSIS — E11.9 TYPE 2 DIABETES MELLITUS WITHOUT COMPLICATION, WITHOUT LONG-TERM CURRENT USE OF INSULIN (HCC): ICD-10-CM

## 2020-11-23 DIAGNOSIS — I10 ESSENTIAL HYPERTENSION: ICD-10-CM

## 2020-11-23 DIAGNOSIS — E78.2 MIXED HYPERLIPIDEMIA: ICD-10-CM

## 2020-11-23 DIAGNOSIS — Z95.1 HX OF CABG: ICD-10-CM

## 2020-11-23 DIAGNOSIS — Z72.0 TOBACCO ABUSE: ICD-10-CM

## 2020-11-23 LAB
CHOLESTEROL, TOTAL: 120 MG/DL (ref 160–199)
HDLC SERPL-MCNC: 43 MG/DL (ref 55–121)
LDL CHOLESTEROL CALCULATED: 28 MG/DL
TRIGL SERPL-MCNC: 245 MG/DL (ref 0–149)

## 2020-12-08 ENCOUNTER — OFFICE VISIT (OUTPATIENT)
Dept: PRIMARY CARE CLINIC | Age: 50
End: 2020-12-08
Payer: MEDICAID

## 2020-12-08 ENCOUNTER — HOSPITAL ENCOUNTER (OUTPATIENT)
Dept: GENERAL RADIOLOGY | Age: 50
Discharge: HOME | End: 2020-12-08
Payer: MEDICAID

## 2020-12-08 VITALS
HEART RATE: 69 BPM | HEIGHT: 71 IN | RESPIRATION RATE: 18 BRPM | TEMPERATURE: 98.7 F | WEIGHT: 193.6 LBS | BODY MASS INDEX: 27.1 KG/M2 | SYSTOLIC BLOOD PRESSURE: 140 MMHG | OXYGEN SATURATION: 98 % | DIASTOLIC BLOOD PRESSURE: 78 MMHG

## 2020-12-08 PROCEDURE — G8419 CALC BMI OUT NRM PARAM NOF/U: HCPCS | Performed by: NURSE PRACTITIONER

## 2020-12-08 PROCEDURE — G8484 FLU IMMUNIZE NO ADMIN: HCPCS | Performed by: NURSE PRACTITIONER

## 2020-12-08 PROCEDURE — 99214 OFFICE O/P EST MOD 30 MIN: CPT | Performed by: NURSE PRACTITIONER

## 2020-12-08 PROCEDURE — 4004F PT TOBACCO SCREEN RCVD TLK: CPT | Performed by: NURSE PRACTITIONER

## 2020-12-08 PROCEDURE — 3017F COLORECTAL CA SCREEN DOC REV: CPT | Performed by: NURSE PRACTITIONER

## 2020-12-08 PROCEDURE — 2022F DILAT RTA XM EVC RTNOPTHY: CPT | Performed by: NURSE PRACTITIONER

## 2020-12-08 PROCEDURE — 3044F HG A1C LEVEL LT 7.0%: CPT | Performed by: NURSE PRACTITIONER

## 2020-12-08 PROCEDURE — 72100 X-RAY EXAM L-S SPINE 2/3 VWS: CPT

## 2020-12-08 PROCEDURE — G8427 DOCREV CUR MEDS BY ELIG CLIN: HCPCS | Performed by: NURSE PRACTITIONER

## 2020-12-08 RX ORDER — BUSPIRONE HYDROCHLORIDE 10 MG/1
10 TABLET ORAL 3 TIMES DAILY PRN
Qty: 90 TABLET | Refills: 5 | Status: SHIPPED | OUTPATIENT
Start: 2020-12-08 | End: 2022-01-02

## 2020-12-08 RX ORDER — CHOLECALCIFEROL (VITAMIN D3) 1250 MCG
1 CAPSULE ORAL WEEKLY
Qty: 4 CAPSULE | Refills: 2 | Status: SHIPPED | OUTPATIENT
Start: 2020-12-08 | End: 2021-04-01

## 2020-12-08 RX ORDER — LISINOPRIL 10 MG/1
10 TABLET ORAL DAILY
Qty: 30 TABLET | Refills: 3 | Status: SHIPPED | OUTPATIENT
Start: 2020-12-08 | End: 2021-04-07

## 2020-12-08 RX ORDER — ATORVASTATIN CALCIUM 20 MG/1
TABLET, FILM COATED ORAL
Qty: 30 TABLET | Refills: 3 | Status: SHIPPED | OUTPATIENT
Start: 2020-12-08 | End: 2021-04-28

## 2020-12-08 RX ORDER — ARIPIPRAZOLE 5 MG/1
TABLET ORAL
Qty: 30 TABLET | Refills: 3 | Status: SHIPPED | OUTPATIENT
Start: 2020-12-08 | End: 2021-04-07

## 2020-12-08 RX ORDER — FENOFIBRATE 160 MG/1
TABLET ORAL
Qty: 30 TABLET | Refills: 5 | Status: SHIPPED | OUTPATIENT
Start: 2020-12-08

## 2020-12-08 RX ORDER — ATENOLOL 50 MG/1
TABLET ORAL
Qty: 30 TABLET | Refills: 5 | Status: SHIPPED | OUTPATIENT
Start: 2020-12-08 | End: 2021-10-10

## 2020-12-08 RX ORDER — TRAZODONE HYDROCHLORIDE 50 MG/1
100 TABLET ORAL NIGHTLY PRN
Qty: 60 TABLET | Refills: 1 | Status: SHIPPED | OUTPATIENT
Start: 2020-12-08 | End: 2021-04-07

## 2020-12-08 RX ORDER — FLUOXETINE HYDROCHLORIDE 40 MG/1
40 CAPSULE ORAL DAILY
Qty: 30 CAPSULE | Refills: 5 | Status: SHIPPED | OUTPATIENT
Start: 2020-12-08 | End: 2021-12-21

## 2020-12-08 RX ORDER — BACLOFEN 10 MG/1
TABLET ORAL
Qty: 90 TABLET | Refills: 5 | Status: SHIPPED | OUTPATIENT
Start: 2020-12-08 | End: 2022-01-02

## 2020-12-08 RX ORDER — OMEPRAZOLE 40 MG/1
40 CAPSULE, DELAYED RELEASE ORAL DAILY
Qty: 30 CAPSULE | Refills: 2 | Status: SHIPPED | OUTPATIENT
Start: 2020-12-08 | End: 2021-05-12

## 2020-12-08 ASSESSMENT — ENCOUNTER SYMPTOMS
GASTROINTESTINAL NEGATIVE: 1
EYES NEGATIVE: 1
RESPIRATORY NEGATIVE: 1
BACK PAIN: 1

## 2020-12-08 NOTE — PROGRESS NOTES
[address] [facility]  [address] [place] [address]  [address] [address]  Dept: [phone]  Dept Fax: [phone]: [phone]    Paulina Larson is a 44 y.o. male who presents today for his medical conditions/complaints as noted below. Chris Larson is c/o of Back Pain (lumbar); Anxiety; and Depression      Chief Complaint   Patient presents with    Back Pain     lumbar    Anxiety    Depression       HPI:     HPI   Patient is here for follow up on chronic conditions including chronic back pain, HTN, HLD, anxiety. He has been taking his meds as prescribed. Reports that HTN and anxiety is well controlled at this time. He has noticed some increased back pain in the lower region. He has not had xrays on this area in some time. Past Medical History:   Diagnosis Date    CAD (coronary artery disease)     sees [facility] cardiology    Cancer [facility])     Bladder    COPD (ch[facility] obstructive pulmonary disease) ([facility] [address]     Depression     Diabetes mellitus ([facility] [address]     History of blood transfusion     unsure thinks he did    Hyperlipidemia     Hypertension         Past Surgical History:   Procedure Laterality Date    BACK SURGERY      X3     BLADDER REMOVAL      CARDIAC CATHETERIZATION      CERVICAL FUSION N/A 2/19/2020    Phase 1:  C5 and C6 corpectomy with placement of an expandable cage and anterior cervical plate U5-N6. performed by Sushant Masterson DO at [address] [place] [address] N/A 6/17/2020    POSTERIOR CERVICAL WOUND [address] OUT AND CLOSURE performed by Sushant Masterson DO at [address] [place] [address] N/A 6/26/2020    [address] performed by Sushant Masterson DO at [address] [facility] N/A 9/10/2019    Dr Lottie Valle, 5 yr recall    CORONARY ARTERY BYPASS GRAFT N/A 5/1/2019    CORONARY ARTERY BYPASS GRAFT X 3 WITH LEFT INTERNAL MAMMARY ARTERY, ENDOSCOPIC  VEIN HARVEST, WITH PERFUSION.  performed by Holland Farias Ashlyn Blum MD at [facility] N/A 2/19/2020    Phase 2:  Posterior instrumented fusion C3-C7 using lateral mass screws and rods. performed by Elisa Holman DO at [address] [place] [address]      lower ext    PROSTATECTOMY         Social History     Tobacco Use    Smoking status: Current Every Day Smoker     Packs/day: 1.00     Years: 30.00     Pack years: 30.00    Smokeless tobacco: Never Used    Tobacco comment: sometimes less   Substance Use Topics    Alcohol use: Yes     Comment: Occ        Current Outpatient Medications   Medication Sig Dispense Refill    FLUoxetine (PROZAC) 40 MG capsule Take 1 capsule by mouth daily 30 capsule 5    baclofen (LIORESAL) 10 MG tablet TAKE 1 TABLET BY MOUTH 3 TIMES DAILY AS NEEDED FOR PAIN/SPASMS 90 tablet 5    busPIRone (BUSPAR) 10 MG tablet Take 1 tablet by mouth 3 times daily as needed (anxiety) 90 tablet 5    fenofibrate (TRIGLIDE) 160 MG tablet TAKE ONE TABLET BY MOUTH DAILY. 30 tablet 5    omeprazole (PRILOSEC) 40 MG delayed release capsule Take 1 capsule by mouth daily 30 capsule 2    traZODone (DESYREL) 50 MG tablet Take 2 tablets by mouth nightly as needed for Sleep 60 tablet 1    atenolol (TENORMIN) 50 MG tablet Take once daily 30 tablet 5    atorvastatin (LIPITOR) 20 MG tablet TAKE ONE TABLET BY MOUTH ONCE DAILY. 30 tablet 3    metFORMIN (GLUCOPHAGE) 1000 MG tablet Take 1 tablet by mouth 2 times daily (with meals) 60 tablet 5    lisinopril (PRINIVIL;ZESTRIL) 10 MG tablet Take 1 tablet by mouth daily 30 tablet 3    ARIPiprazole (ABILIFY) 5 MG tablet TAKE ONE TABLET BY MOUTH ONCE DAILY.  30 tablet 3    fluticasone-salmeterol (ADVAIR) 100-50 MCG/DOSE diskus inhaler Inhale 1 puff into the lungs every 12 hours 1 Inhaler 3    Cholecalciferol (VITAMIN D3) 1.25 MG (62584 UT) CAPS Take 1 capsule by mouth once a week Wednesday 4 capsule 2    sodium hypochlorite (DAKINS) 0.125 % SOLN external solution Moisten gauze apply to wound twice daily 1000 mL 2    HYDROcodone-acetaminophen (NORCO)  MG per tablet Take 2 tablets by mouth every 6 hours as needed for Pain.  sildenafil (REVATIO) 20 MG tablet Take 1-5 tablets 1-2 hours before sexual activity PRN      tiotropium (SPIRIVA) 18 MCG inhalation capsule Inhale 1 capsule into the lungs      blood glucose monitor strips Test 4 times a day & as needed for symptoms of irregular blood glucose. 100 strip 3    glucose monitoring kit (FREESTYLE) monitoring kit Please provide glucometer that INS will cover for DM type 2 1 kit 0    Lancets 30G MISC 1 each by Does not apply route daily 4 times daily 100 each 3    aspirin 81 MG EC tablet Take 1 tablet by mouth daily 30 tablet 3    gabapentin (NEURONTIN) 300 MG capsule Take 1 capsule by mouth 3 times daily for 30 days. 90 capsule 1     No current facility-administered medications for this visit. Allergies   Allergen Reactions    Latex Other (See Comments)     BOILS AND BLISTERS- ALLERGY CALLED TO [facility] SURGERY SCHEDULING.  Demerol Hcl [Meperidine] Hives     And n/v       Family History   Problem Relation Age of Onset    Cancer Mother     Vision Loss Mother     Breast Cancer Mother     Coronary Art Dis Father     Obesity Father     Kidney Disease Father     High Blood Pressure Father     High Cholesterol Father     Hypercalcemia Sister     Obesity Brother     High Blood Pressure Brother                Subjective:      Review of Systems   Constitutional: Negative. HENT: Negative. Eyes: Negative. Respiratory: Negative. Cardiovascular: Negative. Gastrointestinal: Negative. Endocrine: Negative. Genitourinary: Negative. Musculoskeletal: Positive for back pain (lower). Skin: Negative. Neurological: Negative. Hematological: Negative. Psychiatric/Behavioral: Negative. Objective:     Physical Exam  Vitals signs and nursing note reviewed. Constitutional:       Appearance: Normal appearance.    HENT: Head: Normocephalic and atraumatic. Right Ear: Hearing, tympanic membrane, ear canal and external ear normal.      Left Ear: Hearing, tympanic membrane, ear canal and external ear normal.      Nose: Nose normal.      Mouth/Throat:      Lips: Pink. Mouth: Mucous membranes are moist.      Pharynx: Oropharynx is clear. Eyes:      General: Lids are normal.      Extraocular Movements: Extraocular movements intact. Conjunctiva/sclera: Conjunctivae normal.      Pupils: Pupils are equal, round, and reactive to light. Neck:      Musculoskeletal: Full passive range of motion without pain, normal range of motion and neck supple. Thyroid: No thyromegaly. Cardiovascular:      Rate and Rhythm: Normal rate and regular rhythm. Pulses: Normal pulses. Dorsalis pedis pulses are 2+ on the right side and 2+ on the left side. Posterior tibial pulses are 2+ on the right side and 2+ on the left side. Heart sounds: Normal heart sounds. Pulmonary:      Effort: Pulmonary effort is normal.      Breath sounds: Normal breath sounds and air entry. Abdominal:      General: Bowel sounds are normal.      Palpations: Abdomen is soft. Musculoskeletal:      Cervical back: He exhibits decreased range of motion and tenderness. Thoracic back: He exhibits normal range of motion and no tenderness. Lumbar back: He exhibits decreased range of motion and tenderness. Back:    Lymphadenopathy:      Cervical: No cervical adenopathy. Skin:     General: Skin is warm and dry. Capillary Refill: Capillary refill takes 2 to 3 seconds. Neurological:      General: No focal deficit present. Mental Status: He is alert and oriented to person, place, and time. Mental status is at baseline. Coordination: Coordination is intact. Psychiatric:         Mood and Affect: Mood normal.         Speech: Speech normal.         Behavior: Behavior normal.         Thought Content:  Thought content normal.         Cognition and Memory: Cognition and memory normal.         Judgment: Judgment normal.         BP (!) 140/78   Pulse 69   Temp 98.7 °F (37.1 °C) (Temporal)   Resp 18   Ht 5' 11\" (1.803 m)   Wt 193 lb 9.6 oz (87.8 kg)   SpO2 98%   BMI 27.00 kg/m²     Assessment:      Diagnosis Orders   1. Anxiety and depression  FLUoxetine (PROZAC) 40 MG capsule    busPIRone (BUSPAR) 10 MG tablet   2. Mixed hyperlipidemia  fenofibrate (TRIGLIDE) 160 MG tablet   3. Gastroesophageal reflux disease without esophagitis  omeprazole (PRILOSEC) 40 MG delayed release capsule   4. Primary insomnia  traZODone (DESYREL) 50 MG tablet   5. Essential hypertension  atenolol (TENORMIN) 50 MG tablet    lisinopril (PRINIVIL;ZESTRIL) 10 MG tablet   6. Type 2 diabetes mellitus without complication, without long-term current use of insulin (HCC)  metFORMIN (GLUCOPHAGE) 1000 MG tablet       No results found for this visit on 12/08/20. Plan:     Refill on all meds  Will check xray of lower back. Will likely need MRI after results have returned. Return in about 3 months (around 3/8/2021) for Office Visit, Follow up chronic conditions. No orders of the defined types were placed in this encounter. Orders Placed This Encounter   Medications    FLUoxetine (PROZAC) 40 MG capsule     Sig: Take 1 capsule by mouth daily     Dispense:  30 capsule     Refill:  5    baclofen (LIORESAL) 10 MG tablet     Sig: TAKE 1 TABLET BY MOUTH 3 TIMES DAILY AS NEEDED FOR PAIN/SPASMS     Dispense:  90 tablet     Refill:  5    busPIRone (BUSPAR) 10 MG tablet     Sig: Take 1 tablet by mouth 3 times daily as needed (anxiety)     Dispense:  90 tablet     Refill:  5    fenofibrate (TRIGLIDE) 160 MG tablet     Sig: TAKE ONE TABLET BY MOUTH DAILY.      Dispense:  30 tablet     Refill:  5    omeprazole (PRILOSEC) 40 MG delayed release capsule     Sig: Take 1 capsule by mouth daily     Dispense:  30 capsule     Refill:  2    traZODone (DESYREL) 50 MG tablet     Sig: Take 2 tablets by mouth nightly as needed for Sleep     Dispense:  60 tablet     Refill:  1    atenolol (TENORMIN) 50 MG tablet     Sig: Take once daily     Dispense:  30 tablet     Refill:  5    atorvastatin (LIPITOR) 20 MG tablet     Sig: TAKE ONE TABLET BY MOUTH ONCE DAILY. Dispense:  30 tablet     Refill:  3    metFORMIN (GLUCOPHAGE) 1000 MG tablet     Sig: Take 1 tablet by mouth 2 times daily (with meals)     Dispense:  60 tablet     Refill:  5    lisinopril (PRINIVIL;ZESTRIL) 10 MG tablet     Sig: Take 1 tablet by mouth daily     Dispense:  30 tablet     Refill:  3    ARIPiprazole (ABILIFY) 5 MG tablet     Sig: TAKE ONE TABLET BY MOUTH ONCE DAILY. Dispense:  30 tablet     Refill:  3    fluticasone-salmeterol (ADVAIR) 100-50 MCG/DOSE diskus inhaler     Sig: Inhale 1 puff into the lungs every 12 hours     Dispense:  1 Inhaler     Refill:  3    Cholecalciferol (VITAMIN D3) 1.25 MG (14356 UT) CAPS     Sig: Take 1 capsule by mouth once a week Wednesday     Dispense:  4 capsule     Refill:  2        Patient offered educational handouts and has had all questions answered. Patient voices understanding and agrees to plans along with risks and benefits of plan. Patient is instructed to continue prior meds, diet, and exercise plans as instructed. Patient agrees to follow up as instructed and sooner if needed. Patient agrees to go to ER if condition becomes emergent. EMR Dragon/transcription disclaimer: Some of this encounter note is an electronic transcription/translation of spoken language to printed text. The electronic translation of spoken language may permit erroneous, or at times, nonsensical words or phrases to be inadvertently transcribed.  Although I have reviewed the note for such errors, some may still exist.    Electronically signed by BRYAN Greene on 12/8/2020 at 3:51 PM

## 2020-12-10 ENCOUNTER — TELEPHONE (OUTPATIENT)
Dept: PRIMARY CARE CLINIC | Age: 50
End: 2020-12-10

## 2020-12-10 NOTE — TELEPHONE ENCOUNTER
Pt has been notified of results and VU. Orders have been placed and pt provided number to scheduling to have those scheduled.

## 2020-12-10 NOTE — TELEPHONE ENCOUNTER
----- Message from BRYAN Robison sent at 12/9/2020  4:22 PM CST -----  Please let patient know that xray was abnormal.  Recommend MRI of lumbar if he is willing. Also xray shows some changes in his abdominal aorta. I recommend that we check US of AAA.

## 2021-01-04 ENCOUNTER — HOSPITAL ENCOUNTER (OUTPATIENT)
Dept: VASCULAR LAB | Age: 51
Discharge: HOME | End: 2021-01-04
Payer: MEDICAID

## 2021-01-04 DIAGNOSIS — I73.9 CLAUDICATION (HCC): ICD-10-CM

## 2021-01-04 PROCEDURE — 93923 UPR/LXTR ART STDY 3+ LVLS: CPT

## 2021-01-07 DIAGNOSIS — N52.32 ERECTILE DYSFUNCTION AFTER RADICAL CYSTECTOMY: ICD-10-CM

## 2021-01-08 ENCOUNTER — HOSPITAL ENCOUNTER (OUTPATIENT)
Dept: ULTRASOUND IMAGING | Age: 51
Discharge: HOME | End: 2021-01-08
Payer: MEDICAID

## 2021-01-08 ENCOUNTER — HOSPITAL ENCOUNTER (OUTPATIENT)
Dept: MRI IMAGING | Age: 51
Discharge: HOME | End: 2021-01-08
Payer: MEDICAID

## 2021-01-08 DIAGNOSIS — R93.7 ABNORMAL X-RAY OF LUMBAR SPINE: ICD-10-CM

## 2021-01-08 DIAGNOSIS — Z13.6 SCREENING FOR AAA (ABDOMINAL AORTIC ANEURYSM): ICD-10-CM

## 2021-01-08 PROCEDURE — 72148 MRI LUMBAR SPINE W/O DYE: CPT

## 2021-01-08 PROCEDURE — 76706 US ABDL AORTA SCREEN AAA: CPT

## 2021-01-08 RX ORDER — SILDENAFIL 100 MG/1
TABLET, FILM COATED ORAL
Qty: 10 TABLET | Refills: 5 | Status: SHIPPED | OUTPATIENT
Start: 2021-01-08 | End: 2022-02-15 | Stop reason: SDUPTHER

## 2021-01-10 NOTE — PROGRESS NOTES
"Doing well   Decreasing james output   Drain creat pending for another 24 hrs.  Good po and bf.     LOS: 6 days   Patient Care Team:  Tam Gonzalez MD as PCP - General  Brandon Wolfe MD as Consulting Physician (Urology)        Subjective     History of Present Illness    Subjective      Objective     Vital Signs  Temp:  [97.5 °F (36.4 °C)-98.9 °F (37.2 °C)] 98.9 °F (37.2 °C)  Heart Rate:  [80-84] 84  Resp:  [16-18] 18  BP: (166-195)/(67-86) 166/72    Objective:  General Appearance:  Comfortable.    Vital signs: (most recent): Blood pressure 166/72, pulse 84, temperature 98.9 °F (37.2 °C), temperature source Oral, resp. rate 18, height 71\" (180.3 cm), weight 209 lb (94.8 kg), SpO2 100 %.  Vital signs are normal.    Output: Producing urine.    HEENT: Normal HEENT exam.    Lungs:  Normal respiratory rate and normal effort.    Abdomen: Abdomen is soft.              Results Review:  New clinical results reviewed.        Assessment/Plan     Principal Problem:    Bladder cancer  Active Problems:    Hypertension    Hypokalemia      Assessment:  (Doing well  Home and will fu next week for staple and prob drain removal).       Mckinley Alanis MD  11/08/17  6:26 AM            " 2. The status of comorbities. (See ED/admit documents)

## 2021-01-28 ENCOUNTER — OFFICE VISIT (OUTPATIENT)
Dept: CARDIOLOGY CLINIC | Age: 51
End: 2021-01-28
Payer: MEDICAID

## 2021-01-28 ENCOUNTER — OFFICE VISIT (OUTPATIENT)
Dept: NEUROSURGERY | Age: 51
End: 2021-01-28
Payer: MEDICAID

## 2021-01-28 VITALS
WEIGHT: 198 LBS | BODY MASS INDEX: 27.72 KG/M2 | HEIGHT: 71 IN | HEART RATE: 80 BPM | DIASTOLIC BLOOD PRESSURE: 82 MMHG | SYSTOLIC BLOOD PRESSURE: 142 MMHG

## 2021-01-28 VITALS
SYSTOLIC BLOOD PRESSURE: 117 MMHG | OXYGEN SATURATION: 100 % | WEIGHT: 198 LBS | HEART RATE: 87 BPM | DIASTOLIC BLOOD PRESSURE: 66 MMHG | BODY MASS INDEX: 27.72 KG/M2 | HEIGHT: 71 IN

## 2021-01-28 DIAGNOSIS — R20.0 NUMBNESS AND TINGLING OF BOTH FEET: ICD-10-CM

## 2021-01-28 DIAGNOSIS — I25.10 CORONARY ARTERY DISEASE INVOLVING NATIVE CORONARY ARTERY OF NATIVE HEART WITHOUT ANGINA PECTORIS: Primary | ICD-10-CM

## 2021-01-28 DIAGNOSIS — G89.29 CHRONIC MIDLINE LOW BACK PAIN WITH BILATERAL SCIATICA: ICD-10-CM

## 2021-01-28 DIAGNOSIS — M48.061 LUMBAR FORAMINAL STENOSIS: ICD-10-CM

## 2021-01-28 DIAGNOSIS — M43.17 SPONDYLOLISTHESIS AT L5-S1 LEVEL: Primary | ICD-10-CM

## 2021-01-28 DIAGNOSIS — I10 ESSENTIAL HYPERTENSION: ICD-10-CM

## 2021-01-28 DIAGNOSIS — M51.36 DDD (DEGENERATIVE DISC DISEASE), LUMBAR: ICD-10-CM

## 2021-01-28 DIAGNOSIS — M54.41 CHRONIC MIDLINE LOW BACK PAIN WITH BILATERAL SCIATICA: ICD-10-CM

## 2021-01-28 DIAGNOSIS — M48.062 SPINAL STENOSIS OF LUMBAR REGION WITH NEUROGENIC CLAUDICATION: ICD-10-CM

## 2021-01-28 DIAGNOSIS — M54.42 CHRONIC MIDLINE LOW BACK PAIN WITH BILATERAL SCIATICA: ICD-10-CM

## 2021-01-28 DIAGNOSIS — R20.2 NUMBNESS AND TINGLING OF BOTH FEET: ICD-10-CM

## 2021-01-28 DIAGNOSIS — M43.16 SPONDYLOLISTHESIS AT L3-L4 LEVEL: ICD-10-CM

## 2021-01-28 DIAGNOSIS — Z95.1 HX OF CABG: ICD-10-CM

## 2021-01-28 DIAGNOSIS — E78.2 MIXED HYPERLIPIDEMIA: ICD-10-CM

## 2021-01-28 PROCEDURE — 99214 OFFICE O/P EST MOD 30 MIN: CPT | Performed by: NEUROLOGICAL SURGERY

## 2021-01-28 PROCEDURE — G8419 CALC BMI OUT NRM PARAM NOF/U: HCPCS | Performed by: NEUROLOGICAL SURGERY

## 2021-01-28 PROCEDURE — 4004F PT TOBACCO SCREEN RCVD TLK: CPT | Performed by: NEUROLOGICAL SURGERY

## 2021-01-28 PROCEDURE — G8419 CALC BMI OUT NRM PARAM NOF/U: HCPCS | Performed by: INTERNAL MEDICINE

## 2021-01-28 PROCEDURE — G8427 DOCREV CUR MEDS BY ELIG CLIN: HCPCS | Performed by: NEUROLOGICAL SURGERY

## 2021-01-28 PROCEDURE — G8484 FLU IMMUNIZE NO ADMIN: HCPCS | Performed by: NEUROLOGICAL SURGERY

## 2021-01-28 PROCEDURE — G8427 DOCREV CUR MEDS BY ELIG CLIN: HCPCS | Performed by: INTERNAL MEDICINE

## 2021-01-28 PROCEDURE — 3017F COLORECTAL CA SCREEN DOC REV: CPT | Performed by: NEUROLOGICAL SURGERY

## 2021-01-28 PROCEDURE — 3017F COLORECTAL CA SCREEN DOC REV: CPT | Performed by: INTERNAL MEDICINE

## 2021-01-28 PROCEDURE — 99214 OFFICE O/P EST MOD 30 MIN: CPT | Performed by: INTERNAL MEDICINE

## 2021-01-28 PROCEDURE — G8484 FLU IMMUNIZE NO ADMIN: HCPCS | Performed by: INTERNAL MEDICINE

## 2021-01-28 PROCEDURE — 4004F PT TOBACCO SCREEN RCVD TLK: CPT | Performed by: INTERNAL MEDICINE

## 2021-01-28 ASSESSMENT — ENCOUNTER SYMPTOMS
DIARRHEA: 0
SHORTNESS OF BREATH: 0
EYES NEGATIVE: 1
VOMITING: 0
EYES NEGATIVE: 1
RESPIRATORY NEGATIVE: 1
NAUSEA: 0
GASTROINTESTINAL NEGATIVE: 1
BACK PAIN: 1
GASTROINTESTINAL NEGATIVE: 1
RESPIRATORY NEGATIVE: 1

## 2021-01-28 NOTE — PROGRESS NOTES
[facility]   Office Visit    Chief Complaint   Patient presents with    Follow-up    Lower Back Pain    Neck Pain     1/28/2021:  Mr. Gilbert Enamorado returns to the clinic today to discuss his new lumbar MRI. He continues to have low back pain that radiates into the left buttock, anterior thigh. He has numbness of the entire feet bilaterally. He has 60% back pain and 40% leg pain. Change in weather, ambulation exacerbates the pain. Lying down and sitting his pain is tolerable. He has tried chiropractic therapy, physical therapy multiple times, [facility] (Dr. Carlos Butler), and currently takes gabapentin, Norco, and baclofen. He complains of right calf pain and swelling that is intermittent in which he has an appointment with vascular. 10/27/2020:  Mr. Gilbert Enamorado returns to the clinic today to discuss his low back pain. He did see Dr. Ramu Mayer on 10/13/2020 that states his wound has completely healed and to follow up with her PRN. He states that he does have some neck pain when he rotates his head to the right. He wants to discuss his low back and leg pain. He reports numbness of the right foot up to the knee. He reports pain from the left hip and anterior thigh. He has some burning of the left thigh. Walking exacerbates the pain and numbness. He states Blake ADAMS has ordered a XR lumbar, however, he has not completed this as of today. 8/27/2020:  Mr. Gilbert Enamorado returns to the clinic today. He has been seeing the wound care team and his wound is healing by secondary intention. He states he is doing okay. He saw Dr. Pamela Wynne recently, he remains on oral abx - Clindamycin. He states he is now having hip issues. He wants to talk about his lumbar spine at next appointment. 6/23/2020:  Mr. Gilbert Enamorado returns to the clinic today to allow me to inspect his wound. He has continued to drain some. No fever, chills, or night sweats. He has been taking clindamycin.   Cultures have revealed MRSA.      HISTORY OF PRESENT ILLNESS:      Jose Larson is a 44 y.o. male who underwent a C5,C6 corpectomy with anterior cervical plating along with posterior instrumented fusion C3-C7 on 2/19/2020. On 6/17/2020 his wound dehisced and he underwent drainage of posterior seroma, washout and closure of wound. His wound was noted to be draining she we had him come to our clinic for inspection. He denies any fever or chills. He continues to smoke and is unaware of his blood glucose at today's visit. Past Medical History:   Diagnosis Date    CAD (coronary artery disease)     sees [facility] cardiology    Cancer [facility])     Bladder    COPD [facility]     Depression     Diabetes mellitus ([facility]     History of blood transfusion     unsure thinks he did    Hyperlipidemia     Hypertension        Past Surgical History:   Procedure Laterality Date    BACK SURGERY      X3     BLADDER REMOVAL      CARDIAC CATHETERIZATION      CERVICAL FUSION N/A 2/19/2020    Phase 1:  C5 and C6 corpectomy with placement of an expandable cage and anterior cervical plate [id number]. performed by Dolores Bassett DO at [facility] N/A 6/17/2020    POSTERIOR CERVICAL WOUND [address] OUT AND CLOSURE performed by Dolores Bassett DO at [address] N/A 6/26/2020    [place] [address] [facility] performed by Dolores Bassett DO at [address] [place] [address] N/A 9/10/2019    Dr Tushar Cason, 5 yr recall    CORONARY ARTERY BYPASS GRAFT N/A 5/1/2019    CORONARY ARTERY BYPASS GRAFT X 3 WITH LEFT INTERNAL MAMMARY ARTERY, ENDOSCOPIC  VEIN HARVEST, WITH PERFUSION. performed by China Blank MD at [address] [place] [address]  N/A 2/19/2020    Phase 2:  Posterior instrumented fusion C3-C7 using lateral mass screws and rods.  performed by Dolores Bassett DO at [address] [facility] ext   [address] [facility] Disp: , Rfl:     tiotropium (SPIRIVA) 18 MCG inhalation capsule, Inhale 1 capsule into the lungs, Disp: , Rfl:     blood glucose monitor strips, Test 4 times a day & as needed for symptoms of irregular blood glucose., Disp: 100 strip, Rfl: 3    glucose monitoring kit (FREESTYLE) monitoring kit, Please provide glucometer that INS will cover for DM type 2, Disp: 1 kit, Rfl: 0    Lancets 30G MISC, 1 each by Does not apply route daily 4 times daily, Disp: 100 each, Rfl: 3    aspirin 81 MG EC tablet, Take 1 tablet by mouth daily, Disp: 30 tablet, Rfl: 3  Latex and Demerol hcl [meperidine]    Social History  Social History     Tobacco Use   Smoking Status Current Every Day Smoker    Packs/day: 1.00    Years: 30.00    Pack years: 30.00   Smokeless Tobacco Never Used   Tobacco Comment    sometimes less     Social History     Substance and Sexual Activity   Alcohol Use Yes    Comment: Occ         Family History   Problem Relation Age of Onset    Cancer Mother     Vision Loss Mother     Breast Cancer Mother     Coronary Art Dis Father     Obesity Father     Kidney Disease Father     High Blood Pressure Father     High Cholesterol Father     Hypercalcemia Sister     Obesity Brother     High Blood Pressure Brother        Review of Systems:  Constitutional: Negative. HENT: Negative. Eyes: Negative. Respiratory: Negative. Cardiovascular: Negative. Gastrointestinal: Negative. Genitourinary: Negative. Musculoskeletal: Positive for back pain, joint pain and neck pain. Skin: Negative. Neurological: Negative. Endo/Heme/Allergies: Negative. Psychiatric/Behavioral: Negative. PHYSICAL EXAM:  Vitals:    01/28/21 0915   BP: 117/66   Pulse: 87   SpO2: 100%     Constitutional: The patient appears well-developed andwell-nourished.    Eyes - conjunctiva normal.  Conjugate gaze  Ear, nose, throat -No scars, masses, or lesions over external nose or ears, no atrophy of tongue  Neck-symmetric, no masses noted, no jugular vein distension  Respiration- chest wall appears symmetric, goodexpansion, normal effort without use of accessory muscles  Musculoskeletal - no significant wasting of muscles noted, no bony deformities, gait no gross ataxia  Extremities-no clubbing, cyanosis or edema  Skin- warm, dry, and intact. No rash, erythema, or pallor. Psychiatric - mood, affect, and behavior appear normal.     Neurologic Examination  Awake, Alert andoriented x 3  Normal speech pattern, following commands  Motor 5/5 all extremities  No deficits to light touch     Antalgic gait pattern      Wound:  Posterior Cervical - Healed, depressed with thinning of the skin    DATA and IMAGING:    Lab Results   Component Value Date    WBC 7.9 11/06/2020    HGB 10.2 (L) 11/06/2020    HCT 33.5 (L) 11/06/2020    MCV 84.8 11/06/2020     11/06/2020     Lab Results   Component Value Date     11/05/2020    K 4.8 11/05/2020     11/05/2020    CO2 20 (L) 11/05/2020    BUN 20 11/05/2020    CREATININE 1.6 (H) 11/05/2020    GLUCOSE 149 (H) 11/05/2020    CALCIUM 9.0 11/05/2020    PROT 6.8 11/05/2020    LABALBU 4.3 11/05/2020    BILITOT <0.2 11/05/2020    ALKPHOS 67 11/05/2020    AST 13 11/05/2020    ALT 7 11/05/2020    LABGLOM 45 (A) 11/05/2020    GFRAA 55 (L) 11/05/2020     Lab Results   Component Value Date    INR 1.03 01/30/2020    INR 1.34 (H) 05/01/2019    INR 1.02 04/30/2019    PROTIME 12.9 01/30/2020    PROTIME 15.9 (H) 05/01/2019    PROTIME 12.8 04/30/2019    No results found. Examination. XR LUMBAR SPINE (2-3 VIEWS) 12/8/2020 2:55 PM   History: Back pain. The frontal and lateral views of the lumbar spine are obtained. There   is no previous study for comparison. The correlation is made with MR imaging of the lumbar spine dated   4/26/2012. There is no evidence of recent fracture or compression. There are 5 nonrib-bearing lumbar vertebrae. There is a mild dextroscoliosis.    There is a mild anterolisthesis of L3 over L4. There is grade 1   spondylolisthesis of L5 over S1. There is evidence of bony fusion at   L4-5 and L5-S1. The vertebral body heights are normal.   Chronic degenerative changes are seen in the form of osteophytes and   narrowing of the disc spaces. There is evidence of laminectomy at L2-L5 with a posterior bony   fusion. Severe atheromatous changes of the abdominal aorta and iliac arteries   are seen. Incidentally noted is a right lower abdominal ostomy.       Impression   The severe lumbar spondylosis. The multilevel alignment disruption more severe at L5-S1. Multilevel laminectomy and posterior bony fusion. Signed by Dr Omer Cramer on 12/8/2020 4:11 PM           EXAMINATION: [facility]  1/8/2021 9:46 AM   HISTORY: Bilateral hip pain and bilateral lower extremity pain and   numbness. Bilateral hip weakness. Low back pain. COMPARISON: 4/26/2012. TECHNIQUE: Multiplanar imaging was performed. FINDINGS: Gustafson Palmer is no bone marrow signal abnormality. The inferior   tip of the conus is at the superior endplate of L1. At L5-S1, there is severe disc narrowing. There is very slight   anterior subluxation of L5 compared to S1. There has been laminectomy   and posterior bony fusion. There is no central spinal canal narrowing   at this level. There is severe bilateral foraminal stenosis. There is   hypertrophic change of the fused facet joints. At L4-5, the disc is fairly well maintained. There is no disc   herniation or significant disc bulging. There is some hypertrophic   change of the facet joints and there has been prior laminectomy and   posterior fusion. There is no central spinal canal narrowing or   foraminal stenosis at this level. At L3-4, there is moderate to severe disc narrowing. There is mild   diffuse disc bulging with endplate spurring producing dural sac   compression.  There has been laminectomy posteriorly with posterior   bony fusion. There is hypertrophic change of the facet joints. There   is moderate narrowing of the dural sac centrally. There is congenital   shortening of the pedicles and a decreased interpediculate distance. There is crowding of the nerve roots within the dural sac. There is   severe bilateral foraminal stenosis. At L2-3, there is moderate to severe disc narrowing. There is mild to   moderate disc bulging. There is hypertrophic change of the facet   joints. There has been laminectomy posteriorly. There is a decreased   interpediculate distance. There is moderate narrowing of the dural sac   at this level with crowding of the nerve roots in the dural sac. There   is likely congenital shortening of the pedicles. There is severe   bilateral foraminal stenosis. At L1-2, there is mild to moderate disc narrowing with mild disc   bulging. There is moderate facet arthropathy. There is a slight   decreased interpediculate distance at this level. There is moderate   narrowing of the dural sac with crowding of the nerve roots within the   sac. There is moderate to severe bilateral foraminal narrowing. At T12-L1, there is mild to moderate disc narrowing. There is minimal   disc bulging. There were no axial images obtained. There does not   appear to be any significant spinal or foraminal stenosis at this   level on the sagittal images.       Impression   Postoperative and degenerative changes. Please see the   above report at each level for complete description of findings. Signed by Dr Kenia Medrano on 1/8/2021 9:56 AM     I have personally reviewed the images and my interpretation is:   There is DDD throughout the entire lumbar spine except at L4-5  He has transitional anatomy  L1-2 mild right lateral recess stenosis due to facet hypertorphy  L2-3 moderate canal stenosis, severe left foraminal stenosis, mild to moderate right foraminal stenosis  L3-4 mild canal stenosis, moderate bilateral foraminal stenosis, there is a small spondylolisthesis that measures 4 mm  L5-S1 there is a spondylolisthesis that measures 8-9 mm, with severe right lateral recess stenosis and moderate right foraminal stenosis. ASSESSMENT   44year old male s/p C5,C6 corpectomy with anterior cervical plating along with posterior instrumented fusion C3-C7 on 2/19/2020. On 6/17/2020 his wound dehisced and he underwent drainage of posterior seroma, washout and closure of wound. Cultures were positive with MRSA. Now with low back and lower extremity pain. PLAN:  -We have discussed and reviewed the results of the MRI lumbar spine with Mr. ewing at length. We explained that he does have significant narrowing of the canal, foramen, in addition to spondylolisthesis, and degenerative changes, that correlate with some of his pain syndrome, that could possibly benefit from a neurosurgical procedure. However, he is not profoundly weak, he has had issues with his previous operations. Because of that we definitely recommend holding off on an additional procedure at this time.   -Follow up as needed for now          ICD-10-CM    1. Spondylolisthesis at L5-S1 level  M43.17    2. Spondylolisthesis at L3-L4 level  M43.16    3. Spinal stenosis of lumbar region with neurogenic claudication  M48.062    4. Lumbar foraminal stenosis  M48.061    5. DDD (degenerative disc disease), lumbar  M51.36    6. Chronic midline low back pain with bilateral sciatica  M54.41     M54.42     G89.29    7.  Numbness and tingling of both feet  R20.0     R20.2         Loren Connolly DO

## 2021-01-28 NOTE — PROGRESS NOTES
[facility] Progress Note                            Date:  1/28/2021  Patient: Sepideh Larson  Age:  44 y.o., 1970      Reason for evaluation:         SUBJECTIVE:    Returns today follow-up assessment. Recent noninvasive arterial plethysmography lower extremities abnormal suggesting significant right popliteal artery disease. Flow to the left side relatively normal.  He continues to smoke. Denies anginal chest pain or limiting dyspnea. No other complaints. Blood pressure 142/82 heart 80. On 11/23/2020 LDL cholesterol 28. Review of Systems   Constitutional: Negative. Negative for chills, fever and unexpected weight change. HENT: Negative. Eyes: Negative. Respiratory: Negative. Negative for shortness of breath. Cardiovascular: Negative. Negative for chest pain. Gastrointestinal: Negative. Negative for diarrhea, nausea and vomiting. Endocrine: Negative. Genitourinary: Negative. Musculoskeletal: Negative. Skin: Negative. Neurological: Negative. All other systems reviewed and are negative. OBJECTIVE:     BP (!) 142/82   Pulse 80   Ht 5' 11\" (1.803 m)   Wt 198 lb (89.8 kg)   BMI 27.62 kg/m²     Labs:   CBC: No results for input(s): WBC, HGB, HCT, PLT in the last 72 hours. BMP:No results for input(s): NA, K, CO2, BUN, CREATININE, LABGLOM, GLUCOSE in the last 72 hours. BNP: No results for input(s): BNP in the last 72 hours. PT/INR: No results for input(s): PROTIME, INR in the last 72 hours. APTT:No results for input(s): APTT in the last 72 hours. CARDIAC ENZYMES:No results for input(s): CKTOTAL, CKMB, CKMBINDEX, TROPONINI in the last 72 hours. FASTING LIPID PANEL:  Lab Results   Component Value Date    HDL 43 11/23/2020    LDLDIRECT 109 04/26/2019    LDLCALC 28 11/23/2020    TRIG 245 11/23/2020     LIVER PROFILE:No results for input(s): AST, ALT, LABALBU in the last 72 hours.         Past Medical History:   Diagnosis Date    CAD (coronary artery disease)     sees [facility] cardiology    Cancer [facility])     Bladder    [facility]) [facility]     Depression     Diabetes mellitus ([facility]     History of blood transfusion     unsure thinks he did    Hyperlipidemia     Hypertension      Past Surgical History:   Procedure Laterality Date    BACK SURGERY      X3     BLADDER REMOVAL      CARDIAC CATHETERIZATION      CERVICAL FUSION N/A 2/19/2020    Phase 1:  C5 and C6 corpectomy with placement of an expandable cage and anterior cervical plate G8-I7. performed by Lily Khan DO at [address] [place] [address] N/A 6/17/2020    POSTERIOR CERVICAL WOUND [address] OUT AND CLOSURE performed by Lily Khan DO at [address] [place] [address] N/A 6/26/2020    [facility] [place] [address] [place] [address] performed by Lily Khan DO at [address] [place], [place] [address] N/A 9/10/2019    Dr Adam Bolanos, 5 yr recall    CORONARY ARTERY BYPASS GRAFT N/A 5/1/2019    CORONARY ARTERY BYPASS GRAFT X 3 WITH LEFT INTERNAL MAMMARY ARTERY, ENDOSCOPIC  VEIN HARVEST, WITH PERFUSION. performed by Audie Kuhn MD at [place] [address] N/A 2/19/2020    Phase 2:  Posterior instrumented fusion C3-C7 using lateral mass screws and rods. performed by Lily Khan DO at [address] [facility]      lower ext    PROSTATECTOMY       Family History   Problem Relation Age of Onset    Cancer Mother     Vision Loss Mother     Breast Cancer Mother     Coronary Art Dis Father     Obesity Father     Kidney Disease Father     High Blood Pressure Father     High Cholesterol Father     Hypercalcemia Sister     Obesity Brother     High Blood Pressure Brother      Allergies   Allergen Reactions    Latex Other (See Comments)     BOILS AND BLISTERS- ALLERGY CALLED TO [facility] SURGERY SCHEDULING.     Demerol Hcl [Meperidine] Hives     And n/v     Current Outpatient Medications   Medication Sig Dispense Refill    gabapentin (NEURONTIN) 300 MG capsule TAKE 1 CAPSULE BY MOUTH 3 TIMES DAILY 90 capsule 0    FLUoxetine (PROZAC) 40 MG capsule Take 1 capsule by mouth daily 30 capsule 5    baclofen (LIORESAL) 10 MG tablet TAKE 1 TABLET BY MOUTH 3 TIMES DAILY AS NEEDED FOR PAIN/SPASMS 90 tablet 5    busPIRone (BUSPAR) 10 MG tablet Take 1 tablet by mouth 3 times daily as needed (anxiety) 90 tablet 5    fenofibrate (TRIGLIDE) 160 MG tablet TAKE ONE TABLET BY MOUTH DAILY. 30 tablet 5    omeprazole (PRILOSEC) 40 MG delayed release capsule Take 1 capsule by mouth daily 30 capsule 2    traZODone (DESYREL) 50 MG tablet Take 2 tablets by mouth nightly as needed for Sleep 60 tablet 1    atenolol (TENORMIN) 50 MG tablet Take once daily 30 tablet 5    atorvastatin (LIPITOR) 20 MG tablet TAKE ONE TABLET BY MOUTH ONCE DAILY. 30 tablet 3    metFORMIN (GLUCOPHAGE) 1000 MG tablet Take 1 tablet by mouth 2 times daily (with meals) 60 tablet 5    lisinopril (PRINIVIL;ZESTRIL) 10 MG tablet Take 1 tablet by mouth daily 30 tablet 3    ARIPiprazole (ABILIFY) 5 MG tablet TAKE ONE TABLET BY MOUTH ONCE DAILY. 30 tablet 3    fluticasone-salmeterol (ADVAIR) 100-50 MCG/DOSE diskus inhaler Inhale 1 puff into the lungs every 12 hours 1 Inhaler 3    Cholecalciferol (VITAMIN D3) 1.25 MG (95812 UT) CAPS Take 1 capsule by mouth once a week Wednesday 4 capsule 2    sodium hypochlorite (DAKINS) 0.125 % SOLN external solution Moisten gauze apply to wound twice daily 1000 mL 2    HYDROcodone-acetaminophen (NORCO)  MG per tablet Take 2 tablets by mouth every 6 hours as needed for Pain.  sildenafil (REVATIO) 20 MG tablet Take 1-5 tablets 1-2 hours before sexual activity PRN      tiotropium (SPIRIVA) 18 MCG inhalation capsule Inhale 1 capsule into the lungs      blood glucose monitor strips Test 4 times a day & as needed for symptoms of irregular blood glucose.  100 strip 3    glucose monitoring kit (FREESTYLE) monitoring kit Please provide glucometer that INS will cover for DM type 2 1 kit 0    Lancets 30G MISC 1 each by Does not apply route daily 4 times daily 100 each 3    aspirin 81 MG EC tablet Take 1 tablet by mouth daily 30 tablet 3     No current facility-administered medications for this visit. Social History     Socioeconomic History    Marital status:      Spouse name: Not on file    Number of children: Not on file    Years of education: Not on file    Highest education level: Not on file   Occupational History    Not on file   Social Needs    Financial resource strain: Not on file    Food insecurity     Worry: Not on file     Inability: Not on file    Transportation needs     Medical: Not on file     Non-medical: Not on file   Tobacco Use    Smoking status: Current Every Day Smoker     Packs/day: 1.00     Years: 30.00     Pack years: 30.00    Smokeless tobacco: Never Used    Tobacco comment: sometimes less   Substance and Sexual Activity    Alcohol use: Yes     Comment:  Occ    Drug use: Never    Sexual activity: Not on file   Lifestyle    Physical activity     Days per week: Not on file     Minutes per session: Not on file    Stress: Not on file   Relationships    Social connections     Talks on phone: Not on file     Gets together: Not on file     Attends Anabaptism service: Not on file     Active member of club or organization: Not on file     Attends meetings of clubs or organizations: Not on file     Relationship status: Not on file    Intimate partner violence     Fear of current or ex partner: Not on file     Emotionally abused: Not on file     Physically abused: Not on file     Forced sexual activity: Not on file   Other Topics Concern    Not on file   Social History Narrative     16 years second marriage    On disability due to multiple back surgeries and bladder cancer    [address] [facility]    Education high school    Smokes 1-1/2 pack per day drinks alcohol occasionally denies substance usage Physically sedentary    Former  at an [address] [facility] [facility]    Never in the [employer]       Physical Examination:  BP (!) 142/82   Pulse 80   Ht 5' 11\" (1.803 m)   Wt 198 lb (89.8 kg)   BMI 27.62 kg/m²   Physical Exam  Vitals signs reviewed. Constitutional:       Appearance: He is well-developed. Neck:      Vascular: No carotid bruit or JVD. Cardiovascular:      Rate and Rhythm: Normal rate and regular rhythm. Heart sounds: Normal heart sounds. No murmur. No friction rub. No gallop. Pulmonary:      Effort: Pulmonary effort is normal. No respiratory distress. Breath sounds: Normal breath sounds. No wheezing or rales. Abdominal:      General: There is no distension. Tenderness: There is no abdominal tenderness. Lymphadenopathy:      Cervical: No cervical adenopathy. Skin:     General: Skin is warm and dry. ASSESSMENT:     Diagnosis Orders   1. Coronary artery disease involving native coronary artery of native heart without angina pectoris     2. Essential hypertension     3. Mixed hyperlipidemia     4. Hx of CABG         PLAN:  No orders of the defined types were placed in this encounter. No orders of the defined types were placed in this encounter. 1. Continue present medications  2. We discussed the possibility of invasive angiography because of lifestyle limiting symptoms. At the present time he has not yet made that decision that he wants to proceed. I have encouraged him to exercise and walk as much as feasible. I have strongly encouraged him to completely quit smoking. 3. Recommend follow-up assessment in 3 months    Return in about 3 months (around 4/28/2021) for return to Dr. Bre Escalera only. Michelle Laughlin MD 1/28/2021 1:10 PM [facility]      Thisdictation was generated by voice recognition computer software.   Although all attempts are made to edit the dictation for accuracy, there may be errors in the transcription that are not intended.

## 2021-01-28 NOTE — PROGRESS NOTES
Review of Systems   Constitutional: Negative. HENT: Negative. Eyes: Negative. Respiratory: Negative. Cardiovascular: Negative. Gastrointestinal: Negative. Genitourinary: Negative. Musculoskeletal: Positive for back pain and neck pain. Skin: Negative. Neurological: Positive for tingling and weakness. Endo/Heme/Allergies: Negative. Psychiatric/Behavioral: Negative.

## 2021-02-11 DIAGNOSIS — M54.50 LUMBAR PAIN: ICD-10-CM

## 2021-02-11 NOTE — TELEPHONE ENCOUNTER
Chris Larson called to request a refill on his medication. Last office visit : 12/8/2020   Next office visit : 3/11/2021     Last UDS:   Amphetamines   Date Value Ref Range Status   03/23/2012 NEGATIVE  Final     Barbiturates   Date Value Ref Range Status   03/23/2012 NEGATIVE  Final     Benzodiazepines   Date Value Ref Range Status   03/23/2012 NEGATIVE  Final       Last Oddis Humberto: 01-11-21  Medication Contract: 0   Last Fill: 01-13-21    Requested Prescriptions     Pending Prescriptions Disp Refills    gabapentin (NEURONTIN) 300 MG capsule [Pharmacy Med Name: GABAPENTIN 300 MG CAPSULE 300 Capsule] 90 capsule 0     Sig: TAKE 1 CAPSULE BY MOUTH 3 TIMES DAILY         Please approve or refuse this medication.    Lux Ball MA

## 2021-02-12 RX ORDER — GABAPENTIN 300 MG/1
CAPSULE ORAL
Qty: 90 CAPSULE | Refills: 0 | Status: SHIPPED | OUTPATIENT
Start: 2021-02-12 | End: 2021-04-29

## 2021-03-17 ENCOUNTER — TELEPHONE (OUTPATIENT)
Dept: PRIMARY CARE CLINIC | Age: 51
End: 2021-03-17

## 2021-03-17 NOTE — TELEPHONE ENCOUNTER
I called and [facility] for pt to give the office a call back to reschedule his appt on 5/4/21 with Chasidy Garcia

## 2021-04-01 RX ORDER — ERGOCALCIFEROL 1.25 MG/1
CAPSULE ORAL
Qty: 4 CAPSULE | Refills: 2 | Status: SHIPPED | OUTPATIENT
Start: 2021-04-01 | End: 2021-07-10

## 2021-04-07 DIAGNOSIS — F51.01 PRIMARY INSOMNIA: ICD-10-CM

## 2021-04-07 DIAGNOSIS — I10 ESSENTIAL HYPERTENSION: ICD-10-CM

## 2021-04-07 RX ORDER — LISINOPRIL 10 MG/1
10 TABLET ORAL DAILY
Qty: 30 TABLET | Refills: 1 | Status: SHIPPED | OUTPATIENT
Start: 2021-04-07 | End: 2022-02-16 | Stop reason: SDUPTHER

## 2021-04-07 RX ORDER — TRAZODONE HYDROCHLORIDE 50 MG/1
100 TABLET ORAL NIGHTLY PRN
Qty: 60 TABLET | Refills: 1 | Status: SHIPPED | OUTPATIENT
Start: 2021-04-07 | End: 2021-07-10

## 2021-04-07 RX ORDER — ARIPIPRAZOLE 5 MG/1
TABLET ORAL
Qty: 30 TABLET | Refills: 1 | Status: SHIPPED | OUTPATIENT
Start: 2021-04-07 | End: 2021-07-10

## 2021-04-07 NOTE — TELEPHONE ENCOUNTER
Chris Larson called to request a refill on his medication. Last office visit : 12/8/2020   Next office visit : 5/4/2021     Requested Prescriptions     Signed Prescriptions Disp Refills    lisinopril (PRINIVIL;ZESTRIL) 10 MG tablet 30 tablet 1     Sig: TAKE 1 TABLET BY MOUTH DAILY     Authorizing Provider: Zayda Monte     Ordering User: Viveca Mask traZODone (DESYREL) 50 MG tablet 60 tablet 1     Sig: TAKE 2 TABLETS BY MOUTH NIGHTLY AS NEEDED FOR SLEEP     Authorizing Provider: Zayda Monte     Ordering User: Viveca Mask fluticasone-salmeterol (ADVAIR) 100-50 MCG/DOSE diskus inhaler 1 Inhaler 1     Sig: INHALE 1 PUFF INTO THE LUNGS EVERY 12 HOURS     Authorizing Provider: Zayda Monte     Ordering User: Viveca Mask ARIPiprazole (ABILIFY) 5 MG tablet 30 tablet 1     Sig: TAKE ONE TABLET BY MOUTH ONCE DAILY.      Authorizing Provider: Zayda Monte     Ordering User: Leo Giang MA

## 2021-04-28 DIAGNOSIS — M54.50 LUMBAR PAIN: ICD-10-CM

## 2021-04-28 RX ORDER — ATORVASTATIN CALCIUM 20 MG/1
TABLET, FILM COATED ORAL
Qty: 30 TABLET | Refills: 3 | Status: SHIPPED | OUTPATIENT
Start: 2021-04-28 | End: 2021-09-09

## 2021-04-29 RX ORDER — GABAPENTIN 300 MG/1
CAPSULE ORAL
Qty: 90 CAPSULE | Refills: 0 | Status: SHIPPED | OUTPATIENT
Start: 2021-04-29 | End: 2021-05-27

## 2021-05-03 ENCOUNTER — APPOINTMENT (OUTPATIENT)
Dept: CT IMAGING | Facility: HOSPITAL | Age: 51
End: 2021-05-03

## 2021-05-27 DIAGNOSIS — M54.50 LUMBAR PAIN: ICD-10-CM

## 2021-05-27 DIAGNOSIS — K21.9 GASTROESOPHAGEAL REFLUX DISEASE WITHOUT ESOPHAGITIS: ICD-10-CM

## 2021-05-27 RX ORDER — OMEPRAZOLE 40 MG/1
40 CAPSULE, DELAYED RELEASE ORAL DAILY
Qty: 30 CAPSULE | Refills: 2 | OUTPATIENT
Start: 2021-05-27

## 2021-05-27 RX ORDER — GABAPENTIN 300 MG/1
CAPSULE ORAL
Qty: 90 CAPSULE | Refills: 0 | Status: SHIPPED | OUTPATIENT
Start: 2021-05-27 | End: 2021-08-04

## 2021-05-27 NOTE — TELEPHONE ENCOUNTER
Chris Larson called to request a refill on his medication. Last office visit : 12/8/2020   Next office visit : Visit date not found     Last UDS:   Amphetamines   Date Value Ref Range Status   03/23/2012 NEGATIVE  Final     Barbiturates   Date Value Ref Range Status   03/23/2012 NEGATIVE  Final     Benzodiazepines   Date Value Ref Range Status   03/23/2012 NEGATIVE  Final       Last Clayburn [place]: 1/11/2021  Medication Contract: Not on file    Last Fill: 4/29/2021    Requested Prescriptions     Pending Prescriptions Disp Refills    gabapentin (NEURONTIN) 300 MG capsule [Pharmacy Med Name: GABAPENTIN 300 MG CAPS 300 Capsule] 90 capsule 0     Sig: TAKE 1 CAPSULE BY MOUTH 3 TIMES DAILY    omeprazole (PRILOSEC) 40 MG delayed release capsule [Pharmacy Med Name: OMEPRAZOLE DR 40 MG CAPSULE 40 Capsule] 30 capsule 2     Sig: TAKE 1 CAPSULE BY MOUTH DAILY         Please approve or refuse this medication.    Kaiser Rodriguez

## 2021-06-16 DIAGNOSIS — K21.9 GASTROESOPHAGEAL REFLUX DISEASE WITHOUT ESOPHAGITIS: ICD-10-CM

## 2021-06-16 RX ORDER — OMEPRAZOLE 40 MG/1
40 CAPSULE, DELAYED RELEASE ORAL DAILY
Qty: 30 CAPSULE | Refills: 2 | Status: SHIPPED | OUTPATIENT
Start: 2021-06-16 | End: 2021-10-03

## 2021-07-02 DIAGNOSIS — M54.50 LUMBAR PAIN: ICD-10-CM

## 2021-07-02 DIAGNOSIS — I10 ESSENTIAL HYPERTENSION: ICD-10-CM

## 2021-07-02 DIAGNOSIS — F51.01 PRIMARY INSOMNIA: ICD-10-CM

## 2021-07-04 RX ORDER — GABAPENTIN 300 MG/1
CAPSULE ORAL
Qty: 90 CAPSULE | Refills: 0 | OUTPATIENT
Start: 2021-07-04 | End: 2021-08-03

## 2021-07-04 RX ORDER — TRAZODONE HYDROCHLORIDE 50 MG/1
100 TABLET ORAL NIGHTLY PRN
Qty: 60 TABLET | Refills: 1 | OUTPATIENT
Start: 2021-07-04

## 2021-07-04 RX ORDER — ARIPIPRAZOLE 5 MG/1
TABLET ORAL
Qty: 30 TABLET | Refills: 1 | OUTPATIENT
Start: 2021-07-04

## 2021-07-04 RX ORDER — ERGOCALCIFEROL 1.25 MG/1
CAPSULE ORAL
Qty: 4 CAPSULE | Refills: 2 | OUTPATIENT
Start: 2021-07-04

## 2021-07-04 RX ORDER — LISINOPRIL 10 MG/1
10 TABLET ORAL DAILY
Qty: 30 TABLET | Refills: 1 | OUTPATIENT
Start: 2021-07-04

## 2021-07-10 DIAGNOSIS — F51.01 PRIMARY INSOMNIA: ICD-10-CM

## 2021-07-10 RX ORDER — TRAZODONE HYDROCHLORIDE 50 MG/1
100 TABLET ORAL NIGHTLY PRN
Qty: 60 TABLET | Refills: 1 | Status: SHIPPED | OUTPATIENT
Start: 2021-07-10 | End: 2021-09-28

## 2021-07-10 RX ORDER — ERGOCALCIFEROL 1.25 MG/1
CAPSULE ORAL
Qty: 4 CAPSULE | Refills: 2 | Status: SHIPPED | OUTPATIENT
Start: 2021-07-10 | End: 2021-10-10

## 2021-07-10 RX ORDER — ARIPIPRAZOLE 5 MG/1
TABLET ORAL
Qty: 30 TABLET | Refills: 1 | Status: SHIPPED | OUTPATIENT
Start: 2021-07-10 | End: 2021-09-09

## 2021-07-27 ENCOUNTER — TELEPHONE (OUTPATIENT)
Dept: NEUROSURGERY | Age: 51
End: 2021-07-27

## 2021-07-27 NOTE — TELEPHONE ENCOUNTER
Patient called and voiced that he is hurting really bad, he is having increased neck pain and it seems that it locks up on him when he turns his head. He states that he would like to get in to see the provider as soon as possible. Patient was scheduled with Milind Alicia on 7/29/2021 at 8:45 am. Patient voiced ok thank you.

## 2021-07-29 ENCOUNTER — OFFICE VISIT (OUTPATIENT)
Dept: NEUROSURGERY | Age: 51
End: 2021-07-29
Payer: MEDICAID

## 2021-07-29 VITALS
HEIGHT: 71 IN | DIASTOLIC BLOOD PRESSURE: 58 MMHG | OXYGEN SATURATION: 100 % | HEART RATE: 68 BPM | BODY MASS INDEX: 26.74 KG/M2 | SYSTOLIC BLOOD PRESSURE: 109 MMHG | WEIGHT: 191 LBS

## 2021-07-29 DIAGNOSIS — Z98.1 S/P CERVICAL SPINAL FUSION: Primary | ICD-10-CM

## 2021-07-29 DIAGNOSIS — M54.2 NECK PAIN: ICD-10-CM

## 2021-07-29 DIAGNOSIS — M62.838 MUSCLE SPASMS OF NECK: ICD-10-CM

## 2021-07-29 PROCEDURE — G8419 CALC BMI OUT NRM PARAM NOF/U: HCPCS | Performed by: NURSE PRACTITIONER

## 2021-07-29 PROCEDURE — 4004F PT TOBACCO SCREEN RCVD TLK: CPT | Performed by: NURSE PRACTITIONER

## 2021-07-29 PROCEDURE — G8427 DOCREV CUR MEDS BY ELIG CLIN: HCPCS | Performed by: NURSE PRACTITIONER

## 2021-07-29 PROCEDURE — 3017F COLORECTAL CA SCREEN DOC REV: CPT | Performed by: NURSE PRACTITIONER

## 2021-07-29 PROCEDURE — 99213 OFFICE O/P EST LOW 20 MIN: CPT | Performed by: NURSE PRACTITIONER

## 2021-07-29 ASSESSMENT — ENCOUNTER SYMPTOMS
GASTROINTESTINAL NEGATIVE: 1
EYES NEGATIVE: 1
RESPIRATORY NEGATIVE: 1

## 2021-07-29 NOTE — PROGRESS NOTES
[facility]   Office Visit    Chief Complaint   Patient presents with    Follow-up    Neck Pain    Shoulder Pain       7/29/2021: Mr. Em Aggarwal returns to clinic today to discuss increase in his neck pain. He states he was looking to the side the other day, he felt a pop in his neck and has had increase pain in the posterior neck and bilateral trapezius. He denies any new radicular pains or numbness. 1/28/2021:  Mr. Em Aggarwal returns to the clinic today to discuss his new lumbar MRI. He continues to have low back pain that radiates into the left buttock, anterior thigh. He has numbness of the entire feet bilaterally. He has 60% back pain and 40% leg pain. Change in weather, ambulation exacerbates the pain. Lying down and sitting his pain is tolerable. He has tried chiropractic therapy, physical therapy multiple times, [facility] (Dr. Shawn Hartley), and currently takes gabapentin, Norco, and baclofen. He complains of right calf pain and swelling that is intermittent in which he has an appointment with vascular. 10/27/2020:  Mr. Em Aggarwal returns to the clinic today to discuss his low back pain. He did see Dr. Riya Mcnally on 10/13/2020 that states his wound has completely healed and to follow up with her PRN. He states that he does have some neck pain when he rotates his head to the right. He wants to discuss his low back and leg pain. He reports numbness of the right foot up to the knee. He reports pain from the left hip and anterior thigh. He has some burning of the left thigh. Walking exacerbates the pain and numbness. He states Varun ADAMS has ordered a XR lumbar, however, he has not completed this as of today. 8/27/2020:  Mr. Em Aggarwal returns to the clinic today. He has been seeing the wound care team and his wound is healing by secondary intention. He states he is doing okay. He saw Dr. Kip Whitney recently, he remains on oral abx - Clindamycin.   He states he is now having hip issues. He wants to talk about his lumbar spine at next appointment. 6/23/2020:  Mr. Sanjiv Schafer returns to the clinic today to allow me to inspect his wound. He has continued to drain some. No fever, chills, or night sweats. He has been taking clindamycin. Cultures have revealed MRSA. HISTORY OF PRESENT ILLNESS:      Enedina Larson is a 46 y.o. male who underwent a C5,C6 corpectomy with anterior cervical plating along with posterior instrumented fusion C3-C7 on 2/19/2020. On 6/17/2020 his wound dehisced and he underwent drainage of posterior seroma, washout and closure of wound. His wound was noted to be draining she we had him come to our clinic for inspection. He denies any fever or chills. He continues to smoke and is unaware of his blood glucose at today's visit. Past Medical History:   Diagnosis Date    CAD (coronary artery disease)     sees [facility] cardiology    Cancer [facility])     Bladder    [facility]     Depression     Diabetes mellitus [facility]     History of blood transfusion     unsure thinks he did    Hyperlipidemia     Hypertension        Past Surgical History:   Procedure Laterality Date    BACK SURGERY      X3     BLADDER REMOVAL      CARDIAC CATHETERIZATION      CERVICAL FUSION N/A 2/19/2020    Phase 1:  C5 and C6 corpectomy with placement of an expandable cage and anterior cervical plate F4-U1. performed by Bebeto Arellano DO at [address] [place] [address] N/A 6/17/2020    POSTERIOR CERVICAL WOUND [address] OUT AND CLOSURE performed by Bebeto Arellano DO at [address] [place] [address] N/A 6/26/2020    [address] performed by Bebeto Arellano DO at [address] [place] [address] N/A 9/10/2019    Dr Isauro Finney, 5 yr recall    CORONARY ARTERY BYPASS GRAFT N/A 5/1/2019    CORONARY ARTERY BYPASS GRAFT X 3 WITH LEFT INTERNAL MAMMARY ARTERY, ENDOSCOPIC  VEIN HARVEST, WITH PERFUSION. performed by Kadeem Pinzon MD at [facility] N/A 2/19/2020    Phase 2:  Posterior instrumented fusion C3-C7 using lateral mass screws and rods. performed by Tricia Wallis DO at [address] [place] [address]      lower ext    PROSTATECTOMY          Medications    Current Outpatient Medications:     traZODone (DESYREL) 50 MG tablet, TAKE 2 TABLETS BY MOUTH NIGHTLY AS NEEDED FOR SLEEP, Disp: 60 tablet, Rfl: 1    vitamin D (ERGOCALCIFEROL) 1.25 MG (74672 UT) CAPS capsule, TAKE 1 CAPSULE BY MOUTH ONCE A WEEK ON WEDNESDAY, Disp: 4 capsule, Rfl: 2    ARIPiprazole (ABILIFY) 5 MG tablet, TAKE ONE TABLET BY MOUTH ONCE DAILY. , Disp: 30 tablet, Rfl: 1    omeprazole (PRILOSEC) 40 MG delayed release capsule, TAKE 1 CAPSULE BY MOUTH DAILY, Disp: 30 capsule, Rfl: 2    gabapentin (NEURONTIN) 300 MG capsule, TAKE 1 CAPSULE BY MOUTH 3 TIMES DAILY, Disp: 90 capsule, Rfl: 0    atorvastatin (LIPITOR) 20 MG tablet, TAKE ONE TABLET BY MOUTH ONCE DAILY. , Disp: 30 tablet, Rfl: 3    lisinopril (PRINIVIL;ZESTRIL) 10 MG tablet, TAKE 1 TABLET BY MOUTH DAILY, Disp: 30 tablet, Rfl: 1    fluticasone-salmeterol (ADVAIR) 100-50 MCG/DOSE diskus inhaler, INHALE 1 PUFF INTO THE LUNGS EVERY 12 HOURS, Disp: 1 Inhaler, Rfl: 1    FLUoxetine (PROZAC) 40 MG capsule, Take 1 capsule by mouth daily, Disp: 30 capsule, Rfl: 5    baclofen (LIORESAL) 10 MG tablet, TAKE 1 TABLET BY MOUTH 3 TIMES DAILY AS NEEDED FOR PAIN/SPASMS, Disp: 90 tablet, Rfl: 5    busPIRone (BUSPAR) 10 MG tablet, Take 1 tablet by mouth 3 times daily as needed (anxiety), Disp: 90 tablet, Rfl: 5    fenofibrate (TRIGLIDE) 160 MG tablet, TAKE ONE TABLET BY MOUTH DAILY. , Disp: 30 tablet, Rfl: 5    atenolol (TENORMIN) 50 MG tablet, Take once daily, Disp: 30 tablet, Rfl: 5    metFORMIN (GLUCOPHAGE) 1000 MG tablet, Take 1 tablet by mouth 2 times daily (with meals), Disp: 60 tablet, Rfl: 5    sodium hypochlorite (DAKINS) 0.125 % SOLN external solution, Moisten 07/29/21 0857   BP: (!) 109/58   Pulse: 68   SpO2: 100%     Constitutional: The patient appears well-developed andwell-nourished. Eyes - conjunctiva normal.  Conjugate gaze  Ear, nose, throat -No scars, masses, or lesions over external nose or ears, no atrophy of tongue  Neck-symmetric, no masses noted, no jugular vein distension  Respiration- chest wall appears symmetric, goodexpansion, normal effort without use of accessory muscles  Musculoskeletal - no significant wasting of muscles noted, no bony deformities, gait no gross ataxia  Extremities-no clubbing, cyanosis or edema  Skin- warm, dry, and intact. No rash, erythema, or pallor.   Psychiatric - mood, affect, and behavior appear normal.     Neurologic Examination  Awake, Alert andoriented x 3  Normal speech pattern, following commands      Motor:  RIGHT: hand grasp 5/5    finger extension 5/5    bicep 5/5    triceps 5/5    deltoid 5/5      LEFT:   hand grasp 5/5    finger extension 5/5    bicep 5/5    triceps 5/5    deltoid 5/5    Decrease to pinprick left 5th digit  Bilateral trapezial tightness      Antalgic gait pattern; walks with a vasquez      Wound:  Posterior Cervical - Healed, depressed with thinning of the skin    DATA and IMAGING:    Lab Results   Component Value Date    WBC 7.9 11/06/2020    HGB 10.2 (L) 11/06/2020    HCT 33.5 (L) 11/06/2020    MCV 84.8 11/06/2020     11/06/2020     Lab Results   Component Value Date     11/05/2020    K 4.8 11/05/2020     11/05/2020    CO2 20 (L) 11/05/2020    BUN 20 11/05/2020    CREATININE 1.6 (H) 11/05/2020    GLUCOSE 149 (H) 11/05/2020    CALCIUM 9.0 11/05/2020    PROT 6.8 11/05/2020    LABALBU 4.3 11/05/2020    BILITOT <0.2 11/05/2020    ALKPHOS 67 11/05/2020    AST 13 11/05/2020    ALT 7 11/05/2020    LABGLOM 45 (A) 11/05/2020    GFRAA 55 (L) 11/05/2020     Lab Results   Component Value Date    INR 1.03 01/30/2020    INR 1.34 (H) 05/01/2019    INR 1.02 04/30/2019    PROTIME 12.9 01/30/2020 PROTIME 15.9 (H) 05/01/2019    PROTIME 12.8 04/30/2019    No results found. Examination. XR LUMBAR SPINE (2-3 VIEWS) 12/8/2020 2:55 PM   History: Back pain. The frontal and lateral views of the lumbar spine are obtained. There   is no previous study for comparison. The correlation is made with MR imaging of the lumbar spine dated   4/26/2012. There is no evidence of recent fracture or compression. There are 5 nonrib-bearing lumbar vertebrae. There is a mild dextroscoliosis. There is a mild anterolisthesis of L3 over L4. There is grade 1   spondylolisthesis of L5 over S1. There is evidence of bony fusion at   L4-5 and L5-S1. The vertebral body heights are normal.   Chronic degenerative changes are seen in the form of osteophytes and   narrowing of the disc spaces. There is evidence of laminectomy at L2-L5 with a posterior bony   fusion. Severe atheromatous changes of the abdominal aorta and iliac arteries   are seen. Incidentally noted is a right lower abdominal ostomy.       Impression   The severe lumbar spondylosis. The multilevel alignment disruption more severe at L5-S1. Multilevel laminectomy and posterior bony fusion. Signed by Dr Deanne Bear on 12/8/2020 4:11 PM           EXAMINATION: [facility]  1/8/2021 9:46 AM   HISTORY: Bilateral hip pain and bilateral lower extremity pain and   numbness. Bilateral hip weakness. Low back pain. COMPARISON: 4/26/2012. TECHNIQUE: Multiplanar imaging was performed. FINDINGS: Jason Harlan is no bone marrow signal abnormality. The inferior   tip of the conus is at the superior endplate of L1. At L5-S1, there is severe disc narrowing. There is very slight   anterior subluxation of L5 compared to S1. There has been laminectomy   and posterior bony fusion. There is no central spinal canal narrowing   at this level. There is severe bilateral foraminal stenosis. There is   hypertrophic change of the fused facet joints.    At L4-5, the disc is fairly well maintained. There is no disc   herniation or significant disc bulging. There is some hypertrophic   change of the facet joints and there has been prior laminectomy and   posterior fusion. There is no central spinal canal narrowing or   foraminal stenosis at this level. At L3-4, there is moderate to severe disc narrowing. There is mild   diffuse disc bulging with endplate spurring producing dural sac   compression. There has been laminectomy posteriorly with posterior   bony fusion. There is hypertrophic change of the facet joints. There   is moderate narrowing of the dural sac centrally. There is congenital   shortening of the pedicles and a decreased interpediculate distance. There is crowding of the nerve roots within the dural sac. There is   severe bilateral foraminal stenosis. At L2-3, there is moderate to severe disc narrowing. There is mild to   moderate disc bulging. There is hypertrophic change of the facet   joints. There has been laminectomy posteriorly. There is a decreased   interpediculate distance. There is moderate narrowing of the dural sac   at this level with crowding of the nerve roots in the dural sac. There   is likely congenital shortening of the pedicles. There is severe   bilateral foraminal stenosis. At L1-2, there is mild to moderate disc narrowing with mild disc   bulging. There is moderate facet arthropathy. There is a slight   decreased interpediculate distance at this level. There is moderate   narrowing of the dural sac with crowding of the nerve roots within the   sac. There is moderate to severe bilateral foraminal narrowing. At T12-L1, there is mild to moderate disc narrowing. There is minimal   disc bulging. There were no axial images obtained. There does not   appear to be any significant spinal or foraminal stenosis at this   level on the sagittal images.       Impression   Postoperative and degenerative changes.  Please see the   above report at each level for complete description of findings. Signed by Dr Holly Thomas on 1/8/2021 9:56 AM     I have personally reviewed the images and my interpretation is: There is DDD throughout the entire lumbar spine except at L4-5  He has transitional anatomy  L1-2 mild right lateral recess stenosis due to facet hypertorphy  L2-3 moderate canal stenosis, severe left foraminal stenosis, mild to moderate right foraminal stenosis  L3-4 mild canal stenosis, moderate bilateral foraminal stenosis, there is a small spondylolisthesis that measures 4 mm  L5-S1 there is a spondylolisthesis that measures 8-9 mm, with severe right lateral recess stenosis and moderate right foraminal stenosis. ASSESSMENT   44year old male s/p C5,C6 corpectomy with anterior cervical plating along with posterior instrumented fusion C3-C7 on 2/19/2020. On 6/17/2020 his wound dehisced and he underwent drainage of posterior seroma, washout and closure of wound. Cultures were positive with MRSA. Now with low back and lower extremity pain. PLAN:  -XR cervical flex/ex to assess hardware and rule out instability  -We did explain that he will likely have chronic neck pain from the surgery itself after physical activity and may have periodic flare ups. -Follow up as needed unless films show abnormal motion or hardware malfunction         ICD-10-CM    1.  S/P cervical spinal fusion  Z98.1 XR CERVICAL SPINE (4-5 VIEWS)   2. Neck pain  M54.2 XR CERVICAL SPINE (4-5 VIEWS)   3. Muscle spasms of neck  M62.838         Mariel Hess, APRN

## 2021-07-30 ENCOUNTER — HOSPITAL ENCOUNTER (OUTPATIENT)
Dept: GENERAL RADIOLOGY | Age: 51
Discharge: HOME | End: 2021-07-30
Payer: MEDICAID

## 2021-07-30 DIAGNOSIS — M54.2 NECK PAIN: ICD-10-CM

## 2021-07-30 DIAGNOSIS — Z98.1 S/P CERVICAL SPINAL FUSION: ICD-10-CM

## 2021-07-30 PROCEDURE — 72050 X-RAY EXAM NECK SPINE 4/5VWS: CPT

## 2021-08-03 DIAGNOSIS — M54.50 LUMBAR PAIN: ICD-10-CM

## 2021-08-04 RX ORDER — GABAPENTIN 300 MG/1
CAPSULE ORAL
Qty: 90 CAPSULE | Refills: 0 | Status: SHIPPED | OUTPATIENT
Start: 2021-08-04 | End: 2021-09-09

## 2021-08-04 NOTE — TELEPHONE ENCOUNTER
Chris Larson called to request a refill on his medication. Last office visit : 12/8/2020   Next office visit : Visit date not found     Last UDS:   Amphetamines   Date Value Ref Range Status   03/23/2012 NEGATIVE  Final     Barbiturates   Date Value Ref Range Status   03/23/2012 NEGATIVE  Final     Benzodiazepines   Date Value Ref Range Status   03/23/2012 NEGATIVE  Final       Last Gregor Quincyazi: 01-11-21  Medication Contract:     Last Fill: 05-27-21    Requested Prescriptions     Pending Prescriptions Disp Refills    gabapentin (NEURONTIN) 300 MG capsule [Pharmacy Med Name: GABAPENTIN 300 MG CAPS 300 Capsule] 90 capsule 0     Sig: TAKE 1 CAPSULE BY MOUTH 3 TIMES DAILY         Please approve or refuse this medication.    Jia Raman MA

## 2021-08-09 ENCOUNTER — TELEPHONE (OUTPATIENT)
Dept: NEUROSURGERY | Age: 51
End: 2021-08-09

## 2021-08-09 NOTE — TELEPHONE ENCOUNTER
Patient called and states that he had  Imaging done a few weeks ago and has not heard anything  Please advise patient on results of recent imaging.

## 2021-09-09 DIAGNOSIS — M54.50 LUMBAR PAIN: ICD-10-CM

## 2021-09-09 DIAGNOSIS — E11.9 TYPE 2 DIABETES MELLITUS WITHOUT COMPLICATION, WITHOUT LONG-TERM CURRENT USE OF INSULIN (HCC): Chronic | ICD-10-CM

## 2021-09-09 RX ORDER — ARIPIPRAZOLE 5 MG/1
TABLET ORAL
Qty: 30 TABLET | Refills: 0 | Status: SHIPPED | OUTPATIENT
Start: 2021-09-09 | End: 2021-10-10

## 2021-09-09 RX ORDER — ATORVASTATIN CALCIUM 20 MG/1
TABLET, FILM COATED ORAL
Qty: 30 TABLET | Refills: 0 | Status: SHIPPED | OUTPATIENT
Start: 2021-09-09 | End: 2021-10-10

## 2021-09-09 NOTE — TELEPHONE ENCOUNTER
Chris Larson called to request a refill on his medication. Last office visit : 12/8/2020   Next office visit : 9/15/2021     Last UDS:   Amphetamines   Date Value Ref Range Status   03/23/2012 NEGATIVE  Final     Barbiturates   Date Value Ref Range Status   03/23/2012 NEGATIVE  Final     Benzodiazepines   Date Value Ref Range Status   03/23/2012 NEGATIVE  Final       Last Celester Kim: 1-13-21  Medication Contract: none   Last Fill: 8-4    Requested Prescriptions     Pending Prescriptions Disp Refills    ARIPiprazole (ABILIFY) 5 MG tablet [Pharmacy Med Name: ARIPIPRAZOLE 5 MG TABS 5 Tablet] 30 tablet 1     Sig: TAKE ONE TABLET BY MOUTH ONCE DAILY.  gabapentin (NEURONTIN) 300 MG capsule [Pharmacy Med Name: GABAPENTIN 300 MG CAPS 300 Capsule] 90 capsule 0     Sig: TAKE ONE CAPSULE BY MOUTH THREE TIMES DAILY    atorvastatin (LIPITOR) 20 MG tablet [Pharmacy Med Name: ATORVASTATIN CALCIUM 20 MG 20 Tablet] 30 tablet 3     Sig: TAKE ONE TABLET BY MOUTH ONCE DAILY.  metFORMIN (GLUCOPHAGE) 1000 MG tablet [Pharmacy Med Name: METFORMIN HCL 1000 MG TABS 1000 Tablet] 60 tablet 5     Sig: TAKE 1 TABLET BY MOUTH 2 TIMES DAILY (WITH MEALS)                 Please approve or refuse this medication.    Duy Tilley LPN

## 2021-09-14 RX ORDER — GABAPENTIN 300 MG/1
CAPSULE ORAL
Qty: 90 CAPSULE | Refills: 0 | Status: SHIPPED | OUTPATIENT
Start: 2021-09-14 | End: 2021-11-10

## 2021-09-24 ENCOUNTER — TELEPHONE (OUTPATIENT)
Dept: PRIMARY CARE CLINIC | Age: 51
End: 2021-09-24

## 2021-09-28 DIAGNOSIS — F51.01 PRIMARY INSOMNIA: ICD-10-CM

## 2021-09-28 RX ORDER — TRAZODONE HYDROCHLORIDE 50 MG/1
100 TABLET ORAL NIGHTLY PRN
Qty: 74 TABLET | Refills: 0 | Status: SHIPPED | OUTPATIENT
Start: 2021-09-28 | End: 2021-10-10

## 2021-09-28 NOTE — TELEPHONE ENCOUNTER
Chris Larson called to request a refill on his medication.       Last office visit : 12/8/2020   Next office visit : 11/4/2021     Requested Prescriptions     Pending Prescriptions Disp Refills    traZODone (DESYREL) 50 MG tablet [Pharmacy Med Name: TRAZODONE HCL 50 MG TABS 50 Tablet] 74 tablet 0     Sig: TAKE 2 TABLETS BY MOUTH NIGHTLY AS NEEDED FOR SLEEP            Nadiya De La Cruz

## 2021-10-01 DIAGNOSIS — K21.9 GASTROESOPHAGEAL REFLUX DISEASE WITHOUT ESOPHAGITIS: ICD-10-CM

## 2021-10-03 RX ORDER — OMEPRAZOLE 40 MG/1
40 CAPSULE, DELAYED RELEASE ORAL DAILY
Qty: 30 CAPSULE | Refills: 2 | Status: SHIPPED | OUTPATIENT
Start: 2021-10-03 | End: 2022-01-27

## 2021-10-20 DIAGNOSIS — I10 ESSENTIAL HYPERTENSION: ICD-10-CM

## 2021-10-20 DIAGNOSIS — E11.9 TYPE 2 DIABETES MELLITUS WITHOUT COMPLICATION, WITHOUT LONG-TERM CURRENT USE OF INSULIN (HCC): ICD-10-CM

## 2021-10-20 RX ORDER — BLOOD SUGAR DIAGNOSTIC
STRIP MISCELLANEOUS
Qty: 100 STRIP | Refills: 3 | Status: SHIPPED | OUTPATIENT
Start: 2021-10-20

## 2021-10-20 RX ORDER — ATENOLOL 50 MG/1
TABLET ORAL
Qty: 30 TABLET | Refills: 0 | Status: SHIPPED | OUTPATIENT
Start: 2021-10-20 | End: 2021-12-21

## 2021-10-20 NOTE — TELEPHONE ENCOUNTER
Chris Larson called to request a refill on his medication. Last office visit : 12/8/2020   Next office visit : 11/4/2021     Requested Prescriptions     Pending Prescriptions Disp Refills    atenolol (TENORMIN) 50 MG tablet [Pharmacy Med Name: ATENOLOL 50 MG TABLET 50 Tablet] 30 tablet 0     Sig: TAKE ONE TABLET BY MOUTH EVERY DAY.     blood glucose test strips (ONETOUCH ULTRA) strip [Pharmacy Med Name: ONE TOUCH ULTRA TEST STRIPS Strip] 100 strip 3     Sig: USE ONE FOUR TIMES DAILY & AS NEEDED FOR SYMPTOMS OF IRREGULAR BLOOD SUGAR            Jade Gross, [address] [facility] [place]

## 2021-11-04 ENCOUNTER — OFFICE VISIT (OUTPATIENT)
Dept: PRIMARY CARE CLINIC | Age: 51
End: 2021-11-04
Payer: MEDICAID

## 2021-11-04 VITALS
DIASTOLIC BLOOD PRESSURE: 78 MMHG | HEIGHT: 71 IN | BODY MASS INDEX: 27.58 KG/M2 | OXYGEN SATURATION: 93 % | WEIGHT: 197 LBS | RESPIRATION RATE: 18 BRPM | TEMPERATURE: 97.6 F | SYSTOLIC BLOOD PRESSURE: 122 MMHG | HEART RATE: 72 BPM

## 2021-11-04 DIAGNOSIS — Z00.00 ENCOUNTER FOR WELL ADULT EXAM WITHOUT ABNORMAL FINDINGS: ICD-10-CM

## 2021-11-04 DIAGNOSIS — Z00.00 ENCOUNTER FOR WELL ADULT EXAM WITHOUT ABNORMAL FINDINGS: Primary | ICD-10-CM

## 2021-11-04 DIAGNOSIS — I10 ESSENTIAL HYPERTENSION: ICD-10-CM

## 2021-11-04 DIAGNOSIS — E11.9 TYPE 2 DIABETES MELLITUS WITHOUT COMPLICATION, WITHOUT LONG-TERM CURRENT USE OF INSULIN (HCC): ICD-10-CM

## 2021-11-04 LAB
ALBUMIN SERPL-MCNC: 3.8 G/DL (ref 3.5–5.2)
ALP BLD-CCNC: 118 U/L (ref 40–130)
ALT SERPL-CCNC: 9 U/L (ref 5–41)
ANION GAP SERPL CALCULATED.3IONS-SCNC: 12 MMOL/L (ref 7–19)
AST SERPL-CCNC: 10 U/L (ref 5–40)
BILIRUB SERPL-MCNC: <0.2 MG/DL (ref 0.2–1.2)
BUN BLDV-MCNC: 15 MG/DL (ref 6–20)
CALCIUM SERPL-MCNC: 9.2 MG/DL (ref 8.6–10)
CHLORIDE BLD-SCNC: 101 MMOL/L (ref 98–111)
CHOLESTEROL, FASTING: 97 MG/DL (ref 160–199)
CO2: 24 MMOL/L (ref 22–29)
CREAT SERPL-MCNC: 1 MG/DL (ref 0.5–1.2)
GFR AFRICAN AMERICAN: >59
GFR NON-AFRICAN AMERICAN: >60
GLUCOSE BLD-MCNC: 211 MG/DL (ref 74–109)
HBA1C MFR BLD: 5.9 % (ref 4–6)
HCT VFR BLD CALC: 28.2 % (ref 42–52)
HDLC SERPL-MCNC: 30 MG/DL (ref 55–121)
HEMOGLOBIN: 7.8 G/DL (ref 14–18)
LDL CHOLESTEROL CALCULATED: 36 MG/DL
MCH RBC QN AUTO: 20.2 PG (ref 27–31)
MCHC RBC AUTO-ENTMCNC: 27.7 G/DL (ref 33–37)
MCV RBC AUTO: 73.1 FL (ref 80–94)
PDW BLD-RTO: 16.9 % (ref 11.5–14.5)
PLATELET # BLD: 199 K/UL (ref 130–400)
PMV BLD AUTO: 10.1 FL (ref 9.4–12.4)
POTASSIUM SERPL-SCNC: 4.6 MMOL/L (ref 3.5–5)
RBC # BLD: 3.86 M/UL (ref 4.7–6.1)
SODIUM BLD-SCNC: 137 MMOL/L (ref 136–145)
TOTAL PROTEIN: 7 G/DL (ref 6.6–8.7)
TRIGLYCERIDE, FASTING: 154 MG/DL (ref 0–149)
WBC # BLD: 7 K/UL (ref 4.8–10.8)

## 2021-11-04 PROCEDURE — 99396 PREV VISIT EST AGE 40-64: CPT | Performed by: NURSE PRACTITIONER

## 2021-11-04 PROCEDURE — G8484 FLU IMMUNIZE NO ADMIN: HCPCS | Performed by: NURSE PRACTITIONER

## 2021-11-04 SDOH — ECONOMIC STABILITY: FOOD INSECURITY: WITHIN THE PAST 12 MONTHS, THE FOOD YOU BOUGHT JUST DIDN'T LAST AND YOU DIDN'T HAVE MONEY TO GET MORE.: NEVER TRUE

## 2021-11-04 SDOH — ECONOMIC STABILITY: FOOD INSECURITY: WITHIN THE PAST 12 MONTHS, YOU WORRIED THAT YOUR FOOD WOULD RUN OUT BEFORE YOU GOT MONEY TO BUY MORE.: NEVER TRUE

## 2021-11-04 ASSESSMENT — SOCIAL DETERMINANTS OF HEALTH (SDOH): HOW HARD IS IT FOR YOU TO PAY FOR THE VERY BASICS LIKE FOOD, HOUSING, MEDICAL CARE, AND HEATING?: SOMEWHAT HARD

## 2021-11-04 ASSESSMENT — ENCOUNTER SYMPTOMS
GASTROINTESTINAL NEGATIVE: 1
EYES NEGATIVE: 1
RESPIRATORY NEGATIVE: 1

## 2021-11-04 NOTE — PATIENT INSTRUCTIONS
Well Visit, Men 48 to 72: Care Instructions  Overview     Well visits can help you stay healthy. Your doctor has checked your overall health and may have suggested ways to take good care of yourself. Your doctor also may have recommended tests. At home, you can help prevent illness with healthy eating, regular exercise, and other steps. Follow-up care is a key part of your treatment and safety. Be sure to make and go to all appointments, and call your doctor if you are having problems. It's also a good idea to know your test results and keep a list of the medicines you take. How can you care for yourself at home? · Get screening tests that you and your doctor decide on. Screening helps find diseases before any symptoms appear. · Eat healthy foods. Choose fruits, vegetables, whole grains, protein, and low-fat dairy foods. Limit fat, especially saturated fat. Reduce salt in your diet. · Limit alcohol. Have no more than 2 drinks a day or 14 drinks a week. · Get at least 30 minutes of exercise on most days of the week. Walking is a good choice. You also may want to do other activities, such as running, swimming, cycling, or playing tennis or team sports. · Reach and stay at a healthy weight. This will lower your risk for many problems, such as obesity, diabetes, heart disease, and high blood pressure. · Do not smoke. Smoking can make health problems worse. If you need help quitting, talk to your doctor about stop-smoking programs and medicines. These can increase your chances of quitting for good. · Care for your mental health. It is easy to get weighed down by worry and stress. Learn strategies to manage stress, like deep breathing and mindfulness, and stay connected with your family and community. If you find you often feel sad or hopeless, talk with your doctor. Treatment can help. · Talk to your doctor about whether you have any risk factors for sexually transmitted infections (STIs).  You can help prevent STIs if you wait to have sex with a new partner (or partners) until you've each been tested for STIs. It also helps if you use condoms (male or female condoms) and if you limit your sex partners to one person who only has sex with you. Vaccines are available for some STIs. · If it's important to you to prevent pregnancy with your partner, talk with your doctor about birth control options that might be best for you. · If you think you may have a problem with alcohol or drug use, talk to your doctor. This includes prescription medicines (such as amphetamines and opioids) and illegal drugs (such as cocaine and methamphetamine). Your doctor can help you figure out what type of treatment is best for you. · Protect your skin from too much sun. When you're outdoors from 10 a.m. to 4 p.m., stay in the shade or cover up with clothing and a hat with a wide brim. Wear sunglasses that block UV rays. Even when it's cloudy, put broad-spectrum sunscreen (SPF 30 or higher) on any exposed skin. · See a dentist one or two times a year for checkups and to have your teeth cleaned. · Wear a seat belt in the car. When should you call for help? Watch closely for changes in your health, and be sure to contact your doctor if you have any problems or symptoms that concern you. Where can you learn more? Go to https://chfrancescaeb.[facility]. org and sign in to your [employer] account. Enter J445 in the [facility] box to learn more about \"Well Visit, Men 48 to 72: Care Instructions. \"     If you do not have an account, please click on the \"Sign Up Now\" link. Current as of: February 11, 2021               Content Version: 13.0  © 4140-4475 Healthwise, Incorporated. Care instructions adapted under license by [facility] ([facility]).  If you have questions about a medical condition or this instruction, always ask your healthcare professional. Darcie Tellez any warranty or liability for your use of this information. A Healthy Lifestyle: Care Instructions  Your Care Instructions     A healthy lifestyle can help you feel good, stay at a healthy weight, and have plenty of energy for both work and play. A healthy lifestyle is something you can share with your whole family. A healthy lifestyle also can lower your risk for serious health problems, such as high blood pressure, heart disease, and diabetes. You can follow a few steps listed below to improve your health and the health of your family. Follow-up care is a key part of your treatment and safety. Be sure to make and go to all appointments, and call your doctor if you are having problems. It's also a good idea to know your test results and keep a list of the medicines you take. How can you care for yourself at home? · Do not eat too much sugar, fat, or fast foods. You can still have dessert and treats now and then. The goal is moderation. · Start small to improve your eating habits. Pay attention to portion sizes, drink less juice and soda pop, and eat more fruits and vegetables. ? Eat a healthy amount of food. A 3-ounce serving of meat, for example, is about the size of a deck of cards. Fill the rest of your plate with vegetables and whole grains. ? Limit the amount of soda and sports drinks you have every day. Drink more water when you are thirsty. ? Eat plenty of fruits and vegetables every day. Have an apple or some carrot sticks as an afternoon snack instead of a candy bar. Try to have fruits and/or vegetables at every meal.  · Make exercise part of your daily routine. You may want to start with simple activities, such as walking, bicycling, or slow swimming. Try to be active 30 to 60 minutes every day. You do not need to do all 30 to 60 minutes all at once. For example, you can exercise 3 times a day for 10 or 20 minutes.  Moderate exercise is safe for most people, but it is always a good idea to talk to your doctor before starting an 2021               Content Version: 13.0  © 8893-7566 Healthwise, Incorporated. Care instructions adapted under license by [facility] ([facility]). If you have questions about a medical condition or this instruction, always ask your healthcare professional. [address] any warranty or liability for your use of this information.

## 2021-11-04 NOTE — PROGRESS NOTES
[facility] PRIMARY CARE  [address] [address]  [address] [address]  Dept: [phone]  Dept Fax: [phone]  Loc: [phone]    Brodie Larson is a 46 y.o. male who presents today for his medical conditions/complaints as noted below. Chris Larson is c/o of Annual Exam      Chief Complaint   Patient presents with    Annual Exam       HPI:     HPI   Patient is here today for his yearly physical with no concerns to address today. Has had COVID vaccination but refuses flu vaccine today. He is still dealing with increased neck and back pain, but is \"living with it. \"    Past Medical History:   Diagnosis Date    CAD (coronary artery disease)     sees [facility] cardiology    Cancer [facility])     Bladder    [facility]ronic obstructive [facility]) [facility]     Depression     Diabetes mellitus ([facility]     History of blood transfusion     unsure thinks he did    Hyperlipidemia     Hypertension         Past Surgical History:   Procedure Laterality Date    BACK SURGERY      X3     BLADDER REMOVAL      CARDIAC CATHETERIZATION      CERVICAL FUSION N/A 2/19/2020    Phase 1:  C5 and C6 corpectomy with placement of an expandable cage and anterior cervical plate W6-E9. performed by Romayne Denmark, DO at [address] [place] [address] [address] N/A 6/17/2020    POSTERIOR CERVICAL WOUND [address] OUT AND CLOSURE performed by Romayne Denmark, DO at [address] N/A 6/26/2020    [address] performed by Romayne Denmark, DO at [address] [place] [address] N/A 9/10/2019    Dr Daisy Harvey, 5 yr recall    CORONARY ARTERY BYPASS GRAFT N/A 5/1/2019    CORONARY ARTERY BYPASS GRAFT X 3 WITH LEFT INTERNAL MAMMARY ARTERY, ENDOSCOPIC  VEIN HARVEST, WITH PERFUSION. performed by Evelina Tan MD at [place] [address] N/A 2/19/2020    Phase 2:  Posterior instrumented fusion C3-C7 using lateral mass screws and rods.  performed by Camille Dee, DO at [facility] OR    [facility]      lower ext    PROSTATECTOMY         Social History     Tobacco Use    Smoking status: Current Every Day Smoker     Packs/day: 1.00     Years: 30.00     Pack years: 30.00    Smokeless tobacco: Never Used    Tobacco comment: sometimes less   Substance Use Topics    Alcohol use: Yes     Comment: Occ        Current Outpatient Medications   Medication Sig Dispense Refill    atenolol (TENORMIN) 50 MG tablet TAKE ONE TABLET BY MOUTH EVERY DAY. 30 tablet 0    blood glucose test strips (ONETOUCH ULTRA) strip USE ONE FOUR TIMES DAILY & AS NEEDED FOR SYMPTOMS OF IRREGULAR BLOOD SUGAR 100 strip 3    ADVAIR DISKUS 100-50 MCG/DOSE diskus inhaler INHALE 1 PUFF INTO THE LUNGS EVERY 12 HOURS 1 each 0    traZODone (DESYREL) 50 MG tablet TAKE 2 TABLETS BY MOUTH NIGHTLY AS NEEDED FOR SLEEP 60 tablet 0    atorvastatin (LIPITOR) 20 MG tablet TAKE ONE TABLET BY MOUTH ONCE DAILY. 30 tablet 0    metFORMIN (GLUCOPHAGE) 1000 MG tablet TAKE 1 TABLET BY MOUTH 2 TIMES DAILY (WITH MEALS) 60 tablet 0    vitamin D (ERGOCALCIFEROL) 1.25 MG (26880 UT) CAPS capsule TAKE 1 CAPSULE BY MOUTH ONCE A WEEK ON WEDNESDAY 4 capsule 0    ARIPiprazole (ABILIFY) 5 MG tablet TAKE ONE TABLET BY MOUTH ONCE DAILY. 30 tablet 0    omeprazole (PRILOSEC) 40 MG delayed release capsule TAKE 1 CAPSULE BY MOUTH DAILY 30 capsule 2    lisinopril (PRINIVIL;ZESTRIL) 10 MG tablet TAKE 1 TABLET BY MOUTH DAILY 30 tablet 1    FLUoxetine (PROZAC) 40 MG capsule Take 1 capsule by mouth daily 30 capsule 5    baclofen (LIORESAL) 10 MG tablet TAKE 1 TABLET BY MOUTH 3 TIMES DAILY AS NEEDED FOR PAIN/SPASMS 90 tablet 5    busPIRone (BUSPAR) 10 MG tablet Take 1 tablet by mouth 3 times daily as needed (anxiety) 90 tablet 5    fenofibrate (TRIGLIDE) 160 MG tablet TAKE ONE TABLET BY MOUTH DAILY.  30 tablet 5    sodium hypochlorite (DAKINS) 0.125 % SOLN external solution Moisten gauze apply to wound twice daily 1000 mL 2    HYDROcodone-acetaminophen (NORCO)  MG per tablet Take 2 tablets by mouth every 6 hours as needed for Pain.  sildenafil (REVATIO) 20 MG tablet Take 1-5 tablets 1-2 hours before sexual activity PRN      tiotropium (SPIRIVA) 18 MCG inhalation capsule Inhale 1 capsule into the lungs      glucose monitoring kit (FREESTYLE) monitoring kit Please provide glucometer that INS will cover for DM type 2 1 kit 0    Lancets 30G MISC 1 each by Does not apply route daily 4 times daily 100 each 3    aspirin 81 MG EC tablet Take 1 tablet by mouth daily 30 tablet 3    gabapentin (NEURONTIN) 300 MG capsule TAKE ONE CAPSULE BY MOUTH THREE TIMES DAILY 90 capsule 0     No current facility-administered medications for this visit. Allergies   Allergen Reactions    Latex Other (See Comments)     BOILS AND BLISTERS- ALLERGY CALLED TO [facility]ID SURGERY SCHEDULING.  Demerol Hcl [Meperidine] Hives     And n/v       Family History   Problem Relation Age of Onset    Cancer Mother     Vision Loss Mother     Breast Cancer Mother     Coronary Art Dis Father     Obesity Father     Kidney Disease Father     High Blood Pressure Father     High Cholesterol Father     Hypercalcemia Sister     Obesity Brother     High Blood Pressure Brother                Subjective:      Review of Systems   Constitutional: Negative. HENT: Negative. Eyes: Negative. Respiratory: Negative. Cardiovascular: Negative. Gastrointestinal: Negative. Endocrine: Negative. Genitourinary: Negative. Musculoskeletal: Positive for neck pain. Skin: Negative. Neurological: Negative. Hematological: Negative. Psychiatric/Behavioral: Negative. Objective:     Physical Exam  Vitals and nursing note reviewed. Constitutional:       Appearance: Normal appearance. HENT:      Head: Normocephalic and atraumatic.       Right Ear: Hearing, tympanic membrane, ear canal and external ear normal.      Left Ear: Hearing, tympanic membrane, ear canal and external ear normal.      Nose: Nose normal.      Mouth/Throat:      Lips: Pink. Mouth: Mucous membranes are moist.      Pharynx: Oropharynx is clear. Eyes:      General: Lids are normal.      Extraocular Movements: Extraocular movements intact. Conjunctiva/sclera: Conjunctivae normal.      Pupils: Pupils are equal, round, and reactive to light. Neck:      Thyroid: No thyromegaly. Cardiovascular:      Rate and Rhythm: Normal rate and regular rhythm. Pulses: Normal pulses. Dorsalis pedis pulses are 2+ on the right side and 2+ on the left side. Posterior tibial pulses are 2+ on the right side and 2+ on the left side. Heart sounds: Normal heart sounds. Pulmonary:      Effort: Pulmonary effort is normal.      Breath sounds: Normal breath sounds and air entry. Abdominal:      General: Bowel sounds are normal.      Palpations: Abdomen is soft. Musculoskeletal:      Cervical back: Neck supple. Decreased range of motion. Thoracic back: No tenderness. Normal range of motion. Lumbar back: No tenderness. Normal range of motion. Lymphadenopathy:      Cervical: No cervical adenopathy. Skin:     General: Skin is warm and dry. Capillary Refill: Capillary refill takes less than 2 seconds. Neurological:      General: No focal deficit present. Mental Status: He is alert and oriented to person, place, and time. Mental status is at baseline. Coordination: Coordination is intact. Psychiatric:         Mood and Affect: Mood normal.         Speech: Speech normal.         Behavior: Behavior normal.         Thought Content: Thought content normal.         Cognition and Memory: Cognition and memory normal.         Judgment: Judgment normal.         /78   Pulse 72   Temp 97.6 °F (36.4 °C) (Temporal)   Resp 18   Ht 5' 11\" (1.803 m)   Wt 197 lb (89.4 kg)   SpO2 93%   BMI 27.48 kg/m²     Assessment:      Diagnosis Orders   1. Encounter for well adult exam without abnormal findings  CBC    Comprehensive Metabolic Panel    Lipid, Fasting    Hemoglobin A1C   2. Type 2 diabetes mellitus without complication, without long-term current use of insulin ([facility])  Lipid, Fasting    Hemoglobin A1C   3. Essential hypertension         No results found for this visit on 11/04/21. Plan:     Overall things are going well. Will continue current regimen. Checking labs -we will call with these results. Return in about 6 months (around 5/4/2022) for Follow up chronic conditions. Orders Placed This Encounter   Procedures    CBC     Standing Status:   Future     Number of Occurrences:   1     Standing Expiration Date:   11/4/2022    Comprehensive Metabolic Panel     Standing Status:   Future     Number of Occurrences:   1     Standing Expiration Date:   11/4/2022    Lipid, Fasting     Standing Status:   Future     Number of Occurrences:   1     Standing Expiration Date:   11/4/2022    Hemoglobin A1C     Standing Status:   Future     Number of Occurrences:   1     Standing Expiration Date:   11/4/2022       No orders of the defined types were placed in this encounter. Patient offered educational handouts and has had all questions answered. Patient voices understanding and agrees to plans along with risks and benefits of plan. Patient is instructed to continue prior meds, diet, and exercise plans as instructed. Patient agrees to follow up as instructed and sooner if needed. Patient agrees to go to ER if condition becomes emergent. EMR Dragon/transcription disclaimer: Some of this encounter note is an electronic transcription/translation of spoken language to printed text. The electronic translation of spoken language may permit erroneous, or at times, nonsensical words or phrases to be inadvertently transcribed.  Although I have reviewed the note for such errors, some may still exist.    Electronically signed by Nany Nascimento

## 2021-11-05 ENCOUNTER — TELEPHONE (OUTPATIENT)
Dept: PRIMARY CARE CLINIC | Age: 51
End: 2021-11-05

## 2021-11-05 DIAGNOSIS — D50.8 OTHER IRON DEFICIENCY ANEMIA: Primary | ICD-10-CM

## 2021-11-05 NOTE — TELEPHONE ENCOUNTER
----- Message from BRYAN Haddad sent at 11/5/2021  3:47 PM CDT -----  Please let patient know that labs have returned. Cholesterol was fantastic. He was extremely anemic however. I do want him to recheck CBC next week. Also add iron, TIBC, vitamin B-12, and ferritin. All under anemia. I want him to get these completed on Monday or Tuesday of next week no later. This could be contributing to his extreme fatigue level.

## 2021-11-10 DIAGNOSIS — M54.50 LUMBAR PAIN: ICD-10-CM

## 2021-11-10 RX ORDER — GABAPENTIN 300 MG/1
CAPSULE ORAL
Qty: 90 CAPSULE | Refills: 0 | Status: SHIPPED | OUTPATIENT
Start: 2021-11-10 | End: 2022-01-04

## 2021-11-10 RX ORDER — ERGOCALCIFEROL 1.25 MG/1
CAPSULE ORAL
Qty: 4 CAPSULE | Refills: 0 | Status: SHIPPED | OUTPATIENT
Start: 2021-11-10 | End: 2021-12-21

## 2021-11-10 NOTE — TELEPHONE ENCOUNTER
Chris Larson called to request a refill on his medication. Last office visit : 11/4/2021   Next office visit : 5/5/2022     Last UDS:   Amphetamines   Date Value Ref Range Status   03/23/2012 NEGATIVE  Final     Barbiturates   Date Value Ref Range Status   03/23/2012 NEGATIVE  Final     Benzodiazepines   Date Value Ref Range Status   03/23/2012 NEGATIVE  Final       Last [place] Cristian: 9-10-21  Medication Contract: Due   Last Fill: 9-14-21    Requested Prescriptions     Pending Prescriptions Disp Refills    gabapentin (NEURONTIN) 300 MG capsule [Pharmacy Med Name: GABAPENTIN 300 MG CAPS 300 Capsule] 90 capsule 0     Sig: TAKE ONE CAPSULE BY MOUTH THREE TIMES DAILY    vitamin D (ERGOCALCIFEROL) 1.25 MG (65487 UT) CAPS capsule [Pharmacy Med Name: VIT D2 1.25 MG (50,000 UNIT 1.25 MG Capsule] 4 capsule 0     Sig: TAKE 1 CAPSULE BY MOUTH ONCE A WEEK ON WEDNESDAY         Please approve or refuse this medication.    Danielle Plascencia MA

## 2021-11-17 DIAGNOSIS — E11.9 TYPE 2 DIABETES MELLITUS WITHOUT COMPLICATION, WITHOUT LONG-TERM CURRENT USE OF INSULIN (HCC): Chronic | ICD-10-CM

## 2021-11-18 RX ORDER — ATORVASTATIN CALCIUM 20 MG/1
TABLET, FILM COATED ORAL
Qty: 30 TABLET | Refills: 1 | Status: SHIPPED | OUTPATIENT
Start: 2021-11-18 | End: 2022-01-27

## 2021-11-18 RX ORDER — ARIPIPRAZOLE 5 MG/1
TABLET ORAL
Qty: 30 TABLET | Refills: 1 | Status: SHIPPED | OUTPATIENT
Start: 2021-11-18 | End: 2022-01-27

## 2021-12-15 ENCOUNTER — TELEPHONE (OUTPATIENT)
Dept: NEUROSURGERY | Age: 51
End: 2021-12-15

## 2021-12-21 DIAGNOSIS — I10 ESSENTIAL HYPERTENSION: ICD-10-CM

## 2021-12-21 DIAGNOSIS — F51.01 PRIMARY INSOMNIA: ICD-10-CM

## 2021-12-21 DIAGNOSIS — F41.9 ANXIETY AND DEPRESSION: ICD-10-CM

## 2021-12-21 DIAGNOSIS — F32.A ANXIETY AND DEPRESSION: ICD-10-CM

## 2021-12-21 LAB
AMPHETAMINE SCREEN, URINE: POSITIVE
BARBITURATE SCREEN URINE: NEGATIVE
BENZODIAZEPINE SCREEN, URINE: NEGATIVE
CANNABINOID SCREEN URINE: NEGATIVE
COCAINE METABOLITE SCREEN URINE: NEGATIVE
Lab: ABNORMAL
OPIATE SCREEN URINE: POSITIVE

## 2021-12-21 RX ORDER — FLUOXETINE HYDROCHLORIDE 40 MG/1
40 CAPSULE ORAL DAILY
Qty: 30 CAPSULE | Refills: 5 | Status: SHIPPED | OUTPATIENT
Start: 2021-12-21 | End: 2022-05-11

## 2021-12-21 RX ORDER — ERGOCALCIFEROL 1.25 MG/1
CAPSULE ORAL
Qty: 4 CAPSULE | Refills: 0 | Status: SHIPPED | OUTPATIENT
Start: 2021-12-21 | End: 2022-01-27

## 2021-12-21 RX ORDER — ATENOLOL 50 MG/1
TABLET ORAL
Qty: 30 TABLET | Refills: 0 | Status: SHIPPED | OUTPATIENT
Start: 2021-12-21 | End: 2022-01-27

## 2021-12-21 RX ORDER — TRAZODONE HYDROCHLORIDE 50 MG/1
100 TABLET ORAL NIGHTLY PRN
Qty: 60 TABLET | Refills: 0 | Status: SHIPPED | OUTPATIENT
Start: 2021-12-21 | End: 2022-01-24

## 2021-12-28 DIAGNOSIS — F41.9 ANXIETY AND DEPRESSION: ICD-10-CM

## 2021-12-28 DIAGNOSIS — F32.A ANXIETY AND DEPRESSION: ICD-10-CM

## 2021-12-28 DIAGNOSIS — M54.50 LUMBAR PAIN: ICD-10-CM

## 2022-01-02 RX ORDER — BACLOFEN 10 MG/1
TABLET ORAL
Qty: 90 TABLET | Refills: 5 | Status: SHIPPED | OUTPATIENT
Start: 2022-01-02

## 2022-01-02 RX ORDER — BUSPIRONE HYDROCHLORIDE 10 MG/1
10 TABLET ORAL 3 TIMES DAILY PRN
Qty: 90 TABLET | Refills: 5 | Status: SHIPPED | OUTPATIENT
Start: 2022-01-02 | End: 2022-06-03

## 2022-01-04 RX ORDER — GABAPENTIN 300 MG/1
CAPSULE ORAL
Qty: 90 CAPSULE | Refills: 0 | Status: SHIPPED | OUTPATIENT
Start: 2022-01-04 | End: 2022-03-07

## 2022-01-11 ENCOUNTER — NURSE TRIAGE (OUTPATIENT)
Dept: OTHER | Facility: CLINIC | Age: 52
End: 2022-01-11

## 2022-01-11 NOTE — TELEPHONE ENCOUNTER
Received call from Mayda Chan at [facility]  [employer] with [employer] Complaint. Subjective: Caller states has sores on face and hands    Current Symptoms: Bumps on face,back side of hands,left forearm,swelling of  face on side right cheek and chin ,painful and right side face redness 2-3 inches ,getting worse, itching,draining clear fluid and then crusty,getting worse    Onset: 3 weeks ago     Associated Symptoms: see above    Pain Severity: 10/10    Temperature: Denies    What has been tried: med for bed bug bites,vaseline    LMP: NA Pregnant: NA    Recommended disposition: See PCP next 4 hours, UCC/ER if no appt. Care advice provided, patient verbalizes understanding; denies any other questions or concerns; instructed to call back for any new or worsening symptoms. Sparkle January at [facility] [employer] [facility] calling patient to schedule     Attention Provider: Thank you for allowing me to participate in the care of your patient. The patient was connected to triage in response to information provided to the ECC/PSC. Please do not respond through this encounter as the response is not directed to a shared pool.       Reason for Disposition   Looks infected (e.g., spreading redness, pus)    Protocols used: [facility]

## 2022-01-12 ENCOUNTER — OFFICE VISIT (OUTPATIENT)
Dept: PRIMARY CARE CLINIC | Age: 52
End: 2022-01-12
Payer: MEDICAID

## 2022-01-12 VITALS
BODY MASS INDEX: 26.88 KG/M2 | OXYGEN SATURATION: 96 % | DIASTOLIC BLOOD PRESSURE: 68 MMHG | HEART RATE: 74 BPM | HEIGHT: 71 IN | WEIGHT: 192 LBS | SYSTOLIC BLOOD PRESSURE: 140 MMHG | TEMPERATURE: 95.5 F

## 2022-01-12 DIAGNOSIS — A49.02 MRSA (METHICILLIN RESISTANT STAPHYLOCOCCUS AUREUS) INFECTION: Primary | ICD-10-CM

## 2022-01-12 PROCEDURE — G8419 CALC BMI OUT NRM PARAM NOF/U: HCPCS | Performed by: NURSE PRACTITIONER

## 2022-01-12 PROCEDURE — G8427 DOCREV CUR MEDS BY ELIG CLIN: HCPCS | Performed by: NURSE PRACTITIONER

## 2022-01-12 PROCEDURE — 99213 OFFICE O/P EST LOW 20 MIN: CPT | Performed by: NURSE PRACTITIONER

## 2022-01-12 PROCEDURE — 4004F PT TOBACCO SCREEN RCVD TLK: CPT | Performed by: NURSE PRACTITIONER

## 2022-01-12 PROCEDURE — G8484 FLU IMMUNIZE NO ADMIN: HCPCS | Performed by: NURSE PRACTITIONER

## 2022-01-12 PROCEDURE — 3017F COLORECTAL CA SCREEN DOC REV: CPT | Performed by: NURSE PRACTITIONER

## 2022-01-12 RX ORDER — DOXYCYCLINE HYCLATE 100 MG
100 TABLET ORAL 2 TIMES DAILY
Qty: 28 TABLET | Refills: 0 | Status: SHIPPED | OUTPATIENT
Start: 2022-01-12 | End: 2022-01-26

## 2022-01-12 RX ORDER — MUPIROCIN CALCIUM 20 MG/G
CREAM TOPICAL
Qty: 30 G | Refills: 1 | Status: SHIPPED | OUTPATIENT
Start: 2022-01-12 | End: 2022-02-11

## 2022-01-12 NOTE — PROGRESS NOTES
[facility] PRIMARY CARE  [address] [facility] [address] [address]  Dept: [phone]  Dept Fax: [phone]  Loc: [phone]    Julianne Larson is a 46 y.o. male who presents today for his medical conditions/complaints as noted below. Chris Larson is c/o of Facial Swelling and Mouth Lesions (sores on body)      Chief Complaint   Patient presents with    Facial Swelling    Mouth Lesions     sores on body       HPI:     HPI  Pt is here with complaints of facial swelling and sores on his body. The sores hurt and he is picking at them. He has history of MRSA. He has been picking at the areas. These areas have been ongoing now for weeks. He denies any fever at this time. Past Medical History:   Diagnosis Date    CAD (coronary artery disease)     sees [facility] cardiology    Cancer [facility])     Bladder    [facility] obstructive [facility]     Depression     Diabetes mellitus ([facility]     History of blood transfusion     unsure thinks he did    Hyperlipidemia     Hypertension         Past Surgical History:   Procedure Laterality Date    BACK SURGERY      X3     BLADDER REMOVAL      CARDIAC CATHETERIZATION      CERVICAL FUSION N/A 2/19/2020    Phase 1:  C5 and C6 corpectomy with placement of an expandable cage and anterior cervical plate S1-J9. performed by Lori Dale DO at [facility] N/A 6/17/2020    POSTERIOR CERVICAL WOUND [address] OUT AND CLOSURE performed by Lori Dale DO at [address] [facility] N/A 6/26/2020    [facility] [address] [place] [address] performed by Lori Dale DO at [address] [place] [address] N/A 9/10/2019    Dr Ang Bianchi, 5 yr recall    CORONARY ARTERY BYPASS GRAFT N/A 5/1/2019    CORONARY ARTERY BYPASS GRAFT X 3 WITH LEFT INTERNAL MAMMARY ARTERY, ENDOSCOPIC  VEIN HARVEST, WITH PERFUSION.  performed by Cristobal Charlton MD at [facility] 2/19/2020    Phase 2:  Posterior instrumented fusion C3-C7 using lateral mass screws and rods. performed by Johnathan Zelaya DO at [address] [facility]      lower ext    PROSTATECTOMY         Social History     Tobacco Use    Smoking status: Current Every Day Smoker     Packs/day: 1.00     Years: 30.00     Pack years: 30.00    Smokeless tobacco: Never Used    Tobacco comment: sometimes less   Substance Use Topics    Alcohol use: Yes     Comment: Occ        Current Outpatient Medications   Medication Sig Dispense Refill    doxycycline hyclate (VIBRA-TABS) 100 MG tablet Take 1 tablet by mouth 2 times daily for 14 days 28 tablet 0    mupirocin (BACTROBAN) 2 % cream Apply 3 times daily. 30 g 1    gabapentin (NEURONTIN) 300 MG capsule TAKE ONE CAPSULE BY MOUTH THREE TIMES DAILY 90 capsule 0    busPIRone (BUSPAR) 10 MG tablet TAKE 1 TABLET BY MOUTH 3 TIMES DAILY AS NEEDED (ANXIETY) 90 tablet 5    baclofen (LIORESAL) 10 MG tablet TAKE 1 TABLET BY MOUTH 3 TIMES DAILY AS NEEDED FOR PAIN/SPASMS 90 tablet 5    vitamin D (ERGOCALCIFEROL) 1.25 MG (11543 UT) CAPS capsule TAKE 1 CAPSULE BY MOUTH ONCE A WEEK ON WEDNESDAY 4 capsule 0    traZODone (DESYREL) 50 MG tablet TAKE 2 TABLETS BY MOUTH NIGHTLY AS NEEDED FOR SLEEP 60 tablet 0    atenolol (TENORMIN) 50 MG tablet TAKE ONE TABLET BY MOUTH EVERY DAY. 30 tablet 0    FLUoxetine (PROZAC) 40 MG capsule TAKE 1 CAPSULE BY MOUTH DAILY 30 capsule 5    atorvastatin (LIPITOR) 20 MG tablet TAKE ONE TABLET BY MOUTH ONCE DAILY. 30 tablet 1    ARIPiprazole (ABILIFY) 5 MG tablet TAKE ONE TABLET BY MOUTH ONCE DAILY.  30 tablet 1    metFORMIN (GLUCOPHAGE) 1000 MG tablet TAKE 1 TABLET BY MOUTH 2 TIMES DAILY (WITH MEALS) 60 tablet 1    blood glucose test strips (ONETOUCH ULTRA) strip USE ONE FOUR TIMES DAILY & AS NEEDED FOR SYMPTOMS OF IRREGULAR BLOOD SUGAR 100 strip 3    ADVAIR DISKUS 100-50 MCG/DOSE diskus inhaler INHALE 1 PUFF INTO THE LUNGS EVERY 12 HOURS 1 each 0    omeprazole (PRILOSEC) 40 MG delayed release capsule TAKE 1 CAPSULE BY MOUTH DAILY 30 capsule 2    lisinopril (PRINIVIL;ZESTRIL) 10 MG tablet TAKE 1 TABLET BY MOUTH DAILY 30 tablet 1    fenofibrate (TRIGLIDE) 160 MG tablet TAKE ONE TABLET BY MOUTH DAILY. 30 tablet 5    sodium hypochlorite (DAKINS) 0.125 % SOLN external solution Moisten gauze apply to wound twice daily 1000 mL 2    HYDROcodone-acetaminophen (NORCO)  MG per tablet Take 2 tablets by mouth every 6 hours as needed for Pain.  sildenafil (REVATIO) 20 MG tablet Take 1-5 tablets 1-2 hours before sexual activity PRN      tiotropium (SPIRIVA) 18 MCG inhalation capsule Inhale 1 capsule into the lungs      glucose monitoring kit (FREESTYLE) monitoring kit Please provide glucometer that INS will cover for DM type 2 1 kit 0    Lancets 30G MISC 1 each by Does not apply route daily 4 times daily 100 each 3    aspirin 81 MG EC tablet Take 1 tablet by mouth daily 30 tablet 3     No current facility-administered medications for this visit. Allergies   Allergen Reactions    Latex Other (See Comments)     BOILS AND BLISTERS- ALLERGY CALLED TO [facility] SURGERY SCHEDULING.  Demerol Hcl [Meperidine] Hives     And n/v       Family History   Problem Relation Age of Onset    Cancer Mother     Vision Loss Mother     Breast Cancer Mother     Coronary Art Dis Father     Obesity Father     Kidney Disease Father     High Blood Pressure Father     High Cholesterol Father     Hypercalcemia Sister     Obesity Brother     High Blood Pressure Brother                Subjective:      Review of Systems   Constitutional: Negative. HENT: Negative. Eyes: Negative. Respiratory: Negative. Cardiovascular: Negative. Gastrointestinal: Negative. Endocrine: Negative. Genitourinary: Negative. Musculoskeletal: Negative. Skin: Positive for rash. Neurological: Negative. Hematological: Negative. Psychiatric/Behavioral: Negative. Objective:     Physical Exam  HENT:      Head:     Skin:               BP (!) 140/68 (Site: Left Upper Arm)   Pulse 74   Temp 95.5 °F (35.3 °C)   Ht 5' 11\" (1.803 m)   Wt 192 lb (87.1 kg)   SpO2 96%   BMI 26.78 kg/m²     Assessment:      Diagnosis Orders   1. MRSA (methicillin resistant Staphylococcus aureus) infection  doxycycline hyclate (VIBRA-TABS) 100 MG tablet    mupirocin (BACTROBAN) 2 % cream       No results found for this visit on 01/12/22. Plan:     Started patient on oral antibiotics and Bactroban ointment to apply to these twice daily. I am using doxycycline instead of Bactrim due to patient recent kidney function test.  I am having him return in approximately 2 weeks to monitor the status of these wounds along with need for lengthening oral antibiotic. More than 50% of the time was spent counseling and coordinating care for a total time of 28 mins face to face. Return in about 2 weeks (around 1/26/2022) for infection with labs. No orders of the defined types were placed in this encounter. Orders Placed This Encounter   Medications    doxycycline hyclate (VIBRA-TABS) 100 MG tablet     Sig: Take 1 tablet by mouth 2 times daily for 14 days     Dispense:  28 tablet     Refill:  0    mupirocin (BACTROBAN) 2 % cream     Sig: Apply 3 times daily. Dispense:  30 g     Refill:  1            Patient offered educational handouts and has had all questions answered. Patient voices understanding and agrees to plans along with risks and benefits of plan. Patient is instructed to continue prior meds, diet, and exercise plans as instructed. Patient agrees to follow up as instructed and sooner if needed. Patient agrees to go to ER if condition becomes emergent. EMR Dragon/transcription disclaimer: Some of this encounter note is an electronic transcription/translation of spoken language to printed text.  The electronic translation of spoken language may permit erroneous, or at times, nonsensical words or phrases to be inadvertently transcribed.  Although I have reviewed the note for such errors, some may still exist.    Electronically signed by BRYAN Hackett on 1/13/2022 at 7:10 AM

## 2022-01-13 ASSESSMENT — ENCOUNTER SYMPTOMS
RESPIRATORY NEGATIVE: 1
GASTROINTESTINAL NEGATIVE: 1
EYES NEGATIVE: 1

## 2022-01-24 DIAGNOSIS — F51.01 PRIMARY INSOMNIA: ICD-10-CM

## 2022-01-24 RX ORDER — TRAZODONE HYDROCHLORIDE 50 MG/1
100 TABLET ORAL NIGHTLY PRN
Qty: 60 TABLET | Refills: 2 | Status: SHIPPED | OUTPATIENT
Start: 2022-01-24 | End: 2022-04-12 | Stop reason: SDUPTHER

## 2022-01-24 NOTE — TELEPHONE ENCOUNTER
Sameer Larson called to request a refill on his medication.       Last office visit : 1/12/2022   Next office visit : 1/27/2022     Requested Prescriptions     Signed Prescriptions Disp Refills    traZODone (DESYREL) 50 MG tablet 60 tablet 2     Sig: TAKE 2 TABLETS BY MOUTH NIGHTLY AS NEEDED FOR SLEEP     Authorizing Provider: Gisela Kwon     Ordering User: Marcus Willett MA

## 2022-01-27 DIAGNOSIS — K21.9 GASTROESOPHAGEAL REFLUX DISEASE WITHOUT ESOPHAGITIS: ICD-10-CM

## 2022-01-27 DIAGNOSIS — I10 ESSENTIAL HYPERTENSION: ICD-10-CM

## 2022-01-27 DIAGNOSIS — E11.9 TYPE 2 DIABETES MELLITUS WITHOUT COMPLICATION, WITHOUT LONG-TERM CURRENT USE OF INSULIN (HCC): Chronic | ICD-10-CM

## 2022-01-27 RX ORDER — OMEPRAZOLE 40 MG/1
40 CAPSULE, DELAYED RELEASE ORAL DAILY
Qty: 30 CAPSULE | Refills: 2 | Status: SHIPPED | OUTPATIENT
Start: 2022-01-27 | End: 2022-05-03

## 2022-01-27 RX ORDER — ATENOLOL 50 MG/1
TABLET ORAL
Qty: 30 TABLET | Refills: 0 | Status: SHIPPED | OUTPATIENT
Start: 2022-01-27 | End: 2022-02-22

## 2022-01-27 RX ORDER — ATORVASTATIN CALCIUM 20 MG/1
TABLET, FILM COATED ORAL
Qty: 30 TABLET | Refills: 1 | Status: SHIPPED | OUTPATIENT
Start: 2022-01-27 | End: 2022-04-12 | Stop reason: SDUPTHER

## 2022-01-27 RX ORDER — ARIPIPRAZOLE 5 MG/1
TABLET ORAL
Qty: 30 TABLET | Refills: 1 | Status: SHIPPED | OUTPATIENT
Start: 2022-01-27 | End: 2022-04-12 | Stop reason: SDUPTHER

## 2022-01-27 RX ORDER — ERGOCALCIFEROL 1.25 MG/1
CAPSULE ORAL
Qty: 4 CAPSULE | Refills: 0 | Status: SHIPPED | OUTPATIENT
Start: 2022-01-27 | End: 2022-02-22

## 2022-01-27 NOTE — TELEPHONE ENCOUNTER
Sameer Larson called to request a refill on his medication. Last office visit : 1/12/2022   Next office visit : 5/5/2022     Requested Prescriptions     Signed Prescriptions Disp Refills    omeprazole (PRILOSEC) 40 MG delayed release capsule 30 capsule 2     Sig: TAKE 1 CAPSULE BY MOUTH DAILY     Authorizing Provider: Yasmin Tinoco     Ordering User: Kim Kirby metFORMIN (GLUCOPHAGE) 1000 MG tablet 60 tablet 1     Sig: TAKE 1 TABLET BY MOUTH 2 TIMES DAILY (WITH MEALS)     Authorizing Provider: Yasmin Tinoco     Ordering User: Kim Kirby vitamin D (ERGOCALCIFEROL) 1.25 MG (75904 UT) CAPS capsule 4 capsule 0     Sig: TAKE 1 CAPSULE BY MOUTH ONCE A WEEK ON WEDNESDAY     Authorizing Provider: Yasmin Tinoco     Ordering User: April Scarce    ARIPiprazole (ABILIFY) 5 MG tablet 30 tablet 1     Sig: TAKE ONE TABLET BY MOUTH ONCE DAILY. Authorizing Provider: Yasmin Tinoco     Ordering User: April Scarce    atorvastatin (LIPITOR) 20 MG tablet 30 tablet 1     Sig: TAKE ONE TABLET BY MOUTH ONCE DAILY.      Authorizing Provider: Yasmin Tinoco     Ordering User: Clyde Castro MA

## 2022-01-27 NOTE — TELEPHONE ENCOUNTER
Sameer Larson called to request a refill on his medication. Last office visit : 1/12/2022   Next office visit : 5/5/2022     Requested Prescriptions     Signed Prescriptions Disp Refills    atenolol (TENORMIN) 50 MG tablet 30 tablet 0     Sig: TAKE ONE TABLET BY MOUTH EVERY DAY.      Authorizing Provider: Yaya Mobley     Ordering User: Dee Dee Borrero MA

## 2022-02-07 ENCOUNTER — NURSE TRIAGE (OUTPATIENT)
Dept: OTHER | Facility: CLINIC | Age: 52
End: 2022-02-07

## 2022-02-09 NOTE — PROGRESS NOTES
Chief Complaint  C/o bladder cancer    Subjective          Lamberto Tafoya presents to [facility] for follow-up of urothelial carcinoma of bladder.  Patient had BCG refractory carcinoma in situ.  He underwent radical cystoprostatectomy bilateral pelvic lymphadenectomy in November 2017 by Dr. Badillo in [facility].  He has had no hematuria or UTIs since of last seen him.    History related to this issue:   -11/2017: Radical cystoprostatectomy/BPLND by Jessie Badillo [facility] [place].   Final Diagnosis            1. DISTAL RIGHT URETER, BIOPSY:               BENIGN URETERAL TISSUE.           2. DISTAL LEFT URETER, BIOPSY:               BENIGN URETERAL TISSUE.            3. PELVIC APICAL MARGIN, BIOPSY:               BENIGN FIBROVASCULAR, FATTY, AND NERVE TISSUES, WITH GANGLION.              4. RADICAL CYSTOPROSTATECTOMY SPECIMEN:               FLAT UROTHELIAL CARCINOMA IN SITU.               EXTENSIVE MUCOSAL/SUBMUCOSAL FIBROSIS WITH CHRONIC                             INFLAMMATION                            AND REACTIVE CHANGE.               NEGATIVE SEMINAL VESICLE AND VAS DEFERENS MARGINS.               NEGATIVE URETERAL MARGINS.               NEGATIVE PROSTATIC URETHRAL MARGIN.               PROSTATE WITH NECROTIZING GRANULOMATOUS INFLAMMATION.           5. RIGHT PELVIC LYMPH NODES (4):               NO TUMOR IDENTIFIED.               6. LEFT PELVIC LYMPH NODES (6):               NO TUMOR IDENTIFIED.     Patient is bothered by testosterone deficiency and erectile dysfunction.  He has limited success with all PDE 5 inhibitors.  He said that the sildenafil does occasionally work.      Current Outpatient Medications:   •  ARIPiprazole (ABILIFY) 5 MG tablet, Take 5 mg by mouth Daily., Disp: , Rfl:   •  aspirin 81 MG EC tablet, Take 81 mg by mouth Daily., Disp: , Rfl:   •  atenolol (TENORMIN) 50 MG tablet, Take 1 tablet by mouth 2 (Two) Times a Day., Disp: 60 tablet, Rfl:  0  •  atorvastatin (LIPITOR) 20 MG tablet, Take 20 mg by mouth Daily., Disp: , Rfl:   •  baclofen (LIORESAL) 10 MG tablet, Take 10 mg by mouth 3 (Three) Times a Day., Disp: , Rfl:   •  busPIRone (BUSPAR) 5 MG tablet, Take 5 mg by mouth., Disp: , Rfl:   •  clopidogrel (PLAVIX) 75 MG tablet, Take 75 mg by mouth Daily., Disp: , Rfl:   •  fenofibrate 160 MG tablet, Take 160 mg by mouth Daily., Disp: , Rfl:   •  FLUoxetine (PROzac) 20 MG capsule, Take 40 mg by mouth Daily., Disp: , Rfl:   •  gabapentin (NEURONTIN) 100 MG capsule, , Disp: , Rfl:   •  HYDROcodone-acetaminophen (NORCO)  MG per tablet, Take 1 tablet by mouth Every 6 (Six) Hours As Needed for Moderate Pain ., Disp: , Rfl:   •  lisinopril (PRINIVIL,ZESTRIL) 10 MG tablet, Take 10 mg by mouth Daily., Disp: , Rfl:   •  metFORMIN (GLUCOPHAGE) 500 MG tablet, Take 1 tablet by mouth 2 (Two) Times a Day With Meals., Disp: 60 tablet, Rfl: 1  •  omeprazole (priLOSEC) 40 MG capsule, Take 40 mg by mouth Daily., Disp: , Rfl:   •  sildenafil (VIAGRA) 100 MG tablet, Take 1 tablet by mouth As Needed for Erectile Dysfunction (1-4 hours before activity)., Disp: 30 tablet, Rfl: 5  •  tiotropium (SPIRIVA) 18 MCG per inhalation capsule, Place 1 capsule into inhaler and inhale Daily., Disp: , Rfl:   •  traZODone (DESYREL) 50 MG tablet, Take 50 mg by mouth Every Night., Disp: , Rfl:   •  vitamin D (ERGOCALCIFEROL) 1.25 MG (90166 UT) capsule capsule, Take 50,000 Units by mouth 1 (One) Time Per Week., Disp: , Rfl:   Past Medical History:   Diagnosis Date   • Anxiety    • Back pain    • Bladder carcinoma (HCC) 06/2016    High grade CIS & ta low grade   • H/O hematuria    • History of pneumonia 2011   • Hypertension    • Overweight    • Smoking    • Urinary retention    • Wheezing     r/t smoking     Past Surgical History:   Procedure Laterality Date   • BACK SURGERY      x3   • CYSTECTOMY N/A 11/2/2017    Procedure: CYSTOPROSTATECTOMY WITH BILATERAL PELVIC LYMPHADENECTOMY AND  "ILEAL CONDUIT URINARY DIVERSON;  Surgeon: Vijay Badillo Jr., MD;  Location:  [facility];  Service:    • CYSTOSCOPY N/A 10/9/2017    Procedure: CYSTOSCOPY AND EXAM UNDER ANESTHESIA;  Surgeon: Vijay Badillo Jr., MD;  Location:  [facility];  Service:    • CYSTOSCOPY BLADDER BIOPSY N/A 11/30/2016    Procedure: CYSTOSCOPY BLADDER BIOPSY fulgeration of 3cm area of bladder;  Surgeon: Brandon Wolfe MD;  Location:  [facility];  Service:    • CYSTOSCOPY RETROGRADE PYELOGRAM Bilateral 7/14/2017    Procedure: CYSTOSCOPY RETROGRADE PYELOGRAM;  Surgeon: Brandon Wolfe MD;  Location:  [facility];  Service:    • NECK SURGERY     • TRANSURETHRAL RESECTION OF BLADDER TUMOR N/A 3/31/2017    Procedure: CYSTOSCOPY , BLADDER BIOPSY, AND TRANSURETHRAL RESECTION OF BLADDER TUMOR ;  Surgeon: Brandon Wolfe MD;  Location:  [facility];  Service:    • TRANSURETHRAL RESECTION OF BLADDER TUMOR  06/2016    High Grade CIS & Low grade ta disease   • TRANSURETHRAL RESECTION OF BLADDER TUMOR N/A 7/14/2017    Procedure: CYSTOSCOPY TRANSURETHRAL RESECTION OF BLADDER TUMOR & BILATERAL RETROGRADE URETEROPYELOGRAMS;  Surgeon: Brandon Wolfe MD;  Location:  [facility];  Service:            Review  of systems  Constitutional: Negative for chills or fever.   Gastrointestinal: Negative for abdominal pain, anal bleeding or blood in stool.           Objective   PHYSICAL EXAM  Vital Signs:   Temp 98.2 °F (36.8 °C)   Ht 180.3 cm (71\")   Wt 91.5 kg (201 lb 12.8 oz)   BMI 28.15 kg/m²     Constitutional: Patient is without distress or deformity.  Vital signs are reviewed as above.    Neuro: No confusion; No disorientation; Alert and oriented  Pulmonary: No respiratory distress.   Skin: No pallor or diaphoresis      DATA  Result Review :              Results for orders placed or performed during the hospital encounter of 02/11/22   POC Creatinine    Specimen: Blood   Result Value Ref Range    Creatinine 1.20 0.60 - 1.30 mg/dL                  "   ASSESSMENT AND PLAN          Problem List Items Addressed This Visit        Hematology and Neoplasia    Bladder cancer (HCC) - Primary    Relevant Medications    sildenafil (VIAGRA) 100 MG tablet    Other Relevant Orders    CT abdomen pelvis w wo contrast (Completed)    US Renal Bilateral      Other Visit Diagnoses     Erectile dysfunction after radical cystectomy        Relevant Medications    sildenafil (VIAGRA) 100 MG tablet      No evidence recurrent disease    We will continued patient sildenafil dosage.  I did talk to him about penile injection and even penile prosthesis today.  He is not interested in those options.          FOLLOW UP     Return in about 1 year (around 2/15/2023).        (Please note that portions of this note were completed with a voice recognition program.)  Brandon Wolfe MD  02/15/22  09:12 CST

## 2022-02-11 ENCOUNTER — HOSPITAL ENCOUNTER (OUTPATIENT)
Dept: CT IMAGING | Facility: HOSPITAL | Age: 52
Discharge: HOME | End: 2022-02-11
Admitting: UROLOGY

## 2022-02-11 DIAGNOSIS — C67.8 MALIGNANT NEOPLASM OF OVERLAPPING SITES OF BLADDER: ICD-10-CM

## 2022-02-11 LAB — CREAT BLDA-MCNC: 1.2 MG/DL (ref 0.6–1.3)

## 2022-02-11 PROCEDURE — 74178 CT ABD&PLV WO CNTR FLWD CNTR: CPT

## 2022-02-11 PROCEDURE — 25010000002 IOPAMIDOL 61 % SOLUTION: Performed by: UROLOGY

## 2022-02-11 PROCEDURE — 82565 ASSAY OF CREATININE: CPT

## 2022-02-11 RX ADMIN — IOPAMIDOL 100 ML: 612 INJECTION, SOLUTION INTRAVENOUS at 08:48

## 2022-02-15 ENCOUNTER — OFFICE VISIT (OUTPATIENT)
Dept: UROLOGY | Facility: CLINIC | Age: 52
End: 2022-02-15

## 2022-02-15 VITALS — BODY MASS INDEX: 28.25 KG/M2 | WEIGHT: 201.8 LBS | TEMPERATURE: 98.2 F | HEIGHT: 71 IN

## 2022-02-15 DIAGNOSIS — N52.32 ERECTILE DYSFUNCTION AFTER RADICAL CYSTECTOMY: ICD-10-CM

## 2022-02-15 DIAGNOSIS — C67.8 MALIGNANT NEOPLASM OF OVERLAPPING SITES OF BLADDER: Primary | ICD-10-CM

## 2022-02-15 PROCEDURE — 99214 OFFICE O/P EST MOD 30 MIN: CPT | Performed by: UROLOGY

## 2022-02-15 RX ORDER — SILDENAFIL 100 MG/1
100 TABLET, FILM COATED ORAL AS NEEDED
Qty: 30 TABLET | Refills: 5 | Status: SHIPPED | OUTPATIENT
Start: 2022-02-15

## 2022-02-16 ENCOUNTER — TELEPHONE (OUTPATIENT)
Dept: CARDIOTHORACIC SURGERY | Age: 52
End: 2022-02-16

## 2022-02-16 ENCOUNTER — OFFICE VISIT (OUTPATIENT)
Dept: PRIMARY CARE CLINIC | Age: 52
End: 2022-02-16
Payer: MEDICAID

## 2022-02-16 VITALS
WEIGHT: 201 LBS | HEART RATE: 84 BPM | TEMPERATURE: 96.9 F | SYSTOLIC BLOOD PRESSURE: 165 MMHG | DIASTOLIC BLOOD PRESSURE: 82 MMHG | BODY MASS INDEX: 28.14 KG/M2 | OXYGEN SATURATION: 93 % | HEIGHT: 71 IN

## 2022-02-16 DIAGNOSIS — J42 CHRONIC BRONCHITIS, UNSPECIFIED CHRONIC BRONCHITIS TYPE (HCC): ICD-10-CM

## 2022-02-16 DIAGNOSIS — I10 ESSENTIAL HYPERTENSION: ICD-10-CM

## 2022-02-16 DIAGNOSIS — A49.02 MRSA (METHICILLIN RESISTANT STAPHYLOCOCCUS AUREUS) INFECTION: ICD-10-CM

## 2022-02-16 DIAGNOSIS — F41.9 ANXIETY AND DEPRESSION: Primary | ICD-10-CM

## 2022-02-16 DIAGNOSIS — F32.A ANXIETY AND DEPRESSION: Primary | ICD-10-CM

## 2022-02-16 PROCEDURE — 3017F COLORECTAL CA SCREEN DOC REV: CPT | Performed by: NURSE PRACTITIONER

## 2022-02-16 PROCEDURE — 99214 OFFICE O/P EST MOD 30 MIN: CPT | Performed by: NURSE PRACTITIONER

## 2022-02-16 PROCEDURE — 4004F PT TOBACCO SCREEN RCVD TLK: CPT | Performed by: NURSE PRACTITIONER

## 2022-02-16 PROCEDURE — G8419 CALC BMI OUT NRM PARAM NOF/U: HCPCS | Performed by: NURSE PRACTITIONER

## 2022-02-16 PROCEDURE — G8427 DOCREV CUR MEDS BY ELIG CLIN: HCPCS | Performed by: NURSE PRACTITIONER

## 2022-02-16 PROCEDURE — G8484 FLU IMMUNIZE NO ADMIN: HCPCS | Performed by: NURSE PRACTITIONER

## 2022-02-16 PROCEDURE — 3023F SPIROM DOC REV: CPT | Performed by: NURSE PRACTITIONER

## 2022-02-16 RX ORDER — CHLORHEXIDINE GLUCONATE 213 G/1000ML
SOLUTION TOPICAL
Qty: 118 ML | Refills: 0 | Status: SHIPPED | OUTPATIENT
Start: 2022-02-16 | End: 2022-03-02

## 2022-02-16 RX ORDER — CLINDAMYCIN HYDROCHLORIDE 300 MG/1
300 CAPSULE ORAL 2 TIMES DAILY
Qty: 20 CAPSULE | Refills: 0 | Status: SHIPPED | OUTPATIENT
Start: 2022-02-16 | End: 2022-02-26

## 2022-02-16 RX ORDER — LISINOPRIL 30 MG/1
30 TABLET ORAL DAILY
Qty: 30 TABLET | Refills: 3 | Status: SHIPPED | OUTPATIENT
Start: 2022-02-16 | End: 2022-05-11

## 2022-02-16 RX ORDER — ALBUTEROL SULFATE 90 UG/1
2 AEROSOL, METERED RESPIRATORY (INHALATION) 4 TIMES DAILY PRN
Qty: 18 G | Refills: 0 | Status: SHIPPED | OUTPATIENT
Start: 2022-02-16 | End: 2022-03-23

## 2022-02-16 ASSESSMENT — PATIENT HEALTH QUESTIONNAIRE - PHQ9
SUM OF ALL RESPONSES TO PHQ QUESTIONS 1-9: 24
9. THOUGHTS THAT YOU WOULD BE BETTER OFF DEAD, OR OF HURTING YOURSELF: 0
5. POOR APPETITE OR OVEREATING: 3
3. TROUBLE FALLING OR STAYING ASLEEP: 3
8. MOVING OR SPEAKING SO SLOWLY THAT OTHER PEOPLE COULD HAVE NOTICED. OR THE OPPOSITE, BEING SO FIGETY OR RESTLESS THAT YOU HAVE BEEN MOVING AROUND A LOT MORE THAN USUAL: 3
1. LITTLE INTEREST OR PLEASURE IN DOING THINGS: 3
2. FEELING DOWN, DEPRESSED OR HOPELESS: 3
SUM OF ALL RESPONSES TO PHQ QUESTIONS 1-9: 24
7. TROUBLE CONCENTRATING ON THINGS, SUCH AS READING THE NEWSPAPER OR WATCHING TELEVISION: 3
4. FEELING TIRED OR HAVING LITTLE ENERGY: 3
10. IF YOU CHECKED OFF ANY PROBLEMS, HOW DIFFICULT HAVE THESE PROBLEMS MADE IT FOR YOU TO DO YOUR WORK, TAKE CARE OF THINGS AT HOME, OR GET ALONG WITH OTHER PEOPLE: 0
6. FEELING BAD ABOUT YOURSELF - OR THAT YOU ARE A FAILURE OR HAVE LET YOURSELF OR YOUR FAMILY DOWN: 3
SUM OF ALL RESPONSES TO PHQ9 QUESTIONS 1 & 2: 6

## 2022-02-16 ASSESSMENT — ENCOUNTER SYMPTOMS
SHORTNESS OF BREATH: 1
GASTROINTESTINAL NEGATIVE: 1
EYES NEGATIVE: 1

## 2022-02-16 ASSESSMENT — COLUMBIA-SUICIDE SEVERITY RATING SCALE - C-SSRS
6. HAVE YOU EVER DONE ANYTHING, STARTED TO DO ANYTHING, OR PREPARED TO DO ANYTHING TO END YOUR LIFE?: NO
2. HAVE YOU ACTUALLY HAD ANY THOUGHTS OF KILLING YOURSELF?: NO
1. WITHIN THE PAST MONTH, HAVE YOU WISHED YOU WERE DEAD OR WISHED YOU COULD GO TO SLEEP AND NOT WAKE UP?: NO

## 2022-02-16 NOTE — PROGRESS NOTES
[address] [facility]  [address] [address]  [address] [address]  Dept: [phone]  Dept Fax: [phone]  Loc: [phone]    Tori Larson is a 46 y.o. male who presents today for his medical conditions/complaints as noted below. Tori Larson is c/o of Shortness of Breath (advair isnt filled anymore), Fatigue, and Hypertension      Chief Complaint   Patient presents with    Shortness of Breath     advair isnt filled anymore    Fatigue    Hypertension       HPI:     HPI  Pt is here today with MRSA and complains of shortness of breath. He states sob has gotten worse the last 3 wks. He is very tired and fatigued. He has to stop several times when walking. Pt is also here for htn with bp today in office at 165/82 and 162/80. The SOB has worsened since running out of his inhaler. Patient is also having extreme fatigue with the SOB. He has seen cardiology within the last year. He denies any chest pain with the SOB. Patient also was told he was cancer free for 5 years yesterday. Right arm pain after bicep tear. Has not had this looked. He does not want to have this followed up on at this time either.      Past Medical History:   Diagnosis Date    CAD (coronary artery disease)     sees [facility] cardiology    Cancer [facility])     Bladder    [facility]ch[facility] obstructive [facility]     Depression     Diabetes mellitus ([facility]     History of blood transfusion     unsure thinks he did    Hyperlipidemia     Hypertension         Past Surgical History:   Procedure Laterality Date    BACK SURGERY      X3     BLADDER REMOVAL      CARDIAC CATHETERIZATION      CERVICAL FUSION N/A 2/19/2020    Phase 1:  C5 and C6 corpectomy with placement of an expandable cage and anterior cervical plate Y3-C7. performed by Chemo Watts DO at [address] N/A 6/17/2020    POSTERIOR CERVICAL WOUND [address] OUT AND CLOSURE performed by Chemo Watts DO at [address] N/A 6/26/2020    POSTERIOR CERVICAL WOUND DEBRIDEMENT WITH PLACEMENT OF [address] performed by Giacomo Triana DO at [address] COLONOSCOPY N/A 9/10/2019    Dr Beverely Sicard, 5 yr recall    CORONARY ARTERY BYPASS GRAFT N/A 5/1/2019    CORONARY ARTERY BYPASS GRAFT X 3 WITH LEFT INTERNAL MAMMARY ARTERY, ENDOSCOPIC  VEIN HARVEST, WITH PERFUSION. performed by Balaji Aden MD at [address] [address] [facility]  N/A 2/19/2020    Phase 2:  Posterior instrumented fusion C3-C7 using lateral mass screws and rods. performed by Giacomo Triana DO at [address] [facility]      lower ext    PROSTATECTOMY         Social History     Tobacco Use    Smoking status: Current Every Day Smoker     Packs/day: 1.00     Years: 30.00     Pack years: 30.00    Smokeless tobacco: Never Used    Tobacco comment: sometimes less   Substance Use Topics    Alcohol use: Yes     Comment: Occ        Current Outpatient Medications   Medication Sig Dispense Refill    lisinopril (PRINIVIL;ZESTRIL) 30 MG tablet Take 1 tablet by mouth daily 30 tablet 3    fluticasone-salmeterol (ADVAIR DISKUS) 250-50 MCG/DOSE AEPB Inhale 1 puff into the lungs every 12 hours 60 each 3    albuterol sulfate HFA (VENTOLIN HFA) 108 (90 Base) MCG/ACT inhaler Inhale 2 puffs into the lungs 4 times daily as needed for Wheezing 18 g 0    clindamycin (CLEOCIN) 300 MG capsule Take 1 capsule by mouth 2 times daily for 10 days 20 capsule 0    chlorhexidine (HIBICLENS) 4 % external liquid Apply topically daily as needed. 118 mL 0    omeprazole (PRILOSEC) 40 MG delayed release capsule TAKE 1 CAPSULE BY MOUTH DAILY 30 capsule 2    metFORMIN (GLUCOPHAGE) 1000 MG tablet TAKE 1 TABLET BY MOUTH 2 TIMES DAILY (WITH MEALS) 60 tablet 1    vitamin D (ERGOCALCIFEROL) 1.25 MG (79131 UT) CAPS capsule TAKE 1 CAPSULE BY MOUTH ONCE A WEEK ON WEDNESDAY 4 capsule 0    ARIPiprazole (ABILIFY) 5 MG tablet TAKE ONE TABLET BY MOUTH ONCE DAILY.  30 tablet 1    atorvastatin (LIPITOR) 20 MG tablet TAKE ONE TABLET BY MOUTH ONCE DAILY. 30 tablet 1    atenolol (TENORMIN) 50 MG tablet TAKE ONE TABLET BY MOUTH EVERY DAY. 30 tablet 0    traZODone (DESYREL) 50 MG tablet TAKE 2 TABLETS BY MOUTH NIGHTLY AS NEEDED FOR SLEEP 60 tablet 2    gabapentin (NEURONTIN) 300 MG capsule TAKE ONE CAPSULE BY MOUTH THREE TIMES DAILY 90 capsule 0    busPIRone (BUSPAR) 10 MG tablet TAKE 1 TABLET BY MOUTH 3 TIMES DAILY AS NEEDED (ANXIETY) 90 tablet 5    baclofen (LIORESAL) 10 MG tablet TAKE 1 TABLET BY MOUTH 3 TIMES DAILY AS NEEDED FOR PAIN/SPASMS 90 tablet 5    FLUoxetine (PROZAC) 40 MG capsule TAKE 1 CAPSULE BY MOUTH DAILY 30 capsule 5    blood glucose test strips (ONETOUCH ULTRA) strip USE ONE FOUR TIMES DAILY & AS NEEDED FOR SYMPTOMS OF IRREGULAR BLOOD SUGAR 100 strip 3    fenofibrate (TRIGLIDE) 160 MG tablet TAKE ONE TABLET BY MOUTH DAILY. 30 tablet 5    sodium hypochlorite (DAKINS) 0.125 % SOLN external solution Moisten gauze apply to wound twice daily 1000 mL 2    HYDROcodone-acetaminophen (NORCO)  MG per tablet Take 2 tablets by mouth every 6 hours as needed for Pain.  sildenafil (REVATIO) 20 MG tablet Take 1-5 tablets 1-2 hours before sexual activity PRN      tiotropium (SPIRIVA) 18 MCG inhalation capsule Inhale 1 capsule into the lungs      glucose monitoring kit (FREESTYLE) monitoring kit Please provide glucometer that INS will cover for DM type 2 1 kit 0    Lancets 30G MISC 1 each by Does not apply route daily 4 times daily 100 each 3    aspirin 81 MG EC tablet Take 1 tablet by mouth daily 30 tablet 3     No current facility-administered medications for this visit. Allergies   Allergen Reactions    Latex Other (See Comments)     BOILS AND BLISTERS- ALLERGY CALLED TO [facility] SURGERY SCHEDULING.     Demerol Hcl [Meperidine] Hives     And n/v       Family History   Problem Relation Age of Onset    Cancer Mother     Vision Loss Mother     Breast Cancer Mother     Coronary Art Dis Father     Obesity Father     Kidney Disease Father     High Blood Pressure Father     High Cholesterol Father     Hypercalcemia Sister    Esme Witt Obesity Brother     High Blood Pressure Brother                Subjective:      Review of Systems   Constitutional: Negative. HENT: Negative. Eyes: Negative. Respiratory: Positive for shortness of breath. Cardiovascular: Negative. Gastrointestinal: Negative. Endocrine: Negative. Genitourinary: Negative. Musculoskeletal:        Right arm pain - related to tear   Skin: Negative. Neurological: Negative. Hematological: Negative. Psychiatric/Behavioral: Negative. Objective:     Physical Exam  Vitals and nursing note reviewed. Constitutional:       Appearance: Normal appearance. HENT:      Head: Normocephalic and atraumatic. Right Ear: Hearing, tympanic membrane, ear canal and external ear normal.      Left Ear: Hearing, tympanic membrane, ear canal and external ear normal.      Nose: Nose normal.      Mouth/Throat:      Lips: Pink. Mouth: Mucous membranes are moist.      Pharynx: Oropharynx is clear. Eyes:      General: Lids are normal.      Extraocular Movements: Extraocular movements intact. Conjunctiva/sclera: Conjunctivae normal.      Pupils: Pupils are equal, round, and reactive to light. Neck:      Thyroid: No thyromegaly. Cardiovascular:      Rate and Rhythm: Normal rate and regular rhythm. Pulses: Normal pulses. Dorsalis pedis pulses are 2+ on the right side and 2+ on the left side. Posterior tibial pulses are 2+ on the right side and 2+ on the left side. Heart sounds: Normal heart sounds. Pulmonary:      Effort: Pulmonary effort is normal.      Breath sounds: Normal breath sounds and air entry. Abdominal:      General: Bowel sounds are normal.      Palpations: Abdomen is soft.    Musculoskeletal:        Arms:       Cervical back: Full passive range of motion without pain, normal range of motion and neck supple. Thoracic back: No tenderness. Normal range of motion. Lumbar back: No tenderness. Normal range of motion. Lymphadenopathy:      Cervical: No cervical adenopathy. Skin:     General: Skin is warm and dry. Capillary Refill: Capillary refill takes less than 2 seconds. Neurological:      General: No focal deficit present. Mental Status: He is alert and oriented to person, place, and time. Mental status is at baseline. Coordination: Coordination is intact. Psychiatric:         Mood and Affect: Mood normal.         Speech: Speech normal.         Behavior: Behavior normal.         Thought Content: Thought content normal.         Cognition and Memory: Cognition and memory normal.         Judgment: Judgment normal.         BP (!) 165/82 (Site: Left Upper Arm)   Pulse 84   Temp 96.9 °F (36.1 °C)   Ht 5' 11\" (1.803 m)   Wt 201 lb (91.2 kg)   SpO2 93%   BMI 28.03 kg/m²     Assessment:      Diagnosis Orders   1. Anxiety and depression  External Referral To Psychiatry   2. Essential hypertension  lisinopril (PRINIVIL;ZESTRIL) 30 MG tablet   3. Chronic bronchitis, unspecified chronic bronchitis type ([facility]     4. MRSA (methicillin resistant Staphylococcus aureus) infection         No results found for this visit on 02/16/22. Plan:     Increasing lisinopril due to elevated BP readings. Will send in oral abx for continued MRSA    Restarting inhaler for SOB and worsening COPD. More than 50% of the time was spent counseling and coordinating care for a total time of 35 mins face to face. Return in about 3 weeks (around 3/9/2022) for MRSA, BP, and SOB.     Orders Placed This Encounter   Procedures    External Referral To Psychiatry     Referral Priority:   Routine     Referral Type:   Eval and Treat     Referral Reason:   Specialty Services Required     Requested Specialty:   Psychiatry     Number of Visits Requested:   1       Orders Placed This Encounter   Medications    lisinopril (PRINIVIL;ZESTRIL) 30 MG tablet     Sig: Take 1 tablet by mouth daily     Dispense:  30 tablet     Refill:  3    fluticasone-salmeterol (ADVAIR DISKUS) 250-50 MCG/DOSE AEPB     Sig: Inhale 1 puff into the lungs every 12 hours     Dispense:  60 each     Refill:  3    albuterol sulfate HFA (VENTOLIN HFA) 108 (90 Base) MCG/ACT inhaler     Sig: Inhale 2 puffs into the lungs 4 times daily as needed for Wheezing     Dispense:  18 g     Refill:  0    clindamycin (CLEOCIN) 300 MG capsule     Sig: Take 1 capsule by mouth 2 times daily for 10 days     Dispense:  20 capsule     Refill:  0    chlorhexidine (HIBICLENS) 4 % external liquid     Sig: Apply topically daily as needed. Dispense:  118 mL     Refill:  0            Patient offered educational handouts and has had all questions answered. Patient voices understanding and agrees to plans along with risks and benefits of plan. Patient is instructed to continue prior meds, diet, and exercise plans as instructed. Patient agrees to follow up as instructed and sooner if needed. Patient agrees to go to ER if condition becomes emergent. EMR Dragon/transcription disclaimer: Some of this encounter note is an electronic transcription/translation of spoken language to printed text. The electronic translation of spoken language may permit erroneous, or at times, nonsensical words or phrases to be inadvertently transcribed.  Although I have reviewed the note for such errors, some may still exist.    Electronically signed by BRYAN Go on 2/16/2022 at 12:40 PM

## 2022-02-16 NOTE — TELEPHONE ENCOUNTER
Brandon Herman requests that the office return their call. The best time to reach him is Anytime. Patient called back requesting an appointment with dr joanne wiseman. Patient said his pcp gave him some medication that helped his breathing. Patient also denied any chest pain. Please call patient. Thank you.

## 2022-02-16 NOTE — TELEPHONE ENCOUNTER
Spoke with pt; I have made him an appt with Dr. Marquis Rebolledo; Pt was denying any active chest pains but sounded SOB; Pt stated that he was feeling better but felt like he needed to see Dr. Marquis Rebolledo sooner than later; PT seemed to be all over the place when getting information for his appt;  Pt stated that he did not need any refills on any of his medications

## 2022-02-16 NOTE — TELEPHONE ENCOUNTER
Received call from Nicholas Zaidi MA who wanted to make an appt. For Mr Marsha. She states he is at there office and is SOA and having chest pain. She does not know anything else. I tried to explain that we do not bring pts to the office if they are having CP and SOA but send them to the ED to have testing done that can not be done at the office setting which could impact possible urgent care. MA sounded frustrated stating  She just wants to make him an appointment that's all she was told to do. I asked if they had done an EKG or any labs? She said he is sitting here waiting for me to make this appt. And I dont know what has been done the note is not done yet. I just need an appt. Again explained I need more information and requested to speak to Nicholas ADAMS directly. MA said ok, and then I was somehow disconnected. Tried to call back but was sent to the MA voice mail after telling them the situation. If pt is having chest pain and is SOA he really needs to go to the ED. If that info is not correct then he can be placed on the schedule.

## 2022-02-17 ENCOUNTER — OFFICE VISIT (OUTPATIENT)
Dept: CARDIOLOGY CLINIC | Age: 52
End: 2022-02-17
Payer: MEDICAID

## 2022-02-17 VITALS
DIASTOLIC BLOOD PRESSURE: 77 MMHG | HEART RATE: 80 BPM | WEIGHT: 200 LBS | OXYGEN SATURATION: 98 % | BODY MASS INDEX: 28 KG/M2 | SYSTOLIC BLOOD PRESSURE: 138 MMHG | HEIGHT: 71 IN

## 2022-02-17 DIAGNOSIS — I73.9 CLAUDICATION (HCC): Primary | ICD-10-CM

## 2022-02-17 DIAGNOSIS — E78.2 MIXED HYPERLIPIDEMIA: ICD-10-CM

## 2022-02-17 DIAGNOSIS — I25.10 CORONARY ARTERY DISEASE INVOLVING NATIVE CORONARY ARTERY OF NATIVE HEART WITHOUT ANGINA PECTORIS: ICD-10-CM

## 2022-02-17 DIAGNOSIS — Z95.1 HX OF CABG: ICD-10-CM

## 2022-02-17 DIAGNOSIS — I73.9 CLAUDICATION (HCC): ICD-10-CM

## 2022-02-17 DIAGNOSIS — I10 ESSENTIAL HYPERTENSION: ICD-10-CM

## 2022-02-17 LAB
ANION GAP SERPL CALCULATED.3IONS-SCNC: 12 MMOL/L (ref 7–19)
BUN BLDV-MCNC: 19 MG/DL (ref 6–20)
CALCIUM SERPL-MCNC: 8.8 MG/DL (ref 8.6–10)
CHLORIDE BLD-SCNC: 103 MMOL/L (ref 98–111)
CO2: 23 MMOL/L (ref 22–29)
CREAT SERPL-MCNC: 1.2 MG/DL (ref 0.5–1.2)
GFR AFRICAN AMERICAN: >59
GFR NON-AFRICAN AMERICAN: >60
GLUCOSE BLD-MCNC: 100 MG/DL (ref 74–109)
POTASSIUM SERPL-SCNC: 4.7 MMOL/L (ref 3.5–5)
SODIUM BLD-SCNC: 138 MMOL/L (ref 136–145)

## 2022-02-17 PROCEDURE — 3017F COLORECTAL CA SCREEN DOC REV: CPT | Performed by: INTERNAL MEDICINE

## 2022-02-17 PROCEDURE — G8427 DOCREV CUR MEDS BY ELIG CLIN: HCPCS | Performed by: INTERNAL MEDICINE

## 2022-02-17 PROCEDURE — G8419 CALC BMI OUT NRM PARAM NOF/U: HCPCS | Performed by: INTERNAL MEDICINE

## 2022-02-17 PROCEDURE — 4004F PT TOBACCO SCREEN RCVD TLK: CPT | Performed by: INTERNAL MEDICINE

## 2022-02-17 PROCEDURE — G8484 FLU IMMUNIZE NO ADMIN: HCPCS | Performed by: INTERNAL MEDICINE

## 2022-02-17 PROCEDURE — 99214 OFFICE O/P EST MOD 30 MIN: CPT | Performed by: INTERNAL MEDICINE

## 2022-02-17 ASSESSMENT — ENCOUNTER SYMPTOMS
RESPIRATORY NEGATIVE: 1
SHORTNESS OF BREATH: 0
EYES NEGATIVE: 1
NAUSEA: 0
VOMITING: 0
GASTROINTESTINAL NEGATIVE: 1
DIARRHEA: 0

## 2022-02-17 NOTE — PROGRESS NOTES
[facility] Progress Note                            Date:  2/17/2022  Patient: Miracle Larson  Age:  46 y.o., 1970      Reason for evaluation:         SUBJECTIVE:    Returns today for follow-up assessment. Denies anginal chest pain. Follow-up for coronary artery disease previous CABG hypertension hyperlipidemia. He is a little bit more out of breath primary care physician adjusted some of his pulmonary medications. He continues to smoke. Also complains of prominent calf discomfort with ambulation relieved by rest right leg more affected than the left can walk about 5 minutes at the most has to stop and rest.  Lower extremity plethysmography 1/4/2021 abnormal moderately diminished ABIs on the right lower extremity likely disease right popliteal arterial segments. Normal flow on the left. Most recent creatinine was 1.0 but has been as high as 1.6. Review of Systems   Constitutional: Negative. Negative for chills, fever and unexpected weight change. HENT: Negative. Eyes: Negative. Respiratory: Negative. Negative for shortness of breath. Cardiovascular: Negative. Negative for chest pain. Gastrointestinal: Negative. Negative for diarrhea, nausea and vomiting. Endocrine: Negative. Genitourinary: Negative. Musculoskeletal: Negative. Skin: Negative. Neurological: Negative. All other systems reviewed and are negative. OBJECTIVE:     /77   Pulse 80   Ht 5' 11\" (1.803 m)   Wt 200 lb (90.7 kg)   SpO2 98%   BMI 27.89 kg/m²     Labs:   CBC: No results for input(s): WBC, HGB, HCT, PLT in the last 72 hours. BMP:No results for input(s): NA, K, CO2, BUN, CREATININE, LABGLOM, GLUCOSE in the last 72 hours. BNP: No results for input(s): BNP in the last 72 hours. PT/INR: No results for input(s): PROTIME, INR in the last 72 hours. APTT:No results for input(s): APTT in the last 72 hours.   CARDIAC ENZYMES:No results for input(s): CKTOTAL, CKMB, CKMBINDEX, TROPONINI in the last 72 hours. FASTING LIPID PANEL:  Lab Results   Component Value Date    HDL 30 11/04/2021    LDLDIRECT 109 04/26/2019    LDLCALC 36 11/04/2021    TRIG 245 11/23/2020     LIVER PROFILE:No results for input(s): AST, ALT, LABALBU in the last 72 hours. Past Medical History:   Diagnosis Date    CAD (coronary artery disease)     sees [facility] cardiology    Cancer [facility])     Bladder    [facility]ch[facility]     Depression     Diabetes mellitus ([facility]     History of blood transfusion     unsure thinks he did    Hyperlipidemia     Hypertension      Past Surgical History:   Procedure Laterality Date    BACK SURGERY      X3     BLADDER REMOVAL      CARDIAC CATHETERIZATION      CERVICAL FUSION N/A 2/19/2020    Phase 1:  C5 and C6 corpectomy with placement of an expandable cage and anterior cervical plate A1-T8. performed by Cristobal Kwon DO at [address] [place] [address] N/A 6/17/2020    POSTERIOR CERVICAL WOUND [address] OUT AND CLOSURE performed by Cristobal Kwon DO at [address] [place] [address] N/A 6/26/2020    [facility] [place] [address] [place] [address] performed by Cristobal Kwon DO at [address] [place] [address] N/A 9/10/2019    Dr Monique Rosas, 5 yr recall    CORONARY ARTERY BYPASS GRAFT N/A 5/1/2019    CORONARY ARTERY BYPASS GRAFT X 3 WITH LEFT INTERNAL MAMMARY ARTERY, ENDOSCOPIC  VEIN HARVEST, WITH PERFUSION. performed by Kurtis Medina MD at [place] [address] N/A 2/19/2020    Phase 2:  Posterior instrumented fusion C3-C7 using lateral mass screws and rods.  performed by Cristobal Kwon DO at [address] [facility]      lower ext    PROSTATECTOMY       Family History   Problem Relation Age of Onset    Cancer Mother     Vision Loss Mother     Breast Cancer Mother     Coronary Art Dis Father     Obesity Father     Kidney Disease Father     High Blood Pressure Father     High Cholesterol Father     Hypercalcemia Sister    Maggie Diehl Obesity Brother     High Blood Pressure Brother      Allergies   Allergen Reactions    Latex Other (See Comments)     BOILS AND BLISTERS- ALLERGY CALLED TO [facility]ID SURGERY SCHEDULING.  Demerol Hcl [Meperidine] Hives     And n/v     Current Outpatient Medications   Medication Sig Dispense Refill    lisinopril (PRINIVIL;ZESTRIL) 30 MG tablet Take 1 tablet by mouth daily 30 tablet 3    fluticasone-salmeterol (ADVAIR DISKUS) 250-50 MCG/DOSE AEPB Inhale 1 puff into the lungs every 12 hours 60 each 3    albuterol sulfate HFA (VENTOLIN HFA) 108 (90 Base) MCG/ACT inhaler Inhale 2 puffs into the lungs 4 times daily as needed for Wheezing 18 g 0    clindamycin (CLEOCIN) 300 MG capsule Take 1 capsule by mouth 2 times daily for 10 days 20 capsule 0    chlorhexidine (HIBICLENS) 4 % external liquid Apply topically daily as needed. 118 mL 0    omeprazole (PRILOSEC) 40 MG delayed release capsule TAKE 1 CAPSULE BY MOUTH DAILY 30 capsule 2    metFORMIN (GLUCOPHAGE) 1000 MG tablet TAKE 1 TABLET BY MOUTH 2 TIMES DAILY (WITH MEALS) 60 tablet 1    vitamin D (ERGOCALCIFEROL) 1.25 MG (68095 UT) CAPS capsule TAKE 1 CAPSULE BY MOUTH ONCE A WEEK ON WEDNESDAY 4 capsule 0    ARIPiprazole (ABILIFY) 5 MG tablet TAKE ONE TABLET BY MOUTH ONCE DAILY. 30 tablet 1    atorvastatin (LIPITOR) 20 MG tablet TAKE ONE TABLET BY MOUTH ONCE DAILY. 30 tablet 1    atenolol (TENORMIN) 50 MG tablet TAKE ONE TABLET BY MOUTH EVERY DAY.  30 tablet 0    traZODone (DESYREL) 50 MG tablet TAKE 2 TABLETS BY MOUTH NIGHTLY AS NEEDED FOR SLEEP 60 tablet 2    gabapentin (NEURONTIN) 300 MG capsule TAKE ONE CAPSULE BY MOUTH THREE TIMES DAILY 90 capsule 0    busPIRone (BUSPAR) 10 MG tablet TAKE 1 TABLET BY MOUTH 3 TIMES DAILY AS NEEDED (ANXIETY) 90 tablet 5    baclofen (LIORESAL) 10 MG tablet TAKE 1 TABLET BY MOUTH 3 TIMES DAILY AS NEEDED FOR PAIN/SPASMS 90 tablet 5    FLUoxetine (PROZAC) 40 MG capsule TAKE 1 CAPSULE BY MOUTH DAILY 30 capsule 5    blood glucose test strips (ONETOUCH ULTRA) strip USE ONE FOUR TIMES DAILY & AS NEEDED FOR SYMPTOMS OF IRREGULAR BLOOD SUGAR 100 strip 3    fenofibrate (TRIGLIDE) 160 MG tablet TAKE ONE TABLET BY MOUTH DAILY. 30 tablet 5    sodium hypochlorite (DAKINS) 0.125 % SOLN external solution Moisten gauze apply to wound twice daily 1000 mL 2    HYDROcodone-acetaminophen (NORCO)  MG per tablet Take 2 tablets by mouth every 6 hours as needed for Pain.  sildenafil (REVATIO) 20 MG tablet Take 1-5 tablets 1-2 hours before sexual activity PRN      glucose monitoring kit (FREESTYLE) monitoring kit Please provide glucometer that INS will cover for DM type 2 1 kit 0    Lancets 30G MISC 1 each by Does not apply route daily 4 times daily 100 each 3    aspirin 81 MG EC tablet Take 1 tablet by mouth daily 30 tablet 3    tiotropium (SPIRIVA) 18 MCG inhalation capsule Inhale 1 capsule into the lungs (Patient not taking: Reported on 2/17/2022)       No current facility-administered medications for this visit. Social History     Socioeconomic History    Marital status:      Spouse name: Not on file    Number of children: Not on file    Years of education: Not on file    Highest education level: Not on file   Occupational History    Not on file   Tobacco Use    Smoking status: Current Every Day Smoker     Packs/day: 1.00     Years: 30.00     Pack years: 30.00    Smokeless tobacco: Never Used    Tobacco comment: sometimes less   Vaping Use    Vaping Use: Never used   Substance and Sexual Activity    Alcohol use: Yes     Comment:  Occ    Drug use: Never    Sexual activity: Not on file   Other Topics Concern    Not on file   Social History Narrative     16 years second marriage    On disability due to multiple back surgeries and bladder cancer    [facility]    Education high school    Smokes 1-1/2 pack per day drinks alcohol occasionally denies substance usage Physically sedentary    Former  at an [address] [facility] [employer]    Never in the [address] Strain: Medium Risk    Difficulty of Paying Living Expenses: Somewhat hard   Food Insecurity: No Food Insecurity    Worried About [address] in the Last Year: Never true   [address] in the Last Year: Never true   Transportation Needs:     Lack of Transportation (Medical): Not on file    Lack of Transportation (Non-Medical): Not on file   Physical Activity:     Days of Exercise per Week: Not on file    Minutes of Exercise per Session: Not on file   Stress:     Feeling of Stress : Not on file   Social Connections:     Frequency of Communication with Friends and Family: Not on file    Frequency of Social Gatherings with Friends and Family: Not on file    Attends Taoist Services: Not on file    Active Member of [address] [place] [address] or Organizations: Not on file    Attends Club or Organization Meetings: Not on file    Marital Status: Not on file   Intimate Partner Violence:     Fear of Current or Ex-Partner: Not on file    Emotionally Abused: Not on file    Physically Abused: Not on file    Sexually Abused: Not on file   Housing Stability:     Unable to Pay for Housing in the Last Year: Not on file    Number of [place] in the Last Year: Not on file    Unstable Housing in the Last Year: Not on file       Physical Examination:  /77   Pulse 80   Ht 5' 11\" (1.803 m)   Wt 200 lb (90.7 kg)   SpO2 98%   BMI 27.89 kg/m²   Physical Exam  Vitals reviewed. Constitutional:       General: He is not in acute distress. Appearance: Normal appearance. He is well-developed and normal weight. He is not ill-appearing, toxic-appearing or diaphoretic. HENT:      Head: Normocephalic and atraumatic. Nose: Nose normal.      Mouth/Throat:      Mouth: Mucous membranes are moist.      Pharynx: Oropharynx is clear.    Eyes:      General: No scleral icterus. Extraocular Movements: Extraocular movements intact. Pupils: Pupils are equal, round, and reactive to light. Neck:      Vascular: No carotid bruit or JVD. Cardiovascular:      Rate and Rhythm: Normal rate and regular rhythm. Heart sounds: Normal heart sounds. No murmur heard. No friction rub. No gallop. Pulmonary:      Effort: Pulmonary effort is normal. No respiratory distress. Breath sounds: Normal breath sounds. No stridor. No wheezing, rhonchi or rales. Chest:      Chest wall: No tenderness. Abdominal:      General: There is no distension. Palpations: There is no mass. Tenderness: There is no abdominal tenderness. Hernia: No hernia is present. Musculoskeletal:      Cervical back: Normal range of motion and neck supple. No rigidity or tenderness. Lymphadenopathy:      Cervical: No cervical adenopathy. Skin:     General: Skin is warm and dry. Neurological:      General: No focal deficit present. Mental Status: He is alert and oriented to person, place, and time. Mental status is at baseline. Cranial Nerves: No cranial nerve deficit. Sensory: No sensory deficit. Motor: No weakness. Coordination: Coordination normal.             ASSESSMENT:     Diagnosis Orders   1. Coronary artery disease involving native coronary artery of native heart without angina pectoris     2. Mixed hyperlipidemia     3. Essential hypertension     4. Hx of CABG     5. Claudication [facility])         PLAN:  No orders of the defined types were placed in this encounter. No orders of the defined types were placed in this encounter. 1. Continue present medications  2. Recommend consideration for abdominal angiography lower extremity runoff at his earliest convenience advise indication alternatives benefits and risk patient agreeable arrange forth with.   I have discussed with the patient regarding indications for the proposed procedure LEFT HEART CATHETERIZATION AND POSSIBLE PERCUTANEOUS INTERVENTION  along with possible alternatives benefits and risks including but not limited to risks of death, myocardial infarction, stroke, contrast induced nephropathy which in some cases may lead to acute kidney failure requiring dialysis, allergic reactions, bleeding requiring blood transfusion,  cardiac arrhthymias, respiratory failure which may require placing the patient on respiratory support such as a ventilator or breathing machine,risk of complications which may require vascular surgery, and if coronary intervention is performed emergency CABG may be required in less than 1% of cases. The patient is awake and alert and understands the issues involved and indicates willingness to proceed as ordered. The patient does not have any contraindications to dual antiplatelet therapy. The patient does not have any known  pending surgical procedures in the next 12 months at this time. The patient is  a reasonable candidate for moderate conscious sedation. ASA score:  ASA 3 - Patient with moderate systemic disease with functional limitations    Mallampati: I (soft palate, uvula, fauces, tonsillar pillars visible)    Preferred vascular access site will be: Left common femoral artery        Return in about 4 weeks (around 3/17/2022) for return to Dr. Hannah Brown only. Allyson Hartley MD 2/17/2022 9:55 AM CST    [facility]      Thisdictation was generated by voice recognition computer software. Although all attempts are made to edit the dictation for accuracy, there may be errors in the transcription that are not intended.

## 2022-02-22 DIAGNOSIS — I10 ESSENTIAL HYPERTENSION: ICD-10-CM

## 2022-02-22 RX ORDER — ATENOLOL 50 MG/1
TABLET ORAL
Qty: 30 TABLET | Refills: 3 | Status: SHIPPED | OUTPATIENT
Start: 2022-02-22 | End: 2022-05-11

## 2022-02-22 RX ORDER — ERGOCALCIFEROL 1.25 MG/1
CAPSULE ORAL
Qty: 4 CAPSULE | Refills: 0 | Status: SHIPPED | OUTPATIENT
Start: 2022-02-22 | End: 2022-04-20 | Stop reason: SDUPTHER

## 2022-02-22 NOTE — TELEPHONE ENCOUNTER
Sameer Larson called to request a refill on his medication. Last office visit : 2/16/2022   Next office visit : 3/10/2022     Requested Prescriptions     Pending Prescriptions Disp Refills    vitamin D (ERGOCALCIFEROL) 1.25 MG (29652 UT) CAPS capsule [Pharmacy Med Name: VIT D2 1.25 MG (50,000 UNIT 1.25 MG Capsule] 4 capsule 0     Sig: TAKE 1 CAPSULE BY MOUTH ONCE A WEEK ON WEDNESDAY    atenolol (TENORMIN) 50 MG tablet [Pharmacy Med Name: ATENOLOL 50 MG TABLET 50 Tablet] 30 tablet 0     Sig: TAKE ONE TABLET BY MOUTH EVERY DAY.             Mohamud Camarena MA

## 2022-02-28 DIAGNOSIS — Z11.59 SCREENING FOR VIRAL DISEASE: Primary | ICD-10-CM

## 2022-02-28 NOTE — PROGRESS NOTES
Patient is scheduled for 3/1 at 12 arrival for 1 procedure. COVID swab MUST be done at [place] [address] on 2/28 between 9AM and 12PM then required to quarantine until day of procedure. If patient does not go as instructed for covid swab patient will be rescheduled and required to retest again regardless. If quarantine is broke patient will be rescheduled and required to retest. Will only call with results if positive. Patient voiced understanding of all of this and had patient repeat back to me. Other instructions given below.

## 2022-03-01 ENCOUNTER — TELEPHONE (OUTPATIENT)
Dept: CARDIOLOGY CLINIC | Age: 52
End: 2022-03-01

## 2022-03-01 NOTE — TELEPHONE ENCOUNTER
Patient forgot to get covid swab yesterday for aortagram today. Will reschedule to next week but first have to confirm new date with Christelle Razo in specials. Told patient I would call him back.

## 2022-03-01 NOTE — TELEPHONE ENCOUNTER
Talked with García in scheduling. Said there is opening for Tuesday 3/8 at 730 patient would need to be here at 630. Told her I would have to confirm with Dr. Meagan Cardenas to make sure that time is okay if not will resort to next week. Will talk with BRENDA Thursday to see if this is okay.

## 2022-03-04 NOTE — TELEPHONE ENCOUNTER
BRENDA said 730 am was fine. Talked with patient he was fine with this as well. Covid swab Monday between 9A-12P and arrive at 630A for 730A procedure.

## 2022-03-05 DIAGNOSIS — M54.50 LUMBAR PAIN: ICD-10-CM

## 2022-03-07 DIAGNOSIS — Z11.59 SCREENING FOR VIRAL DISEASE: ICD-10-CM

## 2022-03-07 LAB — SARS-COV-2, PCR: NOT DETECTED

## 2022-03-07 RX ORDER — GABAPENTIN 300 MG/1
CAPSULE ORAL
Qty: 90 CAPSULE | Refills: 2 | Status: SHIPPED | OUTPATIENT
Start: 2022-03-07 | End: 2022-05-05

## 2022-03-07 NOTE — TELEPHONE ENCOUNTER
Sameer Larson called to request a refill on his medication. Last office visit : 2/16/2022   Next office visit : 3/10/2022     Last UDS:   Amphetamines   Date Value Ref Range Status   03/23/2012 NEGATIVE  Final     Amphetamine Screen, Urine   Date Value Ref Range Status   12/21/2021 Positive (A) Negative <1000 ng/mL Final     Barbiturates   Date Value Ref Range Status   03/23/2012 NEGATIVE  Final     Benzodiazepines   Date Value Ref Range Status   03/23/2012 NEGATIVE  Final     Benzodiazepine Screen, Urine   Date Value Ref Range Status   12/21/2021 Negative Negative <100 ng/mL Final     Cocaine Metabolite Screen, Urine   Date Value Ref Range Status   12/21/2021 Negative Negative <300 ng/mL Final     Opiate Scrn, Ur   Date Value Ref Range Status   12/21/2021 Positive (A) Negative < 300 ng/mL Final     Comment:     Positive       Last Kaylee Anderson: 3/7/22  Medication Contract: needs updated   Last Fill: 1/4/22    Requested Prescriptions     Pending Prescriptions Disp Refills    gabapentin (NEURONTIN) 300 MG capsule [Pharmacy Med Name: GABAPENTIN 300 MG CAPS 300 Capsule] 90 capsule 0     Sig: TAKE 1 CAPSULE BY MOUTH 3 TIMES DAILY         Please approve or refuse this medication.    [employer]

## 2022-03-08 ENCOUNTER — HOSPITAL ENCOUNTER (OUTPATIENT)
Dept: INTERVENTIONAL RADIOLOGY/VASCULAR | Age: 52
Discharge: HOME | End: 2022-03-08
Payer: MEDICAID

## 2022-03-08 VITALS
HEART RATE: 67 BPM | HEIGHT: 71 IN | OXYGEN SATURATION: 96 % | WEIGHT: 201 LBS | BODY MASS INDEX: 28.14 KG/M2 | SYSTOLIC BLOOD PRESSURE: 125 MMHG | DIASTOLIC BLOOD PRESSURE: 54 MMHG | RESPIRATION RATE: 13 BRPM | TEMPERATURE: 97.1 F

## 2022-03-08 DIAGNOSIS — I73.9 PVD (PERIPHERAL VASCULAR DISEASE) (HCC): ICD-10-CM

## 2022-03-08 DIAGNOSIS — E11.9 TYPE 2 DIABETES MELLITUS WITHOUT COMPLICATION, WITHOUT LONG-TERM CURRENT USE OF INSULIN (HCC): Chronic | ICD-10-CM

## 2022-03-08 LAB
EKG P AXIS: 50 DEGREES
EKG P-R INTERVAL: 122 MS
EKG Q-T INTERVAL: 406 MS
EKG QRS DURATION: 100 MS
EKG QTC CALCULATION (BAZETT): 421 MS
EKG T AXIS: 43 DEGREES
HCT VFR BLD CALC: 23.9 % (ref 42–52)
HEMOGLOBIN: 6.1 G/DL (ref 14–18)
MCH RBC QN AUTO: 17.5 PG (ref 27–31)
MCHC RBC AUTO-ENTMCNC: 25.5 G/DL (ref 33–37)
MCV RBC AUTO: 68.7 FL (ref 80–94)
PDW BLD-RTO: 20.2 % (ref 11.5–14.5)
PLATELET # BLD: 205 K/UL (ref 130–400)
PMV BLD AUTO: 10.5 FL (ref 9.4–12.4)
RBC # BLD: 3.48 M/UL (ref 4.7–6.1)
WBC # BLD: 6.1 K/UL (ref 4.8–10.8)

## 2022-03-08 PROCEDURE — 99152 MOD SED SAME PHYS/QHP 5/>YRS: CPT | Performed by: INTERNAL MEDICINE

## 2022-03-08 PROCEDURE — 99153 MOD SED SAME PHYS/QHP EA: CPT

## 2022-03-08 PROCEDURE — C1769 GUIDE WIRE: HCPCS

## 2022-03-08 PROCEDURE — 99152 MOD SED SAME PHYS/QHP 5/>YRS: CPT

## 2022-03-08 PROCEDURE — 37224 HC PLASTY UNI FEMPOP: CPT

## 2022-03-08 PROCEDURE — 75625 CONTRAST EXAM ABDOMINL AORTA: CPT

## 2022-03-08 PROCEDURE — 75625 CONTRAST EXAM ABDOMINL AORTA: CPT | Performed by: INTERNAL MEDICINE

## 2022-03-08 PROCEDURE — 75716 ARTERY X-RAYS ARMS/LEGS: CPT | Performed by: INTERNAL MEDICINE

## 2022-03-08 PROCEDURE — 6360000002 HC RX W HCPCS: Performed by: INTERNAL MEDICINE

## 2022-03-08 PROCEDURE — 75774 ARTERY X-RAY EACH VESSEL: CPT

## 2022-03-08 PROCEDURE — 2500000003 HC RX 250 WO HCPCS: Performed by: INTERNAL MEDICINE

## 2022-03-08 PROCEDURE — 36415 COLL VENOUS BLD VENIPUNCTURE: CPT

## 2022-03-08 PROCEDURE — 93010 ELECTROCARDIOGRAM REPORT: CPT | Performed by: INTERNAL MEDICINE

## 2022-03-08 PROCEDURE — 99219 PR INITIAL OBSERVATION CARE/DAY 50 MINUTES: CPT | Performed by: INTERNAL MEDICINE

## 2022-03-08 PROCEDURE — 75716 ARTERY X-RAYS ARMS/LEGS: CPT

## 2022-03-08 PROCEDURE — 6360000004 HC RX CONTRAST MEDICATION: Performed by: INTERNAL MEDICINE

## 2022-03-08 PROCEDURE — 37224 PR REVSC OPN/PRG FEM/POP W/ANGIOPLASTY UNI: CPT | Performed by: INTERNAL MEDICINE

## 2022-03-08 PROCEDURE — 93005 ELECTROCARDIOGRAM TRACING: CPT | Performed by: INTERNAL MEDICINE

## 2022-03-08 PROCEDURE — 85027 COMPLETE CBC AUTOMATED: CPT

## 2022-03-08 RX ORDER — MIDAZOLAM HYDROCHLORIDE 1 MG/ML
INJECTION INTRAMUSCULAR; INTRAVENOUS
Status: COMPLETED | OUTPATIENT
Start: 2022-03-08 | End: 2022-03-08

## 2022-03-08 RX ORDER — ACETAMINOPHEN 325 MG/1
650 TABLET ORAL EVERY 4 HOURS PRN
Status: DISCONTINUED | OUTPATIENT
Start: 2022-03-08 | End: 2022-03-10 | Stop reason: HOSPADM

## 2022-03-08 RX ORDER — LIDOCAINE HYDROCHLORIDE 20 MG/ML
INJECTION, SOLUTION INFILTRATION; PERINEURAL
Status: COMPLETED | OUTPATIENT
Start: 2022-03-08 | End: 2022-03-08

## 2022-03-08 RX ORDER — HEPARIN SODIUM 5000 [USP'U]/ML
INJECTION, SOLUTION INTRAVENOUS; SUBCUTANEOUS
Status: COMPLETED | OUTPATIENT
Start: 2022-03-08 | End: 2022-03-08

## 2022-03-08 RX ORDER — FENTANYL CITRATE 50 UG/ML
INJECTION, SOLUTION INTRAMUSCULAR; INTRAVENOUS
Status: COMPLETED | OUTPATIENT
Start: 2022-03-08 | End: 2022-03-08

## 2022-03-08 RX ORDER — IODIXANOL 320 MG/ML
INJECTION, SOLUTION INTRAVASCULAR
Status: COMPLETED | OUTPATIENT
Start: 2022-03-08 | End: 2022-03-08

## 2022-03-08 RX ORDER — ONDANSETRON 2 MG/ML
4 INJECTION INTRAMUSCULAR; INTRAVENOUS EVERY 6 HOURS PRN
Status: DISCONTINUED | OUTPATIENT
Start: 2022-03-08 | End: 2022-03-10 | Stop reason: HOSPADM

## 2022-03-08 RX ORDER — SODIUM CHLORIDE 9 MG/ML
1000 INJECTION, SOLUTION INTRAVENOUS CONTINUOUS
Status: DISCONTINUED | OUTPATIENT
Start: 2022-03-08 | End: 2022-03-10 | Stop reason: HOSPADM

## 2022-03-08 RX ADMIN — FENTANYL CITRATE 25 MCG: 50 INJECTION INTRAMUSCULAR; INTRAVENOUS at 07:56

## 2022-03-08 RX ADMIN — HEPARIN SODIUM 5000 UNITS: 5000 INJECTION INTRAVENOUS; SUBCUTANEOUS at 07:49

## 2022-03-08 RX ADMIN — MIDAZOLAM 1 MG: 1 INJECTION INTRAMUSCULAR; INTRAVENOUS at 08:00

## 2022-03-08 RX ADMIN — MIDAZOLAM 1 MG: 1 INJECTION INTRAMUSCULAR; INTRAVENOUS at 07:56

## 2022-03-08 RX ADMIN — LIDOCAINE HYDROCHLORIDE 10 ML: 20 INJECTION, SOLUTION INFILTRATION; PERINEURAL at 07:49

## 2022-03-08 RX ADMIN — IODIXANOL 150 ML: 320 INJECTION, SOLUTION INTRAVASCULAR at 08:32

## 2022-03-08 RX ADMIN — FENTANYL CITRATE 25 MCG: 50 INJECTION INTRAMUSCULAR; INTRAVENOUS at 08:00

## 2022-03-08 NOTE — H&P
Office Visit    2/17/2022  [facility] MD    Interventional Cardiology  Claudication [facility]) +4 more    Dx  Follow-up     Reason for Visit       Progress Notes  Majo Henao MD (Physician) [id number] Progress Note                              Date:                2/17/2022  Patient:            Howard Osman  Age:                 46 y.o., 1970        Reason for evaluation:            SUBJECTIVE:    Returns today for follow-up assessment. Denies anginal chest pain. Follow-up for coronary artery disease previous CABG hypertension hyperlipidemia. He is a little bit more out of breath primary care physician adjusted some of his pulmonary medications. He continues to smoke. Also complains of prominent calf discomfort with ambulation relieved by rest right leg more affected than the left can walk about 5 minutes at the most has to stop and rest.  Lower extremity plethysmography 1/4/2021 abnormal moderately diminished ABIs on the right lower extremity likely disease right popliteal arterial segments. Normal flow on the left. Most recent creatinine was 1.0 but has been as high as 1.6.     Review of Systems   Constitutional: Negative. Negative for chills, fever and unexpected weight change. HENT: Negative. Eyes: Negative. Respiratory: Negative. Negative for shortness of breath. Cardiovascular: Negative. Negative for chest pain. Gastrointestinal: Negative. Negative for diarrhea, nausea and vomiting. Endocrine: Negative. Genitourinary: Negative. Musculoskeletal: Negative. Skin: Negative. Neurological: Negative.     All other systems reviewed and are negative.           OBJECTIVE:     /77   Pulse 80   Ht 5' 11\" (1.803 m)   Wt 200 lb (90.7 kg)   SpO2 98%   BMI 27.89 kg/m²      Labs:   CBC: No results for input(s): WBC, HGB, HCT, PLT in the last 72 hours.  BMP:No results for input(s): NA, K, CO2, BUN, CREATININE, LABGLOM, GLUCOSE in the last 72 hours. BNP: No results for input(s): BNP in the last 72 hours. PT/INR: No results for input(s): PROTIME, INR in the last 72 hours. APTT:No results for input(s): APTT in the last 72 hours. CARDIAC ENZYMES:No results for input(s): CKTOTAL, CKMB, CKMBINDEX, TROPONINI in the last 72 hours. FASTING LIPID PANEL:        Lab Results   Component Value Date     HDL 30 11/04/2021     LDLDIRECT 109 04/26/2019     LDLCALC 36 11/04/2021     TRIG 245 11/23/2020      LIVER PROFILE:No results for input(s): AST, ALT, LABALBU in the last 72 hours.         Past Medical History        Past Medical History:   Diagnosis Date    CAD (coronary artery disease)       sees [facility] cardiology    Cancer [facility])       Bladder    COPD [facility]chronic obstructive pulmonary disease) [facility] [address]      Depression      Diabetes mellitus ([facility] [address]      History of blood transfusion       unsure thinks he did    Hyperlipidemia      Hypertension           Past Surgical History         Past Surgical History:   Procedure Laterality Date    BACK SURGERY         X3     BLADDER REMOVAL        CARDIAC CATHETERIZATION        CERVICAL FUSION N/A 2/19/2020     Phase 1:  C5 and C6 corpectomy with placement of an expandable cage and anterior cervical plate L2-C5. performed by Garlon Runner, DO at [address] N/A 6/17/2020     POSTERIOR CERVICAL WOUND [address] OUT AND CLOSURE performed by Garlon Runner, DO at [address] N/A 6/26/2020     POSTERIOR CERVICAL WOUND DEBRIDEMENT WITH PLACEMENT OF [address] performed by Garlon Runner, DO at [address] COLONOSCOPY N/A 9/10/2019     Dr Jeronimo aWrren, 5 yr recall    CORONARY ARTERY BYPASS GRAFT N/A 5/1/2019     CORONARY ARTERY BYPASS GRAFT X 3 WITH LEFT INTERNAL MAMMARY ARTERY, ENDOSCOPIC  VEIN HARVEST, WITH PERFUSION.  performed by Gladys Stark MD at [facility] 2/19/2020     Phase 2:  Posterior instrumented fusion C3-C7 using lateral mass screws and rods. performed by Jordana Montero DO at [address]         lower ext    PROSTATECTOMY             Family History         Family History   Problem Relation Age of Onset    Cancer Mother      Vision Loss Mother      Breast Cancer Mother      Coronary Art Dis Father      Obesity Father      Kidney Disease Father      High Blood Pressure Father      High Cholesterol Father      Hypercalcemia Sister      Obesity Brother      High Blood Pressure Brother                 Allergies   Allergen Reactions    Latex Other (See Comments)       BOILS AND BLISTERS- ALLERGY CALLED TO OMID SURGERY SCHEDULING.  Demerol Hcl [Meperidine] Hives       And n/v      Current Facility-Administered Medications          Current Outpatient Medications   Medication Sig Dispense Refill    lisinopril (PRINIVIL;ZESTRIL) 30 MG tablet Take 1 tablet by mouth daily 30 tablet 3    fluticasone-salmeterol (ADVAIR DISKUS) 250-50 MCG/DOSE AEPB Inhale 1 puff into the lungs every 12 hours 60 each 3    albuterol sulfate HFA (VENTOLIN HFA) 108 (90 Base) MCG/ACT inhaler Inhale 2 puffs into the lungs 4 times daily as needed for Wheezing 18 g 0    clindamycin (CLEOCIN) 300 MG capsule Take 1 capsule by mouth 2 times daily for 10 days 20 capsule 0    chlorhexidine (HIBICLENS) 4 % external liquid Apply topically daily as needed. 118 mL 0    omeprazole (PRILOSEC) 40 MG delayed release capsule TAKE 1 CAPSULE BY MOUTH DAILY 30 capsule 2    metFORMIN (GLUCOPHAGE) 1000 MG tablet TAKE 1 TABLET BY MOUTH 2 TIMES DAILY (WITH MEALS) 60 tablet 1    vitamin D (ERGOCALCIFEROL) 1.25 MG (82556 UT) CAPS capsule TAKE 1 CAPSULE BY MOUTH ONCE A WEEK ON WEDNESDAY 4 capsule 0    ARIPiprazole (ABILIFY) 5 MG tablet TAKE ONE TABLET BY MOUTH ONCE DAILY. 30 tablet 1    atorvastatin (LIPITOR) 20 MG tablet TAKE ONE TABLET BY MOUTH ONCE DAILY.  30 tablet 1    atenolol (TENORMIN) 50 MG tablet TAKE ONE TABLET BY MOUTH EVERY DAY. 30 tablet 0    traZODone (DESYREL) 50 MG tablet TAKE 2 TABLETS BY MOUTH NIGHTLY AS NEEDED FOR SLEEP 60 tablet 2    gabapentin (NEURONTIN) 300 MG capsule TAKE ONE CAPSULE BY MOUTH THREE TIMES DAILY 90 capsule 0    busPIRone (BUSPAR) 10 MG tablet TAKE 1 TABLET BY MOUTH 3 TIMES DAILY AS NEEDED (ANXIETY) 90 tablet 5    baclofen (LIORESAL) 10 MG tablet TAKE 1 TABLET BY MOUTH 3 TIMES DAILY AS NEEDED FOR PAIN/SPASMS 90 tablet 5    FLUoxetine (PROZAC) 40 MG capsule TAKE 1 CAPSULE BY MOUTH DAILY 30 capsule 5    blood glucose test strips (ONETOUCH ULTRA) strip USE ONE FOUR TIMES DAILY & AS NEEDED FOR SYMPTOMS OF IRREGULAR BLOOD SUGAR 100 strip 3    fenofibrate (TRIGLIDE) 160 MG tablet TAKE ONE TABLET BY MOUTH DAILY.  30 tablet 5    sodium hypochlorite (DAKINS) 0.125 % SOLN external solution Moisten gauze apply to wound twice daily 1000 mL 2    HYDROcodone-acetaminophen (NORCO)  MG per tablet Take 2 tablets by mouth every 6 hours as needed for Pain.        sildenafil (REVATIO) 20 MG tablet Take 1-5 tablets 1-2 hours before sexual activity PRN        glucose monitoring kit (FREESTYLE) monitoring kit Please provide glucometer that INS will cover for DM type 2 1 kit 0    Lancets 30G MISC 1 each by Does not apply route daily 4 times daily 100 each 3    aspirin 81 MG EC tablet Take 1 tablet by mouth daily 30 tablet 3    tiotropium (SPIRIVA) 18 MCG inhalation capsule Inhale 1 capsule into the lungs (Patient not taking: Reported on 2/17/2022)          No current facility-administered medications for this visit.         Social History               Socioeconomic History    Marital status:        Spouse name: Not on file    Number of children: Not on file    Years of education: Not on file    Highest education level: Not on file   Occupational History    Not on file   Tobacco Use    Smoking status: Current Every Day Smoker       Packs/day: 1.00     Years: 30.00       Pack years: 30.00    Smokeless tobacco: Never Used    Tobacco comment: sometimes less   Vaping Use    Vaping Use: Never used   Substance and Sexual Activity    Alcohol use: Yes       Comment: Occ    Drug use: Never    Sexual activity: Not on file   Other Topics Concern    Not on file   Social History Narrative      16 years second marriage     On disability due to multiple back surgeries and bladder cancer     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[employer]     Education high school     Smokes 1-1/2 pack per day drinks alcohol occasionally denies substance usage     Physically sedentary     Former  at [employer] Never in the [employer]      Social Determinants of Health          Financial Resource Strain: Medium Risk    Difficulty of Paying Living Expenses: Somewhat hard   Food Insecurity: No Food Insecurity    Worried About [address] in the Last Year: Never true    Remigio of Food in the Last Year: Never true   Transportation Needs:     Lack of Transportation (Medical): Not on file    Lack of Transportation (Non-Medical):  Not on file   Physical Activity:     Days of Exercise per Week: Not on file    Minutes of Exercise per Session: Not on file   Stress:     Feeling of Stress : Not on file   Social Connections:     Frequency of Communication with Friends and Family: Not on file    Frequency of Social Gatherings with Friends and Family: Not on file    Attends Sabianism Services: Not on file    Active Member of Clubs or Organizations: Not on file    Attends Club or Organization Meetings: Not on file    Marital Status: Not on file   Intimate Partner Violence:     Fear of Current or Ex-Partner: Not on file    Emotionally Abused: Not on file    Physically Abused: Not on file    Sexually Abused: Not on file   Housing Stability:     Unable to Pay for Housing in the Last Year: Not on file    Number of [place] in the Last Year: Not on file    Unstable Housing in the Last Year: Not on file            Physical Examination:  /77   Pulse 80   Ht 5' 11\" (1.803 m)   Wt 200 lb (90.7 kg)   SpO2 98%   BMI 27.89 kg/m²   Physical Exam  Vitals reviewed. Constitutional:       General: He is not in acute distress. Appearance: Normal appearance. He is well-developed and normal weight. He is not ill-appearing, toxic-appearing or diaphoretic. HENT:      Head: Normocephalic and atraumatic. Nose: Nose normal.      Mouth/Throat:      Mouth: Mucous membranes are moist.      Pharynx: Oropharynx is clear. Eyes:      General: No scleral icterus. Extraocular Movements: Extraocular movements intact. Pupils: Pupils are equal, round, and reactive to light. Neck:      Vascular: No carotid bruit or JVD. Cardiovascular:      Rate and Rhythm: Normal rate and regular rhythm. Heart sounds: Normal heart sounds. No murmur heard. No friction rub. No gallop. Pulmonary:      Effort: Pulmonary effort is normal. No respiratory distress. Breath sounds: Normal breath sounds. No stridor. No wheezing, rhonchi or rales. Chest:      Chest wall: No tenderness. Abdominal:      General: There is no distension. Palpations: There is no mass. Tenderness: There is no abdominal tenderness. Hernia: No hernia is present. Musculoskeletal:      Cervical back: Normal range of motion and neck supple. No rigidity or tenderness. Lymphadenopathy:      Cervical: No cervical adenopathy. Skin:     General: Skin is warm and dry. Neurological:      General: No focal deficit present. Mental Status: He is alert and oriented to person, place, and time. Mental status is at baseline. Cranial Nerves: No cranial nerve deficit. Sensory: No sensory deficit. Motor: No weakness. Coordination: Coordination normal.                ASSESSMENT:       Diagnosis Orders   1.  Coronary artery disease involving native coronary artery of native heart without angina pectoris      2. Mixed hyperlipidemia      3. Essential hypertension      4. Hx of CABG      5. Claudication ([facility] [address]            PLAN:  No orders of the defined types were placed in this encounter.       Encounter Medications    No orders of the defined types were placed in this encounter.            1. Continue present medications  2. Recommend consideration for abdominal angiography lower extremity runoff at his earliest convenience advise indication alternatives benefits and risk patient agreeable arrange forth with. I have discussed with the patient regarding indications for the proposed procedure LEFT HEART CATHETERIZATION AND POSSIBLE PERCUTANEOUS INTERVENTION  along with possible alternatives benefits and risks including but not limited to risks of death, myocardial infarction, stroke, contrast induced nephropathy which in some cases may lead to acute kidney failure requiring dialysis, allergic reactions, bleeding requiring blood transfusion,  cardiac arrhthymias, respiratory failure which may require placing the patient on respiratory support such as a ventilator or breathing machine,risk of complications which may require vascular surgery, and if coronary intervention is performed emergency CABG may be required in less than 1% of cases.  The patient is awake and alert and understands the issues involved and indicates willingness to proceed as ordered.     The patient does not have any contraindications to dual antiplatelet therapy.     The patient does not have any known  pending surgical procedures in the next 12 months at this time.     The patient is  a reasonable candidate for moderate conscious sedation.     ASA score:  ASA 3 - Patient with moderate systemic disease with functional limitations     Mallampati: I (soft palate, uvula, fauces, tonsillar pillars visible)     Preferred vascular access site will be: Left common femoral artery           Return in about 4 weeks (around 3/17/2022) for return to Dr. Hannah Brown only.        Allyson Hartley MD 2/17/2022 9:55 AM CST     [facility]        Thisdictation was generated by voice recognition computer software. Although all attempts are made to edit the dictation for accuracy, there may be errors in the transcription that are not intended.                           No significant interval history change since above visit

## 2022-03-08 NOTE — PROGRESS NOTES
Abdominal angiogram and runoff preliminary report access left common femoral artery retrograde severe 90% stenosis in the right popliteal artery underwent balloon angioplasty drug-coated balloon 5 mm x 60 mm at 10 [address] small dissection only seen in one view not flow-limiting tolerated well good flow distally.

## 2022-03-08 NOTE — PROGRESS NOTES
Pt ambulated in patricia with Rn and tolerated activity without complication. L groin site cdi, no s/s of bleeding or hematoma. DC instructions explained to pt and pt's wife, both v/u and had no further questions. Pt assisted to front entrance via wheelchair with RN where wife waits to drive pt home.  Electronically signed by Shell Hylton RN on 3/8/2022 at 12:24 PM

## 2022-03-10 ENCOUNTER — OFFICE VISIT (OUTPATIENT)
Dept: PRIMARY CARE CLINIC | Age: 52
End: 2022-03-10
Payer: MEDICAID

## 2022-03-10 VITALS
HEIGHT: 71 IN | WEIGHT: 194 LBS | DIASTOLIC BLOOD PRESSURE: 60 MMHG | SYSTOLIC BLOOD PRESSURE: 135 MMHG | BODY MASS INDEX: 27.16 KG/M2 | HEART RATE: 86 BPM | OXYGEN SATURATION: 98 % | TEMPERATURE: 96.4 F

## 2022-03-10 DIAGNOSIS — R40.0 DAYTIME SOMNOLENCE: ICD-10-CM

## 2022-03-10 DIAGNOSIS — E11.9 TYPE 2 DIABETES MELLITUS WITHOUT COMPLICATION, WITHOUT LONG-TERM CURRENT USE OF INSULIN (HCC): ICD-10-CM

## 2022-03-10 DIAGNOSIS — I10 ESSENTIAL HYPERTENSION: ICD-10-CM

## 2022-03-10 DIAGNOSIS — A49.02 MRSA (METHICILLIN RESISTANT STAPHYLOCOCCUS AUREUS) INFECTION: Primary | ICD-10-CM

## 2022-03-10 DIAGNOSIS — J42 CHRONIC BRONCHITIS, UNSPECIFIED CHRONIC BRONCHITIS TYPE (HCC): ICD-10-CM

## 2022-03-10 LAB — HBA1C MFR BLD: 6 %

## 2022-03-10 PROCEDURE — G8427 DOCREV CUR MEDS BY ELIG CLIN: HCPCS | Performed by: NURSE PRACTITIONER

## 2022-03-10 PROCEDURE — 4004F PT TOBACCO SCREEN RCVD TLK: CPT | Performed by: NURSE PRACTITIONER

## 2022-03-10 PROCEDURE — 2022F DILAT RTA XM EVC RTNOPTHY: CPT | Performed by: NURSE PRACTITIONER

## 2022-03-10 PROCEDURE — 3017F COLORECTAL CA SCREEN DOC REV: CPT | Performed by: NURSE PRACTITIONER

## 2022-03-10 PROCEDURE — 83036 HEMOGLOBIN GLYCOSYLATED A1C: CPT | Performed by: NURSE PRACTITIONER

## 2022-03-10 PROCEDURE — 3023F SPIROM DOC REV: CPT | Performed by: NURSE PRACTITIONER

## 2022-03-10 PROCEDURE — G8484 FLU IMMUNIZE NO ADMIN: HCPCS | Performed by: NURSE PRACTITIONER

## 2022-03-10 PROCEDURE — 99214 OFFICE O/P EST MOD 30 MIN: CPT | Performed by: NURSE PRACTITIONER

## 2022-03-10 PROCEDURE — G8419 CALC BMI OUT NRM PARAM NOF/U: HCPCS | Performed by: NURSE PRACTITIONER

## 2022-03-10 PROCEDURE — 3044F HG A1C LEVEL LT 7.0%: CPT | Performed by: NURSE PRACTITIONER

## 2022-03-10 RX ORDER — MUPIROCIN CALCIUM 20 MG/G
CREAM TOPICAL
Qty: 30 G | Refills: 1 | Status: SHIPPED | OUTPATIENT
Start: 2022-03-10 | End: 2022-04-09

## 2022-03-10 ASSESSMENT — PATIENT HEALTH QUESTIONNAIRE - PHQ9
9. THOUGHTS THAT YOU WOULD BE BETTER OFF DEAD, OR OF HURTING YOURSELF: 0
10. IF YOU CHECKED OFF ANY PROBLEMS, HOW DIFFICULT HAVE THESE PROBLEMS MADE IT FOR YOU TO DO YOUR WORK, TAKE CARE OF THINGS AT HOME, OR GET ALONG WITH OTHER PEOPLE: 0
SUM OF ALL RESPONSES TO PHQ9 QUESTIONS 1 & 2: 6
7. TROUBLE CONCENTRATING ON THINGS, SUCH AS READING THE NEWSPAPER OR WATCHING TELEVISION: 3
SUM OF ALL RESPONSES TO PHQ QUESTIONS 1-9: 24
SUM OF ALL RESPONSES TO PHQ QUESTIONS 1-9: 24
5. POOR APPETITE OR OVEREATING: 3
4. FEELING TIRED OR HAVING LITTLE ENERGY: 3
SUM OF ALL RESPONSES TO PHQ QUESTIONS 1-9: 24
6. FEELING BAD ABOUT YOURSELF - OR THAT YOU ARE A FAILURE OR HAVE LET YOURSELF OR YOUR FAMILY DOWN: 3
3. TROUBLE FALLING OR STAYING ASLEEP: 3
SUM OF ALL RESPONSES TO PHQ QUESTIONS 1-9: 24
8. MOVING OR SPEAKING SO SLOWLY THAT OTHER PEOPLE COULD HAVE NOTICED. OR THE OPPOSITE, BEING SO FIGETY OR RESTLESS THAT YOU HAVE BEEN MOVING AROUND A LOT MORE THAN USUAL: 3
2. FEELING DOWN, DEPRESSED OR HOPELESS: 3
1. LITTLE INTEREST OR PLEASURE IN DOING THINGS: 3

## 2022-03-10 ASSESSMENT — ENCOUNTER SYMPTOMS
GASTROINTESTINAL NEGATIVE: 1
SHORTNESS OF BREATH: 1
EYES NEGATIVE: 1

## 2022-03-10 NOTE — PROGRESS NOTES
[address] [facility]  [address] [address]  [address] [address]  Dept: [phone]  Dept Fax: [phone]  Loc: [phone]    Barbi Larson is a 45 y.o. male who presents today for his medical conditions/complaints as noted below. Sameer Larson is c/o of Shortness of Breath, Diabetes (leg swelling), and Other (MRSA)      Chief Complaint   Patient presents with    Shortness of Breath    Diabetes     leg swelling    Other     MRSA       HPI:     HPI  Pt is here for sob follow up. He is having a hard time breathing and has to stop several times while walking. He states that the two inhalers are not working. He is also having fatigue issues due to the sob. He complains of leg swelling possibly due to diabetes. He did receive drug-coated stent in the right popliteal artery by Dr Karen Cavanaugh 2 days ago. He would like his legs looked at and is also due to diabetic foot exam.   He has not been checking blood sugars as recommended. He is still battling MRSA on his face still.           Past Medical History:   Diagnosis Date    CAD (coronary artery disease)     sees [facility] cardiology    Cancer [facility])     Bladder    [facility]) [facility]     Depression     Diabetes mellitus ([facility]     History of blood transfusion     unsure thinks he did    Hyperlipidemia     Hypertension         Past Surgical History:   Procedure Laterality Date    BACK SURGERY      X3     BLADDER REMOVAL      CARDIAC CATHETERIZATION      CERVICAL FUSION N/A 2/19/2020    Phase 1:  C5 and C6 corpectomy with placement of an expandable cage and anterior cervical plate [id number]. performed by Lazaro Mercedes DO at [address] [place] [address] N/A 6/17/2020    POSTERIOR CERVICAL WOUND [address] OUT AND CLOSURE performed by Lazaro Mercedes DO at [address] [place] [address] N/A 6/26/2020    POSTERIOR CERVICAL WOUND DEBRIDEMENT WITH PLACEMENT OF [facility] performed by Senait Bernstein Tonia Perez at [address] [facility] COLONOSCOPY N/A 9/10/2019    Dr Francisco Lei, 5 yr recall    CORONARY ARTERY BYPASS GRAFT N/A 5/1/2019    CORONARY ARTERY BYPASS GRAFT X 3 WITH LEFT INTERNAL MAMMARY ARTERY, ENDOSCOPIC  VEIN HARVEST, WITH PERFUSION. performed by Corrinne Makua, MD at [facility] N/A 2/19/2020    Phase 2:  Posterior instrumented fusion C3-C7 using lateral mass screws and rods. performed by Cesilia Gonzalez DO at [facility]      lower ext    PROSTATECTOMY         Social History     Tobacco Use    Smoking status: Current Every Day Smoker     Packs/day: 1.00     Years: 30.00     Pack years: 30.00    Smokeless tobacco: Never Used    Tobacco comment: sometimes less   Substance Use Topics    Alcohol use: Not Currently        Current Outpatient Medications   Medication Sig Dispense Refill    mupirocin (BACTROBAN) 2 % cream Apply 3 times daily. 30 g 1    [START ON 3/11/2022] metFORMIN (GLUCOPHAGE) 1000 MG tablet Take 1 tablet by mouth 2 times daily (with meals) 60 tablet 1    gabapentin (NEURONTIN) 300 MG capsule TAKE 1 CAPSULE BY MOUTH 3 TIMES DAILY 90 capsule 2    vitamin D (ERGOCALCIFEROL) 1.25 MG (75147 UT) CAPS capsule TAKE 1 CAPSULE BY MOUTH ONCE A WEEK ON WEDNESDAY 4 capsule 0    atenolol (TENORMIN) 50 MG tablet TAKE ONE TABLET BY MOUTH EVERY DAY. 30 tablet 3    lisinopril (PRINIVIL;ZESTRIL) 30 MG tablet Take 1 tablet by mouth daily 30 tablet 3    fluticasone-salmeterol (ADVAIR DISKUS) 250-50 MCG/DOSE AEPB Inhale 1 puff into the lungs every 12 hours 60 each 3    albuterol sulfate HFA (VENTOLIN HFA) 108 (90 Base) MCG/ACT inhaler Inhale 2 puffs into the lungs 4 times daily as needed for Wheezing 18 g 0    omeprazole (PRILOSEC) 40 MG delayed release capsule TAKE 1 CAPSULE BY MOUTH DAILY 30 capsule 2    ARIPiprazole (ABILIFY) 5 MG tablet TAKE ONE TABLET BY MOUTH ONCE DAILY. 30 tablet 1    atorvastatin (LIPITOR) 20 MG tablet TAKE ONE TABLET BY MOUTH ONCE DAILY. 30 tablet 1    traZODone (DESYREL) 50 MG tablet TAKE 2 TABLETS BY MOUTH NIGHTLY AS NEEDED FOR SLEEP 60 tablet 2    busPIRone (BUSPAR) 10 MG tablet TAKE 1 TABLET BY MOUTH 3 TIMES DAILY AS NEEDED (ANXIETY) 90 tablet 5    baclofen (LIORESAL) 10 MG tablet TAKE 1 TABLET BY MOUTH 3 TIMES DAILY AS NEEDED FOR PAIN/SPASMS 90 tablet 5    FLUoxetine (PROZAC) 40 MG capsule TAKE 1 CAPSULE BY MOUTH DAILY 30 capsule 5    blood glucose test strips (ONETOUCH ULTRA) strip USE ONE FOUR TIMES DAILY & AS NEEDED FOR SYMPTOMS OF IRREGULAR BLOOD SUGAR 100 strip 3    fenofibrate (TRIGLIDE) 160 MG tablet TAKE ONE TABLET BY MOUTH DAILY. 30 tablet 5    sodium hypochlorite (DAKINS) 0.125 % SOLN external solution Moisten gauze apply to wound twice daily 1000 mL 2    HYDROcodone-acetaminophen (NORCO)  MG per tablet Take 2 tablets by mouth every 6 hours as needed for Pain.  sildenafil (REVATIO) 20 MG tablet Take 1-5 tablets 1-2 hours before sexual activity PRN      tiotropium (SPIRIVA) 18 MCG inhalation capsule Inhale 1 capsule into the lungs       glucose monitoring kit (FREESTYLE) monitoring kit Please provide glucometer that INS will cover for DM type 2 1 kit 0    Lancets 30G MISC 1 each by Does not apply route daily 4 times daily 100 each 3    aspirin 81 MG EC tablet Take 1 tablet by mouth daily 30 tablet 3     No current facility-administered medications for this visit. Allergies   Allergen Reactions    Latex Other (See Comments)     BOILS AND BLISTERS- ALLERGY CALLED TO [facility] SURGERY SCHEDULING.     Demerol Hcl [Meperidine] Hives     And n/v       Family History   Problem Relation Age of Onset    Cancer Mother     Vision Loss Mother     Breast Cancer Mother     Coronary Art Dis Father     Obesity Father     Kidney Disease Father     High Blood Pressure Father     High Cholesterol Father     Hypercalcemia Sister     Obesity Brother     High Blood Pressure Brother                Subjective:      Review of Systems   Constitutional: Positive for fatigue. HENT: Negative. Eyes: Negative. Respiratory: Positive for shortness of breath. Cardiovascular: Negative. Gastrointestinal: Negative. Endocrine: Negative. Genitourinary: Negative. Musculoskeletal: Negative. Skin: Positive for wound (facial sore). Neurological: Negative. Hematological: Negative. Psychiatric/Behavioral: Negative. Objective:     Physical Exam  Vitals and nursing note reviewed. Constitutional:       Appearance: Normal appearance. HENT:      Head: Normocephalic and atraumatic. Right Ear: Hearing, tympanic membrane, ear canal and external ear normal.      Left Ear: Hearing, tympanic membrane, ear canal and external ear normal.      Nose: Nose normal.      Mouth/Throat:      Lips: Pink. Mouth: Mucous membranes are moist.      Pharynx: Oropharynx is clear. Eyes:      General: Lids are normal.      Extraocular Movements: Extraocular movements intact. Conjunctiva/sclera: Conjunctivae normal.      Pupils: Pupils are equal, round, and reactive to light. Neck:      Thyroid: No thyromegaly. Cardiovascular:      Rate and Rhythm: Normal rate and regular rhythm. Pulses: Normal pulses. Dorsalis pedis pulses are 2+ on the right side and 2+ on the left side. Posterior tibial pulses are 2+ on the right side and 2+ on the left side. Heart sounds: Normal heart sounds. Comments: Nonpitting edema  Pulmonary:      Effort: Pulmonary effort is normal.      Breath sounds: Normal breath sounds and air entry. Abdominal:      General: Bowel sounds are normal.      Palpations: Abdomen is soft. Musculoskeletal:      Cervical back: Full passive range of motion without pain, normal range of motion and neck supple. Thoracic back: No tenderness. Normal range of motion. Lumbar back: No tenderness. Normal range of motion. Right lower leg: Edema present.       Left lower leg: Edema present. Lymphadenopathy:      Cervical: No cervical adenopathy. Skin:     General: Skin is warm and dry. Capillary Refill: Capillary refill takes less than 2 seconds. Neurological:      General: No focal deficit present. Mental Status: He is alert and oriented to person, place, and time. Mental status is at baseline. Coordination: Coordination is intact. Psychiatric:         Mood and Affect: Mood normal.         Speech: Speech normal.         Behavior: Behavior normal.         Thought Content: Thought content normal.         Cognition and Memory: Cognition and memory normal.         Judgment: Judgment normal.         /60 (Site: Left Upper Arm)   Pulse 86   Temp 96.4 °F (35.8 °C)   Ht 5' 11\" (1.803 m)   Wt 194 lb (88 kg)   SpO2 98%   BMI 27.06 kg/m²     Assessment:      Diagnosis Orders   1. MRSA (methicillin resistant Staphylococcus aureus) infection  mupirocin (BACTROBAN) 2 % cream   2. Essential hypertension     3. Chronic bronchitis, unspecified chronic bronchitis type ([address]  Full PFT Study With Katlyn Delacruz MD, Pulmonary Disease, [place] [facility]    Home Sleep Study   4. Type 2 diabetes mellitus without complication, without long-term current use of insulin ([facility])  POCT glycosylated hemoglobin (Hb A1C)   5. Daytime somnolence  Home Sleep Study       No results found for this visit on 03/10/22. Plan:     1. MRSA (methicillin resistant Staphylococcus aureus) infection  Patient is to use Bactroban ointment to facial sore. I have also given him a [facility] card for cost.  - mupirocin (BACTROBAN) 2 % cream; Apply 3 times daily. Dispense: 30 g; Refill: 1    2. Essential hypertension  BP is stable. He will need to monitor readings at home    3. Chronic bronchitis, unspecified chronic bronchitis type ([address]  Checking PFT due to continued SOB and fatigue. Cardiac cause was assessed by Dr Sulema Lockett.   Referral to pulmonary. Overnight sleep study alos ordered. - Full PFT Study With Bronchodilator; Future  - Daniel Guzman MD, Pulmonary Disease, [place] [facility]  - Home Sleep Study; Future    4. Type 2 diabetes mellitus without complication, without long-term current use of insulin ([facility] [address]  Patient is to start monitoring blood glucose levels at home. - POCT glycosylated hemoglobin (Hb A1C)    5. Daytime somnolence    - Home Sleep Study; Future       Return in about 2 months (around 5/10/2022) for SOB, Diabetic Follow up. Orders Placed This Encounter   Procedures   Daniel Guzman MD, Pulmonary Disease, [place] [facility]     Referral Priority:   Routine     Referral Type:   Eval and Treat     Referral Reason:   Specialty Services Required     Referred to Provider:   Abby San MD     Requested Specialty:   Pulmonary Disease     Number of Visits Requested:   1    POCT glycosylated hemoglobin (Hb A1C)    Full PFT Study With Bronchodilator     If an ABG is needed along with this PFT procedure, please place the appropriate lab order     Standing Status:   Future     Standing Expiration Date:   3/10/2023    Home Sleep Study     Standing Status:   Future     Standing Expiration Date:   3/10/2023     Order Specific Question:   Location For Sleep Study     Answer:   [place]     Order Specific Question:   Select Sleep Lab Location     Answer:   [facility]       Orders Placed This Encounter   Medications    mupirocin (BACTROBAN) 2 % cream     Sig: Apply 3 times daily. Dispense:  30 g     Refill:  1            Patient offered educational handouts and has had all questions answered. Patient voices understanding and agrees to plans along with risks and benefits of plan. Patient is instructed to continue prior meds, diet, and exercise plans as instructed. Patient agrees to follow up as instructed and sooner if needed.   Patient agrees to go to ER if condition becomes emergent. EMR Dragon/transcription disclaimer: Some of this encounter note is an electronic transcription/translation of spoken language to printed text. The electronic translation of spoken language may permit erroneous, or at times, nonsensical words or phrases to be inadvertently transcribed.  Although I have reviewed the note for such errors, some may still exist.    Electronically signed by BRYAN Ramirez on 3/10/2022 at 8:12 AM

## 2022-03-15 NOTE — PROCEDURES
Date of Procedure:  3/15/2022    Preoperative Diagnosis: Claudication    Postoperative Diagnosis:  Same    Procedure: 1. Abdominal aortography and bilateral lower extremity angiography  2. Balloon angioplasty PTA utilizing a drug-coated balloon of the right popliteal artery      Surgeon:  Morro Lala MD    Anesthesia:  Moderate sedation plus local  Anesthesia Start Time: 0745  Anesthesia Stop Time: 3982    EBL:  Less than 50 mL    Fluoro Time: 5.1    Contrast used: 150 cc's    Findings:    Abdominal aorta diffuse mild atherosclerosis. A single pair of renal arteries arising appears unremarkable. Right common iliac artery unremarkable except for a focal 40 to 50% stenosis just proximal to takeoff of the internal iliac artery. Mild luminal irregularities in the left common iliac artery. Both internal iliac arteries are widely patent. Both external iliac arteries are free of significant disease. Both profundus arteries are widely patent. 40 to 50% smooth narrowing in the proximal right SFA. Left SFA mild luminal irregularities. On the right focal 90% stenosis in the right popliteal artery good flow distally. Left popliteal artery minimal luminal irregularities. Focal 80% stenosis in the proximal right anterior tibial artery. Three-vessel runoff on the right with all 3 vessels distally demonstrating mild luminal irregularities. Diffuse mild disease throughout all 3 vessels in the distal left lower extremity. Recommendations: percutaneous intervention of Right popliteal artery    Procedure in Detail:    After informed consent was obtained, the patient was brought to angiography and placed on the angiography suite table in the supine position. The patient was given intravenous/moderate sedation.     I was personally responsible for the administration of moderate sedation services during the procedure performed and I confirm requirements described in the CPT section on moderate sedation were followed, including the use of an independent trained observer who had no other duties during the procedure. The drug(s) utilized were 2 mg Versed and 50 mcg of fentanyl (see nursing log for details). Both groins were prepped and draped in the usual sterile fashion. The left groin was anesthetized with lidocaine and utilizing fluoroscopic and/or ultrasound guidance, the common femoral artery was cannulated with a micropuncture needle. Seldinger technique was used to place a 5F sheath. Over an 0.035 wire, an omniflush catheter was placed in the suprarenal aorta. Aortogram with bilateral iliofemoral arteriograms were performed with above findings. The catheter was withdrawn to the distal abdominal aorta and aortobiiliac arteriograms were performed in both oblique views. The above findings were noted. Finally, bilateral lower extremity arteriograms were performed with above findings noted. Next following review of diagnostic angiograms we elected to proceed with percutaneous intervention. Utilizing the Omni Flush catheter and advantage 0.035 wire was advanced to the catheter directed into the right iliac artery and advanced distally. The catheter was withdrawn and removed. A 55 cm 6 [place] sheath and dilator was advanced over the wire and positioned in the right SFA. The dilator was removed the sheath was double flushed. Additional heparin given intravenously at this time. The advantage wire was advanced across the lesion and positioned distally. We then prepared a 5 x 60 drug-coated Bard balloon which was advanced and positioned across the lesion subsequently deployed up to 10 germain for 3 minutes. The balloon was then withdrawn and removed. Repeat angiogram revealed excellent luminal expansion with no significant residual stenosis there was a small focal dissection which was only visualized in 1 plane this was not felt to be flow compromising.   Oblique views obtained to confirm the

## 2022-03-17 ENCOUNTER — OFFICE VISIT (OUTPATIENT)
Dept: CARDIOLOGY CLINIC | Age: 52
End: 2022-03-17
Payer: MEDICAID

## 2022-03-17 VITALS
DIASTOLIC BLOOD PRESSURE: 60 MMHG | SYSTOLIC BLOOD PRESSURE: 144 MMHG | HEIGHT: 71 IN | HEART RATE: 83 BPM | WEIGHT: 192 LBS | BODY MASS INDEX: 26.88 KG/M2

## 2022-03-17 DIAGNOSIS — I10 ESSENTIAL HYPERTENSION: ICD-10-CM

## 2022-03-17 DIAGNOSIS — Z95.1 HX OF CABG: ICD-10-CM

## 2022-03-17 DIAGNOSIS — I73.9 PVD (PERIPHERAL VASCULAR DISEASE) (HCC): ICD-10-CM

## 2022-03-17 DIAGNOSIS — E78.2 MIXED HYPERLIPIDEMIA: ICD-10-CM

## 2022-03-17 DIAGNOSIS — I25.10 CORONARY ARTERY DISEASE INVOLVING NATIVE CORONARY ARTERY OF NATIVE HEART WITHOUT ANGINA PECTORIS: Primary | ICD-10-CM

## 2022-03-17 PROCEDURE — G8419 CALC BMI OUT NRM PARAM NOF/U: HCPCS | Performed by: INTERNAL MEDICINE

## 2022-03-17 PROCEDURE — 3017F COLORECTAL CA SCREEN DOC REV: CPT | Performed by: INTERNAL MEDICINE

## 2022-03-17 PROCEDURE — G8427 DOCREV CUR MEDS BY ELIG CLIN: HCPCS | Performed by: INTERNAL MEDICINE

## 2022-03-17 PROCEDURE — G8484 FLU IMMUNIZE NO ADMIN: HCPCS | Performed by: INTERNAL MEDICINE

## 2022-03-17 PROCEDURE — 99213 OFFICE O/P EST LOW 20 MIN: CPT | Performed by: INTERNAL MEDICINE

## 2022-03-17 PROCEDURE — 4004F PT TOBACCO SCREEN RCVD TLK: CPT | Performed by: INTERNAL MEDICINE

## 2022-03-17 ASSESSMENT — ENCOUNTER SYMPTOMS
DIARRHEA: 0
RESPIRATORY NEGATIVE: 1
SHORTNESS OF BREATH: 0
NAUSEA: 0
GASTROINTESTINAL NEGATIVE: 1
VOMITING: 0
EYES NEGATIVE: 1

## 2022-03-17 NOTE — PROGRESS NOTES
[facility] Progress Note                            Date:  3/17/2022  Patient: Cornell Larson  Age:  45 y.o., 1970      Reason for evaluation:         SUBJECTIVE:    Returns today underwent abdominal aortography with runoff found to have severe right popliteal artery stenosis underwent successful drug-coated balloon treatment with excellent results reports his right leg pain is much improved since then. Denies chest pain blood pressure 144/60 heart 83 no other complaints or issues reported today. Review of Systems   Constitutional: Negative. Negative for chills, fever and unexpected weight change. HENT: Negative. Eyes: Negative. Respiratory: Negative. Negative for shortness of breath. Cardiovascular: Negative. Negative for chest pain. Gastrointestinal: Negative. Negative for diarrhea, nausea and vomiting. Endocrine: Negative. Genitourinary: Negative. Musculoskeletal: Negative. Skin: Negative. Neurological: Negative. OBJECTIVE:     BP (!) 144/60   Pulse 83   Ht 5' 11\" (1.803 m)   Wt 192 lb (87.1 kg)   BMI 26.78 kg/m²     Labs:   CBC: No results for input(s): WBC, HGB, HCT, PLT in the last 72 hours. BMP:No results for input(s): NA, K, CO2, BUN, CREATININE, LABGLOM, GLUCOSE in the last 72 hours. BNP: No results for input(s): BNP in the last 72 hours. PT/INR: No results for input(s): PROTIME, INR in the last 72 hours. APTT:No results for input(s): APTT in the last 72 hours. CARDIAC ENZYMES:No results for input(s): CKTOTAL, CKMB, CKMBINDEX, TROPONINI in the last 72 hours. FASTING LIPID PANEL:  Lab Results   Component Value Date    HDL 30 11/04/2021    LDLDIRECT 109 04/26/2019    LDLCALC 36 11/04/2021    TRIG 245 11/23/2020     LIVER PROFILE:No results for input(s): AST, ALT, LABALBU in the last 72 hours.         Past Medical History:   Diagnosis Date    CAD (coronary artery disease)     sees [facility] cardiology    Cancer [facility])     Bladder    [facility]     Depression     Diabetes mellitus ([facility]     History of blood transfusion     unsure thinks he did    Hyperlipidemia     Hypertension      Past Surgical History:   Procedure Laterality Date    BACK SURGERY      X3     BLADDER REMOVAL      CARDIAC CATHETERIZATION      CERVICAL FUSION N/A 2/19/2020    Phase 1:  C5 and C6 corpectomy with placement of an expandable cage and anterior cervical plate L8-F8. performed by J Carlos Seaman DO at [address] [place] [address] N/A 6/17/2020    POSTERIOR CERVICAL WOUND [address] OUT AND CLOSURE performed by J Carlos Seaman DO at [address] [place] [address] N/A 6/26/2020    [place] [facility] [address] [facility] performed by J Carlos Seaman DO at [address] [place] [address] N/A 9/10/2019    Dr Florencio Eddy, 5 yr recall    CORONARY ARTERY BYPASS GRAFT N/A 5/1/2019    CORONARY ARTERY BYPASS GRAFT X 3 WITH LEFT INTERNAL MAMMARY ARTERY, ENDOSCOPIC  VEIN HARVEST, WITH PERFUSION. performed by Chuck Davis MD at [place] [address] N/A 2/19/2020    Phase 2:  Posterior instrumented fusion C3-C7 using lateral mass screws and rods. performed by J Carlos Seaman DO at [facility]      lower ext    PROSTATECTOMY       Family History   Problem Relation Age of Onset    Cancer Mother     Vision Loss Mother     Breast Cancer Mother     Coronary Art Dis Father     Obesity Father     Kidney Disease Father     High Blood Pressure Father     High Cholesterol Father     Hypercalcemia Sister     Obesity Brother     High Blood Pressure Brother      Allergies   Allergen Reactions    Latex Other (See Comments)     BOILS AND BLISTERS- ALLERGY CALLED TO [facility] SURGERY SCHEDULING.  Demerol Hcl [Meperidine] Hives     And n/v     Current Outpatient Medications   Medication Sig Dispense Refill    mupirocin (BACTROBAN) 2 % cream Apply 3 times daily.  30 g 1    metFORMIN (GLUCOPHAGE) 1000 MG tablet Take 1 tablet by mouth 2 times daily (with meals) 60 tablet 1    gabapentin (NEURONTIN) 300 MG capsule TAKE 1 CAPSULE BY MOUTH 3 TIMES DAILY 90 capsule 2    vitamin D (ERGOCALCIFEROL) 1.25 MG (95213 UT) CAPS capsule TAKE 1 CAPSULE BY MOUTH ONCE A WEEK ON WEDNESDAY 4 capsule 0    atenolol (TENORMIN) 50 MG tablet TAKE ONE TABLET BY MOUTH EVERY DAY. 30 tablet 3    lisinopril (PRINIVIL;ZESTRIL) 30 MG tablet Take 1 tablet by mouth daily 30 tablet 3    fluticasone-salmeterol (ADVAIR DISKUS) 250-50 MCG/DOSE AEPB Inhale 1 puff into the lungs every 12 hours 60 each 3    albuterol sulfate HFA (VENTOLIN HFA) 108 (90 Base) MCG/ACT inhaler Inhale 2 puffs into the lungs 4 times daily as needed for Wheezing 18 g 0    omeprazole (PRILOSEC) 40 MG delayed release capsule TAKE 1 CAPSULE BY MOUTH DAILY 30 capsule 2    ARIPiprazole (ABILIFY) 5 MG tablet TAKE ONE TABLET BY MOUTH ONCE DAILY. 30 tablet 1    atorvastatin (LIPITOR) 20 MG tablet TAKE ONE TABLET BY MOUTH ONCE DAILY. 30 tablet 1    traZODone (DESYREL) 50 MG tablet TAKE 2 TABLETS BY MOUTH NIGHTLY AS NEEDED FOR SLEEP 60 tablet 2    busPIRone (BUSPAR) 10 MG tablet TAKE 1 TABLET BY MOUTH 3 TIMES DAILY AS NEEDED (ANXIETY) 90 tablet 5    baclofen (LIORESAL) 10 MG tablet TAKE 1 TABLET BY MOUTH 3 TIMES DAILY AS NEEDED FOR PAIN/SPASMS 90 tablet 5    FLUoxetine (PROZAC) 40 MG capsule TAKE 1 CAPSULE BY MOUTH DAILY 30 capsule 5    blood glucose test strips (ONETOUCH ULTRA) strip USE ONE FOUR TIMES DAILY & AS NEEDED FOR SYMPTOMS OF IRREGULAR BLOOD SUGAR 100 strip 3    fenofibrate (TRIGLIDE) 160 MG tablet TAKE ONE TABLET BY MOUTH DAILY. 30 tablet 5    sodium hypochlorite (DAKINS) 0.125 % SOLN external solution Moisten gauze apply to wound twice daily 1000 mL 2    HYDROcodone-acetaminophen (NORCO)  MG per tablet Take 2 tablets by mouth every 6 hours as needed for Pain.       sildenafil (REVATIO) 20 MG tablet Take 1-5 tablets 1-2 hours before sexual activity PRN      glucose monitoring kit (FREESTYLE) monitoring kit Please provide glucometer that INS will cover for DM type 2 1 kit 0    Lancets 30G MISC 1 each by Does not apply route daily 4 times daily 100 each 3    aspirin 81 MG EC tablet Take 1 tablet by mouth daily 30 tablet 3    tiotropium (SPIRIVA) 18 MCG inhalation capsule Inhale 1 capsule into the lungs  (Patient not taking: Reported on 3/17/2022)       No current facility-administered medications for this visit. Social History     Socioeconomic History    Marital status:      Spouse name: Not on file    Number of children: Not on file    Years of education: Not on file    Highest education level: Not on file   Occupational History    Not on file   Tobacco Use    Smoking status: Current Every Day Smoker     Packs/day: 1.00     Years: 30.00     Pack years: 30.00    Smokeless tobacco: Never Used    Tobacco comment: sometimes less   Vaping Use    Vaping Use: Never used   Substance and Sexual Activity    Alcohol use: Not Currently    Drug use: Never    Sexual activity: Not on file   Other Topics Concern    Not on file   Social History Narrative     16 years second marriage    On disability due to multiple back surgeries and bladder cancer    [employer]    Smokes 1-1/2 pack per day drinks alcohol occasionally denies substance usage    Physically sedentary    Former  at an [employer] [employer]    Never in the [address] Strain: Medium Risk    Difficulty of Paying Living Expenses: Somewhat hard   Food Insecurity: No Food Insecurity    Worried About [address] [facility] in the Last Year: Never true    [address] [facility] [address] in the Last Year: Never true   Transportation Needs:     Lack of Transportation (Medical): Not on file    Lack of Transportation (Non-Medical):  Not on file   Physical Activity:     Days of Exercise per Week: Not on file    Minutes of Exercise per Session: Not on file   Stress:     Feeling of Stress : Not on file   Social Connections:     Frequency of Communication with Friends and Family: Not on file    Frequency of Social Gatherings with Friends and Family: Not on file    Attends Scientologist Services: Not on file    Active Member of Clubs or Organizations: Not on file    Attends Club or Organization Meetings: Not on file    Marital Status: Not on file   Intimate Partner Violence:     Fear of Current or Ex-Partner: Not on file    Emotionally Abused: Not on file    Physically Abused: Not on file    Sexually Abused: Not on file   Housing Stability:     Unable to Pay for Housing in the Last Year: Not on file    Number of [place] in the Last Year: Not on file    Unstable Housing in the Last Year: Not on file       Physical Examination:  BP (!) 144/60   Pulse 83   Ht 5' 11\" (1.803 m)   Wt 192 lb (87.1 kg)   BMI 26.78 kg/m²   Physical Exam  Constitutional:       Appearance: He is well-developed. Neck:      Vascular: No carotid bruit or JVD. Cardiovascular:      Rate and Rhythm: Normal rate and regular rhythm. Heart sounds: Normal heart sounds. No murmur heard. No friction rub. No gallop. Pulmonary:      Effort: Pulmonary effort is normal. No respiratory distress. Breath sounds: Normal breath sounds. No wheezing or rales. Abdominal:      General: There is no distension. Tenderness: There is no abdominal tenderness. Lymphadenopathy:      Cervical: No cervical adenopathy. Skin:     General: Skin is warm and dry. ASSESSMENT:     Diagnosis Orders   1. Coronary artery disease involving native coronary artery of native heart without angina pectoris     2. Mixed hyperlipidemia     3. Essential hypertension     4. Hx of CABG     5. PVD (peripheral vascular disease) ([facility] [address]         PLAN:  No orders of the defined types were placed in this encounter.     No orders of the defined types were placed in this encounter. 1. Continue present medications  2. Encourage exercise  3. Recommend follow-up assessment in 3 months    Return in about 3 months (around 6/17/2022) for return to Dr. Cody Galeano only. Forrest Ly MD 3/17/2022 1:21 PM CDT    [facility]      Thisdictation was generated by voice recognition computer software. Although all attempts are made to edit the dictation for accuracy, there may be errors in the transcription that are not intended.

## 2022-03-23 ENCOUNTER — OFFICE VISIT (OUTPATIENT)
Dept: PULMONOLOGY | Age: 52
End: 2022-03-23
Payer: MEDICAID

## 2022-03-23 VITALS
SYSTOLIC BLOOD PRESSURE: 116 MMHG | HEART RATE: 71 BPM | WEIGHT: 192.8 LBS | OXYGEN SATURATION: 99 % | HEIGHT: 71 IN | BODY MASS INDEX: 26.99 KG/M2 | DIASTOLIC BLOOD PRESSURE: 68 MMHG

## 2022-03-23 DIAGNOSIS — F17.210 CIGARETTE NICOTINE DEPENDENCE WITHOUT COMPLICATION: ICD-10-CM

## 2022-03-23 DIAGNOSIS — Z95.1 HX OF CABG: ICD-10-CM

## 2022-03-23 DIAGNOSIS — R06.02 SHORTNESS OF BREATH: Primary | ICD-10-CM

## 2022-03-23 DIAGNOSIS — R06.2 WHEEZING: ICD-10-CM

## 2022-03-23 LAB
DISTANCE WALKED: 600 FT
SPO2: 98 %

## 2022-03-23 PROCEDURE — 3017F COLORECTAL CA SCREEN DOC REV: CPT | Performed by: INTERNAL MEDICINE

## 2022-03-23 PROCEDURE — 94618 PULMONARY STRESS TESTING: CPT | Performed by: INTERNAL MEDICINE

## 2022-03-23 PROCEDURE — 99204 OFFICE O/P NEW MOD 45 MIN: CPT | Performed by: INTERNAL MEDICINE

## 2022-03-23 PROCEDURE — 99406 BEHAV CHNG SMOKING 3-10 MIN: CPT | Performed by: INTERNAL MEDICINE

## 2022-03-23 PROCEDURE — G8419 CALC BMI OUT NRM PARAM NOF/U: HCPCS | Performed by: INTERNAL MEDICINE

## 2022-03-23 PROCEDURE — 4004F PT TOBACCO SCREEN RCVD TLK: CPT | Performed by: INTERNAL MEDICINE

## 2022-03-23 PROCEDURE — G8484 FLU IMMUNIZE NO ADMIN: HCPCS | Performed by: INTERNAL MEDICINE

## 2022-03-23 PROCEDURE — G8427 DOCREV CUR MEDS BY ELIG CLIN: HCPCS | Performed by: INTERNAL MEDICINE

## 2022-03-23 RX ORDER — ALBUTEROL SULFATE 90 UG/1
2 AEROSOL, METERED RESPIRATORY (INHALATION) 4 TIMES DAILY PRN
Qty: 18 G | Refills: 5 | Status: SHIPPED | OUTPATIENT
Start: 2022-03-23

## 2022-03-23 RX ORDER — FLUTICASONE FUROATE, UMECLIDINIUM BROMIDE AND VILANTEROL TRIFENATATE 100; 62.5; 25 UG/1; UG/1; UG/1
1 POWDER RESPIRATORY (INHALATION) DAILY
Qty: 1 EACH | Refills: 5 | Status: SHIPPED | OUTPATIENT
Start: 2022-03-23

## 2022-03-23 ASSESSMENT — ENCOUNTER SYMPTOMS
CHEST TIGHTNESS: 1
ABDOMINAL DISTENTION: 0
COUGH: 0
RHINORRHEA: 0
ANAL BLEEDING: 0
BACK PAIN: 0
WHEEZING: 1
APNEA: 0
SHORTNESS OF BREATH: 1
ABDOMINAL PAIN: 0

## 2022-03-23 NOTE — PROGRESS NOTES
Pulmonary and Sleep Medicine    Zina Larson (:  1970) is a 45 y.o. male,New patient, here for evaluation of the following chief complaint(s):  New Patient (Referral for Chronic bronchitis)      Referring physician:  Queenie Johnson, Melody  [address] [facility],  [address]     ASSESSMENT/PLAN:  1. Shortness of breath  -     Echo 2d w doppler w color w contrast; Future  -     fluticasone-umeclidin-vilant (TRELEGY ELLIPTA) 100-62.5-25 MCG/INH AEPB; Inhale 1 puff into the lungs daily, Disp-1 each, R-5Normal  -     XR CHEST (2 VW); Future  2. Wheezing  -     albuterol sulfate HFA (VENTOLIN HFA) 108 (90 Base) MCG/ACT inhaler; Inhale 2 puffs into the lungs 4 times daily as needed for Wheezing, Disp-18 g, R-5Normal  -     fluticasone-umeclidin-vilant (Micki ) 100-62.5-25 MCG/INH AEPB; Inhale 1 puff into the lungs daily, Disp-1 each, R-5Normal  3. Cigarette nicotine dependence without complication. discussed smoking cessation for 3.5 min. 4. Hx of CABG        Shortness of breath of unclear etiology likely underlying COPD or cardiac dysfunction as the cause or a combination of the above. Will get a PFT, Echo and CXR. Switch inhalers to Trelegy. Follow up after the above. N[facility]um Prather [facility]    No follow-ups on file. SUBJECTIVE/OBJECTIVE:  Patient is here for evaluation of shortness of breath and wheezing. He says about 2 months ago he started feeling more short of breath. He has been on Advair for that long and albuterol. He is using the albuterol 4-5 times a day despite using the Advair. He says he wakes up occasionally short of breath. He does use his Advair when he wakes up however it does not help much. He is a chronic smoker he continues to smoke. I was asked to see him regarding the above. Prior to Visit Medications    Medication Sig Taking?  Authorizing Provider   PROAIR  (90 Base) MCG/ACT inhaler INHALE 2 PUFFS INTO THE LUNGS 4  MG per tablet Take 2 tablets by mouth every 6 hours as needed for Pain. Yes Historical Provider, MD   sildenafil (REVATIO) 20 MG tablet Take 1-5 tablets 1-2 hours before sexual activity PRN Yes Historical Provider, MD   tiotropium (SPIRIVA) 18 MCG inhalation capsule Inhale 1 capsule into the lungs  Yes Historical Provider, MD   glucose monitoring kit (FREESTYLE) monitoring kit Please provide glucometer that INS will cover for DM type 2 Yes BRYAN Gentile   Lancets 30G MISC 1 each by Does not apply route daily 4 times daily Yes BRYAN Gentile   aspirin 81 MG EC tablet Take 1 tablet by mouth daily Yes BRYAN Clark        Review of Systems   Constitutional: Negative for activity change, appetite change, chills, diaphoresis and fatigue. HENT: Negative for congestion, dental problem, drooling, ear discharge, postnasal drip and rhinorrhea. Eyes: Negative for visual disturbance. Respiratory: Positive for chest tightness, shortness of breath and wheezing. Negative for apnea and cough. Gastrointestinal: Negative for abdominal distention, abdominal pain and anal bleeding. Endocrine: Negative for cold intolerance, heat intolerance and polydipsia. Genitourinary: Negative for difficulty urinating, dysuria, enuresis and flank pain. Musculoskeletal: Negative for arthralgias, back pain and gait problem. Allergic/Immunologic: Negative for environmental allergies. Neurological: Negative for dizziness, facial asymmetry, light-headedness and headaches. Vitals:    03/23/22 1513   BP: 116/68   Pulse: 71   SpO2: 99%     Physical Exam  Vitals reviewed. Constitutional:       Appearance: Normal appearance. HENT:      Head: Normocephalic and atraumatic. Nose: Nose normal.   Eyes:      Extraocular Movements: Extraocular movements intact. Conjunctiva/sclera: Conjunctivae normal.   Cardiovascular:      Rate and Rhythm: Normal rate and regular rhythm.       Heart sounds: No murmur heard.  No friction rub. Pulmonary:      Effort: Pulmonary effort is normal. No respiratory distress. Breath sounds: Normal breath sounds. No stridor. No wheezing, rhonchi or rales. Abdominal:      General: There is no distension. Palpations: There is no mass. Tenderness: There is no abdominal tenderness. There is no guarding or rebound. Musculoskeletal:      Cervical back: Normal range of motion and neck supple. Neurological:      Mental Status: He is alert and oriented to person, place, and time. This note was generated used a voice recognition software. Errors in voice recognition may have occurred. An electronic signature was used to authenticate this note.     --Jaxon Lewis MD

## 2022-03-28 ENCOUNTER — HOSPITAL ENCOUNTER (OUTPATIENT)
Dept: GENERAL RADIOLOGY | Age: 52
Discharge: HOME | End: 2022-03-28
Payer: MEDICAID

## 2022-03-28 DIAGNOSIS — R06.02 SHORTNESS OF BREATH: ICD-10-CM

## 2022-03-28 PROCEDURE — 71046 X-RAY EXAM CHEST 2 VIEWS: CPT

## 2022-03-29 ENCOUNTER — HOSPITAL ENCOUNTER (OUTPATIENT)
Dept: NON INVASIVE DIAGNOSTICS | Age: 52
Discharge: HOME | End: 2022-03-29
Payer: MEDICAID

## 2022-03-29 DIAGNOSIS — R06.02 SHORTNESS OF BREATH: ICD-10-CM

## 2022-03-29 LAB
LV EF: 58 %
LVEF MODALITY: NORMAL

## 2022-03-29 PROCEDURE — C8929 TTE W OR WO FOL WCON,DOPPLER: HCPCS

## 2022-03-29 PROCEDURE — 6360000004 HC RX CONTRAST MEDICATION: Performed by: INTERNAL MEDICINE

## 2022-03-29 RX ADMIN — PERFLUTREN 1.5 ML: 6.52 INJECTION, SUSPENSION INTRAVENOUS at 10:02

## 2022-04-04 ENCOUNTER — TELEPHONE (OUTPATIENT)
Dept: PRIMARY CARE CLINIC | Age: 52
End: 2022-04-04

## 2022-04-04 ENCOUNTER — TELEPHONE (OUTPATIENT)
Dept: CARDIOLOGY CLINIC | Age: 52
End: 2022-04-04

## 2022-04-04 NOTE — TELEPHONE ENCOUNTER
needs letter for treatment center stating he will not die coming off lortabs  Faxed to treatment center [facility] fax # Joby Hennessy [phone].

## 2022-04-04 NOTE — TELEPHONE ENCOUNTER
Sameer Larson called requesting a statement from Dr. Jean Moses saying he is well enough to not take pain meds. Per pt he trying to get off pain meds. Pt is currently at [facility] in [address] [place] [address]. Please call pt clarify.  Fax info to: [phone] ATT: Omar Paul

## 2022-04-04 NOTE — TELEPHONE ENCOUNTER
Called and left v/m for patient to advise that we do not handle pain medications and have no cardiac reason for patient to be taking them. Advised patient to follow up with PCP as this would not be something we would handle.

## 2022-04-05 NOTE — TELEPHONE ENCOUNTER
Please let patient know that I will not send or write this letter. The letter needs to come from who ever is writing the prescription of meds.

## 2022-04-11 ENCOUNTER — TELEPHONE (OUTPATIENT)
Dept: PRIMARY CARE CLINIC | Age: 52
End: 2022-04-11

## 2022-04-12 ENCOUNTER — TELEPHONE (OUTPATIENT)
Dept: PRIMARY CARE CLINIC | Age: 52
End: 2022-04-12

## 2022-04-12 DIAGNOSIS — F51.01 PRIMARY INSOMNIA: ICD-10-CM

## 2022-04-12 DIAGNOSIS — E11.9 TYPE 2 DIABETES MELLITUS WITHOUT COMPLICATION, WITHOUT LONG-TERM CURRENT USE OF INSULIN (HCC): Chronic | ICD-10-CM

## 2022-04-12 RX ORDER — ATORVASTATIN CALCIUM 20 MG/1
TABLET, FILM COATED ORAL
Qty: 30 TABLET | Refills: 1 | Status: SHIPPED | OUTPATIENT
Start: 2022-04-12 | End: 2022-05-11

## 2022-04-12 RX ORDER — ARIPIPRAZOLE 5 MG/1
TABLET ORAL
Qty: 30 TABLET | Refills: 1 | Status: SHIPPED | OUTPATIENT
Start: 2022-04-12 | End: 2022-05-11

## 2022-04-12 RX ORDER — TRAZODONE HYDROCHLORIDE 50 MG/1
100 TABLET ORAL NIGHTLY PRN
Qty: 60 TABLET | Refills: 2 | Status: SHIPPED | OUTPATIENT
Start: 2022-04-12 | End: 2022-06-03

## 2022-04-12 NOTE — TELEPHONE ENCOUNTER
Attempted to contact pt in regards to wanting medication sent to the [facility]. I'm not sure what that is. Pt didn't answer. Phone went straight to [employer] [employer] stating to call the office back.

## 2022-04-12 NOTE — TELEPHONE ENCOUNTER
Sameer Larson called to request a refill on his medication.       Last office visit : 3/10/2022   Next office visit : 5/5/2022     Requested Prescriptions     Signed Prescriptions Disp Refills    ARIPiprazole (ABILIFY) 5 MG tablet 30 tablet 1     Sig: TAKE ONE TABLET BY MOUTH ONCE DAILY     Authorizing Provider: Sergio Carlos User: Bessy Lu    metFORMIN (GLUCOPHAGE) 1000 MG tablet 60 tablet 1     Sig: Take 1 tablet by mouth 2 times daily (with meals)     Authorizing Provider: Sergio Carlos User: Bessy Lu    atorvastatin (LIPITOR) 20 MG tablet 30 tablet 1     Sig: TAKE ONE TABLET BY MOUTH ONCE DAILY     Authorizing Provider: Sergio Carlos User: Bessy Lu    traZODone (DESYREL) 50 MG tablet 60 tablet 2     Sig: Take 2 tablets by mouth nightly as needed for Sleep     Authorizing Provider: Sergio Carlos User: Ila Tristan MA

## 2022-04-12 NOTE — TELEPHONE ENCOUNTER
Pt needs refill on metformin, aripiprazole, atorvastatin, trazodone.  Pharmacy at [facility]

## 2022-04-19 ENCOUNTER — TELEPHONE (OUTPATIENT)
Dept: PRIMARY CARE CLINIC | Age: 52
End: 2022-04-19

## 2022-04-20 DIAGNOSIS — R06.02 SHORTNESS OF BREATH: Primary | ICD-10-CM

## 2022-04-20 RX ORDER — ERGOCALCIFEROL 1.25 MG/1
CAPSULE ORAL
Qty: 4 CAPSULE | Refills: 0 | Status: SHIPPED | OUTPATIENT
Start: 2022-04-20 | End: 2022-05-11

## 2022-04-20 RX ORDER — FLUTICASONE PROPIONATE AND SALMETEROL 250; 50 UG/1; UG/1
1 POWDER RESPIRATORY (INHALATION) EVERY 12 HOURS
Qty: 60 EACH | Refills: 3 | Status: SHIPPED | OUTPATIENT
Start: 2022-04-20

## 2022-04-20 NOTE — TELEPHONE ENCOUNTER
Sameer Larson called to request a refill on his medication.       Last office visit : 3/10/2022   Next office visit : 5/5/2022     Requested Prescriptions     Signed Prescriptions Disp Refills    PROAIR  (90 Base) MCG/ACT inhaler 8.5 g 0     Sig: INHALE 2 PUFFS INTO THE LUNGS 4 TIMES DAILY AS NEEDED FOR WHEEZING     Authorizing Provider: Jaison Ng     Ordering User: Manuel Juvenal    fluticasone-salmeterol (ADVAIR DISKUS) 250-50 MCG/ACT AEPB diskus inhaler 60 each 3     Sig: Inhale 1 puff into the lungs every 12 hours     Authorizing Provider: Jaison Ng     Ordering User: Kellen Steen vitamin D (ERGOCALCIFEROL) 1.25 MG (39084 UT) CAPS capsule 4 capsule 0     Sig: TAKE 1 CAPSULE BY MOUTH ONCE A WEEK ON [facility]     Authorizing Provider: Jaison Ng     Ordering User: Marybeth Bai MA

## 2022-04-20 NOTE — TELEPHONE ENCOUNTER
fluticasone-salmeterol (ADVAIR DISKUS) 250-50 MCG/DOSE AEPB    PROAIR  (90 Base) MCG/ACT inhaler  vitamin D (ERGOCALCIFEROL) 1.25 MG (87598 UT) CAPS capsule  Pt states he's been out for 2 weeks, struggling. Would like them filled today.     [facility] in [facility]

## 2022-04-26 ENCOUNTER — HOSPITAL ENCOUNTER (OUTPATIENT)
Dept: PULMONOLOGY | Age: 52
Discharge: HOME | End: 2022-04-26

## 2022-05-03 DIAGNOSIS — K21.9 GASTROESOPHAGEAL REFLUX DISEASE WITHOUT ESOPHAGITIS: ICD-10-CM

## 2022-05-03 RX ORDER — OMEPRAZOLE 40 MG/1
40 CAPSULE, DELAYED RELEASE ORAL DAILY
Qty: 30 CAPSULE | Refills: 2 | Status: SHIPPED | OUTPATIENT
Start: 2022-05-03

## 2022-05-04 ENCOUNTER — OFFICE VISIT (OUTPATIENT)
Dept: PULMONOLOGY | Age: 52
End: 2022-05-04
Payer: MEDICARE

## 2022-05-04 VITALS
SYSTOLIC BLOOD PRESSURE: 132 MMHG | OXYGEN SATURATION: 99 % | HEIGHT: 71 IN | BODY MASS INDEX: 26.71 KG/M2 | DIASTOLIC BLOOD PRESSURE: 72 MMHG | HEART RATE: 75 BPM | WEIGHT: 190.8 LBS

## 2022-05-04 DIAGNOSIS — F17.210 CIGARETTE NICOTINE DEPENDENCE WITHOUT COMPLICATION: ICD-10-CM

## 2022-05-04 DIAGNOSIS — J42 CHRONIC BRONCHITIS, UNSPECIFIED CHRONIC BRONCHITIS TYPE (HCC): ICD-10-CM

## 2022-05-04 DIAGNOSIS — R06.02 SHORTNESS OF BREATH: Primary | ICD-10-CM

## 2022-05-04 DIAGNOSIS — R06.2 WHEEZING: ICD-10-CM

## 2022-05-04 DIAGNOSIS — D64.9 ANEMIA, UNSPECIFIED TYPE: ICD-10-CM

## 2022-05-04 PROCEDURE — G8419 CALC BMI OUT NRM PARAM NOF/U: HCPCS | Performed by: INTERNAL MEDICINE

## 2022-05-04 PROCEDURE — 3023F SPIROM DOC REV: CPT | Performed by: INTERNAL MEDICINE

## 2022-05-04 PROCEDURE — 4004F PT TOBACCO SCREEN RCVD TLK: CPT | Performed by: INTERNAL MEDICINE

## 2022-05-04 PROCEDURE — G8427 DOCREV CUR MEDS BY ELIG CLIN: HCPCS | Performed by: INTERNAL MEDICINE

## 2022-05-04 PROCEDURE — 99214 OFFICE O/P EST MOD 30 MIN: CPT | Performed by: INTERNAL MEDICINE

## 2022-05-04 PROCEDURE — 3017F COLORECTAL CA SCREEN DOC REV: CPT | Performed by: INTERNAL MEDICINE

## 2022-05-04 RX ORDER — FERROUS SULFATE 325(65) MG
TABLET ORAL
COMMUNITY
Start: 2022-04-07

## 2022-05-04 ASSESSMENT — ENCOUNTER SYMPTOMS
BACK PAIN: 0
ANAL BLEEDING: 0
SHORTNESS OF BREATH: 1
COUGH: 0
CHEST TIGHTNESS: 1
RHINORRHEA: 0
ABDOMINAL PAIN: 0
WHEEZING: 1
APNEA: 0
ABDOMINAL DISTENTION: 0

## 2022-05-04 NOTE — PROGRESS NOTES
Pulmonary and Sleep Medicine    Quynh Larson (:  1970) is a 45 y.o. male,Established patient, here for evaluation of the following chief complaint(s):  Follow-up (Pt came in today for results of chest x-ray and echo. PFT was canceled due to pt being in rehab. )      Referring physician:  No referring provider defined for this encounter. ASSESSMENT/PLAN:  1. Shortness of breath  2. Wheezing  3. Chronic bronchitis, unspecified chronic bronchitis type ([address]  4. Cigarette nicotine dependence without complication. discussed smoking cessation for 3.5 min. 5. Anemia, unspecified type        Shortness of breath is likely multifactorial and related to likely underlying lung disease with a severe anemia. His anemia has been recurrent. He apparently had work-up for his anemia in [place]. He had a lower endoscopy. His anemia work-up is ongoing according to the patient. Pulmonary function testing was ordered again. Continue inhalers for now. We will see him after the pulmonary function study. The echocardiogram was unremarkable       Nahum Prather MD, [facility], DABSM    Return in about 3 months (around 2022). SUBJECTIVE/OBJECTIVE:  The patient is here for evaluation and follow-up on shortness of breath. He did have an echocardiogram that was normal.  He was found to be severely anemic. He apparently has recurrent anemia. His hemoglobin was 6. He felt much improved after transfusion. He denies any respiratory complaints at this time. He continues to use inhalers he feels they help him. He missed his pulmonary function testing appointment. Prior to Visit Medications    Medication Sig Taking?  Authorizing Provider   omeprazole (PRILOSEC) 40 MG delayed release capsule TAKE 1 CAPSULE BY MOUTH DAILY Yes BRYAN Robison   PROAIR  (90 Base) MCG/ACT inhaler INHALE 2 PUFFS INTO THE LUNGS 4 TIMES DAILY AS NEEDED FOR WHEEZING Yes BRYAN Robison   fluticasone-salmeterol ([place] DISKUS) 250-50 MCG/ACT AEPB diskus inhaler Inhale 1 puff into the lungs every 12 hours Yes BRYAN Rosa   vitamin D (ERGOCALCIFEROL) 1.25 MG (82710 UT) CAPS capsule TAKE 1 CAPSULE BY MOUTH ONCE A WEEK ON WEDNESDAY Yes BRYAN Rosa   ARIPiprazole (ABILIFY) 5 MG tablet TAKE ONE TABLET BY MOUTH ONCE DAILY Yes BRYAN Rosa   metFORMIN (GLUCOPHAGE) 1000 MG tablet Take 1 tablet by mouth 2 times daily (with meals) Yes BRYAN Rosa   atorvastatin (LIPITOR) 20 MG tablet TAKE ONE TABLET BY MOUTH ONCE DAILY Yes BRYAN Rosa   traZODone (DESYREL) 50 MG tablet Take 2 tablets by mouth nightly as needed for Sleep Yes BRYAN Rosa   albuterol sulfate HFA (VENTOLIN HFA) 108 (90 Base) MCG/ACT inhaler Inhale 2 puffs into the lungs 4 times daily as needed for Wheezing Yes Nahum Prather MD   fluticasone-umeclidin-vilant (TRELEGY ELLIPTA) 100-62.5-25 MCG/INH AEPB Inhale 1 puff into the lungs daily Yes Nahum Prather MD   atenolol (TENORMIN) 50 MG tablet TAKE ONE TABLET BY MOUTH EVERY DAY. Yes BRYAN Rosa   lisinopril (PRINIVIL;ZESTRIL) 30 MG tablet Take 1 tablet by mouth daily Yes BRYAN Rosa   fluticasone-salmeterol (ADVAIR DISKUS) 250-50 MCG/DOSE AEPB Inhale 1 puff into the lungs every 12 hours Yes BRYAN Rosa   busPIRone (BUSPAR) 10 MG tablet TAKE 1 TABLET BY MOUTH 3 TIMES DAILY AS NEEDED (ANXIETY) Yes BRYAN Rosa   baclofen (LIORESAL) 10 MG tablet TAKE 1 TABLET BY MOUTH 3 TIMES DAILY AS NEEDED FOR PAIN/SPASMS Yes BRYAN Rosa   FLUoxetine (PROZAC) 40 MG capsule TAKE 1 CAPSULE BY MOUTH DAILY Yes BRYAN Rosa   blood glucose test strips (ONETOUCH ULTRA) strip USE ONE FOUR TIMES DAILY & AS NEEDED FOR SYMPTOMS OF IRREGULAR BLOOD SUGAR Yes BRYAN Rosa   fenofibrate (TRIGLIDE) 160 MG tablet TAKE ONE TABLET BY MOUTH DAILY.  Yes BRYAN Rosa   sodium hypochlorite (DAKINS) 0.125 % SOLN external solution Moisten gauze apply to wound twice daily Yes Harsha Dewitt,    HYDROcodone-acetaminophen (NORCO)  MG per tablet Take 2 tablets by mouth every 6 hours as needed for Pain. Yes Historical Provider, MD   sildenafil (REVATIO) 20 MG tablet Take 1-5 tablets 1-2 hours before sexual activity PRN Yes Historical Provider, MD   tiotropium (SPIRIVA) 18 MCG inhalation capsule Inhale 1 capsule into the lungs  Yes Historical Provider, MD   glucose monitoring kit (FREESTYLE) monitoring kit Please provide glucometer that INS will cover for DM type 2 Yes BRYAN Schroeder   Lancets 30G MISC 1 each by Does not apply route daily 4 times daily Yes BRYAN Schroeder   aspirin 81 MG EC tablet Take 1 tablet by mouth daily Yes BRYAN Sumner   FEROSUL 325 (65 Fe) MG tablet   Historical Provider, MD   gabapentin (NEURONTIN) 300 MG capsule TAKE 1 CAPSULE BY MOUTH 3 TIMES DAILY  BRYAN Schroeder        Review of Systems   Constitutional: Negative for activity change, appetite change, chills, diaphoresis and fatigue. HENT: Negative for congestion, dental problem, drooling, ear discharge, postnasal drip and rhinorrhea. Eyes: Negative for visual disturbance. Respiratory: Positive for chest tightness, shortness of breath and wheezing. Negative for apnea and cough. Gastrointestinal: Negative for abdominal distention, abdominal pain and anal bleeding. Endocrine: Negative for cold intolerance, heat intolerance and polydipsia. Genitourinary: Negative for difficulty urinating, dysuria, enuresis and flank pain. Musculoskeletal: Negative for arthralgias, back pain and gait problem. Allergic/Immunologic: Negative for environmental allergies. Neurological: Negative for dizziness, facial asymmetry, light-headedness and headaches. Vitals:    05/04/22 1417   BP: 132/72   Pulse: 75   SpO2: 99%     Physical Exam  Vitals reviewed. Constitutional:       Appearance: Normal appearance. HENT:      Head: Normocephalic and atraumatic.       Nose: Nose normal.   Eyes:      Extraocular Movements: Extraocular movements intact. Conjunctiva/sclera: Conjunctivae normal.   Cardiovascular:      Rate and Rhythm: Normal rate and regular rhythm. Heart sounds: No murmur heard. No friction rub. Pulmonary:      Effort: Pulmonary effort is normal. No respiratory distress. Breath sounds: Normal breath sounds. No stridor. No wheezing, rhonchi or rales. Abdominal:      General: There is no distension. Palpations: There is no mass. Tenderness: There is no abdominal tenderness. There is no guarding or rebound. Musculoskeletal:      Cervical back: Normal range of motion and neck supple. Neurological:      Mental Status: He is alert and oriented to person, place, and time. This note was generated used a voice recognition software. Errors in voice recognition may have occurred. An electronic signature was used to authenticate this note.     --Fawn Adame MD

## 2022-05-05 ENCOUNTER — OFFICE VISIT (OUTPATIENT)
Dept: PRIMARY CARE CLINIC | Age: 52
End: 2022-05-05
Payer: MEDICARE

## 2022-05-05 VITALS
HEART RATE: 63 BPM | BODY MASS INDEX: 26.6 KG/M2 | HEIGHT: 71 IN | OXYGEN SATURATION: 98 % | WEIGHT: 190 LBS | SYSTOLIC BLOOD PRESSURE: 125 MMHG | DIASTOLIC BLOOD PRESSURE: 65 MMHG | TEMPERATURE: 97.3 F

## 2022-05-05 DIAGNOSIS — E11.9 TYPE 2 DIABETES MELLITUS WITHOUT COMPLICATION, WITHOUT LONG-TERM CURRENT USE OF INSULIN (HCC): ICD-10-CM

## 2022-05-05 DIAGNOSIS — Z93.6 PRESENCE OF UROSTOMY (HCC): ICD-10-CM

## 2022-05-05 DIAGNOSIS — R06.02 SHORTNESS OF BREATH: ICD-10-CM

## 2022-05-05 DIAGNOSIS — D64.9 ANEMIA, UNSPECIFIED TYPE: ICD-10-CM

## 2022-05-05 DIAGNOSIS — I10 ESSENTIAL HYPERTENSION: ICD-10-CM

## 2022-05-05 DIAGNOSIS — E11.9 TYPE 2 DIABETES MELLITUS WITHOUT COMPLICATION, WITHOUT LONG-TERM CURRENT USE OF INSULIN (HCC): Primary | ICD-10-CM

## 2022-05-05 LAB
ALBUMIN SERPL-MCNC: 4.4 G/DL (ref 3.5–5.2)
ALP BLD-CCNC: 90 U/L (ref 40–130)
ALT SERPL-CCNC: 21 U/L (ref 5–41)
ANION GAP SERPL CALCULATED.3IONS-SCNC: 12 MMOL/L (ref 7–19)
ANISOCYTOSIS: ABNORMAL
AST SERPL-CCNC: 22 U/L (ref 5–40)
BASOPHILS ABSOLUTE: 0.1 K/UL (ref 0–0.2)
BASOPHILS RELATIVE PERCENT: 0.7 % (ref 0–1)
BILIRUB SERPL-MCNC: 0.3 MG/DL (ref 0.2–1.2)
BUN BLDV-MCNC: 22 MG/DL (ref 6–20)
CALCIUM SERPL-MCNC: 9.1 MG/DL (ref 8.6–10)
CHLORIDE BLD-SCNC: 102 MMOL/L (ref 98–111)
CO2: 24 MMOL/L (ref 22–29)
CREAT SERPL-MCNC: 1.2 MG/DL (ref 0.5–1.2)
EOSINOPHILS ABSOLUTE: 0.2 K/UL (ref 0–0.6)
EOSINOPHILS RELATIVE PERCENT: 1.8 % (ref 0–5)
FERRITIN: 36.4 NG/ML (ref 30–400)
FOLATE: 13.1 NG/ML (ref 4.5–32.2)
GFR AFRICAN AMERICAN: >59
GFR NON-AFRICAN AMERICAN: >60
GLUCOSE BLD-MCNC: 167 MG/DL (ref 74–109)
HCT VFR BLD CALC: 41.1 % (ref 42–52)
HEMOGLOBIN: 11.7 G/DL (ref 14–18)
HYPOCHROMIA: ABNORMAL
IMMATURE GRANULOCYTES #: 0 K/UL
IRON SATURATION: 46 % (ref 14–50)
IRON: 171 UG/DL (ref 59–158)
LYMPHOCYTES ABSOLUTE: 1.6 K/UL (ref 1.1–4.5)
LYMPHOCYTES RELATIVE PERCENT: 19.2 % (ref 20–40)
MAGNESIUM: 1.5 MG/DL (ref 1.6–2.6)
MCH RBC QN AUTO: 22.9 PG (ref 27–31)
MCHC RBC AUTO-ENTMCNC: 28.5 G/DL (ref 33–37)
MCV RBC AUTO: 80.6 FL (ref 80–94)
MICROCYTES: ABNORMAL
MONOCYTES ABSOLUTE: 0.7 K/UL (ref 0–0.9)
MONOCYTES RELATIVE PERCENT: 8.2 % (ref 0–10)
NEUTROPHILS ABSOLUTE: 5.8 K/UL (ref 1.5–7.5)
NEUTROPHILS RELATIVE PERCENT: 69.7 % (ref 50–65)
OVALOCYTES: ABNORMAL
PDW BLD-RTO: 28.7 % (ref 11.5–14.5)
PLATELET # BLD: 172 K/UL (ref 130–400)
PLATELET SLIDE REVIEW: ADEQUATE
POTASSIUM SERPL-SCNC: 4.8 MMOL/L (ref 3.5–5)
RBC # BLD: 5.1 M/UL (ref 4.7–6.1)
SODIUM BLD-SCNC: 138 MMOL/L (ref 136–145)
TOTAL IRON BINDING CAPACITY: 372 UG/DL (ref 250–400)
TOTAL PROTEIN: 6.7 G/DL (ref 6.6–8.7)
VITAMIN B-12: 428 PG/ML (ref 211–946)
WBC # BLD: 8.3 K/UL (ref 4.8–10.8)

## 2022-05-05 PROCEDURE — 3044F HG A1C LEVEL LT 7.0%: CPT | Performed by: NURSE PRACTITIONER

## 2022-05-05 PROCEDURE — 2022F DILAT RTA XM EVC RTNOPTHY: CPT | Performed by: NURSE PRACTITIONER

## 2022-05-05 PROCEDURE — 3017F COLORECTAL CA SCREEN DOC REV: CPT | Performed by: NURSE PRACTITIONER

## 2022-05-05 PROCEDURE — G8419 CALC BMI OUT NRM PARAM NOF/U: HCPCS | Performed by: NURSE PRACTITIONER

## 2022-05-05 PROCEDURE — 4004F PT TOBACCO SCREEN RCVD TLK: CPT | Performed by: NURSE PRACTITIONER

## 2022-05-05 PROCEDURE — 99214 OFFICE O/P EST MOD 30 MIN: CPT | Performed by: NURSE PRACTITIONER

## 2022-05-05 PROCEDURE — G8427 DOCREV CUR MEDS BY ELIG CLIN: HCPCS | Performed by: NURSE PRACTITIONER

## 2022-05-05 ASSESSMENT — PATIENT HEALTH QUESTIONNAIRE - PHQ9
7. TROUBLE CONCENTRATING ON THINGS, SUCH AS READING THE NEWSPAPER OR WATCHING TELEVISION: 0
9. THOUGHTS THAT YOU WOULD BE BETTER OFF DEAD, OR OF HURTING YOURSELF: 0
SUM OF ALL RESPONSES TO PHQ QUESTIONS 1-9: 0
SUM OF ALL RESPONSES TO PHQ QUESTIONS 1-9: 0
SUM OF ALL RESPONSES TO PHQ9 QUESTIONS 1 & 2: 0
10. IF YOU CHECKED OFF ANY PROBLEMS, HOW DIFFICULT HAVE THESE PROBLEMS MADE IT FOR YOU TO DO YOUR WORK, TAKE CARE OF THINGS AT HOME, OR GET ALONG WITH OTHER PEOPLE: 0
4. FEELING TIRED OR HAVING LITTLE ENERGY: 0
6. FEELING BAD ABOUT YOURSELF - OR THAT YOU ARE A FAILURE OR HAVE LET YOURSELF OR YOUR FAMILY DOWN: 0
8. MOVING OR SPEAKING SO SLOWLY THAT OTHER PEOPLE COULD HAVE NOTICED. OR THE OPPOSITE, BEING SO FIGETY OR RESTLESS THAT YOU HAVE BEEN MOVING AROUND A LOT MORE THAN USUAL: 0
SUM OF ALL RESPONSES TO PHQ QUESTIONS 1-9: 0
3. TROUBLE FALLING OR STAYING ASLEEP: 0
1. LITTLE INTEREST OR PLEASURE IN DOING THINGS: 0
SUM OF ALL RESPONSES TO PHQ QUESTIONS 1-9: 0
2. FEELING DOWN, DEPRESSED OR HOPELESS: 0
5. POOR APPETITE OR OVEREATING: 0

## 2022-05-05 ASSESSMENT — ENCOUNTER SYMPTOMS
RESPIRATORY NEGATIVE: 1
EYES NEGATIVE: 1
GASTROINTESTINAL NEGATIVE: 1

## 2022-05-05 NOTE — LETTER
[address] [facility]  Phone: [phone]  Fax: [address]        May 5, 2022     Patient: Courtney Larson   YOB: 1970   Date of Visit: 5/5/2022       To Whom It May Concern:    Courtney Larson  is a current patient. He has a significant medical history that I would request you take into consideration. This medical history includes chronic anemia that he recently had to have multiple blood transfusions, multiple back surgeries, COPD that is being managed by multiple inhalers, Diabetes that requires monitoring of his blood sugars, and a history of bladder cancer that required removal of his bladder. He does currently have a urostomy that requires him to wear a bag on the outside of his abdomin catching his urine. This bag does have to be changed frequently to prevent infections. If you have any questions or concerns, please don't hesitate to call.     Sincerely,        BRYAN Rivas

## 2022-05-05 NOTE — PROGRESS NOTES
[facility] PRIMARY CARE  [address] [facility] [address] [address]  Dept: [phone]  Dept Fax: [phone]  Loc: [phone]    Ewa Larson is a 45 y.o. male who presents today for his medical conditions/complaints as noted below. Ewa Larson is c/o of Anemia (2.5 pints of blood one month ago) and Fatigue      Chief Complaint   Patient presents with    Anemia     2.5 pints of blood one month ago    Fatigue       HPI:     HPI  Patient is here stating that he was diagnosed anemic and has to get 2.5 pints of blood one month ago at [employer]. Patient did see pulmonary yesterday and has PFTs that have been ordered. Patient is also needing a letter stating that he has significant ailments. Patient has had multiple back surgeries, urostomy from bladder cancer, CABG, and recent issues with ongoing anemia. He was arrested for meth. He had a relapse for greater than 1 year. He just left [facility].       Past Medical History:   Diagnosis Date    CAD (coronary artery disease)     sees [facility] cardiology    Cancer [facility])     Bladder    COPD (chronic obstructive pulmonary disease) [facility]     Depression     Diabetes mellitus ([facility]     History of blood transfusion     unsure thinks he did    Hyperlipidemia     Hypertension     Substance abuse ([facility]         Past Surgical History:   Procedure Laterality Date    BACK SURGERY      X3     BLADDER REMOVAL      Urostomy currently in place   [address] N/A 2/19/2020    Phase 1:  C5 and C6 corpectomy with placement of an expandable cage and anterior cervical plate [id number]. performed by Mare Mendoza DO at [facility] N/A 6/17/2020    [facility] [address] OUT AND CLOSURE performed by Mare Mendoza DO at [facility] N/A 6/26/2020    POSTERIOR CERVICAL WOUND DEBRIDEMENT WITH PLACEMENT OF [address] performed by Mare Mendoza DO at [address] COLONOSCOPY N/A 9/10/2019    Dr Violeta Evans, 5 yr recall    CORONARY ARTERY BYPASS GRAFT N/A 5/1/2019    CORONARY ARTERY BYPASS GRAFT X 3 WITH LEFT INTERNAL MAMMARY ARTERY, ENDOSCOPIC  VEIN HARVEST, WITH PERFUSION. performed by Rob Galan MD at [place] [address] N/A 2/19/2020    Phase 2:  Posterior instrumented fusion C3-C7 using lateral mass screws and rods.  performed by Alvino Vallejo DO at [address] [facility]      lower ext    PROSTATECTOMY         Social History     Tobacco Use    Smoking status: Current Every Day Smoker     Packs/day: 1.00     Years: 30.00     Pack years: 30.00    Smokeless tobacco: Never Used    Tobacco comment: sometimes less   Substance Use Topics    Alcohol use: Not Currently        Current Outpatient Medications   Medication Sig Dispense Refill    FEROSUL 325 (65 Fe) MG tablet       omeprazole (PRILOSEC) 40 MG delayed release capsule TAKE 1 CAPSULE BY MOUTH DAILY 30 capsule 2    PROAIR  (90 Base) MCG/ACT inhaler INHALE 2 PUFFS INTO THE LUNGS 4 TIMES DAILY AS NEEDED FOR WHEEZING 8.5 g 0    fluticasone-salmeterol (ADVAIR DISKUS) 250-50 MCG/ACT AEPB diskus inhaler Inhale 1 puff into the lungs every 12 hours 60 each 3    vitamin D (ERGOCALCIFEROL) 1.25 MG (18333 UT) CAPS capsule TAKE 1 CAPSULE BY MOUTH ONCE A WEEK ON WEDNESDAY 4 capsule 0    ARIPiprazole (ABILIFY) 5 MG tablet TAKE ONE TABLET BY MOUTH ONCE DAILY 30 tablet 1    metFORMIN (GLUCOPHAGE) 1000 MG tablet Take 1 tablet by mouth 2 times daily (with meals) 60 tablet 1    atorvastatin (LIPITOR) 20 MG tablet TAKE ONE TABLET BY MOUTH ONCE DAILY 30 tablet 1    traZODone (DESYREL) 50 MG tablet Take 2 tablets by mouth nightly as needed for Sleep 60 tablet 2    albuterol sulfate HFA (VENTOLIN HFA) 108 (90 Base) MCG/ACT inhaler Inhale 2 puffs into the lungs 4 times daily as needed for Wheezing 18 g 5    fluticasone-umeclidin-vilant (TRELEGY ELLIPTA) 100-62.5-25 MCG/INH AEPB Inhale 1 puff into the lungs daily 1 each 5    gabapentin (NEURONTIN) 300 MG capsule TAKE 1 CAPSULE BY MOUTH 3 TIMES DAILY 90 capsule 2    atenolol (TENORMIN) 50 MG tablet TAKE ONE TABLET BY MOUTH EVERY DAY. 30 tablet 3    lisinopril (PRINIVIL;ZESTRIL) 30 MG tablet Take 1 tablet by mouth daily 30 tablet 3    fluticasone-salmeterol (ADVAIR DISKUS) 250-50 MCG/DOSE AEPB Inhale 1 puff into the lungs every 12 hours 60 each 3    busPIRone (BUSPAR) 10 MG tablet TAKE 1 TABLET BY MOUTH 3 TIMES DAILY AS NEEDED (ANXIETY) 90 tablet 5    baclofen (LIORESAL) 10 MG tablet TAKE 1 TABLET BY MOUTH 3 TIMES DAILY AS NEEDED FOR PAIN/SPASMS 90 tablet 5    FLUoxetine (PROZAC) 40 MG capsule TAKE 1 CAPSULE BY MOUTH DAILY 30 capsule 5    blood glucose test strips (ONETOUCH ULTRA) strip USE ONE FOUR TIMES DAILY & AS NEEDED FOR SYMPTOMS OF IRREGULAR BLOOD SUGAR 100 strip 3    fenofibrate (TRIGLIDE) 160 MG tablet TAKE ONE TABLET BY MOUTH DAILY. 30 tablet 5    sodium hypochlorite (DAKINS) 0.125 % SOLN external solution Moisten gauze apply to wound twice daily 1000 mL 2    HYDROcodone-acetaminophen (NORCO)  MG per tablet Take 2 tablets by mouth every 6 hours as needed for Pain.  sildenafil (REVATIO) 20 MG tablet Take 1-5 tablets 1-2 hours before sexual activity PRN      tiotropium (SPIRIVA) 18 MCG inhalation capsule Inhale 1 capsule into the lungs       glucose monitoring kit (FREESTYLE) monitoring kit Please provide glucometer that INS will cover for DM type 2 1 kit 0    Lancets 30G MISC 1 each by Does not apply route daily 4 times daily 100 each 3    aspirin 81 MG EC tablet Take 1 tablet by mouth daily 30 tablet 3     No current facility-administered medications for this visit. Allergies   Allergen Reactions    Latex Other (See Comments)     BOILS AND BLISTERS- ALLERGY CALLED TO [facility] SURGERY SCHEDULING.     Demerol Hcl [Meperidine] Hives     And n/v       Family History   Problem Relation Age of Onset    Cancer Mother     Vision Loss Mother     Breast Cancer Mother     Coronary Art Dis Father     Obesity Father     Kidney Disease Father     High Blood Pressure Father     High Cholesterol Father     Hypercalcemia Sister     Obesity Brother     High Blood Pressure Brother                Subjective:      Review of Systems   Constitutional: Positive for fatigue. HENT: Negative. Eyes: Negative. Respiratory: Negative. Cardiovascular: Negative. Gastrointestinal: Negative. Endocrine: Negative. Genitourinary: Negative. Musculoskeletal: Negative. Skin: Negative. Neurological: Negative. Hematological: Negative. Psychiatric/Behavioral: Negative. Objective:     Physical Exam  Vitals and nursing note reviewed. Constitutional:       Appearance: Normal appearance. HENT:      Head: Normocephalic and atraumatic. Right Ear: Hearing, tympanic membrane, ear canal and external ear normal.      Left Ear: Hearing, tympanic membrane, ear canal and external ear normal.      Nose: Nose normal.      Mouth/Throat:      Lips: Pink. Mouth: Mucous membranes are moist.      Pharynx: Oropharynx is clear. Eyes:      General: Lids are normal.      Extraocular Movements: Extraocular movements intact. Conjunctiva/sclera: Conjunctivae normal.      Pupils: Pupils are equal, round, and reactive to light. Neck:      Thyroid: No thyromegaly. Cardiovascular:      Rate and Rhythm: Normal rate and regular rhythm. Pulses: Normal pulses. Dorsalis pedis pulses are 2+ on the right side and 2+ on the left side. Posterior tibial pulses are 2+ on the right side and 2+ on the left side. Heart sounds: Normal heart sounds. Pulmonary:      Effort: Pulmonary effort is normal.      Breath sounds: Normal breath sounds and air entry. Abdominal:      General: Bowel sounds are normal.      Palpations: Abdomen is soft.        Musculoskeletal:      Cervical back: Full passive range of motion without pain, normal range of motion and neck supple. Thoracic back: No tenderness. Normal range of motion. Lumbar back: No tenderness. Normal range of motion. Lymphadenopathy:      Cervical: No cervical adenopathy. Skin:     General: Skin is warm and dry. Capillary Refill: Capillary refill takes less than 2 seconds. Neurological:      General: No focal deficit present. Mental Status: He is alert and oriented to person, place, and time. Mental status is at baseline. Coordination: Coordination is intact. Psychiatric:         Mood and Affect: Mood normal.         Speech: Speech normal.         Behavior: Behavior normal.         Thought Content: Thought content normal.         Cognition and Memory: Cognition and memory normal.         Judgment: Judgment normal.         /65 (Site: Right Upper Arm)   Pulse 63   Temp 97.3 °F (36.3 °C)   Ht 5' 11\" (1.803 m)   Wt 190 lb (86.2 kg)   SpO2 98%   BMI 26.50 kg/m²     Assessment:      Diagnosis Orders   1. Type 2 diabetes mellitus without complication, without long-term current use of insulin (HCC)  Microalbumin / Creatinine Urine Ratio   2. Anemia, unspecified type  CBC with Auto Differential    Comprehensive Metabolic Panel    Magnesium    Microalbumin / Creatinine Urine Ratio    Vitamin B12    Folate    Iron and TIBC    Ferritin   3. Essential hypertension     4. Shortness of breath     5. Presence of urostomy ([facility] [address]         No results found for this visit on 05/05/22. Plan:     1. Type 2 diabetes mellitus without complication, without long-term current use of insulin (HCC)  Continue Metformin.    - Microalbumin / Creatinine Urine Ratio; Future    2. Anemia, unspecified type  Work up needed. Will likely need referral to hematology if anemia continues. This has been chronic per patient report. - CBC with Auto Differential; Future  - Comprehensive Metabolic Panel; Future  - Magnesium;  Future  - Microalbumin / Creatinine Urine Ratio; Future  - Vitamin B12; Future  - Folate; Future  - Iron and TIBC; Future  - Ferritin; Future    3. Essential hypertension  stable    4. Shortness of breath  Continue inhalers    5. Presence of urostomy [facility])  stable per Urology       Return in about 6 weeks (around 6/16/2022) for in [place]. Orders Placed This Encounter   Procedures    CBC with Auto Differential     Standing Status:   Future     Number of Occurrences:   1     Standing Expiration Date:   5/5/2023    Comprehensive Metabolic Panel     Standing Status:   Future     Number of Occurrences:   1     Standing Expiration Date:   5/5/2023    Magnesium     Standing Status:   Future     Number of Occurrences:   1     Standing Expiration Date:   5/5/2023    Microalbumin / Creatinine Urine Ratio     Standing Status:   Future     Number of Occurrences:   1     Standing Expiration Date:   5/5/2023    Vitamin B12     Standing Status:   Future     Number of Occurrences:   1     Standing Expiration Date:   5/5/2023    Folate     Standing Status:   Future     Number of Occurrences:   1     Standing Expiration Date:   5/5/2023    Iron and TIBC     Standing Status:   Future     Number of Occurrences:   1     Standing Expiration Date:   5/5/2023     Order Specific Question:   Is Patient Fasting? Answer:   no     Order Specific Question:   No of Hours? Answer:   0    Ferritin     Standing Status:   Future     Number of Occurrences:   1     Standing Expiration Date:   5/5/2023       No orders of the defined types were placed in this encounter. Patient offered educational handouts and has had all questions answered. Patient voices understanding and agrees to plans along with risks and benefits of plan. Patient is instructed to continue prior meds, diet, and exercise plans as instructed. Patient agrees to follow up as instructed and sooner if needed. Patient agrees to go to ER if condition becomes emergent.       EMR Dragon/transcription disclaimer: Some of this encounter note is an electronic transcription/translation of spoken language to printed text. The electronic translation of spoken language may permit erroneous, or at times, nonsensical words or phrases to be inadvertently transcribed.  Although I have reviewed the note for such errors, some may still exist.    Electronically signed by BRYAN Mondragon on 5/5/2022 at 8:09 AM

## 2022-05-06 RX ORDER — CALCIUM CARBONATE 300MG(750)
400 TABLET,CHEWABLE ORAL DAILY
Qty: 30 TABLET | Refills: 2 | Status: SHIPPED | OUTPATIENT
Start: 2022-05-06

## 2022-05-09 NOTE — PROGRESS NOTES
Subjective    Mr. Tafoya is 52 y.o. male    Chief Complaint: Penile injection training   History of Present Illness  Patient here with erectile dysfunction which has occurred since radical cystectomy.  He has been on sildenafil however after discussion with Dr. Wolfe at his last visit he would like to try penile injections he received his penile injection medication from [facility] pharmacy it was the PGE1 20 mcg.  He is here today for penile injection trial and instruction.1 he had failed on PDE 5 inhibitors.      The following portions of the patient's history were reviewed and updated as appropriate: allergies, current medications, past family history, past medical history, past social history, past surgical history and problem list.    Review of Systems      Current Outpatient Medications:   •  ARIPiprazole (ABILIFY) 5 MG tablet, Take 5 mg by mouth Daily., Disp: , Rfl:   •  aspirin 81 MG EC tablet, Take 81 mg by mouth Daily., Disp: , Rfl:   •  atenolol (TENORMIN) 50 MG tablet, Take 1 tablet by mouth 2 (Two) Times a Day., Disp: 60 tablet, Rfl: 0  •  atorvastatin (LIPITOR) 20 MG tablet, Take 20 mg by mouth Daily., Disp: , Rfl:   •  baclofen (LIORESAL) 10 MG tablet, Take 10 mg by mouth 3 (Three) Times a Day., Disp: , Rfl:   •  busPIRone (BUSPAR) 5 MG tablet, Take 5 mg by mouth., Disp: , Rfl:   •  clopidogrel (PLAVIX) 75 MG tablet, Take 75 mg by mouth Daily., Disp: , Rfl:   •  fenofibrate 160 MG tablet, Take 160 mg by mouth Daily., Disp: , Rfl:   •  FLUoxetine (PROzac) 20 MG capsule, Take 40 mg by mouth Daily., Disp: , Rfl:   •  Fluticasone-Salmeterol (ADVAIR) 100-50 MCG/ACT DISKUS, Inhale 2 (Two) Times a Day., Disp: , Rfl:   •  HYDROcodone-acetaminophen (NORCO)  MG per tablet, Take 1 tablet by mouth Every 6 (Six) Hours As Needed for Moderate Pain ., Disp: , Rfl:   •  lisinopril (PRINIVIL,ZESTRIL) 10 MG tablet, Take 10 mg by mouth Daily., Disp: , Rfl:   •  magnesium oxide (MAG-OX) 400 MG tablet,  Take 400 mg by mouth Daily., Disp: , Rfl:   •  metFORMIN (GLUCOPHAGE) 500 MG tablet, Take 1 tablet by mouth 2 (Two) Times a Day With Meals., Disp: 60 tablet, Rfl: 1  •  omeprazole (priLOSEC) 40 MG capsule, Take 40 mg by mouth Daily., Disp: , Rfl:   •  sildenafil (VIAGRA) 100 MG tablet, Take 1 tablet by mouth As Needed for Erectile Dysfunction (1-4 hours before activity)., Disp: 30 tablet, Rfl: 5  •  tiotropium (SPIRIVA) 18 MCG per inhalation capsule, Place 1 capsule into inhaler and inhale Daily., Disp: , Rfl:   •  traZODone (DESYREL) 50 MG tablet, Take 50 mg by mouth Every Night., Disp: , Rfl:   •  vitamin D (ERGOCALCIFEROL) 1.25 MG (88128 UT) capsule capsule, Take 50,000 Units by mouth 1 (One) Time Per Week., Disp: , Rfl:   •  gabapentin (NEURONTIN) 100 MG capsule, , Disp: , Rfl:     Past Medical History:   Diagnosis Date   • Anxiety    • Back pain    • Bladder carcinoma (HCC) 06/2016    High grade CIS & ta low grade   • H/O hematuria    • History of pneumonia 2011   • Hypertension    • Overweight    • Smoking    • Urinary retention    • Wheezing     r/t smoking       Past Surgical History:   Procedure Laterality Date   • BACK SURGERY      x3   • CYSTECTOMY N/A 11/2/2017    Procedure: CYSTOPROSTATECTOMY WITH BILATERAL PELVIC LYMPHADENECTOMY AND ILEAL CONDUIT URINARY DIVERSON;  Surgeon: Vijay Badillo Jr., MD;  Location: [facility] OR;  Service:    • CYSTOSCOPY N/A 10/9/2017    Procedure: CYSTOSCOPY AND EXAM UNDER ANESTHESIA;  Surgeon: Vijay Badillo Jr., MD;  Location: [facility] OR;  Service:    • CYSTOSCOPY BLADDER BIOPSY N/A 11/30/2016    Procedure: CYSTOSCOPY BLADDER BIOPSY fulgeration of 3cm area of bladder;  Surgeon: Brandon Wolfe MD;  Location:  [facility] OR;  Service:    • CYSTOSCOPY RETROGRADE PYELOGRAM Bilateral 7/14/2017    Procedure: CYSTOSCOPY RETROGRADE PYELOGRAM;  Surgeon: Brandon Wolfe MD;  Location:  [facility] OR;  Service:    • NECK SURGERY     • TRANSURETHRAL RESECTION OF BLADDER TUMOR  "N/A 3/31/2017    Procedure: CYSTOSCOPY , BLADDER BIOPSY, AND TRANSURETHRAL RESECTION OF BLADDER TUMOR ;  Surgeon: Brandon Wolfe MD;  Location: [facility] OR;  Service:    • TRANSURETHRAL RESECTION OF BLADDER TUMOR  06/2016    High Grade CIS & Low grade ta disease   • TRANSURETHRAL RESECTION OF BLADDER TUMOR N/A 7/14/2017    Procedure: CYSTOSCOPY TRANSURETHRAL RESECTION OF BLADDER TUMOR & BILATERAL RETROGRADE URETEROPYELOGRAMS;  Surgeon: Brandon Wolfe MD;  Location: [facility] OR;  Service:        Social History     Socioeconomic History   • Marital status:    Tobacco Use   • Smoking status: Current Every Day Smoker     Packs/day: 1.00     Years: 34.00     Pack years: 34.00     Types: Cigarettes   • Smokeless tobacco: Never Used   Vaping Use   • Vaping Use: Never used   Substance and Sexual Activity   • Alcohol use: No   • Drug use: No   • Sexual activity: Defer       Family History   Problem Relation Age of Onset   • No Known Problems Father    • No Known Problems Mother    • Malig Hyperthermia Neg Hx        Objective    Ht 180.3 cm (71\")   Wt 86.2 kg (190 lb)   BMI 26.50 kg/m²     Physical Exam  Constitutional:       General: He is not in acute distress.     Appearance: Normal appearance.   Genitourinary:     Penis: Normal.       Testes: Normal.   Neurological:      Mental Status: He is alert and oriented to person, place, and time.   Psychiatric:         Mood and Affect: Mood normal.         Behavior: Behavior normal.             Assessment and Plan    Diagnoses and all orders for this visit:    1. Erectile dysfunction after radical cystectomy (Primary)    Penile injection trial/procedure:  Patient presents today for penile injection trial and instruction in the office.  Patient had failed PDE 5 inhibitors I discussed possible side effects to include pain after injection priapism.  Instructed patient on how to inject using the syringe with a cath over the needle.    Procedure: Penile injection  I injected " 0.1 mL of the PGE-1  20 mcg.  After waiting 10 minutes I reentered the room and patient did have partial erection also noted curvature of the penis.  He was complaining of no discomfort or pain erection was not sufficient for intercourse.  I discussed how to inject the explained to the patient which anatomical areas of the penis not to inject in I then showed him the correct location for the injection midshaft mid way between top or bottom 90 degree angle inject hold pressure for a minute and then released.  After his response here I recommend he use 0.2 mL at his next attempt.  At this point he will contact us to let us know how he is doing by phone otherwise he will follow-up as scheduled with Dr. Wolfe.

## 2022-05-10 ENCOUNTER — OFFICE VISIT (OUTPATIENT)
Dept: UROLOGY | Facility: CLINIC | Age: 52
End: 2022-05-10

## 2022-05-10 VITALS — WEIGHT: 190 LBS | HEIGHT: 71 IN | BODY MASS INDEX: 26.6 KG/M2

## 2022-05-10 DIAGNOSIS — N52.32 ERECTILE DYSFUNCTION AFTER RADICAL CYSTECTOMY: Primary | ICD-10-CM

## 2022-05-10 PROCEDURE — 54235 NJX CORPORA CAVERNOSA RX AGT: CPT | Performed by: PHYSICIAN ASSISTANT

## 2022-05-10 RX ORDER — FLUTICASONE PROPIONATE AND SALMETEROL 100; 50 UG/1; UG/1
POWDER RESPIRATORY (INHALATION)
COMMUNITY

## 2022-05-10 RX ORDER — MAGNESIUM OXIDE 400 MG/1
400 TABLET ORAL DAILY
COMMUNITY

## 2022-05-11 DIAGNOSIS — I10 ESSENTIAL HYPERTENSION: ICD-10-CM

## 2022-05-11 DIAGNOSIS — E11.9 TYPE 2 DIABETES MELLITUS WITHOUT COMPLICATION, WITHOUT LONG-TERM CURRENT USE OF INSULIN (HCC): Chronic | ICD-10-CM

## 2022-05-11 DIAGNOSIS — F32.A ANXIETY AND DEPRESSION: ICD-10-CM

## 2022-05-11 DIAGNOSIS — F41.9 ANXIETY AND DEPRESSION: ICD-10-CM

## 2022-05-11 RX ORDER — ARIPIPRAZOLE 5 MG/1
TABLET ORAL
Qty: 30 TABLET | Refills: 1 | Status: SHIPPED | OUTPATIENT
Start: 2022-05-11 | End: 2022-07-08

## 2022-05-11 RX ORDER — FLUOXETINE HYDROCHLORIDE 40 MG/1
40 CAPSULE ORAL DAILY
Qty: 30 CAPSULE | Refills: 5 | Status: SHIPPED | OUTPATIENT
Start: 2022-05-11

## 2022-05-11 RX ORDER — LISINOPRIL 30 MG/1
30 TABLET ORAL DAILY
Qty: 30 TABLET | Refills: 3 | Status: SHIPPED | OUTPATIENT
Start: 2022-05-11

## 2022-05-11 RX ORDER — ERGOCALCIFEROL 1.25 MG/1
CAPSULE ORAL
Qty: 4 CAPSULE | Refills: 0 | Status: SHIPPED | OUTPATIENT
Start: 2022-05-11

## 2022-05-11 RX ORDER — ATENOLOL 50 MG/1
TABLET ORAL
Qty: 30 TABLET | Refills: 3 | Status: SHIPPED | OUTPATIENT
Start: 2022-05-11

## 2022-05-11 RX ORDER — ATORVASTATIN CALCIUM 20 MG/1
TABLET, FILM COATED ORAL
Qty: 30 TABLET | Refills: 1 | Status: SHIPPED | OUTPATIENT
Start: 2022-05-11 | End: 2022-07-08

## 2022-06-03 DIAGNOSIS — F41.9 ANXIETY AND DEPRESSION: ICD-10-CM

## 2022-06-03 DIAGNOSIS — F32.A ANXIETY AND DEPRESSION: ICD-10-CM

## 2022-06-03 DIAGNOSIS — F51.01 PRIMARY INSOMNIA: ICD-10-CM

## 2022-06-03 RX ORDER — BUSPIRONE HYDROCHLORIDE 10 MG/1
10 TABLET ORAL 3 TIMES DAILY PRN
Qty: 90 TABLET | Refills: 5 | Status: SHIPPED | OUTPATIENT
Start: 2022-06-03

## 2022-06-03 RX ORDER — TRAZODONE HYDROCHLORIDE 50 MG/1
100 TABLET ORAL NIGHTLY PRN
Qty: 60 TABLET | Refills: 2 | Status: SHIPPED | OUTPATIENT
Start: 2022-06-03

## 2022-06-07 DIAGNOSIS — R06.2 WHEEZING: ICD-10-CM

## 2022-06-07 DIAGNOSIS — R06.02 SHORTNESS OF BREATH: ICD-10-CM

## 2022-06-07 RX ORDER — FLUTICASONE FUROATE, UMECLIDINIUM BROMIDE AND VILANTEROL TRIFENATATE 100; 62.5; 25 UG/1; UG/1; UG/1
1 POWDER RESPIRATORY (INHALATION) DAILY
Refills: 5 | OUTPATIENT
Start: 2022-06-07

## 2022-06-08 RX ORDER — FLUTICASONE PROPIONATE AND SALMETEROL 50; 250 UG/1; UG/1
POWDER RESPIRATORY (INHALATION)
Qty: 60 EACH | Refills: 3 | Status: SHIPPED | OUTPATIENT
Start: 2022-06-08

## 2022-06-17 ENCOUNTER — TELEPHONE (OUTPATIENT)
Dept: CARDIOLOGY CLINIC | Age: 52
End: 2022-06-17

## 2022-06-20 ENCOUNTER — OFFICE VISIT (OUTPATIENT)
Dept: CARDIOLOGY CLINIC | Age: 52
End: 2022-06-20
Payer: MEDICARE

## 2022-06-20 VITALS
HEIGHT: 71 IN | BODY MASS INDEX: 27.02 KG/M2 | SYSTOLIC BLOOD PRESSURE: 138 MMHG | WEIGHT: 193 LBS | DIASTOLIC BLOOD PRESSURE: 68 MMHG | HEART RATE: 76 BPM

## 2022-06-20 DIAGNOSIS — I25.10 CORONARY ARTERY DISEASE INVOLVING NATIVE CORONARY ARTERY OF NATIVE HEART WITHOUT ANGINA PECTORIS: Primary | ICD-10-CM

## 2022-06-20 DIAGNOSIS — I10 ESSENTIAL HYPERTENSION: ICD-10-CM

## 2022-06-20 DIAGNOSIS — Z95.1 HX OF CABG: ICD-10-CM

## 2022-06-20 DIAGNOSIS — I73.9 PERIPHERAL ARTERIAL DISEASE (HCC): ICD-10-CM

## 2022-06-20 DIAGNOSIS — E78.2 MIXED HYPERLIPIDEMIA: ICD-10-CM

## 2022-06-20 PROCEDURE — G8427 DOCREV CUR MEDS BY ELIG CLIN: HCPCS | Performed by: INTERNAL MEDICINE

## 2022-06-20 PROCEDURE — 4004F PT TOBACCO SCREEN RCVD TLK: CPT | Performed by: INTERNAL MEDICINE

## 2022-06-20 PROCEDURE — G8419 CALC BMI OUT NRM PARAM NOF/U: HCPCS | Performed by: INTERNAL MEDICINE

## 2022-06-20 PROCEDURE — 3017F COLORECTAL CA SCREEN DOC REV: CPT | Performed by: INTERNAL MEDICINE

## 2022-06-20 PROCEDURE — 99213 OFFICE O/P EST LOW 20 MIN: CPT | Performed by: INTERNAL MEDICINE

## 2022-06-20 ASSESSMENT — ENCOUNTER SYMPTOMS
EYES NEGATIVE: 1
NAUSEA: 0
SHORTNESS OF BREATH: 0
VOMITING: 0
GASTROINTESTINAL NEGATIVE: 1
RESPIRATORY NEGATIVE: 1
DIARRHEA: 0

## 2022-06-20 NOTE — PROGRESS NOTES
[facility]                            Date:  6/20/2022  Patient: Ninfa Larson  Age:  45 y.o., 1970      Reason for evaluation:         SUBJECTIVE:    Returns today follow-up assessment coronary artery disease peripheral vascular disease recent balloon angioplasty popliteal artery. Still smoking. Denies chest pain denies claudication. He did have a episode recently where he had some shortness of breath found to be anemic given 2 units of blood underwent extensive evaluation cause not determined but it seems to be improved and stable at this time. No other complaints or issues reported. Blood pressure 138/68 heart 76. Review of Systems   Constitutional: Negative. Negative for chills, fever and unexpected weight change. HENT: Negative. Eyes: Negative. Respiratory: Negative. Negative for shortness of breath. Cardiovascular: Negative. Negative for chest pain. Gastrointestinal: Negative. Negative for diarrhea, nausea and vomiting. Endocrine: Negative. Genitourinary: Negative. Musculoskeletal: Negative. Skin: Negative. Neurological: Negative. All other systems reviewed and are negative. OBJECTIVE:     /68   Pulse 76   Ht 5' 11\" (1.803 m)   Wt 193 lb (87.5 kg)   BMI 26.92 kg/m²     Labs:   CBC: No results for input(s): WBC, HGB, HCT, PLT in the last 72 hours. BMP:No results for input(s): NA, K, CO2, BUN, CREATININE, LABGLOM, GLUCOSE in the last 72 hours. BNP: No results for input(s): BNP in the last 72 hours. PT/INR: No results for input(s): PROTIME, INR in the last 72 hours. APTT:No results for input(s): APTT in the last 72 hours. CARDIAC ENZYMES:No results for input(s): CKTOTAL, CKMB, CKMBINDEX, TROPONINI in the last 72 hours.   FASTING LIPID PANEL:  Lab Results   Component Value Date    HDL 30 11/04/2021    LDLDIRECT 109 04/26/2019    LDLCALC 36 11/04/2021    TRIG 245 11/23/2020     LIVER PROFILE:No results for input(s): AST, ALT, LABALBU in the last 72 hours. Past Medical History:   Diagnosis Date    CAD (coronary artery disease)     sees [facility] cardiology    Cancer [facility])     Bladder    COPD (chronic obstructive pulmonary disease) ([facility]     Depression     Diabetes mellitus ([facility]     History of blood transfusion     unsure thinks he did    Hyperlipidemia     Hypertension     Substance abuse ([facility]      Past Surgical History:   Procedure Laterality Date    BACK SURGERY      X3     BLADDER REMOVAL      Urostomy currently in place   [address] N/A 2/19/2020    Phase 1:  C5 and C6 corpectomy with placement of an expandable cage and anterior cervical plate S9-X7. performed by William Darby DO at [address] N/A 6/17/2020    POSTERIOR CERVICAL WOUND [address] OUT AND CLOSURE performed by William Darby DO at [address] N/A 6/26/2020    [facility] OF [address] [facility] performed by William Darby DO at [address] [facility] N/A 9/10/2019    Dr Trinh Yi, 5 yr recall    CORONARY ARTERY BYPASS GRAFT N/A 5/1/2019    CORONARY ARTERY BYPASS GRAFT X 3 WITH LEFT INTERNAL MAMMARY ARTERY, ENDOSCOPIC  VEIN HARVEST, WITH PERFUSION. performed by Reji Forbes MD at [id number] [place] [address]  N/A 2/19/2020    Phase 2:  Posterior instrumented fusion C3-C7 using lateral mass screws and rods.  performed by William Darby DO at [address]      lower ext    PROSTATECTOMY       Family History   Problem Relation Age of Onset    Cancer Mother     Vision Loss Mother     Breast Cancer Mother     Coronary Art Dis Father     Obesity Father     Kidney Disease Father     High Blood Pressure Father     High Cholesterol Father     Hypercalcemia Sister     Obesity Brother     High Blood Pressure Brother      Allergies   Allergen Reactions    Latex Other (See Comments)     BOILS AND BLISTERS- ALLERGY CALLED TO [facility] SURGERY SCHEDULING.  Demerol Hcl [Meperidine] Hives     And n/v     Current Outpatient Medications   Medication Sig Dispense Refill    ADVAIR DISKUS 250-50 MCG/ACT AEPB diskus inhaler INHALE 1 PUFF INTO THE LUNGS EVERY 12 HOURS 60 each 3    traZODone (DESYREL) 50 MG tablet TAKE 2 TABLETS BY MOUTH NIGHTLY AS NEEDED FOR SLEEP 60 tablet 2    busPIRone (BUSPAR) 10 MG tablet TAKE 1 TABLET BY MOUTH 3 TIMES DAILY AS NEEDED (ANXIETY) 90 tablet 5    lisinopril (PRINIVIL;ZESTRIL) 30 MG tablet TAKE 1 TABLET BY MOUTH DAILY 30 tablet 3    metFORMIN (GLUCOPHAGE) 1000 MG tablet TAKE 1 TABLET BY MOUTH 2 TIMES DAILY (WITH MEALS) 60 tablet 1    vitamin D (ERGOCALCIFEROL) 1.25 MG (17461 UT) CAPS capsule TAKE 1 CAPSULE BY MOUTH ONCE A WEEK ON WEDNESDAY 4 capsule 0    ARIPiprazole (ABILIFY) 5 MG tablet TAKE ONE TABLET BY MOUTH ONCE DAILY. 30 tablet 1    atorvastatin (LIPITOR) 20 MG tablet TAKE ONE TABLET BY MOUTH ONCE DAILY. 30 tablet 1    FLUoxetine (PROZAC) 40 MG capsule TAKE 1 CAPSULE BY MOUTH DAILY 30 capsule 5    atenolol (TENORMIN) 50 MG tablet TAKE ONE TABLET BY MOUTH EVERY DAY.  30 tablet 3    Magnesium 400 MG TABS Take 400 mg by mouth daily 30 tablet 2    FEROSUL 325 (65 Fe) MG tablet       omeprazole (PRILOSEC) 40 MG delayed release capsule TAKE 1 CAPSULE BY MOUTH DAILY 30 capsule 2    PROAIR  (90 Base) MCG/ACT inhaler INHALE 2 PUFFS INTO THE LUNGS 4 TIMES DAILY AS NEEDED FOR WHEEZING 8.5 g 0    fluticasone-salmeterol (ADVAIR DISKUS) 250-50 MCG/ACT AEPB diskus inhaler Inhale 1 puff into the lungs every 12 hours 60 each 3    albuterol sulfate HFA (VENTOLIN HFA) 108 (90 Base) MCG/ACT inhaler Inhale 2 puffs into the lungs 4 times daily as needed for Wheezing 18 g 5    fluticasone-umeclidin-vilant (TRELEGY ELLIPTA) 100-62.5-25 MCG/INH AEPB Inhale 1 puff into the lungs daily 1 each 5    baclofen (LIORESAL) 10 MG tablet TAKE 1 TABLET BY MOUTH 3 TIMES DAILY AS NEEDED FOR PAIN/SPASMS 90 tablet 5    blood glucose test strips (ONETOUCH ULTRA) strip USE ONE FOUR TIMES DAILY & AS NEEDED FOR SYMPTOMS OF IRREGULAR BLOOD SUGAR 100 strip 3    fenofibrate (TRIGLIDE) 160 MG tablet TAKE ONE TABLET BY MOUTH DAILY. 30 tablet 5    sodium hypochlorite (DAKINS) 0.125 % SOLN external solution Moisten gauze apply to wound twice daily 1000 mL 2    HYDROcodone-acetaminophen (NORCO)  MG per tablet Take 2 tablets by mouth every 6 hours as needed for Pain.  sildenafil (REVATIO) 20 MG tablet Take 1-5 tablets 1-2 hours before sexual activity PRN      tiotropium (SPIRIVA) 18 MCG inhalation capsule Inhale 1 capsule into the lungs       glucose monitoring kit (FREESTYLE) monitoring kit Please provide glucometer that INS will cover for DM type 2 1 kit 0    Lancets 30G MISC 1 each by Does not apply route daily 4 times daily 100 each 3    aspirin 81 MG EC tablet Take 1 tablet by mouth daily 30 tablet 3    gabapentin (NEURONTIN) 300 MG capsule TAKE 1 CAPSULE BY MOUTH 3 TIMES DAILY 90 capsule 2     No current facility-administered medications for this visit.      Social History     Socioeconomic History    Marital status:      Spouse name: Not on file    Number of children: Not on file    Years of education: Not on file    Highest education level: Not on file   Occupational History    Not on file   Tobacco Use    Smoking status: Current Every Day Smoker     Packs/day: 1.00     Years: 30.00     Pack years: 30.00    Smokeless tobacco: Never Used    Tobacco comment: sometimes less   Vaping Use    Vaping Use: Never used   Substance and Sexual Activity    Alcohol use: Not Currently    Drug use: Never    Sexual activity: Not on file   Other Topics Concern    Not on file   Social History Narrative     16 years second marriage    On disability due to multiple back surgeries and bladder cancer    [address] [facility]    Education high school    Smokes 1-1/2 pack per day drinks alcohol occasionally denies substance usage    Physically sedentary    Former  at an [address]    Never in the  [address] Strain: Medium Risk    Difficulty of Paying Living Expenses: Somewhat hard   Food Insecurity: No Food Insecurity    Worried About [address] in the Last Year: Never true    [address] in the Last Year: Never true   Transportation Needs:     Lack of Transportation (Medical): Not on file    Lack of Transportation (Non-Medical): Not on file   Physical Activity:     Days of Exercise per Week: Not on file    Minutes of Exercise per Session: Not on file   Stress:     Feeling of Stress : Not on file   Social Connections:     Frequency of Communication with Friends and Family: Not on file    Frequency of Social Gatherings with Friends and Family: Not on file    Attends Oriental orthodox Services: Not on file    Active Member of [address] [place] [address] or Organizations: Not on file    Attends Club or Organization Meetings: Not on file    Marital Status: Not on file   Intimate Partner Violence:     Fear of Current or Ex-Partner: Not on file    Emotionally Abused: Not on file    Physically Abused: Not on file    Sexually Abused: Not on file   Housing Stability:     Unable to Pay for Housing in the Last Year: Not on file    Number of [place] in the Last Year: Not on file    Unstable Housing in the Last Year: Not on file       Physical Examination:  /68   Pulse 76   Ht 5' 11\" (1.803 m)   Wt 193 lb (87.5 kg)   BMI 26.92 kg/m²   Physical Exam  Vitals reviewed. Constitutional:       Appearance: He is well-developed. Neck:      Vascular: No carotid bruit or JVD. Cardiovascular:      Rate and Rhythm: Normal rate and regular rhythm. Heart sounds: Normal heart sounds. No murmur heard. No friction rub. No gallop. Pulmonary:      Effort: Pulmonary effort is normal. No respiratory distress.       Breath sounds: Normal breath sounds. No wheezing or rales. Abdominal:      General: There is no distension. Tenderness: There is no abdominal tenderness. Lymphadenopathy:      Cervical: No cervical adenopathy. Skin:     General: Skin is warm and dry. ASSESSMENT:     Diagnosis Orders   1. Coronary artery disease involving native coronary artery of native heart without angina pectoris     2. Mixed hyperlipidemia     3. Essential hypertension     4. Hx of CABG     5. Peripheral arterial disease ([address]         PLAN:  No orders of the defined types were placed in this encounter. No orders of the defined types were placed in this encounter. 1. Continue present medications  2. Recommend follow-up assessment in 6 months    Return in about 6 months (around 12/20/2022) for return to Dr. Mariela Hammer only. Addis Castaneda MD 6/20/2022 3:10 PM CDT    [facility]      Thisdictation was generated by voice recognition computer software. Although all attempts are made to edit the dictation for accuracy, there may be errors in the transcription that are not intended.

## 2022-07-07 DIAGNOSIS — E11.9 TYPE 2 DIABETES MELLITUS WITHOUT COMPLICATION, WITHOUT LONG-TERM CURRENT USE OF INSULIN (HCC): Chronic | ICD-10-CM

## 2022-07-08 RX ORDER — ATORVASTATIN CALCIUM 20 MG/1
TABLET, FILM COATED ORAL
Qty: 30 TABLET | Refills: 1 | Status: SHIPPED | OUTPATIENT
Start: 2022-07-08

## 2022-07-08 RX ORDER — ARIPIPRAZOLE 5 MG/1
TABLET ORAL
Qty: 30 TABLET | Refills: 1 | Status: SHIPPED | OUTPATIENT
Start: 2022-07-08

## 2024-01-31 NOTE — PROGRESS NOTES
Patient of Dr. Wolfe states he is here today for his testosterone injection. Patient denies any fever, chills, N&V or hematuria. I administered 1cc/ 200 mg of testosterone IM right hip with no complications. Dr. Campbell was here in the office at the time of injection. The patient was advised to follow up in 3 weeks for his next testosterone injection. He verbalized understanding.    Subjective   Patient ID: Leta is a 37 year old female who presents today for prenatal visit.  She is of 35w4d gestation.  OB History    Para Term  AB Living   2 1 1 0 0 1   SAB IAB Ectopic Molar Multiple Live Births   0 0 0 0 0 1   Obstetric Comments   Dr.Reis- ZEE scheduled 2023@ 12:30 PM   LMP 2023 @ 7w5d   MAXI 3/2/2024   Sx since LMP: mild nausea (feeling ok with small meals, sprite prn), cramping/bloating, constipation/occ diarrhea. Denies vb/spotting/severe abd pain. B6/Unisom and gentle relief for constipation w/miralax/metamucil prn reviewed.   Pre-preg Wt: 222lb    Current Meds: PNV/DHA daily   Allergies: NKDA      OBHx: : 2020  39 5/7, baby girl, 8.5lbs. C/b: 4th deg lac, uterine inversion. Baby needed fetal echox2 (SUA?)    Gyn Hx:   Menses: regular, monthly, lasting 6-7d-- hx heavy periods, (needs tampon+pad, change tampon Q1-2hrs)+ hx large clots but now resolved   Menarche:8   Last Pap: 2021 NIL/hpv neg, (+) hx abnl Pap  (HPV+), resolved    STI hx: HPV, otherwise denies   PMH: none   PSH: none   FHx:   Mo-DM2, asthma   Fa-HLD, had pulmonary embolism last year (possibly COVID related)    Sister- ectopic pg, endometriosis, adenomyosis    Paternal cousin- colon CA (, late 50s)    Denies fhx breast, ovarian CA, bleeding/clotting d/o    SH: denies tobacco/drug/etoh use   Genetics: no genetic risk factors identified (self/partner). Both had carrier screening in [place]-- not carriers of same recessive condition. Desires NIPT   COVID: utd-- Pfizer x2 + 2 boosters (last in )   Flu: utd-- received 2022   Occupation: works in education, non-profit    Cats: none    Dental care: no concerns, UTD q6mo cleaning   Travel: domestic only, [place] for 1 wk   Partner: Nirav Motley (spouse) [phone]      1st trimester edu & precautions reviewed:   Pt aware of SAB/Ectopic precautions    Verbally consents to  tri HIV screening   Verbally consents to blood  transfusion if necessary   Aware of [facility] lactation resources, interested in BF    All pt questions answered    Women with any risk factor:   [ ] history of pre eclampsia    [ ]multifetal gestation    [ ]chronic hypertension    [ ]type 1 or 2 diabetes    [ ]renal disease    [ ]autoimmune disease    Women with more than one of the following:   [ ]nulliparity    [X ]BMI > 30    [ ]family history of pre eclampsia in mother or sister    [ ] race or low socioeconomic status    [X ]age 35 years or older    [ ]previous adverse pregnancy outcome    [ ]more than 10 year pregnancy interval         Objective     positive fetal movement, No bleeding, No rupture of membranes, No uterine contractions    ASSESSMENT:  A/P 38yo  pregnancy at 35w4d weeks gestation.  Problem List Items Addressed This Visit          Genitourinary and Reproductive    History of uterine inversion       Gravid and     Multigravida of advanced maternal age in first trimester    Vaginal bleeding in pregnancy    History of maternal fourth degree perineal laceration, currently pregnant    Cervical insufficiency in pregnancy, antepartum    Placenta previa in second trimester     Other Visit Diagnoses       Third trimester pregnancy    -  Primary    Relevant Orders    POCT Urine Dip Auto (Completed)            PLAN:  1) FHTs +  2) PNL utd; gbbs today no pcn allergy  3) CI : on vaginal progesterone nightly   4) A1GDM: blood sugars reviewed and wnl  5) AMA: NIPT/AFP neg. Growths/apt. LDA qd  6) h/o 4th degree perineal lac with uterine inversion: pt declines c/s. Records on chart. Iol scheduled for  @6am  7) PTL precautions/kick cts reviewed

## 2024-04-29 ENCOUNTER — TELEPHONE (OUTPATIENT)
Dept: UROLOGY | Facility: CLINIC | Age: 54
End: 2024-04-29
Payer: COMMERCIAL

## 2024-04-29 NOTE — TELEPHONE ENCOUNTER
Caller: DOMINGUEZ MISHRA    Relationship to patient: SELF    Best call back number: [phone]    Patient is needing: PATIENT CALLED IN WANTING A FOLLOW UP APPT WITH DR HERNANDEZ. HE BELIEVES HE WOULD NEED TO HAVE A RENAL ULTRASOUND BEFOREHAND SO HE WOULD LIKE AN ORDER FOR THAT SO HE CAN GET IT SCHEDULED AND THEN SCHEDULE HIS FOLLOW UP APPT. PLEASE CALL PATIENT FOR ASSISTANCE.

## 2024-04-30 DIAGNOSIS — C67.8 MALIGNANT NEOPLASM OF OVERLAPPING SITES OF BLADDER: Primary | ICD-10-CM

## 2024-04-30 NOTE — PROGRESS NOTES
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  He was ultimately treated by radical cystoprostatectomy in [facility].  He underwent ileal loop diversion.  The the diversion is doing well for him.  He is experience no blood in the urine.  No urinary tract infections.  Denies flank pain.  He does have to change his stoma daily or he gets significant leakage.      History related to this issue:   -11/2017: Radical cystoprostatectomy/BPLND by Jessie Badillo [facility] [place].   Final Diagnosis            1. DISTAL RIGHT URETER, BIOPSY:               BENIGN URETERAL TISSUE.           2. DISTAL LEFT URETER, BIOPSY:               BENIGN URETERAL TISSUE.            3. PELVIC APICAL MARGIN, BIOPSY:               BENIGN FIBROVASCULAR, FATTY, AND NERVE TISSUES, WITH GANGLION.              4. RADICAL CYSTOPROSTATECTOMY SPECIMEN:               FLAT UROTHELIAL CARCINOMA IN SITU.               EXTENSIVE MUCOSAL/SUBMUCOSAL FIBROSIS WITH CHRONIC                             INFLAMMATION                            AND REACTIVE CHANGE.               NEGATIVE SEMINAL VESICLE AND VAS DEFERENS MARGINS.               NEGATIVE URETERAL MARGINS.               NEGATIVE PROSTATIC URETHRAL MARGIN.               PROSTATE WITH NECROTIZING GRANULOMATOUS INFLAMMATION.           5. RIGHT PELVIC LYMPH NODES (4):               NO TUMOR IDENTIFIED.               6. LEFT PELVIC LYMPH NODES (6):               NO TUMOR IDENTIFIED.     He continues to be bothered by erectile dysfunction.  Has been on intracavernosal injections.  He says these worked quite well for him.  A 0.5 mL of the PGE1/phentolamine regimen.          Current Outpatient Medications:      acetaminophen (TYLENOL) 325 MG tablet, Take 2 tablets by mouth Every 6 (Six) Hours As Needed for Mild Pain., Disp: , Rfl:     naproxen sodium (ALEVE) 220 MG tablet, Take 1 tablet by mouth 2 (Two) Times a Day As Needed., Disp: , Rfl:     ARIPiprazole (ABILIFY) 5 MG tablet, Take 5 mg by mouth Daily. (Patient not taking: Reported on 5/14/2024), Disp: , Rfl:     aspirin 81 MG EC tablet, Take 81 mg by mouth Daily. (Patient not taking: Reported on 5/14/2024), Disp: , Rfl:     atenolol (TENORMIN) 50 MG tablet, Take 1 tablet by mouth 2 (Two) Times a Day. (Patient not taking: Reported on 5/14/2024), Disp: 60 tablet, Rfl: 0    atorvastatin (LIPITOR) 20 MG tablet, Take 20 mg by mouth Daily. (Patient not taking: Reported on 5/14/2024), Disp: , Rfl:     baclofen (LIORESAL) 10 MG tablet, Take 10 mg by mouth 3 (Three) Times a Day. (Patient not taking: Reported on 5/14/2024), Disp: , Rfl:     busPIRone (BUSPAR) 5 MG tablet, Take 5 mg by mouth. (Patient not taking: Reported on 5/14/2024), Disp: , Rfl:     clopidogrel (PLAVIX) 75 MG tablet, Take 75 mg by mouth Daily. (Patient not taking: Reported on 5/14/2024), Disp: , Rfl:     fenofibrate 160 MG tablet, Take 160 mg by mouth Daily. (Patient not taking: Reported on 5/14/2024), Disp: , Rfl:     FLUoxetine (PROzac) 20 MG capsule, Take 40 mg by mouth Daily. (Patient not taking: Reported on 5/14/2024), Disp: , Rfl:     Fluticasone-Salmeterol (ADVAIR) 100-50 MCG/ACT DISKUS, Inhale 2 (Two) Times a Day. (Patient not taking: Reported on 5/14/2024), Disp: , Rfl:     gabapentin (NEURONTIN) 100 MG capsule, , Disp: , Rfl:     HYDROcodone-acetaminophen (NORCO)  MG per tablet, Take 1 tablet by mouth Every 6 (Six) Hours As Needed for Moderate Pain . (Patient not taking: Reported on 5/14/2024), Disp: , Rfl:     lisinopril (PRINIVIL,ZESTRIL) 10 MG tablet, Take 10 mg by mouth Daily. (Patient not taking: Reported on 5/14/2024), Disp: , Rfl:     magnesium oxide (MAG-OX) 400 MG tablet, Take 400 mg  by mouth Daily. (Patient not taking: Reported on 5/14/2024), Disp: , Rfl:     metFORMIN (GLUCOPHAGE) 500 MG tablet, Take 1 tablet by mouth 2 (Two) Times a Day With Meals. (Patient not taking: Reported on 5/14/2024), Disp: 60 tablet, Rfl: 1    omeprazole (priLOSEC) 40 MG capsule, Take 40 mg by mouth Daily. (Patient not taking: Reported on 5/14/2024), Disp: , Rfl:     sildenafil (VIAGRA) 100 MG tablet, Take 1 tablet by mouth As Needed for Erectile Dysfunction (1-4 hours before activity). (Patient not taking: Reported on 5/14/2024), Disp: 30 tablet, Rfl: 5    tiotropium (SPIRIVA) 18 MCG per inhalation capsule, Place 1 capsule into inhaler and inhale Daily. (Patient not taking: Reported on 5/14/2024), Disp: , Rfl:     traZODone (DESYREL) 50 MG tablet, Take 50 mg by mouth Every Night. (Patient not taking: Reported on 5/14/2024), Disp: , Rfl:     vitamin D (ERGOCALCIFEROL) 1.25 MG (44753 UT) capsule capsule, Take 50,000 Units by mouth 1 (One) Time Per Week. (Patient not taking: Reported on 5/14/2024), Disp: , Rfl:   Past Medical History:   Diagnosis Date    Anxiety     Back pain     Bladder carcinoma 06/2016    High grade CIS & ta low grade    H/O hematuria     History of pneumonia 2011    Hypertension     Overweight     Smoking     Urinary retention     Wheezing     r/t smoking     Past Surgical History:   Procedure Laterality Date    BACK SURGERY      x3    CYSTECTOMY N/A 11/2/2017    Procedure: CYSTOPROSTATECTOMY WITH BILATERAL PELVIC LYMPHADENECTOMY AND ILEAL CONDUIT URINARY DIVERSON;  Surgeon: Vijay Badillo Jr., MD;  Location: [facility] OR;  Service:     CYSTOSCOPY N/A 10/9/2017    Procedure: CYSTOSCOPY AND EXAM UNDER ANESTHESIA;  Surgeon: Vijay Badillo Jr., MD;  Location: [facility] OR;  Service:     CYSTOSCOPY BLADDER BIOPSY N/A 11/30/2016    Procedure: CYSTOSCOPY BLADDER BIOPSY fulgeration of 3cm area of bladder;  Surgeon: Brandon Wolfe MD;  Location: [facility] OR;  Service:     CYSTOSCOPY  "RETROGRADE PYELOGRAM Bilateral 7/14/2017    Procedure: CYSTOSCOPY RETROGRADE PYELOGRAM;  Surgeon: Brandon Wolfe MD;  Location:  [facility] OR;  Service:     NECK SURGERY      TRANSURETHRAL RESECTION OF BLADDER TUMOR N/A 3/31/2017    Procedure: CYSTOSCOPY , BLADDER BIOPSY, AND TRANSURETHRAL RESECTION OF BLADDER TUMOR ;  Surgeon: Brandon Wolfe MD;  Location:  [facility] OR;  Service:     TRANSURETHRAL RESECTION OF BLADDER TUMOR  06/2016    High Grade CIS & Low grade ta disease    TRANSURETHRAL RESECTION OF BLADDER TUMOR N/A 7/14/2017    Procedure: CYSTOSCOPY TRANSURETHRAL RESECTION OF BLADDER TUMOR & BILATERAL RETROGRADE URETEROPYELOGRAMS;  Surgeon: Brandon Wolfe MD;  Location:  [facility] OR;  Service:            Review of Systems      Objective   PHYSICAL EXAM  Vital Signs:   Temp 97 °F (36.1 °C)   Ht 180.3 cm (71\")   Wt 92.5 kg (204 lb)   BMI 28.45 kg/m²     Physical Exam      DATA  Result Review :              Results for orders placed or performed during the hospital encounter of 05/10/24   POC Creatinine    Specimen: Blood   Result Value Ref Range    Creatinine 1.30 0.60 - 1.30 mg/dL       CT Abdomen Pelvis With & Without Contrast (05/10/2024 10:24)       CT ABDOMEN PELVIS W WO CONTRAST-     INDICATION: hx bladder cancer; C67.8-Malignant neoplasm of overlapping  sites of bladder       TECHNIQUE: Helically acquired CT images were obtained of the abdomen and  pelvis prior to and after the administration of intravenous contrast.  Postcontrast images were obtained in the arterial, venous, and delayed  phases. Arterial images were only obtained of the abdomen. Coronal and  sagittal reformations were performed.       CONTRAST:   Isovue-300      DOSE LENGTH PRODUCT: 1660.58 mGy.cm mGy cm. Automated exposure control  was also utilized to decrease patient radiation dose.     COMPARISON: 2/11/2022     FINDINGS:     Lower Chest: Unremarkable.     Liver: Likely focal fat infiltration adjacent to the falciform ligament.   "   Biliary Tree: Cholelithiasis.     Spleen: Unremarkable.     Pancreas: Unremarkable.     Adrenal Glands: Unremarkable.     Kidneys/Ureters/Bladder: Status post cystectomy with urinary diversion  and right lower quadrant ileal conduit. No enhancing renal mass. No  renal calculi. No suspicious collecting system filling defect.     Reproductive Organs: Prostatectomy.     Gastrointestinal Tract: See above, otherwise unremarkable.     Lymphatics: Unremarkable.     Vasculature: Severe atherosclerosis.     Peritoneum/Retroperitoneum: Unremarkable.     Abdominal Wall/Soft Tissues: Tiny fat-containing inguinal hernias.     Osseous Structures: Laminectomy changes in the lumbar spine. No acute  osseous findings.     IMPRESSION:     No evidence of metastatic disease in the abdomen/pelvis.     Cholelithiasis.                 This report was signed and finalized on 5/10/2024 12:05 PM by Stanislav Ellison.    Reviewed report and the images.  I reviewed these images.  There is no evidence of hydronephrosis or filling defect./DLS           ASSESSMENT AND PLAN          Problem List Items Addressed This Visit          Hematology and Neoplasia    Bladder cancer - Primary     Other Visit Diagnoses       Erectile dysfunction after radical cystectomy            Each of these chronic Urologic conditions, which I have followed >1 year,  were evaluated and managed today as follows:     Patient is currently using PGE -1 20 mcg/ Phentolamine 0.5 mg / mL (10 mL bottle) -since he has some erections that last longer than expected I would recommend that he change to 0.3 mL/inj. as opposed to 0.5 mL    Needs to change his stoma device daily.  If he does not he develops significant leakage with it.      FOLLOW UP     Return in about 1 year (around 5/14/2025) for CT Urogram.        (Please note that portions of this note were completed with a voice recognition program.)  Brandon Wolfe MD  05/15/24  12:57 CDT

## 2024-05-10 ENCOUNTER — HOSPITAL ENCOUNTER (OUTPATIENT)
Dept: CT IMAGING | Facility: HOSPITAL | Age: 54
Discharge: HOME | End: 2024-05-10
Payer: MEDICARE

## 2024-05-10 DIAGNOSIS — C67.8 MALIGNANT NEOPLASM OF OVERLAPPING SITES OF BLADDER: ICD-10-CM

## 2024-05-10 LAB — CREAT BLDA-MCNC: 1.3 MG/DL (ref 0.6–1.3)

## 2024-05-10 PROCEDURE — 25510000001 IOPAMIDOL PER 1 ML: Performed by: UROLOGY

## 2024-05-10 PROCEDURE — 74178 CT ABD&PLV WO CNTR FLWD CNTR: CPT

## 2024-05-10 PROCEDURE — 82565 ASSAY OF CREATININE: CPT

## 2024-05-10 RX ADMIN — IOPAMIDOL 100 ML: 755 INJECTION, SOLUTION INTRAVENOUS at 10:24

## 2024-05-14 ENCOUNTER — OFFICE VISIT (OUTPATIENT)
Dept: UROLOGY | Facility: CLINIC | Age: 54
End: 2024-05-14
Payer: MEDICARE

## 2024-05-14 VITALS — WEIGHT: 204 LBS | HEIGHT: 71 IN | TEMPERATURE: 97 F | BODY MASS INDEX: 28.56 KG/M2

## 2024-05-14 DIAGNOSIS — C67.8 MALIGNANT NEOPLASM OF OVERLAPPING SITES OF BLADDER: Primary | ICD-10-CM

## 2024-05-14 DIAGNOSIS — N52.32 ERECTILE DYSFUNCTION AFTER RADICAL CYSTECTOMY: ICD-10-CM

## 2024-05-14 PROCEDURE — 1160F RVW MEDS BY RX/DR IN RCRD: CPT | Performed by: UROLOGY

## 2024-05-14 PROCEDURE — 99214 OFFICE O/P EST MOD 30 MIN: CPT | Performed by: UROLOGY

## 2024-05-14 PROCEDURE — 1159F MED LIST DOCD IN RCRD: CPT | Performed by: UROLOGY

## 2024-05-14 RX ORDER — NAPROXEN SODIUM 220 MG
220 TABLET ORAL 2 TIMES DAILY PRN
COMMUNITY

## 2024-05-14 RX ORDER — ACETAMINOPHEN 325 MG/1
650 TABLET ORAL EVERY 6 HOURS PRN
COMMUNITY

## 2024-06-06 SDOH — ECONOMIC STABILITY: TRANSPORTATION INSECURITY
IN THE PAST 12 MONTHS, HAS LACK OF TRANSPORTATION KEPT YOU FROM MEETINGS, WORK, OR FROM GETTING THINGS NEEDED FOR DAILY LIVING?: NO

## 2024-06-06 SDOH — ECONOMIC STABILITY: FOOD INSECURITY: WITHIN THE PAST 12 MONTHS, THE FOOD YOU BOUGHT JUST DIDN'T LAST AND YOU DIDN'T HAVE MONEY TO GET MORE.: SOMETIMES TRUE

## 2024-06-06 SDOH — ECONOMIC STABILITY: INCOME INSECURITY: HOW HARD IS IT FOR YOU TO PAY FOR THE VERY BASICS LIKE FOOD, HOUSING, MEDICAL CARE, AND HEATING?: VERY HARD

## 2024-06-06 SDOH — ECONOMIC STABILITY: FOOD INSECURITY: WITHIN THE PAST 12 MONTHS, YOU WORRIED THAT YOUR FOOD WOULD RUN OUT BEFORE YOU GOT MONEY TO BUY MORE.: SOMETIMES TRUE

## 2024-06-06 SDOH — ECONOMIC STABILITY: HOUSING INSECURITY
IN THE LAST 12 MONTHS, WAS THERE A TIME WHEN YOU DID NOT HAVE A STEADY PLACE TO SLEEP OR SLEPT IN A SHELTER (INCLUDING NOW)?: NO

## 2024-06-06 ASSESSMENT — PATIENT HEALTH QUESTIONNAIRE - PHQ9
8. MOVING OR SPEAKING SO SLOWLY THAT OTHER PEOPLE COULD HAVE NOTICED. OR THE OPPOSITE, BEING SO FIGETY OR RESTLESS THAT YOU HAVE BEEN MOVING AROUND A LOT MORE THAN USUAL: NOT AT ALL
1. LITTLE INTEREST OR PLEASURE IN DOING THINGS: NEARLY EVERY DAY
8. MOVING OR SPEAKING SO SLOWLY THAT OTHER PEOPLE COULD HAVE NOTICED. OR THE OPPOSITE - BEING SO FIDGETY OR RESTLESS THAT YOU HAVE BEEN MOVING AROUND A LOT MORE THAN USUAL: NOT AT ALL
5. POOR APPETITE OR OVEREATING: NEARLY EVERY DAY
4. FEELING TIRED OR HAVING LITTLE ENERGY: NEARLY EVERY DAY
2. FEELING DOWN, DEPRESSED OR HOPELESS: NEARLY EVERY DAY
6. FEELING BAD ABOUT YOURSELF - OR THAT YOU ARE A FAILURE OR HAVE LET YOURSELF OR YOUR FAMILY DOWN: NOT AT ALL
SUM OF ALL RESPONSES TO PHQ QUESTIONS 1-9: 16
7. TROUBLE CONCENTRATING ON THINGS, SUCH AS READING THE NEWSPAPER OR WATCHING TELEVISION: SEVERAL DAYS
9. THOUGHTS THAT YOU WOULD BE BETTER OFF DEAD, OR OF HURTING YOURSELF: NOT AT ALL
10. IF YOU CHECKED OFF ANY PROBLEMS, HOW DIFFICULT HAVE THESE PROBLEMS MADE IT FOR YOU TO DO YOUR WORK, TAKE CARE OF THINGS AT HOME, OR GET ALONG WITH OTHER PEOPLE: NOT DIFFICULT AT ALL
3. TROUBLE FALLING OR STAYING ASLEEP: NEARLY EVERY DAY
SUM OF ALL RESPONSES TO PHQ QUESTIONS 1-9: 16
3. TROUBLE FALLING OR STAYING ASLEEP: NEARLY EVERY DAY
4. FEELING TIRED OR HAVING LITTLE ENERGY: NEARLY EVERY DAY
SUM OF ALL RESPONSES TO PHQ QUESTIONS 1-9: 16
SUM OF ALL RESPONSES TO PHQ QUESTIONS 1-9: 16
5. POOR APPETITE OR OVEREATING: NEARLY EVERY DAY
7. TROUBLE CONCENTRATING ON THINGS, SUCH AS READING THE NEWSPAPER OR WATCHING TELEVISION: SEVERAL DAYS
SUM OF ALL RESPONSES TO PHQ9 QUESTIONS 1 & 2: 6
1. LITTLE INTEREST OR PLEASURE IN DOING THINGS: NEARLY EVERY DAY
SUM OF ALL RESPONSES TO PHQ QUESTIONS 1-9: 16
10. IF YOU CHECKED OFF ANY PROBLEMS, HOW DIFFICULT HAVE THESE PROBLEMS MADE IT FOR YOU TO DO YOUR WORK, TAKE CARE OF THINGS AT HOME, OR GET ALONG WITH OTHER PEOPLE: NOT DIFFICULT AT ALL
9. THOUGHTS THAT YOU WOULD BE BETTER OFF DEAD, OR OF HURTING YOURSELF: NOT AT ALL
6. FEELING BAD ABOUT YOURSELF - OR THAT YOU ARE A FAILURE OR HAVE LET YOURSELF OR YOUR FAMILY DOWN: NOT AT ALL
2. FEELING DOWN, DEPRESSED OR HOPELESS: NEARLY EVERY DAY

## 2024-06-07 ENCOUNTER — OFFICE VISIT (OUTPATIENT)
Dept: FAMILY MEDICINE CLINIC | Age: 54
End: 2024-06-07
Payer: MEDICARE

## 2024-06-07 VITALS
HEIGHT: 71 IN | HEART RATE: 90 BPM | BODY MASS INDEX: 28.84 KG/M2 | OXYGEN SATURATION: 97 % | SYSTOLIC BLOOD PRESSURE: 138 MMHG | TEMPERATURE: 97 F | DIASTOLIC BLOOD PRESSURE: 86 MMHG | WEIGHT: 206 LBS

## 2024-06-07 DIAGNOSIS — Z11.4 ENCOUNTER FOR SCREENING FOR HIV: ICD-10-CM

## 2024-06-07 DIAGNOSIS — M19.90 ARTHRITIS: ICD-10-CM

## 2024-06-07 DIAGNOSIS — I73.9 PVD (PERIPHERAL VASCULAR DISEASE) (HCC): Primary | ICD-10-CM

## 2024-06-07 DIAGNOSIS — Z12.5 SPECIAL SCREENING FOR MALIGNANT NEOPLASM OF PROSTATE: ICD-10-CM

## 2024-06-07 DIAGNOSIS — R06.2 WHEEZING: ICD-10-CM

## 2024-06-07 DIAGNOSIS — Z11.59 NEED FOR HEPATITIS C SCREENING TEST: ICD-10-CM

## 2024-06-07 DIAGNOSIS — E11.9 TYPE 2 DIABETES MELLITUS WITHOUT COMPLICATION, WITHOUT LONG-TERM CURRENT USE OF INSULIN (HCC): Chronic | ICD-10-CM

## 2024-06-07 DIAGNOSIS — J42 CHRONIC BRONCHITIS, UNSPECIFIED CHRONIC BRONCHITIS TYPE (HCC): ICD-10-CM

## 2024-06-07 DIAGNOSIS — I25.10 CORONARY ARTERY DISEASE INVOLVING NATIVE CORONARY ARTERY OF NATIVE HEART WITHOUT ANGINA PECTORIS: ICD-10-CM

## 2024-06-07 DIAGNOSIS — I10 ESSENTIAL HYPERTENSION: ICD-10-CM

## 2024-06-07 PROCEDURE — 3017F COLORECTAL CA SCREEN DOC REV: CPT | Performed by: NURSE PRACTITIONER

## 2024-06-07 PROCEDURE — G8419 CALC BMI OUT NRM PARAM NOF/U: HCPCS | Performed by: NURSE PRACTITIONER

## 2024-06-07 PROCEDURE — 2022F DILAT RTA XM EVC RTNOPTHY: CPT | Performed by: NURSE PRACTITIONER

## 2024-06-07 PROCEDURE — 3079F DIAST BP 80-89 MM HG: CPT | Performed by: NURSE PRACTITIONER

## 2024-06-07 PROCEDURE — 3046F HEMOGLOBIN A1C LEVEL >9.0%: CPT | Performed by: NURSE PRACTITIONER

## 2024-06-07 PROCEDURE — 99214 OFFICE O/P EST MOD 30 MIN: CPT | Performed by: NURSE PRACTITIONER

## 2024-06-07 PROCEDURE — 3023F SPIROM DOC REV: CPT | Performed by: NURSE PRACTITIONER

## 2024-06-07 PROCEDURE — G8427 DOCREV CUR MEDS BY ELIG CLIN: HCPCS | Performed by: NURSE PRACTITIONER

## 2024-06-07 PROCEDURE — 3075F SYST BP GE 130 - 139MM HG: CPT | Performed by: NURSE PRACTITIONER

## 2024-06-07 PROCEDURE — 4004F PT TOBACCO SCREEN RCVD TLK: CPT | Performed by: NURSE PRACTITIONER

## 2024-06-07 RX ORDER — ALBUTEROL SULFATE 90 UG/1
2 AEROSOL, METERED RESPIRATORY (INHALATION) 4 TIMES DAILY PRN
Qty: 18 G | Refills: 5 | Status: SHIPPED | OUTPATIENT
Start: 2024-06-07

## 2024-06-07 RX ORDER — ATENOLOL 50 MG/1
50 TABLET ORAL DAILY
Qty: 30 TABLET | Refills: 3 | Status: SHIPPED | OUTPATIENT
Start: 2024-06-07

## 2024-06-07 RX ORDER — LISINOPRIL 20 MG/1
20 TABLET ORAL DAILY
Qty: 30 TABLET | Refills: 5 | Status: SHIPPED | OUTPATIENT
Start: 2024-06-07

## 2024-06-07 RX ORDER — FLUTICASONE PROPIONATE AND SALMETEROL 50; 250 UG/1; UG/1
1 POWDER RESPIRATORY (INHALATION) EVERY 12 HOURS
Qty: 60 EACH | Refills: 3 | Status: SHIPPED | OUTPATIENT
Start: 2024-06-07

## 2024-06-07 RX ORDER — MELOXICAM 7.5 MG/1
7.5 TABLET ORAL DAILY
Qty: 30 TABLET | Refills: 3 | Status: SHIPPED | OUTPATIENT
Start: 2024-06-07

## 2024-06-07 NOTE — PATIENT INSTRUCTIONS
[facility]  Food Resources*      Regional   [employer] ([employer]   Randi Montgomery, [place], [place], [place] and [place]  Ph. [phone]  Contact [facility] to find out more information on the [place] food and commodities, get set up for Meals on Wheels, or get in contact with a local Senior Center.    [facility]  Typically serve a daily lunch during the week- contact for meal times  [facility] [place]  [address] [place] [address]  [phone]  Serving a free lunch from 11:00 AM to 1:00 PM Monday through Friday. Facility includes Showers, Laundry Facility, and .  [facility]  [address] [place], [place] [address]  [phone]  [facility]  [address] [place] [address]  [phone]  Serving a free lunch on Sundays  [facility]  [address] [address]  [phone]  [facility]  [address] [address]  [phone]  Serving a free lunch on Saturdays from 11am-1pm    Senior Center   Typically serve a daily meal and serve as point of contact for Meals on Wheels program  [facility]  [address] [place], [place]  [phone]    Food Pantry  Food distribution. Most require person to bring ID/documentation. Contact for information.    [facility]   [address], [place] [address]  [phone]  [place] [facility]  [address] [place], [place] [address]  [phone]  [facility]   [address], [place] [address]  [phone]  [facility]  [address] [place], [place] [address]  [phone]  [facility]  [address] [place] [address] [place] [address]  [email or web address]   [phone]    [place] [facility]  Community supported miniature pantry filled with non-perishables. Take what

## 2024-06-07 NOTE — PROGRESS NOTES
mobic for anti inflammatory    - meloxicam (MOBIC) 7.5 MG tablet; Take 1 tablet by mouth daily  Dispense: 30 tablet; Refill: 3       Return in about 4 weeks (around 7/5/2024) for Medication Follow Up.    Orders Placed This Encounter   Procedures    CBC with Auto Differential     Standing Status:   Future     Standing Expiration Date:   6/7/2025    Comprehensive Metabolic Panel     Standing Status:   Future     Standing Expiration Date:   6/7/2025    TSH with Reflex to FT4     Standing Status:   Future     Standing Expiration Date:   6/7/2025    Lipid, Fasting     Standing Status:   Future     Standing Expiration Date:   6/7/2025    Hemoglobin A1C     Standing Status:   Future     Standing Expiration Date:   6/7/2025    Microalbumin / Creatinine Urine Ratio     Standing Status:   Future     Standing Expiration Date:   6/7/2025    Urinalysis     Standing Status:   Future     Standing Expiration Date:   6/7/2025    PSA Screening     Standing Status:   Future     Standing Expiration Date:   6/7/2025    Hepatitis C Antibody     Standing Status:   Future     Standing Expiration Date:   6/8/2025    HIV Rapid 1&2     Standing Status:   Future     Standing Expiration Date:   6/8/2025     Order Specific Question:   Specify Date & Time of Incident     Answer:   none       Orders Placed This Encounter   Medications    metFORMIN (GLUCOPHAGE) 500 MG tablet     Sig: Take 1 tablet by mouth 2 times daily (with meals)     Dispense:  60 tablet     Refill:  3    albuterol sulfate HFA (VENTOLIN HFA) 108 (90 Base) MCG/ACT inhaler     Sig: Inhale 2 puffs into the lungs 4 times daily as needed for Wheezing     Dispense:  18 g     Refill:  5    ADVAIR DISKUS 250-50 MCG/ACT AEPB diskus inhaler     Sig: Inhale 1 puff into the lungs in the morning and 1 puff in the evening.     Dispense:  60 each     Refill:  3    atenolol (TENORMIN) 50 MG tablet     Sig: Take 1 tablet by mouth daily     Dispense:  30 tablet     Refill:  3    lisinopril

## 2024-06-08 ASSESSMENT — ENCOUNTER SYMPTOMS
RESPIRATORY NEGATIVE: 1
EYES NEGATIVE: 1
GASTROINTESTINAL NEGATIVE: 1

## 2024-06-10 ENCOUNTER — TELEPHONE (OUTPATIENT)
Dept: FAMILY MEDICINE CLINIC | Age: 54
End: 2024-06-10

## 2024-06-10 DIAGNOSIS — E11.9 TYPE 2 DIABETES MELLITUS WITHOUT COMPLICATION, WITHOUT LONG-TERM CURRENT USE OF INSULIN (HCC): Chronic | ICD-10-CM

## 2024-06-10 DIAGNOSIS — Z11.59 NEED FOR HEPATITIS C SCREENING TEST: ICD-10-CM

## 2024-06-10 DIAGNOSIS — Z11.4 ENCOUNTER FOR SCREENING FOR HIV: ICD-10-CM

## 2024-06-10 DIAGNOSIS — I25.10 CORONARY ARTERY DISEASE INVOLVING NATIVE CORONARY ARTERY OF NATIVE HEART WITHOUT ANGINA PECTORIS: ICD-10-CM

## 2024-06-10 DIAGNOSIS — I10 ESSENTIAL HYPERTENSION: ICD-10-CM

## 2024-06-10 DIAGNOSIS — Z12.5 SPECIAL SCREENING FOR MALIGNANT NEOPLASM OF PROSTATE: ICD-10-CM

## 2024-06-10 LAB
ALBUMIN SERPL-MCNC: 3.9 G/DL (ref 3.5–5.2)
ALP SERPL-CCNC: 120 U/L (ref 40–130)
ALT SERPL-CCNC: 11 U/L (ref 5–41)
AMORPH SED URNS QL MICRO: ABNORMAL /HPF
ANION GAP SERPL CALCULATED.3IONS-SCNC: 16 MMOL/L (ref 7–19)
AST SERPL-CCNC: 15 U/L (ref 5–40)
BACTERIA #/AREA URNS HPF: ABNORMAL /HPF
BASOPHILS # BLD: 0.1 K/UL (ref 0–0.2)
BASOPHILS NFR BLD: 1.2 % (ref 0–1)
BILIRUB SERPL-MCNC: 0.2 MG/DL (ref 0.2–1.2)
BILIRUB UR STRIP.AUTO-MCNC: NEGATIVE MG/DL
BUN SERPL-MCNC: 15 MG/DL (ref 6–20)
CALCIUM SERPL-MCNC: 9.4 MG/DL (ref 8.6–10)
CHLORIDE SERPL-SCNC: 98 MMOL/L (ref 98–111)
CHOLEST SERPL-MCNC: 322 MG/DL (ref 160–199)
CLARITY UR: ABNORMAL
CO2 SERPL-SCNC: 21 MMOL/L (ref 22–29)
COLOR UR: YELLOW
CREAT SERPL-MCNC: 1.1 MG/DL (ref 0.5–1.2)
CREAT UR-MCNC: 133.1 MG/DL (ref 39–259)
EOSINOPHIL # BLD: 0.5 K/UL (ref 0–0.6)
EOSINOPHIL NFR BLD: 5.9 % (ref 0–5)
ERYTHROCYTE [DISTWIDTH] IN BLOOD BY AUTOMATED COUNT: 13.3 % (ref 11.5–14.5)
GLUCOSE SERPL-MCNC: 236 MG/DL (ref 74–109)
GLUCOSE UR STRIP.AUTO-MCNC: 500 MG/DL
HBA1C MFR BLD: 8.5 % (ref 4–6)
HCT VFR BLD AUTO: 44.3 % (ref 42–52)
HCV AB SERPL QL IA: NORMAL
HDLC SERPL-MCNC: 23 MG/DL (ref 55–121)
HGB BLD-MCNC: 14.5 G/DL (ref 14–18)
HGB UR STRIP.AUTO-MCNC: ABNORMAL MG/L
HIV-1 P24 AG: NORMAL
HIV1+2 AB SERPLBLD QL IA.RAPID: NORMAL
IMM GRANULOCYTES # BLD: 0 K/UL
KETONES UR STRIP.AUTO-MCNC: NEGATIVE MG/DL
LDLC SERPL CALC-MCNC: ABNORMAL MG/DL
LDLC SERPL-MCNC: 68 MG/DL
LEUKOCYTE ESTERASE UR QL STRIP.AUTO: ABNORMAL
LYMPHOCYTES # BLD: 2.3 K/UL (ref 1.1–4.5)
LYMPHOCYTES NFR BLD: 28.8 % (ref 20–40)
MCH RBC QN AUTO: 28.1 PG (ref 27–31)
MCHC RBC AUTO-ENTMCNC: 32.7 G/DL (ref 33–37)
MCV RBC AUTO: 85.9 FL (ref 80–94)
MICROALBUMIN UR-MCNC: 12 MG/DL (ref 0–19)
MICROALBUMIN/CREAT UR-RTO: 90.2 MG/G
MONOCYTES # BLD: 0.8 K/UL (ref 0–0.9)
MONOCYTES NFR BLD: 9.7 % (ref 0–10)
NEUTROPHILS # BLD: 4.3 K/UL (ref 1.5–7.5)
NEUTS SEG NFR BLD: 54.2 % (ref 50–65)
NITRITE UR QL STRIP.AUTO: POSITIVE
PH UR STRIP.AUTO: 6 [PH] (ref 5–8)
PLATELET # BLD AUTO: 197 K/UL (ref 130–400)
PMV BLD AUTO: 10.7 FL (ref 9.4–12.4)
POTASSIUM SERPL-SCNC: 5 MMOL/L (ref 3.5–5)
PROT SERPL-MCNC: 7.2 G/DL (ref 6.6–8.7)
PROT UR STRIP.AUTO-MCNC: ABNORMAL MG/DL
PSA SERPL-MCNC: 0.01 NG/ML (ref 0–4)
RBC # BLD AUTO: 5.16 M/UL (ref 4.7–6.1)
RBC #/AREA URNS HPF: ABNORMAL /HPF (ref 0–2)
SODIUM SERPL-SCNC: 135 MMOL/L (ref 136–145)
SP GR UR STRIP.AUTO: 1.02 (ref 1–1.03)
SQUAMOUS #/AREA URNS HPF: ABNORMAL /HPF
TRIGL SERPL-MCNC: 1278 MG/DL (ref 0–149)
TSH SERPL DL<=0.005 MIU/L-ACNC: 1.3 UIU/ML (ref 0.27–4.2)
UROBILINOGEN UR STRIP.AUTO-MCNC: 0.2 E.U./DL
WBC # BLD AUTO: 8 K/UL (ref 4.8–10.8)
WBC #/AREA URNS HPF: ABNORMAL /HPF (ref 0–5)

## 2024-06-10 NOTE — TELEPHONE ENCOUNTER
Patient aware of results. Patient stated he is not experiencing any UTI symptoms and that it was likely from his urostomy bag. Patient stated he doesn't have anything to check his sugars with and is wondering if it was possible to get something sent in so he can start checking.

## 2024-06-10 NOTE — TELEPHONE ENCOUNTER
----- Message from BRYAN Carias sent at 6/10/2024 10:26 AM CDT -----  Please call patient and let them know results.   Blood sugars have worsened from previous check.  A1c is 8.5.  We would like to see this less than 7 for diabetic control.  Decrease carbohydrates and sugars in diet and follow-up with Vannessa for medication adjustments.  Normal blood counts  Urinalysis shows questionable UTI.  Please ensure they culture this urine.  According to Dagoberto's note there is no complaints of urinary symptoms so would not treat this unless patient is symptomatic and culture grows bacteria.    Other labs pending

## 2024-06-11 ENCOUNTER — TELEPHONE (OUTPATIENT)
Dept: FAMILY MEDICINE CLINIC | Age: 54
End: 2024-06-11

## 2024-06-11 DIAGNOSIS — E78.2 MIXED HYPERLIPIDEMIA: Primary | Chronic | ICD-10-CM

## 2024-06-11 RX ORDER — BLOOD-GLUCOSE METER
1 KIT MISCELLANEOUS DAILY
Qty: 1 KIT | Refills: 0 | Status: SHIPPED | OUTPATIENT
Start: 2024-06-11

## 2024-06-11 RX ORDER — ATORVASTATIN CALCIUM 40 MG/1
40 TABLET, FILM COATED ORAL DAILY
Qty: 30 TABLET | Refills: 3 | Status: SHIPPED | OUTPATIENT
Start: 2024-06-11

## 2024-06-11 NOTE — TELEPHONE ENCOUNTER
----- Message from BRYAN Candelaria sent at 6/11/2024  3:54 PM CDT -----  Cholesterol was terrible.  Restarting medication for this.  Will need to recheck lipid panel and CMP in 12 weeks.   Renal function was stable.   Sending in Ozempic to get started on for A1c control

## 2024-06-12 NOTE — TELEPHONE ENCOUNTER
Tried calling pt with [facility] recommendations. No answer and no vm. Will send pt a my chart msg letting him know that this was sent to the pharmacy.

## 2024-06-13 NOTE — TELEPHONE ENCOUNTER
Called patient, spoke with: Patient regarding the results of the patients most recent labs.  I advised Patient of Vannessa Medina recommendations.   Patient did voice understanding

## 2024-07-16 ENCOUNTER — OFFICE VISIT (OUTPATIENT)
Dept: FAMILY MEDICINE CLINIC | Age: 54
End: 2024-07-16
Payer: MEDICARE

## 2024-07-16 VITALS
TEMPERATURE: 97 F | WEIGHT: 212 LBS | OXYGEN SATURATION: 97 % | DIASTOLIC BLOOD PRESSURE: 88 MMHG | BODY MASS INDEX: 29.68 KG/M2 | HEIGHT: 71 IN | HEART RATE: 66 BPM | SYSTOLIC BLOOD PRESSURE: 138 MMHG

## 2024-07-16 DIAGNOSIS — M19.90 ARTHRITIS: ICD-10-CM

## 2024-07-16 DIAGNOSIS — E11.65 TYPE 2 DIABETES MELLITUS WITH HYPERGLYCEMIA, WITHOUT LONG-TERM CURRENT USE OF INSULIN (HCC): ICD-10-CM

## 2024-07-16 DIAGNOSIS — E78.2 MIXED HYPERLIPIDEMIA: Chronic | ICD-10-CM

## 2024-07-16 DIAGNOSIS — Z93.6 PRESENCE OF UROSTOMY (HCC): Primary | ICD-10-CM

## 2024-07-16 DIAGNOSIS — J41.0 SIMPLE CHRONIC BRONCHITIS (HCC): ICD-10-CM

## 2024-07-16 DIAGNOSIS — M65.331 TRIGGER MIDDLE FINGER OF RIGHT HAND: ICD-10-CM

## 2024-07-16 DIAGNOSIS — I10 ESSENTIAL HYPERTENSION: Chronic | ICD-10-CM

## 2024-07-16 DIAGNOSIS — M65.332 TRIGGER MIDDLE FINGER OF LEFT HAND: ICD-10-CM

## 2024-07-16 PROCEDURE — 3023F SPIROM DOC REV: CPT | Performed by: NURSE PRACTITIONER

## 2024-07-16 PROCEDURE — 99214 OFFICE O/P EST MOD 30 MIN: CPT | Performed by: NURSE PRACTITIONER

## 2024-07-16 PROCEDURE — 2022F DILAT RTA XM EVC RTNOPTHY: CPT | Performed by: NURSE PRACTITIONER

## 2024-07-16 PROCEDURE — 3052F HG A1C>EQUAL 8.0%<EQUAL 9.0%: CPT | Performed by: NURSE PRACTITIONER

## 2024-07-16 PROCEDURE — G8419 CALC BMI OUT NRM PARAM NOF/U: HCPCS | Performed by: NURSE PRACTITIONER

## 2024-07-16 PROCEDURE — 3075F SYST BP GE 130 - 139MM HG: CPT | Performed by: NURSE PRACTITIONER

## 2024-07-16 PROCEDURE — 3079F DIAST BP 80-89 MM HG: CPT | Performed by: NURSE PRACTITIONER

## 2024-07-16 PROCEDURE — G8427 DOCREV CUR MEDS BY ELIG CLIN: HCPCS | Performed by: NURSE PRACTITIONER

## 2024-07-16 PROCEDURE — 3017F COLORECTAL CA SCREEN DOC REV: CPT | Performed by: NURSE PRACTITIONER

## 2024-07-16 PROCEDURE — 4004F PT TOBACCO SCREEN RCVD TLK: CPT | Performed by: NURSE PRACTITIONER

## 2024-07-16 RX ORDER — ONDANSETRON 4 MG/1
4 TABLET, ORALLY DISINTEGRATING ORAL EVERY 12 HOURS PRN
Qty: 30 TABLET | Refills: 1 | Status: SHIPPED | OUTPATIENT
Start: 2024-07-16

## 2024-07-16 SDOH — ECONOMIC STABILITY: FOOD INSECURITY: WITHIN THE PAST 12 MONTHS, THE FOOD YOU BOUGHT JUST DIDN'T LAST AND YOU DIDN'T HAVE MONEY TO GET MORE.: NEVER TRUE

## 2024-07-16 SDOH — ECONOMIC STABILITY: INCOME INSECURITY: HOW HARD IS IT FOR YOU TO PAY FOR THE VERY BASICS LIKE FOOD, HOUSING, MEDICAL CARE, AND HEATING?: NOT HARD AT ALL

## 2024-07-16 ASSESSMENT — ENCOUNTER SYMPTOMS
GASTROINTESTINAL NEGATIVE: 1
EYES NEGATIVE: 1
RESPIRATORY NEGATIVE: 1

## 2024-07-16 NOTE — PROGRESS NOTES
ARTERY BYPASS GRAFT N/A 5/1/2019    CORONARY ARTERY BYPASS GRAFT X 3 WITH LEFT INTERNAL MAMMARY ARTERY, ENDOSCOPIC  VEIN HARVEST, WITH PERFUSION. performed by Dane Pathak MD at [facility] OR    LUMBAR SPINE SURGERY N/A 2/19/2020    Phase 2:  Posterior instrumented fusion C3-C7 using lateral mass screws and rods. performed by Franko Julien DO at [facility] OR    LYMPHADENECTOMY      lower ext    PROSTATECTOMY         Social History     Tobacco Use    Smoking status: Every Day     Current packs/day: 1.00     Average packs/day: 1 pack/day for 30.0 years (30.0 ttl pk-yrs)     Types: Cigarettes    Smokeless tobacco: Never    Tobacco comments:     sometimes less   Substance Use Topics    Alcohol use: Not Currently        Current Outpatient Medications   Medication Sig Dispense Refill    Semaglutide,0.25 or 0.5MG/DOS, 2 MG/3ML SOPN Inject 0.5 mg into the skin once a week 3 mL 5    ondansetron (ZOFRAN-ODT) 4 MG disintegrating tablet Take 1 tablet by mouth every 12 hours as needed for Nausea or Vomiting 30 tablet 1    diclofenac (VOLTAREN) 50 MG EC tablet Take 1 tablet by mouth 2 times daily 60 tablet 3    glucose monitoring kit 1 kit by Does not apply route daily 1 kit 0    atorvastatin (LIPITOR) 40 MG tablet Take 1 tablet by mouth daily 30 tablet 3    metFORMIN (GLUCOPHAGE) 500 MG tablet Take 1 tablet by mouth 2 times daily (with meals) 60 tablet 3    albuterol sulfate HFA (VENTOLIN HFA) 108 (90 Base) MCG/ACT inhaler Inhale 2 puffs into the lungs 4 times daily as needed for Wheezing 18 g 5    ADVAIR DISKUS 250-50 MCG/ACT AEPB diskus inhaler Inhale 1 puff into the lungs in the morning and 1 puff in the evening. 60 each 3    atenolol (TENORMIN) 50 MG tablet Take 1 tablet by mouth daily 30 tablet 3    lisinopril (PRINIVIL;ZESTRIL) 20 MG tablet Take 1 tablet by mouth daily 30 tablet 5     No current facility-administered medications for this visit.       Allergies   Allergen Reactions    Latex Other (See Comments)     BOILS AND

## 2024-08-12 ENCOUNTER — TELEPHONE (OUTPATIENT)
Dept: FAMILY MEDICINE CLINIC | Age: 54
End: 2024-08-12

## 2024-08-12 NOTE — TELEPHONE ENCOUNTER
Pt called stating the Advair disc inhaler needs a PA     He also stated he missed a call regarding appt being scheduled- I do not see documentation so please call pt tomorrow with this appt date and time

## 2024-08-14 DIAGNOSIS — E78.2 MIXED HYPERLIPIDEMIA: Chronic | ICD-10-CM

## 2024-08-14 LAB
ALBUMIN SERPL-MCNC: 4.2 G/DL (ref 3.5–5.2)
ALP SERPL-CCNC: 103 U/L (ref 40–130)
ALT SERPL-CCNC: 17 U/L (ref 5–41)
ANION GAP SERPL CALCULATED.3IONS-SCNC: 13 MMOL/L (ref 7–19)
AST SERPL-CCNC: 15 U/L (ref 5–40)
BILIRUB SERPL-MCNC: 0.2 MG/DL (ref 0.2–1.2)
BUN SERPL-MCNC: 15 MG/DL (ref 6–20)
CALCIUM SERPL-MCNC: 9.2 MG/DL (ref 8.6–10)
CHLORIDE SERPL-SCNC: 100 MMOL/L (ref 98–111)
CHOLEST SERPL-MCNC: 137 MG/DL (ref 160–199)
CO2 SERPL-SCNC: 24 MMOL/L (ref 22–29)
CREAT SERPL-MCNC: 1.3 MG/DL (ref 0.5–1.2)
GLUCOSE SERPL-MCNC: 155 MG/DL (ref 74–109)
HDLC SERPL-MCNC: 30 MG/DL (ref 55–121)
LDLC SERPL CALC-MCNC: ABNORMAL MG/DL
LDLC SERPL-MCNC: 50 MG/DL
POTASSIUM SERPL-SCNC: 4.1 MMOL/L (ref 3.5–5)
PROT SERPL-MCNC: 7.2 G/DL (ref 6.6–8.7)
SODIUM SERPL-SCNC: 137 MMOL/L (ref 136–145)
TRIGL SERPL-MCNC: 444 MG/DL (ref 0–149)

## 2024-08-15 ENCOUNTER — TELEPHONE (OUTPATIENT)
Dept: FAMILY MEDICINE CLINIC | Age: 54
End: 2024-08-15

## 2024-08-15 NOTE — TELEPHONE ENCOUNTER
Attempted to call patient and had to leave a message.  I am unsure what the appt is that he is talking about

## 2024-08-15 NOTE — TELEPHONE ENCOUNTER
----- Message from BRYAN Candelaria sent at 8/14/2024  4:35 PM CDT -----  Please let patient know that labs have returned.  Lipid panel does show improvement from last check total cholesterol going down from 322 down to 137.  LDL did decrease down from 68 down to 50 and triglycerides 1278 down to 440.  Again this is a significant improvement from last check.  CMP did show slight increase in renal function.  I do recommend increasing water intake and recheck a BMP in 1 week.

## 2024-08-15 NOTE — TELEPHONE ENCOUNTER
Spoke to patient about lab results. He understood. Still had questions about inhaler and referral appointment. I told him to call [facility] to confirm that appointment, I didn't see anything in his chart about that, and that the PA on the inhaler was being worked on.

## 2024-08-26 SDOH — HEALTH STABILITY: PHYSICAL HEALTH: ON AVERAGE, HOW MANY DAYS PER WEEK DO YOU ENGAGE IN MODERATE TO STRENUOUS EXERCISE (LIKE A BRISK WALK)?: 0 DAYS

## 2024-08-26 SDOH — HEALTH STABILITY: PHYSICAL HEALTH: ON AVERAGE, HOW MANY MINUTES DO YOU ENGAGE IN EXERCISE AT THIS LEVEL?: 0 MIN

## 2024-08-26 ASSESSMENT — PATIENT HEALTH QUESTIONNAIRE - PHQ9
SUM OF ALL RESPONSES TO PHQ9 QUESTIONS 1 & 2: 6
8. MOVING OR SPEAKING SO SLOWLY THAT OTHER PEOPLE COULD HAVE NOTICED. OR THE OPPOSITE, BEING SO FIGETY OR RESTLESS THAT YOU HAVE BEEN MOVING AROUND A LOT MORE THAN USUAL: NOT AT ALL
4. FEELING TIRED OR HAVING LITTLE ENERGY: NEARLY EVERY DAY
10. IF YOU CHECKED OFF ANY PROBLEMS, HOW DIFFICULT HAVE THESE PROBLEMS MADE IT FOR YOU TO DO YOUR WORK, TAKE CARE OF THINGS AT HOME, OR GET ALONG WITH OTHER PEOPLE: SOMEWHAT DIFFICULT
1. LITTLE INTEREST OR PLEASURE IN DOING THINGS: NEARLY EVERY DAY
2. FEELING DOWN, DEPRESSED OR HOPELESS: NEARLY EVERY DAY
5. POOR APPETITE OR OVEREATING: SEVERAL DAYS
SUM OF ALL RESPONSES TO PHQ QUESTIONS 1-9: 16
9. THOUGHTS THAT YOU WOULD BE BETTER OFF DEAD, OR OF HURTING YOURSELF: NOT AT ALL
SUM OF ALL RESPONSES TO PHQ QUESTIONS 1-9: 16
SUM OF ALL RESPONSES TO PHQ QUESTIONS 1-9: 16
3. TROUBLE FALLING OR STAYING ASLEEP: NEARLY EVERY DAY
SUM OF ALL RESPONSES TO PHQ QUESTIONS 1-9: 16
6. FEELING BAD ABOUT YOURSELF - OR THAT YOU ARE A FAILURE OR HAVE LET YOURSELF OR YOUR FAMILY DOWN: NEARLY EVERY DAY
7. TROUBLE CONCENTRATING ON THINGS, SUCH AS READING THE NEWSPAPER OR WATCHING TELEVISION: NOT AT ALL

## 2024-08-26 ASSESSMENT — LIFESTYLE VARIABLES
HOW MANY STANDARD DRINKS CONTAINING ALCOHOL DO YOU HAVE ON A TYPICAL DAY: PATIENT DOES NOT DRINK
HOW OFTEN DO YOU HAVE A DRINK CONTAINING ALCOHOL: 1
HOW OFTEN DO YOU HAVE SIX OR MORE DRINKS ON ONE OCCASION: 1
HOW MANY STANDARD DRINKS CONTAINING ALCOHOL DO YOU HAVE ON A TYPICAL DAY: 0
HOW OFTEN DO YOU HAVE A DRINK CONTAINING ALCOHOL: NEVER

## 2024-08-27 ENCOUNTER — OFFICE VISIT (OUTPATIENT)
Dept: FAMILY MEDICINE CLINIC | Age: 54
End: 2024-08-27
Payer: MEDICARE

## 2024-08-27 VITALS
DIASTOLIC BLOOD PRESSURE: 78 MMHG | HEIGHT: 71 IN | BODY MASS INDEX: 29.26 KG/M2 | OXYGEN SATURATION: 96 % | WEIGHT: 209 LBS | TEMPERATURE: 97.9 F | SYSTOLIC BLOOD PRESSURE: 126 MMHG | HEART RATE: 80 BPM

## 2024-08-27 DIAGNOSIS — Z00.00 INITIAL MEDICARE ANNUAL WELLNESS VISIT: ICD-10-CM

## 2024-08-27 DIAGNOSIS — F32.A ANXIETY AND DEPRESSION: ICD-10-CM

## 2024-08-27 DIAGNOSIS — Z87.891 PERSONAL HISTORY OF TOBACCO USE: ICD-10-CM

## 2024-08-27 DIAGNOSIS — F41.9 ANXIETY AND DEPRESSION: ICD-10-CM

## 2024-08-27 DIAGNOSIS — K21.9 GERD WITHOUT ESOPHAGITIS: ICD-10-CM

## 2024-08-27 DIAGNOSIS — E11.65 TYPE 2 DIABETES MELLITUS WITH HYPERGLYCEMIA, WITHOUT LONG-TERM CURRENT USE OF INSULIN (HCC): Primary | ICD-10-CM

## 2024-08-27 DIAGNOSIS — J42 CHRONIC BRONCHITIS, UNSPECIFIED CHRONIC BRONCHITIS TYPE (HCC): ICD-10-CM

## 2024-08-27 LAB — HBA1C MFR BLD: 6.9 %

## 2024-08-27 PROCEDURE — 3017F COLORECTAL CA SCREEN DOC REV: CPT | Performed by: NURSE PRACTITIONER

## 2024-08-27 PROCEDURE — 2022F DILAT RTA XM EVC RTNOPTHY: CPT | Performed by: NURSE PRACTITIONER

## 2024-08-27 PROCEDURE — 3074F SYST BP LT 130 MM HG: CPT | Performed by: NURSE PRACTITIONER

## 2024-08-27 PROCEDURE — 3044F HG A1C LEVEL LT 7.0%: CPT | Performed by: NURSE PRACTITIONER

## 2024-08-27 PROCEDURE — G8427 DOCREV CUR MEDS BY ELIG CLIN: HCPCS | Performed by: NURSE PRACTITIONER

## 2024-08-27 PROCEDURE — 4004F PT TOBACCO SCREEN RCVD TLK: CPT | Performed by: NURSE PRACTITIONER

## 2024-08-27 PROCEDURE — 3023F SPIROM DOC REV: CPT | Performed by: NURSE PRACTITIONER

## 2024-08-27 PROCEDURE — 3078F DIAST BP <80 MM HG: CPT | Performed by: NURSE PRACTITIONER

## 2024-08-27 PROCEDURE — G8419 CALC BMI OUT NRM PARAM NOF/U: HCPCS | Performed by: NURSE PRACTITIONER

## 2024-08-27 PROCEDURE — 83036 HEMOGLOBIN GLYCOSYLATED A1C: CPT | Performed by: NURSE PRACTITIONER

## 2024-08-27 PROCEDURE — G0296 VISIT TO DETERM LDCT ELIG: HCPCS | Performed by: NURSE PRACTITIONER

## 2024-08-27 PROCEDURE — G0438 PPPS, INITIAL VISIT: HCPCS | Performed by: NURSE PRACTITIONER

## 2024-08-27 PROCEDURE — 99214 OFFICE O/P EST MOD 30 MIN: CPT | Performed by: NURSE PRACTITIONER

## 2024-08-27 RX ORDER — AZITHROMYCIN 250 MG/1
TABLET, FILM COATED ORAL
Qty: 6 TABLET | Refills: 0 | Status: SHIPPED | OUTPATIENT
Start: 2024-08-27 | End: 2024-09-06

## 2024-08-27 RX ORDER — FLUTICASONE PROPIONATE AND SALMETEROL 50; 250 UG/1; UG/1
1 POWDER RESPIRATORY (INHALATION) EVERY 12 HOURS
Qty: 60 EACH | Refills: 3 | Status: SHIPPED | OUTPATIENT
Start: 2024-08-27

## 2024-08-27 RX ORDER — OMEPRAZOLE 40 MG/1
40 CAPSULE, DELAYED RELEASE ORAL
Qty: 30 CAPSULE | Refills: 3 | Status: SHIPPED | OUTPATIENT
Start: 2024-08-27

## 2024-08-27 RX ORDER — METHYLPREDNISOLONE 4 MG
TABLET, DOSE PACK ORAL
Qty: 1 KIT | Refills: 0 | Status: SHIPPED | OUTPATIENT
Start: 2024-08-27

## 2024-08-27 RX ORDER — FLUOXETINE 10 MG/1
10 CAPSULE ORAL DAILY
Qty: 30 CAPSULE | Refills: 3 | Status: SHIPPED | OUTPATIENT
Start: 2024-08-27

## 2024-08-27 ASSESSMENT — ENCOUNTER SYMPTOMS
RESPIRATORY NEGATIVE: 1
EYES NEGATIVE: 1
BACK PAIN: 1
NAUSEA: 1

## 2024-08-27 NOTE — PROGRESS NOTES
capsule; Take 1 capsule by mouth every morning (before breakfast)  Dispense: 30 capsule; Refill: 3       Return in about 6 weeks (around 10/8/2024) for Medication Follow Up.    Orders Placed This Encounter   Procedures    CT Lung Screen (Initial/Annual/Baseline)     Age: Patient is 54 y.o.    Smoking History:   Tobacco Use      Smoking status: Every Day        Packs/day: 1.00        Years: 1 pack/day for 30.0 years (30.0 ttl pk-yrs)        Types: Cigarettes      Smokeless tobacco: Never      Tobacco comments: sometimes less        Date of last lung cancer screening: No previous lung cancer screening exam     Standing Status:   Future     Standing Expiration Date:   2026     Order Specific Question:   Is there documentation of shared decision making?     Answer:   Yes     Order Specific Question:   Is this a low dose CT or a routine CT?     Answer:   Low Dose CT [1]     Order Specific Question:   Is this the first (baseline) CT or an annual exam?     Answer:   Annual [2]     Order Specific Question:   Does the patient show any signs or symptoms of lung cancer?     Answer:   No     Order Specific Question:   Smoking Status?     Answer:   Every Day [1]     Order Specific Question:   Pack Years     Answer:   30    POCT glycosylated hemoglobin (Hb A1C)    HI VISIT TO DISCUSS LUNG CA SCREEN W LDCT       Orders Placed This Encounter   Medications    ADVAIR DISKUS 250-50 MCG/ACT AEPB diskus inhaler     Sig: Inhale 1 puff into the lungs in the morning and 1 puff in the evening.     Dispense:  60 each     Refill:  3    FLUoxetine (PROZAC) 10 MG capsule     Sig: Take 1 capsule by mouth daily     Dispense:  30 capsule     Refill:  3    azithromycin (ZITHROMAX) 250 MG tablet     Simg on day 1 followed by 250mg on days 2 - 5     Dispense:  6 tablet     Refill:  0    methylPREDNISolone (MEDROL DOSEPACK) 4 MG tablet     Sig: Take by mouth.     Dispense:  1 kit     Refill:  0    omeprazole (PRILOSEC) 40 MG delayed release  capsule     Sig: Take 1 capsule by mouth every morning (before breakfast)     Dispense:  30 capsule     Refill:  3            Patient offered educational handouts and has had all questions answered.  Patient voices understanding and agrees to plans along with risks and benefits of plan. Patient is instructed to continue prior meds, diet, and exercise plans as instructed. Patient agrees to follow up as instructed and sooner if needed.  Patient agrees to go to ER if condition becomes emergent.      EMR Dragon/transcription disclaimer: Some of this encounter note is an electronic transcription/translation of spoken language to printed text. The electronic translation of spoken language may permit erroneous, or at times, nonsensical words or phrases to be inadvertently transcribed. Although I have reviewed the note for such errors, some may still exist.    Electronically signed by BRYAN Candelaria on 8/27/2024 at 1:03 PM

## 2024-08-27 NOTE — PROGRESS NOTES
Objective   Vitals:    08/27/24 0927   BP: 126/78   Site: Right Upper Arm   Position: Sitting   Cuff Size: Medium Adult   Pulse: 80   Temp: 97.9 °F (36.6 °C)   TempSrc: Temporal   SpO2: 96%   Weight: 94.8 kg (209 lb)   Height: 1.803 m (5' 11\")      Body mass index is 29.15 kg/m².                    Allergies   Allergen Reactions    Latex Other (See Comments)     BOILS AND BLISTERS- ALLERGY CALLED TO [facility] SURGERY SCHEDULING.    Demerol Hcl [Meperidine] Hives     And n/v     Prior to Visit Medications    Medication Sig Taking? Authorizing Provider   ADVAIR DISKUS 250-50 MCG/ACT AEPB diskus inhaler Inhale 1 puff into the lungs in the morning and 1 puff in the evening. Yes Vannessa Medina APRN   FLUoxetine (PROZAC) 10 MG capsule Take 1 capsule by mouth daily Yes Vannessa Medina APRN   azithromycin (ZITHROMAX) 250 MG tablet 500mg on day 1 followed by 250mg on days 2 - 5 Yes Vannessa Medina APRN   methylPREDNISolone (MEDROL DOSEPACK) 4 MG tablet Take by mouth. Yes Vannessa Medina APRN   omeprazole (PRILOSEC) 40 MG delayed release capsule Take 1 capsule by mouth every morning (before breakfast) Yes Vannessa Medina APRN   Semaglutide,0.25 or 0.5MG/DOS, 2 MG/3ML SOPN Inject 0.5 mg into the skin once a week Yes Vannessa Medina APRN   ondansetron (ZOFRAN-ODT) 4 MG disintegrating tablet Take 1 tablet by mouth every 12 hours as needed for Nausea or Vomiting Yes Vannessa Medina APRN   diclofenac (VOLTAREN) 50 MG EC tablet Take 1 tablet by mouth 2 times daily Yes Vannessa Medina APRN   glucose monitoring kit 1 kit by Does not apply route daily Yes Vannessa Medina APRN   atorvastatin (LIPITOR) 40 MG tablet Take 1 tablet by mouth daily Yes Vannessa Medina APRN   metFORMIN (GLUCOPHAGE) 500 MG tablet Take 1 tablet by mouth 2 times daily (with meals) Yes Vannessa Medina APRN   albuterol sulfate HFA (VENTOLIN HFA) 108 (90 Base) MCG/ACT inhaler Inhale 2 puffs into the lungs 4 times daily as needed for Wheezing Yes Vannessa Medina APRN   atenolol

## 2024-08-27 NOTE — PATIENT INSTRUCTIONS
while others meet online.  Try using these resources to help you find the best support group for you.  Your doctor, health care team, or counselor.  People with the same health concern.  Your local Anabaptism, Orthodoxy, Adventist, or other Hoahaoism group.  A city, state, or national group that provides support for your health concern. Check your local library or community center for a list of these groups. Or look for information online.  Your local community, friends, and family.  Supportive relationships  A supportive relationship includes emotional support such as love, trust, and understanding, as well as advice and concrete help, such as help managing your time.  Reach out to others  Family and friends can help you. Ask them to:  Listen to you and give you encouragement. This can keep you from feeling hopeless or alone.  Help with small daily tasks or with bigger problems. A helping hand can keep you from feeling overwhelmed.  Help you manage a health problem. For example, ask them to go to doctor visits with you. Your loved ones can offer support by being involved in your medical care.  Respect your relationships  A good relationship is also a two-way street. You count on help from others, but they also count on you.  Know your friends' limits. You don't have to see or call your friends every day. If you are going through a rough patch, ask friends if you can contact them outside of the usual boundaries.  Don't always complain or talk about yourself. Know when it's time to stop talking and listen or just enjoy your friend's company.  Know that good friends can be a bad influence. For example, if a friend encourages you to drink when you know it will harm you, you may want to end the friendship.  Where can you learn more?  Go to https://www.[employer].net/patientEd and enter G092 to learn more about \"Learning About Emotional Support.\"  Current as of: June 24, 2023  Content Version: 14.1  © 8952-8007 Healthwise,  screening, you can go back to your usual activities right away.  A lung cancer screening test can't tell if you have lung cancer. If your results are positive, your doctor can't tell whether an abnormal finding is a harmless nodule, cancer, or something else without doing more tests.  What can you do to help prevent lung cancer?  Some lung cancers can't be prevented. But if you smoke, quitting smoking is the best step you can take to prevent lung cancer. If you want to quit, your doctor can recommend medicines or other ways to help.  Follow-up care is a key part of your treatment and safety. Be sure to make and go to all appointments, and call your doctor if you are having problems. It's also a good idea to know your test results and keep a list of the medicines you take.  Where can you learn more?  Go to https://www.[employer].net/patientEd and enter Q940 to learn more about \"Learning About Lung Cancer Screening.\"  Current as of: October 25, 2023  Content Version: 14.1  © 2006-2024 [employer].   Care instructions adapted under license by [employer]. If you have questions about a medical condition or this instruction, always ask your healthcare professional. [employer] disclaims any warranty or liability for your use of this information.           A Healthy Heart: Care Instructions  Overview     Coronary artery disease, also called heart disease, occurs when a substance called plaque builds up in the vessels that supply oxygen-rich blood to your heart muscle. This can narrow the blood vessels and reduce blood flow. A heart attack happens when blood flow is completely blocked. A high-fat diet, smoking, and other factors increase the risk of heart disease.  Your doctor has found that you have a chance of having heart disease. A heart-healthy lifestyle can help keep your heart healthy and prevent heart disease. This lifestyle includes eating healthy, being active, staying at a weight

## 2024-09-05 ENCOUNTER — TELEPHONE (OUTPATIENT)
Dept: FAMILY MEDICINE CLINIC | Age: 54
End: 2024-09-05

## 2024-09-05 RX ORDER — FLUTICASONE PROPIONATE AND SALMETEROL XINAFOATE 115; 21 UG/1; UG/1
2 AEROSOL, METERED RESPIRATORY (INHALATION) 2 TIMES DAILY
Qty: 1 EACH | Refills: 3 | Status: SHIPPED | OUTPATIENT
Start: 2024-09-05

## 2024-09-05 NOTE — TELEPHONE ENCOUNTER
Advair Denied-  See below:    Denied. This drug is not covered on the formulary. We are denying your request because we do not show that you have tried at least 2 covered drugs that can treat your condition. Other covered drug(s) is/are: Advair HFA inhaler (45-21mcg/actuation, 115-21mcg/actuation, 230-21mcg/actuation) Breo Ellipta inhalation blister with device (50-25, 100-25, and 200-25mcg/dose), Dulera HFA aerosol inhaler (50-5 mcg/actuation, 100-5 mcg/actuation, 200-5 mcg/actuation) and Trelegy Ellipta inhalation blister with device (100-62.5-25 and 200-62.5-25mcg). We may be able to make an exception to cover this drug. Your doctor will need to send us medical records showing that you tried this drug. If you cannot take the covered drug,

## 2024-09-27 ENCOUNTER — HOSPITAL ENCOUNTER (OUTPATIENT)
Dept: CT IMAGING | Age: 54
Discharge: HOME | End: 2024-09-27
Payer: MEDICARE

## 2024-09-27 DIAGNOSIS — Z87.891 PERSONAL HISTORY OF TOBACCO USE: ICD-10-CM

## 2024-09-27 PROCEDURE — 71271 CT THORAX LUNG CANCER SCR C-: CPT

## 2024-10-07 DIAGNOSIS — E11.9 TYPE 2 DIABETES MELLITUS WITHOUT COMPLICATION, WITHOUT LONG-TERM CURRENT USE OF INSULIN (HCC): Chronic | ICD-10-CM

## 2024-10-07 DIAGNOSIS — I10 ESSENTIAL HYPERTENSION: ICD-10-CM

## 2024-10-08 RX ORDER — ATENOLOL 50 MG/1
50 TABLET ORAL DAILY
Qty: 30 TABLET | Refills: 0 | Status: SHIPPED | OUTPATIENT
Start: 2024-10-08

## 2024-10-08 NOTE — TELEPHONE ENCOUNTER
Sameer called requesting a refill of the below medication which has been pended for you:     Requested Prescriptions     Pending Prescriptions Disp Refills    atenolol (TENORMIN) 50 MG tablet [Pharmacy Med Name: ATENOLOL 50 MG TABLET 50 Tablet] 30 tablet 3     Sig: TAKE 1 TABLET BY MOUTH DAILY    metFORMIN (GLUCOPHAGE) 500 MG tablet [Pharmacy Med Name: METFORMIN  MG TABS 500 Tablet] 60 tablet 3     Sig: TAKE 1 TABLET BY MOUTH 2 TIMES DAILY (WITH MEALS)       Last Appointment Date: 8/27/2024  Next Appointment Date: 10/15/2024    Allergies   Allergen Reactions    Latex Other (See Comments)     BOILS AND BLISTERS- ALLERGY CALLED TO [facility] SURGERY SCHEDULING.    Demerol Hcl [Meperidine] Hives     And n/v

## 2024-10-15 ENCOUNTER — OFFICE VISIT (OUTPATIENT)
Dept: FAMILY MEDICINE CLINIC | Age: 54
End: 2024-10-15
Payer: MEDICARE

## 2024-10-15 VITALS
TEMPERATURE: 97.9 F | BODY MASS INDEX: 28.14 KG/M2 | WEIGHT: 201 LBS | HEART RATE: 82 BPM | SYSTOLIC BLOOD PRESSURE: 120 MMHG | OXYGEN SATURATION: 97 % | DIASTOLIC BLOOD PRESSURE: 82 MMHG | HEIGHT: 71 IN

## 2024-10-15 DIAGNOSIS — R06.02 SOB (SHORTNESS OF BREATH): ICD-10-CM

## 2024-10-15 DIAGNOSIS — F41.9 ANXIETY AND DEPRESSION: ICD-10-CM

## 2024-10-15 DIAGNOSIS — R53.83 DECREASED STAMINA: ICD-10-CM

## 2024-10-15 DIAGNOSIS — I42.1 HYPERTROPHIC OBSTRUCTIVE CARDIOMYOPATHY (HCC): ICD-10-CM

## 2024-10-15 DIAGNOSIS — F32.A ANXIETY AND DEPRESSION: ICD-10-CM

## 2024-10-15 DIAGNOSIS — I25.10 CORONARY ARTERY DISEASE INVOLVING NATIVE CORONARY ARTERY OF NATIVE HEART WITHOUT ANGINA PECTORIS: Primary | ICD-10-CM

## 2024-10-15 PROCEDURE — 3017F COLORECTAL CA SCREEN DOC REV: CPT | Performed by: NURSE PRACTITIONER

## 2024-10-15 PROCEDURE — 4004F PT TOBACCO SCREEN RCVD TLK: CPT | Performed by: NURSE PRACTITIONER

## 2024-10-15 PROCEDURE — 3079F DIAST BP 80-89 MM HG: CPT | Performed by: NURSE PRACTITIONER

## 2024-10-15 PROCEDURE — 99214 OFFICE O/P EST MOD 30 MIN: CPT | Performed by: NURSE PRACTITIONER

## 2024-10-15 PROCEDURE — 3074F SYST BP LT 130 MM HG: CPT | Performed by: NURSE PRACTITIONER

## 2024-10-15 PROCEDURE — G8419 CALC BMI OUT NRM PARAM NOF/U: HCPCS | Performed by: NURSE PRACTITIONER

## 2024-10-15 PROCEDURE — G8427 DOCREV CUR MEDS BY ELIG CLIN: HCPCS | Performed by: NURSE PRACTITIONER

## 2024-10-15 PROCEDURE — G8484 FLU IMMUNIZE NO ADMIN: HCPCS | Performed by: NURSE PRACTITIONER

## 2024-10-15 RX ORDER — ONDANSETRON 4 MG/1
4 TABLET, ORALLY DISINTEGRATING ORAL EVERY 12 HOURS PRN
Qty: 30 TABLET | Refills: 1 | Status: SHIPPED | OUTPATIENT
Start: 2024-10-15

## 2024-10-15 RX ORDER — BACLOFEN 10 MG/1
10 TABLET ORAL 2 TIMES DAILY
Qty: 60 TABLET | Refills: 1 | Status: SHIPPED | OUTPATIENT
Start: 2024-10-15

## 2024-10-15 RX ORDER — MELOXICAM 7.5 MG/1
7.5 TABLET ORAL DAILY
Qty: 30 TABLET | Refills: 3 | Status: SHIPPED | OUTPATIENT
Start: 2024-10-15 | End: 2024-10-15

## 2024-10-15 ASSESSMENT — ENCOUNTER SYMPTOMS
SHORTNESS OF BREATH: 1
EYES NEGATIVE: 1
GASTROINTESTINAL NEGATIVE: 1

## 2024-10-15 NOTE — PROGRESS NOTES
heart without angina pectoris  I25.10 Nuclear stress test with myocardial perfusion      2. Anxiety and depression  F41.9 FLUoxetine (PROZAC) 20 MG capsule    F32.A       3. Hypertrophic obstructive cardiomyopathy (HCC)  I42.1 Echo (TTE) complete (PRN contrast/bubble/strain/3D)      4. SOB (shortness of breath)  R06.02            No results found for this visit on 10/15/24.    Plan:     1. Anxiety and depression  Increase prozac up to 20 mg as discussed.  - FLUoxetine (PROZAC) 20 MG capsule; Take 1 capsule by mouth daily  Dispense: 30 capsule; Refill: 3    2. Coronary artery disease involving native coronary artery of native heart without angina pectoris  Due to history and symptoms I am checking NM stress test  - Nuclear stress test with myocardial perfusion; Future    3. Hypertrophic obstructive cardiomyopathy (HCC)  Checking echo due to history of hypertrophic cardiomyopathy  - Echo (TTE) complete (PRN contrast/bubble/strain/3D); Future    4. SOB (shortness of breath)  Checking stress test and echo as discussed       Changing from Diclofenac to Baclofen to see if this helps with chronic pain.     Return in about 6 weeks (around 11/26/2024), or if symptoms worsen or fail to improve, for Follow up chronic conditions.    Orders Placed This Encounter   Procedures    Nuclear stress test with myocardial perfusion     Standing Status:   Future     Standing Expiration Date:   10/15/2025     Order Specific Question:   What stress agent should be used?     Answer:   Lexiscan     Order Specific Question:   NM Radiopharmaceutical     Answer:   Per Facility Protocol       Orders Placed This Encounter   Medications    ondansetron (ZOFRAN-ODT) 4 MG disintegrating tablet     Sig: Take 1 tablet by mouth every 12 hours as needed for Nausea or Vomiting     Dispense:  30 tablet     Refill:  1    FLUoxetine (PROZAC) 20 MG capsule     Sig: Take 1 capsule by mouth daily     Dispense:  30 capsule     Refill:  3            Patient

## 2024-10-16 ENCOUNTER — TELEPHONE (OUTPATIENT)
Dept: FAMILY MEDICINE CLINIC | Age: 54
End: 2024-10-16

## 2024-10-16 NOTE — TELEPHONE ENCOUNTER
Tried calling patient to give scheduled appointment dates for upcoming testing.    ECHO  10/24/2024 @ 1:00 pm Go to the Main Entrance of [facility]      Amaya Scan 12/06/2024 @ 12:00 pm patient is to go to the [facility] outpatient center for registration. NPO after midnight the night prior before the test.    If patients symptoms worsen between now and 12/06/2024 Patient is to GO TO THE EMERGENCY ROOM per Vannessa Medina

## 2024-10-17 ENCOUNTER — TELEPHONE (OUTPATIENT)
Dept: FAMILY MEDICINE CLINIC | Age: 54
End: 2024-10-17

## 2024-10-17 NOTE — TELEPHONE ENCOUNTER
Called patient, spoke with: Patient regarding the results of the patients most recent ct.  I advised Patient of Vannessa Medina recommendations.   Patient did voice understanding

## 2024-10-17 NOTE — TELEPHONE ENCOUNTER
----- Message from BRYAN Candelaria sent at 10/17/2024 12:22 PM CDT -----  Please let patient know that low dose CT scan of lungs has returned and was normal without any pulmonary nodules or masses. We can repeat scan in 1 year to continue to monitor for any changes related to tobacco use. Thanks!

## 2024-10-24 ENCOUNTER — HOSPITAL ENCOUNTER (OUTPATIENT)
Age: 54
Discharge: HOME | End: 2024-10-24
Payer: MEDICARE

## 2024-10-24 ENCOUNTER — TELEPHONE (OUTPATIENT)
Dept: FAMILY MEDICINE CLINIC | Age: 54
End: 2024-10-24

## 2024-10-24 VITALS
SYSTOLIC BLOOD PRESSURE: 156 MMHG | BODY MASS INDEX: 28.14 KG/M2 | WEIGHT: 201 LBS | DIASTOLIC BLOOD PRESSURE: 75 MMHG | HEIGHT: 71 IN

## 2024-10-24 DIAGNOSIS — R06.02 SOB (SHORTNESS OF BREATH): ICD-10-CM

## 2024-10-24 DIAGNOSIS — I51.7: Primary | ICD-10-CM

## 2024-10-24 DIAGNOSIS — I42.1 HYPERTROPHIC OBSTRUCTIVE CARDIOMYOPATHY (HCC): ICD-10-CM

## 2024-10-24 DIAGNOSIS — I25.10 CORONARY ARTERY DISEASE INVOLVING NATIVE CORONARY ARTERY OF NATIVE HEART WITHOUT ANGINA PECTORIS: ICD-10-CM

## 2024-10-24 LAB
ECHO AO ASC DIAM: 3 CM
ECHO AO ASCENDING AORTA INDEX: 1.42 CM/M2
ECHO AO ROOT DIAM: 2.4 CM
ECHO AO ROOT INDEX: 1.14 CM/M2
ECHO AV AREA PEAK VELOCITY: 2.5 CM2
ECHO AV AREA VTI: 2.5 CM2
ECHO AV AREA/BSA PEAK VELOCITY: 1.2 CM2/M2
ECHO AV AREA/BSA VTI: 1.2 CM2/M2
ECHO AV MEAN GRADIENT: 5 MMHG
ECHO AV MEAN VELOCITY: 1 M/S
ECHO AV PEAK GRADIENT: 9 MMHG
ECHO AV PEAK VELOCITY: 1.5 M/S
ECHO AV VELOCITY RATIO: 0.73
ECHO AV VTI: 29.4 CM
ECHO BSA: 2.14 M2
ECHO EST RA PRESSURE: 3 MMHG
ECHO IVC PROX: 1.2 CM
ECHO LA AREA 2C: 22.6 CM2
ECHO LA AREA 4C: 19.5 CM2
ECHO LA DIAMETER INDEX: 1.56 CM/M2
ECHO LA DIAMETER: 3.3 CM
ECHO LA MAJOR AXIS: 6 CM
ECHO LA MINOR AXIS: 6.3 CM
ECHO LA TO AORTIC ROOT RATIO: 1.38
ECHO LA VOL BP: 61 ML (ref 18–58)
ECHO LA VOL MOD A2C: 68 ML (ref 18–58)
ECHO LA VOL MOD A4C: 52 ML (ref 18–58)
ECHO LA VOL/BSA BIPLANE: 29 ML/M2 (ref 16–34)
ECHO LA VOLUME INDEX MOD A2C: 32 ML/M2 (ref 16–34)
ECHO LA VOLUME INDEX MOD A4C: 25 ML/M2 (ref 16–34)
ECHO LV E' LATERAL VELOCITY: 8.9 CM/S
ECHO LV E' SEPTAL VELOCITY: 7.8 CM/S
ECHO LV EDV A2C: 81 ML
ECHO LV EDV A4C: 86 ML
ECHO LV EDV INDEX A4C: 41 ML/M2
ECHO LV EDV NDEX A2C: 38 ML/M2
ECHO LV EJECTION FRACTION A2C: 53 %
ECHO LV EJECTION FRACTION A4C: 57 %
ECHO LV EJECTION FRACTION BIPLANE: 55 % (ref 55–100)
ECHO LV ESV A2C: 38 ML
ECHO LV ESV A4C: 37 ML
ECHO LV ESV INDEX A2C: 18 ML/M2
ECHO LV ESV INDEX A4C: 18 ML/M2
ECHO LV FRACTIONAL SHORTENING: 34 % (ref 28–44)
ECHO LV GLOBAL LONGITUDINAL STRAIN (GLS): -18.8 %
ECHO LV INTERNAL DIMENSION DIASTOLE INDEX: 2.23 CM/M2
ECHO LV INTERNAL DIMENSION DIASTOLIC: 4.7 CM (ref 4.2–5.9)
ECHO LV INTERNAL DIMENSION SYSTOLIC INDEX: 1.47 CM/M2
ECHO LV INTERNAL DIMENSION SYSTOLIC: 3.1 CM
ECHO LV IVSD: 1.2 CM (ref 0.6–1)
ECHO LV MASS 2D: 224.8 G (ref 88–224)
ECHO LV MASS INDEX 2D: 106.5 G/M2 (ref 49–115)
ECHO LV POSTERIOR WALL DIASTOLIC: 1.3 CM (ref 0.6–1)
ECHO LV RELATIVE WALL THICKNESS RATIO: 0.55
ECHO LVOT AREA: 3.5 CM2
ECHO LVOT AV VTI INDEX: 0.72
ECHO LVOT DIAM: 2.1 CM
ECHO LVOT MEAN GRADIENT: 3 MMHG
ECHO LVOT PEAK GRADIENT: 5 MMHG
ECHO LVOT PEAK VELOCITY: 1.1 M/S
ECHO LVOT STROKE VOLUME INDEX: 34.6 ML/M2
ECHO LVOT SV: 73 ML
ECHO LVOT VTI: 21.1 CM
ECHO MV A VELOCITY: 1.04 M/S
ECHO MV E DECELERATION TIME (DT): 256 MS
ECHO MV E VELOCITY: 0.81 M/S
ECHO MV E/A RATIO: 0.78
ECHO MV E/E' LATERAL: 9.1
ECHO MV E/E' RATIO (AVERAGED): 9.74
ECHO MV E/E' SEPTAL: 10.38
ECHO MV REGURGITANT PEAK GRADIENT: 144 MMHG
ECHO MV REGURGITANT PEAK VELOCITY: 6 M/S
ECHO MV REGURGITANT VTIA: 190 CM
ECHO RA AREA 4C: 13 CM2
ECHO RA END SYSTOLIC VOLUME APICAL 4 CHAMBER INDEX BSA: 14 ML/M2
ECHO RA VOLUME: 29 ML
ECHO RV BASAL DIMENSION: 3.8 CM
ECHO RV INTERNAL DIMENSION: 2.8 CM
ECHO RV LONGITUDINAL DIMENSION: 6.7 CM
ECHO RV MID DIMENSION: 2.3 CM
ECHO RV TAPSE: 1.7 CM (ref 1.7–?)

## 2024-10-24 PROCEDURE — 93306 TTE W/DOPPLER COMPLETE: CPT

## 2024-10-24 NOTE — TELEPHONE ENCOUNTER
Called patient, spoke with: Patient regarding the results of the patients most recent US.  I advised Patient of Vannessa Medina recommendations.   Patient did voice understanding

## 2024-11-07 DIAGNOSIS — E11.9 TYPE 2 DIABETES MELLITUS WITHOUT COMPLICATION, WITHOUT LONG-TERM CURRENT USE OF INSULIN (HCC): Chronic | ICD-10-CM

## 2024-11-07 DIAGNOSIS — I10 ESSENTIAL HYPERTENSION: ICD-10-CM

## 2024-11-07 RX ORDER — ATENOLOL 50 MG/1
50 TABLET ORAL DAILY
Qty: 30 TABLET | Refills: 11 | Status: SHIPPED | OUTPATIENT
Start: 2024-11-07

## 2024-11-07 RX ORDER — ATORVASTATIN CALCIUM 40 MG/1
40 TABLET, FILM COATED ORAL DAILY
Qty: 30 TABLET | Refills: 11 | Status: SHIPPED | OUTPATIENT
Start: 2024-11-07

## 2024-11-07 NOTE — TELEPHONE ENCOUNTER
Received fax from pharmacy requesting refill on pts medication(s). Pt was last seen in office on 10/15/2024  and has a follow up scheduled for 11/7/2024. Will send request to  Rigoberto Christopher  for authorization.     Requested Prescriptions     Pending Prescriptions Disp Refills    atenolol (TENORMIN) 50 MG tablet [Pharmacy Med Name: ATENOLOL 50 MG TABLET 50 Tablet] 30 tablet 11     Sig: TAKE 1 TABLET BY MOUTH DAILY    metFORMIN (GLUCOPHAGE) 500 MG tablet [Pharmacy Med Name: METFORMIN  MG TABS 500 Tablet] 60 tablet 11     Sig: TAKE 1 TABLET BY MOUTH 2 TIMES DAILY (WITH MEALS)

## 2024-11-07 NOTE — TELEPHONE ENCOUNTER
Received fax from pharmacy requesting refill on pts medication(s). Pt was last seen in office on 10/15/2024  and has a follow up scheduled for 11/26/2024. Will send request to  Vannessa Medina  for authorization.     Requested Prescriptions     Pending Prescriptions Disp Refills    atorvastatin (LIPITOR) 40 MG tablet [Pharmacy Med Name: ATORVASTATIN CALCIUM 40 MG 40 Tablet] 30 tablet 11     Sig: TAKE 1 TABLET BY MOUTH DAILY

## 2024-11-11 ENCOUNTER — OFFICE VISIT (OUTPATIENT)
Age: 54
End: 2024-11-11

## 2024-11-11 VITALS — BODY MASS INDEX: 28.14 KG/M2 | HEIGHT: 71 IN | WEIGHT: 201 LBS

## 2024-11-11 DIAGNOSIS — M65.331 TRIGGER FINGER, RIGHT MIDDLE FINGER: Primary | ICD-10-CM

## 2024-11-11 DIAGNOSIS — M65.332 TRIGGER FINGER, LEFT MIDDLE FINGER: ICD-10-CM

## 2024-11-11 DIAGNOSIS — M65.312 TRIGGER FINGER OF LEFT THUMB: ICD-10-CM

## 2024-11-11 RX ORDER — BETAMETHASONE SODIUM PHOSPHATE AND BETAMETHASONE ACETATE 3; 3 MG/ML; MG/ML
3 INJECTION, SUSPENSION INTRA-ARTICULAR; INTRALESIONAL; INTRAMUSCULAR; SOFT TISSUE ONCE
Status: COMPLETED | OUTPATIENT
Start: 2024-11-11 | End: 2024-11-11

## 2024-11-11 RX ORDER — LIDOCAINE HYDROCHLORIDE 10 MG/ML
0.5 INJECTION, SOLUTION EPIDURAL; INFILTRATION; INTRACAUDAL; PERINEURAL ONCE
Status: COMPLETED | OUTPATIENT
Start: 2024-11-11 | End: 2024-11-11

## 2024-11-11 RX ADMIN — LIDOCAINE HYDROCHLORIDE 0.5 ML: 10 INJECTION, SOLUTION EPIDURAL; INFILTRATION; INTRACAUDAL; PERINEURAL at 10:22

## 2024-11-11 RX ADMIN — LIDOCAINE HYDROCHLORIDE 0.5 ML: 10 INJECTION, SOLUTION EPIDURAL; INFILTRATION; INTRACAUDAL; PERINEURAL at 10:21

## 2024-11-11 RX ADMIN — BETAMETHASONE SODIUM PHOSPHATE AND BETAMETHASONE ACETATE 3 MG: 3; 3 INJECTION, SUSPENSION INTRA-ARTICULAR; INTRALESIONAL; INTRAMUSCULAR; SOFT TISSUE at 10:19

## 2024-11-11 RX ADMIN — BETAMETHASONE SODIUM PHOSPHATE AND BETAMETHASONE ACETATE 3 MG: 3; 3 INJECTION, SUSPENSION INTRA-ARTICULAR; INTRALESIONAL; INTRAMUSCULAR; SOFT TISSUE at 10:21

## 2024-11-11 NOTE — PROGRESS NOTES
[facility]  [facility]  [address]  [place] [id number] [phone]     Patient: Sameer Larson   YOB: 1970   Date: 11/11/2024     Chief Complaint   Patient presents with    right hand     left hand         History of Present Illness  Sameer is a 54 y.o. male right-hand-dominant who returns today for follow-up of symptoms consistent with bilateral middle and left thumb trigger fingers.  He was originally seen in September of this year and was given his first round of corticosteroid injections.  He states that his bilateral middle fingers have significantly improved.  Thumb has improved but less so.  He returns today with ongoing but improved symptoms.  Nonetheless, he would like to proceed with repeat corticosteroid injections today.      Past Medical History:   Diagnosis Date    CAD (coronary artery disease)     sees [facility] cardiology    Cancer ([facility])     Bladder    COPD (chronic obstructive pulmonary disease) ([facility])     Depression     Diabetes mellitus ([facility])     History of blood transfusion     unsure thinks he did    Hyperlipidemia     Hypertension     Substance abuse ([facility])       Past Surgical History:   Procedure Laterality Date    BACK SURGERY      X3     BLADDER REMOVAL      Urostomy currently in place    CARDIAC CATHETERIZATION      CERVICAL FUSION N/A 2/19/2020    Phase 1:  C5 and C6 corpectomy with placement of an expandable cage and anterior cervical plate C4-C7. performed by Franko Julien DO at [facility] OR    CERVICAL LAMINECTOMY N/A 6/17/2020    POSTERIOR CERVICAL WOUND WASH OUT AND CLOSURE performed by Franko Julien DO at [facility] OR    CERVICAL LAMINECTOMY N/A 6/26/2020    POSTERIOR CERVICAL WOUND DEBRIDEMENT WITH PLACEMENT OF WOUNDVAC performed by Franko Julien DO at [facility] OR    COLONOSCOPY N/A 9/10/2019    Dr MAUREEN Tee-Katerina, 5 yr recall    CORONARY ARTERY BYPASS GRAFT N/A 5/1/2019    CORONARY ARTERY BYPASS GRAFT X

## 2024-11-18 DIAGNOSIS — R06.2 WHEEZING: ICD-10-CM

## 2024-11-18 NOTE — TELEPHONE ENCOUNTER
Sameer Larson called to request a refill on his medication.      Last office visit : 10/15/2024   Next office visit : 11/26/2024     Requested Prescriptions     Pending Prescriptions Disp Refills    albuterol sulfate HFA (PROVENTIL;VENTOLIN;PROAIR) 108 (90 Base) MCG/ACT inhaler [Pharmacy Med Name: ALBUTEROL SULFATE  ( 108 (90 BAS Aerosol] 18 g 5     Sig: INHALE 2 PUFFS INTO THE LUNGS 4 TIMES DAILY AS NEEDED FOR WHEEZING            Alana Gibson LPN

## 2024-11-19 RX ORDER — ALBUTEROL SULFATE 90 UG/1
2 INHALANT RESPIRATORY (INHALATION) 4 TIMES DAILY PRN
Qty: 18 G | Refills: 5 | Status: SHIPPED | OUTPATIENT
Start: 2024-11-19

## 2024-11-26 ENCOUNTER — OFFICE VISIT (OUTPATIENT)
Dept: FAMILY MEDICINE CLINIC | Age: 54
End: 2024-11-26

## 2024-11-26 VITALS
HEIGHT: 71 IN | WEIGHT: 198.13 LBS | HEART RATE: 82 BPM | TEMPERATURE: 97.7 F | OXYGEN SATURATION: 97 % | DIASTOLIC BLOOD PRESSURE: 64 MMHG | BODY MASS INDEX: 27.74 KG/M2 | SYSTOLIC BLOOD PRESSURE: 130 MMHG

## 2024-11-26 DIAGNOSIS — M54.50 CHRONIC MIDLINE LOW BACK PAIN WITHOUT SCIATICA: ICD-10-CM

## 2024-11-26 DIAGNOSIS — M47.12 CERVICAL SPONDYLOSIS WITH MYELOPATHY AND RADICULOPATHY: ICD-10-CM

## 2024-11-26 DIAGNOSIS — J41.0 SIMPLE CHRONIC BRONCHITIS (HCC): ICD-10-CM

## 2024-11-26 DIAGNOSIS — I10 ESSENTIAL HYPERTENSION: ICD-10-CM

## 2024-11-26 DIAGNOSIS — M47.22 CERVICAL SPONDYLOSIS WITH MYELOPATHY AND RADICULOPATHY: ICD-10-CM

## 2024-11-26 DIAGNOSIS — E11.65 TYPE 2 DIABETES MELLITUS WITH HYPERGLYCEMIA, WITHOUT LONG-TERM CURRENT USE OF INSULIN (HCC): Primary | ICD-10-CM

## 2024-11-26 DIAGNOSIS — R05.3 CHRONIC COUGH: ICD-10-CM

## 2024-11-26 DIAGNOSIS — G89.29 CHRONIC MIDLINE LOW BACK PAIN WITHOUT SCIATICA: ICD-10-CM

## 2024-11-26 RX ORDER — LOSARTAN POTASSIUM 50 MG/1
50 TABLET ORAL DAILY
Qty: 30 TABLET | Refills: 5 | Status: SHIPPED | OUTPATIENT
Start: 2024-11-26

## 2024-11-26 ASSESSMENT — ENCOUNTER SYMPTOMS
EYES NEGATIVE: 1
BACK PAIN: 1
COUGH: 1
GASTROINTESTINAL NEGATIVE: 1
SHORTNESS OF BREATH: 1

## 2024-11-26 NOTE — PROGRESS NOTES
agrees to go to ER if condition becomes emergent.      EMR Dragon/transcription disclaimer: Some of this encounter note is an electronic transcription/translation of spoken language to printed text. The electronic translation of spoken language may permit erroneous, or at times, nonsensical words or phrases to be inadvertently transcribed. Although I have reviewed the note for such errors, some may still exist.    Electronically signed by BRYAN Candelaria on 11/26/2024 at 10:41 PM

## 2024-12-04 ENCOUNTER — HOSPITAL ENCOUNTER (OUTPATIENT)
Dept: PULMONOLOGY | Age: 54
Discharge: HOME | End: 2024-12-04

## 2024-12-04 DIAGNOSIS — R05.3 CHRONIC COUGH: ICD-10-CM

## 2024-12-04 DIAGNOSIS — J41.0 SIMPLE CHRONIC BRONCHITIS (HCC): ICD-10-CM

## 2024-12-04 RX ORDER — ALBUTEROL SULFATE 0.83 MG/ML
2.5 SOLUTION RESPIRATORY (INHALATION) 2 TIMES DAILY PRN
Status: DISCONTINUED | OUTPATIENT
Start: 2024-12-04 | End: 2024-12-06 | Stop reason: HOSPADM

## 2024-12-06 ENCOUNTER — HOSPITAL ENCOUNTER (OUTPATIENT)
Dept: NUCLEAR MEDICINE | Age: 54
Discharge: HOME | End: 2024-12-08

## 2024-12-06 ENCOUNTER — HOSPITAL ENCOUNTER (OUTPATIENT)
Dept: NUCLEAR MEDICINE | Age: 54
Discharge: HOME | End: 2024-12-08
Payer: MEDICARE

## 2024-12-06 DIAGNOSIS — I25.10 CORONARY ARTERY DISEASE INVOLVING NATIVE CORONARY ARTERY OF NATIVE HEART WITHOUT ANGINA PECTORIS: ICD-10-CM

## 2024-12-06 LAB
NUC STRESS EJECTION FRACTION: 58 %
STRESS BASELINE DIAS BP: 66 MMHG
STRESS BASELINE HR: 80 BPM
STRESS BASELINE SYS BP: 146 MMHG
STRESS ESTIMATED WORKLOAD: 1 METS
STRESS EXERCISE DUR MIN: 5 MIN
STRESS PEAK DIAS BP: 58 MMHG
STRESS PEAK SYS BP: 154 MMHG
STRESS PERCENT HR ACHIEVED: 70 %
STRESS POST PEAK HR: 114 BPM
STRESS RATE PRESSURE PRODUCT: NORMAL BPM*MMHG
STRESS TARGET HR: 164 BPM

## 2024-12-06 PROCEDURE — 93018 CV STRESS TEST I&R ONLY: CPT | Performed by: INTERNAL MEDICINE

## 2024-12-06 PROCEDURE — 93016 CV STRESS TEST SUPVJ ONLY: CPT | Performed by: INTERNAL MEDICINE

## 2024-12-06 PROCEDURE — A9502 TC99M TETROFOSMIN: HCPCS | Performed by: NURSE PRACTITIONER

## 2024-12-06 PROCEDURE — 93017 CV STRESS TEST TRACING ONLY: CPT

## 2024-12-06 PROCEDURE — 6360000002 HC RX W HCPCS: Performed by: NURSE PRACTITIONER

## 2024-12-06 PROCEDURE — 78452 HT MUSCLE IMAGE SPECT MULT: CPT | Performed by: INTERNAL MEDICINE

## 2024-12-06 PROCEDURE — 3430000000 HC RX DIAGNOSTIC RADIOPHARMACEUTICAL: Performed by: NURSE PRACTITIONER

## 2024-12-06 RX ORDER — REGADENOSON 0.08 MG/ML
0.4 INJECTION, SOLUTION INTRAVENOUS
Status: COMPLETED | OUTPATIENT
Start: 2024-12-06 | End: 2024-12-06

## 2024-12-06 RX ADMIN — TETROFOSMIN 8 MILLICURIE: 0.23 INJECTION, POWDER, LYOPHILIZED, FOR SOLUTION INTRAVENOUS at 14:07

## 2024-12-06 RX ADMIN — TETROFOSMIN 24 MILLICURIE: 0.23 INJECTION, POWDER, LYOPHILIZED, FOR SOLUTION INTRAVENOUS at 14:07

## 2024-12-06 RX ADMIN — REGADENOSON 0.4 MG: 0.08 INJECTION, SOLUTION INTRAVENOUS at 14:20

## 2024-12-09 ENCOUNTER — TELEPHONE (OUTPATIENT)
Dept: FAMILY MEDICINE CLINIC | Age: 54
End: 2024-12-09

## 2024-12-09 NOTE — TELEPHONE ENCOUNTER
Spoke to patient with result and he verbalized understanding. He has an appt hermelinda with Nivia Magaña next Tuesday, 12/16. I told him I would call to see if they had any closer openings.    I called cardiology and they had an opening come up for tomorrow, 12/10 with Grace Trammell. I had them move his appt and called and notified patient.

## 2024-12-09 NOTE — TELEPHONE ENCOUNTER
----- Message from BRYAN Candelaria sent at 12/8/2024  9:02 PM CST -----  Questionable ischemia was noted.  Patient must follow up with cardiology asap.  Please contact cardiology and let them know that stress test was possibly abnormal, see if they can see him sooner.

## 2024-12-10 ENCOUNTER — OFFICE VISIT (OUTPATIENT)
Dept: CARDIOLOGY CLINIC | Age: 54
End: 2024-12-10
Payer: MEDICARE

## 2024-12-10 ENCOUNTER — TELEPHONE (OUTPATIENT)
Dept: CARDIOLOGY CLINIC | Age: 54
End: 2024-12-10

## 2024-12-10 VITALS
HEART RATE: 77 BPM | SYSTOLIC BLOOD PRESSURE: 140 MMHG | OXYGEN SATURATION: 96 % | BODY MASS INDEX: 27.58 KG/M2 | HEIGHT: 71 IN | WEIGHT: 197 LBS | DIASTOLIC BLOOD PRESSURE: 78 MMHG

## 2024-12-10 DIAGNOSIS — I10 ESSENTIAL HYPERTENSION: Chronic | ICD-10-CM

## 2024-12-10 DIAGNOSIS — R06.02 SHORTNESS OF BREATH: ICD-10-CM

## 2024-12-10 DIAGNOSIS — I25.10 CORONARY ARTERY DISEASE INVOLVING NATIVE CORONARY ARTERY OF NATIVE HEART WITHOUT ANGINA PECTORIS: ICD-10-CM

## 2024-12-10 DIAGNOSIS — Z72.0 TOBACCO ABUSE: Chronic | ICD-10-CM

## 2024-12-10 DIAGNOSIS — R94.39 POSITIVE CARDIAC STRESS TEST: Primary | ICD-10-CM

## 2024-12-10 DIAGNOSIS — R94.39 POSITIVE CARDIAC STRESS TEST: ICD-10-CM

## 2024-12-10 DIAGNOSIS — I25.10 CORONARY ARTERY DISEASE INVOLVING NATIVE CORONARY ARTERY OF NATIVE HEART WITHOUT ANGINA PECTORIS: Primary | ICD-10-CM

## 2024-12-10 LAB
ALBUMIN SERPL-MCNC: 4.3 G/DL (ref 3.5–5.2)
ALP SERPL-CCNC: 127 U/L (ref 40–129)
ALT SERPL-CCNC: 5 U/L (ref 5–41)
ANION GAP SERPL CALCULATED.3IONS-SCNC: 12 MMOL/L (ref 7–19)
AST SERPL-CCNC: 11 U/L (ref 5–40)
BASOPHILS # BLD: 0.1 K/UL (ref 0–0.2)
BASOPHILS NFR BLD: 0.7 % (ref 0–1)
BILIRUB SERPL-MCNC: 0.3 MG/DL (ref 0.2–1.2)
BUN SERPL-MCNC: 17 MG/DL (ref 6–20)
CALCIUM SERPL-MCNC: 9.7 MG/DL (ref 8.6–10)
CHLORIDE SERPL-SCNC: 101 MMOL/L (ref 98–111)
CO2 SERPL-SCNC: 26 MMOL/L (ref 22–29)
CREAT SERPL-MCNC: 1.3 MG/DL (ref 0.7–1.2)
EOSINOPHIL # BLD: 0.1 K/UL (ref 0–0.6)
EOSINOPHIL NFR BLD: 1.2 % (ref 0–5)
ERYTHROCYTE [DISTWIDTH] IN BLOOD BY AUTOMATED COUNT: 13.9 % (ref 11.5–14.5)
GLUCOSE SERPL-MCNC: 110 MG/DL (ref 70–99)
HCT VFR BLD AUTO: 43.2 % (ref 42–52)
HGB BLD-MCNC: 13.4 G/DL (ref 14–18)
IMM GRANULOCYTES # BLD: 0.1 K/UL
LYMPHOCYTES # BLD: 2.3 K/UL (ref 1.1–4.5)
LYMPHOCYTES NFR BLD: 21.9 % (ref 20–40)
MCH RBC QN AUTO: 26.3 PG (ref 27–31)
MCHC RBC AUTO-ENTMCNC: 31 G/DL (ref 33–37)
MCV RBC AUTO: 84.9 FL (ref 80–94)
MONOCYTES # BLD: 0.8 K/UL (ref 0–0.9)
MONOCYTES NFR BLD: 7.9 % (ref 0–10)
NEUTROPHILS # BLD: 7.2 K/UL (ref 1.5–7.5)
NEUTS SEG NFR BLD: 67.8 % (ref 50–65)
PLATELET # BLD AUTO: 247 K/UL (ref 130–400)
PMV BLD AUTO: 9.9 FL (ref 9.4–12.4)
POTASSIUM SERPL-SCNC: 4.6 MMOL/L (ref 3.5–5)
PROT SERPL-MCNC: 7.8 G/DL (ref 6.4–8.3)
RBC # BLD AUTO: 5.09 M/UL (ref 4.7–6.1)
SODIUM SERPL-SCNC: 139 MMOL/L (ref 136–145)
WBC # BLD AUTO: 10.6 K/UL (ref 4.8–10.8)

## 2024-12-10 PROCEDURE — 4004F PT TOBACCO SCREEN RCVD TLK: CPT | Performed by: CLINICAL NURSE SPECIALIST

## 2024-12-10 PROCEDURE — 3017F COLORECTAL CA SCREEN DOC REV: CPT | Performed by: CLINICAL NURSE SPECIALIST

## 2024-12-10 PROCEDURE — 93000 ELECTROCARDIOGRAM COMPLETE: CPT | Performed by: CLINICAL NURSE SPECIALIST

## 2024-12-10 PROCEDURE — 99214 OFFICE O/P EST MOD 30 MIN: CPT | Performed by: CLINICAL NURSE SPECIALIST

## 2024-12-10 PROCEDURE — 3077F SYST BP >= 140 MM HG: CPT | Performed by: CLINICAL NURSE SPECIALIST

## 2024-12-10 PROCEDURE — G8484 FLU IMMUNIZE NO ADMIN: HCPCS | Performed by: CLINICAL NURSE SPECIALIST

## 2024-12-10 PROCEDURE — G8427 DOCREV CUR MEDS BY ELIG CLIN: HCPCS | Performed by: CLINICAL NURSE SPECIALIST

## 2024-12-10 PROCEDURE — G8419 CALC BMI OUT NRM PARAM NOF/U: HCPCS | Performed by: CLINICAL NURSE SPECIALIST

## 2024-12-10 PROCEDURE — 3078F DIAST BP <80 MM HG: CPT | Performed by: CLINICAL NURSE SPECIALIST

## 2024-12-10 RX ORDER — ISOSORBIDE MONONITRATE 30 MG/1
30 TABLET, EXTENDED RELEASE ORAL DAILY
Qty: 30 TABLET | Refills: 3 | Status: SHIPPED | OUTPATIENT
Start: 2024-12-10

## 2024-12-10 RX ORDER — ASPIRIN 81 MG/1
81 TABLET ORAL DAILY
Qty: 90 TABLET | Refills: 1 | Status: SHIPPED | OUTPATIENT
Start: 2024-12-10

## 2024-12-10 NOTE — TELEPHONE ENCOUNTER
Horace at the [facility] [place] located on the first floor of [facility].   Enter through hospital main entrance and turn immediately to your left.    Spoke with: Chris Larson in clinc    Date: Tuesday, 12/17/24. Advised patient will receive a phone call from cath lab day prior to procedure with arrival time.      Pre-operative work-up:  CBC, BMP  Allergies:  Latex and Demerol hcl [meperidine]   Contact number:  [phone] (home)     Cardiac Catheterization Instructions   Do not eat or drink anything after midnight on the night before your procedure.  You can take your morning medications with a sip of water unless otherwise directed not to.    Bring a list of the names and dosages of all the medications you are taking.    Do not take Metformin for 2 days prior to procedure.    You should arrange to have someone take you home rather than drive yourself.  Further plan will depend upon the result of the cardiac catheterization.      If for any reason you are unable to keep this appointment, please contact [facility], [phone], as soon as possible to reschedule.  -------------------------------------------------------------------------------------------------------------------  Cardiac Catheterization   (Coronary Angiography; Coronary Arteriography; Coronary Angiogram)   Definition:  Cardiac catheterization is a test that uses a catheter (tube) and x-ray machine to assess the heart and its blood supply.     Reasons for Procedure   It is used to find the cause of symptoms, like chest pain, that could mean heart problems.   Cardiac catheterization helps doctors to:   Identify narrowed or clogged arteries of the heart   Measure blood pressure within the heart   Evaluate how well the heart valves and chambers function   Check heart defects   Evaluate an enlarged heart   Decide on an appropriate treatment   Possible Complications   If you are planning to have a cardiac

## 2024-12-10 NOTE — PATIENT INSTRUCTIONS
Recommend smoking cessation  Add isosorbide 30 mg daily  He should be taking 81 mg low-dose aspirin daily   Heart cath scheduled for 12/17/24        Maintain good blood pressure control-goal<130/80 at rest  Maintain good cholesterol control LDL goal<70 with arterial disease  If you are diabetic work to keep/obtain hemoglobin A1c< 7    Follow up after procedure  Call with any questions or concerns  Follow up with Vannessa Medina APRN for non cardiac problems  Report any new problems  Cardiovascular Fitness-Exercise as tolerated.  Strive for 30 minutes of exercise most days of the week.    Cardiac / Healthy Diet- Avoid processed high fat foods, maintain low sodium/salt   Continue current medications as directed  Continue plan of treatment  It is always recommended that you bring your medications bottles with you to each visit - this is for your safety!

## 2024-12-10 NOTE — PROGRESS NOTES
[facility]  [address] [address]  Phone: [phone]  Fax: [phone]    OFFICE VISIT:  12/10/2024    Sameer Larson - : 1970    Reason For Visit:  Sameer is a 54 y.o. male who is here for New Patient (Est. Care ) and Coronary Artery Disease  History hyper tension, hyperlipidemia, diabetes, COPD, smoker, bladder cancer and coronary disease with previous bypass grafting in     Last seen in the office in .  He is here today to reestablish care    He reports he was incarcerated till March of this year.  Has been very inactive over the last several months.  PERDOMO with any activity.  Ongoing cough for several weeks and feels like he may have fractured rib last week coughing    Overall very inactive due to back pain as well.  Has intermittent disturbed sleep through the night.  He does continue to smoke.  He smokes about 2 packs a day        Subjective  Sameer denies exertional chest pain,  orthopnea, paroxysmal nocturnal dyspnea, syncope, presyncope, arrhythmia, edema   The patient denies numbness or weakness to suggest cerebrovascular accident or transient ischemic attack.    Vannessa Medina APRN is PCP and follows labs.  Sameer Larson has the following history as recorded in [facility]:    Patient Active Problem List    Diagnosis Date Noted    PVD (peripheral vascular disease) (HCC)     Coronary artery disease 2019    Hx of CABG 06/10/2019    Presence of urostomy (HCC) 2022    Anemia 2022    Positive cardiac stress test 12/10/2024    Trigger finger, right middle finger 2024    Trigger finger, left middle finger 2024    Trigger finger of left thumb 2024    Type 2 diabetes mellitus with hyperglycemia 2024    Shortness of breath 2022    Wheezing 2022    Cigarette nicotine dependence without complication. discussed smoking cessation for 3.5 min.  2022    Tobacco abuse 2020    Open wound of neck 2020

## 2024-12-17 ENCOUNTER — HOSPITAL ENCOUNTER (OUTPATIENT)
Age: 54
Setting detail: OBSERVATION
Discharge: HOME | End: 2024-12-18
Attending: INTERNAL MEDICINE | Admitting: INTERNAL MEDICINE
Payer: MEDICARE

## 2024-12-17 DIAGNOSIS — R09.89 CORONARY ARTERY DISEASE WITH CARDIAC SYMPTOMS: ICD-10-CM

## 2024-12-17 DIAGNOSIS — R94.39 POSITIVE CARDIAC STRESS TEST: ICD-10-CM

## 2024-12-17 DIAGNOSIS — I25.10 CORONARY ARTERY DISEASE WITH CARDIAC SYMPTOMS: ICD-10-CM

## 2024-12-17 PROBLEM — R07.9 CHEST PAIN: Status: ACTIVE | Noted: 2024-12-17

## 2024-12-17 LAB
ECHO BSA: 2.11 M2
GLUCOSE BLD-MCNC: 125 MG/DL (ref 70–99)
GLUCOSE BLD-MCNC: 132 MG/DL (ref 70–99)
GLUCOSE BLD-MCNC: 175 MG/DL (ref 70–99)
PERFORMED ON: ABNORMAL

## 2024-12-17 PROCEDURE — C1887 CATHETER, GUIDING: HCPCS | Performed by: INTERNAL MEDICINE

## 2024-12-17 PROCEDURE — 6370000000 HC RX 637 (ALT 250 FOR IP): Performed by: SURGERY

## 2024-12-17 PROCEDURE — 6360000004 HC RX CONTRAST MEDICATION: Performed by: INTERNAL MEDICINE

## 2024-12-17 PROCEDURE — 6370000000 HC RX 637 (ALT 250 FOR IP): Performed by: INTERNAL MEDICINE

## 2024-12-17 PROCEDURE — G0378 HOSPITAL OBSERVATION PER HR: HCPCS

## 2024-12-17 PROCEDURE — 7100000011 HC PHASE II RECOVERY - ADDTL 15 MIN: Performed by: INTERNAL MEDICINE

## 2024-12-17 PROCEDURE — 82962 GLUCOSE BLOOD TEST: CPT

## 2024-12-17 PROCEDURE — 99153 MOD SED SAME PHYS/QHP EA: CPT | Performed by: INTERNAL MEDICINE

## 2024-12-17 PROCEDURE — 99152 MOD SED SAME PHYS/QHP 5/>YRS: CPT | Performed by: INTERNAL MEDICINE

## 2024-12-17 PROCEDURE — C1894 INTRO/SHEATH, NON-LASER: HCPCS | Performed by: INTERNAL MEDICINE

## 2024-12-17 PROCEDURE — 99024 POSTOP FOLLOW-UP VISIT: CPT | Performed by: INTERNAL MEDICINE

## 2024-12-17 PROCEDURE — C1769 GUIDE WIRE: HCPCS | Performed by: INTERNAL MEDICINE

## 2024-12-17 PROCEDURE — C1725 CATH, TRANSLUMIN NON-LASER: HCPCS | Performed by: INTERNAL MEDICINE

## 2024-12-17 PROCEDURE — 6360000002 HC RX W HCPCS: Performed by: INTERNAL MEDICINE

## 2024-12-17 PROCEDURE — 93459 L HRT ART/GRFT ANGIO: CPT | Performed by: INTERNAL MEDICINE

## 2024-12-17 PROCEDURE — 2500000003 HC RX 250 WO HCPCS: Performed by: INTERNAL MEDICINE

## 2024-12-17 PROCEDURE — 7100000010 HC PHASE II RECOVERY - FIRST 15 MIN: Performed by: INTERNAL MEDICINE

## 2024-12-17 PROCEDURE — 2709999900 HC NON-CHARGEABLE SUPPLY: Performed by: INTERNAL MEDICINE

## 2024-12-17 PROCEDURE — 2580000003 HC RX 258: Performed by: INTERNAL MEDICINE

## 2024-12-17 RX ORDER — FENTANYL CITRATE 50 UG/ML
INJECTION, SOLUTION INTRAMUSCULAR; INTRAVENOUS PRN
Status: DISCONTINUED | OUTPATIENT
Start: 2024-12-17 | End: 2024-12-17 | Stop reason: HOSPADM

## 2024-12-17 RX ORDER — VERAPAMIL HYDROCHLORIDE 2.5 MG/ML
INJECTION, SOLUTION INTRAVENOUS PRN
Status: DISCONTINUED | OUTPATIENT
Start: 2024-12-17 | End: 2024-12-17 | Stop reason: HOSPADM

## 2024-12-17 RX ORDER — ALBUTEROL SULFATE 90 UG/1
2 INHALANT RESPIRATORY (INHALATION) 4 TIMES DAILY PRN
Status: DISCONTINUED | OUTPATIENT
Start: 2024-12-17 | End: 2024-12-18 | Stop reason: HOSPADM

## 2024-12-17 RX ORDER — SODIUM CHLORIDE 9 MG/ML
INJECTION, SOLUTION INTRAVENOUS CONTINUOUS
Status: DISCONTINUED | OUTPATIENT
Start: 2024-12-17 | End: 2024-12-17 | Stop reason: HOSPADM

## 2024-12-17 RX ORDER — ATENOLOL 50 MG/1
50 TABLET ORAL DAILY
Status: DISCONTINUED | OUTPATIENT
Start: 2024-12-18 | End: 2024-12-18 | Stop reason: HOSPADM

## 2024-12-17 RX ORDER — HEPARIN SODIUM 1000 [USP'U]/ML
INJECTION, SOLUTION INTRAVENOUS; SUBCUTANEOUS PRN
Status: DISCONTINUED | OUTPATIENT
Start: 2024-12-17 | End: 2024-12-17 | Stop reason: HOSPADM

## 2024-12-17 RX ORDER — ASPIRIN 81 MG/1
81 TABLET ORAL DAILY
Status: DISCONTINUED | OUTPATIENT
Start: 2024-12-18 | End: 2024-12-18 | Stop reason: HOSPADM

## 2024-12-17 RX ORDER — SODIUM CHLORIDE 0.9 % (FLUSH) 0.9 %
5-40 SYRINGE (ML) INJECTION EVERY 12 HOURS SCHEDULED
Status: DISCONTINUED | OUTPATIENT
Start: 2024-12-17 | End: 2024-12-17 | Stop reason: HOSPADM

## 2024-12-17 RX ORDER — BUDESONIDE AND FORMOTEROL FUMARATE DIHYDRATE 80; 4.5 UG/1; UG/1
2 AEROSOL RESPIRATORY (INHALATION)
Status: DISCONTINUED | OUTPATIENT
Start: 2024-12-17 | End: 2024-12-18 | Stop reason: HOSPADM

## 2024-12-17 RX ORDER — PANTOPRAZOLE SODIUM 40 MG/1
40 TABLET, DELAYED RELEASE ORAL
Status: DISCONTINUED | OUTPATIENT
Start: 2024-12-18 | End: 2024-12-18 | Stop reason: HOSPADM

## 2024-12-17 RX ORDER — NITROGLYCERIN 20 MG/100ML
INJECTION INTRAVENOUS PRN
Status: DISCONTINUED | OUTPATIENT
Start: 2024-12-17 | End: 2024-12-17 | Stop reason: HOSPADM

## 2024-12-17 RX ORDER — ONDANSETRON 2 MG/ML
4 INJECTION INTRAMUSCULAR; INTRAVENOUS EVERY 6 HOURS PRN
Status: DISCONTINUED | OUTPATIENT
Start: 2024-12-17 | End: 2024-12-17 | Stop reason: HOSPADM

## 2024-12-17 RX ORDER — ACETAMINOPHEN 325 MG/1
650 TABLET ORAL EVERY 4 HOURS PRN
Status: DISCONTINUED | OUTPATIENT
Start: 2024-12-17 | End: 2024-12-18 | Stop reason: HOSPADM

## 2024-12-17 RX ORDER — LOSARTAN POTASSIUM 50 MG/1
50 TABLET ORAL DAILY
Status: DISCONTINUED | OUTPATIENT
Start: 2024-12-18 | End: 2024-12-18 | Stop reason: HOSPADM

## 2024-12-17 RX ORDER — HYDROCODONE BITARTRATE AND ACETAMINOPHEN 7.5; 325 MG/1; MG/1
1 TABLET ORAL ONCE
Status: COMPLETED | OUTPATIENT
Start: 2024-12-17 | End: 2024-12-17

## 2024-12-17 RX ORDER — ATORVASTATIN CALCIUM 40 MG/1
40 TABLET, FILM COATED ORAL DAILY
Status: DISCONTINUED | OUTPATIENT
Start: 2024-12-18 | End: 2024-12-18 | Stop reason: HOSPADM

## 2024-12-17 RX ORDER — MIDAZOLAM HYDROCHLORIDE 1 MG/ML
INJECTION, SOLUTION INTRAMUSCULAR; INTRAVENOUS PRN
Status: DISCONTINUED | OUTPATIENT
Start: 2024-12-17 | End: 2024-12-17 | Stop reason: HOSPADM

## 2024-12-17 RX ORDER — SODIUM CHLORIDE 0.9 % (FLUSH) 0.9 %
5-40 SYRINGE (ML) INJECTION PRN
Status: DISCONTINUED | OUTPATIENT
Start: 2024-12-17 | End: 2024-12-17 | Stop reason: HOSPADM

## 2024-12-17 RX ORDER — ASPIRIN 81 MG/1
81 TABLET ORAL ONCE
Status: COMPLETED | OUTPATIENT
Start: 2024-12-18 | End: 2024-12-18

## 2024-12-17 RX ORDER — ASPIRIN 81 MG/1
81 TABLET ORAL ONCE
Status: DISCONTINUED | OUTPATIENT
Start: 2024-12-17 | End: 2024-12-17

## 2024-12-17 RX ORDER — NICOTINE 21 MG/24HR
1 PATCH, TRANSDERMAL 24 HOURS TRANSDERMAL DAILY
Status: DISCONTINUED | OUTPATIENT
Start: 2024-12-17 | End: 2024-12-18 | Stop reason: HOSPADM

## 2024-12-17 RX ORDER — ISOSORBIDE MONONITRATE 30 MG/1
30 TABLET, EXTENDED RELEASE ORAL DAILY
Status: DISCONTINUED | OUTPATIENT
Start: 2024-12-18 | End: 2024-12-18 | Stop reason: HOSPADM

## 2024-12-17 RX ORDER — IOPAMIDOL 612 MG/ML
INJECTION, SOLUTION INTRAVASCULAR PRN
Status: DISCONTINUED | OUTPATIENT
Start: 2024-12-17 | End: 2024-12-17 | Stop reason: HOSPADM

## 2024-12-17 RX ORDER — SODIUM CHLORIDE 9 MG/ML
INJECTION, SOLUTION INTRAVENOUS PRN
Status: DISCONTINUED | OUTPATIENT
Start: 2024-12-17 | End: 2024-12-17 | Stop reason: HOSPADM

## 2024-12-17 RX ADMIN — HYDROCODONE BITARTRATE AND ACETAMINOPHEN 1 TABLET: 7.5; 325 TABLET ORAL at 14:10

## 2024-12-17 RX ADMIN — ACETAMINOPHEN 650 MG: 325 TABLET ORAL at 21:07

## 2024-12-17 RX ADMIN — SODIUM CHLORIDE 75 ML/HR: 9 INJECTION, SOLUTION INTRAVENOUS at 07:06

## 2024-12-17 ASSESSMENT — PAIN SCALES - GENERAL
PAINLEVEL_OUTOF10: 10
PAINLEVEL_OUTOF10: 8

## 2024-12-17 NOTE — PROGRESS NOTES
Cardiac catheterization performed left radial artery access tolerated well  Left ventricular function satisfactory  Patent saphenous vein graft to right PDA  Patent vein graft to OM probably inferior branch with no retrograde flow  Patent LIMA graft LAD  High-grade 95% stenosis in the proximal OM which supplies a superior branch which is actually larger and is not supplied by the graft  Initially we attempted to proceed with percutaneous intervention but had difficulty finding a graft that would access this from the left side and by that point we had already utilized about 375 cc of contrast and elected to defer the procedure at this point we will keep him overnight recheck his laboratory studies in the morning and if satisfactory proceed with a second attempt tomorrow either from the right femoral right radial artery approach

## 2024-12-17 NOTE — PROGRESS NOTES
4 Eyes Skin Assessment     NAME:  Sameer Larson  YOB: 1970  MEDICAL RECORD NUMBER:  [id number]    The patient is being assessed for  Admission    I agree that at least one RN has performed a thorough Head to Toe Skin Assessment on the patient. ALL assessment sites listed below have been assessed.      Areas assessed by both nurses:    Head, Face, Ears, Shoulders, Back, Chest, Arms, Elbows, Hands, Sacrum. Buttock, Coccyx, Ischium, Legs. Feet and Heels, and Under Medical Devices         Does the Patient have a Wound? No noted wound(s)       Otrito Prevention initiated by RN: Yes  Wound Care Orders initiated by RN: No    Pressure Injury (Stage 3,4, Unstageable, DTI, NWPT, and Complex wounds) if present, place Wound referral order by RN under : No    New Ostomies, if present place, Ostomy referral order under : No     Nurse 1 eSignature: Electronically signed by Alethea Trammell RN on 12/17/24 at 4:08 PM CST    **SHARE this note so that the co-signing nurse can place an eSignature**    Nurse 2 eSignature: Electronically signed by Nhung Will RN on 12/17/24 at 4:19 PM CST

## 2024-12-17 NOTE — PROGRESS NOTES
713/2 MONITOR CONNECTED TO PT. ALL BELONGINGS WITH PT INCLUDING BROWN WALLET, BLACK CELL PHONE, CLOTHING, PERSONAL MUG, AND READING GLASSES. TRANSPORTED VIA STRETCHER PER AV LEWIS.

## 2024-12-17 NOTE — PROGRESS NOTES
Social Determinants of Health     Tobacco Use: High Risk (12/17/2024)    Patient History     Smoking Tobacco Use: Every Day     Smokeless Tobacco Use: Never     Passive Exposure: Not on file   Alcohol Use: Not At Risk (8/26/2024)    AUDIT-C     Frequency of Alcohol Consumption: Never     Average Number of Drinks: Patient does not drink     Frequency of Binge Drinking: Never   Financial Resource Strain: Low Risk  (7/16/2024)    Overall Financial Resource Strain (CARDIA)     Difficulty of Paying Living Expenses: Not hard at all   Recent Concern: Financial Resource Strain - High Risk (6/6/2024)    Overall Financial Resource Strain (CARDIA)     Difficulty of Paying Living Expenses: Very hard   Food Insecurity: Food Insecurity Present (7/16/2024)    Hunger Vital Sign     Worried About Running Out of Food in the Last Year: Sometimes true     Ran Out of Food in the Last Year: Never true   Transportation Needs: Unknown (7/16/2024)    PRAPARE - Transportation     Lack of Transportation (Medical): Not on file     Lack of Transportation (Non-Medical): No   Physical Activity: Inactive (8/26/2024)    Exercise Vital Sign     Days of Exercise per Week: 0 days     Minutes of Exercise per Session: 0 min   Stress: Not on file   Social Connections: Unknown (10/11/2023)    [facility]    Family and Community Support     Help with Day-to-Day Activities: Not on file     Lonely or Isolated: Not on file   Intimate Partner Violence: Unknown (10/11/2023)    [facility]    Abuse Screen     Unsafe at Home or Work/School: Not on file     Feels Threatened by Someone?: Not on file     Does Anyone Keep You from Contacting Others or Doint Things Outside the Home?: Not on file     Physical Sign of Abuse Present: Not on file   Depression: At risk (8/26/2024)    PHQ-2     PHQ-2 Score: 16   Housing Stability: Unknown (7/16/2024)    Housing

## 2024-12-17 NOTE — PROGRESS NOTES
Sameer Larson arrived to room # 713.   Presented with: heart cath for positive stress test  Mental Status: Patient is oriented, alert, coherent, logical, thought processes intact, and able to concentrate and follow conversation.   Vitals:    12/17/24 1600   BP: 134/88   Pulse: 70   Resp: 16   Temp: 97.3 °F (36.3 °C)   SpO2: 98%     Patient safety contract and falls prevention contract reviewed with patient Yes.  Oriented Patient to room.  Call light within reach. Yes.  Needs, issues or concerns expressed at this time: no.      Electronically signed by Alethea Trammell RN on 12/17/2024 at 4:08 PM

## 2024-12-17 NOTE — H&P
always recommended that you bring your medications bottles with you to each visit - this is for your safety!         BRYAN Mendoza     EMR dragon/transcription disclaimer: Much of this encounter note is electronic transcription/translation of spoken language to printed tach. Electronic translation of spoken language may be erroneous, or at times, nonsensical words or phrases may be inadvertently transcribed. Although, I have reviewed the note for such errors, some may still exist.    Best test 12/6/2024 inferior ischemia EF 58% here for elective cardiac catheterization advised indication alternatives benefits and risk patient agreeable plan today.  I have discussed with the patient regarding indications for the proposed procedure LEFT HEART CATHETERIZATION AND POSSIBLE PERCUTANEOUS INTERVENTION  along with possible alternatives benefits and risks including but not limited to risks of death, myocardial infarction, stroke, contrast induced nephropathy which in some cases may lead to acute kidney failure requiring dialysis, allergic reactions, bleeding requiring blood transfusion,  cardiac arrhthymias, respiratory failure which may require placing the patient on respiratory support such as a ventilator or breathing machine,risk of complications which may require vascular surgery, and if coronary intervention is performed emergency CABG may be required in less than 1% of cases. The patient is awake and alert and understands the issues involved and indicates willingness to proceed as ordered.    The patient does not have any contraindications to dual antiplatelet therapy.    The patient does not have any known  pending surgical procedures in the next 12 months at this time.    The patient is  a reasonable candidate for moderate conscious sedation.    ASA score:  ASA 3 - Patient with moderate systemic disease with functional limitations    Mallampati: I (soft palate, uvula, fauces, tonsillar pillars

## 2024-12-17 NOTE — PROGRESS NOTES
Report given to OJSE Claire. Lt wrist with bruising noted. 4+ pulse. Pci scheduled for 11 am. Wife present. Vss. Informed her pt received lortab 7.5 mg @ 1410

## 2024-12-18 VITALS
HEART RATE: 71 BPM | TEMPERATURE: 97.2 F | BODY MASS INDEX: 27.61 KG/M2 | SYSTOLIC BLOOD PRESSURE: 124 MMHG | RESPIRATION RATE: 18 BRPM | HEIGHT: 71 IN | DIASTOLIC BLOOD PRESSURE: 68 MMHG | OXYGEN SATURATION: 100 % | WEIGHT: 197.25 LBS

## 2024-12-18 LAB
ANION GAP SERPL CALCULATED.3IONS-SCNC: 13 MMOL/L (ref 7–19)
BUN SERPL-MCNC: 14 MG/DL (ref 6–20)
CALCIUM SERPL-MCNC: 8.2 MG/DL (ref 8.6–10)
CHLORIDE SERPL-SCNC: 101 MMOL/L (ref 98–111)
CO2 SERPL-SCNC: 21 MMOL/L (ref 22–29)
CREAT SERPL-MCNC: 1.1 MG/DL (ref 0.7–1.2)
ERYTHROCYTE [DISTWIDTH] IN BLOOD BY AUTOMATED COUNT: 14.4 % (ref 11.5–14.5)
GLUCOSE BLD-MCNC: 158 MG/DL (ref 70–99)
GLUCOSE BLD-MCNC: 176 MG/DL (ref 70–99)
GLUCOSE SERPL-MCNC: 149 MG/DL (ref 70–99)
HCT VFR BLD AUTO: 34.7 % (ref 42–52)
HGB BLD-MCNC: 11.1 G/DL (ref 14–18)
MCH RBC QN AUTO: 26.5 PG (ref 27–31)
MCHC RBC AUTO-ENTMCNC: 32 G/DL (ref 33–37)
MCV RBC AUTO: 82.8 FL (ref 80–94)
PERFORMED ON: ABNORMAL
PERFORMED ON: ABNORMAL
PLATELET # BLD AUTO: 220 K/UL (ref 130–400)
PMV BLD AUTO: 9.7 FL (ref 9.4–12.4)
POC ACT LR: 295 SEC
POTASSIUM SERPL-SCNC: 3.9 MMOL/L (ref 3.5–5)
RBC # BLD AUTO: 4.19 M/UL (ref 4.7–6.1)
SODIUM SERPL-SCNC: 135 MMOL/L (ref 136–145)
WBC # BLD AUTO: 8.8 K/UL (ref 4.8–10.8)

## 2024-12-18 PROCEDURE — 85027 COMPLETE CBC AUTOMATED: CPT

## 2024-12-18 PROCEDURE — 85347 COAGULATION TIME ACTIVATED: CPT

## 2024-12-18 PROCEDURE — C1874 STENT, COATED/COV W/DEL SYS: HCPCS | Performed by: INTERNAL MEDICINE

## 2024-12-18 PROCEDURE — C1894 INTRO/SHEATH, NON-LASER: HCPCS | Performed by: INTERNAL MEDICINE

## 2024-12-18 PROCEDURE — 99152 MOD SED SAME PHYS/QHP 5/>YRS: CPT | Performed by: INTERNAL MEDICINE

## 2024-12-18 PROCEDURE — 6370000000 HC RX 637 (ALT 250 FOR IP): Performed by: INTERNAL MEDICINE

## 2024-12-18 PROCEDURE — 2500000003 HC RX 250 WO HCPCS: Performed by: INTERNAL MEDICINE

## 2024-12-18 PROCEDURE — 92928 PRQ TCAT PLMT NTRAC ST 1 LES: CPT | Performed by: INTERNAL MEDICINE

## 2024-12-18 PROCEDURE — 2709999900 HC NON-CHARGEABLE SUPPLY: Performed by: INTERNAL MEDICINE

## 2024-12-18 PROCEDURE — C1887 CATHETER, GUIDING: HCPCS | Performed by: INTERNAL MEDICINE

## 2024-12-18 PROCEDURE — C1769 GUIDE WIRE: HCPCS | Performed by: INTERNAL MEDICINE

## 2024-12-18 PROCEDURE — 94640 AIRWAY INHALATION TREATMENT: CPT

## 2024-12-18 PROCEDURE — 36415 COLL VENOUS BLD VENIPUNCTURE: CPT

## 2024-12-18 PROCEDURE — 82962 GLUCOSE BLOOD TEST: CPT

## 2024-12-18 PROCEDURE — G0378 HOSPITAL OBSERVATION PER HR: HCPCS

## 2024-12-18 PROCEDURE — 80048 BASIC METABOLIC PNL TOTAL CA: CPT

## 2024-12-18 PROCEDURE — 99153 MOD SED SAME PHYS/QHP EA: CPT | Performed by: INTERNAL MEDICINE

## 2024-12-18 PROCEDURE — C1725 CATH, TRANSLUMIN NON-LASER: HCPCS | Performed by: INTERNAL MEDICINE

## 2024-12-18 PROCEDURE — 6370000000 HC RX 637 (ALT 250 FOR IP): Performed by: SURGERY

## 2024-12-18 PROCEDURE — 99238 HOSP IP/OBS DSCHRG MGMT 30/<: CPT | Performed by: INTERNAL MEDICINE

## 2024-12-18 PROCEDURE — 6360000002 HC RX W HCPCS: Performed by: INTERNAL MEDICINE

## 2024-12-18 PROCEDURE — 93005 ELECTROCARDIOGRAM TRACING: CPT | Performed by: INTERNAL MEDICINE

## 2024-12-18 PROCEDURE — 94760 N-INVAS EAR/PLS OXIMETRY 1: CPT

## 2024-12-18 DEVICE — STENT ONYXNG25012UX ONYX 2.50X12RX
Type: IMPLANTABLE DEVICE | Site: CORONARY | Status: FUNCTIONAL
Brand: ONYX FRONTIER™

## 2024-12-18 RX ORDER — NICOTINE 21 MG/24HR
1 PATCH, TRANSDERMAL 24 HOURS TRANSDERMAL DAILY
Qty: 30 PATCH | Refills: 1 | Status: SHIPPED | OUTPATIENT
Start: 2024-12-19

## 2024-12-18 RX ORDER — NITROGLYCERIN 20 MG/100ML
INJECTION INTRAVENOUS PRN
Status: DISCONTINUED | OUTPATIENT
Start: 2024-12-18 | End: 2024-12-18 | Stop reason: HOSPADM

## 2024-12-18 RX ORDER — VERAPAMIL HYDROCHLORIDE 2.5 MG/ML
INJECTION, SOLUTION INTRAVENOUS PRN
Status: DISCONTINUED | OUTPATIENT
Start: 2024-12-18 | End: 2024-12-18 | Stop reason: HOSPADM

## 2024-12-18 RX ORDER — CLOPIDOGREL BISULFATE 75 MG/1
TABLET ORAL PRN
Status: DISCONTINUED | OUTPATIENT
Start: 2024-12-18 | End: 2024-12-18 | Stop reason: HOSPADM

## 2024-12-18 RX ORDER — CLOPIDOGREL BISULFATE 75 MG/1
75 TABLET ORAL DAILY
Qty: 90 TABLET | Refills: 3 | Status: SHIPPED | OUTPATIENT
Start: 2024-12-19

## 2024-12-18 RX ORDER — CLOPIDOGREL BISULFATE 75 MG/1
75 TABLET ORAL DAILY
Status: DISCONTINUED | OUTPATIENT
Start: 2024-12-19 | End: 2024-12-18 | Stop reason: HOSPADM

## 2024-12-18 RX ORDER — MIDAZOLAM 1 MG/ML
INJECTION INTRAMUSCULAR; INTRAVENOUS PRN
Status: DISCONTINUED | OUTPATIENT
Start: 2024-12-18 | End: 2024-12-18 | Stop reason: HOSPADM

## 2024-12-18 RX ORDER — HEPARIN SODIUM 1000 [USP'U]/ML
INJECTION, SOLUTION INTRAVENOUS; SUBCUTANEOUS PRN
Status: DISCONTINUED | OUTPATIENT
Start: 2024-12-18 | End: 2024-12-18 | Stop reason: HOSPADM

## 2024-12-18 RX ORDER — HYDRALAZINE HYDROCHLORIDE 20 MG/ML
INJECTION INTRAMUSCULAR; INTRAVENOUS PRN
Status: DISCONTINUED | OUTPATIENT
Start: 2024-12-18 | End: 2024-12-18 | Stop reason: HOSPADM

## 2024-12-18 RX ADMIN — ATORVASTATIN CALCIUM 40 MG: 40 TABLET, FILM COATED ORAL at 09:56

## 2024-12-18 RX ADMIN — ISOSORBIDE MONONITRATE 30 MG: 30 TABLET, EXTENDED RELEASE ORAL at 09:56

## 2024-12-18 RX ADMIN — ASPIRIN 81 MG: 81 TABLET, COATED ORAL at 05:05

## 2024-12-18 RX ADMIN — FLUOXETINE HYDROCHLORIDE 20 MG: 20 CAPSULE ORAL at 09:56

## 2024-12-18 RX ADMIN — ATENOLOL 50 MG: 50 TABLET ORAL at 09:56

## 2024-12-18 RX ADMIN — ASPIRIN 81 MG: 81 TABLET, COATED ORAL at 09:56

## 2024-12-18 RX ADMIN — BUDESONIDE AND FORMOTEROL FUMARATE DIHYDRATE 2 PUFF: 80; 4.5 AEROSOL RESPIRATORY (INHALATION) at 06:17

## 2024-12-18 RX ADMIN — LOSARTAN POTASSIUM 50 MG: 50 TABLET, FILM COATED ORAL at 09:56

## 2024-12-18 RX ADMIN — PANTOPRAZOLE SODIUM 40 MG: 40 TABLET, DELAYED RELEASE ORAL at 05:05

## 2024-12-18 NOTE — PROGRESS NOTES
Pt was asked if he needed help being given a chg bath and he stated \"no\". This nurse and the aide asked if he would like some help with his bath and he again said \"no.\" This nurse asked if he was able to give himself a CHG bath and he replied, \"yes.\" So everything was set up for the CHG bath and this nurse and the aide left the room. This nurse went in a little later to see if the pt had completed his bath and he said \"WHY NO!\" At this time pt has refused the CHG bath. This nurse and the aide offered to help. Pt did sign the consents for the procedure.

## 2024-12-18 NOTE — PROGRESS NOTES
Cardiac catheterization note preliminary findings right radial artery access tolerated well  PCI of obtuse marginal lesion performed using a 6 [place] EBU 3.75 guide predilated with a 2.25 mm balloon followed by a 2.5 x 13 drug-eluting stent deployment to inflations at 14 and 16 germain thereafter excellent angiographic results tolerated well.

## 2024-12-18 NOTE — PLAN OF CARE
Problem: Safety - Adult  Goal: Free from fall injury  Outcome: Progressing     Problem: Chronic Conditions and Co-morbidities  Goal: Patient's chronic conditions and co-morbidity symptoms are monitored and maintained or improved  Outcome: Progressing  Flowsheets (Taken 12/17/2024 2158)  Care Plan - Patient's Chronic Conditions and Co-Morbidity Symptoms are Monitored and Maintained or Improved:   Monitor and assess patient's chronic conditions and comorbid symptoms for stability, deterioration, or improvement   Collaborate with multidisciplinary team to address chronic and comorbid conditions and prevent exacerbation or deterioration   Update acute care plan with appropriate goals if chronic or comorbid symptoms are exacerbated and prevent overall improvement and discharge     Problem: Discharge Planning  Goal: Discharge to home or other facility with appropriate resources  Outcome: Progressing  Flowsheets (Taken 12/17/2024 2158)  Discharge to home or other facility with appropriate resources:   Identify barriers to discharge with patient and caregiver   Arrange for needed discharge resources and transportation as appropriate   Identify discharge learning needs (meds, wound care, etc)   Refer to discharge planning if patient needs post-hospital services based on physician order or complex needs related to functional status, cognitive ability or social support system     Problem: ABCDS Injury Assessment  Goal: Absence of physical injury  Outcome: Progressing

## 2024-12-19 ENCOUNTER — TELEPHONE (OUTPATIENT)
Dept: FAMILY MEDICINE CLINIC | Age: 54
End: 2024-12-19

## 2024-12-19 LAB
ECHO BSA: 2.12 M2
EKG P AXIS: NORMAL DEGREES
EKG P-R INTERVAL: NORMAL MS
EKG Q-T INTERVAL: 362 MS
EKG QRS DURATION: 120 MS
EKG QTC CALCULATION (BAZETT): 411 MS
EKG T AXIS: -104 DEGREES

## 2024-12-19 NOTE — TELEPHONE ENCOUNTER
Care Transitions Initial Follow Up Call    Outreach made within 2 business days of discharge: Yes    Patient: Sameer Larson Patient : 1970   MRN: [id number]  Reason for Admission: cardiac catherization   Discharge Date: 24       Spoke with: Patient    Discharge department/facility: [facility] Interactive Patient Contact:  Was patient able to fill all prescriptions: Yes  Was patient instructed to bring all medications to the follow-up visit: Yes  Is patient taking all medications as directed in the discharge summary? Yes  Does patient understand their discharge instructions: Yes  Does patient have questions or concerns that need addressed prior to 7-14 day follow up office visit: no    Additional needs identified to be addressed with provider  No needs identified - pt does not wish to come in at this time.          Scheduled appointment with PCP within 7-14 days    Follow Up  Future Appointments   Date Time Provider Department Center   2025 11:00 AM [facility]   1/10/2025  9:30 AM Rigoberto Matos MD [place] [facility]   2025  1:00 PM Neo Bermudez MD [place] [facility] Cardio [facility]   2025 11:00 AM Vannessa Medina APRN [facility] DEP       Rebeca Fiore LPN

## 2024-12-19 NOTE — TELEPHONE ENCOUNTER
Called patient to check up on him since his recent hospital stay.  Patient states he has still not heard from pain management as far as an appointment.  Will send this to Lupe to check on for patient and give him a call.

## 2024-12-20 NOTE — DISCHARGE SUMMARY
Discharge Summary    Sameer Larson  :  1970  MRN:  [id number]    Admit date:  2024  Discharge date:  2024    Admitting Physician:  Neo Bermudez MD    Advance Directive: Prior    Consults: none    Primary Care Physician:  Vannessa Medina, BRYAN    Discharge Diagnoses:  Principal Problem:    Positive cardiac stress test  Active Problems:    Chest pain    Coronary artery disease with cardiac symptoms  Resolved Problems:    * No resolved hospital problems. *      Cardiology Specific Data:  Specialty Problems          Cardiology Problems    Coronary artery disease        PVD (peripheral vascular disease) ([facility])        Chest pain        Essential hypertension        Mixed hyperlipidemia        Coronary artery disease with cardiac symptoms           Significant Diagnostic Studies:   Cardiac procedure    Result Date: 2024  Successful PCI of the proximal obtuse marginal lesion with placement of a 2.5 x 13 mm drug-eluting stent.     Cardiac procedure    Result Date: 2024  Normal left ventricular systolic function Coronary angiograms multivessel disease Severe diffuse disease throughout the mid to distal right coronary artery Patent saphenous vein graft to right PDA Moderately severe diffuse disease mid LAD with competitive flow distally Patent LIMA graft to mid distal LAD 85% proximal OM narrowing Patent saphenous vein graft to OM inferior branch; the larger superior branch appears to have not been grafted and the vein graft does not provide retrograde flow       Pertinent Labs:   CBC:   Recent Labs     24  011   WBC 8.8   HGB 11.1*        BMP:    Recent Labs     24   *   K 3.9      CO2 21*   BUN 14   CREATININE 1.1   GLUCOSE 149*     INR: No results for input(s): \"INR\" in the last 72 hours.  Lipids: No results for input(s): \"CHOL\", \"HDL\" in the last 72 hours.    Invalid input(s): \"LDLCALCU\"  ABGs:No results for input(s): \"PHART\", \"DKY0AUO\", \"PO2ART\",

## 2024-12-23 DIAGNOSIS — K21.9 GERD WITHOUT ESOPHAGITIS: ICD-10-CM

## 2024-12-26 RX ORDER — OMEPRAZOLE 40 MG/1
40 CAPSULE, DELAYED RELEASE ORAL
Qty: 30 CAPSULE | Refills: 3 | Status: SHIPPED | OUTPATIENT
Start: 2024-12-26

## 2024-12-26 NOTE — TELEPHONE ENCOUNTER
Received fax from pharmacy requesting refill on pts medication(s). Pt was last seen in office on 11/26/2024  and has a follow up scheduled for Visit date not found. Will send request to  Vannessa Medina  for authorization.     Requested Prescriptions     Pending Prescriptions Disp Refills    omeprazole (PRILOSEC) 40 MG delayed release capsule [Pharmacy Med Name: OMEPRAZOLE DR 40 MG CAPSULE 40 Capsule] 30 capsule 3     Sig: Take 1 capsule by mouth every morning (before breakfast)

## 2024-12-31 NOTE — TELEPHONE ENCOUNTER
After looking into the referral, It also looks like they attempted to reach out to the patient with no call back.  Will send him [facility] with phone number to reach out to them as well.

## 2025-01-16 ENCOUNTER — OFFICE VISIT (OUTPATIENT)
Dept: CARDIOLOGY CLINIC | Age: 55
End: 2025-01-16
Payer: MEDICARE

## 2025-01-16 VITALS
WEIGHT: 199 LBS | HEART RATE: 77 BPM | HEIGHT: 71 IN | BODY MASS INDEX: 27.86 KG/M2 | DIASTOLIC BLOOD PRESSURE: 72 MMHG | SYSTOLIC BLOOD PRESSURE: 138 MMHG | OXYGEN SATURATION: 98 %

## 2025-01-16 DIAGNOSIS — Z95.1 STATUS POST AORTO-CORONARY ARTERY BYPASS GRAFT: ICD-10-CM

## 2025-01-16 DIAGNOSIS — I25.10 CORONARY ARTERY DISEASE INVOLVING NATIVE CORONARY ARTERY OF NATIVE HEART WITHOUT ANGINA PECTORIS: ICD-10-CM

## 2025-01-16 DIAGNOSIS — Z95.5 H/O HEART ARTERY STENT: ICD-10-CM

## 2025-01-16 DIAGNOSIS — R06.02 SHORTNESS OF BREATH: ICD-10-CM

## 2025-01-16 DIAGNOSIS — Z72.0 TOBACCO ABUSE: ICD-10-CM

## 2025-01-16 DIAGNOSIS — I10 ESSENTIAL HYPERTENSION: Primary | ICD-10-CM

## 2025-01-16 DIAGNOSIS — R94.39 POSITIVE CARDIAC STRESS TEST: ICD-10-CM

## 2025-01-16 PROBLEM — I42.1 HYPERTROPHIC OBSTRUCTIVE CARDIOMYOPATHY (HCC): Status: ACTIVE | Noted: 2025-01-16

## 2025-01-16 PROCEDURE — G8419 CALC BMI OUT NRM PARAM NOF/U: HCPCS | Performed by: INTERNAL MEDICINE

## 2025-01-16 PROCEDURE — 3075F SYST BP GE 130 - 139MM HG: CPT | Performed by: INTERNAL MEDICINE

## 2025-01-16 PROCEDURE — 3078F DIAST BP <80 MM HG: CPT | Performed by: INTERNAL MEDICINE

## 2025-01-16 PROCEDURE — 99214 OFFICE O/P EST MOD 30 MIN: CPT | Performed by: INTERNAL MEDICINE

## 2025-01-16 PROCEDURE — 3017F COLORECTAL CA SCREEN DOC REV: CPT | Performed by: INTERNAL MEDICINE

## 2025-01-16 PROCEDURE — 4004F PT TOBACCO SCREEN RCVD TLK: CPT | Performed by: INTERNAL MEDICINE

## 2025-01-16 PROCEDURE — G8427 DOCREV CUR MEDS BY ELIG CLIN: HCPCS | Performed by: INTERNAL MEDICINE

## 2025-01-16 ASSESSMENT — ENCOUNTER SYMPTOMS
EYES NEGATIVE: 1
SHORTNESS OF BREATH: 0
GASTROINTESTINAL NEGATIVE: 1
VOMITING: 0
DIARRHEA: 0
RESPIRATORY NEGATIVE: 1
NAUSEA: 0

## 2025-01-16 NOTE — PROGRESS NOTES
Transportation (Medical): No     Lack of Transportation (Non-Medical): No   Physical Activity: Inactive (8/26/2024)    Exercise Vital Sign     Days of Exercise per Week: 0 days     Minutes of Exercise per Session: 0 min   Stress: Not on file   Social Connections: Unknown (10/11/2023)    Received from [facility]    Family and Community Support     Help with Day-to-Day Activities: Not on file     Lonely or Isolated: Not on file   Intimate Partner Violence: Unknown (10/11/2023)    Received from [facility]    Abuse Screen     Unsafe at Home or Work/School: Not on file     Feels Threatened by Someone?: Not on file     Does Anyone Keep You from Contacting Others or Doint Things Outside the Home?: Not on file     Physical Sign of Abuse Present: Not on file   Housing Stability: Low Risk  (12/17/2024)    Housing Stability Vital Sign     Unable to Pay for Housing in the Last Year: No     Number of Times Moved in the Last Year: 1     Homeless in the Last Year: No       Physical Examination:  /72   Pulse 77   Ht 1.803 m (5' 11\")   Wt 90.3 kg (199 lb)   SpO2 98%   BMI 27.75 kg/m²   Physical Exam  Vitals reviewed.   Constitutional:       Appearance: He is well-developed.   Neck:      Vascular: No carotid bruit or JVD.   Cardiovascular:      Rate and Rhythm: Normal rate and regular rhythm.      Heart sounds: Normal heart sounds. No murmur heard.     No friction rub. No gallop.   Pulmonary:      Effort: Pulmonary effort is normal. No respiratory distress.      Breath sounds: Normal breath sounds. No wheezing or rales.   Abdominal:      General: There is no distension.      Tenderness: There is no abdominal tenderness.   Lymphadenopathy:      Cervical: No cervical adenopathy.   Skin:     General: Skin is warm and dry.             ASSESSMENT:     Diagnosis Orders   1. Essential hypertension        2. Shortness of breath  External Referral

## 2025-01-22 ENCOUNTER — HOSPITAL ENCOUNTER (OUTPATIENT)
Dept: PULMONOLOGY | Age: 55
Discharge: HOME | End: 2025-01-22
Payer: MEDICARE

## 2025-01-22 PROCEDURE — 94729 DIFFUSING CAPACITY: CPT

## 2025-01-22 PROCEDURE — 94726 PLETHYSMOGRAPHY LUNG VOLUMES: CPT

## 2025-01-22 PROCEDURE — 6360000002 HC RX W HCPCS: Performed by: NURSE PRACTITIONER

## 2025-01-22 PROCEDURE — 94060 EVALUATION OF WHEEZING: CPT

## 2025-01-22 RX ORDER — ALBUTEROL SULFATE 0.83 MG/ML
2.5 SOLUTION RESPIRATORY (INHALATION) 2 TIMES DAILY PRN
Status: DISCONTINUED | OUTPATIENT
Start: 2025-01-22 | End: 2025-01-24 | Stop reason: HOSPADM

## 2025-01-22 RX ADMIN — ALBUTEROL SULFATE 2.5 MG: 2.5 SOLUTION RESPIRATORY (INHALATION) at 08:56

## 2025-01-22 NOTE — PROCEDURES
Media Information          Pulmonary Function Study    Interpretation:    The FVC is moderately reduced. FEV1 is moderately reduced. FEV1/FVC ratio is Normal. After bronchodilator therapy there was no significant change in FEV1. Total lung capacity is Normal. Residual volume is increased.      Diffusing lung capacity when corrected for alveolar volume is Normal.         Impression:    Nonspecific pulmonary function study.         Nahum Prather MD, FCCP, [facility]

## 2025-01-24 NOTE — PROGRESS NOTES
Systems  System  Neg/Pos  Details  Constitutional  Negative  Chills, Fatigue, Fever and Night Sweats  Respiratory  Negative  Chest Pain, Cough and Dyspnea  Cardio   Negative  Leg Swelling  GI   Negative  Abdominal Pain, Constipation, Nausea and Vomiting     Negative  Urinary Incontinence   Endocrine  Negative  Weight Gain and Weight Loss  MS   Negative  Except as noted in HPI and Chief Complaint    Ht 1.803 m (5' 11\")   Wt 90.3 kg (199 lb)   BMI 27.75 kg/m²      Physical Exam   Physical Examination:  General: The patient is a Well Nourished 54 y.o. male who is calm, no acute distress.  Psychological: Appropriate mood and affect  HEENT: Normocephalic, Atraumatic. No gross visual or auditory acuity deficits.   Respiratory: Rate and effort within normal limits.   Vascular: Capillary refill <3 seconds to extremities  Musculoskeletal: Bilateral upper extremities: On inspection, no open skin wounds or lesions.  Palpable nodules in line with the left thumb and bilateral middle fingers at A1 pulleys, most significant tenderness about the left thumb.  No appreciable flexion contracture is noted.  He exhibits catching of the left thumb but no sharri locking.  Slight catching of the left middle finger with no locking.  No appreciable catching or locking of the right middle finger.  Stable neurovascular exam to bilateral hands.      Imaging  None        Sally Estrella is a 54 y.o. male  right-hand-dominant who returns today for follow-up of symptoms consistent with bilateral middle and left thumb trigger fingers.  As his right hand is improved, we are going to proceed with observation.  In regard to persistent symptoms on the left, we discussed treatment options consisting of observation, repeat corticosteroid injection and/or operative intervention.  After discussion, he would like to proceed with his third round of corticosteroid injections.  Risks including, but not limited to, bleeding, infection and failure to alleviate

## 2025-01-25 NOTE — PROGRESS NOTES
Bilateral Lower Ext Vein Mapping     Bilateral Lower Extremity Vein Mapping and Marking performed for Pre-op CABG.     Final Report Pending Thoracic Surgery

## 2025-01-26 ASSESSMENT — PATIENT HEALTH QUESTIONNAIRE - PHQ9
SUM OF ALL RESPONSES TO PHQ QUESTIONS 1-9: 6
3. TROUBLE FALLING OR STAYING ASLEEP: NEARLY EVERY DAY
6. FEELING BAD ABOUT YOURSELF - OR THAT YOU ARE A FAILURE OR HAVE LET YOURSELF OR YOUR FAMILY DOWN: NOT AT ALL
9. THOUGHTS THAT YOU WOULD BE BETTER OFF DEAD, OR OF HURTING YOURSELF: NOT AT ALL
2. FEELING DOWN, DEPRESSED OR HOPELESS: NOT AT ALL
SUM OF ALL RESPONSES TO PHQ QUESTIONS 1-9: 6
4. FEELING TIRED OR HAVING LITTLE ENERGY: NEARLY EVERY DAY
3. TROUBLE FALLING OR STAYING ASLEEP: NEARLY EVERY DAY
SUM OF ALL RESPONSES TO PHQ QUESTIONS 1-9: 6
5. POOR APPETITE OR OVEREATING: NOT AT ALL
SUM OF ALL RESPONSES TO PHQ9 QUESTIONS 1 & 2: 0
8. MOVING OR SPEAKING SO SLOWLY THAT OTHER PEOPLE COULD HAVE NOTICED. OR THE OPPOSITE - BEING SO FIDGETY OR RESTLESS THAT YOU HAVE BEEN MOVING AROUND A LOT MORE THAN USUAL: NOT AT ALL
10. IF YOU CHECKED OFF ANY PROBLEMS, HOW DIFFICULT HAVE THESE PROBLEMS MADE IT FOR YOU TO DO YOUR WORK, TAKE CARE OF THINGS AT HOME, OR GET ALONG WITH OTHER PEOPLE: NOT DIFFICULT AT ALL
SUM OF ALL RESPONSES TO PHQ QUESTIONS 1-9: 6
10. IF YOU CHECKED OFF ANY PROBLEMS, HOW DIFFICULT HAVE THESE PROBLEMS MADE IT FOR YOU TO DO YOUR WORK, TAKE CARE OF THINGS AT HOME, OR GET ALONG WITH OTHER PEOPLE: NOT DIFFICULT AT ALL
6. FEELING BAD ABOUT YOURSELF - OR THAT YOU ARE A FAILURE OR HAVE LET YOURSELF OR YOUR FAMILY DOWN: NOT AT ALL
7. TROUBLE CONCENTRATING ON THINGS, SUCH AS READING THE NEWSPAPER OR WATCHING TELEVISION: NOT AT ALL
7. TROUBLE CONCENTRATING ON THINGS, SUCH AS READING THE NEWSPAPER OR WATCHING TELEVISION: NOT AT ALL
8. MOVING OR SPEAKING SO SLOWLY THAT OTHER PEOPLE COULD HAVE NOTICED. OR THE OPPOSITE, BEING SO FIGETY OR RESTLESS THAT YOU HAVE BEEN MOVING AROUND A LOT MORE THAN USUAL: NOT AT ALL
9. THOUGHTS THAT YOU WOULD BE BETTER OFF DEAD, OR OF HURTING YOURSELF: NOT AT ALL
1. LITTLE INTEREST OR PLEASURE IN DOING THINGS: NOT AT ALL
5. POOR APPETITE OR OVEREATING: NOT AT ALL
1. LITTLE INTEREST OR PLEASURE IN DOING THINGS: NOT AT ALL
2. FEELING DOWN, DEPRESSED OR HOPELESS: NOT AT ALL
4. FEELING TIRED OR HAVING LITTLE ENERGY: NEARLY EVERY DAY
SUM OF ALL RESPONSES TO PHQ QUESTIONS 1-9: 6

## 2025-01-27 ENCOUNTER — OFFICE VISIT (OUTPATIENT)
Age: 55
End: 2025-01-27
Payer: MEDICARE

## 2025-01-27 VITALS — BODY MASS INDEX: 27.86 KG/M2 | HEIGHT: 71 IN | WEIGHT: 199 LBS

## 2025-01-27 DIAGNOSIS — M65.332 TRIGGER FINGER, LEFT MIDDLE FINGER: ICD-10-CM

## 2025-01-27 DIAGNOSIS — M65.312 TRIGGER FINGER OF LEFT THUMB: ICD-10-CM

## 2025-01-27 DIAGNOSIS — M65.331 TRIGGER FINGER, RIGHT MIDDLE FINGER: Primary | ICD-10-CM

## 2025-01-27 PROCEDURE — G8419 CALC BMI OUT NRM PARAM NOF/U: HCPCS | Performed by: ORTHOPAEDIC SURGERY

## 2025-01-27 PROCEDURE — 20550 NJX 1 TENDON SHEATH/LIGAMENT: CPT | Performed by: ORTHOPAEDIC SURGERY

## 2025-01-27 PROCEDURE — G8427 DOCREV CUR MEDS BY ELIG CLIN: HCPCS | Performed by: ORTHOPAEDIC SURGERY

## 2025-01-27 PROCEDURE — 99213 OFFICE O/P EST LOW 20 MIN: CPT | Performed by: ORTHOPAEDIC SURGERY

## 2025-01-27 PROCEDURE — 3017F COLORECTAL CA SCREEN DOC REV: CPT | Performed by: ORTHOPAEDIC SURGERY

## 2025-01-27 PROCEDURE — 4004F PT TOBACCO SCREEN RCVD TLK: CPT | Performed by: ORTHOPAEDIC SURGERY

## 2025-01-27 RX ORDER — LIDOCAINE HYDROCHLORIDE 10 MG/ML
0.5 INJECTION, SOLUTION EPIDURAL; INFILTRATION; INTRACAUDAL; PERINEURAL ONCE
Status: COMPLETED | OUTPATIENT
Start: 2025-01-27 | End: 2025-01-27

## 2025-01-27 RX ORDER — BETAMETHASONE SODIUM PHOSPHATE AND BETAMETHASONE ACETATE 3; 3 MG/ML; MG/ML
3 INJECTION, SUSPENSION INTRA-ARTICULAR; INTRALESIONAL; INTRAMUSCULAR; SOFT TISSUE ONCE
Status: COMPLETED | OUTPATIENT
Start: 2025-01-27 | End: 2025-01-27

## 2025-01-27 RX ADMIN — LIDOCAINE HYDROCHLORIDE 0.5 ML: 10 INJECTION, SOLUTION EPIDURAL; INFILTRATION; INTRACAUDAL; PERINEURAL at 12:51

## 2025-01-27 RX ADMIN — BETAMETHASONE SODIUM PHOSPHATE AND BETAMETHASONE ACETATE 3 MG: 3; 3 INJECTION, SUSPENSION INTRA-ARTICULAR; INTRALESIONAL; INTRAMUSCULAR; SOFT TISSUE at 12:46

## 2025-01-27 RX ADMIN — LIDOCAINE HYDROCHLORIDE 0.5 ML: 10 INJECTION, SOLUTION EPIDURAL; INFILTRATION; INTRACAUDAL; PERINEURAL at 12:46

## 2025-01-27 RX ADMIN — BETAMETHASONE SODIUM PHOSPHATE AND BETAMETHASONE ACETATE 3 MG: 3; 3 INJECTION, SUSPENSION INTRA-ARTICULAR; INTRALESIONAL; INTRAMUSCULAR; SOFT TISSUE at 12:45

## 2025-01-28 ENCOUNTER — TELEPHONE (OUTPATIENT)
Dept: PAIN MANAGEMENT | Age: 55
End: 2025-01-28

## 2025-01-28 ENCOUNTER — TELEPHONE (OUTPATIENT)
Age: 55
End: 2025-01-28

## 2025-01-28 ENCOUNTER — OFFICE VISIT (OUTPATIENT)
Age: 55
End: 2025-01-28
Payer: MEDICARE

## 2025-01-28 ENCOUNTER — HOSPITAL ENCOUNTER (OUTPATIENT)
Dept: PAIN MANAGEMENT | Age: 55
Discharge: HOME | End: 2025-01-28
Payer: MEDICARE

## 2025-01-28 ENCOUNTER — TELEPHONE (OUTPATIENT)
Dept: CARDIAC REHAB | Age: 55
End: 2025-01-28

## 2025-01-28 VITALS
DIASTOLIC BLOOD PRESSURE: 88 MMHG | BODY MASS INDEX: 28.42 KG/M2 | SYSTOLIC BLOOD PRESSURE: 138 MMHG | HEIGHT: 71 IN | OXYGEN SATURATION: 98 % | WEIGHT: 203 LBS | TEMPERATURE: 97.7 F | HEART RATE: 78 BPM

## 2025-01-28 VITALS
WEIGHT: 202 LBS | RESPIRATION RATE: 16 BRPM | HEIGHT: 71 IN | BODY MASS INDEX: 28.28 KG/M2 | DIASTOLIC BLOOD PRESSURE: 80 MMHG | HEART RATE: 84 BPM | TEMPERATURE: 98 F | OXYGEN SATURATION: 98 % | SYSTOLIC BLOOD PRESSURE: 154 MMHG

## 2025-01-28 DIAGNOSIS — I42.1 HYPERTROPHIC OBSTRUCTIVE CARDIOMYOPATHY (HCC): ICD-10-CM

## 2025-01-28 DIAGNOSIS — M54.42 CHRONIC MIDLINE LOW BACK PAIN WITH LEFT-SIDED SCIATICA: ICD-10-CM

## 2025-01-28 DIAGNOSIS — M47.22 CERVICAL SPONDYLOSIS WITH MYELOPATHY AND RADICULOPATHY: Primary | ICD-10-CM

## 2025-01-28 DIAGNOSIS — J41.0 SIMPLE CHRONIC BRONCHITIS (HCC): ICD-10-CM

## 2025-01-28 DIAGNOSIS — Z93.6 PRESENCE OF UROSTOMY (HCC): ICD-10-CM

## 2025-01-28 DIAGNOSIS — G89.29 CHRONIC MIDLINE LOW BACK PAIN WITH LEFT-SIDED SCIATICA: ICD-10-CM

## 2025-01-28 DIAGNOSIS — F17.210 NICOTINE DEPENDENCE, CIGARETTES, UNCOMPLICATED: ICD-10-CM

## 2025-01-28 DIAGNOSIS — E11.65 TYPE 2 DIABETES MELLITUS WITH HYPERGLYCEMIA, WITHOUT LONG-TERM CURRENT USE OF INSULIN (HCC): Primary | ICD-10-CM

## 2025-01-28 DIAGNOSIS — M47.12 CERVICAL SPONDYLOSIS WITH MYELOPATHY AND RADICULOPATHY: Primary | ICD-10-CM

## 2025-01-28 DIAGNOSIS — E11.9 TYPE 2 DIABETES MELLITUS WITHOUT COMPLICATION, WITHOUT LONG-TERM CURRENT USE OF INSULIN (HCC): Chronic | ICD-10-CM

## 2025-01-28 DIAGNOSIS — Z02.89 PAIN MANAGEMENT CONTRACT SIGNED: ICD-10-CM

## 2025-01-28 DIAGNOSIS — I25.10 CORONARY ARTERY DISEASE INVOLVING NATIVE CORONARY ARTERY OF NATIVE HEART WITHOUT ANGINA PECTORIS: ICD-10-CM

## 2025-01-28 DIAGNOSIS — R94.2 ABNORMAL PFTS (PULMONARY FUNCTION TESTS): Primary | ICD-10-CM

## 2025-01-28 LAB — HBA1C MFR BLD: 5.8 %

## 2025-01-28 PROCEDURE — 83036 HEMOGLOBIN GLYCOSYLATED A1C: CPT | Performed by: NURSE PRACTITIONER

## 2025-01-28 PROCEDURE — 3075F SYST BP GE 130 - 139MM HG: CPT | Performed by: NURSE PRACTITIONER

## 2025-01-28 PROCEDURE — 99214 OFFICE O/P EST MOD 30 MIN: CPT | Performed by: NURSE PRACTITIONER

## 2025-01-28 PROCEDURE — 99204 OFFICE O/P NEW MOD 45 MIN: CPT

## 2025-01-28 PROCEDURE — 4004F PT TOBACCO SCREEN RCVD TLK: CPT | Performed by: NURSE PRACTITIONER

## 2025-01-28 PROCEDURE — G8419 CALC BMI OUT NRM PARAM NOF/U: HCPCS | Performed by: NURSE PRACTITIONER

## 2025-01-28 PROCEDURE — G8427 DOCREV CUR MEDS BY ELIG CLIN: HCPCS | Performed by: NURSE PRACTITIONER

## 2025-01-28 PROCEDURE — 99213 OFFICE O/P EST LOW 20 MIN: CPT

## 2025-01-28 PROCEDURE — 3079F DIAST BP 80-89 MM HG: CPT | Performed by: NURSE PRACTITIONER

## 2025-01-28 PROCEDURE — 3044F HG A1C LEVEL LT 7.0%: CPT | Performed by: NURSE PRACTITIONER

## 2025-01-28 ASSESSMENT — PAIN SCALES - GENERAL: PAINLEVEL_OUTOF10: 10

## 2025-01-28 ASSESSMENT — ENCOUNTER SYMPTOMS
BACK PAIN: 1
GASTROINTESTINAL NEGATIVE: 1
GASTROINTESTINAL NEGATIVE: 1
COUGH: 1
EYES NEGATIVE: 1
RESPIRATORY NEGATIVE: 1
EYES NEGATIVE: 1
VISUAL CHANGE: 0
BOWEL INCONTINENCE: 0
TROUBLE SWALLOWING: 0
PHOTOPHOBIA: 0

## 2025-01-28 ASSESSMENT — PAIN DESCRIPTION - INTENSITY: RATING_2: 10

## 2025-01-28 ASSESSMENT — PAIN DESCRIPTION - LOCATION
LOCATION: BACK
LOCATION_2: NECK

## 2025-01-28 ASSESSMENT — PAIN DESCRIPTION - PAIN TYPE: TYPE: CHRONIC PAIN

## 2025-01-28 ASSESSMENT — PAIN DESCRIPTION - ORIENTATION
ORIENTATION: LOWER
ORIENTATION_2: LOWER

## 2025-01-28 NOTE — TELEPHONE ENCOUNTER
Attempted to make contact concerning cardiac rehab referral.  No answer at number listed and no VM set up.

## 2025-01-28 NOTE — PROGRESS NOTES
Clinic Documentation      Education Provided:  [x] Review of Sánchez  [] Agreement Review  [x] PEG Score Calculated [] PHQ Score Calculated [] ORT Score Calculated    [] Compliance Issues Discussed [] Cognitive Behavior Needs [x] Exercise [] Review of Test [] Financial Issues  [x] Tobacco/Alcohol Use Reviewed [x] Teaching [] New Patient [] Picture Obtained    Physician Plan:  [] Outgoing Referral  [] Pharmacy Consult  [] Test Ordered [x] Prescription Ordered/Changed   [] Obtained Test Results / Consult Notes        Complete if patient is withholding blood thinner for procedure     Blood Thinner Patient is currently taking:      [] Plavix (Hold for 7 days)  [] Aspirin (Hold for 5 days)     [] Pletal (Hold for 2 days)  [] Pradaxa (Hold for 3 days)    [] Effient (Hold for 7 days)  [] Xarelto (Hold for 2 days)    [] Eliquis (Hold for 2 days)  [] Brilinta (Hold for 7 days)    [] Coumadin (Hold for 5 days) - (INR needs to be drawn the day prior to procedure- INR < 2.0)    [] Aggrenox (Hold for 7 days)        [] Patient will stop medication on their own.  [] Blood Thinner Form Faxed for approval to hold.   Provider form faxed to:     Assessment Completed by:  Electronically signed by Ailyn Liriano MA on 1/28/2025 at 3:56 PM

## 2025-01-28 NOTE — PROGRESS NOTES
Clinic Documentation      Education Provided:  [x] Review of Sánchez  [] Agreement Review  [x] PEG Score Calculated [] PHQ Score Calculated [] ORT Score Calculated    [] Compliance Issues Discussed [] Cognitive Behavior Needs [x] Exercise [] Review of Test [] Financial Issues  [x] Tobacco/Alcohol Use Reviewed [x] Teaching [] New Patient [] Picture Obtained    Physician Plan:  [] Outgoing Referral  [] Pharmacy Consult  [] Test Ordered [x] Prescription Ordered/Changed   [] Obtained Test Results / Consult Notes        Complete if patient is withholding blood thinner for procedure     Blood Thinner Patient is currently taking:      [] Plavix (Hold for 7 days)  [] Aspirin (Hold for 5 days)     [] Pletal (Hold for 2 days)  [] Pradaxa (Hold for 3 days)    [] Effient (Hold for 7 days)  [] Xarelto (Hold for 2 days)    [] Eliquis (Hold for 2 days)  [] Brilinta (Hold for 7 days)    [] Coumadin (Hold for 5 days) - (INR needs to be drawn the day prior to procedure- INR < 2.0)    [] Aggrenox (Hold for 7 days)        [] Patient will stop medication on their own.    [] Blood Thinner Form Faxed for approval to hold.   Provider form faxed to:     Assessment Completed by:  Electronically signed by Ailyn Liriano MA on 1/28/2025 at 3:57 PM

## 2025-01-28 NOTE — PROGRESS NOTES
Clinic Documentation      Education Provided:  [x] Review of Sánchez  [] Agreement Review  [x] PEG Score Calculated [] PHQ Score Calculated [] ORT Score Calculated    [] Compliance Issues Discussed [] Cognitive Behavior Needs [x] Exercise [] Review of Test [] Financial Issues  [x] Tobacco/Alcohol Use Reviewed [x] Teaching [] New Patient [] Picture Obtained    Physician Plan:  [] Outgoing Referral  [] Pharmacy Consult  [] Test Ordered [x] Prescription Ordered/Changed   [] Obtained Test Results / Consult Notes        Complete if patient is withholding blood thinner for procedure     Blood Thinner Patient is currently taking:      [] Plavix (Hold for 7 days)  [] Aspirin (Hold for 5 days)     [] Pletal (Hold for 2 days)  [] Pradaxa (Hold for 3 days)    [] Effient (Hold for 7 days)  [] Xarelto (Hold for 2 days)    [] Eliquis (Hold for 2 days)  [] Brilinta (Hold for 7 days)    [] Coumadin (Hold for 5 days) - (INR needs to be drawn the day prior to procedure- INR < 2.0)    [] Aggrenox (Hold for 7 days)        [] Patient will stop medication on their own.  [] Blood Thinner Form Faxed for approval to hold.   Provider form faxed to:     Assessment Completed by:  Electronically signed by Ailyn Liriano MA on 1/28/2025 at 2:34 PM

## 2025-01-28 NOTE — H&P
[facility]  [address] [address]  Phone: [phone]  Fax: [phone]        History and Physical New Patient    Patient Name: Sameer Larson    MR #: [id number]    Account #:[id number]    : 1970    Age: 54 y.o.    Sex: male    Date: 25    PCP: Vannessa Medina APRN    Referring Provider:    Chief Complaint:   Chief Complaint   Patient presents with    Neck Pain     10/10       History of Present Illness:   The patient is a 54 y.o. male who presents with referral from PCP with primary complaints of cervical spondylosis with myelopathy and radiculopathy. Patient also reports chronic low back pain. Saw pain management years ago. He received epidural injections until he states they hit something wrong and he stopped going. Dr. Lizarraga managed pain medications after that. He has been off of opioids for the last 3 years. He reports that he was in group home for drugs for 2 years and has been out for 1 year. 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 He was on #240 norco per month at the time. Feels as though he was overmedicated and  to his poor decisions. He spent 18 months in a shelter house and actively participates with his . States he is in such chronic pain all the time that he never gets off the couch. Reports he has no desire to do anything because the slightest movements cause him neck and back pain.     He reports pain with dressing, showering and any other ADLs. He had a previous posterior spinal fusion that had subsequent MRSA infection requiring wound care in . It left significant damage to surrounding tissues and muscles. Reports a constant state of spasm to left trapezius muscle since that surgery. He has also had lumbar spinal surgery in 2020 as well.     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 He is currently on

## 2025-01-28 NOTE — TELEPHONE ENCOUNTER
Called patient, spoke with: Patient regarding the results of the patients most recent PFT.  I advised Patient of Vannessa Medina recommendations.   Patient did voice understanding    Referral entered for pt.

## 2025-01-28 NOTE — TELEPHONE ENCOUNTER
PA denied for [facility] for the following reason:      We denied coverage for this drug because: This drug is not covered on the formulary. We are denying your request because we do not show that you have tried at least 2 covered drugs that can treat your condition. Other covered drug(s) is/are: Fentanyl transdermal patch 72 hour 12 mcg/hr, 25 mcg/hr, 50 mcg/hr, 75 mcg/hr, and 100 mcg/hr, Methadone oral tablet 5 mg and 10 mg, Morphine oral tablet 15 mg and 30 mg, and Morphine oral tablet extended release 15 mg, 30 mg, 60 mg, 100 mg and 200 mg. We may be able to make an exception to cover this drug. Your doctor will need to send us medical records showing that you tried this drug. If you cannot take the covered drug, your doctor will need to tell us why. Note: Some covered drug(s) may have quantity limits. Please refer to the formulary for details.

## 2025-01-28 NOTE — TELEPHONE ENCOUNTER
----- Message from BRYAN Candelaria sent at 1/28/2025  8:26 AM CST -----  Some reduced lung capacity noted.  Recommend referral to pulmonary if not already established.

## 2025-01-29 DIAGNOSIS — M47.12 CERVICAL SPONDYLOSIS WITH MYELOPATHY AND RADICULOPATHY: ICD-10-CM

## 2025-01-29 DIAGNOSIS — M47.22 CERVICAL SPONDYLOSIS WITH MYELOPATHY AND RADICULOPATHY: ICD-10-CM

## 2025-01-29 RX ORDER — BUPRENORPHINE 5 UG/H
1 PATCH TRANSDERMAL WEEKLY
Qty: 4 PATCH | Refills: 0 | Status: SHIPPED | OUTPATIENT
Start: 2025-01-29 | End: 2025-02-26

## 2025-02-02 DIAGNOSIS — E11.65 TYPE 2 DIABETES MELLITUS WITH HYPERGLYCEMIA, WITHOUT LONG-TERM CURRENT USE OF INSULIN (HCC): ICD-10-CM

## 2025-02-03 NOTE — TELEPHONE ENCOUNTER
Sameer called requesting a refill of the below medication which has been pended for you:     Requested Prescriptions     Pending Prescriptions Disp Refills    OZEMPIC, 0.25 OR 0.5 MG/DOSE, 2 MG/3ML SOPN [Pharmacy Med Name: OZEMPIC 0.25-0.5 MG/DOSE PEN]  2     Sig: INJECT 0.5 MG UNDER THE SKIN WEEKLY       Last Appointment Date: 1/28/2025  Next Appointment Date: 4/29/2025    Allergies   Allergen Reactions    Latex Other (See Comments)     BOILS AND BLISTERS- ALLERGY CALLED TO [facility] SURGERY SCHEDULING.    Demerol Hcl [Meperidine] Hives     And n/v

## 2025-02-04 RX ORDER — SEMAGLUTIDE 0.68 MG/ML
0.5 INJECTION, SOLUTION SUBCUTANEOUS WEEKLY
Qty: 2 ML | Refills: 3 | Status: SHIPPED | OUTPATIENT
Start: 2025-02-04

## 2025-02-15 DIAGNOSIS — F32.A ANXIETY AND DEPRESSION: ICD-10-CM

## 2025-02-15 DIAGNOSIS — F41.9 ANXIETY AND DEPRESSION: ICD-10-CM

## 2025-02-17 DIAGNOSIS — J42 CHRONIC BRONCHITIS, UNSPECIFIED CHRONIC BRONCHITIS TYPE (HCC): ICD-10-CM

## 2025-02-17 RX ORDER — FLUTICASONE PROPIONATE AND SALMETEROL 50; 250 UG/1; UG/1
1 POWDER RESPIRATORY (INHALATION) EVERY 12 HOURS
Qty: 60 EACH | Refills: 3 | Status: SHIPPED | OUTPATIENT
Start: 2025-02-17

## 2025-02-17 NOTE — TELEPHONE ENCOUNTER
Received fax from pharmacy requesting refill on pts medication(s). Pt was last seen in office on 1/28/25  and has a follow up scheduled for 4/29/25. Will send request to  Vannessa Medina  for patient.     Requested Prescriptions     Pending Prescriptions Disp Refills    ADVAIR DISKUS 250-50 MCG/ACT AEPB diskus inhaler [Pharmacy Med Name: ADVAIR 250/50 DISKUS 250-50 Aerosol]  3     Sig: INHALE 1 PUFF INTO THE LUNGS IN THE MORNING AND 1 PUFF IN THE EVENING.

## 2025-02-17 NOTE — TELEPHONE ENCOUNTER
Received fax from pharmacy requesting refill on pts medication(s). Pt was last seen in office on 1/28/25  and has a follow up scheduled for 4/29/25. Will send request to  Vannessa Medina  for patient.     Requested Prescriptions     Pending Prescriptions Disp Refills    FLUoxetine (PROZAC) 20 MG capsule [Pharmacy Med Name: FLUOXETINE HCL 20 MG CAPS 20 Capsule] 30 capsule 3     Sig: TAKE 1 CAPSULE BY MOUTH DAILY

## 2025-02-25 ENCOUNTER — HOSPITAL ENCOUNTER (OUTPATIENT)
Dept: PAIN MANAGEMENT | Age: 55
Discharge: HOME | End: 2025-02-25
Payer: MEDICARE

## 2025-02-25 VITALS
HEART RATE: 89 BPM | SYSTOLIC BLOOD PRESSURE: 163 MMHG | TEMPERATURE: 96.7 F | OXYGEN SATURATION: 99 % | DIASTOLIC BLOOD PRESSURE: 80 MMHG | BODY MASS INDEX: 28.19 KG/M2 | RESPIRATION RATE: 16 BRPM | HEIGHT: 71 IN | WEIGHT: 201.4 LBS

## 2025-02-25 DIAGNOSIS — G89.29 CHRONIC MIDLINE LOW BACK PAIN WITH LEFT-SIDED SCIATICA: Primary | ICD-10-CM

## 2025-02-25 DIAGNOSIS — M54.42 CHRONIC MIDLINE LOW BACK PAIN WITH LEFT-SIDED SCIATICA: Primary | ICD-10-CM

## 2025-02-25 DIAGNOSIS — M47.22 CERVICAL SPONDYLOSIS WITH MYELOPATHY AND RADICULOPATHY: ICD-10-CM

## 2025-02-25 DIAGNOSIS — F32.A ANXIETY AND DEPRESSION: ICD-10-CM

## 2025-02-25 DIAGNOSIS — Z51.81 ENCOUNTER FOR MONITORING OPIOID MAINTENANCE THERAPY: ICD-10-CM

## 2025-02-25 DIAGNOSIS — M47.12 CERVICAL SPONDYLOSIS WITH MYELOPATHY AND RADICULOPATHY: ICD-10-CM

## 2025-02-25 DIAGNOSIS — Z79.891 ENCOUNTER FOR MONITORING OPIOID MAINTENANCE THERAPY: ICD-10-CM

## 2025-02-25 DIAGNOSIS — Z93.6 PRESENCE OF UROSTOMY (HCC): ICD-10-CM

## 2025-02-25 DIAGNOSIS — F17.210 NICOTINE DEPENDENCE, CIGARETTES, UNCOMPLICATED: ICD-10-CM

## 2025-02-25 DIAGNOSIS — F41.9 ANXIETY AND DEPRESSION: ICD-10-CM

## 2025-02-25 DIAGNOSIS — E11.9 TYPE 2 DIABETES MELLITUS WITHOUT COMPLICATION, WITHOUT LONG-TERM CURRENT USE OF INSULIN (HCC): Chronic | ICD-10-CM

## 2025-02-25 PROCEDURE — 99214 OFFICE O/P EST MOD 30 MIN: CPT

## 2025-02-25 RX ORDER — BUPRENORPHINE AND NALOXONE 2; .5 MG/1; MG/1
0.25 FILM, SOLUBLE BUCCAL; SUBLINGUAL 2 TIMES DAILY
Qty: 4 FILM | Refills: 0 | Status: SHIPPED | OUTPATIENT
Start: 2025-02-25 | End: 2025-03-05

## 2025-02-25 ASSESSMENT — ENCOUNTER SYMPTOMS
VISUAL CHANGE: 0
BACK PAIN: 1
EYES NEGATIVE: 1
TROUBLE SWALLOWING: 0
RESPIRATORY NEGATIVE: 1
GASTROINTESTINAL NEGATIVE: 1
PHOTOPHOBIA: 0

## 2025-02-25 ASSESSMENT — PAIN SCALES - GENERAL: PAINLEVEL_OUTOF10: 8

## 2025-02-25 ASSESSMENT — PAIN DESCRIPTION - ORIENTATION: ORIENTATION: LOWER;MID;UPPER

## 2025-02-25 ASSESSMENT — PAIN DESCRIPTION - PAIN TYPE: TYPE: CHRONIC PAIN

## 2025-02-25 ASSESSMENT — PAIN DESCRIPTION - LOCATION
LOCATION: BACK
LOCATION_2: NECK

## 2025-02-25 ASSESSMENT — PAIN DESCRIPTION - INTENSITY: RATING_2: 8

## 2025-02-25 NOTE — PROGRESS NOTES
Clinic Documentation      Education Provided:  [x] Review of Sánchez  [] Agreement Review  [x] PEG Score Calculated [] PHQ Score Calculated [] ORT Score Calculated    [] Compliance Issues Discussed [] Cognitive Behavior Needs [x] Exercise [] Review of Test [] Financial Issues  [x] Tobacco/Alcohol Use Reviewed [x] Teaching [] New Patient [] Picture Obtained    Physician Plan:  [] Outgoing Referral  [] Pharmacy Consult  [x] Test Ordered [x] Prescription Ordered/Changed   [] Obtained Test Results / Consult Notes        Complete if patient is withholding blood thinner for procedure     Blood Thinner Patient is currently taking:      [] Plavix (Hold for 7 days)  [] Aspirin (Hold for 5 days)     [] Pletal (Hold for 2 days)  [] Pradaxa (Hold for 3 days)    [] Effient (Hold for 7 days)  [] Xarelto (Hold for 2 days)    [] Eliquis (Hold for 2 days)  [] Brilinta (Hold for 7 days)    [] Coumadin (Hold for 5 days) - (INR needs to be drawn the day prior to procedure- INR < 2.0)    [] Aggrenox (Hold for 7 days)        [] Patient will stop medication on their own.    [] Blood Thinner Form Faxed for approval to hold.   Provider form faxed to:     Assessment Completed by:  Electronically signed by MAJOR BUCK on 2/25/2025 at 11:38 AM  
reviewed the note for such errors, some may still exist.

## 2025-03-06 DIAGNOSIS — M47.22 CERVICAL SPONDYLOSIS WITH MYELOPATHY AND RADICULOPATHY: Primary | ICD-10-CM

## 2025-03-06 DIAGNOSIS — M47.12 CERVICAL SPONDYLOSIS WITH MYELOPATHY AND RADICULOPATHY: Primary | ICD-10-CM

## 2025-03-06 RX ORDER — BUPRENORPHINE AND NALOXONE 2; .5 MG/1; MG/1
0.25 FILM, SOLUBLE BUCCAL; SUBLINGUAL 2 TIMES DAILY
Qty: 4 FILM | Refills: 0 | Status: SHIPPED | OUTPATIENT
Start: 2025-03-06 | End: 2025-03-14

## 2025-03-06 NOTE — PROGRESS NOTES
Patient called - no side effects. Tolerating medication well. Will refill. Patient does believe he needs higher dose. Will have him in for office visit to discuss.

## 2025-03-12 ENCOUNTER — OFFICE VISIT (OUTPATIENT)
Dept: PAIN MANAGEMENT | Age: 55
End: 2025-03-12
Payer: MEDICARE

## 2025-03-12 VITALS
SYSTOLIC BLOOD PRESSURE: 153 MMHG | WEIGHT: 199 LBS | HEIGHT: 71 IN | RESPIRATION RATE: 16 BRPM | DIASTOLIC BLOOD PRESSURE: 77 MMHG | BODY MASS INDEX: 27.86 KG/M2 | OXYGEN SATURATION: 96 % | HEART RATE: 79 BPM | TEMPERATURE: 97.1 F

## 2025-03-12 DIAGNOSIS — G89.29 CHRONIC MIDLINE LOW BACK PAIN WITH LEFT-SIDED SCIATICA: ICD-10-CM

## 2025-03-12 DIAGNOSIS — F17.210 NICOTINE DEPENDENCE, CIGARETTES, UNCOMPLICATED: ICD-10-CM

## 2025-03-12 DIAGNOSIS — M47.12 CERVICAL SPONDYLOSIS WITH MYELOPATHY AND RADICULOPATHY: Primary | ICD-10-CM

## 2025-03-12 DIAGNOSIS — M54.42 CHRONIC MIDLINE LOW BACK PAIN WITH LEFT-SIDED SCIATICA: ICD-10-CM

## 2025-03-12 DIAGNOSIS — J41.0 SIMPLE CHRONIC BRONCHITIS (HCC): ICD-10-CM

## 2025-03-12 DIAGNOSIS — M47.22 CERVICAL SPONDYLOSIS WITH MYELOPATHY AND RADICULOPATHY: Primary | ICD-10-CM

## 2025-03-12 DIAGNOSIS — E11.9 TYPE 2 DIABETES MELLITUS WITHOUT COMPLICATION, WITHOUT LONG-TERM CURRENT USE OF INSULIN (HCC): Chronic | ICD-10-CM

## 2025-03-12 DIAGNOSIS — F11.21 OPIOID DEPENDENCE IN REMISSION (HCC): ICD-10-CM

## 2025-03-12 DIAGNOSIS — Z87.898 HISTORY OF SUBSTANCE USE DISORDER: ICD-10-CM

## 2025-03-12 DIAGNOSIS — F41.8 MIXED ANXIETY DEPRESSIVE DISORDER: Chronic | ICD-10-CM

## 2025-03-12 PROCEDURE — 3023F SPIROM DOC REV: CPT

## 2025-03-12 PROCEDURE — G8419 CALC BMI OUT NRM PARAM NOF/U: HCPCS

## 2025-03-12 PROCEDURE — G8427 DOCREV CUR MEDS BY ELIG CLIN: HCPCS

## 2025-03-12 PROCEDURE — 3017F COLORECTAL CA SCREEN DOC REV: CPT

## 2025-03-12 PROCEDURE — 2022F DILAT RTA XM EVC RTNOPTHY: CPT

## 2025-03-12 PROCEDURE — 3044F HG A1C LEVEL LT 7.0%: CPT

## 2025-03-12 PROCEDURE — 99214 OFFICE O/P EST MOD 30 MIN: CPT

## 2025-03-12 PROCEDURE — 4004F PT TOBACCO SCREEN RCVD TLK: CPT

## 2025-03-12 PROCEDURE — 3077F SYST BP >= 140 MM HG: CPT

## 2025-03-12 PROCEDURE — 3078F DIAST BP <80 MM HG: CPT

## 2025-03-12 RX ORDER — BUPRENORPHINE AND NALOXONE 2; .5 MG/1; MG/1
0.5 FILM, SOLUBLE BUCCAL; SUBLINGUAL 2 TIMES DAILY
Qty: 8 FILM | Refills: 0 | Status: SHIPPED | OUTPATIENT
Start: 2025-03-12 | End: 2025-03-20

## 2025-03-12 ASSESSMENT — ENCOUNTER SYMPTOMS
GASTROINTESTINAL NEGATIVE: 1
BOWEL INCONTINENCE: 0
EYES NEGATIVE: 1
RESPIRATORY NEGATIVE: 1
TROUBLE SWALLOWING: 0
PHOTOPHOBIA: 0
BACK PAIN: 1

## 2025-03-12 NOTE — ASSESSMENT & PLAN NOTE
- managed by PCP   - Chronic pain can lead to depression. Depression can also manifest as physical pain, good control of MDD imperative.

## 2025-03-12 NOTE — ASSESSMENT & PLAN NOTE
- managed by pulmonology  - increases risk of respiratory depression or respiratory failure  - will ensure patient has narcan available

## 2025-03-12 NOTE — ASSESSMENT & PLAN NOTE
- not a candidate for full opioid agonists  - completed rehabilitation and spent 18 months in [facility]  - no history of relapses

## 2025-03-12 NOTE — ASSESSMENT & PLAN NOTE
- increase suboxone to 1/2 film twice daily   - consider gabapentin and muscle relaxer next office visit

## 2025-03-12 NOTE — PROGRESS NOTES
[facility]  [address]  [place] [address]  Phone: [phone]  Fax: [phone]        Follow Up Office Visit    Patient Name: Sameer Larson    MR #: [id number]    Account #:    : 1970    Age: 55 y.o.    Sex: male    Date: 3/12/2025     PCP: Vannessa Medina APRN    Chief Complaint:   Chief Complaint   Patient presents with    Back Pain     9/10    Neck Pain     9/10       History of Present Illness:   The patient is a 55 y.o. male who presents for follow up on primary complaints of chronic neck and back pain. He has history of opioid dependence and substance use disorder. [facility] He is currently on suboxone 2 mg- 1/4 film twice daily. He reports minimal improvement in pain. Reports he has not heard from PT to start therapy yet. Reports just showering and getting dressed takes him 3 hours due to stopping and resting from the pain. Patient states that he cannot tolerate any activity at all currently.     Patient is currently managed on suboxone. Patient takes medication as prescribed. Patient denies any side effects from their current therapy. Patient reports this therapy is not effective.     Neck Pain   This is a chronic problem. The current episode started more than 1 year ago. The problem occurs constantly. The problem has been gradually worsening. The pain is associated with a remote injury. The pain is present in the midline and left side. The quality of the pain is described as aching, burning, shooting and stabbing. The pain is at a severity of 9/10. The pain is severe. The symptoms are aggravated by bending, position and twisting. The pain is Same all the time. Stiffness is present All day. Associated symptoms include leg pain, numbness and tingling. Pertinent negatives include no chest pain, fever, headaches, pain with swallowing, photophobia, syncope, trouble swallowing, weakness or weight loss. He has tried acetaminophen,

## 2025-03-12 NOTE — ASSESSMENT & PLAN NOTE
- managed by PCP  - consider when giving injections containing steroids  - diabetics can frequently have circulatory issues - which can impair healing

## 2025-03-13 ENCOUNTER — TELEPHONE (OUTPATIENT)
Dept: PAIN MANAGEMENT | Age: 55
End: 2025-03-13

## 2025-03-13 NOTE — TELEPHONE ENCOUNTER
PA approved for [facility] 900g 03/13/25 - until further notice.    Does patient need to be called?

## 2025-03-20 ENCOUNTER — OFFICE VISIT (OUTPATIENT)
Dept: PAIN MANAGEMENT | Age: 55
End: 2025-03-20
Payer: MEDICARE

## 2025-03-20 VITALS
OXYGEN SATURATION: 97 % | DIASTOLIC BLOOD PRESSURE: 70 MMHG | WEIGHT: 199 LBS | BODY MASS INDEX: 27.86 KG/M2 | SYSTOLIC BLOOD PRESSURE: 119 MMHG | HEART RATE: 83 BPM | RESPIRATION RATE: 16 BRPM | TEMPERATURE: 97.4 F | HEIGHT: 71 IN

## 2025-03-20 DIAGNOSIS — M47.12 CERVICAL SPONDYLOSIS WITH MYELOPATHY AND RADICULOPATHY: ICD-10-CM

## 2025-03-20 DIAGNOSIS — G89.29 CHRONIC MIDLINE LOW BACK PAIN WITH LEFT-SIDED SCIATICA: Primary | ICD-10-CM

## 2025-03-20 DIAGNOSIS — E11.9 TYPE 2 DIABETES MELLITUS WITHOUT COMPLICATION, WITHOUT LONG-TERM CURRENT USE OF INSULIN: Chronic | ICD-10-CM

## 2025-03-20 DIAGNOSIS — M47.22 CERVICAL SPONDYLOSIS WITH MYELOPATHY AND RADICULOPATHY: ICD-10-CM

## 2025-03-20 DIAGNOSIS — F17.210 NICOTINE DEPENDENCE, CIGARETTES, UNCOMPLICATED: ICD-10-CM

## 2025-03-20 DIAGNOSIS — F11.21 OPIOID DEPENDENCE IN REMISSION (HCC): ICD-10-CM

## 2025-03-20 DIAGNOSIS — M54.42 CHRONIC MIDLINE LOW BACK PAIN WITH LEFT-SIDED SCIATICA: Primary | ICD-10-CM

## 2025-03-20 PROCEDURE — 4004F PT TOBACCO SCREEN RCVD TLK: CPT

## 2025-03-20 PROCEDURE — 2022F DILAT RTA XM EVC RTNOPTHY: CPT

## 2025-03-20 PROCEDURE — G8427 DOCREV CUR MEDS BY ELIG CLIN: HCPCS

## 2025-03-20 PROCEDURE — 3017F COLORECTAL CA SCREEN DOC REV: CPT

## 2025-03-20 PROCEDURE — 3074F SYST BP LT 130 MM HG: CPT

## 2025-03-20 PROCEDURE — 3044F HG A1C LEVEL LT 7.0%: CPT

## 2025-03-20 PROCEDURE — 99214 OFFICE O/P EST MOD 30 MIN: CPT

## 2025-03-20 PROCEDURE — G8419 CALC BMI OUT NRM PARAM NOF/U: HCPCS

## 2025-03-20 PROCEDURE — 3078F DIAST BP <80 MM HG: CPT

## 2025-03-20 RX ORDER — BUPRENORPHINE HYDROCHLORIDE 900 UG/1
900 FILM, SOLUBLE BUCCAL 2 TIMES DAILY
Qty: 60 EACH | Refills: 0 | Status: SHIPPED | OUTPATIENT
Start: 2025-03-20 | End: 2025-04-19

## 2025-03-20 ASSESSMENT — ENCOUNTER SYMPTOMS
RESPIRATORY NEGATIVE: 1
EYES NEGATIVE: 1
BACK PAIN: 1
TROUBLE SWALLOWING: 0
GASTROINTESTINAL NEGATIVE: 1
PHOTOPHOBIA: 0
BOWEL INCONTINENCE: 0

## 2025-03-20 NOTE — ASSESSMENT & PLAN NOTE
- STOP suboxone   - Trial [facility]  Patient educated on how to properly apply film and voiced understanding importance not to eat or drink for 30 minutes after application. The drug task force recommends the use of buprenorphine for chronic pain due to ceiling limit on respiratory depression and decreased euphoric effect as other opioids. It would be medically negligent to prescribe a scheduled II long acting opioid for chronic pain with a clinically proven schedule III long acting opioid is available.    - patient reports side effects from gabapentin, lyrica and muscle relaxers. Does not wish to trial

## 2025-03-20 NOTE — PROGRESS NOTES
available to [place] residents 15 years of age and over. When you become a member,it offers a free telephone service, so you can speak to a  in person, if you would prefer. Call the QuitLine at 2-260-QUITNOW or 1-543.940.9229.  special tools, a support team of coaches, research-based information, and a community of others trying to become tobacco free. Our expert coaches can talk to you about overcoming common barriers, such as dealing with stress, fighting cravings, coping with irritability, and controlling weight gain.  Http://www.cdc.gov/tobacco/campaign/tips/index.htm  Https://www.quitnow[place].org/  CC:  Vannessa Medina APRN Holly Kimberlin, APRN - CNP, 3/20/2025 at 3:44 PM    EMR dragon/transcription disclaimer: Much of this encounter note is electronic transcription/translation of spoken language to printed tach. Electronic translation of spoken language may be erroneous, or at times, nonsensical words or phrases may be inadvertently transcribed. Although, I have reviewed the note for such errors, some may still exist.

## 2025-04-10 RX ORDER — ISOSORBIDE MONONITRATE 30 MG/1
30 TABLET, EXTENDED RELEASE ORAL DAILY
Qty: 30 TABLET | Refills: 5 | Status: SHIPPED | OUTPATIENT
Start: 2025-04-10

## 2025-04-22 ENCOUNTER — OFFICE VISIT (OUTPATIENT)
Dept: PAIN MANAGEMENT | Age: 55
End: 2025-04-22
Payer: MEDICARE

## 2025-04-22 VITALS
RESPIRATION RATE: 16 BRPM | DIASTOLIC BLOOD PRESSURE: 79 MMHG | SYSTOLIC BLOOD PRESSURE: 132 MMHG | TEMPERATURE: 97 F | OXYGEN SATURATION: 99 % | HEART RATE: 83 BPM | HEIGHT: 71 IN | WEIGHT: 190 LBS | BODY MASS INDEX: 26.6 KG/M2

## 2025-04-22 DIAGNOSIS — M47.12 CERVICAL SPONDYLOSIS WITH MYELOPATHY AND RADICULOPATHY: ICD-10-CM

## 2025-04-22 DIAGNOSIS — M47.22 CERVICAL SPONDYLOSIS WITH MYELOPATHY AND RADICULOPATHY: ICD-10-CM

## 2025-04-22 DIAGNOSIS — G89.29 CHRONIC MIDLINE LOW BACK PAIN WITH LEFT-SIDED SCIATICA: Primary | ICD-10-CM

## 2025-04-22 DIAGNOSIS — M54.42 CHRONIC MIDLINE LOW BACK PAIN WITH LEFT-SIDED SCIATICA: Primary | ICD-10-CM

## 2025-04-22 DIAGNOSIS — F11.21 OPIOID DEPENDENCE IN REMISSION (HCC): ICD-10-CM

## 2025-04-22 PROCEDURE — G8427 DOCREV CUR MEDS BY ELIG CLIN: HCPCS

## 2025-04-22 PROCEDURE — G8419 CALC BMI OUT NRM PARAM NOF/U: HCPCS

## 2025-04-22 PROCEDURE — 99214 OFFICE O/P EST MOD 30 MIN: CPT

## 2025-04-22 PROCEDURE — 3017F COLORECTAL CA SCREEN DOC REV: CPT

## 2025-04-22 PROCEDURE — 3078F DIAST BP <80 MM HG: CPT

## 2025-04-22 PROCEDURE — 4004F PT TOBACCO SCREEN RCVD TLK: CPT

## 2025-04-22 PROCEDURE — 3075F SYST BP GE 130 - 139MM HG: CPT

## 2025-04-22 RX ORDER — BUPRENORPHINE HYDROCHLORIDE 900 UG/1
900 FILM, SOLUBLE BUCCAL 2 TIMES DAILY
Qty: 60 EACH | Refills: 0 | Status: SHIPPED | OUTPATIENT
Start: 2025-04-22 | End: 2025-04-23

## 2025-04-22 ASSESSMENT — ENCOUNTER SYMPTOMS
SHORTNESS OF BREATH: 0
DIARRHEA: 0
CHEST TIGHTNESS: 0
APNEA: 0
ABDOMINAL PAIN: 0
BLOOD IN STOOL: 0
BACK PAIN: 1
CONSTIPATION: 0

## 2025-04-22 NOTE — PROGRESS NOTES
to improve function and relieve pain with a focus on health and individualizing care.    Per CDC recommendations, patients prescribed opiates with a history of overdose, substance use disorder, >50 OME's daily, or concurrent benzodiazepine use should be prescribed naloxone. We instructed the patient to keep one nasal spray on his/her person, and instruct his/her family and friends to use 2 sprays under their nose in the event of accidental overdose, then to call 911.    Potential opioid side effects were discussed in detail including constipation, urinary retention, pruritus, respiratory depression, sedation, dependence, addiction, tolerance, opioid induced hyperalgesia, osteopenia, hypogonadism and immune suppression.  Advised to take the opioid medications as prescribed.      CC:  Vannessa Medina APRN Lindsey B Roberts, APRN - CNP, 4/22/2025 at 4:30 PM    EMR dragon/transcription disclaimer: Much of this encounter note is electronic transcription/translation of spoken language to printed tach. Electronic translation of spoken language may be erroneous, or at times, nonsensical words or phrases may be inadvertently transcribed. Although, I have reviewed the note for such errors, some may still exist.

## 2025-04-23 DIAGNOSIS — G89.29 CHRONIC MIDLINE LOW BACK PAIN WITH LEFT-SIDED SCIATICA: ICD-10-CM

## 2025-04-23 DIAGNOSIS — M47.22 CERVICAL SPONDYLOSIS WITH MYELOPATHY AND RADICULOPATHY: ICD-10-CM

## 2025-04-23 DIAGNOSIS — M47.12 CERVICAL SPONDYLOSIS WITH MYELOPATHY AND RADICULOPATHY: ICD-10-CM

## 2025-04-23 DIAGNOSIS — M54.42 CHRONIC MIDLINE LOW BACK PAIN WITH LEFT-SIDED SCIATICA: ICD-10-CM

## 2025-04-23 RX ORDER — BUPRENORPHINE HYDROCHLORIDE 900 UG/1
900 FILM, SOLUBLE BUCCAL 2 TIMES DAILY
Qty: 60 EACH | Refills: 0 | Status: SHIPPED | OUTPATIENT
Start: 2025-04-23 | End: 2025-05-23

## 2025-04-24 NOTE — PROGRESS NOTES
was used to inject 0.5 cc of 1% lidocaine and 3 mg of betamethasone into the A1 pulley/flexor tendon sheath.  Band-Aid was placed over the injection site.  Patient tolerated the injection well and was without complication.  Discussed follow-up today and he feels comfortable returning on an as-needed basis.    This dictation was generated by voice recognition computer software. Although all attempts are made to edit the dictation for accuracy, there may be errors in the transcription that are not intended.    Electronically signed by Rigoberto Matos MD on 4/28/2025 at 10:17 AM

## 2025-04-28 ENCOUNTER — OFFICE VISIT (OUTPATIENT)
Age: 55
End: 2025-04-28
Payer: MEDICARE

## 2025-04-28 VITALS — BODY MASS INDEX: 26.6 KG/M2 | HEIGHT: 71 IN | WEIGHT: 190 LBS

## 2025-04-28 DIAGNOSIS — M65.331 TRIGGER FINGER, RIGHT MIDDLE FINGER: ICD-10-CM

## 2025-04-28 DIAGNOSIS — M65.312 TRIGGER FINGER OF LEFT THUMB: ICD-10-CM

## 2025-04-28 DIAGNOSIS — M65.332 TRIGGER FINGER, LEFT MIDDLE FINGER: Primary | ICD-10-CM

## 2025-04-28 PROBLEM — T81.30XA WOUND DEHISCENCE: Status: RESOLVED | Noted: 2020-06-23 | Resolved: 2025-04-28

## 2025-04-28 PROBLEM — F41.9 ANXIETY AND DEPRESSION: Status: RESOLVED | Noted: 2019-07-09 | Resolved: 2025-04-28

## 2025-04-28 PROBLEM — T81.31XA RUPTURE OF OPERATION WOUND: Status: RESOLVED | Noted: 2020-06-17 | Resolved: 2025-04-28

## 2025-04-28 PROBLEM — R07.9 CHEST PAIN: Status: RESOLVED | Noted: 2024-12-17 | Resolved: 2025-04-28

## 2025-04-28 PROBLEM — F32.A ANXIETY AND DEPRESSION: Status: RESOLVED | Noted: 2019-07-09 | Resolved: 2025-04-28

## 2025-04-28 PROBLEM — D62 POSTOPERATIVE ANEMIA DUE TO ACUTE BLOOD LOSS: Status: RESOLVED | Noted: 2019-05-06 | Resolved: 2025-04-28

## 2025-04-28 PROCEDURE — 3017F COLORECTAL CA SCREEN DOC REV: CPT | Performed by: ORTHOPAEDIC SURGERY

## 2025-04-28 PROCEDURE — G8427 DOCREV CUR MEDS BY ELIG CLIN: HCPCS | Performed by: ORTHOPAEDIC SURGERY

## 2025-04-28 PROCEDURE — 99213 OFFICE O/P EST LOW 20 MIN: CPT | Performed by: ORTHOPAEDIC SURGERY

## 2025-04-28 PROCEDURE — G8419 CALC BMI OUT NRM PARAM NOF/U: HCPCS | Performed by: ORTHOPAEDIC SURGERY

## 2025-04-28 PROCEDURE — 20550 NJX 1 TENDON SHEATH/LIGAMENT: CPT | Performed by: ORTHOPAEDIC SURGERY

## 2025-04-28 PROCEDURE — 4004F PT TOBACCO SCREEN RCVD TLK: CPT | Performed by: ORTHOPAEDIC SURGERY

## 2025-04-28 RX ORDER — BETAMETHASONE SODIUM PHOSPHATE AND BETAMETHASONE ACETATE 3; 3 MG/ML; MG/ML
3 INJECTION, SUSPENSION INTRA-ARTICULAR; INTRALESIONAL; INTRAMUSCULAR; SOFT TISSUE ONCE
Status: COMPLETED | OUTPATIENT
Start: 2025-04-28 | End: 2025-04-28

## 2025-04-28 RX ORDER — LIDOCAINE HYDROCHLORIDE 10 MG/ML
0.5 INJECTION, SOLUTION EPIDURAL; INFILTRATION; INTRACAUDAL; PERINEURAL ONCE
Status: COMPLETED | OUTPATIENT
Start: 2025-04-28 | End: 2025-04-28

## 2025-04-28 RX ADMIN — LIDOCAINE HYDROCHLORIDE 0.5 ML: 10 INJECTION, SOLUTION EPIDURAL; INFILTRATION; INTRACAUDAL; PERINEURAL at 12:03

## 2025-04-28 RX ADMIN — BETAMETHASONE SODIUM PHOSPHATE AND BETAMETHASONE ACETATE 3 MG: 3; 3 INJECTION, SUSPENSION INTRA-ARTICULAR; INTRALESIONAL; INTRAMUSCULAR; SOFT TISSUE at 12:02

## 2025-04-29 ENCOUNTER — OFFICE VISIT (OUTPATIENT)
Age: 55
End: 2025-04-29
Payer: MEDICARE

## 2025-04-29 ENCOUNTER — RESULTS FOLLOW-UP (OUTPATIENT)
Age: 55
End: 2025-04-29

## 2025-04-29 VITALS
SYSTOLIC BLOOD PRESSURE: 128 MMHG | OXYGEN SATURATION: 98 % | TEMPERATURE: 97.1 F | HEART RATE: 80 BPM | WEIGHT: 190 LBS | DIASTOLIC BLOOD PRESSURE: 82 MMHG | BODY MASS INDEX: 26.6 KG/M2 | HEIGHT: 71 IN

## 2025-04-29 DIAGNOSIS — F41.9 ANXIETY AND DEPRESSION: ICD-10-CM

## 2025-04-29 DIAGNOSIS — D50.8 IRON DEFICIENCY ANEMIA SECONDARY TO INADEQUATE DIETARY IRON INTAKE: ICD-10-CM

## 2025-04-29 DIAGNOSIS — E11.65 TYPE 2 DIABETES MELLITUS WITH HYPERGLYCEMIA, WITHOUT LONG-TERM CURRENT USE OF INSULIN (HCC): ICD-10-CM

## 2025-04-29 DIAGNOSIS — E11.65 TYPE 2 DIABETES MELLITUS WITH HYPERGLYCEMIA, WITHOUT LONG-TERM CURRENT USE OF INSULIN (HCC): Primary | ICD-10-CM

## 2025-04-29 DIAGNOSIS — I10 ESSENTIAL HYPERTENSION: ICD-10-CM

## 2025-04-29 DIAGNOSIS — K59.03 OPIOID-INDUCED CONSTIPATION: ICD-10-CM

## 2025-04-29 DIAGNOSIS — N28.9 ABNORMAL RENAL FUNCTION: Primary | ICD-10-CM

## 2025-04-29 DIAGNOSIS — K21.9 GERD WITHOUT ESOPHAGITIS: ICD-10-CM

## 2025-04-29 DIAGNOSIS — F32.A ANXIETY AND DEPRESSION: ICD-10-CM

## 2025-04-29 DIAGNOSIS — T40.2X5A OPIOID-INDUCED CONSTIPATION: ICD-10-CM

## 2025-04-29 LAB
ALBUMIN SERPL-MCNC: 4.3 G/DL (ref 3.5–5.2)
ALP SERPL-CCNC: 122 U/L (ref 40–129)
ALT SERPL-CCNC: <5 U/L (ref 10–50)
ANION GAP SERPL CALCULATED.3IONS-SCNC: 11 MMOL/L (ref 8–16)
AST SERPL-CCNC: 16 U/L (ref 10–50)
BASOPHILS # BLD: 0 K/UL (ref 0–0.2)
BASOPHILS NFR BLD: 0.3 % (ref 0–1)
BILIRUB SERPL-MCNC: 0.3 MG/DL (ref 0.2–1.2)
BUN SERPL-MCNC: 22 MG/DL (ref 6–20)
CALCIUM SERPL-MCNC: 9.8 MG/DL (ref 8.6–10)
CHLORIDE SERPL-SCNC: 102 MMOL/L (ref 98–107)
CHOLEST SERPL-MCNC: 111 MG/DL (ref 0–199)
CO2 SERPL-SCNC: 26 MMOL/L (ref 22–29)
CREAT SERPL-MCNC: 1.3 MG/DL (ref 0.7–1.2)
CREAT UR-MCNC: 147 MG/DL (ref 39–259)
EOSINOPHIL # BLD: 0.1 K/UL (ref 0–0.6)
EOSINOPHIL NFR BLD: 0.6 % (ref 0–5)
ERYTHROCYTE [DISTWIDTH] IN BLOOD BY AUTOMATED COUNT: 16.2 % (ref 11.5–14.5)
FOLATE SERPL-MCNC: 7.3 NG/ML (ref 4.5–32.2)
GLUCOSE SERPL-MCNC: 108 MG/DL (ref 70–99)
HBA1C MFR BLD: 5.7 %
HCT VFR BLD AUTO: 39.1 % (ref 42–52)
HDLC SERPL-MCNC: 30 MG/DL (ref 40–60)
HGB BLD-MCNC: 12 G/DL (ref 14–18)
IMM GRANULOCYTES # BLD: 0 K/UL
IRON SATN MFR SERPL: 10 % (ref 20–50)
IRON SERPL-MCNC: 40 UG/DL (ref 59–158)
LDLC SERPL CALC-MCNC: 50 MG/DL
LYMPHOCYTES # BLD: 2.3 K/UL (ref 1.1–4.5)
LYMPHOCYTES NFR BLD: 20.9 % (ref 20–40)
MCH RBC QN AUTO: 24.9 PG (ref 27–31)
MCHC RBC AUTO-ENTMCNC: 30.7 G/DL (ref 33–37)
MCV RBC AUTO: 81.1 FL (ref 80–94)
MICROALBUMIN UR-MCNC: 24.5 MG/DL (ref 0–1.99)
MICROALBUMIN/CREAT UR-RTO: 166.7 MG/G (ref 0–29)
MONOCYTES # BLD: 1 K/UL (ref 0–0.9)
MONOCYTES NFR BLD: 9.5 % (ref 0–10)
NEUTROPHILS # BLD: 7.4 K/UL (ref 1.5–7.5)
NEUTS SEG NFR BLD: 68.3 % (ref 50–65)
PLATELET # BLD AUTO: 242 K/UL (ref 130–400)
PMV BLD AUTO: 10.8 FL (ref 9.4–12.4)
POTASSIUM SERPL-SCNC: 4.4 MMOL/L (ref 3.5–5.1)
PROT SERPL-MCNC: 7.3 G/DL (ref 6.4–8.3)
RBC # BLD AUTO: 4.82 M/UL (ref 4.7–6.1)
SODIUM SERPL-SCNC: 139 MMOL/L (ref 136–145)
TIBC SERPL-MCNC: 406 UG/DL (ref 250–400)
TRIGL SERPL-MCNC: 157 MG/DL (ref 0–149)
TSH SERPL DL<=0.005 MIU/L-ACNC: 0.62 UIU/ML (ref 0.27–4.2)
VIT B12 SERPL-MCNC: 428 PG/ML (ref 232–1245)
WBC # BLD AUTO: 10.9 K/UL (ref 4.8–10.8)

## 2025-04-29 PROCEDURE — G8427 DOCREV CUR MEDS BY ELIG CLIN: HCPCS | Performed by: NURSE PRACTITIONER

## 2025-04-29 PROCEDURE — 3074F SYST BP LT 130 MM HG: CPT | Performed by: NURSE PRACTITIONER

## 2025-04-29 PROCEDURE — 83036 HEMOGLOBIN GLYCOSYLATED A1C: CPT | Performed by: NURSE PRACTITIONER

## 2025-04-29 PROCEDURE — 99214 OFFICE O/P EST MOD 30 MIN: CPT | Performed by: NURSE PRACTITIONER

## 2025-04-29 PROCEDURE — 3079F DIAST BP 80-89 MM HG: CPT | Performed by: NURSE PRACTITIONER

## 2025-04-29 PROCEDURE — 3044F HG A1C LEVEL LT 7.0%: CPT | Performed by: NURSE PRACTITIONER

## 2025-04-29 PROCEDURE — 4004F PT TOBACCO SCREEN RCVD TLK: CPT | Performed by: NURSE PRACTITIONER

## 2025-04-29 PROCEDURE — G8419 CALC BMI OUT NRM PARAM NOF/U: HCPCS | Performed by: NURSE PRACTITIONER

## 2025-04-29 RX ORDER — OMEPRAZOLE 40 MG/1
40 CAPSULE, DELAYED RELEASE ORAL
Qty: 30 CAPSULE | Refills: 3 | Status: SHIPPED | OUTPATIENT
Start: 2025-04-29

## 2025-04-29 RX ORDER — FERROUS SULFATE 325(65) MG
325 TABLET, DELAYED RELEASE (ENTERIC COATED) ORAL
Qty: 90 TABLET | Refills: 0 | Status: SHIPPED | OUTPATIENT
Start: 2025-04-29

## 2025-04-29 RX ORDER — FLUOXETINE HYDROCHLORIDE 40 MG/1
40 CAPSULE ORAL DAILY
Qty: 90 CAPSULE | Refills: 3 | Status: SHIPPED | OUTPATIENT
Start: 2025-04-29

## 2025-04-29 RX ORDER — BLOOD-GLUCOSE METER
1 KIT MISCELLANEOUS DAILY
Qty: 1 KIT | Refills: 0 | Status: SHIPPED | OUTPATIENT
Start: 2025-04-29

## 2025-04-29 ASSESSMENT — ENCOUNTER SYMPTOMS
EYES NEGATIVE: 1
CONSTIPATION: 1
SHORTNESS OF BREATH: 1

## 2025-04-29 NOTE — PROGRESS NOTES
Hypertension     Substance abuse (HCC)        Past Surgical History:   Procedure Laterality Date    ABDOMEN SURGERY  2017    Badder cancer    BACK SURGERY      X3     BLADDER REMOVAL  2017    Urostomy currently in place    CARDIAC CATHETERIZATION      CARDIAC PROCEDURE N/A 12/17/2024    Left heart cath / coronary angiography w grafts performed by Neo Bermudez MD at [facility] CARDIAC CATH LAB    CARDIAC PROCEDURE N/A 12/17/2024    Percutaneous coronary intervention performed by Neo Bermudez MD at [facility] CARDIAC CATH LAB    CARDIAC PROCEDURE N/A 12/18/2024    Percutaneous coronary intervention performed by Neo Bermudez MD at [facility] CARDIAC CATH LAB    CERVICAL FUSION N/A 02/19/2020    Phase 1:  C5 and C6 corpectomy with placement of an expandable cage and anterior cervical plate C4-C7. performed by Franko Julien DO at [facility] OR    CERVICAL LAMINECTOMY N/A 06/17/2020    POSTERIOR CERVICAL WOUND WASH OUT AND CLOSURE performed by Franko Julien DO at [facility] OR    CERVICAL LAMINECTOMY N/A 06/26/2020    POSTERIOR CERVICAL WOUND DEBRIDEMENT WITH PLACEMENT OF WOUNDVAC performed by Franko Julien DO at [facility] OR    COLONOSCOPY N/A 09/10/2019    Dr MAUREEN Longoria, 5 yr recall    CORONARY ARTERY BYPASS GRAFT N/A 05/01/2019    CORONARY ARTERY BYPASS GRAFT X 3 WITH LEFT INTERNAL MAMMARY ARTERY, ENDOSCOPIC  VEIN HARVEST, WITH PERFUSION. performed by Dane Pathak MD at [facility] OR    LUMBAR SPINE SURGERY N/A 02/19/2020    Phase 2:  Posterior instrumented fusion C3-C7 using lateral mass screws and rods. performed by Franko Julien DO at [facility] OR    LYMPHADENECTOMY      lower ext    PROSTATECTOMY         Family History   Problem Relation Age of Onset    Cancer Mother     Vision Loss Mother     Breast Cancer Mother     Meniere's Disease Mother     Coronary Art Dis Father     Obesity Father     Kidney Disease Father     High Blood Pressure Father     High Cholesterol Father     Hypercalcemia Sister     Obesity Brother     High

## 2025-04-29 NOTE — TELEPHONE ENCOUNTER
Received fax from pharmacy requesting refill on pts medication. Will send to provider for authorization  Last OV: 1/28/2025    Next appt: 4/29/2025 (today)    Requested Prescriptions     Pending Prescriptions Disp Refills    omeprazole (PRILOSEC) 40 MG delayed release capsule [Pharmacy Med Name: OMEPRAZOLE DR 40 MG CAPSULE 40 Capsule] 30 capsule 3     Sig: TAKE 1 CAPSULE BY MOUTH EVERY MORNING (BEFORE BREAKFAST)

## 2025-04-30 DIAGNOSIS — T40.2X5A OPIOID-INDUCED CONSTIPATION: ICD-10-CM

## 2025-04-30 DIAGNOSIS — K59.03 OPIOID-INDUCED CONSTIPATION: ICD-10-CM

## 2025-04-30 RX ORDER — MAGNESIUM CITRATE 1.75 G/29.6ML
LIQUID ORAL
Qty: 296 ML | Refills: 0 | Status: SHIPPED | OUTPATIENT
Start: 2025-04-30

## 2025-04-30 NOTE — TELEPHONE ENCOUNTER
Received fax from pharmacy requesting refill on pts medication(s). Pt was last seen in office on 4/29/2025  and has a follow up scheduled for 7/29/2025. Will send request to  Vannessa Medina  for authorization.     Requested Prescriptions     Pending Prescriptions Disp Refills    FT MAGNESIUM CITRATE 1.745 GM/30ML solution [Pharmacy Med Name: FT MAGNESIUM CITRATE 1.745 1.745 Solution] 296 mL 0     Sig: TAKE 296 MLS BY MOUTH ONCE FOR 1 DOSE

## 2025-05-16 DIAGNOSIS — N28.9 ABNORMAL RENAL FUNCTION: ICD-10-CM

## 2025-05-16 LAB
ALBUMIN SERPL-MCNC: 4.1 G/DL (ref 3.5–5.2)
ALP SERPL-CCNC: 116 U/L (ref 40–129)
ALT SERPL-CCNC: 7 U/L (ref 10–50)
ANION GAP SERPL CALCULATED.3IONS-SCNC: 12 MMOL/L (ref 8–16)
AST SERPL-CCNC: 14 U/L (ref 10–50)
BILIRUB SERPL-MCNC: 0.3 MG/DL (ref 0.2–1.2)
BUN SERPL-MCNC: 17 MG/DL (ref 6–20)
CALCIUM SERPL-MCNC: 9.4 MG/DL (ref 8.6–10)
CHLORIDE SERPL-SCNC: 102 MMOL/L (ref 98–107)
CO2 SERPL-SCNC: 25 MMOL/L (ref 22–29)
CREAT SERPL-MCNC: 1.3 MG/DL (ref 0.7–1.2)
GLUCOSE SERPL-MCNC: 101 MG/DL (ref 70–99)
POTASSIUM SERPL-SCNC: 4.4 MMOL/L (ref 3.5–5.1)
PROT SERPL-MCNC: 7.2 G/DL (ref 6.4–8.3)
SODIUM SERPL-SCNC: 139 MMOL/L (ref 136–145)

## 2025-05-19 ENCOUNTER — RESULTS FOLLOW-UP (OUTPATIENT)
Age: 55
End: 2025-05-19

## 2025-06-01 DIAGNOSIS — E11.65 TYPE 2 DIABETES MELLITUS WITH HYPERGLYCEMIA, WITHOUT LONG-TERM CURRENT USE OF INSULIN (HCC): ICD-10-CM

## 2025-06-02 NOTE — TELEPHONE ENCOUNTER
Received fax from pharmacy requesting refill on pts medication(s). Pt was last seen in office on 4/29/2025  and has a follow up scheduled for 7/29/2025. Will send request to  Vannessa Medina  for authorization.     Requested Prescriptions     Pending Prescriptions Disp Refills    OZEMPIC, 0.25 OR 0.5 MG/DOSE, 2 MG/3ML SOPN [Pharmacy Med Name: OZEMPIC 0.25-0.5 MG/DOSE PEN]  3     Sig: INJECT 0.5 MG INTO THE SKIN ONCE A WEEK PT TAKES EVERY MONDAY

## 2025-06-03 ENCOUNTER — OFFICE VISIT (OUTPATIENT)
Dept: PAIN MANAGEMENT | Age: 55
End: 2025-06-03
Payer: MEDICARE

## 2025-06-03 VITALS
RESPIRATION RATE: 16 BRPM | SYSTOLIC BLOOD PRESSURE: 119 MMHG | OXYGEN SATURATION: 100 % | WEIGHT: 185 LBS | TEMPERATURE: 97.3 F | DIASTOLIC BLOOD PRESSURE: 72 MMHG | HEART RATE: 78 BPM | BODY MASS INDEX: 25.9 KG/M2 | HEIGHT: 71 IN

## 2025-06-03 DIAGNOSIS — F11.21 OPIOID DEPENDENCE IN REMISSION (HCC): ICD-10-CM

## 2025-06-03 DIAGNOSIS — Z02.89 PAIN MANAGEMENT CONTRACT SIGNED: ICD-10-CM

## 2025-06-03 DIAGNOSIS — G89.29 CHRONIC MIDLINE LOW BACK PAIN WITH LEFT-SIDED SCIATICA: ICD-10-CM

## 2025-06-03 DIAGNOSIS — M47.12 CERVICAL SPONDYLOSIS WITH MYELOPATHY AND RADICULOPATHY: Primary | ICD-10-CM

## 2025-06-03 DIAGNOSIS — M54.42 CHRONIC MIDLINE LOW BACK PAIN WITH LEFT-SIDED SCIATICA: ICD-10-CM

## 2025-06-03 DIAGNOSIS — M47.22 CERVICAL SPONDYLOSIS WITH MYELOPATHY AND RADICULOPATHY: Primary | ICD-10-CM

## 2025-06-03 PROCEDURE — 3017F COLORECTAL CA SCREEN DOC REV: CPT

## 2025-06-03 PROCEDURE — 3078F DIAST BP <80 MM HG: CPT

## 2025-06-03 PROCEDURE — 99214 OFFICE O/P EST MOD 30 MIN: CPT

## 2025-06-03 PROCEDURE — 4004F PT TOBACCO SCREEN RCVD TLK: CPT

## 2025-06-03 PROCEDURE — G8419 CALC BMI OUT NRM PARAM NOF/U: HCPCS

## 2025-06-03 PROCEDURE — 3074F SYST BP LT 130 MM HG: CPT

## 2025-06-03 PROCEDURE — G8427 DOCREV CUR MEDS BY ELIG CLIN: HCPCS

## 2025-06-03 RX ORDER — SEMAGLUTIDE 0.68 MG/ML
0.5 INJECTION, SOLUTION SUBCUTANEOUS WEEKLY
Qty: 3 ML | Refills: 3 | Status: SHIPPED | OUTPATIENT
Start: 2025-06-03

## 2025-06-03 RX ORDER — BUPRENORPHINE HYDROCHLORIDE 900 UG/1
900 FILM, SOLUBLE BUCCAL 2 TIMES DAILY
Qty: 60 EACH | Refills: 0 | Status: SHIPPED | OUTPATIENT
Start: 2025-06-03 | End: 2025-07-03

## 2025-06-03 RX ORDER — FERROUS SULFATE 325(65) MG
1 TABLET ORAL
Qty: 90 TABLET | Refills: 0 | Status: SHIPPED | OUTPATIENT
Start: 2025-06-03

## 2025-06-03 NOTE — TELEPHONE ENCOUNTER
Sameer called requesting a refill of the below medication which has been pended for you:     Requested Prescriptions     Pending Prescriptions Disp Refills    FEROSUL 325 (65 Fe) MG tablet [Pharmacy Med Name: FERROUS SULFATE 325 MG  (65 FE) Tablet] 90 tablet 0     Sig: TAKE 1 TABLET BY MOUTH DAILY (WITH BREAKFAST)       Last Appointment Date: 4/29/2025  Next Appointment Date: 7/29/2025    Allergies   Allergen Reactions    Latex Other (See Comments)     BOILS AND BLISTERS- ALLERGY CALLED TO [facility] SURGERY SCHEDULING.    Demerol Hcl [Meperidine] Hives     And n/v

## 2025-06-03 NOTE — PROGRESS NOTES
fingers 2-5 on BUE and Numbness, tingling, paresthesia, and pain down BLE and into feet bilaterally, L>R.     Hematological: Negative.    Psychiatric/Behavioral:  Positive for sleep disturbance. Negative for agitation, self-injury and suicidal ideas. The patient is not nervous/anxious.    All other systems reviewed and are negative.     14 point ROS negative besides that noted in HPI    Physical exam:   /72 (BP Site: Left Lower Arm, Patient Position: Sitting, BP Cuff Size: Small Adult)   Pulse 78   Temp 97.3 °F (36.3 °C) (Temporal)   Resp 16   Ht 1.803 m (5' 11\")   Wt 83.9 kg (185 lb)   SpO2 100%   BMI 25.80 kg/m²     Body mass index is 25.8 kg/m².    Physical Exam  Vitals and nursing note reviewed.   Constitutional:       General: He is not in acute distress.     Appearance: Normal appearance. He is well-developed, well-groomed and overweight. He is not ill-appearing or toxic-appearing.   HENT:      Head: Normocephalic and atraumatic.      Mouth/Throat:      Lips: Pink.      Mouth: Mucous membranes are moist.   Eyes:      Conjunctiva/sclera: Conjunctivae normal.      Pupils: Pupils are equal, round, and reactive to light.   Neck:      Trachea: No tracheal deviation.   Cardiovascular:      Rate and Rhythm: Normal rate and regular rhythm.      Pulses: Normal pulses.           Radial pulses are 2+ on the right side and 2+ on the left side.   Pulmonary:      Effort: Pulmonary effort is normal. No accessory muscle usage or respiratory distress.   Musculoskeletal:         General: Tenderness present.      Cervical back: Neck supple. Deformity, spasms, tenderness and bony tenderness present. Pain with movement and muscular tenderness present. Decreased range of motion.      Thoracic back: Spasms and tenderness present.      Lumbar back: Spasms, tenderness and bony tenderness present. Decreased range of motion. Positive left straight leg raise test. Negative right straight leg raise test.        Back:

## 2025-07-07 ENCOUNTER — APPOINTMENT (OUTPATIENT)
Dept: GENERAL RADIOLOGY | Age: 55
End: 2025-07-07
Payer: MEDICARE

## 2025-07-07 ENCOUNTER — HOSPITAL ENCOUNTER (EMERGENCY)
Age: 55
Discharge: HOME | End: 2025-07-07
Payer: MEDICARE

## 2025-07-07 VITALS
OXYGEN SATURATION: 99 % | RESPIRATION RATE: 28 BRPM | BODY MASS INDEX: 26.5 KG/M2 | SYSTOLIC BLOOD PRESSURE: 110 MMHG | WEIGHT: 190 LBS | DIASTOLIC BLOOD PRESSURE: 85 MMHG | HEART RATE: 78 BPM

## 2025-07-07 DIAGNOSIS — S61.012A LACERATION OF LEFT THUMB WITHOUT FOREIGN BODY, NAIL DAMAGE STATUS UNSPECIFIED, INITIAL ENCOUNTER: Primary | ICD-10-CM

## 2025-07-07 LAB
ALBUMIN SERPL-MCNC: 4.4 G/DL (ref 3.5–5.2)
ALP SERPL-CCNC: 139 U/L (ref 40–129)
ALT SERPL-CCNC: 11 U/L (ref 10–50)
ANION GAP SERPL CALCULATED.3IONS-SCNC: 14 MMOL/L (ref 8–16)
AST SERPL-CCNC: 20 U/L (ref 10–50)
BASOPHILS # BLD: 0.1 K/UL (ref 0–0.2)
BASOPHILS NFR BLD: 0.6 % (ref 0–1)
BILIRUB SERPL-MCNC: 0.3 MG/DL (ref 0.2–1.2)
BUN SERPL-MCNC: 20 MG/DL (ref 6–20)
CALCIUM SERPL-MCNC: 9.8 MG/DL (ref 8.6–10)
CHLORIDE SERPL-SCNC: 101 MMOL/L (ref 98–107)
CO2 SERPL-SCNC: 23 MMOL/L (ref 22–29)
CREAT SERPL-MCNC: 1.6 MG/DL (ref 0.7–1.2)
EOSINOPHIL # BLD: 0.2 K/UL (ref 0–0.6)
EOSINOPHIL NFR BLD: 1.3 % (ref 0–5)
ERYTHROCYTE [DISTWIDTH] IN BLOOD BY AUTOMATED COUNT: 14.4 % (ref 11.5–14.5)
GLUCOSE BLD-MCNC: 126 MG/DL (ref 70–99)
GLUCOSE SERPL-MCNC: 166 MG/DL (ref 70–99)
HCT VFR BLD AUTO: 41.2 % (ref 42–52)
HGB BLD-MCNC: 13.2 G/DL (ref 14–18)
IMM GRANULOCYTES # BLD: 0.1 K/UL
LYMPHOCYTES # BLD: 3.9 K/UL (ref 1.1–4.5)
LYMPHOCYTES NFR BLD: 27.3 % (ref 20–40)
MCH RBC QN AUTO: 26.3 PG (ref 27–31)
MCHC RBC AUTO-ENTMCNC: 32 G/DL (ref 33–37)
MCV RBC AUTO: 82.2 FL (ref 80–94)
MONOCYTES # BLD: 1.5 K/UL (ref 0–0.9)
MONOCYTES NFR BLD: 10.3 % (ref 0–10)
NEUTROPHILS # BLD: 8.5 K/UL (ref 1.5–7.5)
NEUTS SEG NFR BLD: 60.1 % (ref 50–65)
PERFORMED ON: ABNORMAL
PLATELET # BLD AUTO: 260 K/UL (ref 130–400)
PMV BLD AUTO: 9.9 FL (ref 9.4–12.4)
POTASSIUM SERPL-SCNC: 4.6 MMOL/L (ref 3.5–5)
PROT SERPL-MCNC: 7.4 G/DL (ref 6.4–8.3)
RBC # BLD AUTO: 5.01 M/UL (ref 4.7–6.1)
SODIUM SERPL-SCNC: 138 MMOL/L (ref 136–145)
WBC # BLD AUTO: 14.1 K/UL (ref 4.8–10.8)

## 2025-07-07 PROCEDURE — 6360000002 HC RX W HCPCS

## 2025-07-07 PROCEDURE — 96365 THER/PROPH/DIAG IV INF INIT: CPT

## 2025-07-07 PROCEDURE — 80053 COMPREHEN METABOLIC PANEL: CPT

## 2025-07-07 PROCEDURE — 96375 TX/PRO/DX INJ NEW DRUG ADDON: CPT

## 2025-07-07 PROCEDURE — 85025 COMPLETE CBC W/AUTO DIFF WBC: CPT

## 2025-07-07 PROCEDURE — 96376 TX/PRO/DX INJ SAME DRUG ADON: CPT

## 2025-07-07 PROCEDURE — 12002 RPR S/N/AX/GEN/TRNK2.6-7.5CM: CPT

## 2025-07-07 PROCEDURE — 82962 GLUCOSE BLOOD TEST: CPT

## 2025-07-07 PROCEDURE — 73130 X-RAY EXAM OF HAND: CPT

## 2025-07-07 PROCEDURE — 99285 EMERGENCY DEPT VISIT HI MDM: CPT

## 2025-07-07 PROCEDURE — 36415 COLL VENOUS BLD VENIPUNCTURE: CPT

## 2025-07-07 PROCEDURE — 2580000003 HC RX 258: Performed by: NURSE PRACTITIONER

## 2025-07-07 PROCEDURE — 6360000002 HC RX W HCPCS: Performed by: NURSE PRACTITIONER

## 2025-07-07 RX ORDER — CEPHALEXIN 500 MG/1
500 CAPSULE ORAL 3 TIMES DAILY
Qty: 21 CAPSULE | Refills: 0 | Status: SHIPPED | OUTPATIENT
Start: 2025-07-07 | End: 2025-07-14

## 2025-07-07 RX ORDER — HYDROMORPHONE HYDROCHLORIDE 1 MG/ML
0.5 INJECTION, SOLUTION INTRAMUSCULAR; INTRAVENOUS; SUBCUTANEOUS ONCE
Status: COMPLETED | OUTPATIENT
Start: 2025-07-07 | End: 2025-07-07

## 2025-07-07 RX ORDER — BISMUTH TRIBROMOPH/PETROLATUM 5"X9"
1 BANDAGE TOPICAL PRN
Status: DISCONTINUED | OUTPATIENT
Start: 2025-07-07 | End: 2025-07-07 | Stop reason: HOSPADM

## 2025-07-07 RX ORDER — 0.9 % SODIUM CHLORIDE 0.9 %
1000 INTRAVENOUS SOLUTION INTRAVENOUS ONCE
Status: COMPLETED | OUTPATIENT
Start: 2025-07-07 | End: 2025-07-07

## 2025-07-07 RX ORDER — LIDOCAINE HYDROCHLORIDE 10 MG/ML
INJECTION, SOLUTION EPIDURAL; INFILTRATION; INTRACAUDAL; PERINEURAL
Status: COMPLETED
Start: 2025-07-07 | End: 2025-07-07

## 2025-07-07 RX ORDER — 0.9 % SODIUM CHLORIDE 0.9 %
500 INTRAVENOUS SOLUTION INTRAVENOUS ONCE
Status: DISCONTINUED | OUTPATIENT
Start: 2025-07-07 | End: 2025-07-07

## 2025-07-07 RX ADMIN — SODIUM CHLORIDE 3000 MG: 9 INJECTION, SOLUTION INTRAVENOUS at 18:34

## 2025-07-07 RX ADMIN — Medication 10 ML: at 17:02

## 2025-07-07 RX ADMIN — LIDOCAINE HYDROCHLORIDE 10 ML: 10 INJECTION, SOLUTION EPIDURAL; INFILTRATION; INTRACAUDAL; PERINEURAL at 17:02

## 2025-07-07 RX ADMIN — HYDROMORPHONE HYDROCHLORIDE 0.5 MG: 1 INJECTION, SOLUTION INTRAMUSCULAR; INTRAVENOUS; SUBCUTANEOUS at 17:03

## 2025-07-07 RX ADMIN — HYDROMORPHONE HYDROCHLORIDE 0.5 MG: 1 INJECTION, SOLUTION INTRAMUSCULAR; INTRAVENOUS; SUBCUTANEOUS at 17:39

## 2025-07-07 RX ADMIN — SODIUM CHLORIDE 1000 ML: 9 INJECTION, SOLUTION INTRAVENOUS at 16:17

## 2025-07-07 ASSESSMENT — PAIN DESCRIPTION - LOCATION: LOCATION: HAND

## 2025-07-07 ASSESSMENT — PAIN DESCRIPTION - ORIENTATION: ORIENTATION: LEFT

## 2025-07-07 ASSESSMENT — PAIN - FUNCTIONAL ASSESSMENT: PAIN_FUNCTIONAL_ASSESSMENT: 0-10

## 2025-07-07 ASSESSMENT — PAIN SCALES - GENERAL
PAINLEVEL_OUTOF10: 10
PAINLEVEL_OUTOF10: 10

## 2025-07-07 ASSESSMENT — PAIN DESCRIPTION - DESCRIPTORS: DESCRIPTORS: ACHING

## 2025-07-07 ASSESSMENT — PAIN DESCRIPTION - PAIN TYPE: TYPE: ACUTE PAIN

## 2025-07-07 NOTE — ED PROVIDER NOTES
[facility] EMERGENCY DEPARTMENT  eMERGENCY dEPARTMENT eNCOUnter      Pt Name: Sameer Larson  MRN: [id number]  Birthdate 1970  Date of evaluation: 7/7/2025  Provider: BRYAN Spicer    CHIEF COMPLAINT       Chief Complaint   Patient presents with    Finger Injury     Left thumb vs skill saw         HISTORY OF PRESENT ILLNESS   (Location/Symptom, Timing/Onset,Context/Setting, Quality, Duration, Modifying Factors, Severity)  Note limiting factors.   Sameer Larson is a 55 y.o. male who presents to the emergency department with an injury to his left thumb from a skill saw.  On presentation pt sweaty and very pale.  Denies chest pain.  /65 and glucose 126.  Complaining of left thumb pain after injury.  Pt is right handed     The history is provided by the patient.   Hand Problem  Location:  Finger  Finger location:  L thumb  Injury: yes    Time since incident:  15 minutes  Mechanism of injury comment:  Skill saw injury  Pain details:     Quality:  Aching    Onset quality:  Sudden      NursingNotes were reviewed.    REVIEW OF SYSTEMS    (2-9 systems for level 4, 10 or more for level 5)     Review of Systems    Except as noted above the remainder of the review of systems was reviewed and negative.       PAST MEDICAL HISTORY     Past Medical History:   Diagnosis Date    CAD (coronary artery disease)     sees [facility] cardiology    Cancer (HCC)     Bladder    COPD (chronic obstructive pulmonary disease) ([facility])     COVID-19 2021 2021, 2022, 2023    Depression     Diabetes mellitus (HCC)     History of blood transfusion     unsure thinks he did    Hyperlipidemia     Hypertension     Substance abuse (HCC)          SURGICALHISTORY       Past Surgical History:   Procedure Laterality Date    ABDOMEN SURGERY  2017    Badder cancer    BACK SURGERY      X3     BLADDER REMOVAL  2017    Urostomy currently in place    CARDIAC CATHETERIZATION      CARDIAC PROCEDURE N/A 12/17/2024    Left heart cath / coronary angiography 
No

## 2025-07-07 NOTE — ED TRIAGE NOTES
Pt very diaphoretic, became nauseated and felt near syncope in triage; expedited to room, Holly ADAMS at bedside, glucose 126

## 2025-07-08 ENCOUNTER — OFFICE VISIT (OUTPATIENT)
Age: 55
End: 2025-07-08
Payer: MEDICARE

## 2025-07-08 ENCOUNTER — TELEPHONE (OUTPATIENT)
Age: 55
End: 2025-07-08

## 2025-07-08 VITALS — WEIGHT: 190 LBS | BODY MASS INDEX: 26.6 KG/M2 | HEIGHT: 71 IN

## 2025-07-08 DIAGNOSIS — G89.29 CHRONIC MIDLINE LOW BACK PAIN WITH LEFT-SIDED SCIATICA: ICD-10-CM

## 2025-07-08 DIAGNOSIS — M54.42 CHRONIC MIDLINE LOW BACK PAIN WITH LEFT-SIDED SCIATICA: ICD-10-CM

## 2025-07-08 DIAGNOSIS — I10 ESSENTIAL HYPERTENSION: ICD-10-CM

## 2025-07-08 DIAGNOSIS — S62.522B DISPLACED FRACTURE OF DISTAL PHALANX OF LEFT THUMB, INITIAL ENCOUNTER FOR OPEN FRACTURE: Primary | ICD-10-CM

## 2025-07-08 PROCEDURE — 4004F PT TOBACCO SCREEN RCVD TLK: CPT | Performed by: ORTHOPAEDIC SURGERY

## 2025-07-08 PROCEDURE — 3017F COLORECTAL CA SCREEN DOC REV: CPT | Performed by: ORTHOPAEDIC SURGERY

## 2025-07-08 PROCEDURE — G8427 DOCREV CUR MEDS BY ELIG CLIN: HCPCS | Performed by: ORTHOPAEDIC SURGERY

## 2025-07-08 PROCEDURE — G8419 CALC BMI OUT NRM PARAM NOF/U: HCPCS | Performed by: ORTHOPAEDIC SURGERY

## 2025-07-08 PROCEDURE — 99213 OFFICE O/P EST LOW 20 MIN: CPT | Performed by: ORTHOPAEDIC SURGERY

## 2025-07-08 RX ORDER — BUPRENORPHINE HYDROCHLORIDE 900 UG/1
900 FILM, SOLUBLE BUCCAL 2 TIMES DAILY
Qty: 60 EACH | Refills: 0 | Status: SHIPPED | OUTPATIENT
Start: 2025-07-08 | End: 2025-08-07

## 2025-07-08 RX ORDER — LOSARTAN POTASSIUM 50 MG/1
50 TABLET ORAL DAILY
Qty: 30 TABLET | Refills: 5 | Status: SHIPPED | OUTPATIENT
Start: 2025-07-08

## 2025-07-08 NOTE — TELEPHONE ENCOUNTER
Received fax from pharmacy requesting refill on pts medication(s). Pt was last seen in office on 4/29/2025  and has a follow up scheduled for 7/29/2025. Will send request to  Vannessa Medina  for patient.     Requested Prescriptions     Pending Prescriptions Disp Refills    losartan (COZAAR) 50 MG tablet [Pharmacy Med Name: LOSARTAN POTASSIUM 50 MG TA 50 Tablet] 30 tablet 5     Sig: TAKE 1 TABLET BY MOUTH DAILY
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
## 2025-07-08 NOTE — TELEPHONE ENCOUNTER
Last seen in office visit: 6/3/25  Next scheduled office visit: 9/3/25 with Jessica  Last UDS results: compliant  Any discrepancies on Sánchez (such as refills from another provider): none

## 2025-07-08 NOTE — PROGRESS NOTES
[facility]  [facility]  [address]  [facility] [id number] [phone]     Patient: Sameer Larson   YOB: 1970   Date: 7/8/2025     Chief Complaint   Patient presents with    Left Thumb         History of Present Illness  Sameer is a right-hand-dominant 55-year-old gentleman who has previously been seen in our clinic for symptoms consistent with trigger fingers returns today after an injury to his left thumb which occurred while using a skill saw yesterday, 7/7/2025.  He presented to [facility] ER where radiographs revealed a distal phalangeal tuft fracture.  He was given tetanus and antibiotics.  He underwent irrigation and loose primary closure.  He presents today and reports ongoing but relatively well-controlled pain.  Denies any other injury as a result of the accident.      Past Medical History:   Diagnosis Date    CAD (coronary artery disease)     sees [facility] cardiology    Cancer ([facility])     Bladder    COPD (chronic obstructive pulmonary disease) ([facility])     COVID-19 2021 2021, 2022, 2023    Depression     Diabetes mellitus ([facility])     History of blood transfusion     unsure thinks he did    Hyperlipidemia     Hypertension     Substance abuse ([facility])       Past Surgical History:   Procedure Laterality Date    ABDOMEN SURGERY  2017    Badder cancer    BACK SURGERY      X3     BLADDER REMOVAL  2017    Urostomy currently in place    CARDIAC CATHETERIZATION      CARDIAC PROCEDURE N/A 12/17/2024    Left heart cath / coronary angiography w grafts performed by Neo Bermudez MD at [facility] CARDIAC CATH LAB    CARDIAC PROCEDURE N/A 12/17/2024    Percutaneous coronary intervention performed by Neo Bermudez MD at [facility] CARDIAC CATH LAB    CARDIAC PROCEDURE N/A 12/18/2024    Percutaneous coronary intervention performed by Noe Bermudez MD at [facility] CARDIAC CATH LAB    CERVICAL FUSION N/A 02/19/2020    Phase 1:  C5 and C6

## 2025-07-12 LAB
EKG P AXIS: 43 DEGREES
EKG P-R INTERVAL: 134 MS
EKG Q-T INTERVAL: 412 MS
EKG QRS DURATION: 100 MS
EKG QTC CALCULATION (BAZETT): 424 MS
EKG T AXIS: 66 DEGREES

## 2025-07-17 ENCOUNTER — OFFICE VISIT (OUTPATIENT)
Dept: CARDIOLOGY CLINIC | Age: 55
End: 2025-07-17
Payer: MEDICARE

## 2025-07-17 VITALS
BODY MASS INDEX: 26.6 KG/M2 | HEIGHT: 71 IN | DIASTOLIC BLOOD PRESSURE: 78 MMHG | SYSTOLIC BLOOD PRESSURE: 142 MMHG | WEIGHT: 190 LBS | HEART RATE: 71 BPM

## 2025-07-17 DIAGNOSIS — I10 ESSENTIAL HYPERTENSION: Primary | ICD-10-CM

## 2025-07-17 DIAGNOSIS — Z95.5 H/O HEART ARTERY STENT: ICD-10-CM

## 2025-07-17 DIAGNOSIS — Z95.1 STATUS POST AORTO-CORONARY ARTERY BYPASS GRAFT: ICD-10-CM

## 2025-07-17 DIAGNOSIS — R06.02 SHORTNESS OF BREATH: ICD-10-CM

## 2025-07-17 DIAGNOSIS — I25.10 CORONARY ARTERY DISEASE INVOLVING NATIVE CORONARY ARTERY OF NATIVE HEART WITHOUT ANGINA PECTORIS: ICD-10-CM

## 2025-07-17 PROCEDURE — G8419 CALC BMI OUT NRM PARAM NOF/U: HCPCS | Performed by: INTERNAL MEDICINE

## 2025-07-17 PROCEDURE — 3078F DIAST BP <80 MM HG: CPT | Performed by: INTERNAL MEDICINE

## 2025-07-17 PROCEDURE — 99213 OFFICE O/P EST LOW 20 MIN: CPT | Performed by: INTERNAL MEDICINE

## 2025-07-17 PROCEDURE — 3017F COLORECTAL CA SCREEN DOC REV: CPT | Performed by: INTERNAL MEDICINE

## 2025-07-17 PROCEDURE — 3077F SYST BP >= 140 MM HG: CPT | Performed by: INTERNAL MEDICINE

## 2025-07-17 PROCEDURE — 4004F PT TOBACCO SCREEN RCVD TLK: CPT | Performed by: INTERNAL MEDICINE

## 2025-07-17 PROCEDURE — G8427 DOCREV CUR MEDS BY ELIG CLIN: HCPCS | Performed by: INTERNAL MEDICINE

## 2025-07-17 ASSESSMENT — ENCOUNTER SYMPTOMS
VOMITING: 0
DIARRHEA: 0
EYES NEGATIVE: 1
NAUSEA: 0
GASTROINTESTINAL NEGATIVE: 1
SHORTNESS OF BREATH: 0
RESPIRATORY NEGATIVE: 1

## 2025-07-17 NOTE — PROGRESS NOTES
[facility]                            Date:  7/17/2025  Patient: Sameer Larson  Age:  55 y.o., 1970      Reason for evaluation:         SUBJECTIVE:    Returns today follow-up assessment overall doing well follow-up for coronary artery disease previous CABG previous stent hypertension tobacco abuse ongoing remains active denies exertional dyspnea chest pain palpitations claudication or other complaints.  Blood pressure 142/78 heart 71.    Review of Systems   Constitutional: Negative.  Negative for chills, fever and unexpected weight change.   HENT: Negative.     Eyes: Negative.    Respiratory: Negative.  Negative for shortness of breath.    Cardiovascular: Negative.  Negative for chest pain.   Gastrointestinal: Negative.  Negative for diarrhea, nausea and vomiting.   Endocrine: Negative.    Genitourinary: Negative.    Musculoskeletal: Negative.    Skin: Negative.    Neurological: Negative.    All other systems reviewed and are negative.        OBJECTIVE:    BP (!) 142/78   Pulse 71   Ht 1.803 m (5' 11\")   Wt 86.2 kg (190 lb)   BMI 26.50 kg/m²     Labs:   CBC: No results for input(s): \"WBC\", \"HGB\", \"HCT\", \"PLT\" in the last 72 hours.  BMP:No results for input(s): \"NA\", \"K\", \"CO2\", \"BUN\", \"CREATININE\", \"LABGLOM\", \"GLUCOSE\" in the last 72 hours.  BNP: No results for input(s): \"BNP\" in the last 72 hours.  PT/INR: No results for input(s): \"PROTIME\", \"INR\" in the last 72 hours.  APTT:No results for input(s): \"APTT\" in the last 72 hours.  CARDIAC ENZYMES:No results for input(s): \"CKTOTAL\", \"CKMB\", \"CKMBINDEX\", \"TROPONINI\" in the last 72 hours.  FASTING LIPID PANEL:  Lab Results   Component Value Date/Time    HDL 30 04/29/2025 11:59 AM    LDLDIRECT 50 08/14/2024 08:53 AM    TRIG 157 04/29/2025 11:59 AM     LIVER PROFILE:No results for input(s): \"AST\", \"ALT\", \"LABALBU\" in the last 72 hours.        Past Medical History:   Diagnosis Date    CAD (coronary artery disease)     [facility]

## 2025-07-21 NOTE — PROGRESS NOTES
[facility]  [facility]  [address]  [facility] [id number] [phone]     Patient: Sameer Larson   YOB: 1970   Date: 7/24/2025     Chief Complaint   Patient presents with    Left hand        History of Present Illness  Sameer is a right-hand-dominant 55-year-old gentleman who has previously been seen in our clinic for symptoms consistent with trigger fingers returns today after an injury to his left thumb which occurred while using a skill saw yesterday, 7/7/2025.  He presented to [facility] ER where radiographs revealed a distal phalangeal tuft fracture.  He was given tetanus and antibiotics.  He underwent irrigation and loose primary closure.  He returns today for routine surveillance.  States that a portion at the distal aspect of the thumb has turned black.  Denies any significant drainage.  Pain remains stable.  Denies any fever or chills.      Past Medical History:   Diagnosis Date    CAD (coronary artery disease)     sees [facility] cardiology    Cancer (HCC)     Bladder    COPD (chronic obstructive pulmonary disease) (HCC)     COVID-19 2021 2021, 2022, 2023    Depression     Diabetes mellitus (HCC)     History of blood transfusion     unsure thinks he did    Hyperlipidemia     Hypertension     Substance abuse (HCC)       Past Surgical History:   Procedure Laterality Date    ABDOMEN SURGERY  2017    Badder cancer    BACK SURGERY      X3     BLADDER REMOVAL  2017    Urostomy currently in place    CARDIAC CATHETERIZATION      CARDIAC PROCEDURE N/A 12/17/2024    Left heart cath / coronary angiography w grafts performed by Neo Bermudez MD at [facility] CARDIAC CATH LAB    CARDIAC PROCEDURE N/A 12/17/2024    Percutaneous coronary intervention performed by Neo Bermudez MD at [facility] CARDIAC CATH LAB    CARDIAC PROCEDURE N/A 12/18/2024    Percutaneous coronary intervention performed by Neo Bermudez MD at [facility]

## 2025-07-24 ENCOUNTER — OFFICE VISIT (OUTPATIENT)
Age: 55
End: 2025-07-24
Payer: MEDICARE

## 2025-07-24 VITALS — BODY MASS INDEX: 26.6 KG/M2 | WEIGHT: 190 LBS | HEIGHT: 71 IN

## 2025-07-24 DIAGNOSIS — S62.522D OPEN DISPLACED FRACTURE OF DISTAL PHALANX OF LEFT THUMB WITH ROUTINE HEALING, SUBSEQUENT ENCOUNTER: Primary | ICD-10-CM

## 2025-07-24 PROCEDURE — 99213 OFFICE O/P EST LOW 20 MIN: CPT | Performed by: ORTHOPAEDIC SURGERY

## 2025-07-24 PROCEDURE — G8419 CALC BMI OUT NRM PARAM NOF/U: HCPCS | Performed by: ORTHOPAEDIC SURGERY

## 2025-07-24 PROCEDURE — G8427 DOCREV CUR MEDS BY ELIG CLIN: HCPCS | Performed by: ORTHOPAEDIC SURGERY

## 2025-07-24 PROCEDURE — 4004F PT TOBACCO SCREEN RCVD TLK: CPT | Performed by: ORTHOPAEDIC SURGERY

## 2025-07-24 PROCEDURE — 3017F COLORECTAL CA SCREEN DOC REV: CPT | Performed by: ORTHOPAEDIC SURGERY

## 2025-07-29 ENCOUNTER — OFFICE VISIT (OUTPATIENT)
Age: 55
End: 2025-07-29
Payer: MEDICARE

## 2025-07-29 VITALS
HEIGHT: 71 IN | TEMPERATURE: 97.2 F | OXYGEN SATURATION: 98 % | SYSTOLIC BLOOD PRESSURE: 120 MMHG | HEART RATE: 70 BPM | BODY MASS INDEX: 25.9 KG/M2 | WEIGHT: 185 LBS | DIASTOLIC BLOOD PRESSURE: 72 MMHG

## 2025-07-29 DIAGNOSIS — E11.65 TYPE 2 DIABETES MELLITUS WITH HYPERGLYCEMIA, WITHOUT LONG-TERM CURRENT USE OF INSULIN (HCC): ICD-10-CM

## 2025-07-29 DIAGNOSIS — D50.8 IRON DEFICIENCY ANEMIA SECONDARY TO INADEQUATE DIETARY IRON INTAKE: ICD-10-CM

## 2025-07-29 DIAGNOSIS — I10 ESSENTIAL HYPERTENSION: ICD-10-CM

## 2025-07-29 DIAGNOSIS — J42 CHRONIC BRONCHITIS, UNSPECIFIED CHRONIC BRONCHITIS TYPE (HCC): ICD-10-CM

## 2025-07-29 DIAGNOSIS — E11.65 TYPE 2 DIABETES MELLITUS WITH HYPERGLYCEMIA, WITHOUT LONG-TERM CURRENT USE OF INSULIN (HCC): Primary | ICD-10-CM

## 2025-07-29 LAB
ALBUMIN SERPL-MCNC: 4.3 G/DL (ref 3.5–5.2)
ALP SERPL-CCNC: 118 U/L (ref 40–129)
ALT SERPL-CCNC: <5 U/L (ref 10–50)
ANION GAP SERPL CALCULATED.3IONS-SCNC: 13 MMOL/L (ref 8–16)
AST SERPL-CCNC: 14 U/L (ref 10–50)
BASOPHILS # BLD: 0.1 K/UL (ref 0–0.2)
BASOPHILS NFR BLD: 0.8 % (ref 0–1)
BILIRUB SERPL-MCNC: 0.3 MG/DL (ref 0.2–1.2)
BUN SERPL-MCNC: 24 MG/DL (ref 6–20)
CALCIUM SERPL-MCNC: 9.5 MG/DL (ref 8.6–10)
CHLORIDE SERPL-SCNC: 100 MMOL/L (ref 98–107)
CO2 SERPL-SCNC: 24 MMOL/L (ref 22–29)
CREAT SERPL-MCNC: 1.5 MG/DL (ref 0.7–1.2)
EOSINOPHIL # BLD: 0.2 K/UL (ref 0–0.6)
EOSINOPHIL NFR BLD: 1.7 % (ref 0–5)
ERYTHROCYTE [DISTWIDTH] IN BLOOD BY AUTOMATED COUNT: 15.9 % (ref 11.5–14.5)
GLUCOSE SERPL-MCNC: 134 MG/DL (ref 70–99)
HBA1C MFR BLD: 5.4 % (ref 4–5.6)
HCT VFR BLD AUTO: 44 % (ref 42–52)
HGB BLD-MCNC: 14.1 G/DL (ref 14–18)
IMM GRANULOCYTES # BLD: 0 K/UL
IRON SATN MFR SERPL: 13 % (ref 20–50)
IRON SERPL-MCNC: 43 UG/DL (ref 59–158)
LYMPHOCYTES # BLD: 3 K/UL (ref 1.1–4.5)
LYMPHOCYTES NFR BLD: 34.6 % (ref 20–40)
MCH RBC QN AUTO: 27.7 PG (ref 27–31)
MCHC RBC AUTO-ENTMCNC: 32 G/DL (ref 33–37)
MCV RBC AUTO: 86.4 FL (ref 80–94)
MONOCYTES # BLD: 0.8 K/UL (ref 0–0.9)
MONOCYTES NFR BLD: 9.4 % (ref 0–10)
NEUTROPHILS # BLD: 4.6 K/UL (ref 1.5–7.5)
NEUTS SEG NFR BLD: 53.2 % (ref 50–65)
PLATELET # BLD AUTO: 214 K/UL (ref 130–400)
PMV BLD AUTO: 10.8 FL (ref 9.4–12.4)
POTASSIUM SERPL-SCNC: 4.3 MMOL/L (ref 3.5–5.1)
PROT SERPL-MCNC: 7.4 G/DL (ref 6.4–8.3)
RBC # BLD AUTO: 5.09 M/UL (ref 4.7–6.1)
SODIUM SERPL-SCNC: 137 MMOL/L (ref 136–145)
TIBC SERPL-MCNC: 341 UG/DL (ref 250–400)
WBC # BLD AUTO: 8.6 K/UL (ref 4.8–10.8)

## 2025-07-29 PROCEDURE — 3078F DIAST BP <80 MM HG: CPT | Performed by: NURSE PRACTITIONER

## 2025-07-29 PROCEDURE — 3044F HG A1C LEVEL LT 7.0%: CPT | Performed by: NURSE PRACTITIONER

## 2025-07-29 PROCEDURE — 4004F PT TOBACCO SCREEN RCVD TLK: CPT | Performed by: NURSE PRACTITIONER

## 2025-07-29 PROCEDURE — 99214 OFFICE O/P EST MOD 30 MIN: CPT | Performed by: NURSE PRACTITIONER

## 2025-07-29 PROCEDURE — 3074F SYST BP LT 130 MM HG: CPT | Performed by: NURSE PRACTITIONER

## 2025-07-29 PROCEDURE — G8419 CALC BMI OUT NRM PARAM NOF/U: HCPCS | Performed by: NURSE PRACTITIONER

## 2025-07-29 PROCEDURE — G8427 DOCREV CUR MEDS BY ELIG CLIN: HCPCS | Performed by: NURSE PRACTITIONER

## 2025-07-29 RX ORDER — FLUTICASONE PROPIONATE AND SALMETEROL 50; 250 UG/1; UG/1
1 POWDER RESPIRATORY (INHALATION) EVERY 12 HOURS
Qty: 60 EACH | Refills: 3 | Status: SHIPPED | OUTPATIENT
Start: 2025-07-29

## 2025-07-29 ASSESSMENT — ENCOUNTER SYMPTOMS
BACK PAIN: 1
GASTROINTESTINAL NEGATIVE: 1
RESPIRATORY NEGATIVE: 1
EYES NEGATIVE: 1

## 2025-07-29 NOTE — PROGRESS NOTES
feeling faint upon standing.  - Alleviating/Aggravating Factors: Discontinued morning medication regimen; resumed medication at night.  - Timing: Improvement noticed after resuming medication at night; adhering to new schedule for the past 3 weeks.    Current State of Thumb  He reports that the tip of his thumb has turned black and experiences pain in the afternoons. He has been applying Band-Aids to the area and plans to take a photograph of it. He has a scheduled appointment with Dr. Matos next month for further evaluation of his thumb.  - Onset: Pain in the afternoons.  - Location: Tip of the left thumb.  - Character: Tip of thumb turned black, pain.  - Alleviating/Aggravating Factors: Applying Band-Aids.  - Timing: Pain experienced in the afternoons.    He has resumed taking iron supplements.    His last colonoscopy was in 2019, and he is due for another one.    Prior to Visit Medications    Medication Sig Taking? Authorizing Provider   ADVAIR DISKUS 250-50 MCG/ACT AEPB diskus inhaler Inhale 1 puff into the lungs in the morning and 1 puff in the evening. Yes Vannessa Medina APRN   Buprenorphine HCl (BELBUCA) 900 MCG FILM Place 900 mcg inside cheek in the morning and at bedtime for 30 days. Yes Kinsey Ledbetter APRN - CNP   losartan (COZAAR) 50 MG tablet TAKE 1 TABLET BY MOUTH DAILY Yes Vannessa Medina APRN   Water For Irrigation, Sterile (STERILE WATER FOR IRRIGATION) MIX ONE PART BETADINE W/ TEN PARTS SALINE Yes Rigoberto Matos MD   Semaglutide,0.25 or 0.5MG/DOS, (OZEMPIC, 0.25 OR 0.5 MG/DOSE,) 2 MG/3ML SOPN INJECT 0.5 MG INTO THE SKIN ONCE A WEEK PT TAKES EVERY MONDAY Yes Vannessa Medina APRN   FEROSUL 325 (65 Fe) MG tablet TAKE 1 TABLET BY MOUTH DAILY (WITH BREAKFAST) Yes Vannessa Medina APRN   FT MAGNESIUM CITRATE 1.745 GM/30ML solution TAKE 296 MLS BY MOUTH ONCE FOR 1 DOSE Yes Vannessa Medina APRN   omeprazole (PRILOSEC) 40 MG delayed release capsule TAKE 1 CAPSULE BY MOUTH EVERY MORNING (BEFORE BREAKFAST)

## 2025-08-11 DIAGNOSIS — M54.42 CHRONIC MIDLINE LOW BACK PAIN WITH LEFT-SIDED SCIATICA: ICD-10-CM

## 2025-08-11 DIAGNOSIS — G89.29 CHRONIC MIDLINE LOW BACK PAIN WITH LEFT-SIDED SCIATICA: ICD-10-CM

## 2025-08-11 RX ORDER — BUPRENORPHINE HYDROCHLORIDE 900 UG/1
900 FILM, SOLUBLE BUCCAL 2 TIMES DAILY
Qty: 60 EACH | Refills: 0 | Status: SHIPPED | OUTPATIENT
Start: 2025-08-11 | End: 2025-09-10

## 2025-08-21 ENCOUNTER — OFFICE VISIT (OUTPATIENT)
Age: 55
End: 2025-08-21
Payer: MEDICARE

## 2025-08-21 VITALS — WEIGHT: 185 LBS | HEIGHT: 71 IN | BODY MASS INDEX: 25.9 KG/M2

## 2025-08-21 DIAGNOSIS — M65.331 TRIGGER FINGER, RIGHT MIDDLE FINGER: ICD-10-CM

## 2025-08-21 DIAGNOSIS — S62.522D OPEN DISPLACED FRACTURE OF DISTAL PHALANX OF LEFT THUMB WITH ROUTINE HEALING, SUBSEQUENT ENCOUNTER: Primary | ICD-10-CM

## 2025-08-21 DIAGNOSIS — M65.332 TRIGGER FINGER, LEFT MIDDLE FINGER: ICD-10-CM

## 2025-08-21 PROCEDURE — G8419 CALC BMI OUT NRM PARAM NOF/U: HCPCS | Performed by: ORTHOPAEDIC SURGERY

## 2025-08-21 PROCEDURE — 4004F PT TOBACCO SCREEN RCVD TLK: CPT | Performed by: ORTHOPAEDIC SURGERY

## 2025-08-21 PROCEDURE — G8427 DOCREV CUR MEDS BY ELIG CLIN: HCPCS | Performed by: ORTHOPAEDIC SURGERY

## 2025-08-21 PROCEDURE — 3017F COLORECTAL CA SCREEN DOC REV: CPT | Performed by: ORTHOPAEDIC SURGERY

## 2025-08-21 PROCEDURE — 99213 OFFICE O/P EST LOW 20 MIN: CPT | Performed by: ORTHOPAEDIC SURGERY

## 2025-08-28 DIAGNOSIS — K21.9 GERD WITHOUT ESOPHAGITIS: ICD-10-CM

## 2025-08-28 RX ORDER — OMEPRAZOLE 40 MG/1
40 CAPSULE, DELAYED RELEASE ORAL
Qty: 30 CAPSULE | Refills: 3 | Status: SHIPPED | OUTPATIENT
Start: 2025-08-28

## 2025-09-02 ENCOUNTER — TELEPHONE (OUTPATIENT)
Age: 55
End: 2025-09-02

## 2025-09-03 ENCOUNTER — OFFICE VISIT (OUTPATIENT)
Age: 55
End: 2025-09-03
Payer: MEDICARE

## 2025-09-03 ENCOUNTER — OFFICE VISIT (OUTPATIENT)
Dept: PAIN MANAGEMENT | Age: 55
End: 2025-09-03

## 2025-09-03 VITALS
TEMPERATURE: 97.4 F | OXYGEN SATURATION: 96 % | WEIGHT: 180 LBS | SYSTOLIC BLOOD PRESSURE: 121 MMHG | HEIGHT: 71 IN | HEART RATE: 84 BPM | DIASTOLIC BLOOD PRESSURE: 76 MMHG | RESPIRATION RATE: 16 BRPM | BODY MASS INDEX: 25.2 KG/M2

## 2025-09-03 VITALS
TEMPERATURE: 97.8 F | DIASTOLIC BLOOD PRESSURE: 82 MMHG | SYSTOLIC BLOOD PRESSURE: 128 MMHG | HEIGHT: 71 IN | WEIGHT: 180 LBS | BODY MASS INDEX: 25.2 KG/M2 | OXYGEN SATURATION: 96 % | HEART RATE: 78 BPM

## 2025-09-03 DIAGNOSIS — G89.29 CHRONIC MIDLINE LOW BACK PAIN WITH LEFT-SIDED SCIATICA: ICD-10-CM

## 2025-09-03 DIAGNOSIS — F11.21 OPIOID DEPENDENCE IN REMISSION (HCC): ICD-10-CM

## 2025-09-03 DIAGNOSIS — M79.18 MYOFASCIAL PAIN SYNDROME, CERVICAL: ICD-10-CM

## 2025-09-03 DIAGNOSIS — J20.8 ACUTE BACTERIAL BRONCHITIS: Primary | ICD-10-CM

## 2025-09-03 DIAGNOSIS — Z51.81 ENCOUNTER FOR MONITORING OPIOID MAINTENANCE THERAPY: ICD-10-CM

## 2025-09-03 DIAGNOSIS — M47.12 CERVICAL SPONDYLOSIS WITH MYELOPATHY AND RADICULOPATHY: Primary | ICD-10-CM

## 2025-09-03 DIAGNOSIS — Z79.891 ENCOUNTER FOR MONITORING OPIOID MAINTENANCE THERAPY: ICD-10-CM

## 2025-09-03 DIAGNOSIS — B96.89 ACUTE BACTERIAL BRONCHITIS: Primary | ICD-10-CM

## 2025-09-03 DIAGNOSIS — M54.42 CHRONIC MIDLINE LOW BACK PAIN WITH LEFT-SIDED SCIATICA: ICD-10-CM

## 2025-09-03 DIAGNOSIS — M47.22 CERVICAL SPONDYLOSIS WITH MYELOPATHY AND RADICULOPATHY: Primary | ICD-10-CM

## 2025-09-03 PROCEDURE — 96372 THER/PROPH/DIAG INJ SC/IM: CPT | Performed by: NURSE PRACTITIONER

## 2025-09-03 PROCEDURE — G8419 CALC BMI OUT NRM PARAM NOF/U: HCPCS | Performed by: NURSE PRACTITIONER

## 2025-09-03 PROCEDURE — 99213 OFFICE O/P EST LOW 20 MIN: CPT | Performed by: NURSE PRACTITIONER

## 2025-09-03 PROCEDURE — 3079F DIAST BP 80-89 MM HG: CPT | Performed by: NURSE PRACTITIONER

## 2025-09-03 PROCEDURE — G8427 DOCREV CUR MEDS BY ELIG CLIN: HCPCS | Performed by: NURSE PRACTITIONER

## 2025-09-03 PROCEDURE — 3074F SYST BP LT 130 MM HG: CPT | Performed by: NURSE PRACTITIONER

## 2025-09-03 PROCEDURE — 4004F PT TOBACCO SCREEN RCVD TLK: CPT | Performed by: NURSE PRACTITIONER

## 2025-09-03 RX ORDER — PREDNISONE 10 MG/1
10 TABLET ORAL DAILY
Qty: 10 TABLET | Refills: 0 | Status: SHIPPED | OUTPATIENT
Start: 2025-09-03 | End: 2025-09-13

## 2025-09-03 RX ORDER — AZITHROMYCIN 250 MG/1
TABLET, FILM COATED ORAL
Qty: 6 TABLET | Refills: 0 | Status: SHIPPED | OUTPATIENT
Start: 2025-09-03 | End: 2025-09-13

## 2025-09-03 RX ORDER — BUPRENORPHINE HYDROCHLORIDE 900 UG/1
900 FILM, SOLUBLE BUCCAL 2 TIMES DAILY
Qty: 60 EACH | Refills: 0 | Status: SHIPPED | OUTPATIENT
Start: 2025-09-11 | End: 2025-10-11

## 2025-09-03 RX ORDER — DEXAMETHASONE SODIUM PHOSPHATE 10 MG/ML
10 INJECTION, SOLUTION INTRA-ARTICULAR; INTRALESIONAL; INTRAMUSCULAR; INTRAVENOUS; SOFT TISSUE ONCE
Status: COMPLETED | OUTPATIENT
Start: 2025-09-03 | End: 2025-09-03

## 2025-09-03 RX ORDER — METHOCARBAMOL 750 MG/1
750 TABLET, FILM COATED ORAL EVERY 8 HOURS PRN
Qty: 90 TABLET | Refills: 0 | Status: SHIPPED | OUTPATIENT
Start: 2025-09-03

## 2025-09-03 RX ADMIN — DEXAMETHASONE SODIUM PHOSPHATE 10 MG: 10 INJECTION, SOLUTION INTRA-ARTICULAR; INTRALESIONAL; INTRAMUSCULAR; INTRAVENOUS; SOFT TISSUE at 10:22

## 2025-09-03 ASSESSMENT — PATIENT HEALTH QUESTIONNAIRE - PHQ9
SUM OF ALL RESPONSES TO PHQ QUESTIONS 1-9: 10
SUM OF ALL RESPONSES TO PHQ QUESTIONS 1-9: 10
9. THOUGHTS THAT YOU WOULD BE BETTER OFF DEAD, OR OF HURTING YOURSELF: NOT AT ALL
6. FEELING BAD ABOUT YOURSELF - OR THAT YOU ARE A FAILURE OR HAVE LET YOURSELF OR YOUR FAMILY DOWN: NOT AT ALL
10. IF YOU CHECKED OFF ANY PROBLEMS, HOW DIFFICULT HAVE THESE PROBLEMS MADE IT FOR YOU TO DO YOUR WORK, TAKE CARE OF THINGS AT HOME, OR GET ALONG WITH OTHER PEOPLE: NOT DIFFICULT AT ALL
3. TROUBLE FALLING OR STAYING ASLEEP: NEARLY EVERY DAY
5. POOR APPETITE OR OVEREATING: SEVERAL DAYS
4. FEELING TIRED OR HAVING LITTLE ENERGY: NEARLY EVERY DAY
8. MOVING OR SPEAKING SO SLOWLY THAT OTHER PEOPLE COULD HAVE NOTICED. OR THE OPPOSITE, BEING SO FIGETY OR RESTLESS THAT YOU HAVE BEEN MOVING AROUND A LOT MORE THAN USUAL: NOT AT ALL
7. TROUBLE CONCENTRATING ON THINGS, SUCH AS READING THE NEWSPAPER OR WATCHING TELEVISION: NOT AT ALL
SUM OF ALL RESPONSES TO PHQ QUESTIONS 1-9: 10
2. FEELING DOWN, DEPRESSED OR HOPELESS: NOT AT ALL
SUM OF ALL RESPONSES TO PHQ QUESTIONS 1-9: 10
1. LITTLE INTEREST OR PLEASURE IN DOING THINGS: NEARLY EVERY DAY

## 2025-09-03 ASSESSMENT — ENCOUNTER SYMPTOMS
COUGH: 1
GASTROINTESTINAL NEGATIVE: 1
WHEEZING: 1
GASTROINTESTINAL NEGATIVE: 1
EYES NEGATIVE: 1
BACK PAIN: 1

## 2025-09-06 ASSESSMENT — ENCOUNTER SYMPTOMS
RHINORRHEA: 1
COUGH: 1

## (undated) DEVICE — DRSNG TELFA PAD NONADH STR 1S 3X8IN

## (undated) DEVICE — YANKAUER,TAPERED BULBOUS TIP,W/O VENT: Brand: MEDLINE

## (undated) DEVICE — SET CATH 20GA L1.75IN RAD ART POLYUR RADPQ W/ INTEGR

## (undated) DEVICE — KIT INTRO 8.5FR L4IN PERC POLYUR SHTH RADPQ W/ INTEGR

## (undated) DEVICE — CABLE THRESHOLD DISP FOR PACE ANALZR MERLIN

## (undated) DEVICE — SYR CATH/TIP 50ML 2OZ STRL 1P/U

## (undated) DEVICE — GLV SURG BIOGEL M LTX PF 8

## (undated) DEVICE — PENCIL CAUT PUSH BTTN W HOLSTER AND CRD 15FT

## (undated) DEVICE — HI-TORQUE WHISPER ES GUIDE WIRE .014 J TIP 3.0 CM X 190 CM: Brand: HI-TORQUE WHISPER

## (undated) DEVICE — CODMAN® SURGICAL PATTIES 1" X 1" (2.54CM X 2.54CM): Brand: CODMAN®

## (undated) DEVICE — NEURO CDS

## (undated) DEVICE — MEDI-VAC YANKAUER SUCTION HANDLE W/BULBOUS TIP: Brand: CARDINAL HEALTH

## (undated) DEVICE — E-Z CLEAN, NON-STICK, PTFE COATED, ELECTROSURGICAL BLADE ELECTRODE, MODIFIED EXTENDED INSULATION, 2.5 INCH (6.35 CM): Brand: MEGADYNE

## (undated) DEVICE — BAG,DRAINAGE,4L,A/R TOWER,LL,SLIDE TAP: Brand: MEDLINE

## (undated) DEVICE — CONNECTOR DRNGE W3/8-0.5XH3/16XL3/16IN 2:1 SIL CARD STR

## (undated) DEVICE — VESSEL SHUNT 1.50MM TAPERED TIP, 12MM SHAFT: Brand: SOF-FLO ATRAUMATIC CORONARY ARTERY SHUNT

## (undated) DEVICE — SOLUTION IV IRRIG WATER 1000ML POUR BRL 2F7114

## (undated) DEVICE — CATHETER DIAG 6FR L100CM SPEC CRV STYL LCB DXTERITY

## (undated) DEVICE — HYDROGEL COATED LATEX FOLEY CATHETER, 5 CC, 3-WAY, 20 FR (6.7 MM): Brand: DOVER

## (undated) DEVICE — CVR BRD ARM 13X30

## (undated) DEVICE — Device

## (undated) DEVICE — 60 ML SYRINGE,TOOMEY TYPE: Brand: MONOJECT

## (undated) DEVICE — TIBURON LAPAROTOMY DRAPE: Brand: CONVERTORS

## (undated) DEVICE — SUTURE VCRL SZ 3-0 L18IN ABSRB UD L26MM SH 1/2 CIR J864D

## (undated) DEVICE — E-Z CLEAN, NON-STICK, PTFE COATED, ELECTROSURGICAL BLADE ELECTRODE, MODIFIED EXTENDED INSULATION, 6.5 INCH (16.5 CM): Brand: MEGADYNE

## (undated) DEVICE — ELECTRD BLD EXT EDGE/INSUL 6IN

## (undated) DEVICE — LOOP VES VASCO 18 G SIL W/ BLNT NDL

## (undated) DEVICE — CATH BLLN ANGIO 2.25X12MM SC EUPHORA RX

## (undated) DEVICE — BLADE CLP TAPR HD WET DRY CAPABILITY GTT IN CHARGING USE

## (undated) DEVICE — DRESSING FOAM W4XL12IN SIL RECT ADH WTRPRF FLM BK W/ BORD

## (undated) DEVICE — FORCEP BPLR IRIS

## (undated) DEVICE — MEDI-TRACE CADENCE ADULT DEFIBRILLATION ELECTRODE (10 PR/PK) - PHYSIO-CONTROL: Brand: MEDI-TRACE CADENCE

## (undated) DEVICE — DRAIN SURG 19FR SIL RND HUBLESS W/ 0.25IN BEND TRCR BLAK

## (undated) DEVICE — 3M™ STERI-STRIP™ REINFORCED ADHESIVE SKIN CLOSURES, R1546, 1/4 IN X 4 IN (6 MM X 100 MM), 10 STRIPS/ENVELOPE: Brand: 3M™ STERI-STRIP™

## (undated) DEVICE — INFLATION DEVICE KIT: Brand: ENCORE™ 26 ADVANTAGE KIT

## (undated) DEVICE — AGENT HEMSTAT 8ML FLX TIP MTRX + DISP SURGIFLO

## (undated) DEVICE — WAX SURG 2.5GM HEMSTAT BNE BEESWAX PARAFFIN ISO PALMITATE

## (undated) DEVICE — GOWN,PREVENTION PLUS,L,ST,24/CS: Brand: MEDLINE

## (undated) DEVICE — GOWN,PREVENTION PLUS,XL,ST,24/CS: Brand: MEDLINE

## (undated) DEVICE — CATH URETRL OPN/END 5F70CM

## (undated) DEVICE — ADAPT CYSTO FOR SYR TO SHEATH

## (undated) DEVICE — MINOR CDS: Brand: MEDLINE INDUSTRIES, INC.

## (undated) DEVICE — SUT SILK 0 TIES 30IN A306H

## (undated) DEVICE — LOU CYSTO: Brand: MEDLINE INDUSTRIES, INC.

## (undated) DEVICE — ACCESSORY TOWEL PACK: Brand: MEDLINE INDUSTRIES, INC.

## (undated) DEVICE — TUBING, SUCTION, 1/4" X 20', STRAIGHT: Brand: MEDLINE INDUSTRIES, INC.

## (undated) DEVICE — PK TURNOVER CYSTO RM

## (undated) DEVICE — SUTURE PERMAHAND SZ 2-0 L18IN NONABSORBABLE BLK L26MM FS 685G

## (undated) DEVICE — LARYNGOSCOPE VID MILLER 2 MTL BLADE M HNDL CURAPLEX

## (undated) DEVICE — MICRO KOVER: Brand: UNBRANDED

## (undated) DEVICE — SUTURE NONABSORBABLE MONOFILAMENT 6-0 RB-2 4X30 IN PROLENE M8711

## (undated) DEVICE — BLADE SAW W6.35XL32MM STRNM CUT STRNOTMY

## (undated) DEVICE — STPLR SKIN VISISTAT WD 35CT

## (undated) DEVICE — TOOL 14BA20 LEGEND 14CM 2MM BA: Brand: MIDAS REX ™

## (undated) DEVICE — SUTURE ETHLN SZ 2-0 L30IN NONABSORBABLE BLK L36MM FSLX 3/8 1674H

## (undated) DEVICE — ST FLD IRR WARM

## (undated) DEVICE — SUTURE VCRL SZ 1 L18IN ABSRB UD L36MM CT-1 1/2 CIR J841D

## (undated) DEVICE — SUT MNCRYL 4/0 RB1 27IN Y214H

## (undated) DEVICE — POOLE SUCTION HANDLE: Brand: CARDINAL HEALTH

## (undated) DEVICE — SUTURE VCRL SZ 2-0 L18IN ABSRB UD CT-1 L36MM 1/2 CIR J839D

## (undated) DEVICE — GLV SURG BIOGEL LTX PF 7

## (undated) DEVICE — GOWN,NON-REINFORCED,SIRUS,SET IN SLV,XXL: Brand: MEDLINE

## (undated) DEVICE — BAG BND W36XL36IN TRNSPAR POLY GEN PURP W E BND CLSR TIDI

## (undated) DEVICE — E-Z CLEAN, NON-STICK, PTFE COATED, ELECTROSURGICAL BLADE ELECTRODE, MODIFIED EXTENDED INSULATION, 4 INCH (10.2 CM): Brand: MEGADYNE

## (undated) DEVICE — WIPE THERAWASH SLV SPEC CARE 2PK

## (undated) DEVICE — ELECTRD LP CUT 24/26F YEL

## (undated) DEVICE — TIDISHIELD UROLOGY DRAIN BAGS FROSTY VINYL STERILE FITS SIEMENS UROSKOP ACCESS 20 PER CASE: Brand: TIDISHIELD

## (undated) DEVICE — UROSTOMY KIT, FLEXTEND: Brand: NEW IMAGE

## (undated) DEVICE — SUT GUT CHRM 4/0 SH 27IN G121H

## (undated) DEVICE — PLEDGET SURG W3.5XL7MM THK1.5MM WHT PTFE RECT SFT TFE

## (undated) DEVICE — SPONGE SURG WHT KTNR DISECT RADPQ ST

## (undated) DEVICE — Device: Brand: RETRACT-O-TAPE 18G X 30.5CM SEMI-POINTED NEEDLE

## (undated) DEVICE — SS SUTURE, 3 PER SLEEVE: Brand: MYO/WIRE II

## (undated) DEVICE — CATHETER THRMDIL 7.5FR L110CM PROXIMAL/DISTAL PRT L30CM STD

## (undated) DEVICE — GLIDESHEATH SLENDER STAINLESS STEEL KIT: Brand: GLIDESHEATH SLENDER

## (undated) DEVICE — PEN: MARKING STD 100/CS: Brand: MEDICAL ACTION INDUSTRIES

## (undated) DEVICE — LARYNGOSCOPE BLDE MAC HNDL M SZ 35 ST CURAPLEX CURAVIEW LED

## (undated) DEVICE — SUT SILK 3/0 SUTUPAK TIES 24IN SA74H

## (undated) DEVICE — ANTIBACTERIAL UNDYED BRAIDED (POLYGLACTIN 910), SYNTHETIC ABSORBABLE SUTURE: Brand: COATED VICRYL

## (undated) DEVICE — GLOVE SURG SZ 75 L12IN FNGR THK94MIL TRNSLUC YEL LTX

## (undated) DEVICE — SOLUTION IV 1000ML 0.9% SOD CHL PH 5 INJ USP VIAFLX PLAS

## (undated) DEVICE — C-ARMOR C-ARM EQUIPMENT COVERS CLEAR STERILE UNIVERSAL FIT 12 PER CASE: Brand: C-ARMOR

## (undated) DEVICE — DRAIN JACKSON PRATT 10FR 1/8END: Brand: CARDINAL HEALTH

## (undated) DEVICE — JP 3-SPRING RES W/15FR PVC DRAIN/TR: Brand: CARDINAL HEALTH

## (undated) DEVICE — KIT ANGIO W/ AT P65 PREM HND CTRL FOR CNTRST DEL ANGIOTOUCH

## (undated) DEVICE — TOWEL,OR,DSP,ST,BLUE,DLX,4/PK,20PK/CS: Brand: MEDLINE

## (undated) DEVICE — CLTH CLENS READYCLEANSE PERI CARE PK/5

## (undated) DEVICE — GLOVE SURG SZ 8 L12IN FNGR THK79MIL GRN LTX FREE

## (undated) DEVICE — CATHETER DIAG 6FR L100CM LUMN ID0.056IN JL4 CRV 0 SIDE H

## (undated) DEVICE — JP 3-SPRING RES W/10FR PVC DRAIN/TR: Brand: CARDINAL HEALTH

## (undated) DEVICE — GW SENSR DUALFLEX NITNL STR .038IN 3X150CM

## (undated) DEVICE — PENCL E/S HNDSWCH ROCKR CB

## (undated) DEVICE — DRSNG WND BORDR/ADHS NONADHR/GZ LF 4X14IN STRL

## (undated) DEVICE — GUIDEWIRE VASC L260CM DIA0038IN TIP L3MM PTFE J TIP FIX COR

## (undated) DEVICE — SOLUTION IV 500ML 0.9% SOD CHL PH 5 INJ USP VIAFLX PLAS

## (undated) DEVICE — SUTURE PROL SZ 7-0 L24IN NONABSORBABLE BLU L9.3MM BV-1 3/8 M8702

## (undated) DEVICE — Device: Brand: CLEANCUT ROTATING AORTIC PUNCH

## (undated) DEVICE — DRSNG WND GZ PAD BORDERED 4X8IN STRL

## (undated) DEVICE — MAYFIELD® DISPOSABLE ADULT SKULL PIN (STAINLESS STEEL): Brand: MAYFIELD®

## (undated) DEVICE — Z INACTIVE USE 2662641 SOLUTION IV 1000ML 140MEQ/L SOD 5MEQ/L K 3MEQ/L MG 27MEQ/L

## (undated) DEVICE — DRAIN SURG SGL COLL PT TB FOR ATS BG OASIS

## (undated) DEVICE — TR BAND RADIAL ARTERY COMPRESSION DEVICE: Brand: TR BAND

## (undated) DEVICE — DEVICE RESUS AD TB L40IN SELF INFL MASK TEXT BG DBL SWVL EL

## (undated) DEVICE — SOLUTION IV 250ML 0.9% SOD CHL PH 5 INJ USP VIAFLX PLAS

## (undated) DEVICE — Device: Brand: NOMOLINE™ LH ADULT NASAL CO2 CANNULA WITH O2 4M

## (undated) DEVICE — ELECTRD BLD EXT EDGE 1P COAT 6.5IN STRL

## (undated) DEVICE — 100% SILICONE FOLEY CATHETER,30 ML, 3-WAY: Brand: DOVER

## (undated) DEVICE — SYSTEM SKIN CLSR 22CM DERMBND PRINEO

## (undated) DEVICE — SUT VIC 0 CT1 36IN J946H

## (undated) DEVICE — JELLY,LUBE,STERILE,FLIP TOP,TUBE,4-OZ: Brand: MEDLINE

## (undated) DEVICE — SURGICAL PROCEDURE PACK OPN HRT LOURDES HOSP

## (undated) DEVICE — COVER TRANSDUCER TELESCOPICALLY FOLDED 3.5X36 IN CIV-FLEX

## (undated) DEVICE — CODMAN® SURGICAL PATTIES 1/2" X 3" (1.27CM X 7.62CM): Brand: CODMAN®

## (undated) DEVICE — Z DUP USE 2537558 SYSTEM ENDOSCP VES HARV VASO VW HEMOPRO

## (undated) DEVICE — PAD GRND REM POLYHESIVE A/ DISP

## (undated) DEVICE — CATH URETRL PA OLIVE TP 5F

## (undated) DEVICE — PK CYSTO 30

## (undated) DEVICE — COLOSTOMY/ILEOSTOMY KIT, FLEXWEAR: Brand: NEW IMAGE

## (undated) DEVICE — DRAIN SURG L3/8-1/2IN DIA3/16IN SIL CARD CONN 1:1 BLAK

## (undated) DEVICE — Z DUP USE 2266075 STABILIZER SURG VAC STD BLDE ACCESSRAIL PLATFRM SUCT POD

## (undated) DEVICE — DRN WND JP RND W TROC SIL 15F 3/16IN

## (undated) DEVICE — ENDO KIT,LOURDES HOSPITAL: Brand: MEDLINE INDUSTRIES, INC.

## (undated) DEVICE — VESSEL LOOPS X-RAY DETECTABLE: Brand: DEROYAL

## (undated) DEVICE — ENSEAL 20 CM SHAFT, LARGE JAW: Brand: ENSEAL X1

## (undated) DEVICE — INSTRUMENT 876-443 ATL13MM DRLBIT TRIFLT

## (undated) DEVICE — CATHETER COR DIAG JUDKINS L 3.5 CRV 6FR 100CM 0 SIDE H

## (undated) DEVICE — BLADE LARYNSCP SZ 3 TI DISP GLIDESCOPE LOPRO (SEE COMMENT)

## (undated) DEVICE — SUTURE PROL SZ 5-0 L36IN NONABSORBABLE BLU L17MM RB-1 1/2 8556H

## (undated) DEVICE — GAUZE,SPONGE,4"X4",16PLY,XRAY,STRL,LF: Brand: MEDLINE

## (undated) DEVICE — CATH BLLN ANGIO 2.50X12MM SC EUPHORA RX

## (undated) DEVICE — DEVICE COMPR LNG 27 CM VASC BND

## (undated) DEVICE — Z DISCONTINUED USE 2423037 APPLICATOR MEDICATED 3 CC CHLORHEXIDINE GLUC 2% CHLORAPREP

## (undated) DEVICE — PK PROC MAJ 40

## (undated) DEVICE — TOOL T12MH25L LGD 12CM 2.5MM MATCH LG: Brand: MIDAS REX®

## (undated) DEVICE — IRRIGATOR BULB ASEPTO 60CC STRL

## (undated) DEVICE — ROYAL SILK SURGICAL GOWN, XXL: Brand: CONVERTORS

## (undated) DEVICE — SHEET,T,THYROID,STERILE: Brand: MEDLINE

## (undated) DEVICE — 3M™ TEGADERM™ TRANSPARENT FILM DRESSING FRAME STYLE, 1628, 6 IN X 8 IN (15 CM X 20 CM), 10/CT 8CT/CASE: Brand: 3M™ TEGADERM™

## (undated) DEVICE — CATHETER GUID 6FR L100CM DIA0.071IN NYL SHFT EBU3.5

## (undated) DEVICE — KIT DSG 26X15X1.6CM PD 1 PERF POLYUR FOAM THN DISP

## (undated) DEVICE — SOLUTION IV IRRIG POUR BRL 0.9% SODIUM CHL 2F7124

## (undated) DEVICE — PINNACLE INTRODUCER SHEATH: Brand: PINNACLE

## (undated) DEVICE — COVER LT HNDL CAM BLU DISP W/ SURG CTRL

## (undated) DEVICE — MASTISOL ADHESIVE LIQ 2/3ML

## (undated) DEVICE — Y-ADAPTOR OPT 125 KIT PG: Brand: NAMIC

## (undated) DEVICE — GLOVE SURG SZ 8 L11.6IN THK9.8MIL STRW LTX POLYMER BEAD CUF

## (undated) DEVICE — DEVICE TRAC UNIV AD W O CRD PD DISPOSABLE NK HD HALT DLX

## (undated) DEVICE — Z DISCONTINUED PER MEDLINE USE 2718072 DRESSING FOAM W5XL12.5CM SIL ADH THN BORDED CNFRM LO PROF

## (undated) DEVICE — SOLUTION CARDPLG H2O DIL 1000 ML CARD PERF VIAFLX

## (undated) DEVICE — DRILL BIT G3606010 2.4MM

## (undated) DEVICE — APPL CHLORAPREP W/TINT 26ML ORNG

## (undated) DEVICE — Z INACTIVE USE 2540311 LEAD PACE L475MM CHN A OR V MYOCARDIAL STEROID ELUT SIL

## (undated) DEVICE — JACKSON-PRATT 100CC BULB RESERVOIR: Brand: CARDINAL HEALTH

## (undated) DEVICE — SUT SILK 2/0 SUTUPAK TIES 24IN SA75H

## (undated) DEVICE — INSTRUMENT 7080902 PLATE HOLDING PIN

## (undated) DEVICE — JP CHANNEL DRAIN, 24FR HUBLESS: Brand: CARDINAL HEALTH

## (undated) DEVICE — KIT EVAC 0.13IN RECT TB DIA10FR 400CC PVC 3 SPR Y CONN DRN

## (undated) DEVICE — TUBE ET 7.5MM NSL ORAL BASIC CUF INTMED MURPHY EYE RADPQ

## (undated) DEVICE — TOTAL TRAY, 16FR 10ML SIL FOLEY, URN: Brand: MEDLINE

## (undated) DEVICE — 3M™ STERI-STRIP™ REINFORCED ADHESIVE SKIN CLOSURES, R1547, 1/2 IN X 4 IN (12 MM X 100 MM), 6 STRIPS/ENVELOPE: Brand: 3M™ STERI-STRIP™

## (undated) DEVICE — DRESSING FOAM W4XL5CM THN ABSRB SELF ADH W/ SAFETAC MEPILEX

## (undated) DEVICE — BLANKET THER AD W24XL60IN FAB COVERING SUP SFT ULT THN LTWT

## (undated) DEVICE — SUTURE PROL SZ 4-0 L36IN NONABSORBABLE BLU L17MM RB-1 1/2 M8557

## (undated) DEVICE — SUT SILK 2/0 FS BLK 18IN 685G

## (undated) DEVICE — CATHETER GUID 6FR GWIRE 0.071IN COR EXTRA BKUP SUPP 3.75

## (undated) DEVICE — KIT BLWR MISTER 5P 15L W/ TBNG SET IRRIG MIST TO IMPROVE

## (undated) DEVICE — CATHETER DIAG 6FR L100CM LUMN ID0.056IN JR4 CRV 0 SIDE H

## (undated) DEVICE — CATHETER GUID 6FR DIA0.071IN SHFT NYL STD JL 4 CRV ENH VIS

## (undated) DEVICE — KIT SHTH INTRO 8.5FR L10CM PERC POLYUR INTEGR HEMSTAS VLV

## (undated) DEVICE — KIT MFLD ISOLATN NACL CNTRST PRT TBNG SPIK W/ PRSS TRNSDUC

## (undated) DEVICE — DRSNG WND BORDR/ADHS NONADHR/GZ LF 4X4IN STRL

## (undated) DEVICE — CATHETER GUID 6FR L100CM DIA0.071IN NYL SHFT AL1.0 W/O SIDE